# Patient Record
Sex: MALE | Race: WHITE | NOT HISPANIC OR LATINO | Employment: OTHER | URBAN - METROPOLITAN AREA
[De-identification: names, ages, dates, MRNs, and addresses within clinical notes are randomized per-mention and may not be internally consistent; named-entity substitution may affect disease eponyms.]

---

## 2017-08-24 ENCOUNTER — OFFICE VISIT (OUTPATIENT)
Dept: CARDIOLOGY | Facility: CLINIC | Age: 67
End: 2017-08-24
Payer: MEDICARE

## 2017-08-24 VITALS
SYSTOLIC BLOOD PRESSURE: 168 MMHG | DIASTOLIC BLOOD PRESSURE: 90 MMHG | HEIGHT: 66 IN | HEART RATE: 68 BPM | WEIGHT: 233 LBS | BODY MASS INDEX: 37.45 KG/M2

## 2017-08-24 DIAGNOSIS — G47.33 OBSTRUCTIVE SLEEP APNEA SYNDROME: ICD-10-CM

## 2017-08-24 DIAGNOSIS — I25.10 MILD CAD: ICD-10-CM

## 2017-08-24 DIAGNOSIS — I11.9 HYPERTENSIVE HEART DISEASE WITHOUT HEART FAILURE: Primary | ICD-10-CM

## 2017-08-24 DIAGNOSIS — I34.0 NON-RHEUMATIC MITRAL REGURGITATION: ICD-10-CM

## 2017-08-24 DIAGNOSIS — E78.5 DYSLIPIDEMIA: ICD-10-CM

## 2017-08-24 PROCEDURE — 99999 PR PBB SHADOW E&M-NEW PATIENT-LVL III: CPT | Mod: PBBFAC,,, | Performed by: INTERNAL MEDICINE

## 2017-08-24 PROCEDURE — 99204 OFFICE O/P NEW MOD 45 MIN: CPT | Mod: S$PBB,,, | Performed by: INTERNAL MEDICINE

## 2017-08-24 PROCEDURE — 1159F MED LIST DOCD IN RCRD: CPT | Mod: ,,, | Performed by: INTERNAL MEDICINE

## 2017-08-24 PROCEDURE — 1126F AMNT PAIN NOTED NONE PRSNT: CPT | Mod: ,,, | Performed by: INTERNAL MEDICINE

## 2017-08-24 PROCEDURE — 99203 OFFICE O/P NEW LOW 30 MIN: CPT | Mod: PBBFAC,PO | Performed by: INTERNAL MEDICINE

## 2017-08-24 RX ORDER — OMEPRAZOLE 40 MG/1
40 CAPSULE, DELAYED RELEASE ORAL DAILY
COMMUNITY
End: 2020-04-22

## 2017-08-24 RX ORDER — TERAZOSIN 5 MG/1
5 CAPSULE ORAL NIGHTLY
COMMUNITY
End: 2017-12-18 | Stop reason: DRUGHIGH

## 2017-08-24 RX ORDER — HYDRALAZINE HYDROCHLORIDE 50 MG/1
50 TABLET, FILM COATED ORAL 3 TIMES DAILY
Qty: 90 TABLET | Refills: 3 | Status: SHIPPED | OUTPATIENT
Start: 2017-08-24 | End: 2017-10-12 | Stop reason: SDUPTHER

## 2017-08-24 RX ORDER — TEMAZEPAM 22.5 MG/1
15 CAPSULE ORAL NIGHTLY PRN
COMMUNITY
End: 2018-12-28

## 2017-08-24 RX ORDER — AMITRIPTYLINE HYDROCHLORIDE 50 MG/1
50 TABLET, FILM COATED ORAL NIGHTLY
COMMUNITY
End: 2021-04-13 | Stop reason: SDUPTHER

## 2017-08-24 RX ORDER — FENOFIBRIC ACID 105 MG/1
1 TABLET ORAL DAILY
COMMUNITY
End: 2018-06-18 | Stop reason: ALTCHOICE

## 2017-08-24 RX ORDER — IRBESARTAN 300 MG/1
300 TABLET ORAL NIGHTLY
COMMUNITY
End: 2020-09-30

## 2017-08-24 RX ORDER — CARVEDILOL 12.5 MG/1
12.5 TABLET ORAL 2 TIMES DAILY WITH MEALS
Qty: 60 TABLET | Refills: 3 | Status: SHIPPED | OUTPATIENT
Start: 2017-08-24 | End: 2018-06-18 | Stop reason: SDUPTHER

## 2017-08-24 RX ORDER — POTASSIUM CHLORIDE 1.5 G/1.58G
20 POWDER, FOR SOLUTION ORAL 2 TIMES DAILY
COMMUNITY
End: 2021-04-13 | Stop reason: SDUPTHER

## 2017-08-24 RX ORDER — METFORMIN HYDROCHLORIDE 500 MG/1
500 TABLET ORAL
COMMUNITY
End: 2018-12-28

## 2017-08-24 RX ORDER — ASPIRIN 81 MG/1
81 TABLET ORAL DAILY
COMMUNITY
End: 2021-11-19

## 2017-08-24 RX ORDER — CARVEDILOL 25 MG/1
25 TABLET ORAL DAILY
COMMUNITY
Start: 2017-08-15 | End: 2017-08-24 | Stop reason: DRUGHIGH

## 2017-08-24 RX ORDER — AMLODIPINE BESYLATE 10 MG/1
5 TABLET ORAL DAILY
COMMUNITY
End: 2019-01-18 | Stop reason: SDUPTHER

## 2017-08-24 RX ORDER — HYDRALAZINE HYDROCHLORIDE 25 MG/1
25 TABLET, FILM COATED ORAL EVERY 12 HOURS
COMMUNITY
End: 2017-08-24 | Stop reason: DRUGHIGH

## 2017-08-24 RX ORDER — AA/PROT/LYSINE/METHIO/VIT C/B6 50-12.5 MG
10 TABLET ORAL DAILY
COMMUNITY
End: 2017-12-18

## 2017-08-24 RX ORDER — CLONIDINE HYDROCHLORIDE 0.1 MG/1
0.1 TABLET ORAL 2 TIMES DAILY
COMMUNITY
End: 2017-12-18

## 2017-08-24 RX ORDER — HYDROCHLOROTHIAZIDE 25 MG/1
25 TABLET ORAL DAILY
COMMUNITY
End: 2017-12-26 | Stop reason: SDUPTHER

## 2017-08-24 NOTE — PROGRESS NOTES
Subjective:    Patient ID:  Chris Hill is a 67 y.o. male who presents for Hypertension; Carotid Artery Disease; and Valvular Heart Disease      HPI  PT WAS FOLLOWED AT [facility], NEW TO THIS CLINIC, RECORDS REVIEWED, DR ABEL PCP WAS ADJUSTING MEDS, THEN SAW CARDIOLOGIST DR CIFUENTES AT [facility], CHANGED TOPROL TO COREG, CATH 7/2016 MINIMAL CAD, CHRONIC HA, BP UP, LOG NOTED,SEE ROS  Past Medical History:   Diagnosis Date    Coronary artery disease     mild    Diabetes mellitus     Heart murmur     Hyperlipidemia     Hypertension     Sleep apnea     NOT USING [facility]    Valvular regurgitation      Past Surgical History:   Procedure Laterality Date    ANGIOPLASTY      CARDIAC CATHETERIZATION       Family History   Problem Relation Age of Onset    Heart disease Mother     Heart disease Father      Social History     Social History    Marital status:      Spouse name: N/A    Number of children: N/A    Years of education: N/A     Social History Main Topics    Smoking status: Never Smoker    Smokeless tobacco: Never Used    Alcohol use No    Drug use: No    Sexual activity: Not Asked     Other Topics Concern    None     Social History Narrative    None       Review of patient's allergies indicates:  No Known Allergies    Current Outpatient Prescriptions:     amitriptyline (ELAVIL) 50 MG tablet, Take 50 mg by mouth every evening., Disp: , Rfl:     amlodipine (NORVASC) 10 MG tablet, Take 5 mg by mouth once daily., Disp: , Rfl:     aspirin (ECOTRIN) 81 MG EC tablet, Take 81 mg by mouth once daily., Disp: , Rfl:     carvedilol (COREG) 25 MG tablet, Take 25 mg by mouth once daily. , Disp: , Rfl:     cloNIDine (CATAPRES) 0.1 MG tablet, Take 0.1 mg by mouth 2 (two) times daily., Disp: , Rfl:     coenzyme Q10 10 mg capsule, Take 10 mg by mouth once daily., Disp: , Rfl:     fenofibric acid 105 mg Tab, Take 1 tablet by mouth once daily., Disp: , Rfl:     hydrochlorothiazide (HYDRODIURIL) 25 MG tablet,  Take 25 mg by mouth once daily., Disp: , Rfl:     irbesartan (AVAPRO) 300 MG tablet, Take 300 mg by mouth every evening., Disp: , Rfl:     metformin (GLUCOPHAGE) 500 MG tablet, Take 500 mg by mouth daily with breakfast., Disp: , Rfl:     omeprazole (PRILOSEC) 40 MG capsule, Take 40 mg by mouth once daily., Disp: , Rfl:     potassium chloride (KLOR-CON) 20 mEq Pack, Take 20 mEq by mouth once daily., Disp: , Rfl:     temazepam (RESTORIL) 22.5 MG capsule, Take 15 mg by mouth nightly as needed for Insomnia., Disp: , Rfl:     terazosin (HYTRIN) 5 MG capsule, Take 5 mg by mouth every evening., Disp: , Rfl:     hydrALAZINE (APRESOLINE) 50 MG tablet, Take 1 tablet (50 mg total) by mouth 3 (three) times daily., Disp: 90 tablet, Rfl: 3    Review of Systems   Constitution: Negative for chills, diaphoresis, weakness, malaise/fatigue and night sweats.   HENT: Positive for headaches. Negative for congestion, hearing loss and nosebleeds.    Eyes: Negative for blurred vision, discharge, double vision and visual disturbance.   Cardiovascular: Negative for chest pain, claudication, cyanosis, dyspnea on exertion (MILD), irregular heartbeat, leg swelling, near-syncope, orthopnea, palpitations, paroxysmal nocturnal dyspnea and syncope.   Respiratory: Positive for snoring. Negative for cough, hemoptysis, sleep disturbances due to breathing, sputum production and wheezing.    Endocrine: Negative for cold intolerance, heat intolerance and polyuria.   Hematologic/Lymphatic: Negative for adenopathy. Does not bruise/bleed easily.   Skin: Negative for color change, itching and nail changes.   Musculoskeletal: Positive for joint pain. Negative for back pain (CHRONIC), falls and stiffness.   Gastrointestinal: Negative for change in bowel habit (METAMUCIL), dysphagia, heartburn, hematemesis, jaundice, melena and vomiting. Abdominal pain: SOME.   Genitourinary: Negative for dysuria, flank pain and frequency.   Neurological: Negative for  "brief paralysis, difficulty with concentration, disturbances in coordination, dizziness, focal weakness, light-headedness, loss of balance, numbness, paresthesias, seizures, sensory change and tremors.   Psychiatric/Behavioral: Negative for altered mental status, depression, memory loss and substance abuse. The patient is not nervous/anxious.    Allergic/Immunologic: Negative for persistent infections.        Objective:      Vitals:    08/24/17 0831   BP: (!) 203/93   Pulse: 68   Weight: 105.7 kg (233 lb 0.4 oz)   Height: 5' 6" (1.676 m)   PainSc: 0-No pain     Body mass index is 37.61 kg/m².    Physical Exam   Constitutional: He is oriented to person, place, and time. He appears well-developed and well-nourished. He is active.   OBESE   HENT:   Head: Normocephalic and atraumatic.   Mouth/Throat: Oropharynx is clear and moist and mucous membranes are normal.   Eyes: Conjunctivae and EOM are normal. Pupils are equal, round, and reactive to light.   Neck: Normal range of motion. Neck supple. Normal carotid pulses, no hepatojugular reflux and no JVD present. Carotid bruit is not present. No tracheal deviation, no edema and no erythema present. No thyromegaly present.   Cardiovascular: Normal rate and regular rhythm.   No extrasystoles are present. PMI is not displaced.  Exam reveals no gallop, no distant heart sounds, no friction rub and no midsystolic click.    Murmur heard.  High-pitched blowing holosystolic murmur is present  at the apex  Pulses:       Carotid pulses are 2+ on the right side, and 2+ on the left side.       Radial pulses are 2+ on the right side, and 2+ on the left side.        Femoral pulses are 2+ on the right side, and 2+ on the left side.       Dorsalis pedis pulses are 2+ on the right side, and 2+ on the left side.        Posterior tibial pulses are 2+ on the right side, and 2+ on the left side.   Pulmonary/Chest: Effort normal and breath sounds normal. No accessory muscle usage. No tachypnea " and no bradypnea. No respiratory distress.   Abdominal: Soft. Bowel sounds are normal. He exhibits no distension and no mass. There is no hepatosplenomegaly. There is no tenderness. There is no CVA tenderness.   Musculoskeletal: Normal range of motion. He exhibits no edema or deformity.   Lymphadenopathy:     He has no cervical adenopathy.   Neurological: He is alert and oriented to person, place, and time. He has normal strength. He displays no tremor. No cranial nerve deficit. He exhibits normal muscle tone. Coordination normal.   Skin: Skin is warm and dry. No cyanosis or erythema. No pallor.   Psychiatric: He has a normal mood and affect. His speech is normal and behavior is normal. Judgment and thought content normal.               ..    Chemistry        Component Value Date/Time     06/09/2010 1215    K 4.2 06/09/2010 1215     06/09/2010 1215    CO2 30 (H) 06/09/2010 1215    BUN 19 06/09/2010 1215    CREATININE 1.1 06/09/2010 1215     06/09/2010 1215        Component Value Date/Time    CALCIUM 9.6 06/09/2010 1215    ALKPHOS 73 06/09/2010 1215    AST 30 06/09/2010 1215    ALT 38 06/09/2010 1215    BILITOT 0.3 06/09/2010 1215    ESTGFRAFRICA >60 06/09/2010 1215    EGFRNONAA >60 06/09/2010 1215            ..No results found for: CHOL  No results found for: HDL  No results found for: LDLCALC  No results found for: TRIG  No results found for: CHOLHDL  ..No results found for: WBC, HGB, HCT, MCV, PLT    Test(s) Reviewed  I have reviewed the following in detail:  [] Stress test   [] Angiography   [] Echocardiogram   [] Labs   [x] Other:       Assessment:         ICD-10-CM ICD-9-CM   1. Hypertensive heart disease without heart failure I11.9 402.90   2. Obstructive sleep apnea syndrome G47.33 327.23   3. Mild CAD I25.10 414.00   4. Non-rheumatic mitral regurgitation I34.0 424.0   5. Obesity, Class II, BMI 35.0-39.9, with comorbidity (see actual BMI) E66.9 278.00   6. Dyslipidemia E78.5 272.4      Problem List Items Addressed This Visit        Pulmonary    Obstructive sleep apnea syndrome       Cardiac/Vascular    Hypertensive heart disease without heart failure - Primary    Non-rheumatic mitral regurgitation    Mild CAD    Dyslipidemia       Endocrine    Obesity, Class II, BMI 35.0-39.9, with comorbidity (see actual BMI)      Other Visit Diagnoses    None.          Plan:         GET LABS FROM PCP /[facility], INCREASE HYDRALAZINE, NEEDS TO USE CPAP (NEEDED 14 CM ), REFER BACK TO DR HARDIN, EXPLAINED RELATION WITH BP, RISKS, ALL OTHER CV CLINICALLY STABLE, NO ANGINA, NO HF, NO TIA, NO CLINICAL ARRHYTHMIA,, EDUCATION, DIET, EXERCISE, WEIGHT LOSS,COREG SHOULD BE TWICE/DAYS, , EXPLAINED PLAN TO PT/ WIFE,  RTC IN 4 MO WITH CMP  Hypertensive heart disease without heart failure    Obstructive sleep apnea syndrome    Mild CAD    Non-rheumatic mitral regurgitation    Obesity, Class II, BMI 35.0-39.9, with comorbidity (see actual BMI)    Dyslipidemia    Other orders  -     hydrALAZINE (APRESOLINE) 50 MG tablet; Take 1 tablet (50 mg total) by mouth 3 (three) times daily.  Dispense: 90 tablet; Refill: 3    RTC Low level/low impact aerobic exercise 5x's/wk. Heart healthy diet and risk factor modification.    See labs and med orders.    Aerobic exercise 5x's/wk. Heart healthy diet and risk factor modification.    See labs and med orders.

## 2017-08-24 NOTE — PATIENT INSTRUCTIONS
Taking Aspirin for Atherosclerosis       Aspirin is a medicine often prescribed to treat atherosclerosis. This condition affects your arteries. These are the blood vessels that carry blood away from your heart. Having atherosclerosis means youre at greater risk of a heart attack or stroke. Aspirin can help prevent these from happening.  How atherosclerosis affects your arteries   A fatty material (plaque) can build up in your arteries. This makes it harder for blood to flow through them. A blood clot can then form on the plaque. This may block the artery, cutting off blood flow. This can cause conditions such as coronary artery disease (CAD) and peripheral arterial disease (PAD):  · CAD occurs when plaque builds up in the coronary artery. This artery supplies the heart with oxygen-rich blood.  · PAD occurs when plaque forms in leg arteries.  The same things that cause CAD and PAD can also cause plaque to form in other arteries in your body, such as those in the brain. When plaque occurs in any of these arteries, it raises your risk of heart attack or stroke.  What aspirin does  Aspirin is a blood-thinner (antiplatelet medicine). It helps keep blood clots from forming. This reduces the risk of blockage. Aspirin can be taken daily if you are at high risk of or have already had a heart attack or stroke. It is also used after a procedure called a stent placement. This is when a tiny wire mesh tube, or stent, is placed in an artery to keep it open. Aspirin helps prevent blood clots from forming on the stent.  Taking aspirin safely  Tell your healthcare provider about any medicines you are taking. This includes:  · All prescription medicines  · Over-the-counter medicines  · Herbs, vitamins, and other supplements  Also mention if you have a history of ulcers or bleeding problems. Ask whether you will need to stop taking aspirin before having surgery or dental work. Always take medicines as directed.  Tips for taking  "aspirin  · Have a routine. For example, take aspirin with the same meal each day.  · Dont take more than prescribed. A low dose gives the same benefit as a higher one, with a lower risk of side effects.  · Dont skip doses. Aspirin needs to be taken each day to work well.  · Keep track of what you take. A pillbox with days of the week can help, especially if you take several medicines. Or use a list or chart to keep track.  When to call your healthcare provider  Side effects of aspirin are not usually serious. If you do have problems, changing your dose may help. Call your healthcare provider if you have any of the following:  · Excessive bruising (some bruising is normal)  · Nosebleeds, bleeding gums, or other excessive bleeding  · An upset stomach or stomach pain   Date Last Reviewed: 6/1/2016 © 2000-2016 [employer]. [address] [place] [address]. All rights reserved. This information is not intended as a substitute for professional medical care. Always follow your healthcare professional's instructions.        Controlling Your Cholesterol  Cholesterol is a waxy substance. It travels in your blood through the blood vessels. When you have high cholesterol, it builds up in the walls of the blood vessels. This makes the vessels narrower. Blood flow decreases. You are then at greater risk for having a heart attack or a stroke.  Good and bad cholesterol  Lipids are fats. Blood is mostly water. Fat and water don't mix. So our bodies need lipoproteins (lipids inside a protein shell) to carry the lipids. The protein shell carries its lipids through the bloodstream. There are two main kinds of lipoproteins:  · LDL (low-density lipoprotein) is known as "bad cholesterol." It mainly carries cholesterol. It delivers this cholesterol to body cells. Excess LDL cholesterol will build up in artery walls. This increases your risk for heart disease and stroke.  · HDL (high-density lipoprotein) is known " "as "good cholesterol." This protein shell collects excess cholesterol that LDLs have left behind on blood vessel walls. That's why high levels of HDL cholesterol can decrease your risk of heart disease and stroke.  Controlling cholesterol levels  Total cholesterol includes LDL and HDL cholesterol, as well as other fats in the bloodstream. If your total cholesterol is high, follow the steps below to help lower your total cholesterol level:  · Eat less unhealthy fat:  ¨ Cut back on saturated fats and trans (also called hydrogenated) fats by selecting lean cuts of meat, low-fat dairy, and using oils instead of solid fats. Limit baked goods, processed meats, and fried foods. A diet thats high in these fats increases your bad cholesterol. It's not enough to just cut back on foods containing cholesterol.  ¨ Eat about 2 servings of fish per week. Most fish contain omega-3 fatty acids. These help lower blood cholesterol.  ¨ Eat more whole grains and soluble fiber (such as oat bran). These lower overall cholesterol.  · Be active:  ¨ Choose an activity you enjoy. Walking, swimming, and riding a bike are some good ways to be active.  ¨ Start at a level where you feel comfortable. Increase your time and pace a little each week.  ¨ Work up to 40 minutes of moderate to high intensity physical activity at least 3 to 4 days per week.  ¨ Remember, some activity is better than none.  ¨ If you haven't been exercising regularly, start slowly. Check with your doctor to make sure the exercise plan is right for you.  · Quit smoking. Quitting smoking can improve your lipid levels. It also lowers your risk for heart disease and stroke.  · Weight management. If you are overweight or obese, your health care provider will work with you to lose weight and lower your BMI (body mass index) to a normal or near-normal level. Making diet changes and increasing physical activity can help.  · Take medication as directed. Many people need medication " to get their LDL levels to a safe level. Medication to lower cholesterol levels is effective and safe. (But taking medication is not a substitute for exercise or watching your diet!) Your doctor can tell you whether you might benefit from a cholesterol-lowering medication.  Date Last Reviewed: 5/11/2015  © 2954-4423 [employer]. [address] [place] [address], [place] [address]. All rights reserved. This information is not intended as a substitute for professional medical care. Always follow your healthcare professional's instructions.        Continuous Positive Air Pressure (CPAP)  Continuous positive air pressure (CPAP) uses gentle air pressure to hold the airway open. CPAP is often the most effective treatment for sleep apnea and severe snoring. It works very well for many people. But keep in mind that it can take several adjustments before the setup is right for you.    How CPAP works  The CPAPmachine  is a small portable pump beside the bed. The pump sends air through a hose, which is held over your nose and mouth by a mask. Mild air pressure is gently pushed through your airway. The air pressure nudges sagging tissues aside. This widens the airway so you can breathe better. CPAP may be combined with other kinds of therapy for sleep apnea.     A mask over the nose gently directs air into the throat to keep the airway open.   Types of air pressure treatments  There are different types of CPAP. Your doctor or CPAP technician will help you decide which type is best for you:  · Basic CPAP keeps the pressure constant all night long.  · A bilevel device (BiPAP) provides more pressure when you breathe in and less when you breathe out. A BiPAP machine also may be set to provide automatic breaths to maintain breathing if you stop breathing while sleeping.  · An autoCPAP device automatically adjusts pressure throughout the night and in response to changes such as body position, sleep stage, and snoring.  Date Last  Reviewed: 8/10/2015  © 9518-5581 [employer]. [address] [place] [address], [place] [address]. All rights reserved. This information is not intended as a substitute for professional medical care. Always follow your healthcare professional's instructions.        Heart Disease Education    The heart beats 60 to 100 times per minute, 24 hours a day. This equals almost 1000,000 times a day. It pumps blood with oxygen and nutrients to the tissues and organs of the body. But the heart is a muscle and needs its own supply of blood. Blood flow to the heart is supplied by the coronary arteries. Coronary artery disease (atherosclerosis) is a result of cholesterol, saturated fat, and calcium deposits (plaques) that build up inside the walls. This causes inflammation within the coronary arteries. These plaques narrow the artery and reduce blood flow to the heart muscle. The reduction in blood flow to the heart muscle decreases oxygen supply to the heart. If the narrowing is significant enough, the oxygen supply to one or more regions of the heart can be temporarily or permanently shut down. This can cause chest pain, and possibly death of heart tissue (heart attack).  Types of chest pain  Angina is the name for pain in the heart muscle. Angina is a warning sign of serious heart disease. When untreated it can lead to a heart attack, also known as acute myocardial infarction, or AMI. Angina occurs when there is not enough blood and oxygen flowing to the heart for the amount of work it is doing. This most often happens during physical exertion, when the heart is working hardest. It is usually relieved by rest or nitroglycerin. Angina may also occur after a large meal when extra blood is sent to the digestive organs and less goes to the heart. In the case of advanced or unstable heart disease, angina can occur at rest or awaken you from sleep. Angina usually lasts from a few minutes up to 20 minutes or more. When treated  early, the effects of angina can be reversed without permanent damage to the heart. Angina is a serious condition and needs to be evaluated by a medical professional immediately.  There are two types of angina -- stable and unstable:  · Stable angina usually occurs with a predictable level of activity. Being stable, its character, severity, and occurrence do not change much over time. It usually starts with activity, and resolves with rest or taking your medicine as instructed by your doctor. The symptoms usually do not last long.  · Unstable angina changes or gets worse over time. It is different from whatever you are used to. It may feel different or worse, begin without cause, occur with exercise or exertion, wake you up from sleep, and last longer. It may not respond in the same way as it does when you take your usual medicines for an attack. This type of angina can be a warning sign of an impending heart attack.     A heart attack is usually the result of a blood clot that suddenly forms in a coronary artery that has been narrowed with plaque. When this occurs, blood flow may be cut off to a part of the heart muscle, causing the cells to die. This weakens the pumping action of the heart, which affects the delivery of blood to all the other organs in the body including the brain. This damage is not reversible. However, early treatment can limit the amount of damage.  The pain you feel with angina and a heart attack may have a similar quality. However, it is usually different in intensity and duration. Here are some typical descriptions of a heart attack:  · It is most often experienced as a squeezing, crushing, pressure-like sensation in the center of the chest.  · It is sometimes described as something heavy sitting on my chest.  · It may feel more like a bad case of indigestion.  · The pain may spread from the chest to the arm, shoulder, throat or jaw.  · Sometimes the pain is not felt in the chest at all,  "but only in the arm, shoulder, throat or jaw.  · There may also be nausea, vomiting, dizziness or light-headedness, sweating and trouble breathing.  · Palpitations, or your heart beating rapidly  · A new, irregular heart beat  · Unexplained weakness  You may not be able to tell the difference between "bad" angina and a heart attack at home. Seek help if your symptoms are different than usual. Do not be in denial or just try to "tough it out."  Call 911  This is the fastest and safest way to get to the emergency department. The paramedics can also start treatment on the way to the hospital, saving valuable time for your heart.  · If the angina gets worse, if it continues, or if it stops and returns, call 911 immediately. Do not delay. You may be having a heart attack.  · After you call 911, take a second tablet or spray unless instructed otherwise. When repeating doses, sit down if possible, because it can make you feel lightheaded or dizzy. Wait another 5 minutes. If the angina still does not go away, take a third tablet or spray. Do not take more than 3 tablets or sprays within 15 minutes. Stay on the phone with 911 for further instruction.  · Your healthcare provider may give you slightly different instructions than those above. If so, follow them carefully.  Do not wait until symptoms become severe to call 911.  Other reasons to call 911 include:  · Trouble breathing  · Feeling lightheaded, faint, or dizzy  · Rapid heart beat  · Slower than usual heart rate compared to your normal  · Angina with weakness, dizziness, fainting, heavy sweating, nausea, or vomiting  · Extreme drowsiness, confusion  · Weakness of an arm or leg or one side of the face  · Difficulty with speech or vision  When to seek medical care  Remember, the signs and symptoms of a heart attack are not always like they are on TV. Sometimes they are not so obvious. You may only feel weak, or just not right. If it is not clear or if you have any " "doubt, call for advice.  · Seek help if there is a change in the type of pain, if it feels different, or if your symptoms are mild.  · Do not drive yourself. Have someone else drive you. If no one can drive, call 911.  · Do not delay. Fast diagnosis and treatment can prevent or limit the amount of heart damage during a heart attack.  · Do not go to your doctor's office or a clinic as they may not be able to provide all the testing and treatment required for this condition.  · If your doctor has given you medicine to take when symptoms occur, take them but don't delay getting help trying to locate medicines.  What happens in the emergency department  The emergency department is connected to your local emergency medical system (EMS) through 911. That's why during a cardiac emergency, calling 911 is the fastest way to get help. The goal of the emergency department is to rapidly screen, evaluate, and treat people.  Once you are there, an electrocardiogram (ECG or heart tracing) will be done. Blood samples may be taken to look for the presence of heart enzymes that leak from damaged heart cells and show if a heart attack is occurring. You will often be evaluated by a heart specialist (cardiologist) who decides the best course of action. In the case of severe angina or early heart attack, and depending on the circumstances, powerful "clot busting" medicines can be used to dissolve blood clots in the coronary artery. In other cases, you may be taken to a cardiac catheterization lab. Here, a tiny balloon-tipped catheter is advanced through blood vessels to the heart. There the balloon is inflated pushing open the blood vessel restoring blood flow.  Risk factors for heart disease  Risk factors for heart disease are a combination of genetic and lifestyle. Many risk factors work by either directly or indirectly damaging the blood vessels of the heart, or by increasing the risk of forming blood or cholesterol clots, which then " clog up and block the arteries.     Examples of physical lifestyle risk factors:  · Cigarette smoking  · High blood pressure  · High blood cholesterol  · Use of stimulant drugs such as cocaine, crack, and amphetamines  · Eating a high-fat, high-cholesterol meal  · Diabetes   · Obesity which increases risk for diabetes and high blood pressure  · Lack of regular physical activity     Examples of emotional lifestyle factors:  · Chronic high stress levels release stress hormones. These raise blood pressure and cholesterol level and makes blood clot more easily.  · Held-in anger, hostile or cynical attitude  · Social and emotional isolation, lack of intimacy  · Loss of relationship  · Depression  Other factors that increase the risk of heart attack that you cannot control :  · Age. The older you get beyond 40, the greater is your risk of significant coronary artery disease.  · Gender. More men than women get heart disease; but once past menopause, women who are not taking estrogen replacement have the same risk as men for a heart attack.  · Family history. If your mother, father, brother or sister has coronary artery disease, your risk of having it is higher than a person your age without this family history.  What can you do to decrease your risk  To reduce your risk of heart disease:  · Get regular checkups with your doctor.  · Take your medicines for blood pressure, cholesterol or diabetes as directed.  · Watch your diet. Eat a heart healthy diet choosing fresh foods, less salt, cholesterol, and fat  · Stop smoking. Get help if needed.  · Get regular exercise.  · Manage stress.  · Carry a list of medicines and doses in your wallet.  Date Last Reviewed: 12/30/2015  © 6430-7122 [employer]. [address] [place] [address], [place] [address]. All rights reserved. This information is not intended as a substitute for professional medical care. Always follow your healthcare professional's  instructions.        Understanding Mitral Valve Regurgitation    Mitral valve regurgitation is when the mitral valve in the heart is leaky. It lets some blood flow backwards when the ventricle squeezes.  How mitral valve regurgitation happens  The mitral valve is one of the hearts 4 valves. Normally, these valves help the blood flow through the hearts 4 chambers and out to the body without leaking backwards. The mitral valve lies between the left atrium and the left ventricle. Normally, the mitral valve stops blood from flowing back into the left atrium from the left ventricle when the ventricle squeezes. This maximizes forward blood flow through the heart.  With mitral valve regurgitation, some blood leaks back through the valve. This makes the heart have to work harder to get blood out to your body. When this blood is regurgitated backwards in the heart, it increases the pressure within the heart which can lead to muscle damage as well as high blood pressure in the lungs.  Mitral valve regurgitation can increase risk for other heart rhythm problems, such as atrial fibrillation (AFib). This abnormal heart rhythm results in fast and irregular heartbeats which can also lower the heart's pumping ability and increases the risk for stroke.  Mitral valve regurgitation can be acute or chronic. If its acute, the valve suddenly becomes leaky. In this case, the heart doesnt have time to adapt to the leak in the valve. Symptoms are often severe. If its chronic, the valve leaks more and more over time. The heart has time to adapt to the leak.  What causes mitral valve regurgitation?  Mitral valve regurgitation can be caused by various things, such as:  · Heart attack or coronary artery disease  · Natural aging that can damage the mitral valve  · Tearing of the tissue or muscle that supports the mitral valve such as due to an injury  · A redundant or floppy mitral valve, called mitral valve prolapse  · Rheumatic heart  disease caused by untreated Streptococcus infection. Strep are the bacteria that cause strep throat.  · Certain autoimmune diseases, such as rheumatoid arthritis  · Infection of the heart valves (endocarditis)  · Problems with the mitral valve that are present at birth  · Certain medicines  You can reduce some risk factors for mitral valve regurgitation. For example:  · Use antibiotics as directed to treat a strep infection and prevent rheumatic heart disease.  · Reduce the risk for heart valve infection by not injecting illegal drugs.  · Manage risk factors that can lead to a heart attack or chronic heart artery disease.  There are other risk factors that you cant change. For example, some conditions that can lead to mitral valve regurgitation are partly genetic.  Symptoms of mitral valve regurgitation  Chronic mitral valve regurgitation often doesnt cause symptoms for a long time. Mild or moderate mitral regurgitation often doesnt cause any symptoms. If the condition becomes more severe, you may have symptoms. They may get worse and happen more often over time. Symptoms may include:  · Shortness of breath with physical activity  · Shortness of breath when lying flat  · Feeling tired  · Less ability to exercise  · Awareness of your heartbeat  · Swelling in your legs, abdomen, and the veins in your neck  · Chest pain (less common)  Acute, severe mitral valve regurgitation is a medical emergency that can cause serious symptoms such as:  · Symptoms of shock (pale skin, loss of consciousness, rapid breathing)  · Severe shortness of breath  · Arrhythmias that make the heart unable to pump blood well  Diagnosing mitral valve regurgitation  Your healthcare provider will ask about your health history. He or she will give you a physical exam. Using a stethoscope, your healthcare provider will listen to your heart. This is to check for sounds called heart murmurs and other signs that the heart isnt pumping normally. You  may also have tests such as:  · Echocardiogram, to look at the structure of the heart using sound waves  · Stress echocardiogram, to see how well the heart does during exercise  · Electrocardiogram (EKG), to check the hearts rhythm  · Cardiac MRI, transesophageal echocardiogram, or cardiac catheterization, if more information is needed  Date Last Reviewed: 6/1/2016  © 5228-2732 [employer]. [address] [place] [address]. All rights reserved. This information is not intended as a substitute for professional medical care. Always follow your healthcare professional's instructions.        Obstructive Sleep Apnea  Obstructive sleep apnea is a condition that causes your air passages to become narrowed or blocked during sleep. As a result, breathing stops for short periods. Your body wakes up enough for breathing to begin again, though you don't remember it. The cycle of stopped breathing and brief awakenings can repeat dozens of times a night. This prevents the body from getting to the deeper stages of sleep that are needed for good rest.  Signs of sleep apnea include loud snoring, noisy breathing, and gasping sounds during sleep. Daytime symptoms include waking up tired after a full night's sleep, waking up with headaches, feeling very sleepy or falling asleep during the day, and having problems with memory or concentration.  Risk factors for sleep apnea include:  · Being overweight  · Being a man, or a woman in menopause  · Smoking  · Using alcohol or sedating medications or herbs  · Having enlarged structures in the nose or throat  Home care  Lifestyle changes that can help treat snoring and sleep apnea include the following:  · If you are overweight, lose weight. Talk to your healthcare provider about a weight-loss plan for you.  · Avoid alcohol for 3 to 4 hours before bedtime. Avoid sedating medications. Ask your healthcare provider about the medications you take.  · If you smoke, talk to  your healthcare provider about ways to quit.  · Sleep on your side. This can help prevent gravity from pulling relaxed throat tissues into your breathing passages.  · If you have allergies or sinus problems that block your nose, ask your healthcare provider for help.  Follow up  Follow up with your healthcare provider as advised. A diagnosis of sleep apnea is made with a sleep study. Your healthcare provider can tell you more about this test.  When to seek medical care  Sleep apnea can make you more likely to have certain health problems. These include high blood pressure, heart attack, stroke, and sexual dysfunction. If you have sleep apnea, talk to your healthcare provider about the best treatments for you.  Date Last Reviewed: 5/3/2015  © 9405-0616 [employer]. [address] [place] [address], [place] [address]. All rights reserved. This information is not intended as a substitute for professional medical care. Always follow your healthcare professional's instructions.        Weight Management: Getting Started  Healthy bodies come in all shapes and sizes. Not all bodies are made to be thin. For some people, a healthy weight is higher than the average weight listed on weight charts. Your healthcare provider can help you decide on a healthy weight for you.    Reasons to lose weight  Losing weight can help with some health problems, such as high blood pressure, heart disease, diabetes, sleep apnea, and arthritis. You may also feel more energy.  Set your long-term goal  Your goal doesn't even have to be a specific weight. You may decide on a fitness goal (such as being able to walk 10 miles a week), or a health goal (such as lowering your blood pressure). Choose a goal that is measurable and reasonable, so you know when you've reached it. A goal of reaching a BMI of less than 25 is not always reasonable (or possible).   Make an action plan  Habits dont change overnight. Setting your goals too high can leave you  feeling discouraged if you cant reach them. Be realistic. Choose one or two small changes you can make now. Set an action plan for how you are going to make these changes. When you can stick to this plan, keep making a few more small changes. Taking small steps will help you stay on the path to success.  Track your progress  Write down your goals. Then, keep a daily record of your progress. Write down what you eat and how active you are. This record lets you look back on how much youve done. It may also help when youre feeling frustrated. Reward yourself for success. Even if you dont reach every goal, give yourself credit for what you do get done.  Get support  Encouragement from others can help make losing weight easier. Ask your family members and friends for support. They may even want to join you. Also look to your healthcare provider, registered dietitian, and  for help. Your local hospital can give you more information about nutrition, exercise, and weight loss.  Date Last Reviewed: 1/31/2016  © 6543-6444 [employer]. [address] [place] [address], [place] [address]. All rights reserved. This information is not intended as a substitute for professional medical care. Always follow your healthcare professional's instructions.

## 2017-09-05 ENCOUNTER — TELEPHONE (OUTPATIENT)
Dept: CARDIOLOGY | Facility: CLINIC | Age: 67
End: 2017-09-05

## 2017-09-05 NOTE — TELEPHONE ENCOUNTER
MD Maria Alejandra Null, LPN   Caller: Unspecified (Today,  1:49 PM)             Increase hydralazine to 100 BID, DIET, DECREASE SALT      Gave pt Dr Jama's instructions, pt verbalized understanding

## 2017-09-05 NOTE — TELEPHONE ENCOUNTER
/93, 1:45 in pm, random, no exertion, has some gastric distress today, been in 170/90's, last night 185/99, HR 63,please advise

## 2017-09-27 ENCOUNTER — TELEPHONE (OUTPATIENT)
Dept: CARDIOLOGY | Facility: CLINIC | Age: 67
End: 2017-09-27

## 2017-09-27 ENCOUNTER — NURSE TRIAGE (OUTPATIENT)
Dept: ADMINISTRATIVE | Facility: CLINIC | Age: 67
End: 2017-09-27

## 2017-09-27 NOTE — TELEPHONE ENCOUNTER
----- Message from Negar Barger sent at 9/27/2017  4:26 PM CDT -----  Contact: self  Patient called regarding multiple side effects while prescribed, COREG 12.5 MG tablet. Stating tightness in chest and dizzy. Transferred patient to [facility]. Please contact [phone] (home)

## 2017-09-27 NOTE — TELEPHONE ENCOUNTER
"  Reason for Disposition   Difficulty breathing     Please see triage note.  Directed Chris to go to the ED now for dizziness, lightheadedness, lump in throat, shortness of breath, heart pounding.  His wife will drive him to [facility] in [place]. Message to Dr Garza.  Please contact caller directly with any additional care advice.   Extra heart beats OR irregular heart beating  (i.e., "palpitations")    Answer Assessment - Initial Assessment Questions  1. DESCRIPTION: "Describe your dizziness."      I feel a lump in my throat, tightness when I swallow, and my chest is tight. Hard to swallow.    2. LIGHTHEADED: "Do you feel lightheaded?" (e.g., somewhat faint, woozy, weak upon standing)      I am feeling lightheaded, weak with standing.    3. VERTIGO: "Do you feel like either you or the room is spinning or tilting?" (i.e. vertigo)      No.    4. SEVERITY: "How bad is it?"  "Do you feel like you are going to faint?" "Can you stand and walk?"    - MILD - walking normally    - MODERATE - interferes with normal activities (e.g., work, school)     - SEVERE - unable to stand, requires support to walk, feels like passing out now.       Moderate.    5. ONSET:  "When did the dizziness begin?"      The dizziness began a couple weeks ago.    6. AGGRAVATING FACTORS: "Does anything make it worse?" (e.g., standing, change in head position)      Standing up.    7. HEART RATE: "Can you tell me your heart rate?" "How many beats in 15 seconds?"  (Note: not all patients can do this)        I feel like my heart is pounding.    8. CAUSE: "What do you think is causing the dizziness?"      I think it might be my medication.    9. RECURRENT SYMPTOM: "Have you had dizziness before?" If so, ask: "When was the last time?" "What happened that time?"      Yes. About 3 years ago, when I was on lisinopril.    10. OTHER SYMPTOMS: "Do you have any other symptoms?" (e.g., fever, chest pain, vomiting, diarrhea, bleeding)        Diarrhea for a " "month.  Shortness of breath.  Headache.    11. PREGNANCY: "Is there any chance you are pregnant?" "When was your last menstrual period?"        Na    Protocols used: ST DIZZINESS - [id number]    "

## 2017-10-12 RX ORDER — HYDRALAZINE HYDROCHLORIDE 100 MG/1
100 TABLET, FILM COATED ORAL 2 TIMES DAILY
Qty: 180 TABLET | Refills: 1 | Status: SHIPPED | OUTPATIENT
Start: 2017-10-12 | End: 2018-04-15 | Stop reason: SDUPTHER

## 2017-10-12 NOTE — TELEPHONE ENCOUNTER
----- Message from Palak Orta sent at 10/12/2017  8:31 AM CDT -----  Contact: Chris  Patient is requesting refill Rx hydrALAZINE (APRESOLINE) but needs to be written for 100 mg tablets twice daily. Please send Rx to     [facility] [address] - [address]  [address] [place] [address]  Phone: [phone] Fax: [phone]    Will be out of medication tomorrow. Any questions please call [phone]. Thanks!

## 2017-12-15 LAB
ALBUMIN SERPL-MCNC: 4.1 G/DL (ref 3.6–4.8)
ALBUMIN/GLOB SERPL: 1.7 {RATIO} (ref 1.2–2.2)
ALP SERPL-CCNC: 42 IU/L (ref 39–117)
ALT SERPL-CCNC: 20 IU/L (ref 0–44)
AMBIG ABBREV CMP 14 DEFAULT: NORMAL
AST SERPL-CCNC: 21 IU/L (ref 0–40)
BILIRUB SERPL-MCNC: 0.2 MG/DL (ref 0–1.2)
BUN SERPL-MCNC: 21 MG/DL (ref 8–27)
BUN/CREAT SERPL: 20 (ref 10–24)
CALCIUM SERPL-MCNC: 9.2 MG/DL (ref 8.6–10.2)
CHLORIDE SERPL-SCNC: 98 MMOL/L (ref 96–106)
CO2 SERPL-SCNC: 24 MMOL/L (ref 18–29)
CREAT SERPL-MCNC: 1.07 MG/DL (ref 0.76–1.27)
GFR SERPLBLD CREATININE-BSD FMLA CKD-EPI: 72 ML/MIN/1.73
GFR SERPLBLD CREATININE-BSD FMLA CKD-EPI: 83 ML/MIN/1.73
GLOBULIN SER CALC-MCNC: 2.4 G/DL (ref 1.5–4.5)
GLUCOSE SERPL-MCNC: 107 MG/DL (ref 65–99)
POTASSIUM SERPL-SCNC: 3.6 MMOL/L (ref 3.5–5.2)
PROT SERPL-MCNC: 6.5 G/DL (ref 6–8.5)
SODIUM SERPL-SCNC: 140 MMOL/L (ref 134–144)

## 2017-12-18 ENCOUNTER — OFFICE VISIT (OUTPATIENT)
Dept: CARDIOLOGY | Facility: CLINIC | Age: 67
End: 2017-12-18
Payer: MEDICARE

## 2017-12-18 VITALS
BODY MASS INDEX: 36.99 KG/M2 | SYSTOLIC BLOOD PRESSURE: 144 MMHG | DIASTOLIC BLOOD PRESSURE: 74 MMHG | HEIGHT: 66 IN | HEART RATE: 67 BPM | WEIGHT: 230.19 LBS

## 2017-12-18 DIAGNOSIS — E78.5 DYSLIPIDEMIA: ICD-10-CM

## 2017-12-18 DIAGNOSIS — I25.10 MILD CAD: ICD-10-CM

## 2017-12-18 DIAGNOSIS — I34.0 NON-RHEUMATIC MITRAL REGURGITATION: ICD-10-CM

## 2017-12-18 DIAGNOSIS — I11.9 HYPERTENSIVE HEART DISEASE WITHOUT HEART FAILURE: Primary | ICD-10-CM

## 2017-12-18 PROCEDURE — 99213 OFFICE O/P EST LOW 20 MIN: CPT | Mod: S$PBB,,, | Performed by: INTERNAL MEDICINE

## 2017-12-18 PROCEDURE — 99213 OFFICE O/P EST LOW 20 MIN: CPT | Mod: PO | Performed by: INTERNAL MEDICINE

## 2017-12-18 RX ORDER — TERAZOSIN 10 MG/1
10 CAPSULE ORAL NIGHTLY
Qty: 30 CAPSULE | Refills: 5 | Status: SHIPPED | OUTPATIENT
Start: 2017-12-18 | End: 2018-06-18 | Stop reason: SDUPTHER

## 2017-12-18 NOTE — PROGRESS NOTES
Subjective:    Patient ID:  Chris Hill is a 67 y.o. male who presents for Hypertension        HPI  BP BETTER BUT NOT IDEAL, STARTED USING CPAP FOR 2 MO, OVERALL FEELS BETTER,HAD LABS AT [facility], NOT RECEIVED,  SEE ROS    Past Medical History:   Diagnosis Date    Coronary artery disease     mild    Diabetes mellitus     Heart murmur     Hyperlipidemia     Hypertension     Sleep apnea     NOT USING CPCP    Valvular regurgitation      Past Surgical History:   Procedure Laterality Date    ANGIOPLASTY      CARDIAC CATHETERIZATION       Family History   Problem Relation Age of Onset    Heart disease Mother     Heart disease Father      Social History     Social History    Marital status:      Spouse name: N/A    Number of children: N/A    Years of education: N/A     Social History Main Topics    Smoking status: Never Smoker    Smokeless tobacco: Never Used    Alcohol use No    Drug use: No    Sexual activity: Not on file     Other Topics Concern    Not on file     Social History Narrative    No narrative on file       Review of patient's allergies indicates:  No Known Allergies    Current Outpatient Prescriptions:     amitriptyline (ELAVIL) 50 MG tablet, Take 50 mg by mouth every evening., Disp: , Rfl:     amlodipine (NORVASC) 10 MG tablet, Take 5 mg by mouth once daily., Disp: , Rfl:     aspirin (ECOTRIN) 81 MG EC tablet, Take 81 mg by mouth once daily., Disp: , Rfl:     carvedilol (COREG) 12.5 MG tablet, Take 1 tablet (12.5 mg total) by mouth 2 (two) times daily with meals., Disp: 60 tablet, Rfl: 3    fenofibric acid 105 mg Tab, Take 1 tablet by mouth once daily., Disp: , Rfl:     hydrALAZINE (APRESOLINE) 100 MG tablet, Take 1 tablet (100 mg total) by mouth 2 (two) times daily., Disp: 180 tablet, Rfl: 1    hydrochlorothiazide (HYDRODIURIL) 25 MG tablet, Take 25 mg by mouth once daily., Disp: , Rfl:     irbesartan (AVAPRO) 300 MG tablet, Take 300 mg by mouth every evening., Disp: ,  Rfl:     metformin (GLUCOPHAGE) 500 MG tablet, Take 500 mg by mouth daily with breakfast., Disp: , Rfl:     omeprazole (PRILOSEC) 40 MG capsule, Take 40 mg by mouth once daily., Disp: , Rfl:     potassium chloride (KLOR-CON) 20 mEq Pack, Take 20 mEq by mouth once daily., Disp: , Rfl:     temazepam (RESTORIL) 22.5 MG capsule, Take 15 mg by mouth nightly as needed for Insomnia., Disp: , Rfl:     terazosin (HYTRIN) 10 MG capsule, Take 1 capsule (10 mg total) by mouth every evening., Disp: 30 capsule, Rfl: 5    Review of Systems   Constitution: Negative for chills, diaphoresis, fever, weakness, malaise/fatigue and night sweats. Weight loss: SOME.   HENT: Negative for congestion and nosebleeds.    Eyes: Negative for blurred vision, discharge, double vision and visual disturbance.   Cardiovascular: Negative for chest pain, claudication, cyanosis, dyspnea on exertion (BETTER), irregular heartbeat, leg swelling, near-syncope, orthopnea, palpitations, paroxysmal nocturnal dyspnea and syncope.   Respiratory: Negative for cough, hemoptysis and wheezing.    Endocrine: Negative for cold intolerance, heat intolerance and polyuria.   Hematologic/Lymphatic: Negative for adenopathy and bleeding problem.   Skin: Negative for color change, itching and nail changes.   Musculoskeletal: Negative for back pain (CHRONIC) and falls.   Gastrointestinal: Negative for abdominal pain, change in bowel habit, dysphagia, heartburn, hematemesis, jaundice, melena and vomiting.   Genitourinary: Negative for dysuria, flank pain and frequency.   Neurological: Negative for brief paralysis, difficulty with concentration, disturbances in coordination, dizziness, focal weakness, headaches, light-headedness, loss of balance, numbness, paresthesias, seizures, sensory change and tremors.   Psychiatric/Behavioral: Negative for altered mental status, depression, memory loss and substance abuse. The patient is not nervous/anxious.    Allergic/Immunologic:  "Negative for persistent infections.        Objective:      Vitals:    12/18/17 0844   BP: (!) 144/74   Pulse: 67   Weight: 104.4 kg (230 lb 2.6 oz)   Height: 5' 6" (1.676 m)   PainSc: 0-No pain     Body mass index is 37.15 kg/m².    Physical Exam   Constitutional: He is oriented to person, place, and time. He appears well-developed and well-nourished. He is active.   OBESE   HENT:   Head: Normocephalic and atraumatic.   Mouth/Throat: Oropharynx is clear and moist and mucous membranes are normal.   Eyes: Conjunctivae and EOM are normal. Pupils are equal, round, and reactive to light.   Neck: Normal range of motion. Neck supple. Normal carotid pulses, no hepatojugular reflux and no JVD present. Carotid bruit is not present. No tracheal deviation, no edema and no erythema present. No thyromegaly present.   Cardiovascular: Normal rate and regular rhythm.   No extrasystoles are present. PMI is not displaced.  Exam reveals no gallop, no distant heart sounds, no friction rub and no midsystolic click.    Murmur heard.  High-pitched holosystolic murmur is present  at the apex  Pulses:       Carotid pulses are 2+ on the right side, and 2+ on the left side.       Radial pulses are 2+ on the right side, and 2+ on the left side.        Femoral pulses are 2+ on the right side, and 2+ on the left side.       Dorsalis pedis pulses are 2+ on the right side, and 2+ on the left side.        Posterior tibial pulses are 2+ on the right side, and 2+ on the left side.   Pulmonary/Chest: Effort normal and breath sounds normal. No accessory muscle usage. No tachypnea and no bradypnea. No respiratory distress.   Abdominal: Soft. Bowel sounds are normal. He exhibits no distension and no mass. There is no hepatosplenomegaly. There is no tenderness. There is no CVA tenderness.   Musculoskeletal: Normal range of motion. He exhibits no edema or deformity.   Lymphadenopathy:     He has no cervical adenopathy.   Neurological: He is alert and " oriented to person, place, and time. He has normal strength. He displays no tremor. No cranial nerve deficit. He exhibits normal muscle tone. Coordination normal.   Skin: Skin is warm and dry. No cyanosis or erythema. No pallor.   Psychiatric: He has a normal mood and affect. His speech is normal and behavior is normal. Judgment and thought content normal.               ..    Chemistry        Component Value Date/Time     06/09/2010 1215    K 4.2 06/09/2010 1215     06/09/2010 1215    CO2 30 (H) 06/09/2010 1215    BUN 19 06/09/2010 1215    CREATININE 1.1 06/09/2010 1215     06/09/2010 1215        Component Value Date/Time    CALCIUM 9.6 06/09/2010 1215    ALKPHOS 73 06/09/2010 1215    AST 30 06/09/2010 1215    ALT 38 06/09/2010 1215    BILITOT 0.3 06/09/2010 1215    ESTGFRAFRICA >60 06/09/2010 1215    EGFRNONAA >60 06/09/2010 1215            ..No results found for: CHOL  No results found for: HDL  No results found for: LDLCALC  No results found for: TRIG  No results found for: CHOLHDL  ..No results found for: WBC, HGB, HCT, MCV, PLT    Test(s) Reviewed  I have reviewed the following in detail:  [] Stress test   [] Angiography   [] Echocardiogram   [x] Labs   [] Other:       Assessment:         ICD-10-CM ICD-9-CM   1. Hypertensive heart disease without heart failure I11.9 402.90   2. Mild CAD I25.10 414.00   3. Non-rheumatic mitral regurgitation I34.0 424.0   4. Obesity, Class II, BMI 35.0-39.9, with comorbidity (see actual BMI) E66.9 278.00   5. Dyslipidemia E78.5 272.4     Problem List Items Addressed This Visit        Cardiac/Vascular    Hypertensive heart disease without heart failure - Primary    Non-rheumatic mitral regurgitation    Mild CAD    Dyslipidemia       Endocrine    Obesity, Class II, BMI 35.0-39.9, with comorbidity (see actual BMI)           Plan:     INCREASE HYTRIN TO 10 MG, ALL OTHER CV CLINICALLY STABLE, NO ANGINA, NO HF, NO TIA, NO CLINICAL ARRHYTHMIA,CONTINUE CURRENT MEDS,  EDUCATION, DIET, EXERCISE, WEIGHT LOSS, RTC IN 6 MO WITH BP LOG, GET LABS FROM [facility]      Hypertensive heart disease without heart failure    Mild CAD    Non-rheumatic mitral regurgitation    Obesity, Class II, BMI 35.0-39.9, with comorbidity (see actual BMI)    Dyslipidemia    Other orders  -     terazosin (HYTRIN) 10 MG capsule; Take 1 capsule (10 mg total) by mouth every evening.  Dispense: 30 capsule; Refill: 5    RTC Low level/low impact aerobic exercise 5x's/wk. Heart healthy diet and risk factor modification.    See labs and med orders.    Aerobic exercise 5x's/wk. Heart healthy diet and risk factor modification.    See labs and med orders.

## 2017-12-18 NOTE — PATIENT INSTRUCTIONS
Weight Management: Getting Started  Healthy bodies come in all shapes and sizes. Not all bodies are made to be thin. For some people, a healthy weight is higher than the average weight listed on weight charts. Your healthcare provider can help you decide on a healthy weight for you.    Reasons to lose weight  Losing weight can help with some health problems, such as high blood pressure, heart disease, diabetes, sleep apnea, and arthritis. You may also feel more energy.  Set your long-term goal  Your goal doesn't even have to be a specific weight. You may decide on a fitness goal (such as being able to walk 10 miles a week), or a health goal (such as lowering your blood pressure). Choose a goal that is measurable and reasonable, so you know when you've reached it. A goal of reaching a BMI of less than 25 is not always reasonable (or possible).   Make an action plan  Habits dont change overnight. Setting your goals too high can leave you feeling discouraged if you cant reach them. Be realistic. Choose one or two small changes you can make now. Set an action plan for how you are going to make these changes. When you can stick to this plan, keep making a few more small changes. Taking small steps will help you stay on the path to success.  Track your progress  Write down your goals. Then, keep a daily record of your progress. Write down what you eat and how active you are. This record lets you look back on how much youve done. It may also help when youre feeling frustrated. Reward yourself for success. Even if you dont reach every goal, give yourself credit for what you do get done.  Get support  Encouragement from others can help make losing weight easier. Ask your family members and friends for support. They may even want to join you. Also look to your healthcare provider, registered dietitian, and  for help. Your local hospital can give you more information about nutrition, exercise, and  weight loss.  Date Last Reviewed: 1/31/2016  © 4347-0835 [employer]. [address] [place] [address], [place] [address]. All rights reserved. This information is not intended as a substitute for professional medical care. Always follow your healthcare professional's instructions.

## 2017-12-26 RX ORDER — HYDROCHLOROTHIAZIDE 25 MG/1
25 TABLET ORAL DAILY
Qty: 90 TABLET | Refills: 1 | Status: SHIPPED | OUTPATIENT
Start: 2017-12-26 | End: 2018-06-18 | Stop reason: SDUPTHER

## 2017-12-26 NOTE — TELEPHONE ENCOUNTER
----- Message from Julissa Avery sent at 12/26/2017 10:33 AM CST -----  Contact: self  [phone]  Patient called asking for a new rx    hydrochlorothiazide (HYDRODIURIL) 25 MG tablet with [facility] on [address].Thanks!

## 2018-04-16 RX ORDER — HYDRALAZINE HYDROCHLORIDE 100 MG/1
TABLET, FILM COATED ORAL
Qty: 180 TABLET | Refills: 1 | Status: SHIPPED | OUTPATIENT
Start: 2018-04-16 | End: 2018-10-10 | Stop reason: SDUPTHER

## 2018-06-13 ENCOUNTER — TELEPHONE (OUTPATIENT)
Dept: CARDIOLOGY | Facility: CLINIC | Age: 68
End: 2018-06-13

## 2018-06-13 NOTE — TELEPHONE ENCOUNTER
----- Message from Michelle Salvador sent at 6/13/2018  9:10 AM CDT -----  Type: Needs Medical Advice    Who Called:  Patient  Best Call Back Number: [phone]  Additional Information: Patient has an appointment on 6/18/18. He states that he usually has his labs before the visit done at [facility] in [address], Ph# [phone]. Please call to place orders and let patient know.

## 2018-06-15 ENCOUNTER — TELEPHONE (OUTPATIENT)
Dept: CARDIOLOGY | Facility: CLINIC | Age: 68
End: 2018-06-15

## 2018-06-15 NOTE — TELEPHONE ENCOUNTER
----- Message from Taylor Rendon sent at 6/15/2018  9:08 AM CDT -----  Contact: pt  Type:  Patient Returning Call    Who Called:  pt  Who Left Message for Patient:  Verena  Does the patient know what this is regarding?: Nurse called him on 6/13 in regards [facility]t on 6/18 with the Dr Swenson Call Back Number: [phone]  Additional Information:  n/a

## 2018-06-15 NOTE — TELEPHONE ENCOUNTER
----- Message from [address] sent at 6/15/2018  8:16 AM CDT -----  Contact: Chris  Please call Mr. Perez back to ask a couple of questions and is calling back. He states he is going to get his labs done at [facility] this morning and they can see labs electronically. [phone] (home)   Thanks!

## 2018-06-18 ENCOUNTER — OFFICE VISIT (OUTPATIENT)
Dept: CARDIOLOGY | Facility: CLINIC | Age: 68
End: 2018-06-18
Payer: MEDICARE

## 2018-06-18 VITALS
SYSTOLIC BLOOD PRESSURE: 139 MMHG | HEIGHT: 66 IN | DIASTOLIC BLOOD PRESSURE: 84 MMHG | WEIGHT: 229.94 LBS | HEART RATE: 55 BPM | BODY MASS INDEX: 36.95 KG/M2

## 2018-06-18 DIAGNOSIS — E78.5 DYSLIPIDEMIA: ICD-10-CM

## 2018-06-18 DIAGNOSIS — I11.9 HYPERTENSIVE HEART DISEASE WITHOUT HEART FAILURE: Primary | ICD-10-CM

## 2018-06-18 DIAGNOSIS — I34.0 NON-RHEUMATIC MITRAL REGURGITATION: ICD-10-CM

## 2018-06-18 DIAGNOSIS — I25.10 MILD CAD: ICD-10-CM

## 2018-06-18 PROCEDURE — 99999 PR PBB SHADOW E&M-EST. PATIENT-LVL III: CPT | Mod: PBBFAC,,, | Performed by: INTERNAL MEDICINE

## 2018-06-18 PROCEDURE — 99214 OFFICE O/P EST MOD 30 MIN: CPT | Mod: S$PBB,,, | Performed by: INTERNAL MEDICINE

## 2018-06-18 PROCEDURE — 99213 OFFICE O/P EST LOW 20 MIN: CPT | Mod: PBBFAC,PO | Performed by: INTERNAL MEDICINE

## 2018-06-18 RX ORDER — TERAZOSIN 10 MG/1
10 CAPSULE ORAL NIGHTLY
Qty: 30 CAPSULE | Refills: 6 | Status: SHIPPED | OUTPATIENT
Start: 2018-06-18 | End: 2019-01-26 | Stop reason: SDUPTHER

## 2018-06-18 RX ORDER — ROSUVASTATIN CALCIUM 10 MG/1
10 TABLET, COATED ORAL DAILY
Qty: 90 TABLET | Refills: 1 | Status: SHIPPED | OUTPATIENT
Start: 2018-06-18 | End: 2018-12-26 | Stop reason: SDUPTHER

## 2018-06-18 RX ORDER — HYDROCHLOROTHIAZIDE 25 MG/1
25 TABLET ORAL DAILY
Qty: 90 TABLET | Refills: 1 | Status: SHIPPED | OUTPATIENT
Start: 2018-06-18 | End: 2018-12-30 | Stop reason: SDUPTHER

## 2018-06-18 RX ORDER — CARVEDILOL 12.5 MG/1
12.5 TABLET ORAL 2 TIMES DAILY WITH MEALS
Qty: 60 TABLET | Refills: 6 | Status: SHIPPED | OUTPATIENT
Start: 2018-06-18 | End: 2019-07-01 | Stop reason: SDUPTHER

## 2018-06-18 NOTE — PROGRESS NOTES
Subjective:    Patient ID:  Chris Hill is a 67 y.o. male who presents for Hypertension and Hyperlipidemia        HPI  HAD LABS AT [facility], HAD EYE TROUBLES, BP IN UPPERR 130'S , SEE ROS    Past Medical History:   Diagnosis Date    Coronary artery disease     mild    Diabetes mellitus     Heart murmur     Hyperlipidemia     Hypertension     Sleep apnea     NOT USING [facility]    Valvular regurgitation      Past Surgical History:   Procedure Laterality Date    ANGIOPLASTY      CARDIAC CATHETERIZATION       Family History   Problem Relation Age of Onset    Heart disease Mother     Heart disease Father      Social History     Social History    Marital status:      Spouse name: N/A    Number of children: N/A    Years of education: N/A     Social History Main Topics    Smoking status: Never Smoker    Smokeless tobacco: Never Used    Alcohol use No    Drug use: No    Sexual activity: Not on file     Other Topics Concern    Not on file     Social History Narrative    No narrative on file       Review of patient's allergies indicates:  No Known Allergies    Current Outpatient Prescriptions:     amitriptyline (ELAVIL) 50 MG tablet, Take 50 mg by mouth every evening., Disp: , Rfl:     amlodipine (NORVASC) 10 MG tablet, Take 5 mg by mouth once daily., Disp: , Rfl:     aspirin (ECOTRIN) 81 MG EC tablet, Take 81 mg by mouth once daily., Disp: , Rfl:     carvedilol (COREG) 12.5 MG tablet, Take 1 tablet (12.5 mg total) by mouth 2 (two) times daily with meals., Disp: 60 tablet, Rfl: 6    fenofibric acid 105 mg Tab, Take 1 tablet by mouth once daily., Disp: , Rfl:     hydrALAZINE (APRESOLINE) 100 MG tablet, TAKE ONE TABLET BY MOUTH TWICE DAILY, Disp: 180 tablet, Rfl: 1    hydroCHLOROthiazide (HYDRODIURIL) 25 MG tablet, Take 1 tablet (25 mg total) by mouth once daily., Disp: 90 tablet, Rfl: 1    irbesartan (AVAPRO) 300 MG tablet, Take 300 mg by mouth every evening., Disp: , Rfl:      "metformin (GLUCOPHAGE) 500 MG tablet, Take 500 mg by mouth daily with breakfast., Disp: , Rfl:     omeprazole (PRILOSEC) 40 MG capsule, Take 40 mg by mouth once daily., Disp: , Rfl:     potassium chloride (KLOR-CON) 20 mEq Pack, Take 20 mEq by mouth once daily., Disp: , Rfl:     temazepam (RESTORIL) 22.5 MG capsule, Take 15 mg by mouth nightly as needed for Insomnia., Disp: , Rfl:     terazosin (HYTRIN) 10 MG capsule, Take 1 capsule (10 mg total) by mouth every evening., Disp: 30 capsule, Rfl: 6    Review of Systems   Constitution: Negative for chills, diaphoresis, fever, weakness, malaise/fatigue and night sweats.   HENT: Negative for congestion and nosebleeds.    Eyes: Positive for visual disturbance. Negative for double vision.   Cardiovascular: Negative for chest pain, claudication, cyanosis, dyspnea on exertion (BETTER), irregular heartbeat, leg swelling, near-syncope, orthopnea, palpitations, paroxysmal nocturnal dyspnea and syncope.   Respiratory: Negative for cough, hemoptysis and wheezing.    Endocrine: Negative for cold intolerance, heat intolerance and polyuria.   Hematologic/Lymphatic: Negative for adenopathy and bleeding problem.   Skin: Negative for color change, itching and nail changes.   Musculoskeletal: Negative for back pain (CHRONIC) and falls.   Gastrointestinal: Negative for abdominal pain, change in bowel habit, heartburn, hematemesis, jaundice, melena and vomiting.   Genitourinary: Negative for dysuria, flank pain and frequency.   Neurological: Negative for brief paralysis, dizziness (OCC), light-headedness, loss of balance, numbness, paresthesias, seizures, sensory change and tremors.   Psychiatric/Behavioral: Negative for altered mental status, depression, memory loss and substance abuse. The patient is not nervous/anxious.         Objective:      Vitals:    06/18/18 0836   BP: 139/84   Pulse: (!) 55   Weight: 104.3 kg (229 lb 15 oz)   Height: 5' 6" (1.676 m)   PainSc:   7   PainLoc: " Head     Body mass index is 37.11 kg/m².    Physical Exam   Constitutional: He is oriented to person, place, and time. He appears well-developed and well-nourished. He is active.   OBESE   HENT:   Head: Normocephalic and atraumatic.   Mouth/Throat: Oropharynx is clear and moist and mucous membranes are normal.   Eyes: Conjunctivae and EOM are normal. Pupils are equal, round, and reactive to light.   Neck: Normal range of motion. Neck supple. Normal carotid pulses, no hepatojugular reflux and no JVD present. Carotid bruit is not present. No tracheal deviation, no edema and no erythema present. No thyromegaly present.   Cardiovascular: Normal rate and regular rhythm.   No extrasystoles are present. PMI is not displaced.  Exam reveals no gallop, no distant heart sounds, no friction rub and no midsystolic click.    Murmur heard.  High-pitched holosystolic murmur is present  at the apex  Pulses:       Carotid pulses are 2+ on the right side, and 2+ on the left side.       Radial pulses are 2+ on the right side, and 2+ on the left side.        Femoral pulses are 2+ on the right side, and 2+ on the left side.       Dorsalis pedis pulses are 2+ on the right side, and 2+ on the left side.        Posterior tibial pulses are 2+ on the right side, and 2+ on the left side.   Pulmonary/Chest: Effort normal and breath sounds normal. No accessory muscle usage. No tachypnea and no bradypnea. No respiratory distress.   Abdominal: Soft. Bowel sounds are normal. He exhibits no distension. There is no hepatosplenomegaly. There is no tenderness. There is no CVA tenderness.   Musculoskeletal: Normal range of motion. He exhibits no edema or deformity.   Lymphadenopathy:     He has no cervical adenopathy.   Neurological: He is alert and oriented to person, place, and time. He has normal strength. He displays no tremor. No cranial nerve deficit.   Skin: Skin is warm and dry. No cyanosis or erythema. No pallor.   Psychiatric: He has a normal  mood and affect. His speech is normal and behavior is normal. Thought content normal.               ..    Chemistry        Component Value Date/Time     12/14/2017 0854    K 3.6 12/14/2017 0854    CL 98 12/14/2017 0854    CO2 24 12/14/2017 0854    BUN 21 12/14/2017 0854    CREATININE 1.07 12/14/2017 0854     (H) 12/14/2017 0854        Component Value Date/Time    CALCIUM 9.2 12/14/2017 0854    ALKPHOS 42 12/14/2017 0854    AST 21 12/14/2017 0854    ALT 20 12/14/2017 0854    BILITOT 0.2 12/14/2017 0854    ESTGFRAFRICA >60 06/09/2010 1215    EGFRNONAA 72 12/14/2017 0854            ..No results found for: CHOL  No results found for: HDL  No results found for: LDLCALC  No results found for: TRIG  No results found for: CHOLHDL  ..No results found for: WBC, HGB, HCT, MCV, PLT    Test(s) Reviewed  I have reviewed the following in detail:  [] Stress test   [] Angiography   [] Echocardiogram   [] Labs   [] Other:       Assessment:         ICD-10-CM ICD-9-CM   1. Hypertensive heart disease without heart failure I11.9 402.90   2. Mild CAD I25.10 414.00   3. Non-rheumatic mitral regurgitation I34.0 424.0   4. Obesity, Class II, BMI 35.0-39.9, with comorbidity (see actual BMI) E66.9 278.00   5. Dyslipidemia E78.5 272.4     Problem List Items Addressed This Visit        Cardiac/Vascular    Hypertensive heart disease without heart failure - Primary    Relevant Orders    Comprehensive metabolic panel    Non-rheumatic mitral regurgitation    Relevant Orders    Comprehensive metabolic panel    Mild CAD    Relevant Orders    Comprehensive metabolic panel    Dyslipidemia    Relevant Orders    Comprehensive metabolic panel    Lipid panel       Endocrine    Obesity, Class II, BMI 35.0-39.9, with comorbidity (see actual BMI)    Relevant Orders    Comprehensive metabolic panel           Plan:         COMPLIANCE WITH DAILY CPAP, DIET, CHANGE FENOFIRIC TO CRESTOR, ALL CV CLINICALLY STABLE, NO ANGINA, NO HF, NO TIA, NO CLINICAL  ARRHYTHMIA,CONTINUE CURRENT MEDS, EDUCATION, DIET, EXERCISE, WEIGHT LOSS, RTC IN 7-8 MO WITH LABS  Hypertensive heart disease without heart failure  -     Comprehensive metabolic panel; Future; Expected date: 06/18/2018    Mild CAD  -     Comprehensive metabolic panel; Future; Expected date: 06/18/2018    Non-rheumatic mitral regurgitation  -     Comprehensive metabolic panel; Future; Expected date: 06/18/2018    Obesity, Class II, BMI 35.0-39.9, with comorbidity (see actual BMI)  -     Comprehensive metabolic panel; Future; Expected date: 06/18/2018    Dyslipidemia  -     Comprehensive metabolic panel; Future; Expected date: 06/18/2018  -     Lipid panel; Future; Expected date: 06/18/2018    Other orders  -     carvedilol (COREG) 12.5 MG tablet; Take 1 tablet (12.5 mg total) by mouth 2 (two) times daily with meals.  Dispense: 60 tablet; Refill: 6  -     hydroCHLOROthiazide (HYDRODIURIL) 25 MG tablet; Take 1 tablet (25 mg total) by mouth once daily.  Dispense: 90 tablet; Refill: 1  -     terazosin (HYTRIN) 10 MG capsule; Take 1 capsule (10 mg total) by mouth every evening.  Dispense: 30 capsule; Refill: 6    RTC Low level/low impact aerobic exercise 5x's/wk. Heart healthy diet and risk factor modification.    See labs and med orders.    Aerobic exercise 5x's/wk. Heart healthy diet and risk factor modification.    See labs and med orders.

## 2018-08-07 ENCOUNTER — TELEPHONE (OUTPATIENT)
Dept: CARDIOLOGY | Facility: CLINIC | Age: 68
End: 2018-08-07

## 2018-08-07 NOTE — TELEPHONE ENCOUNTER
----- Message from Christel Shipley sent at 8/7/2018 11:34 AM CDT -----  Contact: Patient  Chris, patient [phone] calling because he was prescribed rosuvastatin (CRESTOR) 10 MG tablet and ever since he started taking it he has cramps in his legs and hands and would like to know what to do. Please advise. Thanks.

## 2018-08-14 ENCOUNTER — TELEPHONE (OUTPATIENT)
Dept: CARDIOLOGY | Facility: CLINIC | Age: 68
End: 2018-08-14

## 2018-08-14 NOTE — TELEPHONE ENCOUNTER
----- Message from Lynn Bridges sent at 8/14/2018  8:52 AM CDT -----  Contact: [phone]  Patient is requesting a call back, was recently dx w/ cancer and need to discuss treatment and continue use of medication.    Please call the patient upon request at phone number [phone].

## 2018-08-14 NOTE — TELEPHONE ENCOUNTER
Please advise: pt has recent DX of prostate cancer & wanted to know if there are any concerns with him starting radiation, or interactions with his medication.

## 2018-08-20 ENCOUNTER — OFFICE VISIT (OUTPATIENT)
Dept: RADIATION ONCOLOGY | Facility: CLINIC | Age: 68
End: 2018-08-20
Payer: MEDICARE

## 2018-08-20 VITALS
HEIGHT: 66 IN | BODY MASS INDEX: 33.91 KG/M2 | SYSTOLIC BLOOD PRESSURE: 151 MMHG | DIASTOLIC BLOOD PRESSURE: 72 MMHG | RESPIRATION RATE: 16 BRPM | TEMPERATURE: 99 F | WEIGHT: 211 LBS | HEART RATE: 61 BPM

## 2018-08-20 DIAGNOSIS — C61 PROSTATE CANCER: Primary | ICD-10-CM

## 2018-08-20 PROCEDURE — 99205 OFFICE O/P NEW HI 60 MIN: CPT | Mod: ,,, | Performed by: RADIOLOGY

## 2018-08-20 RX ORDER — AMOXICILLIN 500 MG
2 CAPSULE ORAL NIGHTLY
COMMUNITY

## 2018-08-20 RX ORDER — UBIDECARENONE 30 MG
30 CAPSULE ORAL 3 TIMES DAILY
COMMUNITY

## 2018-08-20 NOTE — PROGRESS NOTES
Chris Hill  [id number]  1950  8/20/2018  Viktor Stephens Md  [address]  [address]  [place] [address]    REASON FOR CONSULTATION: High risk prostate adenocarcinoma, [id number] GS 4+5 iPSA 13.2  TREATMENT GOAL: primary    HISTORY OF PRESENT ILLNESS:   68M p/w elevated PSA.    PSA  6/18 12.5  7/18 13.2    *8/18 Dr. Stephens TRUS bx   - 3+3 adenoca R apex 5%; 4+3 adenoca L apex 55%; 4+5 adenoca L mid 80% and L base 95%   - no EPE; +PNI   - 4/6 cores+   - ADT placed    - RadOn consult  *8/18 CT AP/Bone scan: jenniffer    At todays visit patient denies fever, chills, chest pain, shortness of breath, cough or hemoptysis. He denies dysuria, hematuria, bright blood per rectum, diarrhea, nausea vomiting or abdominal pain. He has no bone pain. He has no appetite, energy changes. He is not experiencing any joint pain, hot flashes or weight gain.    AUA 10  QOL 2  IIEF 24    Review of Systems   Constitutional: Negative for appetite change, chills, fever and unexpected weight change.   HENT:   Negative for lump/mass, mouth sores, sore throat, trouble swallowing and voice change.    Eyes: Negative for eye problems and icterus.   Respiratory: Negative for chest tightness, cough, hemoptysis and shortness of breath.    Cardiovascular: Negative for chest pain and leg swelling.   Gastrointestinal: Negative for abdominal distention, abdominal pain, blood in stool, constipation, diarrhea, nausea and vomiting.   Genitourinary: Positive for frequency and nocturia. Negative for bladder incontinence, difficulty urinating, dysuria and hematuria.    Musculoskeletal: Negative for back pain, gait problem, neck pain and neck stiffness.   Neurological: Negative for extremity weakness, gait problem, headaches, numbness and seizures.   Hematological: Negative for adenopathy.     Past Medical History:   Diagnosis Date    Coronary artery disease     mild    Diabetes mellitus     Heart murmur     Hyperlipidemia     Hypertension      Sleep apnea     NOT USING [facility]    Valvular regurgitation      Past Surgical History:   Procedure Laterality Date    ANGIOPLASTY      CARDIAC CATHETERIZATION       Social History     Socioeconomic History    Marital status:      Spouse name: None    Number of children: None    Years of education: None    Highest education level: None   Social Needs    Financial resource strain: None    Food insecurity - worry: None    Food insecurity - inability: None    Transportation needs - medical: None    Transportation needs - non-medical: None   Occupational History    None   Tobacco Use    Smoking status: Never Smoker    Smokeless tobacco: Never Used   Substance and Sexual Activity    Alcohol use: No    Drug use: No    Sexual activity: None   Other Topics Concern    None   Social History Narrative    None     Family History   Problem Relation Age of Onset    Heart disease Mother     Heart disease Father        PRIOR HISTORY OF CHEMOTHERAPY OR RADIOTHERAPY: Please see HPI for patients prior oncologic history.    Medication List with Changes/Refills   Current Medications    AMITRIPTYLINE (ELAVIL) 50 MG TABLET    Take 50 mg by mouth every evening.    AMLODIPINE (NORVASC) 10 MG TABLET    Take 5 mg by mouth once daily.    ASPIRIN (ECOTRIN) 81 MG EC TABLET    Take 81 mg by mouth once daily.    CARVEDILOL (COREG) 12.5 MG TABLET    Take 1 tablet (12.5 mg total) by mouth 2 (two) times daily with meals.    CO-ENZYME Q-10 30 MG CAPSULE    Take 30 mg by mouth 3 (three) times daily.    FISH OIL-OMEGA-3 FATTY ACIDS 300-1,000 MG CAPSULE    Take 1 capsule by mouth 2 (two) times daily.    HYDRALAZINE (APRESOLINE) 100 MG TABLET    TAKE ONE TABLET BY MOUTH TWICE DAILY    HYDROCHLOROTHIAZIDE (HYDRODIURIL) 25 MG TABLET    Take 1 tablet (25 mg total) by mouth once daily.    IRBESARTAN (AVAPRO) 300 MG TABLET    Take 300 mg by mouth every evening.    METFORMIN (GLUCOPHAGE) 500 MG TABLET    Take 500 mg by mouth daily  "with breakfast.    OMEPRAZOLE (PRILOSEC) 40 MG CAPSULE    Take 40 mg by mouth once daily.    POTASSIUM CHLORIDE (KLOR-CON) 20 MEQ PACK    Take 20 mEq by mouth once daily.    ROSUVASTATIN (CRESTOR) 10 MG TABLET    Take 1 tablet (10 mg total) by mouth once daily.    TEMAZEPAM (RESTORIL) 22.5 MG CAPSULE    Take 15 mg by mouth nightly as needed for Insomnia.    TERAZOSIN (HYTRIN) 10 MG CAPSULE    Take 1 capsule (10 mg total) by mouth every evening.     Review of patient's allergies indicates:  No Known Allergies    QUALITY OF LIFE: 100%- Normal, No Complaints, No Evidence of Disease    Vitals:    08/20/18 1358   BP: (!) 151/72   Pulse: 61   Resp: 16   Temp: 98.8 °F (37.1 °C)   TempSrc: Oral   Weight: 95.7 kg (211 lb)   Height: 5' 6" (1.676 m)       PHYSICAL EXAM:   GENERAL: alert; in no apparent distress.   HEAD: normocephalic, atraumatic.  EYES: pupils are equal, round, reactive to light and accommodation. Sclera anicteric. Conjunctiva not injected.   NOSE/THROAT: no nasal erythema or rhinorrhea. Oropharynx pink, without erythema, ulcerations or thrush.   NECK: no cervical motion rigidity; supple with no masses.  CHEST: Patient is speaking comfortably on room air with normal work of breathing without using accessory muscles of respiration.  ABDOMEN: soft, nontender, nondistended. Bowel sounds present.   MUSCULOSKELETAL: no tenderness to palpation along the spine or scapulae. Normal range of motion. B knee incisions c/d/i  NEUROLOGIC: cranial nerves II-XII intact bilaterally. Strength 5/5 in bilateral upper and lower extremities. No sensory deficits appreciated. Normal gait.  EXTREMITIES: no clubbing, cyanosis, edema.  SKIN: no erythema, rashes, ulcerations noted.   RECTAL: deferred    REVIEW OF IMAGING/PATHOLOGY/LABS: Please see HPI. All images reviewed personally by dictating physician.     ASSESSMENT: 68M with high risk prostate adenocarcinoma, [id number] GS 4+5 iPSA 13.2.  PLAN:   Mr. Hill is been diagnosed with " high risk prostate adenocarcinoma discovered after an elevated PSA level. His biopsy results revealed high-volume Bee 4+5 disease in the left gland with contralateral Nayely 3+3 disease. To further clarify his circumstance I [facility] the [facility] with results as below.  15yr PCSS 97%  10yr bPFS 17%  OC  17%  EPE  80%  LN+  29%  SVI  34%    These numbers bear out his high-risk status. I carefully discussed by comparing and contrasting surgery versus definitive radiotherapy which in high-risk patients is traditionally given with 2-3 years of androgen deprivation. He has had ADT depot placed and is not yet appreciating any ill effects. Using the data above I pointed out the likelihood that he would require adjuvant and/or salvage radiotherapy following surgery and that no randomized data has shown a benefit to combined modality therapy. I also explained the radiotherapy can easily follow surgery, but that vice versa is rarely the case.    In discussing radiotherapy I explained the process of CT simulation and daily treatment as well as the importance of bowel and bladder preparation to minimize toxicity to those organs. I explained that we would provide an IMRT technique, prescription dose of 7740 cGy with treatment encompass the pelvic lymph nodes, seminal vesicles and prostate gland. I briefly discussed the immature data regarding hypo-fractionated radiotherapy and long-term toxicity. I also discussed brachytherapy which we do not provide at this institution but has been studied as a boost in high risk patients with good PSA results.    For peace of mind the patient is going to seek out second opinions for both radiation and surgery and I advised him to contact our office should he have any further questions that we can help with. I did my best to address he and his wifes questions today and he will contact us should he proceed with radiotherapy.    The patient has our contact  information and understands that they are free to contact us at any time with questions or concerns regarding radiation therapy.    DISPOSITION: RTC PRN    I have personally seen and evaluated this patient. Greater than 50% of this time was spent discussing coordination of care and/or counseling.    PHYSICIAN: Simone Santillan Jr, MD

## 2018-08-20 NOTE — PATIENT INSTRUCTIONS
Radiation to the male pelvis and skin care instruction sheet given along with [facility] Booklet.  Bowel and bladder prep instruction sheet given. Patient to contact our office once decision made for treatment.

## 2018-08-20 NOTE — Clinical Note
Pt is seeking 2nd opinions on both XRT and surgery, fyi.I recommended ADT+definitive RT to pelvis, SV and gland given high risk status.EMM

## 2018-08-24 ENCOUNTER — TELEPHONE (OUTPATIENT)
Dept: RADIATION ONCOLOGY | Facility: CLINIC | Age: 68
End: 2018-08-24

## 2018-08-24 NOTE — TELEPHONE ENCOUNTER
Patient called to let our office know that he has decided to go through with radiation. He will call us as soon as he gets his marker placement date from the urologist.

## 2018-08-28 ENCOUNTER — TELEPHONE (OUTPATIENT)
Dept: UROLOGY | Facility: CLINIC | Age: 68
End: 2018-08-28

## 2018-08-28 NOTE — TELEPHONE ENCOUNTER
----- Message from Patricia Breyel, RN sent at 8/28/2018 10:01 AM CDT -----  Mir Leonardo,    Wanted to ask if this patient's appointment could possibly  be moved up.  He has an appointment on 9/18 already.  Thank you.

## 2018-08-28 NOTE — TELEPHONE ENCOUNTER
Patient had injection already.  Will see Dr. Carranza after markers are placed.  Would like to get this all done sooner.  Please advise.

## 2018-08-29 NOTE — TELEPHONE ENCOUNTER
Patient does not want to see Dr. RODRIGUEZ.  Would like Dr. Gordon to take care of him.   His shot was 2 weeks ago.  Assured patient that 9/18 appt will not delay treatment, but will be right on schedule.

## 2018-08-30 ENCOUNTER — LAB VISIT (OUTPATIENT)
Dept: LAB | Facility: HOSPITAL | Age: 68
End: 2018-08-30
Attending: INTERNAL MEDICINE
Payer: MEDICARE

## 2018-08-30 DIAGNOSIS — E78.5 DYSLIPIDEMIA: ICD-10-CM

## 2018-08-30 LAB
ALBUMIN SERPL BCP-MCNC: 3.5 G/DL
ALP SERPL-CCNC: 78 U/L
ALT SERPL W/O P-5'-P-CCNC: 31 U/L
AST SERPL-CCNC: 27 U/L
BILIRUB DIRECT SERPL-MCNC: 0.2 MG/DL
BILIRUB SERPL-MCNC: 0.6 MG/DL
PROT SERPL-MCNC: 6.6 G/DL

## 2018-08-30 PROCEDURE — 36415 COLL VENOUS BLD VENIPUNCTURE: CPT | Mod: PO

## 2018-08-30 PROCEDURE — 80076 HEPATIC FUNCTION PANEL: CPT

## 2018-09-18 ENCOUNTER — OFFICE VISIT (OUTPATIENT)
Dept: UROLOGY | Facility: CLINIC | Age: 68
End: 2018-09-18
Payer: MEDICARE

## 2018-09-18 VITALS
BODY MASS INDEX: 33.91 KG/M2 | HEART RATE: 63 BPM | HEIGHT: 66 IN | TEMPERATURE: 98 F | WEIGHT: 211 LBS | SYSTOLIC BLOOD PRESSURE: 167 MMHG | RESPIRATION RATE: 18 BRPM | DIASTOLIC BLOOD PRESSURE: 90 MMHG

## 2018-09-18 DIAGNOSIS — C61 PROSTATE CANCER: Primary | ICD-10-CM

## 2018-09-18 LAB
BILIRUB SERPL-MCNC: NORMAL MG/DL
BLOOD URINE, POC: NORMAL
COLOR, POC UA: NORMAL
GLUCOSE UR QL STRIP: NORMAL
KETONES UR QL STRIP: NORMAL
LEUKOCYTE ESTERASE URINE, POC: NORMAL
NITRITE, POC UA: NORMAL
PH, POC UA: 5
PROTEIN, POC: NORMAL
SPECIFIC GRAVITY, POC UA: 1.02
UROBILINOGEN, POC UA: NORMAL

## 2018-09-18 PROCEDURE — 99999 PR PBB SHADOW E&M-EST. PATIENT-LVL IV: CPT | Mod: PBBFAC,,, | Performed by: UROLOGY

## 2018-09-18 PROCEDURE — 81002 URINALYSIS NONAUTO W/O SCOPE: CPT | Mod: PBBFAC,PN | Performed by: UROLOGY

## 2018-09-18 PROCEDURE — 99205 OFFICE O/P NEW HI 60 MIN: CPT | Mod: S$PBB,,, | Performed by: UROLOGY

## 2018-09-18 PROCEDURE — 99214 OFFICE O/P EST MOD 30 MIN: CPT | Mod: PBBFAC,PN | Performed by: UROLOGY

## 2018-09-18 RX ORDER — CIPROFLOXACIN 500 MG/1
500 TABLET ORAL 2 TIMES DAILY
Qty: 6 TABLET | Refills: 0 | Status: SHIPPED | OUTPATIENT
Start: 2018-09-18 | End: 2018-12-28

## 2018-09-18 RX ORDER — HYDROCODONE BITARTRATE AND ACETAMINOPHEN 7.5; 325 MG/1; MG/1
TABLET ORAL
COMMUNITY
Start: 2018-09-13 | End: 2018-12-28

## 2018-09-18 RX ORDER — LIDOCAINE HYDROCHLORIDE 10 MG/ML
10 INJECTION, SOLUTION EPIDURAL; INFILTRATION; INTRACAUDAL; PERINEURAL ONCE
Status: CANCELLED | OUTPATIENT
Start: 2018-09-18 | End: 2018-09-18

## 2018-09-18 RX ORDER — GENTAMICIN SULFATE 40 MG/ML
80 INJECTION, SOLUTION INTRAMUSCULAR; INTRAVENOUS
Status: CANCELLED | OUTPATIENT
Start: 2018-09-25

## 2018-09-18 RX ORDER — CEPHALEXIN 500 MG/1
CAPSULE ORAL
COMMUNITY
Start: 2018-09-13 | End: 2018-10-02

## 2018-09-18 NOTE — Clinical Note
Will place markers 9/25 and send him right over to get BMP and Xray.Please arrange CT sim and radiotherapyThanks.

## 2018-09-18 NOTE — LETTER
September 19, 2018        Rob Bui MD  [address]  [place] [address]             [place] [facility]  [address] [place] [address] [address]  [place] [address]  Phone: [phone]  Fax: [phone]   Patient: Chris Hill   MR Number: [id number]   YOB: 1950   Date of Visit: 9/18/2018       Dear Dr. Bui:    I had the pleasure of asuming urologic care of Mr. Chris Hill today. Attached you will find relevant portions of my assessment and plan of care.    If you have questions, please do not hesitate to call me. I look forward to following Chris Hill along with you.  As always, please do not hesitate to contact me directly for the urologic needs your patient's    Sincerely,        Manpreet Gordon MD            CC  No Recipients    Enclosure

## 2018-09-18 NOTE — H&P (VIEW-ONLY)
[facility] Urology New Patient/H&P:    Chris Hill is a 68 y.o. male who presents for prostate cancer evaluation    He had a Enedelia presented to Dr. Velazquez in June 2018 noting a family history of prostate cancer and has some low back pain dysuria with a PSA of 12.5 and clinical concern for prostatitis.  He was given a 15 day course of Cipro and a recheck of his PSA did increase to 13.2.  He does have a family history of prostate cancer.    On 7/30/18 he underwent transrectal ultrasound-guided prostate biopsy and was found to have a 22 g gland therefore significantly increased PSA density to 0.61.  On ultrasound the peripheral zone had no nodules though the right side did appear mottled and the seminal vesicles were noted to be within normal limits and prostate had a few calcifications.  Six core biopsy performed, of which 4 of 6 cores were positive for prostate cancer including 2 cores Nayely 9 prostate cancer  3+3 adenoca R apex 5%; 4+3 adenoca L apex 55%; 4+5 adenoca L mid 80% and L base 95%  CT and bone scan 8/10/18 were negative.    Reviewed pathology with Dr. Velazquez on 8/13/10 and discussed options of surgery and radiation.    Eligard 22.5 mg administered and he was referred to Radiation Oncology for consultation.    He did see Dr Santillan on 8/20/18 to discuss radiotherapy.  [facility] [place] 15yr PCSS  97%,  10yr bPFS   17%, OC  17%, EPE   80%, LN+   29%, SVI   34%  AUA SS 10/2, IIEF 24    He has since elected to proceed with radiotherapy though presents today to transition urologic care, discussed options again, and if proceeding with radiotherapy, plan fiducial marker placement.  He has had some flashes and fatigue since his ADT was initiated.  No Casodex used prior to Eligard injection.  AUA symptom score today is 12/2, mild LUTS, not bothered by them.  Denies hematuria or dysuria.  Denies back pain or bone pain.  Here today with his wife and have many questions regarding both surgery and  radiotherapy.    Past Medical History:   Diagnosis Date    Coronary artery disease     mild    Diabetes mellitus     Heart murmur     Hyperlipidemia     Hypertension     Prostate cancer 8/20/2018    Sleep apnea     NOT USING [facility]    Valvular regurgitation        Past Surgical History:   Procedure Laterality Date    ANGIOPLASTY      CARDIAC CATHETERIZATION         Family History   Problem Relation Age of Onset    Heart disease Mother     Heart disease Father        Social History     Socioeconomic History    Marital status:      Spouse name: Not on file    Number of children: Not on file    Years of education: Not on file    Highest education level: Not on file   Social Needs    Financial resource strain: Not on file    Food insecurity - worry: Not on file    Food insecurity - inability: Not on file    Transportation needs - medical: Not on file    Transportation needs - non-medical: Not on file   Occupational History    Not on file   Tobacco Use    Smoking status: Never Smoker    Smokeless tobacco: Never Used   Substance and Sexual Activity    Alcohol use: No    Drug use: No    Sexual activity: Not on file   Other Topics Concern    Not on file   Social History Narrative    Not on file       Review of patient's allergies indicates:  No Known Allergies    Medications Reviewed: see MAR    ROS:    Constitutional: denies fevers, chills, night sweats, fatigue, malaise  Respiratory: negative for cough, shortness of breath, wheezing, dyspnea.  Cardiovascular: + for high blood pressure, negative for chest pain, varicose veins, ankle swelling, palpitations, syncope.  GI: negative for abdominal pain, heartburn, indigestion, nausea, vomiting, constipation, diarrhea, blood in stool.   Urology: as noted above in HPI  Endocrinology: negative for cold intolerance, excessive thirst, not feeling tired/sluggish, no heat intolerance.   Hematology/Lymph: negative for easy bleeding, easy bruising,  "swollen glands.  Musculoskeletal: negative for back pain, joint pain, joint swelling, neck pain.  Allergy-Immunology: negative for seasonal allergies, negative for unusual infections.   Skin: negative for boils, breast lumps, hives, itching, rash.   Neurology: negative for, dizziness, headache, tingling/numbness, tremors.   Psych: satisfied with life; negative for, anxiety, depression, suicidal thoughts.     PHYSICAL EXAM:    Vitals:    09/18/18 0953   BP: (!) 167/90   Pulse: 63   Resp: 18   Temp: 97.6 °F (36.4 °C)     Body mass index is 34.05 kg/m². Weight: 95.7 kg (210 lb 15.7 oz) Height: 5' 6" (167.6 cm)       General: Alert, cooperative, no distress, appears stated age  Head: Normocephalic, without obvious abnormality, atraumatic  Neck: no masses, no thyromegaly, no lymphadenopathy  Eyes: PERRL, conjunctiva/corneas clear  Lungs: Respirations unlabored, normal effort, no accessory muscle use  CV: Warm and well perfused extremities  Abdomen: Soft, non-tender, no CVA tenderness, no hepatosplenomegaly, no hernia  Penis: phallus normal, well cared for, no plaques or lesions.   Scrotum: no cysts, no lesions, no rash, no hydrocele.   Epididymes: normal, nontender, symmetrical, no masses or cysts.   Testes: normal, both descended, no masses, mildly atrophic   Urethra: palpably normal with orthotopic meatus of normal size    LUI: normal sphincter tone, no masses, no hemmorrhoids   PROSTATE: 25-30g, no nodules, non-tender, symmetrical, subtle firmess  Extremities: Extremities normal, atraumatic, no cyanosis or edema  Skin: Normal color, texture, and turgor, no rashes or lesions  Psych: Appropriate, well oriented, normal affect, normal mood  Neuro: Non-focal    LABS:    Recent Results (from the past 336 hour(s))   POCT URINE DIPSTICK WITHOUT MICROSCOPE    Collection Time: 09/18/18 11:21 AM   Result Value Ref Range    Color, UA yellow,clear     Spec Grav UA 1.020     pH, UA 5.0     WBC, UA neg     Nitrite, UA neg     " Protein neg     Glucose, UA neg     Ketones, UA neg     Urobilinogen, UA neg     Bilirubin neg     Blood, UA neg          Assessment/Diagnosis:    1. Prostate cancer  Case Request Operating Room: ULTRASOUND, TRANSRECTAL - GUIDED PLACEMENT OF GOLD SEED FIDUCIAL MARKERS    Place in Outpatient    Prostate Specific Antigen, Diagnostic    Testosterone    POCT URINE DIPSTICK WITHOUT MICROSCOPE       Plans:  I sat with the patient and his wife and reviewed in detail his diagnosis of prostate cancer, including explanation of guru grading system, and went over all the treatment options for management of his prostate cancer. The treatment options include detailed discussions of radiation treatment and surgical extirpative therapy namely robotic prostatectomy. We did discuss that given his high grade guru 9  pathology that he is not a candidate for active surveillance.      We discussed radical prostatectomy. The procedure in general including hospital stay and postoperative process were discussed in detail. Risks of surgery were discussed including but not limited to up-front urinary incontinence and erectile dysfunction which we will work to overcome with kegel excercises and any number of ED therapies, versus side effects of radiation which are often minimal and irritative upfront with more troubling complications such as stricture and radiation cystitis occurring late. We also briefly discussed psa monitoring post-treatment and had brief discussion about psa recurrence, noting radiation could be used as salvage therapy after surgery but not often vice versa. We discussed concurrent pelvic lymphadenectomy with surgery, and that often lymph nodes are included in radiation fields dependent on risk. Also discussed concurrent use of ADT with radiation and its side effects such as fatigue, hot flashes, ED.       We did discuss that given presence of high grade guru 9 component, with high risk profile, and negative  staging imaging, he should proceed with local therapy, which he plans to do. He has made plans to start XRT though again inquires if he should proceed with surgery so we engaged in an extensive risk benefit discussion, including the likelihood given disease grade and volume (which may actually be under represented as he only had a 6 core biopsy, and the diagnostic accuracy is increased with 12 core biopsy), that he would need further adjuvant therapy or salvage radiotherapy following radical prostatectomy. As well volume of high grade disease burden relative to prostate small size increases risk of BERKLEY.     We briefly discussed the 2 schools of thought in high grade high volume disease, as there has been a move to remove the primary and perform prostatectomy with the expectation of adjuvant treatment, yet there is also the idea that he can be treated with ADT+XRT so why experience morbidities of surgery. I was upfront that if he was to proceed with surgery, would perform wide excision, and that there are slight increased risks to surrounding structures with surgical management of high grade disease especially if extracapsular extension is present, and if proceeding with surgery could get 3T MRI to evaluate for location of BERKLEY as well as plane between prostate and rectum.     A we did discuss robotic prostatectomy in detail including hospital stay, recovery, side effects, morbidities, expectations.  We did also discuss some of the long-term side effects of radiation in further detail as well.  After extensive discussion, the patient elected to proceed as planned with combination therapy of androgen deprivation therapy and external beam radiotherapy.  He did have a 3 month depot injection of Eligard on 8/13/18, and therefore is approximately 5 weeks status post initiation of hormone therapy.  We did discuss that fiducial marker placement is often done at the earliest 6 weeks after starting ADT.  One week after  fiducial markers are placed, he will present back to Radiation Oncology for his CT simulation and begin radiation therapy the following week.    We did review fiducial marker placement in detail with transrectal ultrasound guidance as well as reviewed the prep instructions are similar to a prostate biopsy.  These were all reviewed in detail given to him in writing.  Transrectal ultrasound-guided placement of fiducial markers have been scheduled on 9/25/18 at the [facility] after which time he will get a BMP as well as an x-ray to show placement, so radiation can perform his CT simulation the following week.  At this time, he is not bothered by his lower urinary tract symptoms, and we did discuss that my role during radiotherapy would be to help manage any urologic side effects or complications.  We did also discuss the doctor many knows very adept at assessing urinary symptoms, and he may require oral medications such as Flomax during the course of his radiotherapy, and/or possibly an OAB med.  Continued ADT will be arranged, and a nurse visit for injections will be arranged approximately November 13th with 6 month Depo injection of Trelstar 22.5 mg as that is what we have available in this clinic.  We did discuss continuing ADT for 2-3 years in combination with his radiotherapy.  After fiducial marker placement, he will be referred back to Dr. Santillan for XRT, and I will plan to see him approximately 1 month after the completion of his radiotherapy with a serum PSA and testosterone prior.  He is welcome to follow up p.r.n. during the course of his radiotherapy should he need to.     65 minutes spent in direct face to face encounter with patient and wife, over half in counseling, and including review of previous medical record.  All questions I had were answered, and are agreeable to treatment plan.

## 2018-09-18 NOTE — PROGRESS NOTES
[facility] Urology New Patient/H&P:    Chris Hill is a 68 y.o. male who presents for prostate cancer evaluation    He had a Enedelia presented to Dr. Velazquez in June 2018 noting a family history of prostate cancer and has some low back pain dysuria with a PSA of 12.5 and clinical concern for prostatitis.  He was given a 15 day course of Cipro and a recheck of his PSA did increase to 13.2.  He does have a family history of prostate cancer.    On 7/30/18 he underwent transrectal ultrasound-guided prostate biopsy and was found to have a 22 g gland therefore significantly increased PSA density to 0.61.  On ultrasound the peripheral zone had no nodules though the right side did appear mottled and the seminal vesicles were noted to be within normal limits and prostate had a few calcifications.  Six core biopsy performed, of which 4 of 6 cores were positive for prostate cancer including 2 cores Nayely 9 prostate cancer  3+3 adenoca R apex 5%; 4+3 adenoca L apex 55%; 4+5 adenoca L mid 80% and L base 95%  CT and bone scan 8/10/18 were negative.    Reviewed pathology with Dr. Velazquez on 8/13/10 and discussed options of surgery and radiation.    Eligard 22.5 mg administered and he was referred to Radiation Oncology for consultation.    He did see Dr Santillan on 8/20/18 to discuss radiotherapy.  [facility] [place]: 15yr PCSS  97%,  10yr bPFS   17%, OC  17%, EPE   80%, LN+   29%, SVI   34%  AUA SS 10/2, IIEF 24    He has since elected to proceed with radiotherapy though presents today to transition urologic care, discussed options again, and if proceeding with radiotherapy, plan fiducial marker placement.  He has had some flashes and fatigue since his ADT was initiated.  No Casodex used prior to Eligard injection.  AUA symptom score today is 12/2, mild LUTS, not bothered by them.  Denies hematuria or dysuria.  Denies back pain or bone pain.  Here today with his wife and have many questions regarding both surgery and  radiotherapy.    Past Medical History:   Diagnosis Date    Coronary artery disease     mild    Diabetes mellitus     Heart murmur     Hyperlipidemia     Hypertension     Prostate cancer 8/20/2018    Sleep apnea     NOT USING [facility]    Valvular regurgitation        Past Surgical History:   Procedure Laterality Date    ANGIOPLASTY      CARDIAC CATHETERIZATION         Family History   Problem Relation Age of Onset    Heart disease Mother     Heart disease Father        Social History     Socioeconomic History    Marital status:      Spouse name: Not on file    Number of children: Not on file    Years of education: Not on file    Highest education level: Not on file   Social Needs    Financial resource strain: Not on file    Food insecurity - worry: Not on file    Food insecurity - inability: Not on file    Transportation needs - medical: Not on file    Transportation needs - non-medical: Not on file   Occupational History    Not on file   Tobacco Use    Smoking status: Never Smoker    Smokeless tobacco: Never Used   Substance and Sexual Activity    Alcohol use: No    Drug use: No    Sexual activity: Not on file   Other Topics Concern    Not on file   Social History Narrative    Not on file       Review of patient's allergies indicates:  No Known Allergies    Medications Reviewed: see MAR    ROS:    Constitutional: denies fevers, chills, night sweats, fatigue, malaise  Respiratory: negative for cough, shortness of breath, wheezing, dyspnea.  Cardiovascular: + for high blood pressure, negative for chest pain, varicose veins, ankle swelling, palpitations, syncope.  GI: negative for abdominal pain, heartburn, indigestion, nausea, vomiting, constipation, diarrhea, blood in stool.   Urology: as noted above in HPI  Endocrinology: negative for cold intolerance, excessive thirst, not feeling tired/sluggish, no heat intolerance.   Hematology/Lymph: negative for easy bleeding, easy bruising,  "swollen glands.  Musculoskeletal: negative for back pain, joint pain, joint swelling, neck pain.  Allergy-Immunology: negative for seasonal allergies, negative for unusual infections.   Skin: negative for boils, breast lumps, hives, itching, rash.   Neurology: negative for, dizziness, headache, tingling/numbness, tremors.   Psych: satisfied with life; negative for, anxiety, depression, suicidal thoughts.     PHYSICAL EXAM:    Vitals:    09/18/18 0953   BP: (!) 167/90   Pulse: 63   Resp: 18   Temp: 97.6 °F (36.4 °C)     Body mass index is 34.05 kg/m². Weight: 95.7 kg (210 lb 15.7 oz) Height: 5' 6" (167.6 cm)       General: Alert, cooperative, no distress, appears stated age  Head: Normocephalic, without obvious abnormality, atraumatic  Neck: no masses, no thyromegaly, no lymphadenopathy  Eyes: PERRL, conjunctiva/corneas clear  Lungs: Respirations unlabored, normal effort, no accessory muscle use  CV: Warm and well perfused extremities  Abdomen: Soft, non-tender, no CVA tenderness, no hepatosplenomegaly, no hernia  Penis: phallus normal, well cared for, no plaques or lesions.   Scrotum: no cysts, no lesions, no rash, no hydrocele.   Epididymes: normal, nontender, symmetrical, no masses or cysts.   Testes: normal, both descended, no masses, mildly atrophic   Urethra: palpably normal with orthotopic meatus of normal size    LUI: normal sphincter tone, no masses, no hemmorrhoids   PROSTATE: 25-30g, no nodules, non-tender, symmetrical, subtle firmess  Extremities: Extremities normal, atraumatic, no cyanosis or edema  Skin: Normal color, texture, and turgor, no rashes or lesions  Psych: Appropriate, well oriented, normal affect, normal mood  Neuro: Non-focal    LABS:    Recent Results (from the past 336 hour(s))   POCT URINE DIPSTICK WITHOUT MICROSCOPE    Collection Time: 09/18/18 11:21 AM   Result Value Ref Range    Color, UA yellow,clear     Spec Grav UA 1.020     pH, UA 5.0     WBC, UA neg     Nitrite, UA neg     " Protein neg     Glucose, UA neg     Ketones, UA neg     Urobilinogen, UA neg     Bilirubin neg     Blood, UA neg          Assessment/Diagnosis:    1. Prostate cancer  Case Request Operating Room: ULTRASOUND, TRANSRECTAL - GUIDED PLACEMENT OF GOLD SEED FIDUCIAL MARKERS    Place in Outpatient    Prostate Specific Antigen, Diagnostic    Testosterone    POCT URINE DIPSTICK WITHOUT MICROSCOPE       Plans:  I sat with the patient and his wife and reviewed in detail his diagnosis of prostate cancer, including explanation of guru grading system, and went over all the treatment options for management of his prostate cancer. The treatment options include detailed discussions of radiation treatment and surgical extirpative therapy namely robotic prostatectomy. We did discuss that given his high grade guru 9  pathology that he is not a candidate for active surveillance.      We discussed radical prostatectomy. The procedure in general including hospital stay and postoperative process were discussed in detail. Risks of surgery were discussed including but not limited to up-front urinary incontinence and erectile dysfunction which we will work to overcome with kegel excercises and any number of ED therapies, versus side effects of radiation which are often minimal and irritative upfront with more troubling complications such as stricture and radiation cystitis occurring late. We also briefly discussed psa monitoring post-treatment and had brief discussion about psa recurrence, noting radiation could be used as salvage therapy after surgery but not often vice versa. We discussed concurrent pelvic lymphadenectomy with surgery, and that often lymph nodes are included in radiation fields dependent on risk. Also discussed concurrent use of ADT with radiation and its side effects such as fatigue, hot flashes, ED.       We did discuss that given presence of high grade guru 9 component, with high risk profile, and negative  staging imaging, he should proceed with local therapy, which he plans to do. He has made plans to start XRT though again inquires if he should proceed with surgery so we engaged in an extensive risk benefit discussion, including the likelihood given disease grade and volume (which may actually be under represented as he only had a 6 core biopsy, and the diagnostic accuracy is increased with 12 core biopsy), that he would need further adjuvant therapy or salvage radiotherapy following radical prostatectomy. As well volume of high grade disease burden relative to prostate small size increases risk of BERKLEY.     We briefly discussed the 2 schools of thought in high grade high volume disease, as there has been a move to remove the primary and perform prostatectomy with the expectation of adjuvant treatment, yet there is also the idea that he can be treated with ADT+XRT so why experience morbidities of surgery. I was upfront that if he was to proceed with surgery, would perform wide excision, and that there are slight increased risks to surrounding structures with surgical management of high grade disease especially if extracapsular extension is present, and if proceeding with surgery could get 3T MRI to evaluate for location of BERKLEY as well as plane between prostate and rectum.     A we did discuss robotic prostatectomy in detail including hospital stay, recovery, side effects, morbidities, expectations.  We did also discuss some of the long-term side effects of radiation in further detail as well.  After extensive discussion, the patient elected to proceed as planned with combination therapy of androgen deprivation therapy and external beam radiotherapy.  He did have a 3 month depot injection of Eligard on 8/13/18, and therefore is approximately 5 weeks status post initiation of hormone therapy.  We did discuss that fiducial marker placement is often done at the earliest 6 weeks after starting ADT.  One week after  fiducial markers are placed, he will present back to Radiation Oncology for his CT simulation and begin radiation therapy the following week.    We did review fiducial marker placement in detail with transrectal ultrasound guidance as well as reviewed the prep instructions are similar to a prostate biopsy.  These were all reviewed in detail given to him in writing.  Transrectal ultrasound-guided placement of fiducial markers have been scheduled on 9/25/18 at the [facility] after which time he will get a BMP as well as an x-ray to show placement, so radiation can perform his CT simulation the following week.  At this time, he is not bothered by his lower urinary tract symptoms, and we did discuss that my role during radiotherapy would be to help manage any urologic side effects or complications.  We did also discuss the doctor many knows very adept at assessing urinary symptoms, and he may require oral medications such as Flomax during the course of his radiotherapy, and/or possibly an OAB med.  Continued ADT will be arranged, and a nurse visit for injections will be arranged approximately November 13th with 6 month Depo injection of Trelstar 22.5 mg as that is what we have available in this clinic.  We did discuss continuing ADT for 2-3 years in combination with his radiotherapy.  After fiducial marker placement, he will be referred back to Dr. Santillan for XRT, and I will plan to see him approximately 1 month after the completion of his radiotherapy with a serum PSA and testosterone prior.  He is welcome to follow up p.r.n. during the course of his radiotherapy should he need to.     65 minutes spent in direct face to face encounter with patient and wife, over half in counseling, and including review of previous medical record.  All questions I had were answered, and are agreeable to treatment plan.

## 2018-09-21 ENCOUNTER — TELEPHONE (OUTPATIENT)
Dept: UROLOGY | Facility: CLINIC | Age: 68
End: 2018-09-21

## 2018-09-21 NOTE — TELEPHONE ENCOUNTER
Spoke to patient regarding upcoming fiducial marker placement.  As we now have capability for space-oar, discussed placement at time of markers.  Markers and space-oar gel to protect the rectum during radiation would be placed at same time, transperineally rather than transrectally.  Advised this procedure will need to be one in clinic rather than ASC and therefore will R/S to Friday afternoon 9/28 at 2pm.  Offered oral sedation with valium - pt declined.  Advised to follow same prep instructions.

## 2018-09-24 ENCOUNTER — TELEPHONE (OUTPATIENT)
Dept: UROLOGY | Facility: CLINIC | Age: 68
End: 2018-09-24

## 2018-09-24 DIAGNOSIS — C61 PROSTATE CANCER: Primary | ICD-10-CM

## 2018-09-24 RX ORDER — LIDOCAINE HYDROCHLORIDE 10 MG/ML
10 INJECTION, SOLUTION EPIDURAL; INFILTRATION; INTRACAUDAL; PERINEURAL ONCE
Status: CANCELLED | OUTPATIENT
Start: 2018-09-24 | End: 2018-09-24

## 2018-09-24 NOTE — TELEPHONE ENCOUNTER
Discussed with patient, final plan - fiducial markers placed transperineally in conjunction with transperineal placement of space oar will be done in OR under [facility] on 10/4/18. Patient agreeable to plan.    No in person PAT needed. Can phone preop to review meds/instructions. Advsied to be NPO after midnight. Will get BMP on arrival and EKG as per anesthesia discretion on arrival

## 2018-09-24 NOTE — TELEPHONE ENCOUNTER
Schedule defaulted to 105.  This has been fixed and patient advise.      ** please confirm final when and where for patient's surgery.  l

## 2018-09-24 NOTE — TELEPHONE ENCOUNTER
----- Message from Patricia Breyel, RN sent at 9/24/2018 10:28 AM CDT -----  Please clarify with patient the date of fiducial marker placement.  He is under the impression it is Friday, 9/28.  [facility]  Thank you

## 2018-10-01 ENCOUNTER — CLINICAL SUPPORT (OUTPATIENT)
Dept: UROLOGY | Facility: CLINIC | Age: 68
End: 2018-10-01
Payer: MEDICARE

## 2018-10-01 ENCOUNTER — TELEPHONE (OUTPATIENT)
Dept: UROLOGY | Facility: CLINIC | Age: 68
End: 2018-10-01

## 2018-10-01 DIAGNOSIS — R30.0 DYSURIA: ICD-10-CM

## 2018-10-01 DIAGNOSIS — R10.30 INGUINAL PAIN, UNSPECIFIED LATERALITY: Primary | ICD-10-CM

## 2018-10-01 LAB
BILIRUB SERPL-MCNC: NEGATIVE MG/DL
BLOOD URINE, POC: NEGATIVE
COLOR, POC UA: NORMAL
GLUCOSE UR QL STRIP: NEGATIVE
KETONES UR QL STRIP: NEGATIVE
LEUKOCYTE ESTERASE URINE, POC: NEGATIVE
NITRITE, POC UA: NEGATIVE
PH, POC UA: 5
POC RESIDUAL URINE VOLUME: 0 ML (ref 0–100)
PROTEIN, POC: NEGATIVE
SPECIFIC GRAVITY, POC UA: 1.01
UROBILINOGEN, POC UA: NEGATIVE

## 2018-10-01 PROCEDURE — 87086 URINE CULTURE/COLONY COUNT: CPT

## 2018-10-01 PROCEDURE — 51798 US URINE CAPACITY MEASURE: CPT | Mod: PBBFAC,PN

## 2018-10-01 PROCEDURE — 81002 URINALYSIS NONAUTO W/O SCOPE: CPT | Mod: PBBFAC,PN

## 2018-10-01 NOTE — TELEPHONE ENCOUNTER
----- Message from Aure Ly sent at 10/1/2018  9:09 AM CDT -----  Call [phone]  / asking to speak to nurse / has a procedure on Thursday / has some issues that he needs to discuss

## 2018-10-01 NOTE — TELEPHONE ENCOUNTER
Groin pain, mostly in right, and radiates down  The inside of his leg.  Dysuria.  No frequency.  Urine looks clear.  Flow is good.    MD mentioned that Dr. RODRIGUEZ did 6 point biopsy, but Dr. BRADY would have done 12-14.  Should he have more bopsies?  Please advise.

## 2018-10-02 ENCOUNTER — DOCUMENTATION ONLY (OUTPATIENT)
Dept: RADIATION ONCOLOGY | Facility: CLINIC | Age: 68
End: 2018-10-02

## 2018-10-02 NOTE — OR NURSING
PAT phone interview complete. Spoke with pt and discussed meds, npo status, need for  and need for antibacterial shower x2. Voiced understanding.

## 2018-10-03 ENCOUNTER — ANESTHESIA EVENT (OUTPATIENT)
Dept: SURGERY | Facility: HOSPITAL | Age: 68
End: 2018-10-03
Payer: MEDICARE

## 2018-10-03 LAB — BACTERIA UR CULT: NO GROWTH

## 2018-10-04 ENCOUNTER — ANESTHESIA (OUTPATIENT)
Dept: SURGERY | Facility: HOSPITAL | Age: 68
End: 2018-10-04
Payer: MEDICARE

## 2018-10-04 ENCOUNTER — HOSPITAL ENCOUNTER (OUTPATIENT)
Facility: HOSPITAL | Age: 68
Discharge: HOME | End: 2018-10-04
Attending: UROLOGY | Admitting: UROLOGY
Payer: MEDICARE

## 2018-10-04 DIAGNOSIS — C61 PROSTATE CANCER: ICD-10-CM

## 2018-10-04 LAB
ANION GAP SERPL CALC-SCNC: 11 MMOL/L
BUN SERPL-MCNC: 21 MG/DL
CALCIUM SERPL-MCNC: 9.5 MG/DL
CHLORIDE SERPL-SCNC: 105 MMOL/L
CO2 SERPL-SCNC: 30 MMOL/L
CREAT SERPL-MCNC: 1.1 MG/DL
EST. GFR  (AFRICAN AMERICAN): >60 ML/MIN/1.73 M^2
EST. GFR  (NON AFRICAN AMERICAN): >60 ML/MIN/1.73 M^2
GLUCOSE SERPL-MCNC: 116 MG/DL
POTASSIUM SERPL-SCNC: 3.1 MMOL/L
SODIUM SERPL-SCNC: 146 MMOL/L

## 2018-10-04 PROCEDURE — 76942 ECHO GUIDE FOR BIOPSY: CPT | Mod: 26,59,, | Performed by: UROLOGY

## 2018-10-04 PROCEDURE — 37000009 HC ANESTHESIA EA ADD 15 MINS: Performed by: UROLOGY

## 2018-10-04 PROCEDURE — 27800903 OPTIME MED/SURG SUP & DEVICES OTHER IMPLANTS: Performed by: UROLOGY

## 2018-10-04 PROCEDURE — 63600175 PHARM REV CODE 636 W HCPCS: Performed by: NURSE ANESTHETIST, CERTIFIED REGISTERED

## 2018-10-04 PROCEDURE — 25000003 PHARM REV CODE 250: Performed by: ANESTHESIOLOGY

## 2018-10-04 PROCEDURE — 93005 ELECTROCARDIOGRAM TRACING: CPT

## 2018-10-04 PROCEDURE — 55874 TPRNL PLMT BIODEGRDABL MATRL: CPT | Mod: ,,, | Performed by: UROLOGY

## 2018-10-04 PROCEDURE — 71000015 HC POSTOP RECOV 1ST HR: Performed by: UROLOGY

## 2018-10-04 PROCEDURE — 25000003 PHARM REV CODE 250: Performed by: UROLOGY

## 2018-10-04 PROCEDURE — 27201423 OPTIME MED/SURG SUP & DEVICES STERILE SUPPLY: Performed by: UROLOGY

## 2018-10-04 PROCEDURE — 63600175 PHARM REV CODE 636 W HCPCS: Performed by: ANESTHESIOLOGY

## 2018-10-04 PROCEDURE — 36000705 HC OR TIME LEV I EA ADD 15 MIN: Performed by: UROLOGY

## 2018-10-04 PROCEDURE — 36000704 HC OR TIME LEV I 1ST 15 MIN: Performed by: UROLOGY

## 2018-10-04 PROCEDURE — 71000033 HC RECOVERY, INTIAL HOUR: Performed by: UROLOGY

## 2018-10-04 PROCEDURE — D9220A PRA ANESTHESIA: Mod: ANES,,, | Performed by: ANESTHESIOLOGY

## 2018-10-04 PROCEDURE — 80048 BASIC METABOLIC PNL TOTAL CA: CPT

## 2018-10-04 PROCEDURE — 37000008 HC ANESTHESIA 1ST 15 MINUTES: Performed by: UROLOGY

## 2018-10-04 PROCEDURE — D9220A PRA ANESTHESIA: Mod: CRNA,,, | Performed by: NURSE ANESTHETIST, CERTIFIED REGISTERED

## 2018-10-04 PROCEDURE — 36415 COLL VENOUS BLD VENIPUNCTURE: CPT

## 2018-10-04 PROCEDURE — 25000003 PHARM REV CODE 250

## 2018-10-04 PROCEDURE — 55876 PLACE RT DEVICE/MARKER PROS: CPT | Mod: ,,, | Performed by: UROLOGY

## 2018-10-04 PROCEDURE — 63600175 PHARM REV CODE 636 W HCPCS: Performed by: UROLOGY

## 2018-10-04 PROCEDURE — 99900104 DSU ONLY-NO CHARGE-EA ADD'L HR (STAT): Performed by: UROLOGY

## 2018-10-04 PROCEDURE — 93010 ELECTROCARDIOGRAM REPORT: CPT | Mod: ,,, | Performed by: INTERNAL MEDICINE

## 2018-10-04 PROCEDURE — 99900103 DSU ONLY-NO CHARGE-INITIAL HR (STAT): Performed by: UROLOGY

## 2018-10-04 PROCEDURE — 76872 US TRANSRECTAL: CPT | Mod: 26,,, | Performed by: UROLOGY

## 2018-10-04 PROCEDURE — 25000003 PHARM REV CODE 250: Performed by: NURSE ANESTHETIST, CERTIFIED REGISTERED

## 2018-10-04 PROCEDURE — 71000039 HC RECOVERY, EACH ADD'L HOUR: Performed by: UROLOGY

## 2018-10-04 RX ORDER — DEXAMETHASONE SODIUM PHOSPHATE 4 MG/ML
INJECTION, SOLUTION INTRA-ARTICULAR; INTRALESIONAL; INTRAMUSCULAR; INTRAVENOUS; SOFT TISSUE
Status: DISCONTINUED | OUTPATIENT
Start: 2018-10-04 | End: 2018-10-04

## 2018-10-04 RX ORDER — GENTAMICIN SULFATE 80 MG/100ML
80 INJECTION, SOLUTION INTRAVENOUS
Status: COMPLETED | OUTPATIENT
Start: 2018-10-04 | End: 2018-10-04

## 2018-10-04 RX ORDER — PROPOFOL 10 MG/ML
VIAL (ML) INTRAVENOUS
Status: DISCONTINUED | OUTPATIENT
Start: 2018-10-04 | End: 2018-10-04

## 2018-10-04 RX ORDER — SODIUM CHLORIDE 0.9 % (FLUSH) 0.9 %
3 SYRINGE (ML) INJECTION
Status: DISCONTINUED | OUTPATIENT
Start: 2018-10-04 | End: 2018-10-04 | Stop reason: HOSPADM

## 2018-10-04 RX ORDER — SODIUM CHLORIDE, SODIUM LACTATE, POTASSIUM CHLORIDE, CALCIUM CHLORIDE 600; 310; 30; 20 MG/100ML; MG/100ML; MG/100ML; MG/100ML
INJECTION, SOLUTION INTRAVENOUS CONTINUOUS
Status: DISCONTINUED | OUTPATIENT
Start: 2018-10-04 | End: 2019-01-18

## 2018-10-04 RX ORDER — FENTANYL CITRATE 50 UG/ML
INJECTION, SOLUTION INTRAMUSCULAR; INTRAVENOUS
Status: DISCONTINUED | OUTPATIENT
Start: 2018-10-04 | End: 2018-10-04

## 2018-10-04 RX ORDER — LIDOCAINE HYDROCHLORIDE 10 MG/ML
INJECTION, SOLUTION EPIDURAL; INFILTRATION; INTRACAUDAL; PERINEURAL
Status: DISCONTINUED | OUTPATIENT
Start: 2018-10-04 | End: 2018-10-04 | Stop reason: HOSPADM

## 2018-10-04 RX ORDER — LIDOCAINE HYDROCHLORIDE 10 MG/ML
5 INJECTION, SOLUTION EPIDURAL; INFILTRATION; INTRACAUDAL; PERINEURAL ONCE
Status: DISCONTINUED | OUTPATIENT
Start: 2018-10-04 | End: 2019-01-18

## 2018-10-04 RX ORDER — FENTANYL CITRATE 50 UG/ML
25 INJECTION, SOLUTION INTRAMUSCULAR; INTRAVENOUS EVERY 5 MIN PRN
Status: DISCONTINUED | OUTPATIENT
Start: 2018-10-04 | End: 2018-10-04 | Stop reason: HOSPADM

## 2018-10-04 RX ORDER — ONDANSETRON HYDROCHLORIDE 2 MG/ML
INJECTION, SOLUTION INTRAMUSCULAR; INTRAVENOUS
Status: DISCONTINUED | OUTPATIENT
Start: 2018-10-04 | End: 2018-10-04

## 2018-10-04 RX ORDER — ROCURONIUM BROMIDE 10 MG/ML
INJECTION, SOLUTION INTRAVENOUS
Status: DISCONTINUED | OUTPATIENT
Start: 2018-10-04 | End: 2018-10-04

## 2018-10-04 RX ORDER — SUCCINYLCHOLINE CHLORIDE 20 MG/ML
INJECTION INTRAMUSCULAR; INTRAVENOUS
Status: DISCONTINUED | OUTPATIENT
Start: 2018-10-04 | End: 2018-10-04

## 2018-10-04 RX ORDER — SODIUM CITRATE AND CITRIC ACID MONOHYDRATE 334; 500 MG/5ML; MG/5ML
30 SOLUTION ORAL ONCE
Status: COMPLETED | OUTPATIENT
Start: 2018-10-04 | End: 2018-10-04

## 2018-10-04 RX ORDER — CEFOXITIN SODIUM 2 G/50ML
2 INJECTION, SOLUTION INTRAVENOUS
Status: COMPLETED | OUTPATIENT
Start: 2018-10-04 | End: 2018-10-04

## 2018-10-04 RX ORDER — LIDOCAINE HCL/PF 100 MG/5ML
SYRINGE (ML) INTRAVENOUS
Status: DISCONTINUED | OUTPATIENT
Start: 2018-10-04 | End: 2018-10-04

## 2018-10-04 RX ORDER — OXYCODONE HYDROCHLORIDE 5 MG/1
5 TABLET ORAL
Status: DISCONTINUED | OUTPATIENT
Start: 2018-10-04 | End: 2018-10-04 | Stop reason: HOSPADM

## 2018-10-04 RX ORDER — ONDANSETRON 2 MG/ML
4 INJECTION INTRAMUSCULAR; INTRAVENOUS ONCE
Status: COMPLETED | OUTPATIENT
Start: 2018-10-04 | End: 2018-10-04

## 2018-10-04 RX ORDER — LIDOCAINE HYDROCHLORIDE 10 MG/ML
10 INJECTION, SOLUTION EPIDURAL; INFILTRATION; INTRACAUDAL; PERINEURAL ONCE
Status: DISCONTINUED | OUTPATIENT
Start: 2018-10-04 | End: 2018-10-04 | Stop reason: HOSPADM

## 2018-10-04 RX ORDER — MIDAZOLAM HYDROCHLORIDE 1 MG/ML
INJECTION INTRAMUSCULAR; INTRAVENOUS
Status: DISCONTINUED | OUTPATIENT
Start: 2018-10-04 | End: 2018-10-04

## 2018-10-04 RX ORDER — CIPROFLOXACIN 500 MG/1
500 TABLET ORAL
Status: COMPLETED | OUTPATIENT
Start: 2018-10-04 | End: 2018-10-04

## 2018-10-04 RX ADMIN — PROMETHAZINE HYDROCHLORIDE 6.25 MG: 25 INJECTION INTRAMUSCULAR; INTRAVENOUS at 10:10

## 2018-10-04 RX ADMIN — CEFOXITIN SODIUM 2 G: 2 INJECTION, SOLUTION INTRAVENOUS at 07:10

## 2018-10-04 RX ADMIN — ONDANSETRON 4 MG: 2 INJECTION, SOLUTION INTRAMUSCULAR; INTRAVENOUS at 07:10

## 2018-10-04 RX ADMIN — FENTANYL CITRATE 25 MCG: 50 INJECTION INTRAMUSCULAR; INTRAVENOUS at 10:10

## 2018-10-04 RX ADMIN — GENTAMICIN SULFATE 80 MG: 80 INJECTION, SOLUTION INTRAVENOUS at 06:10

## 2018-10-04 RX ADMIN — SODIUM CITRATE AND CITRIC ACID MONOHYDRATE 30 ML: 500; 334 SOLUTION ORAL at 10:10

## 2018-10-04 RX ADMIN — MIDAZOLAM HYDROCHLORIDE 1 MG: 1 INJECTION, SOLUTION INTRAMUSCULAR; INTRAVENOUS at 07:10

## 2018-10-04 RX ADMIN — OXYCODONE HYDROCHLORIDE 5 MG: 5 TABLET ORAL at 10:10

## 2018-10-04 RX ADMIN — ROCURONIUM BROMIDE 10 MG: 10 INJECTION, SOLUTION INTRAVENOUS at 07:10

## 2018-10-04 RX ADMIN — DEXAMETHASONE SODIUM PHOSPHATE 4 MG: 4 INJECTION, SOLUTION INTRAMUSCULAR; INTRAVENOUS at 07:10

## 2018-10-04 RX ADMIN — SUCCINYLCHOLINE CHLORIDE 140 MG: 20 INJECTION, SOLUTION INTRAMUSCULAR; INTRAVENOUS at 07:10

## 2018-10-04 RX ADMIN — PROPOFOL 150 MG: 10 INJECTION, EMULSION INTRAVENOUS at 07:10

## 2018-10-04 RX ADMIN — ONDANSETRON 4 MG: 2 INJECTION INTRAMUSCULAR; INTRAVENOUS at 10:10

## 2018-10-04 RX ADMIN — FENTANYL CITRATE 100 MCG: 50 INJECTION, SOLUTION INTRAMUSCULAR; INTRAVENOUS at 07:10

## 2018-10-04 RX ADMIN — LIDOCAINE HYDROCHLORIDE 100 MG: 20 INJECTION, SOLUTION INTRAVENOUS at 07:10

## 2018-10-04 RX ADMIN — SODIUM CHLORIDE, SODIUM LACTATE, POTASSIUM CHLORIDE, AND CALCIUM CHLORIDE: .6; .31; .03; .02 INJECTION, SOLUTION INTRAVENOUS at 06:10

## 2018-10-04 RX ADMIN — CIPROFLOXACIN HYDROCHLORIDE 500 MG: 500 TABLET, FILM COATED ORAL at 07:10

## 2018-10-04 NOTE — DISCHARGE INSTRUCTIONS
General Information:    1.  Do not drink alcoholic beverages including beer for 24 hours or as long as you are on pain medication..  2.  Do not drive a motor vehicle, operate machinery or power tools, or signs legal papers for 24 hours or as long as you are on pain medication.   3.  You may experience light-headedness, dizziness, and sleepiness following surgery. Please do not stay alone. A responsible adult should be with you for this 24 hour period.  4.  Go home and rest.    5. Progress slowly to a normal diet unless instructed.  Otherwise, begin with liquids such as soft drinks, then soup and crackers working up to solid foods. Drink plenty of nonalcoholic fluids.  6.  Certain anesthetics and pain medications produce nausea and vomiting in certain       individuals. If nausea becomes a problem at home, call you doctor.    7. A nurse will be calling you sometime after surgery. Do not be alarmed. This is our way of finding out how you are doing.    8. Several times every hour while you are awake, take 2-3 deep breaths and cough. If you had stomach surgery hold a pillow or rolled towel firmly against your stomach before you cough. This will help with any pain the cough might cause.  9. Several times every hour while you are awake, pump and flex your feet 5-6 times and do foot circles. This will help prevent blood clots.    10.Call your doctor for severe pain, bleeding, fever, or signs or symptoms of infection (pain, swelling, redness, foul odor, drainage).    Post op instructions for prevention of DVT  What is deep vein thrombosis?  Deep vein thrombosis (DVT) is the medical term for blood clots in the deep veins of the leg.  These blood clots can be dangerous.  A DVT can block a blood vessel and keep blood from getting where it needs to go.  Another problem is that the clot can travel to other parts of the body such as the lungs.  A clot that travels to the lungs is called a pulmonary embolus (PE) and can cause  serious problems with breathing which can lead to death.  Am I at risk for DVT/PE?  If you are not very active, you are at risk of DVT.  Anyone confined to bed, sitting for long periods of time, recovering from surgery, etc. increases the risk of DVT.  Other risk factors are cancer diagnosis, certain medications, estrogen replacement in any form,older age, obesity, pregnancy, smoking, history of clotting disorders, and dehydration.  How will I know if I have a DVT?   Swelling in the lower leg   Pain   Warmth, redness, hardness or bulging of the vein  If you have any of these symptoms, call your doctors office right away.  Some people will not have any symptoms until the clot moves to the lungs.  What are the symptoms of a PE?   Panting, shortness of breath, or trouble breathing   Sharp, knife-like chest pain when you breathe   Coughing or coughing up blood   Rapid heartbeat  If you have any of these symptoms or get worse quickly, call 911 for emergency treatment.  How can I prevent a DVT?   Avoid long periods of inactivity and dont cross your legs--get up and walk around every hour or so.   Stay active--walking after surgery is highly encouraged.  This means you should get out of the house and walk in the neighborhood.  Going up and down stairs will not impair healing (unless advised against such activity by your doctor).     Drink plenty of noncaffeinated, nonalcoholic fluids each day to prevent dehydration.   Wear special support stockings, if they have been advised by your doctor.   If you travel, stop at least once an hour and walk around.   Avoid smoking (assistance with stopping is available through your healthcare provider)  Always notify your doctor if you are not able to follow the post operative instructions that are given to you at the time of discharge.  It may be necessary to prescribe one of the medications available to prevent DVT.    Discharge Instructions: After Your  "Surgery/Procedure  Youve just had surgery. During surgery you were given medicine called anesthesia to keep you relaxed and free of pain. After surgery you may have some pain or nausea. This is common. Here are some tips for feeling better and getting well after surgery.     Stay on schedule with your medication.   Going home  Your doctor or nurse will show you how to take care of yourself when you go home. He or she will also answer your questions. Have an adult family member or friend drive you home.      For your safety we recommend these precaution for the first 24 hours after your procedure:  · Do not drive or use heavy equipment.  · Do not make important decisions or sign legal papers.  · Do not drink alcohol.  · Have someone stay with you, if needed. He or she can watch for problems and help keep you safe.  · Your concentration, balance, coordination, and judgement may be impaired for many hours after anesthesia.  Use caution when ambulating or standing up.     · You may feel weak and "washed out" after anesthesia and surgery.      Subtle residual effects of general anesthesia or sedation with regional / local anesthesia can last more than 24 hours.  Rest for the remainder of the day or longer if your Doctor/Surgeon has advised you to do so.  Although you may feel normal within the first 24 hours, your reflexes and mental ability may be impaired without you realizing it.  You may feel dizzy, lightheaded or sleepy for 24 hours or longer.      Be sure to go to all follow-up visits with your doctor. And rest after your surgery for as long as your doctor tells you to.  Coping with pain  If you have pain after surgery, pain medicine will help you feel better. Take it as told, before pain becomes severe. Also, ask your doctor or pharmacist about other ways to control pain. This might be with heat, ice, or relaxation. And follow any other instructions your surgeon or nurse gives you.  Tips for taking pain " medicine  To get the best relief possible, remember these points:  · Pain medicines can upset your stomach. Taking them with a little food may help.  · Most pain relievers taken by mouth need at least 20 to 30 minutes to start to work.  · Taking medicine on a schedule can help you remember to take it. Try to time your medicine so that you can take it before starting an activity. This might be before you get dressed, go for a walk, or sit down for dinner.  · Constipation is a common side effect of pain medicines. Call your doctor before taking any medicines such as laxatives or stool softeners to help ease constipation. Also ask if you should skip any foods. Drinking lots of fluids and eating foods such as fruits and vegetables that are high in fiber can also help. Remember, do not take laxatives unless your surgeon has prescribed them.  · Drinking alcohol and taking pain medicine can cause dizziness and slow your breathing. It can even be deadly. Do not drink alcohol while taking pain medicine.  · Pain medicine can make you react more slowly to things. Do not drive or run machinery while taking pain medicine.  Your health care provider may tell you to take acetaminophen to help ease your pain. Ask him or her how much you are supposed to take each day. Acetaminophen or other pain relievers may interact with your prescription medicines or other over-the-counter (OTC) drugs. Some prescription medicines have acetaminophen and other ingredients. Using both prescription and OTC acetaminophen for pain can cause you to overdose. Read the labels on your OTC medicines with care. This will help you to clearly know the list of ingredients, how much to take, and any warnings. It may also help you not take too much acetaminophen. If you have questions or do not understand the information, ask your pharmacist or health care provider to explain it to you before you take the OTC medicine.  Managing nausea  Some people have an upset  stomach after surgery. This is often because of anesthesia, pain, or pain medicine, or the stress of surgery. These tips will help you handle nausea and eat healthy foods as you get better. If you were on a special food plan before surgery, ask your doctor if you should follow it while you get better. These tips may help:  · Do not push yourself to eat. Your body will tell you when to eat and how much.  · Start off with clear liquids and soup. They are easier to digest.  · Next try semi-solid foods, such as mashed potatoes, applesauce, and gelatin, as you feel ready.  · Slowly move to solid foods. Dont eat fatty, rich, or spicy foods at first.  · Do not force yourself to have 3 large meals a day. Instead eat smaller amounts more often.  · Take pain medicines with a small amount of solid food, such as crackers or toast, to avoid nausea.     Call your surgeon if  · You still have pain an hour after taking medicine. The medicine may not be strong enough.  · You feel too sleepy, dizzy, or groggy. The medicine may be too strong.  · You have side effects like nausea, vomiting, or skin changes, such as rash, itching, or hives.       If you have obstructive sleep apnea  You were given anesthesia medicine during surgery to keep you comfortable and free of pain. After surgery, you may have more apnea spells because of this medicine and other medicines you were given. The spells may last longer than usual.   At home:  · Keep using the continuous positive airway pressure (CPAP) device when you sleep. Unless your health care provider tells you not to, use it when you sleep, day or night. CPAP is a common device used to treat obstructive sleep apnea.  · Talk with your provider before taking any pain medicine, muscle relaxants, or sedatives. Your provider will tell you about the possible dangers of taking these medicines.  © 9586-6914 [employer]. [address] [place] [address], [place] [address]. All rights reserved. This  information is not intended as a substitute for professional medical care. Always follow your healthcare professional's instructions.

## 2018-10-04 NOTE — INTERVAL H&P NOTE
The patient has been examined and the H&P has been reviewed:    Plan updated to include SpaceOar gel placement to be done perineally concurrent with marker placement.  All risks and benefits discussed in detail with patient and appropriate informed consent obtained    Anesthesia/Surgery risks, benefits and alternative options discussed and understood by patient/family.          Active Hospital Problems    Diagnosis  POA    Prostate cancer [C61]  Yes      Resolved Hospital Problems   No resolved problems to display.

## 2018-10-04 NOTE — ANESTHESIA PREPROCEDURE EVALUATION
10/04/2018  Chris Hill is a 68 y.o., male.    Anesthesia Evaluation    I have reviewed the Patient Summary Reports.    I have reviewed the Nursing Notes.      Review of Systems  Anesthesia Hx:  No problems with previous Anesthesia    Cardiovascular:   Hypertension CAD      Pulmonary:   Sleep Apnea    Endocrine:   Diabetes        Physical Exam  General:  Well nourished, Obesity    Airway/Jaw/Neck:  Airway Findings: Mouth Opening: Normal Tongue: Normal  General Airway Assessment: Adult  Mallampati: III  Improves to II with phonation.  TM Distance: Normal, at least 6 cm  Jaw/Neck Findings:  Neck ROM: Normal ROM     Eyes/Ears/Nose:  Eyes/Ears/Nose Findings:    Dental:  Dental Findings:   Chest/Lungs:  Chest/Lungs Findings: Normal Respiratory Rate     Heart/Vascular:  Heart Findings: Rate: Normal  Rhythm: Regular Rhythm        Mental Status:  Mental Status Findings:  Cooperative, Alert and Oriented         Anesthesia Plan  Type of Anesthesia, risks & benefits discussed:  Anesthesia Type:  general  Patient's Preference: General  Intra-op Monitoring Plan: standard ASA monitors  Intra-op Monitoring Plan Comments:   Post Op Pain Control Plan:   Post Op Pain Control Plan Comments:   Induction:   IV  Beta Blocker:  Patient is not currently on a Beta-Blocker (No further documentation required).       Informed Consent: Patient understands risks and agrees with Anesthesia plan.  Questions answered. Anesthesia consent signed with patient.  ASA Score: 3     Day of Surgery Review of History & Physical:    H&P update referred to the surgeon.         Ready For Surgery From Anesthesia Perspective.

## 2018-10-04 NOTE — PLAN OF CARE
Medicated with fentanyl iv and took one pain pill since nausea better and is drinking cranberry juice for annita jung

## 2018-10-04 NOTE — PLAN OF CARE
Cont to feel nausea in spite of zofran 4 mg iv eariler dr Morrison made aware orders for pheng iv given

## 2018-10-04 NOTE — PLAN OF CARE
Brought pt to [facility] for phase 2  pt  States pain a 6 this is new has rectal pressure also md states he will have pressure now he wants meds

## 2018-10-04 NOTE — TRANSFER OF CARE
"Anesthesia Transfer of Care Note    Patient: Chris Hill    Procedure(s) Performed: Procedure(s) (LRB):  ULTRASOUND, PROSTATE, TRANSPERINEAL APPROACH, WITH GOLD FIDUCIAL MARKER INSERTION (with SPACEOAR transperineal biodegradable gel placement) (N/A)    Patient location: PACU    Anesthesia Type: general    Transport from OR: Transported from OR on 2-3 L/min O2 by NC with adequate spontaneous ventilation    Post pain: adequate analgesia    Post assessment: no apparent anesthetic complications and tolerated procedure well    Post vital signs: stable    Level of consciousness: sedated    Nausea/Vomiting: no nausea/vomiting    Complications: none    Transfer of care protocol was followed      Last vitals:   Visit Vitals  /66 (BP Location: Right arm, Patient Position: Lying)   Pulse 77   Temp 36.5 °C (97.7 °F) (Skin)   Resp 20   Ht 5' 6" (1.676 m)   Wt 99.8 kg (220 lb)   SpO2 (!) 94%   BMI 35.51 kg/m²     "

## 2018-10-04 NOTE — BRIEF OP NOTE
"[facility]  Brief Operative/Discharge Note    Date: 10/04/2018    Staff Surgeon: Manpreet Gordon MD    Pre-Op Diagnosis: prostate cancer    Post-Op Diagnosis: same    Procedure(s) Performed:   Transperineal placement of periprostatic biodegradable spacing gel (SpaceOAR) with ultrasound guided placement  Transperineal placement of gold seed fiducial markers into prostate  Transrectal ultrasound of prostate including volumetric measurement    Specimen(s): none    Anesthesia: General endotracheal anesthesia, and local with 1% xylocaine perineally and as prostate block    Findings: normal prostate anatomy, well defined planes  Prostate volume 33.4 cm3 (W: 43.3mm, H: 28.6mm, L 51.5mm)  Appropriate displacement of anterior rectum from prostate, with 13.4mm space created by SpaceOAR    Estimated Blood Loss: minimal    Drains: none    Complications: none    Disposition: pacu    Discharge home today status post uncomplicated procedure as above  Diet - resume home diet  Follow up: 1 week with Dr Santillan for CT simulation for radiation as planned  - urology follow up to be set approx 1 month after completing radiation  Instructions:   Return to ER if temp >101, fever, chills  No sex/ejaculation/strenuous activity x3-5 days, no riding mowers/tractors/bikes x2-4 weeks  Hold asa/bloodthinners/fish oil x 3-5 days  Drink plenty of water may see blood in urine  Follow instructions of "biopsy" sheet  Complete all cipro starting with evening       Meds:     Medication List      CONTINUE taking these medications    amitriptyline 50 MG tablet  Commonly known as:  ELAVIL     amLODIPine 10 MG tablet  Commonly known as:  NORVASC     aspirin 81 MG EC tablet  Commonly known as:  ECOTRIN     carvedilol 12.5 MG tablet  Commonly known as:  COREG  Take 1 tablet (12.5 mg total) by mouth 2 (two) times daily with meals.     ciprofloxacin HCl 500 MG tablet  Commonly known as:  CIPRO  Take 1 tablet (500 mg total) by mouth 2 (two) times daily.   "   co-enzyme Q-10 30 mg capsule     fish oil-omega-3 fatty acids 300-1,000 mg capsule     FLEET BISACODYL 10 mg/30 mL Enem  Generic drug:  bisacodyl     hydrALAZINE 100 MG tablet  Commonly known as:  APRESOLINE  TAKE ONE TABLET BY MOUTH TWICE DAILY     hydroCHLOROthiazide 25 MG tablet  Commonly known as:  HYDRODIURIL  Take 1 tablet (25 mg total) by mouth once daily.     HYDROcodone-acetaminophen 7.5-325 mg per tablet  Commonly known as:  NORCO     irbesartan 300 MG tablet  Commonly known as:  AVAPRO     metFORMIN 500 MG tablet  Commonly known as:  GLUCOPHAGE     omeprazole 40 MG capsule  Commonly known as:  PRILOSEC     potassium chloride 20 mEq Pack  Commonly known as:  KLOR-CON     rosuvastatin 10 MG tablet  Commonly known as:  CRESTOR  Take 1 tablet (10 mg total) by mouth once daily.     temazepam 22.5 MG capsule  Commonly known as:  RESTORIL     terazosin 10 MG capsule  Commonly known as:  HYTRIN  Take 1 capsule (10 mg total) by mouth every evening.

## 2018-10-04 NOTE — OR NURSING
Pt tolerated procedure well. Pt states no complaints. Pt and spouse given discharge instructions with verbalized understanding.Pt escorted via w/c to car.

## 2018-10-04 NOTE — ANESTHESIA POSTPROCEDURE EVALUATION
"Anesthesia Post Evaluation    Patient: Chris Hill    Procedure(s) Performed: Procedure(s) (LRB):  ULTRASOUND, PROSTATE, TRANSPERINEAL APPROACH, WITH GOLD FIDUCIAL MARKER INSERTION (with SPACEOAR transperineal biodegradable gel placement) (N/A)    Final Anesthesia Type: general  Patient location during evaluation: PACU  Patient participation: Yes- Able to Participate  Level of consciousness: awake and alert, oriented and awake  Post-procedure vital signs: reviewed and stable  Pain management: adequate  Airway patency: patent  PONV status at discharge: No PONV  Anesthetic complications: no      Cardiovascular status: blood pressure returned to baseline and hemodynamically stable  Respiratory status: unassisted, spontaneous ventilation and room air  Hydration status: euvolemic  Follow-up not needed.        Visit Vitals  /76   Pulse 68   Temp 36.4 °C (97.5 °F) (Temporal)   Resp 18   Ht 5' 6" (1.676 m)   Wt 99.8 kg (220 lb)   SpO2 98%   BMI 35.51 kg/m²       Pain/Jossie Score: Pain Assessment Performed: Yes (10/4/2018 10:48 AM)  Presence of Pain: complains of pain/discomfort (10/4/2018 10:48 AM)  Pain Rating Prior to Med Admin: 6 (10/4/2018 10:57 AM)  Pain Rating Post Med Admin: 2 (10/4/2018 11:08 AM)  Jossie Score: 10 (10/4/2018 10:48 AM)        "

## 2018-10-05 ENCOUNTER — TELEPHONE (OUTPATIENT)
Dept: UROLOGY | Facility: CLINIC | Age: 68
End: 2018-10-05

## 2018-10-05 VITALS
WEIGHT: 220 LBS | OXYGEN SATURATION: 97 % | HEIGHT: 66 IN | DIASTOLIC BLOOD PRESSURE: 75 MMHG | TEMPERATURE: 98 F | SYSTOLIC BLOOD PRESSURE: 134 MMHG | BODY MASS INDEX: 35.36 KG/M2 | RESPIRATION RATE: 18 BRPM | HEART RATE: 67 BPM

## 2018-10-05 NOTE — TELEPHONE ENCOUNTER
----- Message from RT Karla sent at 10/5/2018 10:53 AM CDT -----  Contact: pt    pt , requesting a call back soon he is seeking medical advise concerning his procedure, yesterday, thanks.

## 2018-10-05 NOTE — TELEPHONE ENCOUNTER
Told to not take shower yesterday.   Wants to know when he can shower.    Also still having some diarrhea.

## 2018-10-06 NOTE — OP NOTE
[facility] Operative Report     Date: 10/04/2018     Staff Surgeon: Manpreet Gordon MD     Pre-Op Diagnosis: prostate cancer     Post-Op Diagnosis: same     Procedure(s) Performed:   Transperineal placement of periprostatic biodegradable spacing gel (SpaceOAR) with ultrasound needle guidance (34840)  Transperineal placement of gold seed fiducial markers into prostate (38450)  Transrectal ultrasound of prostate including volumetric measurement (73897)     Specimen(s): none     Anesthesia: General endotracheal anesthesia, and local with 1% xylocaine perineally and as prostate block     Findings: normal prostate anatomy, well defined planes  Prostate volume 33.4 cm3 (W: 43.3mm, H: 28.6mm, L 51.5mm)  Appropriate displacement of anterior rectum from prostate, with 13.4mm space created by SpaceOAR    Estimated Blood Loss: minimal     Drains: none     Complications: none     Indications for procedure:  68-year-old man with [place] 4 + 5 equals 9 prostate cancer who has started androgen deprivation therapy and is pending external beam radiation.  He presents today for fiducial marker placement as well as SpaceOAR placement of absorbable polyethylene glycol Hydrogel to displace rectum away from prostate to decrease complications of and increased quality of life during and after radiotherapy, after extensive discussion of risks and benefits.  After extensive discussion of risks and benefits, and answering all questions, appropriate informed consent was obtained for the aforementioned procedures.    Procedure in detail:  After appropriate informed consent was obtained the patient was taken to the operating room and placed in dorsal lithotomy position.  Preoperative antibiotics were administered, and a WHO approved time-out was performed.    His scrotum was taped out of the way, and perineum prepped with ChloraPrep.  The side-fire transrectal ultrasound probe was secured to mounting device and passed into the rectum.   Three-dimensional measurements of the prostate were taken with combination of axial and sagittal plane views as noted above, for total volume of 33cc.  No gross abnormalities of the prostate were noted, and seminal vesicles appeared normal bilaterally.  He did have clear well defined anatomy with a low rectal hump and distinctly visible hyperechoic denonvillers fascia.  1% plain xylocaine was injected on the perineal skin and then advanced under ultrasound guidance into the mid perineum region to inject these areas for local anesthesia, after which the needle was advanced under ultrasound guidance in the sagittal plane up to the apex of the prostate bilaterally to perform bilateral pudendal nerve block.    Under transrectal ultrasound guidance, 3 prostate gold seed fiducial markers were placed transperineally, continue sleeve visualizing needle tip and inserting these markers at the left lateral mid gland, as well as right medial base and right medial apex.  Axial view was then obtained to see the shadowing of these markers and confirm their placement.    At this time, the SpaceOAR Hydrogel was prepared and reconstituted as per  instructions and loaded into the injection syringe Y connector. In the sagittal plane, under transrectal ultrasound guidance, the 15 cm 18 gauge needle was passed transperineally and seen to pass through the rectal urethralis muscle slowly advancing the needle tip into the perirectal fat inferior to the prostate, following Denonviller's fascia inferior to the prostate, while passing over the rectal hump and staying anterior to the rectal wall.  The needle tip position was confirmed mid gland in the appropriate space in both the sagittal and axial field.  Saline was used to hydro dissect this space between the fascia and anterior rectal wall, with test 1st in the axial plane, followed by creating the space with hydrodissection in the sagittal plane.    Again, with the needle tip  at mid gland, the axial field was used to confirm the needle was not in the rectal wall or tenting the rectal wall with movement of the needle tip without corresponding movement of the rectal wall to confirm perirectal placement.  While maintaining needle position, aspiration was performed to confirm position was not within a vascular space.  The needle was then held in this stable position, confirmed by ultrasound, and the saline syringe was exchanged for the previously assembled SpaceOAR delivery system.    Under ultrasound guidance in the sagittal plane, is smooth continuous injection technique was used to dispense Hydrogel into the space between the prostate and rectum.  All 10 cc were injected as a continuous injection without pause over 15 sec maintaining optimal visualization of the needle during Hydrogel administration at all times.  Desired result was noted, with good spread of the Hydrogel into this phyllis prostatic space.  Ultrasound measurement of the height of this space, and thus the displacement of the prostate from the anterior rectal wall, was measured to be 13.4 mm.     The patient tolerated the procedure well.  There were no complications.  There was no suspected penetration or compromise of the rectal wall.  Perineal pressure was held for 5 minutes postprocedure without any bleeding noted afterwards.     Disposition:  He will be discharged home today after uncomplicated procedure as above.  He will complete his course of prophylactic ciprofloxacin, and follow up next week with Radiation Oncology for CT simulation for his radiotherapy as planned.

## 2018-10-11 RX ORDER — HYDRALAZINE HYDROCHLORIDE 100 MG/1
TABLET, FILM COATED ORAL
Qty: 180 TABLET | Refills: 1 | Status: SHIPPED | OUTPATIENT
Start: 2018-10-11 | End: 2019-04-16 | Stop reason: SDUPTHER

## 2018-11-05 DIAGNOSIS — C61 MALIGNANT NEOPLASM OF PROSTATE: Primary | ICD-10-CM

## 2018-11-06 DIAGNOSIS — R11.0 NAUSEA: Primary | ICD-10-CM

## 2018-11-06 RX ORDER — ONDANSETRON HYDROCHLORIDE 8 MG/1
8 TABLET, FILM COATED ORAL EVERY 8 HOURS PRN
Qty: 30 TABLET | Refills: 2 | Status: SHIPPED | OUTPATIENT
Start: 2018-11-06 | End: 2019-08-01

## 2018-11-13 ENCOUNTER — CLINICAL SUPPORT (OUTPATIENT)
Dept: UROLOGY | Facility: CLINIC | Age: 68
End: 2018-11-13
Payer: MEDICARE

## 2018-11-13 DIAGNOSIS — C61 PROSTATE CANCER: Primary | ICD-10-CM

## 2018-11-13 PROCEDURE — 96402 CHEMO HORMON ANTINEOPL SQ/IM: CPT | Mod: S$PBB,,, | Performed by: UROLOGY

## 2018-11-13 PROCEDURE — 99211 OFF/OP EST MAY X REQ PHY/QHP: CPT | Mod: PBBFAC,PN

## 2018-11-13 PROCEDURE — 99213 OFFICE O/P EST LOW 20 MIN: CPT | Mod: S$PBB,25,, | Performed by: UROLOGY

## 2018-11-13 PROCEDURE — 99999 PR PBB SHADOW E&M-EST. PATIENT-LVL I: CPT | Mod: PBBFAC,,,

## 2018-11-13 RX ADMIN — TRIPTORELIN PAMOATE 22.5 MG: KIT at 04:11

## 2018-11-14 ENCOUNTER — LAB VISIT (OUTPATIENT)
Dept: LAB | Facility: HOSPITAL | Age: 68
End: 2018-11-14
Attending: INTERNAL MEDICINE
Payer: MEDICARE

## 2018-11-14 ENCOUNTER — TELEPHONE (OUTPATIENT)
Dept: UROLOGY | Facility: CLINIC | Age: 68
End: 2018-11-14

## 2018-11-14 DIAGNOSIS — R19.7 DIARRHEA OF PRESUMED INFECTIOUS ORIGIN: ICD-10-CM

## 2018-11-14 DIAGNOSIS — R50.9 HYPERTHERMIA-INDUCED DEFECT: Primary | ICD-10-CM

## 2018-11-14 LAB
ALBUMIN SERPL BCP-MCNC: 3.2 G/DL
ALP SERPL-CCNC: 157 U/L
ALT SERPL W/O P-5'-P-CCNC: 37 U/L
AMYLASE SERPL-CCNC: 44 U/L
ANION GAP SERPL CALC-SCNC: 14 MMOL/L
AST SERPL-CCNC: 18 U/L
BASOPHILS # BLD AUTO: 0 K/UL
BASOPHILS NFR BLD: 0.4 %
BILIRUB SERPL-MCNC: 0.5 MG/DL
BUN SERPL-MCNC: 14 MG/DL
CALCIUM SERPL-MCNC: 9.3 MG/DL
CHLORIDE SERPL-SCNC: 97 MMOL/L
CO2 SERPL-SCNC: 28 MMOL/L
CREAT SERPL-MCNC: 0.9 MG/DL
DIFFERENTIAL METHOD: ABNORMAL
EOSINOPHIL # BLD AUTO: 0.3 K/UL
EOSINOPHIL NFR BLD: 6.4 %
ERYTHROCYTE [DISTWIDTH] IN BLOOD BY AUTOMATED COUNT: 14.9 %
EST. GFR  (AFRICAN AMERICAN): >60 ML/MIN/1.73 M^2
EST. GFR  (NON AFRICAN AMERICAN): >60 ML/MIN/1.73 M^2
GLUCOSE SERPL-MCNC: 174 MG/DL
HCT VFR BLD AUTO: 31.7 %
HGB BLD-MCNC: 10.7 G/DL
LIPASE SERPL-CCNC: 24 U/L
LYMPHOCYTES # BLD AUTO: 0.6 K/UL
LYMPHOCYTES NFR BLD: 10.9 %
MCH RBC QN AUTO: 27.4 PG
MCHC RBC AUTO-ENTMCNC: 33.7 G/DL
MCV RBC AUTO: 81 FL
MONOCYTES # BLD AUTO: 0.5 K/UL
MONOCYTES NFR BLD: 8.7 %
NEUTROPHILS # BLD AUTO: 3.8 K/UL
NEUTROPHILS NFR BLD: 73.6 %
PLATELET # BLD AUTO: 197 K/UL
PMV BLD AUTO: 8.3 FL
POTASSIUM SERPL-SCNC: 3.2 MMOL/L
PROT SERPL-MCNC: 6.6 G/DL
RBC # BLD AUTO: 3.91 M/UL
SODIUM SERPL-SCNC: 139 MMOL/L
WBC # BLD AUTO: 5.2 K/UL

## 2018-11-14 PROCEDURE — 80053 COMPREHEN METABOLIC PANEL: CPT

## 2018-11-14 PROCEDURE — 36415 COLL VENOUS BLD VENIPUNCTURE: CPT

## 2018-11-14 PROCEDURE — 85025 COMPLETE CBC W/AUTO DIFF WBC: CPT

## 2018-11-14 PROCEDURE — 87040 BLOOD CULTURE FOR BACTERIA: CPT

## 2018-11-14 PROCEDURE — 82150 ASSAY OF AMYLASE: CPT

## 2018-11-14 PROCEDURE — 83690 ASSAY OF LIPASE: CPT

## 2018-11-14 RX ORDER — OXYBUTYNIN CHLORIDE 5 MG/1
5 TABLET ORAL 2 TIMES DAILY
Qty: 60 TABLET | Refills: 11 | Status: SHIPPED | OUTPATIENT
Start: 2018-11-14 | End: 2019-08-01

## 2018-11-14 NOTE — PROGRESS NOTES
[facility] Urology Progress Note    Chris Hill Jr is a 68 y.o. male who presents for renewal of his ADT    [place] 4 + 5 equals 9 prostate cancer who has started androgen deprivation therapy external beam radiation.    He presented 10/4/18 for fiducial marker placement as well as SpaceOAR placement of absorbable polyethylene glycol Hydrogel to displace rectum away from prostate to decrease complications of and increased quality of life during and after radiotherapy.  At time of SpaceOar he did note daily diarrhea for weeks prior.  Last ADT 8/13/10 (eligard 22.5 with Dr Velazquez, 3 mos depot injection)    He did interrupt his XRT 2 weeks ago for lumbar back surgery as his chronic back pain was limiting his ability to lay flat on radiation table.   He is 2 weeks postop and just had staples removed. Notes he only missed 2d of radiation therapy.  Diarrhea has persisted. Prn immodium, though often still diarrhea regardless  Only mild increase urinary urgency/frequency  Is having severe bothersome hotflashes  He does however note that since his back surgery he has been having nightly fevers  Initially as high as 102 and thought was from surgery, but has persisted nightly and up to 101 the other night  Feels ok during the day  No upper respiratory symptoms  Reports urine was checked and negative. No dysuria  Set to complete XRT around 12/19/18    ROS: A comprehensive 10 system review was performed and is negative except as noted above in HPI    PHYSICAL EXAM:    General: Alert, cooperative, no distress, appears stated age   Head: Normocephalic, without obvious abnormality, atraumatic    Lungs: Respirations unlabored   Heart: Warm and well perfused   Abdomen: soft NT ND  Back: healing low midline incision without surrounding erythema/warmth   Extremities: Extremities normal, atraumatic, no cyanosis or edema   Skin: Skin color, texture, turgor normal, no rashes or lesions   Psych: Appropriate   Neurologic: Non-focal      ASSESSMENT   1. Prostate cancer  triptorelin pamoate Syrg 22.5 mg    Prostate Specific Antigen, Diagnostic       Plan    Trelstar 22.5mg (6 mos depot) injection administered IM L gluteal today by me.  Will RTC ~1 month after XRT complete with psa prior  Will discuss with onc best regimen for ADT related hot flashes and contact patient with treatment recs/rx  Advised asap follow up with PCP for further workup of his nightly fevers - may need to workup diarrhea, stool studies (?cdiff), as well as investigate further into recent back surgery given onset of fever since that time, etc.  Will CC Dr Bui

## 2018-11-14 NOTE — TELEPHONE ENCOUNTER
Please advise patient I have Rxed oxybutynin 5mg po bid for his hot flashes at suggestion of  oncologist. This should also help with his urgency/frequency. Side effects include dry eyes, dry mouth, constipation, the latter of which my be of benefit given his chronic diarrhea for which I have advised he see pcp asap.

## 2018-11-15 ENCOUNTER — LAB VISIT (OUTPATIENT)
Dept: LAB | Facility: HOSPITAL | Age: 68
End: 2018-11-15
Attending: INTERNAL MEDICINE
Payer: MEDICARE

## 2018-11-15 DIAGNOSIS — R19.7 DIARRHEA OF PRESUMED INFECTIOUS ORIGIN: ICD-10-CM

## 2018-11-15 DIAGNOSIS — R50.9 HYPERTHERMIA-INDUCED DEFECT: ICD-10-CM

## 2018-11-15 LAB
OB PNL STL: NEGATIVE
WBC #/AREA STL HPF: NORMAL /[HPF]

## 2018-11-15 PROCEDURE — 87046 STOOL CULTR AEROBIC BACT EA: CPT

## 2018-11-15 PROCEDURE — 89055 LEUKOCYTE ASSESSMENT FECAL: CPT

## 2018-11-15 PROCEDURE — 87427 SHIGA-LIKE TOXIN AG IA: CPT

## 2018-11-15 PROCEDURE — 82272 OCCULT BLD FECES 1-3 TESTS: CPT

## 2018-11-15 PROCEDURE — 87045 FECES CULTURE AEROBIC BACT: CPT

## 2018-11-16 LAB
E COLI SXT1 STL QL IA: NEGATIVE
E COLI SXT2 STL QL IA: NEGATIVE

## 2018-11-18 LAB — BACTERIA STL CULT: NORMAL

## 2018-11-19 LAB — BACTERIA BLD CULT: NORMAL

## 2018-11-21 ENCOUNTER — LAB VISIT (OUTPATIENT)
Dept: LAB | Facility: HOSPITAL | Age: 68
End: 2018-11-21
Attending: INTERNAL MEDICINE
Payer: MEDICARE

## 2018-11-21 DIAGNOSIS — R19.7 DIARRHEA OF PRESUMED INFECTIOUS ORIGIN: ICD-10-CM

## 2018-11-21 DIAGNOSIS — R50.9 HYPERTHERMIA-INDUCED DEFECT: ICD-10-CM

## 2018-11-21 PROCEDURE — 36415 COLL VENOUS BLD VENIPUNCTURE: CPT

## 2018-11-21 PROCEDURE — 87324 CLOSTRIDIUM AG IA: CPT

## 2018-11-22 LAB
C DIFF GDH STL QL: NEGATIVE
C DIFF TOX A+B STL QL IA: NEGATIVE

## 2018-11-29 ENCOUNTER — LAB VISIT (OUTPATIENT)
Dept: LAB | Facility: HOSPITAL | Age: 68
End: 2018-11-29
Attending: INTERNAL MEDICINE
Payer: MEDICARE

## 2018-11-29 DIAGNOSIS — D64.9 ANEMIA, UNSPECIFIED: Primary | ICD-10-CM

## 2018-11-29 LAB
BASOPHILS # BLD AUTO: 0.02 K/UL
BASOPHILS NFR BLD: 0.4 %
DIFFERENTIAL METHOD: ABNORMAL
EOSINOPHIL # BLD AUTO: 0.6 K/UL
EOSINOPHIL NFR BLD: 11.6 %
ERYTHROCYTE [DISTWIDTH] IN BLOOD BY AUTOMATED COUNT: 16.5 %
FERRITIN SERPL-MCNC: 199 NG/ML
HCT VFR BLD AUTO: 32.7 %
HGB BLD-MCNC: 10.5 G/DL
IMM GRANULOCYTES # BLD AUTO: 0.03 K/UL
IMM GRANULOCYTES NFR BLD AUTO: 0.6 %
IRON SERPL-MCNC: 77 UG/DL
LYMPHOCYTES # BLD AUTO: 0.7 K/UL
LYMPHOCYTES NFR BLD: 12.3 %
MCH RBC QN AUTO: 27.2 PG
MCHC RBC AUTO-ENTMCNC: 32.1 G/DL
MCV RBC AUTO: 85 FL
MONOCYTES # BLD AUTO: 0.6 K/UL
MONOCYTES NFR BLD: 11.8 %
NEUTROPHILS # BLD AUTO: 3.4 K/UL
NEUTROPHILS NFR BLD: 63.3 %
NRBC BLD-RTO: 0 /100 WBC
PLATELET # BLD AUTO: 93 K/UL
PMV BLD AUTO: 12.3 FL
RBC # BLD AUTO: 3.86 M/UL
SATURATED IRON: 24 %
TOTAL IRON BINDING CAPACITY: 315 UG/DL
TRANSFERRIN SERPL-MCNC: 213 MG/DL
WBC # BLD AUTO: 5.36 K/UL

## 2018-11-29 PROCEDURE — 83540 ASSAY OF IRON: CPT

## 2018-11-29 PROCEDURE — 82728 ASSAY OF FERRITIN: CPT

## 2018-11-29 PROCEDURE — 36415 COLL VENOUS BLD VENIPUNCTURE: CPT | Mod: PO

## 2018-11-29 PROCEDURE — 85025 COMPLETE CBC W/AUTO DIFF WBC: CPT

## 2018-12-11 DIAGNOSIS — C61 PROSTATE CANCER: Primary | ICD-10-CM

## 2018-12-12 DIAGNOSIS — C61 MALIGNANT NEOPLASM OF PROSTATE: Primary | ICD-10-CM

## 2018-12-13 LAB — PSA, DIAGNOSTIC: 0.1 NG/ML (ref 0–3)

## 2018-12-28 ENCOUNTER — OFFICE VISIT (OUTPATIENT)
Dept: HEMATOLOGY/ONCOLOGY | Facility: CLINIC | Age: 68
End: 2018-12-28
Payer: MEDICARE

## 2018-12-28 VITALS
HEART RATE: 66 BPM | TEMPERATURE: 98 F | DIASTOLIC BLOOD PRESSURE: 69 MMHG | SYSTOLIC BLOOD PRESSURE: 121 MMHG | RESPIRATION RATE: 20 BRPM | WEIGHT: 226.13 LBS | HEIGHT: 64 IN | BODY MASS INDEX: 38.61 KG/M2

## 2018-12-28 DIAGNOSIS — C61 PROSTATE CANCER: Primary | ICD-10-CM

## 2018-12-28 DIAGNOSIS — D64.9 NORMOCYTIC NORMOCHROMIC ANEMIA: ICD-10-CM

## 2018-12-28 DIAGNOSIS — R53.82 CHRONIC FATIGUE: ICD-10-CM

## 2018-12-28 DIAGNOSIS — D63.8 ANEMIA, CHRONIC DISEASE: ICD-10-CM

## 2018-12-28 DIAGNOSIS — D69.6 THROMBOCYTOPENIA: ICD-10-CM

## 2018-12-28 PROCEDURE — 99203 OFFICE O/P NEW LOW 30 MIN: CPT | Mod: ,,, | Performed by: INTERNAL MEDICINE

## 2018-12-28 RX ORDER — METHOCARBAMOL 750 MG/1
TABLET, FILM COATED ORAL
Refills: 1 | COMMUNITY
Start: 2018-10-31 | End: 2019-01-18

## 2018-12-28 RX ORDER — OXYCODONE AND ACETAMINOPHEN 10; 325 MG/1; MG/1
TABLET ORAL
Refills: 0 | COMMUNITY
Start: 2018-10-31 | End: 2019-01-18

## 2018-12-28 RX ORDER — METFORMIN HYDROCHLORIDE 500 MG/1
500 TABLET, EXTENDED RELEASE ORAL 2 TIMES DAILY WITH MEALS
COMMUNITY
Start: 2018-10-08 | End: 2021-03-15

## 2018-12-28 RX ORDER — TEMAZEPAM 15 MG/1
CAPSULE ORAL
COMMUNITY
Start: 2018-12-13 | End: 2019-08-01

## 2018-12-28 NOTE — PROGRESS NOTES
[facility] Hematolgy/Oncology  History & Physical    Subjective:      Patient ID:   NAME: Chris Hill Jr : 1950     68 y.o. male    Referring Doc: Simone Santillan Jr., MD  Other Physicians: Manpreet Gordon; Wiley; Cheyanne Alfaro        Chief Complaint: prostate cancer; anem/tcp      HPI:  68 y.o. male with diagnosis of prostate cancer who has been referred by Simone Santillan Jr., MD for evaluation by medical oncology. Patient with a high risk adenocarcinoma of the prostate with [id number] with GS of 4+5. He has been referred for evaluation for anemia and thrombocytoepnia. He had recent EGD scope with Dr Alfaro on 2018. He was having some diarrhea issues with the radiation and recently finished XRt on 2018. The diarrhea has improved and he is planning to have a colonoscope in future. He is here with his wife. He denies any CP, SOB, HA's or N/V. He has some fatigue.               ROS:   GEN: normal without any fever, night sweats or weight loss  HEENT: normal with no HA's, sore throat, stiff neck, changes in vision  CV: normal with no CP, SOB, PND, JANSEN or orthopnea  PULM: normal with no SOB, cough, hemoptysis, sputum or pleuritic pain  GI: normal with no abdominal pain, nausea, vomiting, constipation, diarrhea, melanotic stools, BRBPR, or hematemesis  : normal with no hematuria, dysuria  BREAST: normal with no mass, discharge, pain  SKIN: normal with no rash, erythema, bruising, or swelling       Past Medical/Surgical History:  Past Medical History:   Diagnosis Date    Anemia, chronic disease 2018    Coronary artery disease     mild    Diabetes mellitus     Heart murmur     Hyperlipidemia     Hypertension     Normocytic normochromic anemia 2018    Prostate cancer 2018    Sleep apnea     NOT USING [facility]    Thrombocytopenia 2018    Valvular regurgitation      Past Surgical History:   Procedure Laterality Date    CARDIAC CATHETERIZATION      EYE SURGERY      cataracts  bilateral    ULTRASOUND, PROSTATE, TRANSPERINEAL APPROACH, WITH GOLD FIDUCIAL MARKER INSERTION (with SPACEOAR transperineal biodegradable gel placement) N/A 10/4/2018    Performed by Manpreet Gordon MD at [facility] OR         Allergies:  Review of patient's allergies indicates:  No Known Allergies    Social/Family History:  Social History     Socioeconomic History    Marital status:      Spouse name: Not on file    Number of children: Not on file    Years of education: Not on file    Highest education level: Not on file   Social Needs    Financial resource strain: Not on file    Food insecurity - worry: Not on file    Food insecurity - inability: Not on file    Transportation needs - medical: Not on file    Transportation needs - non-medical: Not on file   Occupational History    Not on file   Tobacco Use    Smoking status: Never Smoker    Smokeless tobacco: Never Used   Substance and Sexual Activity    Alcohol use: No    Drug use: No    Sexual activity: Not on file   Other Topics Concern    Not on file   Social History Narrative    Not on file     Family History   Problem Relation Age of Onset    Heart disease Mother     Heart disease Father          Medications:    Current Outpatient Medications:     amitriptyline (ELAVIL) 50 MG tablet, Take 50 mg by mouth every evening., Disp: , Rfl:     amlodipine (NORVASC) 10 MG tablet, Take 5 mg by mouth once daily., Disp: , Rfl:     aspirin (ECOTRIN) 81 MG EC tablet, Take 81 mg by mouth once daily., Disp: , Rfl:     carvedilol (COREG) 12.5 MG tablet, Take 1 tablet (12.5 mg total) by mouth 2 (two) times daily with meals., Disp: 60 tablet, Rfl: 6    co-enzyme Q-10 30 mg capsule, Take 30 mg by mouth 3 (three) times daily., Disp: , Rfl:     fish oil-omega-3 fatty acids 300-1,000 mg capsule, Take 1 capsule by mouth 2 (two) times daily., Disp: , Rfl:     hydrALAZINE (APRESOLINE) 100 MG tablet, TAKE 1 TABLET BY MOUTH TWICE DAILY, Disp: 180 tablet,  "Rfl: 1    hydroCHLOROthiazide (HYDRODIURIL) 25 MG tablet, Take 1 tablet (25 mg total) by mouth once daily., Disp: 90 tablet, Rfl: 1    irbesartan (AVAPRO) 300 MG tablet, Take 300 mg by mouth every evening., Disp: , Rfl:     metFORMIN (GLUCOPHAGE-XR) 500 MG 24 hr tablet, , Disp: , Rfl:     methocarbamol (ROBAXIN) 750 MG Tab, TK 1 T PO QID, Disp: , Rfl: 1    omeprazole (PRILOSEC) 40 MG capsule, Take 40 mg by mouth once daily., Disp: , Rfl:     ondansetron (ZOFRAN) 8 MG tablet, Take 1 tablet (8 mg total) by mouth every 8 (eight) hours as needed for Nausea., Disp: 30 tablet, Rfl: 2    oxybutynin (DITROPAN) 5 MG Tab, Take 1 tablet (5 mg total) by mouth 2 (two) times daily., Disp: 60 tablet, Rfl: 11    oxyCODONE-acetaminophen (PERCOCET)  mg per tablet, TK 1 T PO Q 4-6 H PRN FOR PAIN, Disp: , Rfl: 0    potassium chloride (KLOR-CON) 20 mEq Pack, Take 20 mEq by mouth once daily., Disp: , Rfl:     rosuvastatin (CRESTOR) 10 MG tablet, Take 1 tablet (10 mg total) by mouth once daily., Disp: 90 tablet, Rfl: 1    temazepam (RESTORIL) 15 mg Cap, , Disp: , Rfl:     terazosin (HYTRIN) 10 MG capsule, Take 1 capsule (10 mg total) by mouth every evening., Disp: 30 capsule, Rfl: 6  No current facility-administered medications for this visit.     Facility-Administered Medications Ordered in Other Visits:     lactated ringers infusion, , Intravenous, Continuous, Gertrude Akhtar MD, Stopped at 10/04/18 1135    lidocaine (PF) 10 mg/ml (1%) injection 5 mg, 5 mg, Intradermal, Once, Gertrude Akhtar MD      Pathology:  Cancer Staging  No matching staging information was found for the patient.      Objective:   Vitals:  Blood pressure 121/69, pulse 66, temperature 98.4 °F (36.9 °C), resp. rate 20, height 5' 4.25" (1.632 m), weight 102.6 kg (226 lb 1.6 oz).    Physical Examination:   GEN: no apparent distress, comfortable; AAOx3  HEAD: atraumatic and normocephalic  EYES: no pallor, no icterus, PERRLA  ENT: OMM, no pharyngeal " erythema, external ears WNL; no nasal discharge; no thrush  NECK: no masses, thyroid normal, trachea midline, no LAD/LN's, supple  CV: RRR with no murmur; normal pulse; normal S1 and S2; no pedal edema  CHEST: Normal respiratory effort; CTAB; normal breath sounds; no wheeze or crackles  ABDOM: nontender and nondistended; soft; normal bowel sounds; no rebound/guarding  MUSC/Skeletal: ROM normal; no crepitus; joints normal; no deformities or arthropathy  EXTREM: no clubbing, cyanosis, inflammation or swelling  SKIN: no rashes, lesions, ulcers, petechiae or subcutaneous nodules  : no ellison  NEURO: grossly intact; motor/sensory WNL; AAOx3; no tremors  PSYCH: normal mood, affect and behavior  LYMPH: normal cervical, supraclavicular, axillary and groin LN's      Labs:     11/29/2018  Lab Results   Component Value Date    WBC 5.36 11/29/2018    HGB 10.5 (L) 11/29/2018    HCT 32.7 (L) 11/29/2018    MCV 85 11/29/2018    PLT 93 (L) 11/29/2018    CMP  Sodium   Date Value Ref Range Status   11/14/2018 139 136 - 145 mmol/L Final     Potassium   Date Value Ref Range Status   11/14/2018 3.2 (L) 3.5 - 5.1 mmol/L Final     Chloride   Date Value Ref Range Status   11/14/2018 97 95 - 110 mmol/L Final     CO2   Date Value Ref Range Status   11/14/2018 28 23 - 29 mmol/L Final     Glucose   Date Value Ref Range Status   11/14/2018 174 (H) 70 - 110 mg/dL Final     BUN, Bld   Date Value Ref Range Status   11/14/2018 14 8 - 23 mg/dL Final     Creatinine   Date Value Ref Range Status   11/14/2018 0.9 0.5 - 1.4 mg/dL Final     Calcium   Date Value Ref Range Status   11/14/2018 9.3 8.7 - 10.5 mg/dL Final     Total Protein   Date Value Ref Range Status   11/14/2018 6.6 6.0 - 8.4 g/dL Final     Albumin   Date Value Ref Range Status   11/14/2018 3.2 (L) 3.5 - 5.2 g/dL Final     Total Bilirubin   Date Value Ref Range Status   11/14/2018 0.5 0.1 - 1.0 mg/dL Final     Comment:     For infants and newborns, interpretation of results should be  based  on gestational age, weight and in agreement with clinical  observations.  Premature Infant recommended reference ranges:  Up to 24 hours.............<8.0 mg/dL  Up to 48 hours............<12.0 mg/dL  3-5 days..................<15.0 mg/dL  6-29 days.................<15.0 mg/dL       Alkaline Phosphatase   Date Value Ref Range Status   11/14/2018 157 (H) 55 - 135 U/L Final     AST   Date Value Ref Range Status   11/14/2018 18 10 - 40 U/L Final     ALT   Date Value Ref Range Status   11/14/2018 37 10 - 44 U/L Final     Anion Gap   Date Value Ref Range Status   11/14/2018 14 8 - 16 mmol/L Final     eGFR if    Date Value Ref Range Status   11/14/2018 >60 >60 mL/min/1.73 m^2 Final     eGFR if non    Date Value Ref Range Status   11/14/2018 >60 >60 mL/min/1.73 m^2 Final     Comment:     Calculation used to obtain the estimated glomerular filtration  rate (eGFR) is the CKD-EPI equation.        Lab Results   Component Value Date    IRON 77 11/29/2018    TIBC 315 11/29/2018    FERRITIN 199 11/29/2018           Radiology/Diagnostic Studies:          All lab results and imaging results have been reviewed and discussed with the patient    Assessment:   (1) 68 y.o. male with diagnosis of prostate cancer who has been referred by Simone Santillan Jr., MD for evaluation by medical oncology. Patient with a high risk adenocarcinoma of the prostate with [id number] with GS of 4+5.   - he started androgen depravation therapy and monotherapy XRT (IMRT)  - followed by Dr Gordon with  and currently on Trelstar  - followed by Dr Santillan with Rad/onc and completed XRt on 12/18/2018  - latest PSA at 0.1 on 12/13    (2) CAD and non-rheumatic MR; mild CVA in 1993; Hypertensive Heart disease - followed by Dr Jama with cardiology    (3) HTN and hypercholesterolemia    (4) LORA    (5) DM    (6) Chronic back pain issues - required recent lumbar surgery with Dr Chris Fofana [facility]    (7) Hx/of nightly  fevers    (8) Chronic diarhhea - followed by Dr Alfaro with GI and s/p recent endoscopies    (9) Mild thrombocytopenia with last platelet count down at 93,000  - no history of hepatitis or liver problems; no alcoholism    (10) Mild anemia with latest hgb at 10.5 and MCV at 85 - NCNC parameters  - iron and ferritin are good  - OBS was negative on 11/15  - hx/of colon polyps in past  - recent EGD with Dr Alfaro on 12/5        VISIT DIAGNOSES:              Prostate cancer    Normocytic normochromic anemia    Anemia, chronic disease    Thrombocytopenia            Plan:     PLAN:  1. Check antiplatelet antibodies and US of liver and spleen  2. Check retic, hgb elect, SPEP, B12 and folate  3. Check hepatitis viral panel and SANGEETHA  4. F/u with PCP, GI, Rad/onc and   RTC in  3  weeks   Fax note to Simone Santillan Jr., MD; Dominic Bui Pinsky        I have explained and the patient understands all of  the current recommendation(s). I have answered all of their questions to the best of my ability and to their complete satisfaction.             Thank you for allowing me to participate in this patient's care. Please call with any questions or concerns.    Electronically signed Cyrus Gibson MD

## 2018-12-28 NOTE — LETTER
December 28, 2018      Simone Santillan Jr., MD  [address] [facility]  [address]  [facility]  [place] [address]           [facility] - Hematology Oncology  [address]  [address]  [place] [address]  Phone: [phone]  Fax: [phone]          Patient: Chris Hill Jr   MR Number: [id number]   YOB: 1950   Date of Visit: 12/28/2018       Dear Dr. Simone Santillan Jr.:    Thank you for referring Chris Hill Jr to me for evaluation. Attached you will find relevant portions of my assessment and plan of care.    If you have questions, please do not hesitate to call me. I look forward to following Chris Hill Jr along with you.    Sincerely,    Cyrus Gibson MD    Enclosure  CC:  No Recipients    If you would like to receive this communication electronically, please contact [email or web address] or [phone] to request more information on [employer] access.    For providers and/or their staff who would like to refer a patient to [facility], please contact us through our one-stop-shop provider referral line, [facility], at [phone].    If you feel you have received this communication in error or would no longer like to receive these types of communications, please e-mail [email or web address]

## 2018-12-31 RX ORDER — HYDROCHLOROTHIAZIDE 25 MG/1
TABLET ORAL
Qty: 90 TABLET | Refills: 1 | Status: SHIPPED | OUTPATIENT
Start: 2018-12-31 | End: 2019-07-15 | Stop reason: SDUPTHER

## 2018-12-31 RX ORDER — ROSUVASTATIN CALCIUM 10 MG/1
TABLET, COATED ORAL
Qty: 90 TABLET | Refills: 1 | Status: SHIPPED | OUTPATIENT
Start: 2018-12-31 | End: 2019-08-15 | Stop reason: SDUPTHER

## 2019-01-08 LAB — FLOW CYTOMETRY ANTIBODIES ANALYZED: NORMAL

## 2019-01-09 ENCOUNTER — TELEPHONE (OUTPATIENT)
Dept: HEMATOLOGY/ONCOLOGY | Facility: CLINIC | Age: 69
End: 2019-01-09

## 2019-01-09 ENCOUNTER — OFFICE VISIT (OUTPATIENT)
Dept: RADIATION ONCOLOGY | Facility: CLINIC | Age: 69
End: 2019-01-09
Payer: MEDICARE

## 2019-01-09 ENCOUNTER — TELEPHONE (OUTPATIENT)
Dept: UROLOGY | Facility: CLINIC | Age: 69
End: 2019-01-09

## 2019-01-09 DIAGNOSIS — C61 PROSTATE CANCER: Primary | ICD-10-CM

## 2019-01-09 PROCEDURE — 99024 PR POST-OP FOLLOW-UP VISIT: ICD-10-PCS | Mod: ,,, | Performed by: RADIOLOGY

## 2019-01-09 PROCEDURE — 99024 POSTOP FOLLOW-UP VISIT: CPT | Mod: ,,, | Performed by: RADIOLOGY

## 2019-01-09 NOTE — TELEPHONE ENCOUNTER
Tried calling pt x3 no answer and could not leave a message. Pt appt will be reschedule and mailed to home will make at note on appt slip to call office if appt is not a good fit for his schedule.

## 2019-01-09 NOTE — TELEPHONE ENCOUNTER
Printed and in folder for dinora payne see       ----- Message from Cyrus Gibson MD sent at 1/8/2019  3:10 PM CST -----  Need report

## 2019-01-09 NOTE — PROGRESS NOTES
Chris Hill   [id number]  1950  1/9/2019  Manpreet Gordon Md  [address] [place] [address] Dr  [address]  [place] [address]    DIAGNOSIS: High risk prostate adenocarcinoma, [id number] GS 4+5 iPSA 13.2  REASON FOR VISIT: Routine scheduled follow-up.    HISTORY OF PRESENT ILLNESS:   68M p/w elevated PSA.    PSA  6/18 12.5  7/18 13.2    *8/18 Dr. Stephens TRUS bx   - 3+3 adenoca R apex 5%; 4+3 adenoca L apex 55%; 4+5 adenoca L mid 80% and L base 95%   - no EPE; +PNI   - 4/6 cores+   - ADT placed    - RadOnc consult  *8/18 CT AP/Bone scan: jenniffer    Patient was placed on long-term androgen deprivation therapy by Dr. Gordon and completed definitive radiotherapy to 7920 cGy in December 2018. Treatment was complicated by an unexpected lower back surgery and detection of pancytopenia by his GI doctor for which she is undergoing workup with medical oncology. Patient developed frequency and urgency of both urine and stool throughout treatment, managed medically.    INTERVAL HISTORY:  Patient reports fatigue with energy level at 30%. Of note he is undergoing workup for low blood counts with Dr. Gibson.  He reports frequency, urgency, nocturia of urine are resolving however he continues with urgency of stool. He reports frequency has improved with Imodium. He is not doing Kegel exercises. He continues on ADT per Dr. Gordon. He also continues to follow with his surgeon regarding recent lower back procedure.  AUA 17 (10)  QOL 5 (2)  IIEF Na (24)    Review of Systems   Constitutional: Positive for fatigue. Negative for appetite change, chills, fever and unexpected weight change.   HENT:   Negative for lump/mass, mouth sores, sore throat, trouble swallowing and voice change.    Eyes: Negative for eye problems and icterus.   Respiratory: Negative for chest tightness, cough, hemoptysis and shortness of breath.    Cardiovascular: Negative for chest pain and leg swelling.   Gastrointestinal: Positive for diarrhea. Negative for abdominal  distention, abdominal pain, blood in stool, constipation, nausea and vomiting.   Genitourinary: Positive for frequency and nocturia. Negative for bladder incontinence, difficulty urinating, dysuria and hematuria.    Musculoskeletal: Positive for back pain. Negative for gait problem, neck pain and neck stiffness.   Neurological: Negative for extremity weakness, gait problem, headaches, numbness and seizures.   Hematological: Negative for adenopathy.     Past Medical History:   Diagnosis Date    Anemia, chronic disease 12/28/2018    Coronary artery disease     mild    Diabetes mellitus     Heart murmur     Hyperlipidemia     Hypertension     Normocytic normochromic anemia 12/28/2018    Prostate cancer 8/20/2018    Sleep apnea     NOT USING [facility]    Thrombocytopenia 12/28/2018    Valvular regurgitation      Past Surgical History:   Procedure Laterality Date    CARDIAC CATHETERIZATION      EYE SURGERY      cataracts bilateral    ULTRASOUND, PROSTATE, TRANSPERINEAL APPROACH, WITH GOLD FIDUCIAL MARKER INSERTION (with SPACEOAR transperineal biodegradable gel placement) N/A 10/4/2018    Performed by Manpreet Gordon MD at [facility] OR     Social History     Socioeconomic History    Marital status:      Spouse name: Not on file    Number of children: Not on file    Years of education: Not on file    Highest education level: Not on file   Social Needs    Financial resource strain: Not on file    Food insecurity - worry: Not on file    Food insecurity - inability: Not on file    Transportation needs - medical: Not on file    Transportation needs - non-medical: Not on file   Occupational History    Not on file   Tobacco Use    Smoking status: Never Smoker    Smokeless tobacco: Never Used   Substance and Sexual Activity    Alcohol use: No    Drug use: No    Sexual activity: Not on file   Other Topics Concern    Not on file   Social History Narrative    Not on file     Family History   Problem  Relation Age of Onset    Heart disease Mother     Heart disease Father         Medication List           Accurate as of 1/9/19 10:01 AM. If you have any questions, ask your nurse or doctor.               CONTINUE taking these medications    amitriptyline 50 MG tablet  Commonly known as:  ELAVIL     amLODIPine 10 MG tablet  Commonly known as:  NORVASC     aspirin 81 MG EC tablet  Commonly known as:  ECOTRIN     carvedilol 12.5 MG tablet  Commonly known as:  COREG  Take 1 tablet (12.5 mg total) by mouth 2 (two) times daily with meals.     co-enzyme Q-10 30 mg capsule     fish oil-omega-3 fatty acids 300-1,000 mg capsule     hydrALAZINE 100 MG tablet  Commonly known as:  APRESOLINE  TAKE 1 TABLET BY MOUTH TWICE DAILY     hydroCHLOROthiazide 25 MG tablet  Commonly known as:  HYDRODIURIL  TAKE 1 TABLET BY MOUTH ONCE DAILY     irbesartan 300 MG tablet  Commonly known as:  AVAPRO     metFORMIN 500 MG 24 hr tablet  Commonly known as:  GLUCOPHAGE-XR     methocarbamol 750 MG Tab  Commonly known as:  ROBAXIN     omeprazole 40 MG capsule  Commonly known as:  PRILOSEC     ondansetron 8 MG tablet  Commonly known as:  ZOFRAN  Take 1 tablet (8 mg total) by mouth every 8 (eight) hours as needed for Nausea.     oxybutynin 5 MG Tab  Commonly known as:  DITROPAN  Take 1 tablet (5 mg total) by mouth 2 (two) times daily.     oxyCODONE-acetaminophen  mg per tablet  Commonly known as:  PERCOCET     potassium chloride 20 mEq Pack  Commonly known as:  KLOR-CON     rosuvastatin 10 MG tablet  Commonly known as:  CRESTOR  TAKE 1 TABLET BY MOUTH ONCE DAILY     temazepam 15 mg Cap  Commonly known as:  RESTORIL     terazosin 10 MG capsule  Commonly known as:  HYTRIN  Take 1 capsule (10 mg total) by mouth every evening.          Review of patient's allergies indicates:  No Known Allergies    QUALITY OF LIFE: 80%- Normal Activity with Effort: Some Symptoms of Disease    There were no vitals filed for this visit.  There is no height or weight  on file to calculate BMI.    PHYSICAL EXAM:   GENERAL: alert; in no apparent distress.   HEAD: normocephalic, atraumatic.  EYES: pupils are equal, round, reactive to light and accommodation. Sclera anicteric. Conjunctiva not injected.   NOSE/THROAT: no nasal erythema or rhinorrhea. Oropharynx pink, without erythema, ulcerations or thrush.   NECK: no cervical motion rigidity; supple with no masses.  CHEST: Patient is speaking comfortably on room air with normal work of breathing without using accessory muscles of respiration.  ABDOMEN: soft, nontender, nondistended. Bowel sounds present.   MUSCULOSKELETAL: no tenderness to palpation along the spine or scapulae. Normal range of motion.  NEUROLOGIC: cranial nerves II-XII intact bilaterally. Strength 5/5 in bilateral upper and lower extremities. No sensory deficits appreciated. Normal gait.  EXTREMITIES: no clubbing, cyanosis, edema.  SKIN: no erythema, rashes, ulcerations noted.     ANCILLARY DATA:   Us Abdomen Complete   IMPRESSION: Fatty infiltration of the liver otherwise negative abdominal ultrasound       ASSESSMENT: 68 y.o. male with High risk prostate adenocarcinoma, [id number] GS 4+5 iPSA 13.2 started on long-term ADT, status post definitive radiotherapy to 7920 cGy ending December 2018.  PLAN:   Chris Hill Jr had some difficulty with treatment with symptoms of both cystitis and colitis/proctitis that were managed medically. He also underwent a lower back procedure and continues with pain in the right testicle and of note reports significant urgency of stool the frequency has significantly declined. Additionally he was appreciated by his endoscopist to have low blood counts and is undergoing workup with Dr. Gibson of medical oncology. He will be due for colonoscopy in March and this is an adequate interval after radiotherapy. I recommended and described Kegel exercises today as well as keeping a diet journal to assist with stool control. I expect fatigue  and acute radiation cystitis symptoms to resolve within the coming month and assured him of this. He will continue with every 3-6 month PSAs per Dr. Gordon and remain on hormone deprivation the balance of 2 years. Return to clinic in 6 months.    All questions answered and contact information provided. Patient understands free to call us anytime with any questions or concerns regarding radiation therapy.    I have personally seen and evaluated this patient. Greater than 50% of this time was spent discussing coordination of care and/or counseling.    PHYSICIAN: Simone Santillan Jr, MD

## 2019-01-11 ENCOUNTER — LAB VISIT (OUTPATIENT)
Dept: LAB | Facility: HOSPITAL | Age: 69
End: 2019-01-11
Attending: INTERNAL MEDICINE
Payer: MEDICARE

## 2019-01-11 DIAGNOSIS — I11.9 HYPERTENSIVE HEART DISEASE WITHOUT HEART FAILURE: ICD-10-CM

## 2019-01-11 DIAGNOSIS — E78.5 DYSLIPIDEMIA: ICD-10-CM

## 2019-01-11 DIAGNOSIS — I34.0 NON-RHEUMATIC MITRAL REGURGITATION: ICD-10-CM

## 2019-01-11 DIAGNOSIS — I25.10 MILD CAD: ICD-10-CM

## 2019-01-11 LAB
ALBUMIN SERPL BCP-MCNC: 3.2 G/DL
ALBUMIN SERPL BCP-MCNC: 3.2 G/DL
ALP SERPL-CCNC: 77 U/L
ALP SERPL-CCNC: 77 U/L
ALT SERPL W/O P-5'-P-CCNC: 20 U/L
ALT SERPL W/O P-5'-P-CCNC: 20 U/L
ANION GAP SERPL CALC-SCNC: 10 MMOL/L
ANION GAP SERPL CALC-SCNC: 10 MMOL/L
AST SERPL-CCNC: 15 U/L
AST SERPL-CCNC: 15 U/L
BILIRUB SERPL-MCNC: 0.4 MG/DL
BILIRUB SERPL-MCNC: 0.4 MG/DL
BUN SERPL-MCNC: 17 MG/DL
BUN SERPL-MCNC: 17 MG/DL
CALCIUM SERPL-MCNC: 9.2 MG/DL
CALCIUM SERPL-MCNC: 9.2 MG/DL
CHLORIDE SERPL-SCNC: 103 MMOL/L
CHLORIDE SERPL-SCNC: 103 MMOL/L
CHOLEST SERPL-MCNC: 137 MG/DL
CHOLEST/HDLC SERPL: 2.9 {RATIO}
CO2 SERPL-SCNC: 29 MMOL/L
CO2 SERPL-SCNC: 29 MMOL/L
CREAT SERPL-MCNC: 0.8 MG/DL
CREAT SERPL-MCNC: 0.8 MG/DL
EST. GFR  (AFRICAN AMERICAN): >60 ML/MIN/1.73 M^2
EST. GFR  (AFRICAN AMERICAN): >60 ML/MIN/1.73 M^2
EST. GFR  (NON AFRICAN AMERICAN): >60 ML/MIN/1.73 M^2
EST. GFR  (NON AFRICAN AMERICAN): >60 ML/MIN/1.73 M^2
GLUCOSE SERPL-MCNC: 129 MG/DL
GLUCOSE SERPL-MCNC: 129 MG/DL
HDLC SERPL-MCNC: 48 MG/DL
HDLC SERPL: 35 %
LDLC SERPL CALC-MCNC: 49.2 MG/DL
NONHDLC SERPL-MCNC: 89 MG/DL
POTASSIUM SERPL-SCNC: 3.7 MMOL/L
POTASSIUM SERPL-SCNC: 3.7 MMOL/L
PROT SERPL-MCNC: 6.5 G/DL
PROT SERPL-MCNC: 6.5 G/DL
SODIUM SERPL-SCNC: 142 MMOL/L
SODIUM SERPL-SCNC: 142 MMOL/L
TRIGL SERPL-MCNC: 199 MG/DL

## 2019-01-11 PROCEDURE — 80061 LIPID PANEL: CPT

## 2019-01-11 PROCEDURE — 80053 COMPREHEN METABOLIC PANEL: CPT

## 2019-01-11 PROCEDURE — 36415 COLL VENOUS BLD VENIPUNCTURE: CPT | Mod: PO

## 2019-01-14 ENCOUNTER — OFFICE VISIT (OUTPATIENT)
Dept: HEMATOLOGY/ONCOLOGY | Facility: CLINIC | Age: 69
End: 2019-01-14
Payer: MEDICARE

## 2019-01-14 VITALS
WEIGHT: 227.31 LBS | HEART RATE: 73 BPM | SYSTOLIC BLOOD PRESSURE: 151 MMHG | DIASTOLIC BLOOD PRESSURE: 68 MMHG | RESPIRATION RATE: 20 BRPM | BODY MASS INDEX: 38.71 KG/M2 | TEMPERATURE: 98 F

## 2019-01-14 DIAGNOSIS — D63.8 ANEMIA, CHRONIC DISEASE: ICD-10-CM

## 2019-01-14 DIAGNOSIS — C61 PROSTATE CANCER: ICD-10-CM

## 2019-01-14 DIAGNOSIS — D69.6 THROMBOCYTOPENIA: Primary | ICD-10-CM

## 2019-01-14 DIAGNOSIS — D69.3 CHRONIC ITP (IDIOPATHIC THROMBOCYTOPENIA): ICD-10-CM

## 2019-01-14 DIAGNOSIS — D64.9 NORMOCYTIC NORMOCHROMIC ANEMIA: ICD-10-CM

## 2019-01-14 LAB
BASOPHILS NFR BLD: 0 K/UL (ref 0–0.2)
BASOPHILS NFR BLD: 0.4 %
EOSINOPHIL NFR BLD: 0.4 K/UL (ref 0–0.7)
EOSINOPHIL NFR BLD: 7.8 %
ERYTHROCYTE [DISTWIDTH] IN BLOOD BY AUTOMATED COUNT: 14.6 % (ref 12.5–14.5)
GRAN #: 3.7 K/UL (ref 1.4–6.5)
GRAN%: 68.7 %
HCT VFR BLD AUTO: 34.8 % (ref 39–55)
HGB BLD-MCNC: 11.2 G/DL (ref 14–16)
IMMATURE GRANS (ABS): 0.1 K/UL (ref 0–1)
IMMATURE GRANULOCYTES: 0.9 %
LYMPH #: 0.7 K/UL (ref 1.2–3.4)
LYMPH%: 12.2 %
MCH RBC QN AUTO: 28.1 PG (ref 25–35)
MCHC RBC AUTO-ENTMCNC: 32.2 G/DL (ref 31–36)
MCV RBC AUTO: 87.2 FL (ref 80–100)
MONO #: 0.5 K/UL (ref 0.1–0.6)
MONO%: 10 %
NUCLEATED RBCS: 0 %
NUCLEATED RED BLOOD CELLS: 0 /100 WBC
PERFORMED BY:: ABNORMAL
PLATELET # BLD AUTO: 111 K/UL (ref 140–440)
PMV BLD AUTO: 10.7 FL (ref 8.8–12.7)
RBC # BLD AUTO: 3.99 M/UL (ref 4.3–5.9)
WBC # BLD: 5.4 K/UL (ref 5–10)

## 2019-01-14 PROCEDURE — 99215 PR OFFICE/OUTPT VISIT, EST, LEVL V, 40-54 MIN: ICD-10-PCS | Mod: ,,, | Performed by: INTERNAL MEDICINE

## 2019-01-14 PROCEDURE — 99215 OFFICE O/P EST HI 40 MIN: CPT | Mod: ,,, | Performed by: INTERNAL MEDICINE

## 2019-01-14 NOTE — PROGRESS NOTES
[facility] Hematology/Oncology  PROGRESS NOTE - 2nd Follow-up Visit      Subjective:       Patient ID:   NAME: Chris Hill Jr : 1950     68 y.o. male    Referring Doc: Simone Santillan Jr., MD  Other Physicians: Manpreet Gordon; Wiley; Cheyanne Alfaro        Chief Complaint:  prostate cancer; anem/tcp f/u         History of Present Illness:     Patient returns today for a 2nd regularly scheduled follow-up visit.  The patient is here today to go over the results of the recently ordered labs, tests and studies. He saw Dr Santillan on . He is here with his wife. He is doing ok. He has finished XRT. He has been having some dark stools. He denies any CP, SOB, HA's or N/V.             ROS:   GEN: normal without any fever, night sweats or weight loss  HEENT: normal with no HA's, sore throat, stiff neck, changes in vision  CV: normal with no CP, SOB, PND, JANSEN or orthopnea  PULM: normal with no SOB, cough, hemoptysis, sputum or pleuritic pain  GI: normal with no abdominal pain, nausea, vomiting, constipation, diarrhea, melanotic stools, BRBPR, or hematemesis  : normal with no hematuria, dysuria  BREAST: normal with no mass, discharge, pain  SKIN: normal with no rash, erythema, bruising, or swelling    Allergies:  Review of patient's allergies indicates:  No Known Allergies    Medications:    Current Outpatient Medications:     amitriptyline (ELAVIL) 50 MG tablet, Take 50 mg by mouth every evening., Disp: , Rfl:     amlodipine (NORVASC) 10 MG tablet, Take 5 mg by mouth once daily., Disp: , Rfl:     aspirin (ECOTRIN) 81 MG EC tablet, Take 81 mg by mouth once daily., Disp: , Rfl:     carvedilol (COREG) 12.5 MG tablet, Take 1 tablet (12.5 mg total) by mouth 2 (two) times daily with meals., Disp: 60 tablet, Rfl: 6    co-enzyme Q-10 30 mg capsule, Take 30 mg by mouth 3 (three) times daily., Disp: , Rfl:     fish oil-omega-3 fatty acids 300-1,000 mg capsule, Take 1 capsule by mouth 2 (two) times daily., Disp: ,  Rfl:     hydrALAZINE (APRESOLINE) 100 MG tablet, TAKE 1 TABLET BY MOUTH TWICE DAILY, Disp: 180 tablet, Rfl: 1    hydroCHLOROthiazide (HYDRODIURIL) 25 MG tablet, TAKE 1 TABLET BY MOUTH ONCE DAILY, Disp: 90 tablet, Rfl: 1    irbesartan (AVAPRO) 300 MG tablet, Take 300 mg by mouth every evening., Disp: , Rfl:     metFORMIN (GLUCOPHAGE-XR) 500 MG 24 hr tablet, , Disp: , Rfl:     methocarbamol (ROBAXIN) 750 MG Tab, TK 1 T PO QID, Disp: , Rfl: 1    omeprazole (PRILOSEC) 40 MG capsule, Take 40 mg by mouth once daily., Disp: , Rfl:     ondansetron (ZOFRAN) 8 MG tablet, Take 1 tablet (8 mg total) by mouth every 8 (eight) hours as needed for Nausea., Disp: 30 tablet, Rfl: 2    oxybutynin (DITROPAN) 5 MG Tab, Take 1 tablet (5 mg total) by mouth 2 (two) times daily., Disp: 60 tablet, Rfl: 11    oxyCODONE-acetaminophen (PERCOCET)  mg per tablet, TK 1 T PO Q 4-6 H PRN FOR PAIN, Disp: , Rfl: 0    potassium chloride (KLOR-CON) 20 mEq Pack, Take 20 mEq by mouth once daily., Disp: , Rfl:     rosuvastatin (CRESTOR) 10 MG tablet, TAKE 1 TABLET BY MOUTH ONCE DAILY, Disp: 90 tablet, Rfl: 1    temazepam (RESTORIL) 15 mg Cap, , Disp: , Rfl:     terazosin (HYTRIN) 10 MG capsule, Take 1 capsule (10 mg total) by mouth every evening., Disp: 30 capsule, Rfl: 6  No current facility-administered medications for this visit.     Facility-Administered Medications Ordered in Other Visits:     lactated ringers infusion, , Intravenous, Continuous, Gertrude Akhtar MD, Stopped at 10/04/18 1135    lidocaine (PF) 10 mg/ml (1%) injection 5 mg, 5 mg, Intradermal, Once, Gertrude Akhtar MD    PMHx/PSHx Updates:  See patient's last visit with me on 12/28/2018.  See H&P on 12/28/2018        Pathology:  Cancer Staging  No matching staging information was found for the patient.          Objective:     Vitals:  Blood pressure (!) 151/68, pulse 73, temperature 98.2 °F (36.8 °C), resp. rate 20, weight 103.1 kg (227 lb 4.8 oz).    Physical  Examination:   GEN: no apparent distress, comfortable; AAOx3  HEAD: atraumatic and normocephalic  EYES: no pallor, no icterus, PERRLA  ENT: OMM, no pharyngeal erythema, external ears WNL; no nasal discharge; no thrush  NECK: no masses, thyroid normal, trachea midline, no LAD/LN's, supple  CV: RRR with no murmur; normal pulse; normal S1 and S2; no pedal edema  CHEST: Normal respiratory effort; CTAB; normal breath sounds; no wheeze or crackles  ABDOM: nontender and nondistended; soft; normal bowel sounds; no rebound/guarding  MUSC/Skeletal: ROM normal; no crepitus; joints normal; no deformities or arthropathy  EXTREM: no clubbing, cyanosis, inflammation or swelling  SKIN: no rashes, lesions, ulcers, petechiae or subcutaneous nodules  : no ellison  NEURO: grossly intact; motor/sensory WNL; AAOx3; no tremors  PSYCH: normal mood, affect and behavior  LYMPH: normal cervical, supraclavicular, axillary and groin LN's            Labs:     1/2/2019 on chart  Platelet Autoantibody, Cell Bound   Order: [id number]   Status:  Final result   Visible to patient:  Yes (Patient Portal) Next appt:  01/18/2019 at 09:30 AM in Cardiology (Carol Jama MD)      Narrative   Performed by: [facility]                                VALUE             REFERENCE RANGE  UNITS                                         ----------------------------------------------------------------------------------  Plt Associated Anti-IIb/IIIa Positive AB Negative   Plt Associated Anti-Ib/IX Positive AB Negative   Plt Associated Anti-Ia/IIa Positive AB Negative            Platelet Autoantibody, Cell Bound   Order: [id number]   Status:  Final result   Visible to patient:  Yes (Patient Portal) Next appt:  01/18/2019 at 09:30 AM in Cardiology (Carol Jama MD)      Narrative   Performed by: [facility]                                VALUE             REFERENCE RANGE  UNITS                                          ----------------------------------------------------------------------------------  Plt Associated Anti-IIb/IIIa Positive AB Negative   Plt Associated Anti-Ib/IX Positive AB Negative   Plt Associated Anti-Ia/IIa Positive AB Negative            Hemoglobinopathy Evaluation   Order: [id number]   Status:  Final result   Visible to patient:  Yes (Patient Portal) Next appt:  01/18/2019 at 09:30 AM in Cardiology (Carol Jama MD) Dx:  Prostate cancer; Normocytic normochro...      Narrative   Performed by: [facility]                                VALUE             REFERENCE RANGE  UNITS                                         ----------------------------------------------------------------------------------  Hgb Solubility           Negative                     Negative              Hgb A                        98.0                    96.4-98.8  %           Hgb S                         0.0                          0.0  %           Hgb C                         0.0                          0.0  %           Hgb A2                        2.0                      1.8-3.2  %           Hgb F                         0.0                      0.0-2.0  %           Interpretation            Comment                                            Normal adult hemoglobin present.                  Radiology/Diagnostic Studies:    Us Abdomen Complete    Result Date: 1/2/2019  Abdominal ultrasound Clinical history is anemia, prostate cancer The pancreas, aorta and IVC are normal. The liver is hyperechoic compatible with fatty infiltration. There is hepatopedal flow within the portal vein. The common bile duct measures 6 mm. The patient has had a cholecystectomy. The kidneys are normal in size and echogenicity. The spleen is normal in size measuring 12 cm. IMPRESSION: Fatty infiltration of the liver otherwise negative abdominal ultrasound Read and electronically signed by: Marry Hernandez MD on 1/2/2019 9:49 AM CST MARRY HERNANDEZ   MD      I have reviewed all available lab results and radiology reports.    Assessment/Plan:   (1) 68 y.o. male with diagnosis of prostate cancer who has been referred by Smione Santillan Jr., MD for evaluation by medical oncology. Patient with a high risk adenocarcinoma of the prostate with [id number] with GS of 4+5.   - he started androgen depravation therapy and monotherapy XRT (IMRT)  - followed by Dr Gordon with  and currently on Trelstar  - followed by Dr Santillan with Rad/onc and completed XRt on 12/18/2018  - latest PSA at 0.1 on 12/13     (2) CAD and non-rheumatic MR; mild CVA in 1993; Hypertensive Heart disease - followed by Dr Jama with cardiology     (3) HTN and hypercholesterolemia     (4) LORA     (5) DM    (6) Chronic back pain issues - required recent lumbar surgery with Dr Chris Fofana at [facility]     (7) Hx/of nightly fevers     (8) Chronic diarhhea - followed by Dr Alfaro with GI and s/p recent endoscopies     (9) Mild thrombocytopenia with last platelet count down at 93,000  - no history of hepatitis or liver problems; no alcoholism  - he has positive antiplatelet antibody screens both direct and indirect consistent with an underlying chronic ITP condition  - his flow cytometry showed no evidence of leukemia but he did have a relative increase in eosinophils     (10) Mild anemia with latest hgb at 10.5 and MCV at 85 - NCNC parameters  - iron and ferritin are good  - OBS was negative on 11/15  - hx/of colon polyps in past  - recent EGD with Dr lAfaro on 12/5                VISIT DIAGNOSES:      Thrombocytopenia    Prostate cancer    Normocytic normochromic anemia    Anemia, chronic disease          PLAN:  1. Proceed with rad/onc and  directives  2. Check CBC/plat monthly for now  3. F/u with PCP, , Rad/onc etc  4. RTC in 3-4 months  Fax note to Simone Santillan Jr., MD, Rob Bui MD, and Wiley Gordon Albright    Discussion:       I spent over 25 mins of time with the patient. Reviewed  results of the recently ordered labs, tests and studies; made directives with regards to the results. Over half of this time was spent couseling and coordinating care.    I have explained all of the above in detail and the patient understands all of the current recommendation(s). I have answered all of their questions to the best of my ability and to their complete satisfaction.   The patient is to continue with the current management plan.            Electronically signed by Cyrus Gibson MD

## 2019-01-14 NOTE — LETTER
January 14, 2019      Simone Santillan Jr., MD  [address] [facility]  [address]  [facility]  [place] [address]           [facility] - Hematology Oncology  [address]  [address]  [place] [address]  Phone: [phone]  Fax: [phone]          Patient: Chris Hill Jr   MR Number: [id number]   YOB: 1950   Date of Visit: 1/14/2019       Dear Dr. Simone Santillan Jr.:    Thank you for referring Chris Hill Jr to me for evaluation. Attached you will find relevant portions of my assessment and plan of care.    If you have questions, please do not hesitate to call me. I look forward to following Chris Hill Jr along with you.    Sincerely,    Cyrus Gibson MD    Enclosure  CC:  No Recipients    If you would like to receive this communication electronically, please contact [email or web address] or [phone] to request more information on [employer] Link access.    For providers and/or their staff who would like to refer a patient to [facility], please contact us through our one-stop-shop provider referral line, [facility], at [phone].    If you feel you have received this communication in error or would no longer like to receive these types of communications, please e-mail [email or web address]

## 2019-01-16 ENCOUNTER — TELEPHONE (OUTPATIENT)
Dept: HEMATOLOGY/ONCOLOGY | Facility: CLINIC | Age: 69
End: 2019-01-16

## 2019-01-16 NOTE — TELEPHONE ENCOUNTER
Called patient went over labs to his satisfaction       ----- Message from Dorothea Arias sent at 1/15/2019  3:18 PM CST -----  The patient called and would like someone to call him back with his lab results from the labs he had done yesterday in the infusion suite next door. Please call him at [phone]

## 2019-01-18 ENCOUNTER — OFFICE VISIT (OUTPATIENT)
Dept: CARDIOLOGY | Facility: CLINIC | Age: 69
End: 2019-01-18
Payer: MEDICARE

## 2019-01-18 VITALS
DIASTOLIC BLOOD PRESSURE: 69 MMHG | HEART RATE: 69 BPM | SYSTOLIC BLOOD PRESSURE: 125 MMHG | WEIGHT: 225.75 LBS | BODY MASS INDEX: 36.28 KG/M2 | HEIGHT: 66 IN

## 2019-01-18 DIAGNOSIS — I34.0 NON-RHEUMATIC MITRAL REGURGITATION: ICD-10-CM

## 2019-01-18 DIAGNOSIS — E78.5 DYSLIPIDEMIA: ICD-10-CM

## 2019-01-18 DIAGNOSIS — E66.01 SEVERE OBESITY (BMI 35.0-35.9 WITH COMORBIDITY): ICD-10-CM

## 2019-01-18 DIAGNOSIS — I11.9 HYPERTENSIVE HEART DISEASE WITHOUT HEART FAILURE: Primary | ICD-10-CM

## 2019-01-18 DIAGNOSIS — I25.10 MILD CAD: ICD-10-CM

## 2019-01-18 PROCEDURE — 99999 PR PBB SHADOW E&M-EST. PATIENT-LVL III: ICD-10-PCS | Mod: PBBFAC,,, | Performed by: INTERNAL MEDICINE

## 2019-01-18 PROCEDURE — 99213 OFFICE O/P EST LOW 20 MIN: CPT | Mod: PBBFAC,PO | Performed by: INTERNAL MEDICINE

## 2019-01-18 PROCEDURE — 99214 PR OFFICE/OUTPT VISIT, EST, LEVL IV, 30-39 MIN: ICD-10-PCS | Mod: S$PBB,,, | Performed by: INTERNAL MEDICINE

## 2019-01-18 PROCEDURE — 99214 OFFICE O/P EST MOD 30 MIN: CPT | Mod: S$PBB,,, | Performed by: INTERNAL MEDICINE

## 2019-01-18 PROCEDURE — 99999 PR PBB SHADOW E&M-EST. PATIENT-LVL III: CPT | Mod: PBBFAC,,, | Performed by: INTERNAL MEDICINE

## 2019-01-18 RX ORDER — AMLODIPINE BESYLATE 5 MG/1
5 TABLET ORAL DAILY
Qty: 90 TABLET | Refills: 1 | Status: SHIPPED | OUTPATIENT
Start: 2019-01-18 | End: 2019-10-05 | Stop reason: SDUPTHER

## 2019-01-18 NOTE — PROGRESS NOTES
Subjective:    Patient ID:  Chris Hill Jr is a 68 y.o. male who presents for Hypertension; Coronary Artery Disease; and Valvular Heart Disease        HPI  DISCUSSED LABS AND GOALS, CMP OK, , HB 11.2, , JUST BACK ON METFORMIN , WAS OFF FOR DIARRHEA WITH RADIATION TX FOR PROSTATE CANCER, NOW ON HORMONAL TX, TRESLAR, LDL 49, DOING OK, BP LOG OK, HAD ANOTHER BACK SURGERY FOR RUPTURED DISC L2,USES C-PAP REGULARLY AND BENEFITS FROM IT, SEE ROS    Past Medical History:   Diagnosis Date    Anemia, chronic disease 12/28/2018    Coronary artery disease     mild    Diabetes mellitus     Heart murmur     Hyperlipidemia     Hypertension     Normocytic normochromic anemia 12/28/2018    Prostate cancer 8/20/2018    Sleep apnea     NOT USING [facility]    Thrombocytopenia 12/28/2018    Valvular regurgitation      Past Surgical History:   Procedure Laterality Date    CARDIAC CATHETERIZATION      EYE SURGERY      cataracts bilateral    ULTRASOUND, PROSTATE, TRANSPERINEAL APPROACH, WITH GOLD FIDUCIAL MARKER INSERTION (with SPACEOAR transperineal biodegradable gel placement) N/A 10/4/2018    Performed by Manpreet Gordon MD at [facility] OR     Family History   Problem Relation Age of Onset    Heart disease Mother     Heart disease Father      Social History     Socioeconomic History    Marital status:      Spouse name: Not on file    Number of children: Not on file    Years of education: Not on file    Highest education level: Not on file   Social Needs    Financial resource strain: Not on file    Food insecurity - worry: Not on file    Food insecurity - inability: Not on file    Transportation needs - medical: Not on file    Transportation needs - non-medical: Not on file   Occupational History    Not on file   Tobacco Use    Smoking status: Never Smoker    Smokeless tobacco: Never Used   Substance and Sexual Activity    Alcohol use: No    Drug use: No    Sexual activity: Not on file    Other Topics Concern    Not on file   Social History Narrative    Not on file       Review of patient's allergies indicates:  No Known Allergies    Current Outpatient Medications:     amitriptyline (ELAVIL) 50 MG tablet, Take 50 mg by mouth every evening., Disp: , Rfl:     amLODIPine (NORVASC) 5 MG tablet, Take 1 tablet (5 mg total) by mouth once daily., Disp: 90 tablet, Rfl: 1    aspirin (ECOTRIN) 81 MG EC tablet, Take 81 mg by mouth once daily., Disp: , Rfl:     carvedilol (COREG) 12.5 MG tablet, Take 1 tablet (12.5 mg total) by mouth 2 (two) times daily with meals., Disp: 60 tablet, Rfl: 6    co-enzyme Q-10 30 mg capsule, Take 30 mg by mouth 3 (three) times daily., Disp: , Rfl:     fish oil-omega-3 fatty acids 300-1,000 mg capsule, Take 1 capsule by mouth 2 (two) times daily., Disp: , Rfl:     hydrALAZINE (APRESOLINE) 100 MG tablet, TAKE 1 TABLET BY MOUTH TWICE DAILY, Disp: 180 tablet, Rfl: 1    hydroCHLOROthiazide (HYDRODIURIL) 25 MG tablet, TAKE 1 TABLET BY MOUTH ONCE DAILY, Disp: 90 tablet, Rfl: 1    irbesartan (AVAPRO) 300 MG tablet, Take 300 mg by mouth every evening., Disp: , Rfl:     metFORMIN (GLUCOPHAGE-XR) 500 MG 24 hr tablet, , Disp: , Rfl:     omeprazole (PRILOSEC) 40 MG capsule, Take 40 mg by mouth once daily., Disp: , Rfl:     ondansetron (ZOFRAN) 8 MG tablet, Take 1 tablet (8 mg total) by mouth every 8 (eight) hours as needed for Nausea., Disp: 30 tablet, Rfl: 2    oxybutynin (DITROPAN) 5 MG Tab, Take 1 tablet (5 mg total) by mouth 2 (two) times daily., Disp: 60 tablet, Rfl: 11    potassium chloride (KLOR-CON) 20 mEq Pack, Take 20 mEq by mouth once daily., Disp: , Rfl:     rosuvastatin (CRESTOR) 10 MG tablet, TAKE 1 TABLET BY MOUTH ONCE DAILY, Disp: 90 tablet, Rfl: 1    temazepam (RESTORIL) 15 mg Cap, , Disp: , Rfl:     terazosin (HYTRIN) 10 MG capsule, Take 1 capsule (10 mg total) by mouth every evening., Disp: 30 capsule, Rfl: 6    Review of Systems   Constitution: Negative  "for chills, diaphoresis, fever, weakness, malaise/fatigue (MILD) and night sweats.   HENT: Negative for congestion and nosebleeds.    Eyes: Negative for double vision and visual disturbance.   Cardiovascular: Negative for chest pain, claudication, cyanosis, dyspnea on exertion (MILD), irregular heartbeat, leg swelling, near-syncope, orthopnea, palpitations, paroxysmal nocturnal dyspnea and syncope.   Respiratory: Negative for cough, hemoptysis, shortness of breath and wheezing.    Endocrine: Negative for cold intolerance, heat intolerance and polyuria.   Hematologic/Lymphatic: Negative for adenopathy and bleeding problem.   Skin: Negative for color change, itching and nail changes.   Musculoskeletal: Negative for back pain (CHRONIC) and falls.   Gastrointestinal: Negative for abdominal pain, change in bowel habit, heartburn, hematemesis, jaundice, melena and vomiting.   Genitourinary: Negative for dysuria, flank pain and frequency.   Neurological: Negative for brief paralysis, dizziness (OCC), light-headedness, loss of balance, numbness, paresthesias, seizures, sensory change and tremors.   Psychiatric/Behavioral: Negative for altered mental status, depression, memory loss and substance abuse. The patient is not nervous/anxious.         Objective:      Vitals:    01/18/19 0927   BP: 125/69   Pulse: 69   Weight: 102.4 kg (225 lb 12 oz)   Height: 5' 6" (1.676 m)   PainSc:   6     Body mass index is 36.44 kg/m².    Physical Exam   Constitutional: He is oriented to person, place, and time. He appears well-developed and well-nourished. He is active.   OBESE   HENT:   Head: Normocephalic and atraumatic.   Mouth/Throat: Oropharynx is clear and moist and mucous membranes are normal.   Eyes: Conjunctivae and EOM are normal. Pupils are equal, round, and reactive to light.   Neck: Normal range of motion. Neck supple. Normal carotid pulses, no hepatojugular reflux and no JVD present. Carotid bruit is not present. No tracheal " deviation, no edema and no erythema present. No thyromegaly present.   Cardiovascular: Normal rate and regular rhythm.  No extrasystoles are present. PMI is not displaced. Exam reveals no gallop, no distant heart sounds, no friction rub and no midsystolic click.   Murmur heard.  High-pitched holosystolic murmur is present at the apex.  Pulses:       Carotid pulses are 2+ on the right side, and 2+ on the left side.       Radial pulses are 2+ on the right side, and 2+ on the left side.        Femoral pulses are 2+ on the right side, and 2+ on the left side.       Dorsalis pedis pulses are 2+ on the right side, and 2+ on the left side.        Posterior tibial pulses are 2+ on the right side, and 2+ on the left side.   Pulmonary/Chest: Effort normal and breath sounds normal. No accessory muscle usage. No tachypnea and no bradypnea. No respiratory distress.   Abdominal: Soft. Bowel sounds are normal. He exhibits no distension. There is no hepatosplenomegaly. There is no tenderness. There is no CVA tenderness.   Musculoskeletal: Normal range of motion. He exhibits no edema or deformity.   Lymphadenopathy:     He has no cervical adenopathy.   Neurological: He is alert and oriented to person, place, and time. He has normal strength. He displays no tremor. No cranial nerve deficit.   Skin: Skin is warm and dry. No cyanosis or erythema. No pallor.   Psychiatric: He has a normal mood and affect. His speech is normal and behavior is normal.               ..    Chemistry        Component Value Date/Time     01/11/2019 0818     01/11/2019 0818    K 3.7 01/11/2019 0818    K 3.7 01/11/2019 0818     01/11/2019 0818     01/11/2019 0818    CO2 29 01/11/2019 0818    CO2 29 01/11/2019 0818    BUN 17 01/11/2019 0818    BUN 17 01/11/2019 0818    CREATININE 0.8 01/11/2019 0818    CREATININE 0.8 01/11/2019 0818     (H) 01/11/2019 0818     (H) 01/11/2019 0818        Component Value Date/Time    CALCIUM  9.2 01/11/2019 0818    CALCIUM 9.2 01/11/2019 0818    ALKPHOS 77 01/11/2019 0818    ALKPHOS 77 01/11/2019 0818    AST 15 01/11/2019 0818    AST 15 01/11/2019 0818    ALT 20 01/11/2019 0818    ALT 20 01/11/2019 0818    BILITOT 0.4 01/11/2019 0818    BILITOT 0.4 01/11/2019 0818    ESTGFRAFRICA >60.0 01/11/2019 0818    ESTGFRAFRICA >60.0 01/11/2019 0818    EGFRNONAA >60.0 01/11/2019 0818    EGFRNONAA >60.0 01/11/2019 0818            ..  Lab Results   Component Value Date    CHOL 137 01/11/2019     Lab Results   Component Value Date    HDL 48 01/11/2019     Lab Results   Component Value Date    LDLCALC 49.2 (L) 01/11/2019     Lab Results   Component Value Date    TRIG 199 (H) 01/11/2019     Lab Results   Component Value Date    CHOLHDL 35.0 01/11/2019     ..  Lab Results   Component Value Date    WBC 5.4 01/14/2019    HGB 11.2 (L) 01/14/2019    HCT 34.8 (L) 01/14/2019    MCV 85 11/29/2018     (L) 01/14/2019       Test(s) Reviewed  I have reviewed the following in detail:  [] Stress test   [] Angiography   [] Echocardiogram   [x] Labs   [] Other:       Assessment:         ICD-10-CM ICD-9-CM   1. Hypertensive heart disease without heart failure I11.9 402.90   2. Non-rheumatic mitral regurgitation I34.0 424.0   3. Mild CAD I25.10 414.00   4. Dyslipidemia E78.5 272.4   5. Severe obesity (BMI 35.0-35.9 with comorbidity) E66.01 278.01    Z68.35 V85.35     Problem List Items Addressed This Visit        Cardiac/Vascular    Hypertensive heart disease without heart failure - Primary    Non-rheumatic mitral regurgitation    Mild CAD    Dyslipidemia       Endocrine    Severe obesity (BMI 35.0-35.9 with comorbidity)           Plan:         ALL CV CLINICALLY STABLE, NO ANGINA, NO HF, NO TIA, NO CLINICAL ARRHYTHMIA,CONTINUE CURRENT MEDS, EDUCATION, DIET, EXERCISE, WEIGHT LOSS, RTC IN 6-7 MO, EXPLAINED PLAN TO PT AND WIFE  Hypertensive heart disease without heart failure    Non-rheumatic mitral regurgitation    Mild  CAD    Dyslipidemia    Severe obesity (BMI 35.0-35.9 with comorbidity)    Other orders  -     amLODIPine (NORVASC) 5 MG tablet; Take 1 tablet (5 mg total) by mouth once daily.  Dispense: 90 tablet; Refill: 1    RTC Low level/low impact aerobic exercise 5x's/wk. Heart healthy diet and risk factor modification.    See labs and med orders.    Aerobic exercise 5x's/wk. Heart healthy diet and risk factor modification.    See labs and med orders.

## 2019-01-26 RX ORDER — TERAZOSIN 10 MG/1
CAPSULE ORAL
Qty: 30 CAPSULE | Refills: 6 | Status: SHIPPED | OUTPATIENT
Start: 2019-01-26 | End: 2019-08-29 | Stop reason: SDUPTHER

## 2019-02-06 ENCOUNTER — LAB VISIT (OUTPATIENT)
Dept: LAB | Facility: HOSPITAL | Age: 69
End: 2019-02-06
Attending: UROLOGY
Payer: MEDICARE

## 2019-02-06 DIAGNOSIS — C61 PROSTATE CANCER: ICD-10-CM

## 2019-02-06 LAB
ANION GAP SERPL CALC-SCNC: 10 MMOL/L
BUN SERPL-MCNC: 13 MG/DL
CALCIUM SERPL-MCNC: 9 MG/DL
CHLORIDE SERPL-SCNC: 105 MMOL/L
CO2 SERPL-SCNC: 27 MMOL/L
COMPLEXED PSA SERPL-MCNC: 0.02 NG/ML
CREAT SERPL-MCNC: 0.8 MG/DL
EST. GFR  (AFRICAN AMERICAN): >60 ML/MIN/1.73 M^2
EST. GFR  (NON AFRICAN AMERICAN): >60 ML/MIN/1.73 M^2
GLUCOSE SERPL-MCNC: 106 MG/DL
POTASSIUM SERPL-SCNC: 3.2 MMOL/L
SODIUM SERPL-SCNC: 142 MMOL/L
TESTOST SERPL-MCNC: <4 NG/DL

## 2019-02-06 PROCEDURE — 84403 ASSAY OF TOTAL TESTOSTERONE: CPT

## 2019-02-06 PROCEDURE — 36415 COLL VENOUS BLD VENIPUNCTURE: CPT

## 2019-02-06 PROCEDURE — 84153 ASSAY OF PSA TOTAL: CPT

## 2019-02-06 PROCEDURE — 80048 BASIC METABOLIC PNL TOTAL CA: CPT

## 2019-02-09 NOTE — PROGRESS NOTES
[facility] Urology Progress Note    Chris Hill Jr is a 68 y.o. male who presents for prostate cancer follow up    Paisley 4 + 5 equals 9 prostate cancer (cT1c, iPSA 13.2) treated with androgen deprivation therapy external beam radiation.    He presented 10/4/18 for fiducial marker placement as well as SpaceOAR placement. Vol 33.4g at time of markers. At time of SpaceOar he did note daily diarrhea for weeks prior.  8/13/18 eligard 22.5 3 mos depot  11/13/18 trelstar 22.5mg 6 mos depot.  - 2 weeks prior did interrupt his XRT for lumbar back surgery as his chronic back pain was limiting his ability to lay flat on radiation table. Only missed 2d of therapy.  He notd at that time his diarrhea had persisted. Using prn immodium    Completed XRT 12/18/18. PSA 0.1 on 12/13/18  Has followed up with Dr Gibson for chronic ITP and anemia, stable.  Has been followed by GI. Dr Tavares, with recent EGD 12/5/18 and colonoscopy 2/11/19 with a few cecal polyps removed and diverticulosis (repeat 3 yrs)  He last saw Dr Santillan on 1/9/19 noting:  Patient reports fatigue with energy level at 30%. Of note he is undergoing workup for low blood counts with Dr. Gibson.  He reports frequency, urgency, nocturia of urine are resolving however he continues with urgency of stool. He reports frequency has improved with Imodium. AUA SS 17/5 from 10/2    He returns today noting  2/6/19 PSA 0.02, T <4, Cr 0.8  AUA symptom score:  23/5 unhappy (5:  Intermittency, urgency; 4:  Emptying, frequency; 3:  Weak stream; 2:  Sleeping)  He did start oxybutynin 5 mg b.i.d. which has significantly helped decrease the hot flashes.  He still gets some though they are not as intense as they were before  Since starting it, and further way from radiation, his urgency is somewhat better.  He does have daytime frequency of 8-10 times at nighttime frequency of 1-2 times.  Not only see experiencing urinary urgency but he is also experiencing fecal urgency.  His  "diarrhea is somewhat better.  GI evaluation has been overall unrevealing of a etiology of chronic diarrhea.  He does report urinary intermittency but no urinary hesitancy.  He did have back surgery after 22 radiation treatments, though he does note that his fecal and urinary urgency predated his back surgery.  His urinalysis dipstick is negative except for 30 of protein.  Energy and fatigue are improving as time goes on.      ROS: A comprehensive 10 system review was performed and is negative except as noted above in HPI    PHYSICAL EXAM:    Vitals:    02/12/19 1055   BP: (!) 154/78   Pulse: 78   Resp: 18     Body mass index is 35.44 kg/m². Weight: 99.6 kg (219 lb 9.3 oz) Height: 5' 6" (167.6 cm)       General: Alert, cooperative, no distress, appears stated age   Head: Normocephalic, without obvious abnormality, atraumatic   Eyes: PERRL, conjunctiva/corneas clear   Lungs: Respirations unlabored   Heart: Warm and well perfused   Abdomen: soft NT ND  Extremities: Extremities normal, atraumatic, no cyanosis or edema   Skin: Skin color, texture, turgor normal, no rashes or lesions   Psych: Appropriate   Neurologic: Non-focal       Recent Results (from the past 336 hour(s))   Prostate Specific Antigen, Diagnostic    Collection Time: 02/06/19  9:09 AM   Result Value Ref Range    PSA DIAGNOSTIC 0.02 0.00 - 4.00 ng/mL   Testosterone    Collection Time: 02/06/19  9:09 AM   Result Value Ref Range    Testosterone, Total <4 (L) 304 - 1227 ng/dL   Basic metabolic panel    Collection Time: 02/06/19  9:09 AM   Result Value Ref Range    Sodium 142 136 - 145 mmol/L    Potassium 3.2 (L) 3.5 - 5.1 mmol/L    Chloride 105 95 - 110 mmol/L    CO2 27 23 - 29 mmol/L    Glucose 106 70 - 110 mg/dL    BUN, Bld 13 8 - 23 mg/dL    Creatinine 0.8 0.5 - 1.4 mg/dL    Calcium 9.0 8.7 - 10.5 mg/dL    Anion Gap 10 8 - 16 mmol/L    eGFR if African American >60 >60 mL/min/1.73 m^2    eGFR if non African American >60 >60 mL/min/1.73 m^2   POCT URINE " DIPSTICK WITHOUT MICROSCOPE    Collection Time: 02/12/19 10:59 AM   Result Value Ref Range    Color, UA yellow     Spec Grav UA 1.020     pH, UA 7     WBC, UA neg     Nitrite, UA neg     Protein neg     Glucose, UA neg     Ketones, UA neg     Urobilinogen, UA neg     Bilirubin neg     Blood, UA neg        ASSESSMENT   1. Prostate cancer  POCT URINE DIPSTICK WITHOUT MICROSCOPE    Testosterone    Prostate Specific Antigen, Diagnostic       Plan    Overall doing well status post radiotherapy for high-grade prostate cancer with good PSA response, now at 0.02.  He is still having significant urinary symptoms, as well as bowel symptoms, however those predated the radiotherapy.  He has had extensive GI evaluation.  We did discuss the possible neurogenic component given his chronic back problems and back surgery on both his fecal and urinary urgency.  He does however have significant obstructive lower urinary tract symptoms in addition to his urgency.  We did discuss the potential contributions of prostatic and lower tract obstruction to urinary urgency.    He is on Hytrin 10 mg every morning, and this is likely part of his blood pressure regimen per his understanding, however he is on multiple other antihypertensives.  We did discuss this is an alpha-blocker and sometimes Hytrin is used for urinary symptomatic control however Flomax is more specific to the urinary tract.  As well, sometimes alpha-blocker doses can be increased or doubled such as Flomax 0.8 mg rather than 0.4 mg, in the case of severe obstructive LUTS.  With this principal in mind, and given his baseline Hytrin in the mornings, will provide Flomax 0.4 mg in the evenings to see if he achieves better urinary symptomatic control from an obstructive symptom standpoint.  I will cc his primary care physician as well in regards to Hytrin in part of his blood pressure regimen to see its necessity, and can always discontinue and increase Flomax dose if there is  clinical benefit.  We did also discuss the potential for dizziness and retrograde ejaculation on Flomax, and the potential for increased dizziness or symptomatic hypotension on a double dose of alpha blocker which he should be cautious of an if he experiences, should discontinue Flomax and let us know.    Discussed conservative measures to control urgency and frequency including avoiding bladder irritants, timed voiding, not postponing voiding, and bowel regimen (as distended bowel has extrinsic compressive effect on bladder. Discussed bladder irritants include coffe (even decaf), tea, alcohol, soda, spicy foods, acidic juices (orange, tomato), vinegar, and artificial sweeteners.  As well, he has been on low-dose b.i.d. Ditropan largely for resolution of his hot flashes which has helped.  We did discuss that this is an anticholinergic which could help urinary urgency and frequency as well, and he is welcome to try t.i.d. dosing.  As well, will investigate if an extended XL once daily dosing would have the same benefits on his hot flashes, which I imagine it would is visit potentiate a dose of the same baseline medication, and could always try replacing his low-dose b.i.d. Ditropan with a larger dose XL dose more focused on his urinary urgency and frequency if needed.  We also discussed limiting fluid intake 2 hr before bed.  We did discuss that the natural course of irritative symptoms such as urgency and frequency are to continue to improve the further away he is from radiation, though also discuss the inter play between obstruction and urgency frequency.    He is Due for Trelstar ~5/13/18. Return at that time with PSA/T just prior, and can re-evaluate his lower urinary tract symptoms at that time with further temporal interval from radiation as well as time went increased alpha-blocker dose and adherence to conservative recommendations as above.  Should he still have moderate to severe lower urinary tract symptoms  it may be worth a cystoscopic evaluation for lower tract obstruction and potential further intervention, and we did discuss the utility of minimally invasive BPH interventions after radiotherapy for prostate cancer in the face of persistent obstructive symptoms.  As well given the combination of fecal and urinary urgency in the setting chronic back problems and back surgery, a urodynamic evaluation may be necessary in the future, especially in the absence of obstruction, or with relief of obstruction and persistent irritative symptoms.    All questions the patient and his wife had were answered in detail and he is agreeable to treatment plan.

## 2019-02-12 ENCOUNTER — OFFICE VISIT (OUTPATIENT)
Dept: UROLOGY | Facility: CLINIC | Age: 69
End: 2019-02-12
Payer: MEDICARE

## 2019-02-12 VITALS
DIASTOLIC BLOOD PRESSURE: 78 MMHG | BODY MASS INDEX: 35.29 KG/M2 | RESPIRATION RATE: 18 BRPM | HEIGHT: 66 IN | HEART RATE: 78 BPM | WEIGHT: 219.56 LBS | SYSTOLIC BLOOD PRESSURE: 154 MMHG

## 2019-02-12 DIAGNOSIS — C61 PROSTATE CANCER: Primary | ICD-10-CM

## 2019-02-12 LAB
BILIRUB SERPL-MCNC: NORMAL MG/DL
BLOOD URINE, POC: NORMAL
COLOR, POC UA: YELLOW
GLUCOSE UR QL STRIP: NORMAL
KETONES UR QL STRIP: NORMAL
LEUKOCYTE ESTERASE URINE, POC: NORMAL
NITRITE, POC UA: NORMAL
PH, POC UA: 7
PROTEIN, POC: NORMAL
SPECIFIC GRAVITY, POC UA: 1.02
UROBILINOGEN, POC UA: NORMAL

## 2019-02-12 PROCEDURE — 99214 OFFICE O/P EST MOD 30 MIN: CPT | Mod: S$PBB,,, | Performed by: UROLOGY

## 2019-02-12 PROCEDURE — 81002 URINALYSIS NONAUTO W/O SCOPE: CPT | Mod: PBBFAC,PN | Performed by: UROLOGY

## 2019-02-12 PROCEDURE — 99213 OFFICE O/P EST LOW 20 MIN: CPT | Mod: PBBFAC,PN | Performed by: UROLOGY

## 2019-02-12 PROCEDURE — 99999 PR PBB SHADOW E&M-EST. PATIENT-LVL III: CPT | Mod: PBBFAC,,, | Performed by: UROLOGY

## 2019-02-12 PROCEDURE — 99214 PR OFFICE/OUTPT VISIT, EST, LEVL IV, 30-39 MIN: ICD-10-PCS | Mod: S$PBB,,, | Performed by: UROLOGY

## 2019-02-12 PROCEDURE — 99999 PR PBB SHADOW E&M-EST. PATIENT-LVL III: ICD-10-PCS | Mod: PBBFAC,,, | Performed by: UROLOGY

## 2019-02-12 RX ORDER — TAMSULOSIN HYDROCHLORIDE 0.4 MG/1
0.4 CAPSULE ORAL NIGHTLY
Qty: 30 CAPSULE | Refills: 11 | Status: SHIPPED | OUTPATIENT
Start: 2019-02-12 | End: 2020-03-02

## 2019-02-12 NOTE — LETTER
February 17, 2019        Rob Bui MD  [address]  [place] [address]             [place] [facility]  [address] [place] [address] [address]  [place] [address]  Phone: [phone]  Fax: [phone]   Patient: Chris Hill Jr   MR Number: [id number]   YOB: 1950   Date of Visit: 2/12/2019       Dear Dr. Bui:    Thank you for referring Chris Hill Jr to me for evaluation. Attached you will find relevant portions of my assessment and plan of care.    If you have questions, please do not hesitate to call me. I look forward to following Chris Hill Jr along with you.    Sincerely,      Manpreet Gordon MD            CC  No Recipients    Enclosure

## 2019-02-12 NOTE — PATIENT INSTRUCTIONS
Discussed conservative measures to control urgency and frequency including   1. avoiding bladder irritants (see below) and INCREASE water    2. timed voiding - empty on a schedule (approx ~2-3 hours) in spite of need    3. dont postponing voiding - dont hold it on purpose     4. bowel regimen as distended bowel has extrinsic compressive effect on bladder.   - any or all of the following in any combination, titrate to soft daily bowel movement without pushing or straining  - colace/stool softener capsule - once to twice daily  - miralax - 1 capful daily to start, can increase to 2x daily (or decrease to 1/2 cap daily)  - increase dietary fibery  - fiber supplements, such as metamucil    Discussed bladder irritants include coffe (even decaf), tea, alcohol, soda, spicy foods, acidic juices (orange, tomato), vinegar, and artificial sweeteners/sugary beverages.    Stop fluid intake 2 hours before bed    Start flomax 0.4mg every evening    Continue ditropan 2x/day (can go up to 3x/day)

## 2019-04-12 ENCOUNTER — TELEPHONE (OUTPATIENT)
Dept: HEMATOLOGY/ONCOLOGY | Facility: CLINIC | Age: 69
End: 2019-04-12

## 2019-04-15 ENCOUNTER — OFFICE VISIT (OUTPATIENT)
Dept: HEMATOLOGY/ONCOLOGY | Facility: CLINIC | Age: 69
End: 2019-04-15
Payer: MEDICARE

## 2019-04-15 VITALS
SYSTOLIC BLOOD PRESSURE: 137 MMHG | HEART RATE: 59 BPM | BODY MASS INDEX: 36.77 KG/M2 | DIASTOLIC BLOOD PRESSURE: 77 MMHG | RESPIRATION RATE: 20 BRPM | WEIGHT: 227.81 LBS | TEMPERATURE: 98 F

## 2019-04-15 DIAGNOSIS — D69.6 THROMBOCYTOPENIA: Primary | ICD-10-CM

## 2019-04-15 DIAGNOSIS — D64.9 NORMOCYTIC NORMOCHROMIC ANEMIA: ICD-10-CM

## 2019-04-15 DIAGNOSIS — D63.8 ANEMIA, CHRONIC DISEASE: ICD-10-CM

## 2019-04-15 DIAGNOSIS — C61 PROSTATE CANCER: Primary | ICD-10-CM

## 2019-04-15 DIAGNOSIS — C61 PROSTATE CANCER: ICD-10-CM

## 2019-04-15 PROCEDURE — 99213 PR OFFICE/OUTPT VISIT, EST, LEVL III, 20-29 MIN: ICD-10-PCS | Mod: ,,, | Performed by: INTERNAL MEDICINE

## 2019-04-15 PROCEDURE — 99213 OFFICE O/P EST LOW 20 MIN: CPT | Mod: ,,, | Performed by: INTERNAL MEDICINE

## 2019-04-15 NOTE — LETTER
April 15, 2019      Rob Bui MD  [address]  [place] [address]           [facility] Hematology Oncology  [address]  [address]  [place] [address]  Phone: [phone]  Fax: [phone]          Patient: Chris Hill Jr   MR Number: [id number]   YOB: 1950   Date of Visit: 4/15/2019       Dear Dr. Rob Bui:    Thank you for referring Chris Hill Jr to me for evaluation. Attached you will find relevant portions of my assessment and plan of care.    If you have questions, please do not hesitate to call me. I look forward to following Chris Hill Jr along with you.    Sincerely,    Cyrus Gibson MD    Enclosure  CC:  No Recipients    If you would like to receive this communication electronically, please contact [email or web address] or [phone] to request more information on [employer] access.    For providers and/or their staff who would like to refer a patient to [facility], please contact us through our one-stop-shop provider referral line, [facility], at [phone].    If you feel you have received this communication in error or would no longer like to receive these types of communications, please e-mail [email or web address]

## 2019-04-15 NOTE — PROGRESS NOTES
[facility] Hematology/Oncology  PROGRESS NOTE -  Follow-up Visit      Subjective:       Patient ID:   NAME: Chris Hill Jr : 1950     68 y.o. male    Referring Doc: Tyrell  Other Physicians: Manpreet Gordon; Wiley; Cheyanne Alfaro        Chief Complaint:  prostate cancer; anem/tcp f/u         History of Present Illness:     Patient returns today for a regularly scheduled follow-up visit.  The patient is here today to go over the results of the recently ordered labs, tests and studies. He saw Dr Santillan on  and Dr Gordon on 2019. He is here by himself. He is doing ok. He previously had finished XRT. He denies any CP, SOB, HA's or N/V. He had recent ear infection and was on antibiotics.             ROS:   GEN: normal without any fever, night sweats or weight loss  HEENT: normal with no HA's, sore throat, stiff neck, changes in vision  CV: normal with no CP, SOB, PND, JANSEN or orthopnea  PULM: normal with no SOB, cough, hemoptysis, sputum or pleuritic pain  GI: normal with no abdominal pain, nausea, vomiting, constipation, diarrhea, melanotic stools, BRBPR, or hematemesis  : normal with no hematuria, dysuria  BREAST: normal with no mass, discharge, pain  SKIN: normal with no rash, erythema, bruising, or swelling    Allergies:  Review of patient's allergies indicates:  No Known Allergies    Medications:    Current Outpatient Medications:     amitriptyline (ELAVIL) 50 MG tablet, Take 50 mg by mouth every evening., Disp: , Rfl:     amLODIPine (NORVASC) 5 MG tablet, Take 1 tablet (5 mg total) by mouth once daily., Disp: 90 tablet, Rfl: 1    aspirin (ECOTRIN) 81 MG EC tablet, Take 81 mg by mouth once daily., Disp: , Rfl:     carvedilol (COREG) 12.5 MG tablet, Take 1 tablet (12.5 mg total) by mouth 2 (two) times daily with meals., Disp: 60 tablet, Rfl: 6    co-enzyme Q-10 30 mg capsule, Take 30 mg by mouth 3 (three) times daily., Disp: , Rfl:     fish oil-omega-3 fatty acids 300-1,000 mg capsule, Take  1 capsule by mouth 2 (two) times daily., Disp: , Rfl:     hydrALAZINE (APRESOLINE) 100 MG tablet, TAKE 1 TABLET BY MOUTH TWICE DAILY, Disp: 180 tablet, Rfl: 1    hydroCHLOROthiazide (HYDRODIURIL) 25 MG tablet, TAKE 1 TABLET BY MOUTH ONCE DAILY, Disp: 90 tablet, Rfl: 1    irbesartan (AVAPRO) 300 MG tablet, Take 300 mg by mouth every evening., Disp: , Rfl:     metFORMIN (GLUCOPHAGE-XR) 500 MG 24 hr tablet, , Disp: , Rfl:     omeprazole (PRILOSEC) 40 MG capsule, Take 40 mg by mouth once daily., Disp: , Rfl:     ondansetron (ZOFRAN) 8 MG tablet, Take 1 tablet (8 mg total) by mouth every 8 (eight) hours as needed for Nausea., Disp: 30 tablet, Rfl: 2    oxybutynin (DITROPAN) 5 MG Tab, Take 1 tablet (5 mg total) by mouth 2 (two) times daily., Disp: 60 tablet, Rfl: 11    potassium chloride (KLOR-CON) 20 mEq Pack, Take 20 mEq by mouth once daily., Disp: , Rfl:     rosuvastatin (CRESTOR) 10 MG tablet, TAKE 1 TABLET BY MOUTH ONCE DAILY, Disp: 90 tablet, Rfl: 1    tamsulosin (FLOMAX) 0.4 mg Cap, Take 1 capsule (0.4 mg total) by mouth every evening., Disp: 30 capsule, Rfl: 11    temazepam (RESTORIL) 15 mg Cap, , Disp: , Rfl:     terazosin (HYTRIN) 10 MG capsule, TAKE 1 CAPSULE BY MOUTH IN THE EVENING, Disp: 30 capsule, Rfl: 6    PMHx/PSHx Updates:  See patient's last visit with me on 1/14/2019.  See H&P on 12/28/2018        Pathology:  Cancer Staging  No matching staging information was found for the patient.          Objective:     Vitals:  Blood pressure 137/77, pulse (!) 59, temperature 98.4 °F (36.9 °C), resp. rate 20, weight 103.3 kg (227 lb 12.8 oz).    Physical Examination:   GEN: no apparent distress, comfortable; AAOx3  HEAD: atraumatic and normocephalic  EYES: no pallor, no icterus, PERRLA  ENT: OMM, no pharyngeal erythema, external ears WNL; no nasal discharge; no thrush  NECK: no masses, thyroid normal, trachea midline, no LAD/LN's, supple  CV: RRR with no murmur; normal pulse; normal S1 and S2; no pedal  edema  CHEST: Normal respiratory effort; CTAB; normal breath sounds; no wheeze or crackles  ABDOM: nontender and nondistended; soft; normal bowel sounds; no rebound/guarding  MUSC/Skeletal: ROM normal; no crepitus; joints normal; no deformities or arthropathy  EXTREM: no clubbing, cyanosis, inflammation or swelling  SKIN: no rashes, lesions, ulcers, petechiae or subcutaneous nodules  : no ellison  NEURO: grossly intact; motor/sensory WNL; AAOx3; no tremors  PSYCH: normal mood, affect and behavior  LYMPH: normal cervical, supraclavicular, axillary and groin LN's            Labs:       4/12/2019  Lab Results   Component Value Date    WBC 5.9 04/12/2019    HGB 11.1 (L) 04/12/2019    HCT 34.7 (L) 04/12/2019    MCV 85 11/29/2018     (L) 04/12/2019     CMP  Sodium   Date Value Ref Range Status   04/12/2019 138 134 - 144 mmol/L      Potassium   Date Value Ref Range Status   04/12/2019 3.5 3.5 - 5.0 mmol/L      Chloride   Date Value Ref Range Status   04/12/2019 99 98 - 110 mmol/L      CO2   Date Value Ref Range Status   04/12/2019 30.8 22.8 - 31.6 mmol/L      Glucose   Date Value Ref Range Status   04/12/2019 117 (H) 70 - 99 mg/dL      BUN, Bld   Date Value Ref Range Status   04/12/2019 14 8 - 20 mg/dL      Creatinine   Date Value Ref Range Status   04/12/2019 0.72 0.60 - 1.40 mg/dL      Calcium   Date Value Ref Range Status   04/12/2019 9.1 7.7 - 10.4 mg/dL      Total Protein   Date Value Ref Range Status   04/12/2019 6.7 6.0 - 8.2 g/dL      Albumin   Date Value Ref Range Status   04/12/2019 3.3 3.1 - 4.7 g/dL      Total Bilirubin   Date Value Ref Range Status   04/12/2019 0.7 0.3 - 1.0 mg/dL      Alkaline Phosphatase   Date Value Ref Range Status   04/12/2019 75 40 - 104 IU/L      AST   Date Value Ref Range Status   04/12/2019 27 10 - 40 IU/L      ALT   Date Value Ref Range Status   01/11/2019 20 10 - 44 U/L Final   01/11/2019 20 10 - 44 U/L Final     Anion Gap   Date Value Ref Range Status   02/06/2019 10 8 - 16  mmol/L Final     eGFR if    Date Value Ref Range Status   02/06/2019 >60 >60 mL/min/1.73 m^2 Final     eGFR if non    Date Value Ref Range Status   02/06/2019 >60 >60 mL/min/1.73 m^2 Final     Comment:     Calculation used to obtain the estimated glomerular filtration  rate (eGFR) is the CKD-EPI equation.                Radiology/Diagnostic Studies:    Us Abdomen Complete    Result Date: 1/2/2019  Abdominal ultrasound Clinical history is anemia, prostate cancer The pancreas, aorta and IVC are normal. The liver is hyperechoic compatible with fatty infiltration. There is hepatopedal flow within the portal vein. The common bile duct measures 6 mm. The patient has had a cholecystectomy. The kidneys are normal in size and echogenicity. The spleen is normal in size measuring 12 cm. IMPRESSION: Fatty infiltration of the liver otherwise negative abdominal ultrasound Read and electronically signed by: Leydi Goldberg MD on 1/2/2019 9:49 AM CST LEYDI GOLDBERG MD      I have reviewed all available lab results and radiology reports.    Assessment/Plan:   (1) 68 y.o. male with diagnosis of prostate cancer who has been referred by Simone Santillan Jr., MD for evaluation by medical oncology. Patient with a high risk adenocarcinoma of the prostate with [id number] with GS of 4+5.   - he started androgen depravation therapy and monotherapy XRT (IMRT)  - followed by Dr Gordon with  and currently on Trelstar  - followed by Dr Santillan with Rad/onc and completed XRt on 12/18/2018  - latest PSA at 0.1 on 12/13     (2) CAD and non-rheumatic MR; mild CVA in 1993; Hypertensive Heart disease - followed by Dr Jama with cardiology     (3) HTN and hypercholesterolemia     (4) LORA     (5) DM    (6) Chronic back pain issues - required recent lumbar surgery with Dr Chris Fofana at [facility]     (7) Hx/of nightly fevers     (8) Chronic diarhhea - followed by Dr Alfaro with GI and s/p recent endoscopies     (9)  Mild thrombocytopenia with last platelet count down at 129,000  - no history of hepatitis or liver problems; no alcoholism  - he has positive antiplatelet antibody screens both direct and indirect consistent with an underlying chronic ITP condition  - his flow cytometry showed no evidence of leukemia but he did have a relative increase in eosinophils     (10) Mild anemia with latest hgb at 11.1    - NCNC parameters  - iron and ferritin are good   - OBS was negative on 11/15  - hx/of colon polyps in past  - recent EGD with Dr Alfaro on 12/5/2018                VISIT DIAGNOSES:      Thrombocytopenia    Prostate cancer    Normocytic normochromic anemia    Anemia, chronic disease          PLAN:  1. Proceed with rad/onc and  directives  2. Check CBC/plat every 3 months for now  3. F/u with PCP, , Rad/onc etc  4. RTC in 6 months  Fax note to Rob Bui MD, Rob Bui MD, and Wiley Gordon Albright    Discussion:       I spent over 25 mins of time with the patient. Reviewed results of the recently ordered labs, tests and studies; made directives with regards to the results. Over half of this time was spent couseling and coordinating care.    I have explained all of the above in detail and the patient understands all of the current recommendation(s). I have answered all of their questions to the best of my ability and to their complete satisfaction.   The patient is to continue with the current management plan.            Electronically signed by Cyrus Gibson MD

## 2019-04-16 RX ORDER — HYDRALAZINE HYDROCHLORIDE 100 MG/1
TABLET, FILM COATED ORAL
Qty: 180 TABLET | Refills: 1 | Status: SHIPPED | OUTPATIENT
Start: 2019-04-16 | End: 2019-10-22 | Stop reason: SDUPTHER

## 2019-05-08 ENCOUNTER — LAB VISIT (OUTPATIENT)
Dept: LAB | Facility: HOSPITAL | Age: 69
End: 2019-05-08
Attending: UROLOGY
Payer: MEDICARE

## 2019-05-08 DIAGNOSIS — C61 PROSTATE CANCER: ICD-10-CM

## 2019-05-08 LAB
COMPLEXED PSA SERPL-MCNC: 0.02 NG/ML (ref 0–4)
TESTOST SERPL-MCNC: 10 NG/DL (ref 304–1227)

## 2019-05-08 PROCEDURE — 36415 COLL VENOUS BLD VENIPUNCTURE: CPT

## 2019-05-08 PROCEDURE — 84403 ASSAY OF TOTAL TESTOSTERONE: CPT

## 2019-05-08 PROCEDURE — 84153 ASSAY OF PSA TOTAL: CPT

## 2019-05-13 ENCOUNTER — OFFICE VISIT (OUTPATIENT)
Dept: UROLOGY | Facility: CLINIC | Age: 69
End: 2019-05-13
Payer: MEDICARE

## 2019-05-13 VITALS
SYSTOLIC BLOOD PRESSURE: 167 MMHG | WEIGHT: 224.88 LBS | BODY MASS INDEX: 36.14 KG/M2 | DIASTOLIC BLOOD PRESSURE: 80 MMHG | RESPIRATION RATE: 18 BRPM | HEIGHT: 66 IN | HEART RATE: 67 BPM

## 2019-05-13 DIAGNOSIS — C61 PROSTATE CANCER: Primary | ICD-10-CM

## 2019-05-13 LAB
BILIRUB SERPL-MCNC: NORMAL MG/DL
BLOOD URINE, POC: NORMAL
COLOR, POC UA: YELLOW
GLUCOSE UR QL STRIP: NORMAL
KETONES UR QL STRIP: NORMAL
LEUKOCYTE ESTERASE URINE, POC: NORMAL
NITRITE, POC UA: NORMAL
PH, POC UA: 7
PROTEIN, POC: NORMAL
SPECIFIC GRAVITY, POC UA: 1.01
UROBILINOGEN, POC UA: 0.2

## 2019-05-13 PROCEDURE — 99999 PR PBB SHADOW E&M-EST. PATIENT-LVL III: CPT | Mod: PBBFAC,,, | Performed by: UROLOGY

## 2019-05-13 PROCEDURE — 99213 OFFICE O/P EST LOW 20 MIN: CPT | Mod: PBBFAC,PN | Performed by: UROLOGY

## 2019-05-13 PROCEDURE — 99213 PR OFFICE/OUTPT VISIT, EST, LEVL III, 20-29 MIN: ICD-10-PCS | Mod: S$PBB,25,, | Performed by: UROLOGY

## 2019-05-13 PROCEDURE — 81002 URINALYSIS NONAUTO W/O SCOPE: CPT | Mod: PBBFAC,PN | Performed by: UROLOGY

## 2019-05-13 PROCEDURE — 99999 PR PBB SHADOW E&M-EST. PATIENT-LVL III: ICD-10-PCS | Mod: PBBFAC,,, | Performed by: UROLOGY

## 2019-05-13 PROCEDURE — 99213 OFFICE O/P EST LOW 20 MIN: CPT | Mod: S$PBB,25,, | Performed by: UROLOGY

## 2019-05-13 RX ADMIN — LEUPROLIDE ACETATE 45 MG: KIT at 12:05

## 2019-05-15 ENCOUNTER — TELEPHONE (OUTPATIENT)
Dept: UROLOGY | Facility: CLINIC | Age: 69
End: 2019-05-15

## 2019-05-15 NOTE — TELEPHONE ENCOUNTER
Follow up call.    Patient reports that he is alternating ice and heat.  Still has knot.  Still hurting him.  Patient reports that his right leg gets stabbing pain and nearly gives out when he gets up.    Please advise.

## 2019-05-15 NOTE — TELEPHONE ENCOUNTER
----- Message from Jeni Bañuelos sent at 5/15/2019  8:29 AM CDT -----  Contact: Patient  Type: Needs Medical Advice    Who Called:  Patient  Symptoms (please be specific):  Knot where he received the shot and that area is hurting  Best Call Back Number:   Additional Information: Calling to speak with the Nurse. Call to pod. No answer

## 2019-05-15 NOTE — TELEPHONE ENCOUNTER
Patient reports that he has knot about the size of a small orange at the injection site.  He says it is painful.  Also says that he has pain that radiates down his leg and his leg is a little weak when he stands.      Recommend he apply ice alternating with heat to the area about 10 minutes each several times a day.  May take Ibuprofen as needed for pain/inflamation.    Will call him again later for progress report.

## 2019-05-15 NOTE — TELEPHONE ENCOUNTER
Would be unusual for an IM injection to cause pain that makes his leg give out  Does he have any other back pain sciatica?  Can have him come by for you to assess injection site otherwise may need urgent care or asap pcp jose

## 2019-05-25 NOTE — PROGRESS NOTES
[facility] Urology Progress Note    Chris Hill Jr is a 68 y.o. male who presents for prostate cancer follow up and ADT renewal    Hx of [place] 4 + 5 equals 9 prostate cancer (cT1c, iPSA 13.2) treated with androgen deprivation therapy external beam radiation.    He presented 10/4/18 for fiducial marker placement as well as SpaceOAR placement. Vol 33.4g at time of markers. At time of SpaceOar he did note daily diarrhea for weeks prior.  8/13/18 eligard 22.5 3 mos depot  11/13/18 trelstar 22.5mg 6 mos depot.  - 2 weeks prior did interrupt his XRT for lumbar back surgery as his chronic back pain was limiting his ability to lay flat on radiation table. Only missed 2d of therapy.  He notd at that time his diarrhea had persisted. Using prn immodium  Completed XRT 12/18/18. PSA 0.1 on 12/13/18  Has followed up with Dr Gibson for chronic ITP and anemia, stable.  Has been followed by GI. Dr Tavares, with recent EGD 12/5/18 and colonoscopy 2/11/19 with a few cecal polyps removed and diverticulosis (repeat 3 yrs)    Dr Santillan on 1/9/19: Patient reports fatigue with energy level at 30%. Of note he is undergoing workup for low blood counts with Dr. Gibson.  He reports frequency, urgency, nocturia of urine are resolving however he continues with urgency of stool. He reports frequency has improved with Imodium. AUA SS 17/5 from 10/2     2/12/19: 2/6/19 PSA 0.02, T <4, Cr 0.8, AUA symptom score:  23/5 unhappy (5:  Intermittency, urgency; 4:  Emptying, frequency; 3:  Weak stream; 2:  Sleeping)  He did start oxybutynin 5 mg b.i.d. which has significantly helped decrease the hot flashes.  He still gets some though they are not as intense as they were before  Since starting it, and further away from radiation, his urgency is somewhat better.  He does have daytime frequency of 8-10 times at nighttime frequency of 1-2 times.  Not only see experiencing urinary urgency but he is also experiencing fecal urgency.  His diarrhea is  "somewhat better.  GI evaluation has been overall unrevealing of a etiology of chronic diarrhea.  He does report urinary intermittency but no urinary hesitancy. He did have back surgery after 22 radiation treatments, though he does note that his fecal and urinary urgency predated his back surgery.  On Hytrin 10 mg in morning, so started Flomax 0.4 mg in the evening to have increased dose of alpha blocker.    He returns today noting improved voiding taking Hytrin in the morning and Flomax in the evening.  His diarrhea has improved.  Hot flashes went away a bit and are well controlled with Ditropan b.i.d.  Urinalysis dipstick is negative  AUA symptom score:  9/1, please (3:  Emptying, urgency; 1:  Intermittency, weak stream, sleeping)  Labs 5/8/19 reveal PSA of 0.02 and testosterone of 10.    ROS: A comprehensive 10 system review was performed and is negative except as noted above in HPI    PHYSICAL EXAM:    Vitals:    05/13/19 1146   BP: (!) 167/80   Pulse: 67   Resp: 18     Body mass index is 36.29 kg/m². Weight: 102 kg (224 lb 13.9 oz) Height: 5' 6" (167.6 cm)       General: Alert, cooperative, no distress, appears stated age   Head: Normocephalic, without obvious abnormality, atraumatic   Eyes: PERRL, conjunctiva/corneas clear   Lungs: Respirations unlabored   Heart: Warm and well perfused   Abdomen: soft NT ND  Extremities: Extremities normal, atraumatic, no cyanosis or edema   Skin: Skin color, texture, turgor normal, no rashes or lesions   Psych: Appropriate   Neurologic: Non-focal       Recent Results (from the past 336 hour(s))   POCT URINE DIPSTICK WITHOUT MICROSCOPE    Collection Time: 05/13/19 11:43 AM   Result Value Ref Range    Color, UA yellow     Spec Grav UA 1.010     pH, UA 7     WBC, UA neg     Nitrite, UA neg     Protein neg     Glucose, UA neg     Ketones, UA neg     Urobilinogen, UA 0.2     Bilirubin neg     Blood, UA neg        ASSESSMENT   1. Prostate cancer  POCT URINE DIPSTICK WITHOUT " MICROSCOPE    leuprolide (6 month) SyKt 45 mg    Prostate Specific Antigen, Diagnostic    Testosterone       Plan    Doing well status post external beam radiation for prostate cancer.  With increased alpha blocker dose his urinary symptoms are well controlled at this time.  If he does have symptomatic progression in the future, again would consider cystoscopic evaluation for further intervention, though currently is pleased with his urinary symptoms.  His other bothersome symptoms such as hot flashes and fecal urgency and diarrhea are slowly resolving and he is doing well at this time.  His PSA is stable and 0.02 will likely be his kobe point.  ADT renewed today:  I prepared, mixed, and injected Lupron 45 mg, given as an intramuscular injection to the right gluteus muscle.  He tolerated the injection well.  RTC 6 months for further follow-up with PSA and testosterone just prior, and will renew ADT at that time, should continue to total of 2 years minimum.

## 2019-07-01 RX ORDER — CARVEDILOL 12.5 MG/1
TABLET ORAL
Qty: 60 TABLET | Refills: 6 | Status: SHIPPED | OUTPATIENT
Start: 2019-07-01 | End: 2020-02-07 | Stop reason: SDUPTHER

## 2019-07-08 ENCOUNTER — TELEPHONE (OUTPATIENT)
Dept: CARDIOLOGY | Facility: CLINIC | Age: 69
End: 2019-07-08

## 2019-07-08 NOTE — TELEPHONE ENCOUNTER
----- Message from Dionne Munoz sent at 7/8/2019 10:35 AM CDT -----  Contact: self  Type: Needs Medical Advice    Who Called:  patient  Best Call Back Number: [phone]  Additional Information: patient has an appt on 7/15 and wants to know if he is due for labs.  Please call back to advise

## 2019-07-10 ENCOUNTER — OFFICE VISIT (OUTPATIENT)
Dept: RADIATION ONCOLOGY | Facility: CLINIC | Age: 69
End: 2019-07-10
Payer: MEDICARE

## 2019-07-10 VITALS
RESPIRATION RATE: 18 BRPM | HEART RATE: 66 BPM | SYSTOLIC BLOOD PRESSURE: 140 MMHG | DIASTOLIC BLOOD PRESSURE: 68 MMHG | BODY MASS INDEX: 35.67 KG/M2 | WEIGHT: 221 LBS

## 2019-07-10 DIAGNOSIS — C61 PROSTATE CANCER: Primary | ICD-10-CM

## 2019-07-10 PROCEDURE — 99215 OFFICE O/P EST HI 40 MIN: CPT | Mod: ,,, | Performed by: RADIOLOGY

## 2019-07-10 PROCEDURE — 99215 PR OFFICE/OUTPT VISIT, EST, LEVL V, 40-54 MIN: ICD-10-PCS | Mod: ,,, | Performed by: RADIOLOGY

## 2019-07-10 RX ORDER — GABAPENTIN 300 MG/1
300 CAPSULE ORAL 3 TIMES DAILY
Qty: 30 CAPSULE | Refills: 0 | Status: SHIPPED | OUTPATIENT
Start: 2019-07-10 | End: 2019-08-05 | Stop reason: SDUPTHER

## 2019-07-10 NOTE — PROGRESS NOTES
Chris Hill   [id number]  1950  7/10/2019  Manpreet Gordon Md  [address] [place] [address] Dr  [address]  [place] [address]    DIAGNOSIS: High risk prostate adenocarcinoma, [id number] GS 4+5 iPSA 13.2  REASON FOR VISIT: Routine scheduled follow-up.    HISTORY OF PRESENT ILLNESS:   68M p/w elevated PSA.    PSA  6/18 12.5  7/18 13.2    *8/18 Dr. Stephens TRUS bx   - 3+3 adenoca R apex 5%; 4+3 adenoca L apex 55%; 4+5 adenoca L mid 80% and L base 95%   - no EPE; +PNI   - 4/6 cores+   - ADT placed    - RadOnc consult  *8/18 CT AP/Bone scan: jenniffer    Patient was placed on long-term androgen deprivation therapy by Dr. Gordon and completed definitive radiotherapy to 7920 cGy in December 2018. Treatment was complicated by an unexpected lower back surgery and detection of pancytopenia by his GI doctor for which she is undergoing workup with medical oncology. Patient developed frequency and urgency of both urine and stool throughout treatment, managed medically.    INTERVAL HISTORY:  Patient returns with a host of complaints. He reports urinary quality of life is improved on Hytrin and Flomax. He denies dysuria, hematuria, incontinence. He continues with back pain status post surgery and epidural injection ×2. He reports his pain radiates down the right lower extremity. He is not taking pain medication. He has not tried neuroleptics such as gabapentin or Lyrica. He continues to have diarrhea. Colonoscopy workup was unremarkable. Of note he chews sugar-free gum every day around-the-clock. He's also complaining of chronic dry mouth and dry skin. He reports his has altered his taste and his appetite is poor. Lastly he is continuing to have hot flashes and arthralgias. He reports very low energy level. He continues on ADT, planned for 2 years.    AUA 14 (10)  QOL 2 (2)  IIEF Na (24)    Review of Systems   Constitutional: Positive for diaphoresis and fatigue. Negative for appetite change, chills, fever and unexpected weight change.    HENT:   Negative for lump/mass, mouth sores, sore throat, trouble swallowing and voice change.    Eyes: Negative for eye problems and icterus.   Respiratory: Negative for chest tightness, cough, hemoptysis and shortness of breath.    Cardiovascular: Negative for chest pain and leg swelling.   Gastrointestinal: Positive for diarrhea. Negative for abdominal distention, abdominal pain, blood in stool, constipation, nausea and vomiting.   Genitourinary: Positive for nocturia. Negative for bladder incontinence, difficulty urinating, dysuria and hematuria.    Musculoskeletal: Positive for back pain. Negative for gait problem, neck pain and neck stiffness.   Neurological: Negative for extremity weakness, gait problem, headaches, numbness and seizures.   Hematological: Negative for adenopathy.   Psychiatric/Behavioral: Positive for sleep disturbance.     Past Medical History:   Diagnosis Date    Anemia, chronic disease 12/28/2018    Coronary artery disease     mild    Diabetes mellitus     Heart murmur     Hyperlipidemia     Hypertension     Normocytic normochromic anemia 12/28/2018    Prostate cancer 8/20/2018    Sleep apnea     NOT USING [facility]    Thrombocytopenia 12/28/2018    Valvular regurgitation      Past Surgical History:   Procedure Laterality Date    CARDIAC CATHETERIZATION      EYE SURGERY      cataracts bilateral    ULTRASOUND, PROSTATE, TRANSPERINEAL APPROACH, WITH GOLD FIDUCIAL MARKER INSERTION (with SPACEOAR transperineal biodegradable gel placement) N/A 10/4/2018    Performed by Manpreet Gordon MD at [facility] OR     Social History     Socioeconomic History    Marital status:      Spouse name: Not on file    Number of children: Not on file    Years of education: Not on file    Highest education level: Not on file   Occupational History    Not on file   Social Needs    Financial resource strain: Not on file    Food insecurity:     Worry: Not on file     Inability: Not on file     Transportation needs:     Medical: Not on file     Non-medical: Not on file   Tobacco Use    Smoking status: Never Smoker    Smokeless tobacco: Never Used   Substance and Sexual Activity    Alcohol use: No    Drug use: No    Sexual activity: Not on file   Lifestyle    Physical activity:     Days per week: Not on file     Minutes per session: Not on file    Stress: Not on file   Relationships    Social connections:     Talks on phone: Not on file     Gets together: Not on file     Attends [employer] service: Not on file     Active member of club or organization: Not on file     Attends meetings of clubs or organizations: Not on file     Relationship status: Not on file   Other Topics Concern    Not on file   Social History Narrative    Not on file     Family History   Problem Relation Age of Onset    Heart disease Mother     Heart disease Father      Medication List with Changes/Refills   Current Medications    AMITRIPTYLINE (ELAVIL) 50 MG TABLET    Take 50 mg by mouth every evening.    AMLODIPINE (NORVASC) 5 MG TABLET    Take 1 tablet (5 mg total) by mouth once daily.    ASPIRIN (ECOTRIN) 81 MG EC TABLET    Take 81 mg by mouth once daily.    CARVEDILOL (COREG) 12.5 MG TABLET    TAKE 1 TABLET BY MOUTH TWICE DAILY WITH MEALS    CO-ENZYME Q-10 30 MG CAPSULE    Take 30 mg by mouth 3 (three) times daily.    FISH OIL-OMEGA-3 FATTY ACIDS 300-1,000 MG CAPSULE    Take 1 capsule by mouth 2 (two) times daily.    HYDRALAZINE (APRESOLINE) 100 MG TABLET    TAKE 1 TABLET BY MOUTH TWICE DAILY    HYDROCHLOROTHIAZIDE (HYDRODIURIL) 25 MG TABLET    TAKE 1 TABLET BY MOUTH ONCE DAILY    IRBESARTAN (AVAPRO) 300 MG TABLET    Take 300 mg by mouth every evening.    METFORMIN (GLUCOPHAGE-XR) 500 MG 24 HR TABLET        OMEPRAZOLE (PRILOSEC) 40 MG CAPSULE    Take 40 mg by mouth once daily.    ONDANSETRON (ZOFRAN) 8 MG TABLET    Take 1 tablet (8 mg total) by mouth every 8 (eight) hours as needed for Nausea.    OXYBUTYNIN (DITROPAN) 5  MG TAB    Take 1 tablet (5 mg total) by mouth 2 (two) times daily.    POTASSIUM CHLORIDE (KLOR-CON) 20 MEQ PACK    Take 20 mEq by mouth once daily.    ROSUVASTATIN (CRESTOR) 10 MG TABLET    TAKE 1 TABLET BY MOUTH ONCE DAILY    TAMSULOSIN (FLOMAX) 0.4 MG CAP    Take 1 capsule (0.4 mg total) by mouth every evening.    TEMAZEPAM (RESTORIL) 15 MG CAP        TERAZOSIN (HYTRIN) 10 MG CAPSULE    TAKE 1 CAPSULE BY MOUTH IN THE EVENING     Review of patient's allergies indicates:  No Known Allergies    QUALITY OF LIFE: 80%- Normal Activity with Effort: Some Symptoms of Disease    Vitals:    07/10/19 0939   BP: (!) 140/68   Pulse: 66   Resp: 18   Weight: 100.2 kg (221 lb)   PainSc: 0-No pain     Body mass index is 35.67 kg/m².    PHYSICAL EXAM:   GENERAL: alert; in no apparent distress.   HEAD: normocephalic, atraumatic.  EYES: pupils are equal, round, reactive to light and accommodation. Sclera anicteric. Conjunctiva not injected.   NOSE/THROAT: no nasal erythema or rhinorrhea. Oropharynx pink, without erythema, ulcerations or thrush.   NECK: no cervical motion rigidity; supple with no masses.  CHEST: Patient is speaking comfortably on room air with normal work of breathing without using accessory muscles of respiration.  ABDOMEN: soft, nontender, nondistended. Bowel sounds present.   MUSCULOSKELETAL: no tenderness to palpation along the spine or scapulae. Normal range of motion.  NEUROLOGIC: cranial nerves II-XII intact bilaterally. Strength 5/5 in bilateral upper and lower extremities. No sensory deficits appreciated. Normal gait.  EXTREMITIES: no clubbing, cyanosis, edema.  SKIN: no erythema, rashes, ulcerations noted.     ANCILLARY DATA:   PSA  5/19 0.02      ASSESSMENT: 68 y.o. male with High risk prostate adenocarcinoma, [id number] GS 4+5 iPSA 13.2 started on long-term ADT, status post definitive radiotherapy to 7920 cGy ending December 2018.  PLAN:   Chris Hill Jr presents approximately 8 months out from definitive  radiotherapy. Symptoms of cystitis and lower urinary tract symptoms have largely corrected and are being well managed with Hytrin and Flomax per Dr. Gordon. Patient continues to have diarrhea. This would be unusual timeframe following radiotherapy and I'm concerned there may be an element related to nerve compression at his low back and/or his chronic sugar-free gum chewing. I recommended discontinuing this. Also recommended increasing fluid intake with electrolyte rich, sugar-free fluids to assist with symptoms of dehydration such as dry mouth and skin which may be due to chronic diarrhea and/or Rx AE; he is on diuretic. Also recommended daily immodium to assist with control. I recommended frequent mouth rinses while eating to assist with taste. I've also recommended a trial of Neurontin for right lower extremity pain and if beneficial directed him to discuss this with his back surgeon. Regarding prostate cancer I'm pleased with PSA level and I recommended continued surveillance and return to clinic in 6 months. Recommend ADT ×2 years and he understands this may be contributing to fatigue and is likely responsible for hot flashes.    All questions answered and contact information provided. Patient understands free to call us anytime with any questions or concerns regarding radiation therapy.    I have personally seen and evaluated this patient. Greater than 50% of this time was spent discussing coordination of care and/or counseling.    PHYSICIAN: Simone Santillan Jr, MD

## 2019-07-15 ENCOUNTER — OFFICE VISIT (OUTPATIENT)
Dept: CARDIOLOGY | Facility: CLINIC | Age: 69
End: 2019-07-15
Payer: MEDICARE

## 2019-07-15 VITALS
HEART RATE: 69 BPM | WEIGHT: 220.25 LBS | SYSTOLIC BLOOD PRESSURE: 127 MMHG | DIASTOLIC BLOOD PRESSURE: 75 MMHG | BODY MASS INDEX: 35.4 KG/M2 | HEIGHT: 66 IN

## 2019-07-15 DIAGNOSIS — E66.01 SEVERE OBESITY (BMI 35.0-35.9 WITH COMORBIDITY): ICD-10-CM

## 2019-07-15 DIAGNOSIS — E78.5 DYSLIPIDEMIA: ICD-10-CM

## 2019-07-15 DIAGNOSIS — I34.0 NON-RHEUMATIC MITRAL REGURGITATION: ICD-10-CM

## 2019-07-15 DIAGNOSIS — I11.9 HYPERTENSIVE HEART DISEASE WITHOUT HEART FAILURE: Primary | ICD-10-CM

## 2019-07-15 DIAGNOSIS — I25.10 MILD CAD: ICD-10-CM

## 2019-07-15 PROCEDURE — 99999 PR PBB SHADOW E&M-EST. PATIENT-LVL IV: CPT | Mod: PBBFAC,,, | Performed by: INTERNAL MEDICINE

## 2019-07-15 PROCEDURE — 99999 PR PBB SHADOW E&M-EST. PATIENT-LVL IV: ICD-10-PCS | Mod: PBBFAC,,, | Performed by: INTERNAL MEDICINE

## 2019-07-15 PROCEDURE — 99214 OFFICE O/P EST MOD 30 MIN: CPT | Mod: S$PBB,,, | Performed by: INTERNAL MEDICINE

## 2019-07-15 PROCEDURE — 99214 PR OFFICE/OUTPT VISIT, EST, LEVL IV, 30-39 MIN: ICD-10-PCS | Mod: S$PBB,,, | Performed by: INTERNAL MEDICINE

## 2019-07-15 PROCEDURE — 99214 OFFICE O/P EST MOD 30 MIN: CPT | Mod: PBBFAC,PO | Performed by: INTERNAL MEDICINE

## 2019-07-15 RX ORDER — HYDROCHLOROTHIAZIDE 25 MG/1
25 TABLET ORAL DAILY
Qty: 90 TABLET | Refills: 1 | Status: SHIPPED | OUTPATIENT
Start: 2019-07-15 | End: 2020-02-14

## 2019-07-15 NOTE — PROGRESS NOTES
Subjective:    Patient ID:  Chris Hill Jr is a 68 y.o. male who presents for Hypertension; Hyperlipidemia; and Valvular Heart Disease        HPI  DISCUSSED LABS, RECENT CMP OK, IN April, RECURRENT BACK PAIN, GETS EPIDURAL INJECTION, BP OK, NO CHEST PAIN, NO SIGNIFICANT SHORTNESS OF BREATH, NO TIA TYPE SYMPTOMS, NO SIGNIFICANT PALPITATION, SEE ROS    Past Medical History:   Diagnosis Date    Anemia, chronic disease 12/28/2018    Coronary artery disease     mild    Diabetes mellitus     Heart murmur     Hyperlipidemia     Hypertension     Normocytic normochromic anemia 12/28/2018    Prostate cancer 8/20/2018    Sleep apnea     NOT USING [facility]    Thrombocytopenia 12/28/2018    Valvular regurgitation      Past Surgical History:   Procedure Laterality Date    CARDIAC CATHETERIZATION      EYE SURGERY      cataracts bilateral    ULTRASOUND, PROSTATE, TRANSPERINEAL APPROACH, WITH GOLD FIDUCIAL MARKER INSERTION (with SPACEOAR transperineal biodegradable gel placement) N/A 10/4/2018    Performed by Manpreet Gordon MD at [facility] OR     Family History   Problem Relation Age of Onset    Heart disease Mother     Heart disease Father      Social History     Socioeconomic History    Marital status:      Spouse name: Not on file    Number of children: Not on file    Years of education: Not on file    Highest education level: Not on file   Occupational History    Not on file   Social Needs    Financial resource strain: Not on file    Food insecurity:     Worry: Not on file     Inability: Not on file    Transportation needs:     Medical: Not on file     Non-medical: Not on file   Tobacco Use    Smoking status: Never Smoker    Smokeless tobacco: Never Used   Substance and Sexual Activity    Alcohol use: No    Drug use: No    Sexual activity: Not on file   Lifestyle    Physical activity:     Days per week: Not on file     Minutes per session: Not on file    Stress: Not on file   Relationships     Social connections:     Talks on phone: Not on file     Gets together: Not on file     Attends Protestant service: Not on file     Active member of club or organization: Not on file     Attends meetings of clubs or organizations: Not on file     Relationship status: Not on file   Other Topics Concern    Not on file   Social History Narrative    Not on file       Review of patient's allergies indicates:  No Known Allergies    Current Outpatient Medications:     amitriptyline (ELAVIL) 50 MG tablet, Take 50 mg by mouth every evening., Disp: , Rfl:     amLODIPine (NORVASC) 5 MG tablet, Take 1 tablet (5 mg total) by mouth once daily., Disp: 90 tablet, Rfl: 1    aspirin (ECOTRIN) 81 MG EC tablet, Take 81 mg by mouth once daily., Disp: , Rfl:     carvedilol (COREG) 12.5 MG tablet, TAKE 1 TABLET BY MOUTH TWICE DAILY WITH MEALS, Disp: 60 tablet, Rfl: 6    fish oil-omega-3 fatty acids 300-1,000 mg capsule, Take 1 capsule by mouth 2 (two) times daily., Disp: , Rfl:     gabapentin (NEURONTIN) 300 MG capsule, Take 1 capsule (300 mg total) by mouth 3 (three) times daily. Begin with nightly dose to assess benefit before starting three times daily., Disp: 30 capsule, Rfl: 0    hydrALAZINE (APRESOLINE) 100 MG tablet, TAKE 1 TABLET BY MOUTH TWICE DAILY, Disp: 180 tablet, Rfl: 1    hydroCHLOROthiazide (HYDRODIURIL) 25 MG tablet, Take 1 tablet (25 mg total) by mouth once daily., Disp: 90 tablet, Rfl: 1    irbesartan (AVAPRO) 300 MG tablet, Take 300 mg by mouth every evening., Disp: , Rfl:     metFORMIN (GLUCOPHAGE-XR) 500 MG 24 hr tablet, Take 500 mg by mouth 2 (two) times daily with meals. , Disp: , Rfl:     omeprazole (PRILOSEC) 40 MG capsule, Take 40 mg by mouth once daily., Disp: , Rfl:     oxybutynin (DITROPAN) 5 MG Tab, Take 1 tablet (5 mg total) by mouth 2 (two) times daily., Disp: 60 tablet, Rfl: 11    potassium chloride (KLOR-CON) 20 mEq Pack, Take 20 mEq by mouth 2 (two) times daily. , Disp: , Rfl:      rosuvastatin (CRESTOR) 10 MG tablet, TAKE 1 TABLET BY MOUTH ONCE DAILY, Disp: 90 tablet, Rfl: 1    tamsulosin (FLOMAX) 0.4 mg Cap, Take 1 capsule (0.4 mg total) by mouth every evening., Disp: 30 capsule, Rfl: 11    temazepam (RESTORIL) 15 mg Cap, , Disp: , Rfl:     terazosin (HYTRIN) 10 MG capsule, TAKE 1 CAPSULE BY MOUTH IN THE EVENING, Disp: 30 capsule, Rfl: 6    co-enzyme Q-10 30 mg capsule, Take 30 mg by mouth 3 (three) times daily., Disp: , Rfl:     ondansetron (ZOFRAN) 8 MG tablet, Take 1 tablet (8 mg total) by mouth every 8 (eight) hours as needed for Nausea., Disp: 30 tablet, Rfl: 2    Review of Systems   Constitution: Negative for chills, diaphoresis, fever, malaise/fatigue and night sweats. Weight loss: SOME.   HENT: Negative for congestion and nosebleeds.    Eyes: Negative for blurred vision and visual disturbance (DECREASED FLOATERS).   Cardiovascular: Negative for chest pain, claudication, cyanosis, dyspnea on exertion (MILD), irregular heartbeat, leg swelling, near-syncope, orthopnea, palpitations, paroxysmal nocturnal dyspnea and syncope.   Respiratory: Negative for cough, hemoptysis, shortness of breath and wheezing.    Endocrine: Negative for cold intolerance, heat intolerance and polyuria.   Hematologic/Lymphatic: Negative for adenopathy and bleeding problem.   Skin: Negative for color change, itching and nail changes.   Musculoskeletal: Negative for back pain (CHRONIC) and falls.   Gastrointestinal: Negative for abdominal pain, change in bowel habit, heartburn, hematemesis, jaundice, melena and vomiting.   Genitourinary: Negative for dysuria, flank pain and frequency.   Neurological: Negative for brief paralysis, dizziness, light-headedness, loss of balance, numbness (LEGS FROM BACK), paresthesias, sensory change, tremors and weakness.   Psychiatric/Behavioral: Negative for altered mental status, depression and memory loss. The patient is not nervous/anxious.    Allergic/Immunologic: Negative  "for hives.        Objective:      Vitals:    07/15/19 0941   BP: 127/75   Pulse: 69   Weight: 99.9 kg (220 lb 3.8 oz)   Height: 5' 6" (1.676 m)   PainSc:   3   PainLoc: Back     Body mass index is 35.55 kg/m².    Physical Exam   Constitutional: He is oriented to person, place, and time. He appears well-developed and well-nourished. He is active.   OBESE   HENT:   Head: Normocephalic and atraumatic.   Mouth/Throat: Oropharynx is clear and moist and mucous membranes are normal.   Eyes: Pupils are equal, round, and reactive to light. Conjunctivae and EOM are normal.   Neck: Normal range of motion. Neck supple. Normal carotid pulses, no hepatojugular reflux and no JVD present. Carotid bruit is not present. No edema and no erythema present. No thyromegaly present.   Cardiovascular: Normal rate and regular rhythm.  No extrasystoles are present. PMI is not displaced. Exam reveals no gallop, no distant heart sounds, no friction rub and no midsystolic click.   Murmur heard.  High-pitched holosystolic murmur is present at the apex.  Pulses:       Carotid pulses are 2+ on the right side, and 2+ on the left side.       Radial pulses are 2+ on the right side, and 2+ on the left side.        Femoral pulses are 2+ on the right side, and 2+ on the left side.       Dorsalis pedis pulses are 2+ on the right side, and 2+ on the left side.        Posterior tibial pulses are 2+ on the right side, and 2+ on the left side.   Pulmonary/Chest: Effort normal and breath sounds normal. No accessory muscle usage. No tachypnea and no bradypnea. No respiratory distress.   Abdominal: Soft. Bowel sounds are normal. He exhibits no distension and no mass. There is no hepatosplenomegaly. There is no CVA tenderness.   Musculoskeletal: Normal range of motion. He exhibits no edema or deformity.   Lymphadenopathy:     He has no cervical adenopathy.   Neurological: He is alert and oriented to person, place, and time. He has normal strength. He displays no " tremor. No cranial nerve deficit.   Skin: Skin is warm and dry. No cyanosis or erythema. No pallor.   Psychiatric: He has a normal mood and affect. His speech is normal and behavior is normal.               ..    Chemistry        Component Value Date/Time     04/12/2019 1022    K 3.5 04/12/2019 1022    CL 99 04/12/2019 1022    CO2 30.8 04/12/2019 1022    BUN 14 04/12/2019 1022    CREATININE 0.72 04/12/2019 1022     (H) 04/12/2019 1022        Component Value Date/Time    CALCIUM 9.1 04/12/2019 1022    ALKPHOS 75 04/12/2019 1022    AST 27 04/12/2019 1022    ALT 20 01/11/2019 0818    ALT 20 01/11/2019 0818    BILITOT 0.7 04/12/2019 1022    ESTGFRAFRICA >60 02/06/2019 0909    EGFRNONAA >60 02/06/2019 0909            ..  Lab Results   Component Value Date    CHOL 137 01/11/2019     Lab Results   Component Value Date    HDL 48 01/11/2019     Lab Results   Component Value Date    LDLCALC 49.2 (L) 01/11/2019     Lab Results   Component Value Date    TRIG 199 (H) 01/11/2019     Lab Results   Component Value Date    CHOLHDL 35.0 01/11/2019     ..  Lab Results   Component Value Date    WBC 5.9 04/12/2019    HGB 11.1 (L) 04/12/2019    HCT 34.7 (L) 04/12/2019    MCV 85 11/29/2018     (L) 04/12/2019       Test(s) Reviewed  I have reviewed the following in detail:  [] Stress test   [] Angiography   [] Echocardiogram   [x] Labs   [] Other:       Assessment:         ICD-10-CM ICD-9-CM   1. Hypertensive heart disease without heart failure I11.9 402.90   2. Mild CAD I25.10 414.00   3. Non-rheumatic mitral regurgitation I34.0 424.0   4. Dyslipidemia E78.5 272.4   5. Severe obesity (BMI 35.0-35.9 with comorbidity) E66.01 278.01    Z68.35 V85.35     Problem List Items Addressed This Visit        Cardiac/Vascular    Hypertensive heart disease without heart failure - Primary    Non-rheumatic mitral regurgitation    Mild CAD    Relevant Orders    Lipid panel    Dyslipidemia    Relevant Orders    Lipid panel        Endocrine    Severe obesity (BMI 35.0-35.9 with comorbidity)           Plan:     ALL CV CLINICALLY STABLE, NO ANGINA, NO HF, NO TIA, NO CLINICAL ARRHYTHMIA,CONTINUE CURRENT MEDS, EDUCATION, DIET, EXERCISE, WEIGHT LOSS, INCREASED CV RISK WITH DIABETES, RTC  IN 7-8 MO WITH LABS      Hypertensive heart disease without heart failure    Mild CAD  -     Lipid panel; Future; Expected date: 07/15/2019    Non-rheumatic mitral regurgitation    Dyslipidemia  -     Lipid panel; Future; Expected date: 07/15/2019    Severe obesity (BMI 35.0-35.9 with comorbidity)    Other orders  -     hydroCHLOROthiazide (HYDRODIURIL) 25 MG tablet; Take 1 tablet (25 mg total) by mouth once daily.  Dispense: 90 tablet; Refill: 1    RTC Low level/low impact aerobic exercise 5x's/wk. Heart healthy diet and risk factor modification.    See labs and med orders.    Aerobic exercise 5x's/wk. Heart healthy diet and risk factor modification.    See labs and med orders.

## 2019-08-01 ENCOUNTER — OFFICE VISIT (OUTPATIENT)
Dept: PODIATRY | Facility: CLINIC | Age: 69
End: 2019-08-01
Payer: MEDICARE

## 2019-08-01 VITALS — HEIGHT: 66 IN | WEIGHT: 220 LBS | BODY MASS INDEX: 35.36 KG/M2

## 2019-08-01 DIAGNOSIS — L03.032 CELLULITIS OF TOE OF LEFT FOOT: ICD-10-CM

## 2019-08-01 DIAGNOSIS — M79.675 PAIN OF TOE OF LEFT FOOT: ICD-10-CM

## 2019-08-01 DIAGNOSIS — L60.0 INGROWN TOENAIL OF LEFT FOOT: Primary | ICD-10-CM

## 2019-08-01 PROCEDURE — 99999 PR PBB SHADOW E&M-EST. PATIENT-LVL III: CPT | Mod: PBBFAC,,, | Performed by: PODIATRIST

## 2019-08-01 PROCEDURE — 11750 EXCISION NAIL&NAIL MATRIX: CPT | Mod: PBBFAC | Performed by: PODIATRIST

## 2019-08-01 PROCEDURE — 99999 PR PBB SHADOW E&M-EST. PATIENT-LVL III: ICD-10-PCS | Mod: PBBFAC,,, | Performed by: PODIATRIST

## 2019-08-01 PROCEDURE — 11750 NAIL REMOVAL: ICD-10-PCS | Mod: S$PBB,T1,, | Performed by: PODIATRIST

## 2019-08-01 PROCEDURE — 99213 OFFICE O/P EST LOW 20 MIN: CPT | Mod: PBBFAC,25 | Performed by: PODIATRIST

## 2019-08-01 PROCEDURE — 99213 PR OFFICE/OUTPT VISIT, EST, LEVL III, 20-29 MIN: ICD-10-PCS | Mod: 25,S$PBB,, | Performed by: PODIATRIST

## 2019-08-01 PROCEDURE — 99213 OFFICE O/P EST LOW 20 MIN: CPT | Mod: 25,S$PBB,, | Performed by: PODIATRIST

## 2019-08-01 RX ORDER — MUPIROCIN 20 MG/G
OINTMENT TOPICAL
Refills: 4 | COMMUNITY
Start: 2019-06-12 | End: 2021-03-08

## 2019-08-01 RX ORDER — CEPHALEXIN 500 MG/1
500 CAPSULE ORAL EVERY 12 HOURS
Qty: 20 CAPSULE | Refills: 0 | Status: SHIPPED | OUTPATIENT
Start: 2019-08-01 | End: 2019-08-11

## 2019-08-01 RX ORDER — HYDROCODONE BITARTRATE AND ACETAMINOPHEN 7.5; 325 MG/1; MG/1
1 TABLET ORAL EVERY 6 HOURS PRN
Qty: 10 TABLET | Refills: 0 | Status: SHIPPED | OUTPATIENT
Start: 2019-08-01 | End: 2019-08-08

## 2019-08-01 NOTE — PROGRESS NOTES
[address] [facility] [address]  [place] [address]  Phone: [phone]   [phone]    Patient's PCP:Rob Bui MD  Referring Provider: No ref. provider found    Subjective:      Chief Complaint:: Ingrown Toenail (left second toenail medial)    ANA Hill Jr is a 68 y.o. male who presents with a complaint of  Ingrown toenail left second medial border lasting for two months.Also noticed discoloration. Onset of the symptoms was none.  Current symptoms include pain and redness.  Aggravating factors are touch. Symptoms have gotten worse.Treatment to date have included nail trimming,saoking with epson salt and dressing with neosporin Patients rates pain 8/10 on pain scale.    Systemic Doctor: Rob Bui MD  Date Last Seen: 2-19  Blood Sugar: 119  Hemoglobin A1c:     There were no vitals filed for this visit.  Shoe Size:     Past Surgical History:   Procedure Laterality Date    CARDIAC CATHETERIZATION      EYE SURGERY      cataracts bilateral    ULTRASOUND, PROSTATE, TRANSPERINEAL APPROACH, WITH GOLD FIDUCIAL MARKER INSERTION (with SPACEOAR transperineal biodegradable gel placement) N/A 10/4/2018    Performed by Manpreet Gordon MD at [facility] OR     Past Medical History:   Diagnosis Date    Anemia, chronic disease 12/28/2018    Coronary artery disease     mild    Diabetes mellitus     Heart murmur     Hyperlipidemia     Hypertension     Normocytic normochromic anemia 12/28/2018    Prostate cancer 8/20/2018    Sleep apnea     NOT USING [facility]    Thrombocytopenia 12/28/2018    Valvular regurgitation      Family History   Problem Relation Age of Onset    Heart disease Mother     Heart disease Father         Social History:   Marital Status:   Alcohol History:  reports that he does not drink alcohol.  Tobacco History:  reports that he has never smoked. He has never used smokeless tobacco.  Drug History:  reports that he does not use drugs.    Review of patient's allergies  indicates:  No Known Allergies    Current Outpatient Medications   Medication Sig Dispense Refill    amitriptyline (ELAVIL) 50 MG tablet Take 50 mg by mouth every evening.      amLODIPine (NORVASC) 5 MG tablet Take 1 tablet (5 mg total) by mouth once daily. 90 tablet 1    aspirin (ECOTRIN) 81 MG EC tablet Take 81 mg by mouth once daily.      carvedilol (COREG) 12.5 MG tablet TAKE 1 TABLET BY MOUTH TWICE DAILY WITH MEALS 60 tablet 6    cephALEXin (KEFLEX) 500 MG capsule Take 1 capsule (500 mg total) by mouth every 12 (twelve) hours. for 10 days 20 capsule 0    co-enzyme Q-10 30 mg capsule Take 30 mg by mouth 3 (three) times daily.      fish oil-omega-3 fatty acids 300-1,000 mg capsule Take 1 capsule by mouth 2 (two) times daily.      gabapentin (NEURONTIN) 300 MG capsule Take 1 capsule (300 mg total) by mouth 3 (three) times daily. Begin with nightly dose to assess benefit before starting three times daily. 30 capsule 0    hydrALAZINE (APRESOLINE) 100 MG tablet TAKE 1 TABLET BY MOUTH TWICE DAILY 180 tablet 1    hydroCHLOROthiazide (HYDRODIURIL) 25 MG tablet Take 1 tablet (25 mg total) by mouth once daily. 90 tablet 1    HYDROcodone-acetaminophen (NORCO) 7.5-325 mg per tablet Take 1 tablet by mouth every 6 (six) hours as needed for Pain. 10 tablet 0    irbesartan (AVAPRO) 300 MG tablet Take 300 mg by mouth every evening.      metFORMIN (GLUCOPHAGE-XR) 500 MG 24 hr tablet Take 500 mg by mouth 2 (two) times daily with meals.       mupirocin (BACTROBAN) 2 % ointment APPLY A SMALL AMOUNT TOPICALLY THREE TIMES DAILY  4    omeprazole (PRILOSEC) 40 MG capsule Take 40 mg by mouth once daily.      potassium chloride (KLOR-CON) 20 mEq Pack Take 20 mEq by mouth 2 (two) times daily.       rosuvastatin (CRESTOR) 10 MG tablet TAKE 1 TABLET BY MOUTH ONCE DAILY 90 tablet 1    tamsulosin (FLOMAX) 0.4 mg Cap Take 1 capsule (0.4 mg total) by mouth every evening. 30 capsule 11    terazosin (HYTRIN) 10 MG capsule TAKE 1  CAPSULE BY MOUTH IN THE EVENING 30 capsule 6     No current facility-administered medications for this visit.        Review of Systems      Objective:        Physical Exam:   Foot Exam    General  General Appearance: appears stated age and healthy   Orientation: alert and oriented to person, place, and time   Affect: appropriate   Gait: unimpaired       Left Foot/Ankle      Neurovascular  Dorsalis pedis: 2+  Posterior tibial: 2+  Saphenous nerve sensation: normal  Tibial nerve sensation: normal  Superficial peroneal nerve sensation: normal  Deep peroneal nerve sensation: normal  Sural nerve sensation: normal          Physical Exam   Cardiovascular:   Pulses:       Dorsalis pedis pulses are 2+ on the left side.        Posterior tibial pulses are 2+ on the left side.   Musculoskeletal:        Feet:        Imaging:            Assessment:       1. Ingrown toenail of left foot    2. Pain of toe of left foot    3. Cellulitis of toe of left foot      Plan:   Ingrown toenail of left foot  -     Nail Removal    Pain of toe of left foot  -     Nail Removal    Cellulitis of toe of left foot  -     Nail Removal    Other orders  -     cephALEXin (KEFLEX) 500 MG capsule; Take 1 capsule (500 mg total) by mouth every 12 (twelve) hours. for 10 days  Dispense: 20 capsule; Refill: 0  -     HYDROcodone-acetaminophen (NORCO) 7.5-325 mg per tablet; Take 1 tablet by mouth every 6 (six) hours as needed for Pain.  Dispense: 10 tablet; Refill: 0      Follow up if symptoms worsen or fail to improve.    Nail Removal  Date/Time: 8/1/2019 2:28 PM  Performed by: Skinny Somers DPM  Authorized by: Skinny Somers DPM     Consent Done?:  Yes (Written)    Location:  Left foot  Location detail:  Left second toe  Anesthesia:  Digital block  Local anesthetic: lidocaine 2% without epinephrine  Anesthetic total (ml):  3  Preparation:  Skin prepped with alcohol    Amount removed:  1/5  Nail removed location: Medial border.  Wedge excision of skin  of nail fold: No    Nail bed sutured?: No    Nail matrix removed:  Partial  Removed nail replaced and anchored: No    Dressing applied:  4x4 and gauze roll  Patient tolerance:  Patient tolerated the procedure well with no immediate complications     Daily dressings until dry with Neosporin and tube gauze.  Keflex and Norco prescribed.     - None    Counseling:     I provided patient education verbally regarding:   Patient diagnosis, treatment options, as well as alternatives, risks, and benefits.     This note was created using Dragon voice recognition software that occasionally misinterpreted phrases or words.

## 2019-08-05 DIAGNOSIS — C61 PROSTATE CANCER: ICD-10-CM

## 2019-08-06 RX ORDER — GABAPENTIN 300 MG/1
CAPSULE ORAL
Qty: 30 CAPSULE | Refills: 0 | Status: SHIPPED | OUTPATIENT
Start: 2019-08-06 | End: 2019-09-05 | Stop reason: SDUPTHER

## 2019-08-15 RX ORDER — ROSUVASTATIN CALCIUM 10 MG/1
TABLET, COATED ORAL
Qty: 90 TABLET | Refills: 1 | Status: SHIPPED | OUTPATIENT
Start: 2019-08-15 | End: 2020-02-26

## 2019-08-29 RX ORDER — TERAZOSIN 10 MG/1
CAPSULE ORAL
Qty: 30 CAPSULE | Refills: 6 | Status: SHIPPED | OUTPATIENT
Start: 2019-08-29 | End: 2020-03-30

## 2019-09-05 DIAGNOSIS — C61 PROSTATE CANCER: ICD-10-CM

## 2019-09-06 RX ORDER — GABAPENTIN 300 MG/1
CAPSULE ORAL
Qty: 30 CAPSULE | Refills: 0 | Status: SHIPPED | OUTPATIENT
Start: 2019-09-06 | End: 2019-10-12 | Stop reason: SDUPTHER

## 2019-09-06 RX ORDER — AMOXICILLIN AND CLAVULANATE POTASSIUM 500; 125 MG/1; MG/1
1 TABLET, FILM COATED ORAL 2 TIMES DAILY
Refills: 0 | COMMUNITY
Start: 2019-09-04 | End: 2019-12-17

## 2019-09-06 RX ORDER — PANTOPRAZOLE SODIUM 40 MG/1
40 TABLET, DELAYED RELEASE ORAL DAILY
Refills: 1 | COMMUNITY
Start: 2019-08-09 | End: 2020-05-22

## 2019-09-06 RX ORDER — OXYBUTYNIN CHLORIDE 5 MG/1
5 TABLET ORAL 2 TIMES DAILY
Refills: 11 | COMMUNITY
Start: 2019-09-02 | End: 2020-09-30 | Stop reason: SDUPTHER

## 2019-09-06 RX ORDER — BLOOD-GLUCOSE METER
EACH MISCELLANEOUS
Refills: 1 | COMMUNITY
Start: 2019-08-14 | End: 2022-09-20

## 2019-09-09 ENCOUNTER — OFFICE VISIT (OUTPATIENT)
Dept: PODIATRY | Facility: CLINIC | Age: 69
End: 2019-09-09
Payer: MEDICARE

## 2019-09-09 VITALS
SYSTOLIC BLOOD PRESSURE: 138 MMHG | BODY MASS INDEX: 35.36 KG/M2 | DIASTOLIC BLOOD PRESSURE: 55 MMHG | HEIGHT: 66 IN | WEIGHT: 220 LBS | HEART RATE: 59 BPM

## 2019-09-09 DIAGNOSIS — M79.675 PAIN OF TOE OF LEFT FOOT: Primary | ICD-10-CM

## 2019-09-09 DIAGNOSIS — L03.032 CELLULITIS OF TOE OF LEFT FOOT: ICD-10-CM

## 2019-09-09 PROCEDURE — 99999 PR PBB SHADOW E&M-EST. PATIENT-LVL III: CPT | Mod: PBBFAC,,, | Performed by: PODIATRIST

## 2019-09-09 PROCEDURE — 99213 PR OFFICE/OUTPT VISIT, EST, LEVL III, 20-29 MIN: ICD-10-PCS | Mod: S$PBB,,, | Performed by: PODIATRIST

## 2019-09-09 PROCEDURE — 99999 PR PBB SHADOW E&M-EST. PATIENT-LVL III: ICD-10-PCS | Mod: PBBFAC,,, | Performed by: PODIATRIST

## 2019-09-09 PROCEDURE — 99213 OFFICE O/P EST LOW 20 MIN: CPT | Mod: PBBFAC | Performed by: PODIATRIST

## 2019-09-09 PROCEDURE — 99213 OFFICE O/P EST LOW 20 MIN: CPT | Mod: S$PBB,,, | Performed by: PODIATRIST

## 2019-09-09 NOTE — PROGRESS NOTES
[address] [facility] [address]  [place] [address]  Phone: [phone]   [phone]    Patient's PCP:Rob Bui MD  Referring Provider: No ref. provider found    Subjective:      Chief Complaint:: Follow-up (left second toenail)    HPI  Chris Hill Jr is a 69 y.o. male who presents with a complaint of left second toenail pain .Pain scale 6/10. I had a PA done on 8-1-19 [facility] border.Never really feel like it healed.I had a blood blister on the toe last Thursday.I am still dressing it with neosporin I finished my antibiotics.    Systemic Doctor: Rob Bui MD  Date Last Seen: 2-19  Blood Sugar: 120  Hemoglobin A1c:     Vitals:    09/09/19 1620   BP: (!) 138/55   Pulse: (!) 59     Shoe Size:     Past Surgical History:   Procedure Laterality Date    CARDIAC CATHETERIZATION      EYE SURGERY      cataracts bilateral    ULTRASOUND, PROSTATE, TRANSPERINEAL APPROACH, WITH GOLD FIDUCIAL MARKER INSERTION (with SPACEOAR transperineal biodegradable gel placement) N/A 10/4/2018    Performed by Manpreet Gordon MD at [facility] OR     Past Medical History:   Diagnosis Date    Anemia, chronic disease 12/28/2018    Coronary artery disease     mild    Diabetes mellitus     Heart murmur     Hyperlipidemia     Hypertension     Normocytic normochromic anemia 12/28/2018    Prostate cancer 8/20/2018    Sleep apnea     NOT USING [facility]    Thrombocytopenia 12/28/2018    Valvular regurgitation      Family History   Problem Relation Age of Onset    Heart disease Mother     Heart disease Father         Social History:   Marital Status:   Alcohol History:  reports that he does not drink alcohol.  Tobacco History:  reports that he has never smoked. He has never used smokeless tobacco.  Drug History:  reports that he does not use drugs.    Review of patient's allergies indicates:  No Known Allergies    Current Outpatient Medications   Medication Sig Dispense Refill    amitriptyline (ELAVIL) 50 MG tablet Take  50 mg by mouth every evening.      amLODIPine (NORVASC) 5 MG tablet Take 1 tablet (5 mg total) by mouth once daily. 90 tablet 1    amoxicillin-clavulanate 500-125mg (AUGMENTIN) 500-125 mg Tab Take 1 tablet by mouth 2 (two) times daily.  0    aspirin (ECOTRIN) 81 MG EC tablet Take 81 mg by mouth once daily.      carvedilol (COREG) 12.5 MG tablet TAKE 1 TABLET BY MOUTH TWICE DAILY WITH MEALS 60 tablet 6    co-enzyme Q-10 30 mg capsule Take 30 mg by mouth 3 (three) times daily.      fish oil-omega-3 fatty acids 300-1,000 mg capsule Take 1 capsule by mouth 2 (two) times daily.      gabapentin (NEURONTIN) 300 MG capsule TAKE 1 CAPSULE BY MOUTH THREE TIMES DAILY BEGIN  WITH  NIGHTLY  DOSE  TO  ASSESS  BENEFITS  BEFORE  STARTING  THREE  TIMES  DAILY 30 capsule 0    hydrALAZINE (APRESOLINE) 100 MG tablet TAKE 1 TABLET BY MOUTH TWICE DAILY 180 tablet 1    hydroCHLOROthiazide (HYDRODIURIL) 25 MG tablet Take 1 tablet (25 mg total) by mouth once daily. 90 tablet 1    irbesartan (AVAPRO) 300 MG tablet Take 300 mg by mouth every evening.      metFORMIN (GLUCOPHAGE-XR) 500 MG 24 hr tablet Take 500 mg by mouth 2 (two) times daily with meals.       mupirocin (BACTROBAN) 2 % ointment APPLY A SMALL AMOUNT TOPICALLY THREE TIMES DAILY  4    omeprazole (PRILOSEC) 40 MG capsule Take 40 mg by mouth once daily.      ONEUCH VER Strp USE 1 STRIP TO CHECK GLUCOSE TWICE DAILY  1    oxybutynin (DITROPAN) 5 MG Tab Take 5 mg by mouth 2 (two) times daily.  11    pantoprazole (PROTONIX) 40 MG tablet Take 40 mg by mouth once daily.  1    potassium chloride (KLOR-CON) 20 mEq Pack Take 20 mEq by mouth 2 (two) times daily.       rosuvastatin (CRESTOR) 10 MG tablet TAKE 1 TABLET BY MOUTH ONCE DAILY 90 tablet 1    tamsulosin (FLOMAX) 0.4 mg Cap Take 1 capsule (0.4 mg total) by mouth every evening. 30 capsule 11    terazosin (HYTRIN) 10 MG capsule TAKE 1 CAPSULE BY MOUTH ONCE DAILY IN THE EVENING 30 capsule 6     No current  facility-administered medications for this visit.        Review of Systems      Objective:            Physical Exam:   Foot Exam    General  General Appearance: appears stated age and healthy   Orientation: alert and oriented to person, place, and time   Affect: appropriate   Gait: unimpaired       Left Foot/Ankle      Inspection and Palpation  Tenderness: (Proximal medial nail 2nd toe)    Neurovascular  Dorsalis pedis: 2+  Posterior tibial: 2+  Saphenous nerve sensation: normal  Tibial nerve sensation: normal  Superficial peroneal nerve sensation: normal  Deep peroneal nerve sensation: normal  Sural nerve sensation: normal          Physical Exam   Cardiovascular:   Pulses:       Dorsalis pedis pulses are 2+ on the left side.        Posterior tibial pulses are 2+ on the left side.   Musculoskeletal:        Feet:        Imaging:            Assessment:       1. Pain of toe of left foot    2. Cellulitis of toe of left foot      Plan:   Pain of toe of left foot    Cellulitis of toe of left foot     The patient is on Augmentin twice a day already which she will continue.  He is going to soak the left foot abscess all water apply Betadine and then Bactroban and re-dress.  If the area of open wound is healing over the next 2-3 weeks he does not need to see me the continues to be open bothering him I will have to anesthetize the toe and debride the area.  No follow-ups on file.    Procedures - None    Counseling:     I provided patient education verbally regarding:   Patient diagnosis, treatment options, as well as alternatives, risks, and benefits.     This note was created using Dragon voice recognition software that occasionally misinterpreted phrases or words.

## 2019-10-05 RX ORDER — AMLODIPINE BESYLATE 5 MG/1
TABLET ORAL
Qty: 90 TABLET | Refills: 1 | Status: SHIPPED | OUTPATIENT
Start: 2019-10-05 | End: 2020-02-14 | Stop reason: SDUPTHER

## 2019-10-10 ENCOUNTER — TELEPHONE (OUTPATIENT)
Dept: HEMATOLOGY/ONCOLOGY | Facility: CLINIC | Age: 69
End: 2019-10-10

## 2019-10-10 DIAGNOSIS — D69.3 CHRONIC ITP (IDIOPATHIC THROMBOCYTOPENIA): Primary | ICD-10-CM

## 2019-10-11 ENCOUNTER — LAB VISIT (OUTPATIENT)
Dept: LAB | Facility: HOSPITAL | Age: 69
End: 2019-10-11
Attending: INTERNAL MEDICINE
Payer: MEDICARE

## 2019-10-11 DIAGNOSIS — D69.3 CHRONIC ITP (IDIOPATHIC THROMBOCYTOPENIA): ICD-10-CM

## 2019-10-11 LAB
ALBUMIN SERPL BCP-MCNC: 3.7 G/DL (ref 3.5–5.2)
ALP SERPL-CCNC: 75 U/L (ref 55–135)
ALT SERPL W/O P-5'-P-CCNC: 32 U/L (ref 10–44)
ANION GAP SERPL CALC-SCNC: 12 MMOL/L (ref 8–16)
AST SERPL-CCNC: 29 U/L (ref 10–40)
BASOPHILS # BLD AUTO: 0.03 K/UL (ref 0–0.2)
BASOPHILS NFR BLD: 0.6 % (ref 0–1.9)
BILIRUB SERPL-MCNC: 0.7 MG/DL (ref 0.1–1)
BUN SERPL-MCNC: 18 MG/DL (ref 8–23)
CALCIUM SERPL-MCNC: 8.9 MG/DL (ref 8.7–10.5)
CHLORIDE SERPL-SCNC: 101 MMOL/L (ref 95–110)
CO2 SERPL-SCNC: 28 MMOL/L (ref 23–29)
CREAT SERPL-MCNC: 0.9 MG/DL (ref 0.5–1.4)
DIFFERENTIAL METHOD: ABNORMAL
EOSINOPHIL # BLD AUTO: 0.4 K/UL (ref 0–0.5)
EOSINOPHIL NFR BLD: 7.6 % (ref 0–8)
ERYTHROCYTE [DISTWIDTH] IN BLOOD BY AUTOMATED COUNT: 14.3 % (ref 11.5–14.5)
EST. GFR  (AFRICAN AMERICAN): >60 ML/MIN/1.73 M^2
EST. GFR  (NON AFRICAN AMERICAN): >60 ML/MIN/1.73 M^2
GLUCOSE SERPL-MCNC: 106 MG/DL (ref 70–110)
HCT VFR BLD AUTO: 34.7 % (ref 40–54)
HGB BLD-MCNC: 11.3 G/DL (ref 14–18)
IMM GRANULOCYTES # BLD AUTO: 0.06 K/UL (ref 0–0.04)
IMM GRANULOCYTES NFR BLD AUTO: 1.1 % (ref 0–0.5)
LYMPHOCYTES # BLD AUTO: 1.1 K/UL (ref 1–4.8)
LYMPHOCYTES NFR BLD: 19.7 % (ref 18–48)
MCH RBC QN AUTO: 27 PG (ref 27–31)
MCHC RBC AUTO-ENTMCNC: 32.6 G/DL (ref 32–36)
MCV RBC AUTO: 83 FL (ref 82–98)
MONOCYTES # BLD AUTO: 0.5 K/UL (ref 0.3–1)
MONOCYTES NFR BLD: 8.6 % (ref 4–15)
NEUTROPHILS # BLD AUTO: 3.4 K/UL (ref 1.8–7.7)
NEUTROPHILS NFR BLD: 62.4 % (ref 38–73)
NRBC BLD-RTO: 0 /100 WBC
PLATELET # BLD AUTO: 150 K/UL (ref 150–350)
PMV BLD AUTO: 11.9 FL (ref 9.2–12.9)
POTASSIUM SERPL-SCNC: 3.3 MMOL/L (ref 3.5–5.1)
PROT SERPL-MCNC: 6.7 G/DL (ref 6–8.4)
RBC # BLD AUTO: 4.18 M/UL (ref 4.6–6.2)
SODIUM SERPL-SCNC: 141 MMOL/L (ref 136–145)
WBC # BLD AUTO: 5.38 K/UL (ref 3.9–12.7)

## 2019-10-11 PROCEDURE — 85025 COMPLETE CBC W/AUTO DIFF WBC: CPT

## 2019-10-11 PROCEDURE — 80053 COMPREHEN METABOLIC PANEL: CPT

## 2019-10-12 DIAGNOSIS — C61 PROSTATE CANCER: ICD-10-CM

## 2019-10-13 NOTE — PROGRESS NOTES
[facility] Hematology/Oncology  PROGRESS NOTE -  Follow-up Visit      Subjective:       Patient ID:   NAME: Chris Hill Jr : 1950     69 y.o. male    Referring Doc: Tyrell  Other Physicians: Manpreet Gordon; Wiley; Cheyanne Alfaro        Chief Complaint:  prostate cancer; anem/tcp f/u         History of Present Illness:     Patient returns today for a regularly scheduled follow-up visit.  The patient is here today to go over the results of the recently ordered labs, tests and studies. He last saw Dr Santillan on 7/10/2019 and Dr Gordon on 2019. He is here by himself. He is doing ok. He previously had finished XRT. He denies any CP, SOB, HA's or N/V.  He is here with his wife. He has some fatigue.             ROS:   GEN: normal without any fever, night sweats or weight loss  HEENT: normal with no HA's, sore throat, stiff neck, changes in vision  CV: normal with no CP, SOB, PND, JANSEN or orthopnea  PULM: normal with no SOB, cough, hemoptysis, sputum or pleuritic pain  GI: normal with no abdominal pain, nausea, vomiting, constipation, diarrhea, melanotic stools, BRBPR, or hematemesis  : normal with no hematuria, dysuria  BREAST: normal with no mass, discharge, pain  SKIN: normal with no rash, erythema, bruising, or swelling    Allergies:  Review of patient's allergies indicates:  No Known Allergies    Medications:    Current Outpatient Medications:     amitriptyline (ELAVIL) 50 MG tablet, Take 50 mg by mouth every evening., Disp: , Rfl:     amLODIPine (NORVASC) 5 MG tablet, TAKE 1 TABLET BY MOUTH ONCE DAILY, Disp: 90 tablet, Rfl: 1    amoxicillin-clavulanate 500-125mg (AUGMENTIN) 500-125 mg Tab, Take 1 tablet by mouth 2 (two) times daily., Disp: , Rfl: 0    aspirin (ECOTRIN) 81 MG EC tablet, Take 81 mg by mouth once daily., Disp: , Rfl:     carvedilol (COREG) 12.5 MG tablet, TAKE 1 TABLET BY MOUTH TWICE DAILY WITH MEALS, Disp: 60 tablet, Rfl: 6    co-enzyme Q-10 30 mg capsule, Take 30 mg by mouth 3  (three) times daily., Disp: , Rfl:     fish oil-omega-3 fatty acids 300-1,000 mg capsule, Take 1 capsule by mouth 2 (two) times daily., Disp: , Rfl:     gabapentin (NEURONTIN) 300 MG capsule, TAKE 1 CAPSULE BY MOUTH THREE TIMES DAILY BEGIN  WITH  NIGHTLY  DOSE  TO  ASSESS  BENEFITS  BEFORE  STARTING  THREE  TIMES  A  DAY, Disp: 30 capsule, Rfl: 0    hydrALAZINE (APRESOLINE) 100 MG tablet, TAKE 1 TABLET BY MOUTH TWICE DAILY, Disp: 180 tablet, Rfl: 1    hydroCHLOROthiazide (HYDRODIURIL) 25 MG tablet, Take 1 tablet (25 mg total) by mouth once daily., Disp: 90 tablet, Rfl: 1    irbesartan (AVAPRO) 300 MG tablet, Take 300 mg by mouth every evening., Disp: , Rfl:     metFORMIN (GLUCOPHAGE-XR) 500 MG 24 hr tablet, Take 500 mg by mouth 2 (two) times daily with meals. , Disp: , Rfl:     mupirocin (BACTROBAN) 2 % ointment, APPLY A SMALL AMOUNT TOPICALLY THREE TIMES DAILY, Disp: , Rfl: 4    omeprazole (PRILOSEC) 40 MG capsule, Take 40 mg by mouth once daily., Disp: , Rfl:     ONETOUCH VERIO Strp, USE 1 STRIP TO CHECK GLUCOSE TWICE DAILY, Disp: , Rfl: 1    oxybutynin (DITROPAN) 5 MG Tab, Take 5 mg by mouth 2 (two) times daily., Disp: , Rfl: 11    pantoprazole (PROTONIX) 40 MG tablet, Take 40 mg by mouth once daily., Disp: , Rfl: 1    potassium chloride (KLOR-CON) 20 mEq Pack, Take 20 mEq by mouth 2 (two) times daily. , Disp: , Rfl:     rosuvastatin (CRESTOR) 10 MG tablet, TAKE 1 TABLET BY MOUTH ONCE DAILY, Disp: 90 tablet, Rfl: 1    tamsulosin (FLOMAX) 0.4 mg Cap, Take 1 capsule (0.4 mg total) by mouth every evening., Disp: 30 capsule, Rfl: 11    terazosin (HYTRIN) 10 MG capsule, TAKE 1 CAPSULE BY MOUTH ONCE DAILY IN THE EVENING, Disp: 30 capsule, Rfl: 6    PMHx/PSHx Updates:  See patient's last visit with me on 4/15/2019.  See H&P on 12/28/2018        Pathology:  Cancer Staging  No matching staging information was found for the patient.          Objective:     Vitals:  Blood pressure (!) 144/76, pulse (!) 59,  temperature 97.7 °F (36.5 °C), temperature source Oral, resp. rate 20, weight 103.4 kg (227 lb 14.4 oz).    Physical Examination:   GEN: no apparent distress, comfortable; AAOx3  HEAD: atraumatic and normocephalic  EYES: no pallor, no icterus, PERRLA  ENT: OMM, no pharyngeal erythema, external ears WNL; no nasal discharge; no thrush  NECK: no masses, thyroid normal, trachea midline, no LAD/LN's, supple  CV: RRR with no murmur; normal pulse; normal S1 and S2; no pedal edema  CHEST: Normal respiratory effort; CTAB; normal breath sounds; no wheeze or crackles  ABDOM: nontender and nondistended; soft; normal bowel sounds; no rebound/guarding  MUSC/Skeletal: ROM normal; no crepitus; joints normal; no deformities or arthropathy  EXTREM: no clubbing, cyanosis, inflammation or swelling  SKIN: no rashes, lesions, ulcers, petechiae or subcutaneous nodules  : no ellison  NEURO: grossly intact; motor/sensory WNL; AAOx3; no tremors  PSYCH: normal mood, affect and behavior  LYMPH: normal cervical, supraclavicular, axillary and groin LN's            Labs:       10/11/2019  Lab Results   Component Value Date    WBC 5.38 10/11/2019    HGB 11.3 (L) 10/11/2019    HCT 34.7 (L) 10/11/2019    MCV 83 10/11/2019     10/11/2019     CMP  Sodium   Date Value Ref Range Status   10/11/2019 141 136 - 145 mmol/L Final   04/12/2019 138 134 - 144 mmol/L      Potassium   Date Value Ref Range Status   10/11/2019 3.3 (L) 3.5 - 5.1 mmol/L Final     Chloride   Date Value Ref Range Status   10/11/2019 101 95 - 110 mmol/L Final   04/12/2019 99 98 - 110 mmol/L      CO2   Date Value Ref Range Status   10/11/2019 28 23 - 29 mmol/L Final     Glucose   Date Value Ref Range Status   10/11/2019 106 70 - 110 mg/dL Final   04/12/2019 117 (H) 70 - 99 mg/dL      BUN, Bld   Date Value Ref Range Status   10/11/2019 18 8 - 23 mg/dL Final     Creatinine   Date Value Ref Range Status   10/11/2019 0.9 0.5 - 1.4 mg/dL Final   04/12/2019 0.72 0.60 - 1.40 mg/dL       Calcium   Date Value Ref Range Status   10/11/2019 8.9 8.7 - 10.5 mg/dL Final     Total Protein   Date Value Ref Range Status   10/11/2019 6.7 6.0 - 8.4 g/dL Final     Albumin   Date Value Ref Range Status   10/11/2019 3.7 3.5 - 5.2 g/dL Final   04/12/2019 3.3 3.1 - 4.7 g/dL      Total Bilirubin   Date Value Ref Range Status   10/11/2019 0.7 0.1 - 1.0 mg/dL Final     Comment:     For infants and newborns, interpretation of results should be based  on gestational age, weight and in agreement with clinical  observations.  Premature Infant recommended reference ranges:  Up to 24 hours.............<8.0 mg/dL  Up to 48 hours............<12.0 mg/dL  3-5 days..................<15.0 mg/dL  6-29 days.................<15.0 mg/dL       Alkaline Phosphatase   Date Value Ref Range Status   10/11/2019 75 55 - 135 U/L Final     AST   Date Value Ref Range Status   10/11/2019 29 10 - 40 U/L Final     ALT   Date Value Ref Range Status   10/11/2019 32 10 - 44 U/L Final     Anion Gap   Date Value Ref Range Status   10/11/2019 12 8 - 16 mmol/L Final     eGFR if    Date Value Ref Range Status   10/11/2019 >60.0 >60 mL/min/1.73 m^2 Final     eGFR if non    Date Value Ref Range Status   10/11/2019 >60.0 >60 mL/min/1.73 m^2 Final     Comment:     Calculation used to obtain the estimated glomerular filtration  rate (eGFR) is the CKD-EPI equation.        PSA 0.02 0n 5/8/2019        Radiology/Diagnostic Studies:    Us Abdomen Complete    Result Date: 1/2/2019  Abdominal ultrasound Clinical history is anemia, prostate cancer The pancreas, aorta and IVC are normal. The liver is hyperechoic compatible with fatty infiltration. There is hepatopedal flow within the portal vein. The common bile duct measures 6 mm. The patient has had a cholecystectomy. The kidneys are normal in size and echogenicity. The spleen is normal in size measuring 12 cm. IMPRESSION: Fatty infiltration of the liver otherwise negative  abdominal ultrasound Read and electronically signed by: Leydi Goldberg MD on 1/2/2019 9:49 AM CST LEYDI GOLDBERG MD      I have reviewed all available lab results and radiology reports.    Assessment/Plan:   (1) 69 y.o. male with diagnosis of prostate cancer who has been referred by Simone Santillan Jr., MD for evaluation by medical oncology. Patient with a high risk adenocarcinoma of the prostate with [id number] with GS of 4+5.   - he started androgen depravation therapy and monotherapy XRT (IMRT)  - followed by Dr Gordon with  and currently on Trelstar  - followed by Dr Santillan with Rad/onc and completed XRt on 12/18/2018  - latest PSA at 0.02 on 5/8/2019     (2) CAD and non-rheumatic MR; mild CVA in 1993; Hypertensive Heart disease - followed by Dr Jama with cardiology     (3) HTN and hypercholesterolemia     (4) LORA     (5) DM    (6) Chronic back pain issues - required recent lumbar surgery with Dr Chris Fofana at [facility]     (7) Hx/of nightly fevers     (8) Chronic diarhhea - followed by Dr Alfaro with GI and s/p recent endoscopies     (9) Mild thrombocytopenia resolved with last platelet count down at 150,000  - no history of hepatitis or liver problems; no alcoholism  - he has positive antiplatelet antibody screens both direct and indirect consistent with an underlying chronic ITP condition  - his flow cytometry showed no evidence of leukemia but he did have a relative increase in eosinophils     (10) Mild anemia with latest hgb at 11.3 and stable if not better   - NCNC parameters  - iron and ferritin are good   - OBS was negative on 11/15  - hx/of colon polyps in past  - recent EGD with Dr Alfaro on 12/5/2018                VISIT DIAGNOSES:      Prostate cancer    Normocytic normochromic anemia    Anemia, chronic disease    Thrombocytopenia          PLAN:  1. Proceed with rad/onc and  directives  2. Check CBC/plat every 6 months for now  3. F/u with PCP, , Rad/onc etc  4. RTC in 6 months  Fax  note to Rob Santillan MD, and Wiley Gordon Albright    Discussion:       I spent over 25 mins of time with the patient. Reviewed results of the recently ordered labs, tests and studies; made directives with regards to the results. Over half of this time was spent couseling and coordinating care.    I have explained all of the above in detail and the patient understands all of the current recommendation(s). I have answered all of their questions to the best of my ability and to their complete satisfaction.   The patient is to continue with the current management plan.            Electronically signed by Cyrus Gibson MD

## 2019-10-14 ENCOUNTER — OFFICE VISIT (OUTPATIENT)
Dept: HEMATOLOGY/ONCOLOGY | Facility: CLINIC | Age: 69
End: 2019-10-14
Payer: MEDICARE

## 2019-10-14 ENCOUNTER — TELEPHONE (OUTPATIENT)
Dept: HEMATOLOGY/ONCOLOGY | Facility: CLINIC | Age: 69
End: 2019-10-14

## 2019-10-14 VITALS
RESPIRATION RATE: 20 BRPM | SYSTOLIC BLOOD PRESSURE: 144 MMHG | HEART RATE: 59 BPM | WEIGHT: 227.88 LBS | TEMPERATURE: 98 F | BODY MASS INDEX: 36.78 KG/M2 | DIASTOLIC BLOOD PRESSURE: 76 MMHG

## 2019-10-14 DIAGNOSIS — D63.8 ANEMIA, CHRONIC DISEASE: ICD-10-CM

## 2019-10-14 DIAGNOSIS — D64.9 NORMOCYTIC NORMOCHROMIC ANEMIA: ICD-10-CM

## 2019-10-14 DIAGNOSIS — D69.6 THROMBOCYTOPENIA: ICD-10-CM

## 2019-10-14 DIAGNOSIS — C61 PROSTATE CANCER: Primary | ICD-10-CM

## 2019-10-14 PROCEDURE — 99213 PR OFFICE/OUTPT VISIT, EST, LEVL III, 20-29 MIN: ICD-10-PCS | Mod: S$GLB,,, | Performed by: INTERNAL MEDICINE

## 2019-10-14 PROCEDURE — 99213 OFFICE O/P EST LOW 20 MIN: CPT | Mod: S$GLB,,, | Performed by: INTERNAL MEDICINE

## 2019-10-14 RX ORDER — GABAPENTIN 300 MG/1
CAPSULE ORAL
Qty: 30 CAPSULE | Refills: 0 | Status: SHIPPED | OUTPATIENT
Start: 2019-10-14 | End: 2020-04-22

## 2019-10-14 NOTE — TELEPHONE ENCOUNTER
----- Message from Cyrus Gibson MD sent at 10/11/2019  3:49 PM CDT -----  Potassium is a little low - please forward this to his PCP's attention

## 2019-10-16 ENCOUNTER — LAB VISIT (OUTPATIENT)
Dept: LAB | Facility: HOSPITAL | Age: 69
End: 2019-10-16
Attending: INTERNAL MEDICINE
Payer: MEDICARE

## 2019-10-16 DIAGNOSIS — E78.5 HYPERLIPEMIA: Primary | ICD-10-CM

## 2019-10-16 DIAGNOSIS — I10 ESSENTIAL HYPERTENSION, MALIGNANT: ICD-10-CM

## 2019-10-16 DIAGNOSIS — E11.65 TYPE 2 DIABETES MELLITUS WITH HYPERGLYCEMIA: ICD-10-CM

## 2019-10-16 LAB
ALBUMIN/CREAT UR: NORMAL UG/MG (ref 0–30)
BILIRUB UR QL STRIP: NEGATIVE
CHOLEST SERPL-MCNC: 94 MG/DL (ref 120–199)
CHOLEST/HDLC SERPL: 2.8 {RATIO} (ref 2–5)
CLARITY UR: CLEAR
COLOR UR: YELLOW
CREAT UR-MCNC: 63 MG/DL (ref 23–375)
ESTIMATED AVG GLUCOSE: 126 MG/DL (ref 68–131)
GLUCOSE UR QL STRIP: NEGATIVE
HBA1C MFR BLD HPLC: 6 % (ref 4.5–6.2)
HDLC SERPL-MCNC: 34 MG/DL (ref 40–75)
HDLC SERPL: 36.2 % (ref 20–50)
HGB UR QL STRIP: NEGATIVE
KETONES UR QL STRIP: NEGATIVE
LDLC SERPL CALC-MCNC: 12 MG/DL (ref 63–159)
LEUKOCYTE ESTERASE UR QL STRIP: NEGATIVE
MICROALBUMIN UR DL<=1MG/L-MCNC: <2 UG/ML
NITRITE UR QL STRIP: NEGATIVE
NONHDLC SERPL-MCNC: 60 MG/DL
PH UR STRIP: 7 [PH] (ref 5–8)
PROT UR QL STRIP: NEGATIVE
SP GR UR STRIP: 1 (ref 1–1.03)
TRIGL SERPL-MCNC: 240 MG/DL (ref 30–150)
URN SPEC COLLECT METH UR: NORMAL
UROBILINOGEN UR STRIP-ACNC: NEGATIVE EU/DL

## 2019-10-16 PROCEDURE — 81003 URINALYSIS AUTO W/O SCOPE: CPT

## 2019-10-16 PROCEDURE — 83036 HEMOGLOBIN GLYCOSYLATED A1C: CPT

## 2019-10-16 PROCEDURE — 82043 UR ALBUMIN QUANTITATIVE: CPT

## 2019-10-16 PROCEDURE — 80061 LIPID PANEL: CPT

## 2019-10-18 ENCOUNTER — HOSPITAL ENCOUNTER (OUTPATIENT)
Dept: RADIOLOGY | Facility: HOSPITAL | Age: 69
Discharge: HOME | End: 2019-10-18
Attending: INTERNAL MEDICINE
Payer: MEDICARE

## 2019-10-18 DIAGNOSIS — J01.90 ACUTE SINUSITIS, UNSPECIFIED: ICD-10-CM

## 2019-10-18 DIAGNOSIS — J40 BRONCHITIS, NOT SPECIFIED AS ACUTE OR CHRONIC: ICD-10-CM

## 2019-10-18 DIAGNOSIS — J40 BRONCHITIS, NOT SPECIFIED AS ACUTE OR CHRONIC: Primary | ICD-10-CM

## 2019-10-22 RX ORDER — HYDRALAZINE HYDROCHLORIDE 100 MG/1
TABLET, FILM COATED ORAL
Qty: 180 TABLET | Refills: 1 | Status: SHIPPED | OUTPATIENT
Start: 2019-10-22 | End: 2020-04-24 | Stop reason: SDUPTHER

## 2019-10-23 ENCOUNTER — HOSPITAL ENCOUNTER (OUTPATIENT)
Dept: RADIOLOGY | Facility: HOSPITAL | Age: 69
Discharge: HOME | End: 2019-10-23
Attending: INTERNAL MEDICINE
Payer: MEDICARE

## 2019-10-23 PROCEDURE — 70486 CT MAXILLOFACIAL W/O DYE: CPT | Mod: TC,PO

## 2019-10-23 PROCEDURE — 71046 X-RAY EXAM CHEST 2 VIEWS: CPT | Mod: TC,PO

## 2019-10-24 ENCOUNTER — HOSPITAL ENCOUNTER (EMERGENCY)
Facility: HOSPITAL | Age: 69
Discharge: HOME | End: 2019-10-24
Attending: EMERGENCY MEDICINE
Payer: MEDICARE

## 2019-10-24 VITALS
TEMPERATURE: 98 F | HEART RATE: 67 BPM | BODY MASS INDEX: 35.36 KG/M2 | RESPIRATION RATE: 20 BRPM | SYSTOLIC BLOOD PRESSURE: 170 MMHG | HEIGHT: 66 IN | DIASTOLIC BLOOD PRESSURE: 77 MMHG | OXYGEN SATURATION: 95 % | WEIGHT: 220 LBS

## 2019-10-24 DIAGNOSIS — T14.8XXA ABRASION: ICD-10-CM

## 2019-10-24 DIAGNOSIS — M25.551 HIP PAIN, ACUTE, RIGHT: ICD-10-CM

## 2019-10-24 DIAGNOSIS — T14.90XA TRAUMA: ICD-10-CM

## 2019-10-24 DIAGNOSIS — S16.1XXA STRAIN OF NECK MUSCLE, INITIAL ENCOUNTER: ICD-10-CM

## 2019-10-24 DIAGNOSIS — R52 PAIN: ICD-10-CM

## 2019-10-24 DIAGNOSIS — S09.90XA INJURY OF HEAD, INITIAL ENCOUNTER: ICD-10-CM

## 2019-10-24 DIAGNOSIS — S99.912A INJURY OF LEFT ANKLE, INITIAL ENCOUNTER: Primary | ICD-10-CM

## 2019-10-24 DIAGNOSIS — M25.511 ACUTE PAIN OF RIGHT SHOULDER: ICD-10-CM

## 2019-10-24 PROCEDURE — 25000003 PHARM REV CODE 250: Performed by: NURSE PRACTITIONER

## 2019-10-24 PROCEDURE — 99284 EMERGENCY DEPT VISIT MOD MDM: CPT | Mod: 25

## 2019-10-24 RX ORDER — HYDROCODONE BITARTRATE AND ACETAMINOPHEN 7.5; 325 MG/1; MG/1
1 TABLET ORAL EVERY 4 HOURS PRN
Qty: 18 TABLET | Refills: 0 | Status: SHIPPED | OUTPATIENT
Start: 2019-10-24 | End: 2020-04-22

## 2019-10-24 RX ORDER — METHOCARBAMOL 500 MG/1
500 TABLET, FILM COATED ORAL
Status: COMPLETED | OUTPATIENT
Start: 2019-10-24 | End: 2019-10-24

## 2019-10-24 RX ORDER — HYDROCODONE BITARTRATE AND ACETAMINOPHEN 7.5; 325 MG/1; MG/1
1 TABLET ORAL
Status: COMPLETED | OUTPATIENT
Start: 2019-10-24 | End: 2019-10-24

## 2019-10-24 RX ADMIN — METHOCARBAMOL 500 MG: 500 TABLET, FILM COATED ORAL at 06:10

## 2019-10-24 RX ADMIN — HYDROCODONE BITARTRATE AND ACETAMINOPHEN 1 TABLET: 7.5; 325 TABLET ORAL at 06:10

## 2019-10-24 NOTE — ED NOTES
Pt to Ed for c/o left ankle pain following a fall at [place]. Pt twisted left ankle and hit back of head and right shoulder. Also c/o right hip pain.

## 2019-10-24 NOTE — ED NOTES
Bed: [place] 01  Expected date:   Expected time:   Means of arrival:   Comments:  EMS- Fall Injury

## 2019-10-24 NOTE — ED PROVIDER NOTES
Encounter Date: 10/24/2019       History     Chief Complaint   Patient presents with    Ankle Pain     Presents with right shoulder and right hip pain  Left ankle pain head injury with neck pain after falling off one step of a bleacher         Review of patient's allergies indicates:  No Known Allergies  Past Medical History:   Diagnosis Date    Anemia, chronic disease 12/28/2018    Coronary artery disease     mild    Diabetes mellitus     Heart murmur     Hyperlipidemia     Hypertension     Normocytic normochromic anemia 12/28/2018    Prostate cancer 8/20/2018    Sleep apnea     NOT USING [facility]    Thrombocytopenia 12/28/2018    Valvular regurgitation      Past Surgical History:   Procedure Laterality Date    CARDIAC CATHETERIZATION      EYE SURGERY      cataracts bilateral    TRANSRECTAL ULTRASOUND OF PROSTATE WITH INSERTION OF GOLD FIDUCIAL MARKER N/A 10/4/2018    Procedure: ULTRASOUND, PROSTATE, TRANSPERINEAL APPROACH, WITH GOLD FIDUCIAL MARKER INSERTION (with SPACEOAR transperineal biodegradable gel placement);  Surgeon: Manpreet Gordon MD;  Location: [facility];  Service: Urology;  Laterality: N/A;     Family History   Problem Relation Age of Onset    Heart disease Mother     Heart disease Father      Social History     Tobacco Use    Smoking status: Never Smoker    Smokeless tobacco: Never Used   Substance Use Topics    Alcohol use: No    Drug use: No     Review of Systems   Constitutional: Negative for fever.   Respiratory: Negative for cough, shortness of breath and wheezing.    Cardiovascular: Negative for chest pain, palpitations and leg swelling.   Gastrointestinal: Negative for abdominal pain, diarrhea, nausea and vomiting.   Musculoskeletal: Positive for neck pain. Negative for back pain.        Left ankle pain right hip pain and right shoulder pain   Skin: Negative for rash.   Neurological: Negative for weakness.       Physical Exam     Initial Vitals [10/24/19 1644]   BP Pulse Resp  Temp SpO2   (!) 169/80 64 20 97.8 °F (36.6 °C) (!) 94 %      MAP       --         Physical Exam    Constitutional: He appears well-developed and well-nourished.   HENT:   Head: Normocephalic and atraumatic.   Mouth/Throat: Oropharynx is clear and moist.   Eyes: Conjunctivae are normal. Pupils are equal, round, and reactive to light.   Neck: Normal range of motion. Neck supple.   Mild pain with any movement.  Neg for bony tenderness    Cardiovascular: Normal rate.   Pulmonary/Chest: Breath sounds normal.   Abdominal: Soft. Bowel sounds are normal.   Musculoskeletal: Normal range of motion.   Left ankle swelling.  FROM all extremities.     Neurological: He is alert and oriented to person, place, and time. No sensory deficit. GCS score is 15. GCS eye subscore is 4. GCS verbal subscore is 5. GCS motor subscore is 6.   Skin: Skin is warm. Capillary refill takes less than 2 seconds.   Abrasion right scapular region   Psychiatric: He has a normal mood and affect.         ED Course   Splint Application  Date/Time: 10/24/2019 7:22 PM  Performed by: Aretha Watt NP  Authorized by: Aretha Watt NP   Location details: left ankle  Supplies used: elastic bandage  Post-procedure: The splinted body part was neurovascularly unchanged following the procedure.  Patient tolerance: Patient tolerated the procedure well with no immediate complications  Comments: Right shoulder immobilizer applied        Labs Reviewed - No data to display       Imaging Results    None                               Clinical Impression:       ICD-10-CM ICD-9-CM   1. Injury of left ankle, initial encounter S99.912A 959.7   2. Pain R52 780.96   3. Trauma T14.90XA 959.9   4. Hip pain, acute, right M25.551 719.45   5. Acute pain of right shoulder M25.511 719.41   6. Injury of head, initial encounter S09.90XA 959.01   7. Strain of neck muscle, initial encounter S16.1XXA 847.0   8. Abrasion T14.8XXA 919.0                                Aretha RUBY  HECTOR Watt  10/24/19 1927

## 2019-10-28 RX ORDER — LABETALOL 300 MG/1
TABLET, FILM COATED ORAL
COMMUNITY
End: 2020-02-07 | Stop reason: ALTCHOICE

## 2019-10-28 RX ORDER — HYDROXYZINE HYDROCHLORIDE 25 MG/1
TABLET, FILM COATED ORAL
Status: ON HOLD | COMMUNITY
End: 2021-06-22 | Stop reason: HOSPADM

## 2019-10-28 RX ORDER — TRAMADOL HYDROCHLORIDE 50 MG/1
TABLET ORAL
COMMUNITY
End: 2020-04-22

## 2019-10-28 RX ORDER — VALSARTAN AND HYDROCHLOROTHIAZIDE 320; 25 MG/1; MG/1
TABLET, FILM COATED ORAL
COMMUNITY
End: 2020-09-30

## 2019-10-28 RX ORDER — OLMESARTAN MEDOXOMIL AND HYDROCHLOROTHIAZIDE 20/12.5 20; 12.5 MG/1; MG/1
TABLET ORAL
COMMUNITY
End: 2020-04-22

## 2019-10-28 RX ORDER — FLUTICASONE PROPIONATE 50 MCG
SPRAY, SUSPENSION (ML) NASAL
COMMUNITY
End: 2020-09-30

## 2019-10-28 RX ORDER — DICLOFENAC SODIUM 10 MG/G
GEL TOPICAL
COMMUNITY
End: 2020-09-30

## 2019-10-28 RX ORDER — OXYCODONE AND ACETAMINOPHEN 10; 325 MG/1; MG/1
1 TABLET ORAL EVERY 6 HOURS PRN
Refills: 0 | COMMUNITY
Start: 2019-09-14 | End: 2020-04-22

## 2019-10-28 RX ORDER — TEMAZEPAM 15 MG/1
CAPSULE ORAL
Refills: 3 | COMMUNITY
Start: 2019-09-17 | End: 2020-04-22

## 2019-10-28 RX ORDER — MELOXICAM 7.5 MG/1
TABLET ORAL
COMMUNITY
End: 2020-04-22

## 2019-10-28 RX ORDER — CLONAZEPAM 0.5 MG/1
TABLET ORAL
COMMUNITY
End: 2020-09-30

## 2019-10-28 RX ORDER — LEVOFLOXACIN 500 MG/1
500 TABLET, FILM COATED ORAL DAILY
Refills: 0 | COMMUNITY
Start: 2019-10-18 | End: 2020-04-22

## 2019-10-28 RX ORDER — FENOFIBRATE 150 MG/1
CAPSULE ORAL
COMMUNITY
End: 2020-10-15 | Stop reason: ALTCHOICE

## 2019-10-29 ENCOUNTER — OFFICE VISIT (OUTPATIENT)
Dept: PODIATRY | Facility: CLINIC | Age: 69
End: 2019-10-29
Payer: MEDICARE

## 2019-10-29 VITALS
WEIGHT: 229.5 LBS | BODY MASS INDEX: 36.88 KG/M2 | HEIGHT: 66 IN | SYSTOLIC BLOOD PRESSURE: 126 MMHG | DIASTOLIC BLOOD PRESSURE: 69 MMHG | HEART RATE: 71 BPM

## 2019-10-29 DIAGNOSIS — M79.672 FOOT PAIN, LEFT: ICD-10-CM

## 2019-10-29 DIAGNOSIS — M25.572 LEFT ANKLE PAIN, UNSPECIFIED CHRONICITY: ICD-10-CM

## 2019-10-29 PROCEDURE — 99213 OFFICE O/P EST LOW 20 MIN: CPT | Performed by: PODIATRIST

## 2019-10-29 PROCEDURE — 99213 PR OFFICE/OUTPT VISIT, EST, LEVL III, 20-29 MIN: ICD-10-PCS | Mod: S$PBB,,, | Performed by: PODIATRIST

## 2019-10-29 PROCEDURE — 99213 OFFICE O/P EST LOW 20 MIN: CPT | Mod: S$PBB,,, | Performed by: PODIATRIST

## 2019-10-29 NOTE — PROGRESS NOTES
[address] [facility] [address]  [place] [address]  Phone: [phone]   [phone]    Patient's PCP:Rob Bui MD  Referring Provider: No ref. provider found    Subjective:      Chief Complaint:: Ankle Injury (left) and Numbness (left toes)    HPI  Chris Hill Jr is a 69 y.o. male who presents with a complaint of left ankle pain/injury lasting since Thursday. Onset of the symptoms was pt was walking down bleachers and missed the last step and rolled left ankle.  Current symptoms include swelling.  Aggravating factors are prolonged walking/standing. Treatment to date have included ice, went to ER and they took x-rays and Rx pain pills. Patients rates pain 9/10 on pain scale at its worse.    Also c/o numbness in left foot top by toes and the injury to left ankle made matters worse.    Systemic Doctor: Rob Bui MD  Date Last Seen: 10/16/19  Blood Sugar: 120  Hemoglobin A1c: 6.0 (10/16/19)    Vitals:    10/29/19 0901   BP: 126/69   Pulse: 71     Shoe Size:     Past Surgical History:   Procedure Laterality Date    CARDIAC CATHETERIZATION      EYE SURGERY      cataracts bilateral    TRANSRECTAL ULTRASOUND OF PROSTATE WITH INSERTION OF GOLD FIDUCIAL MARKER N/A 10/4/2018    Procedure: ULTRASOUND, PROSTATE, TRANSPERINEAL APPROACH, WITH GOLD FIDUCIAL MARKER INSERTION (with SPACEOAR transperineal biodegradable gel placement);  Surgeon: Manpreet Gordon MD;  Location: [facility];  Service: Urology;  Laterality: N/A;     Past Medical History:   Diagnosis Date    Anemia, chronic disease 12/28/2018    Coronary artery disease     mild    Diabetes mellitus     Heart murmur     Hyperlipidemia     Hypertension     Normocytic normochromic anemia 12/28/2018    Prostate cancer 8/20/2018    Sleep apnea     NOT USING [facility]    Thrombocytopenia 12/28/2018    Valvular regurgitation      Family History   Problem Relation Age of Onset    Heart disease Mother     Heart disease Father         Social History:    Marital Status:   Alcohol History:  reports that he does not drink alcohol.  Tobacco History:  reports that he has never smoked. He has never used smokeless tobacco.  Drug History:  reports that he does not use drugs.    Review of patient's allergies indicates:  No Known Allergies    Current Outpatient Medications   Medication Sig Dispense Refill    amitriptyline (ELAVIL) 50 MG tablet Take 50 mg by mouth every evening.      amLODIPine (NORVASC) 5 MG tablet TAKE 1 TABLET BY MOUTH ONCE DAILY 90 tablet 1    amoxicillin-clavulanate 500-125mg (AUGMENTIN) 500-125 mg Tab Take 1 tablet by mouth 2 (two) times daily.  0    aspirin (ECOTRIN) 81 MG EC tablet Take 81 mg by mouth once daily.      carvedilol (COREG) 12.5 MG tablet TAKE 1 TABLET BY MOUTH TWICE DAILY WITH MEALS 60 tablet 6    clonazePAM (KLONOPIN) 0.5 MG tablet clonazepam 0.5 mg tablet      co-enzyme Q-10 30 mg capsule Take 30 mg by mouth 3 (three) times daily.      diclofenac sodium (VOLTAREN) 1 % Gel Voltaren 1 % topical gel      fenofibrate 150 mg Cap fenofibrate 150 mg capsule      fish oil-omega-3 fatty acids 300-1,000 mg capsule Take 1 capsule by mouth 2 (two) times daily.      fluticasone propionate (FLONASE) 50 mcg/actuation nasal spray fluticasone propionate 50 mcg/actuation nasal spray,suspension      gabapentin (NEURONTIN) 300 MG capsule TAKE 1 CAPSULE BY MOUTH THREE TIMES DAILY BEGIN  WITH  NIGHTLY  DOSE  TO  ASSESS  BENEFITS  BEFORE  STARTING  THREE  TIMES  A  DAY 30 capsule 0    hydrALAZINE (APRESOLINE) 100 MG tablet TAKE 1 TABLET BY MOUTH TWICE DAILY 180 tablet 1    hydroCHLOROthiazide (HYDRODIURIL) 25 MG tablet Take 1 tablet (25 mg total) by mouth once daily. 90 tablet 1    HYDROcodone-acetaminophen (NORCO) 7.5-325 mg per tablet Take 1 tablet by mouth every 4 (four) hours as needed for Pain. 18 tablet 0    hydrOXYzine HCl (ATARAX) 25 MG tablet hydroxyzine HCl 25 mg tablet      irbesartan (AVAPRO) 300 MG tablet Take 300 mg by  mouth every evening.      labetalol (NORMODYNE) 300 MG tablet labetalol 300 mg tablet      levoFLOXacin (LEVAQUIN) 500 MG tablet Take 500 mg by mouth once daily.  0    meloxicam (MOBIC) 7.5 MG tablet meloxicam 7.5 mg tablet      metFORMIN (GLUCOPHAGE-XR) 500 MG 24 hr tablet Take 500 mg by mouth 2 (two) times daily with meals.       mupirocin (BACTROBAN) 2 % ointment APPLY A SMALL AMOUNT TOPICALLY THREE TIMES DAILY  4    olmesartan-hydrochlorothiazide (BENICAR HCT) 20-12.5 mg per tablet Benicar HCT 20 mg-12.5 mg tablet      omeprazole (PRILOSEC) 40 MG capsule Take 40 mg by mouth once daily.      ONEUCH VERIO Strp USE 1 STRIP TO CHECK GLUCOSE TWICE DAILY  1    oxybutynin (DITROPAN) 5 MG Tab Take 5 mg by mouth 2 (two) times daily.  11    oxyCODONE-acetaminophen (PERCOCET)  mg per tablet Take 1 tablet by mouth every 6 (six) hours as needed.  0    pantoprazole (PROTONIX) 40 MG tablet Take 40 mg by mouth once daily.  1    potassium chloride (KLOR-CON) 20 mEq Pack Take 20 mEq by mouth 2 (two) times daily.       rosuvastatin (CRESTOR) 10 MG tablet TAKE 1 TABLET BY MOUTH ONCE DAILY 90 tablet 1    tamsulosin (FLOMAX) 0.4 mg Cap Take 1 capsule (0.4 mg total) by mouth every evening. 30 capsule 11    temazepam (RESTORIL) 15 mg Cap TAKE 1 CAPSULE BY MOUTH ONCE DAILY AT BEDTIME  3    terazosin (HYTRIN) 10 MG capsule TAKE 1 CAPSULE BY MOUTH ONCE DAILY IN THE EVENING 30 capsule 6    traMADol (ULTRAM) 50 mg tablet tramadol 50 mg tablet      valsartan-hydrochlorothiazide (DIOVAN-HCT) 320-25 mg per tablet valsartan 320 mg-hydrochlorothiazide 25 mg tablet       No current facility-administered medications for this visit.        Review of Systems      Objective:        Physical Exam:   Foot Exam    General  General Appearance: appears stated age and healthy   Orientation: alert and oriented to person, place, and time   Affect: appropriate   Gait: antalgic       Left Foot/Ankle      Inspection and Palpation  Left  foot ecchymosis: Medial lateral and anterior ankle left.  Medial lateral subtalar joint.  Tenderness: (Anterior anterior lateral and lateral ankle and posterior by peroneal tendons.  Pain by subtalar joint lateral and medial)  Swelling: (Swelling throughout the ankle and lateral subtalar joint and along the course of the peroneal tendons.)  Hammertoes: second toe, third toe, fourth toe and fifth toe  Skin Exam: skin intact;     Neurovascular  Dorsalis pedis: 2+  Posterior tibial: 2+  Saphenous nerve sensation: normal  Tibial nerve sensation: normal  Superficial peroneal nerve sensation: normal  Deep peroneal nerve sensation: normal  Sural nerve sensation: normal          Physical Exam   Cardiovascular:   Pulses:       Dorsalis pedis pulses are 2+ on the left side.        Posterior tibial pulses are 2+ on the left side.   Musculoskeletal:        Feet:        Imaging:   AP, lateral, lateral oblique x-rays of left ankle:  No acute fractures, no bone tumors, no soft tissue masses. No dislocation or malalignment of the joint. Questionable widening of the inferior tibial fibular syndesmosis.  Degenerative joint disease of subtalar joint.         Assessment:       No diagnosis found.  Plan:   There are no diagnoses linked to this encounter.  No follow-ups on file.  I am placing the patient in a Cam Walker boot cast with an Ace wrap and also a Spenco cross  with felt accommodation early pressure on 2nd 3rd and 4th metatarsal heads.  He has ice the area daily.  No nonsteroidal anti-inflammatories because of previous heart disease.  He is return to see me in 4 weeks for further evaluation.  There is a possibility with previous history of damaged ankle and he has continued pain swelling and instability that will need order an MRI of the ankle.  Possible cortisone injection the 3rd intermetatarsal space of no relief to see if he has a neuroma that is causing his pain.  Procedures - None    Counseling:   I discussed ankle  sprain with pt and the different grades/severity of sprain and different ligaments that can be torn.  I also discussed that most ankle sprains are treated conservatively and the pt does well.  Occasionally some sprains do not heal normally and there is insatbility of the joint leading to pain and possible arthritis.  these are usually evaluated by MRI, stress test.  I counseled patient on causes and treatments for neuromas. Wearing tight or high-heeled shoes can cause a neuroma. Shoes that are too narrow or too pointed squeeze the bones in the ball of the foot. Shoes with high heels put extra pressure on the ends of the bones. When the bones are squeezed together, they pinch the nerve that runs between them. Taking off your shoes and rubbing the ball of your foot may decrease or relieve the pain. Recommend shoes with more room in the toe box. Sometimes steroid injection is necessary to alleviate symptoms. Discussed alcohol sclerosing injections as another option for conservative treatment.   I discussed conservative treatment of minor tendon tear with cast for 6 to 8 weeks, MRI, ultrasound if needed for evaluation of the rupture and possible need for surgical repair.  I provided patient education verbally regarding:   Patient diagnosis, treatment options, as well as alternatives, risks, and benefits.     This note was created using Dragon voice recognition software that occasionally misinterpreted phrases or words.

## 2019-11-04 ENCOUNTER — OFFICE VISIT (OUTPATIENT)
Dept: ORTHOPEDICS | Facility: CLINIC | Age: 69
End: 2019-11-04
Payer: MEDICARE

## 2019-11-04 VITALS
HEIGHT: 66 IN | SYSTOLIC BLOOD PRESSURE: 138 MMHG | DIASTOLIC BLOOD PRESSURE: 71 MMHG | BODY MASS INDEX: 35.36 KG/M2 | WEIGHT: 220 LBS | OXYGEN SATURATION: 95 % | HEART RATE: 66 BPM

## 2019-11-04 DIAGNOSIS — M75.41 IMPINGEMENT SYNDROME OF RIGHT SHOULDER: Primary | ICD-10-CM

## 2019-11-04 DIAGNOSIS — Z98.890 HISTORY OF REPAIR OF RIGHT ROTATOR CUFF: ICD-10-CM

## 2019-11-04 DIAGNOSIS — M65.332 TRIGGER MIDDLE FINGER OF LEFT HAND: ICD-10-CM

## 2019-11-04 PROCEDURE — 99213 OFFICE O/P EST LOW 20 MIN: CPT | Mod: 25,S$GLB,, | Performed by: ORTHOPAEDIC SURGERY

## 2019-11-04 PROCEDURE — 20550 NJX 1 TENDON SHEATH/LIGAMENT: CPT | Mod: LT,S$GLB,, | Performed by: ORTHOPAEDIC SURGERY

## 2019-11-04 PROCEDURE — 99213 PR OFFICE/OUTPT VISIT, EST, LEVL III, 20-29 MIN: ICD-10-PCS | Mod: 25,S$GLB,, | Performed by: ORTHOPAEDIC SURGERY

## 2019-11-04 PROCEDURE — 20550 TENDON SHEATH: L LONG MCP: ICD-10-PCS | Mod: LT,S$GLB,, | Performed by: ORTHOPAEDIC SURGERY

## 2019-11-04 RX ORDER — METHYLPREDNISOLONE ACETATE 40 MG/ML
40 INJECTION, SUSPENSION INTRA-ARTICULAR; INTRALESIONAL; INTRAMUSCULAR; SOFT TISSUE
Status: DISCONTINUED | OUTPATIENT
Start: 2019-11-04 | End: 2019-11-04 | Stop reason: HOSPADM

## 2019-11-04 RX ADMIN — METHYLPREDNISOLONE ACETATE 40 MG: 40 INJECTION, SUSPENSION INTRA-ARTICULAR; INTRALESIONAL; INTRAMUSCULAR; SOFT TISSUE at 02:11

## 2019-11-04 NOTE — PROCEDURES
Tendon Sheath: L long MCP  Date/Time: 11/4/2019 2:45 PM  Performed by: Mitchell Sanders Jr., MD  Authorized by: Mitchell Sanders Jr., MD     Consent Done?:  Yes (Verbal)  Timeout: prior to procedure the correct patient, procedure, and site was verified    Indications:  Pain  Site marked: the procedure site was marked    Timeout: prior to procedure the correct patient, procedure, and site was verified    Location:  Long finger  Site:  L long MCP  Prep: patient was prepped and draped in usual sterile fashion    Ultrasonic guidance for needle placement?: No    Needle size:  25 G  Medications:  40 mg methylPREDNISolone acetate 40 mg/mL  Patient tolerance:  Patient tolerated the procedure well with no immediate complications

## 2019-11-04 NOTE — PROGRESS NOTES
[facility]    Subjective:     Chief Complaint:   Chief Complaint   Patient presents with    Right Shoulder - Injury     Pt fell in gym on 10/24/19 while walking down bleachers, fell on floor and hit right shoulde, pain is a 5-6/10 on a pain scale, tx: [facility] told pt suture and screw came loose.         Past Medical History:   Diagnosis Date    Anemia, chronic disease 12/28/2018    Coronary artery disease     mild    Diabetes mellitus     Heart murmur     Hyperlipidemia     Hypertension     Normocytic normochromic anemia 12/28/2018    Prostate cancer 8/20/2018    Sleep apnea     NOT USING [facility]    Thrombocytopenia 12/28/2018    Valvular regurgitation        Past Surgical History:   Procedure Laterality Date    CARDIAC CATHETERIZATION      EYE SURGERY      cataracts bilateral    TRANSRECTAL ULTRASOUND OF PROSTATE WITH INSERTION OF GOLD FIDUCIAL MARKER N/A 10/4/2018    Procedure: ULTRASOUND, PROSTATE, TRANSPERINEAL APPROACH, WITH GOLD FIDUCIAL MARKER INSERTION (with SPACEOAR transperineal biodegradable gel placement);  Surgeon: Manpreet Gordon MD;  [facility]  Service: Urology;  Laterality: N/A;       Current Outpatient Medications   Medication Sig    amitriptyline (ELAVIL) 50 MG tablet Take 50 mg by mouth every evening.    amLODIPine (NORVASC) 5 MG tablet TAKE 1 TABLET BY MOUTH ONCE DAILY    amoxicillin-clavulanate 500-125mg (AUGMENTIN) 500-125 mg Tab Take 1 tablet by mouth 2 (two) times daily.    aspirin (ECOTRIN) 81 MG EC tablet Take 81 mg by mouth once daily.    carvedilol (COREG) 12.5 MG tablet TAKE 1 TABLET BY MOUTH TWICE DAILY WITH MEALS    clonazePAM (KLONOPIN) 0.5 MG tablet clonazepam 0.5 mg tablet    co-enzyme Q-10 30 mg capsule Take 30 mg by mouth 3 (three) times daily.    diclofenac sodium (VOLTAREN) 1 % Gel Voltaren 1 % topical gel    fenofibrate 150 mg Cap fenofibrate 150 mg capsule    fish oil-omega-3 fatty acids 300-1,000 mg capsule Take 1 capsule by mouth 2  (two) times daily.    fluticasone propionate (FLONASE) 50 mcg/actuation nasal spray fluticasone propionate 50 mcg/actuation nasal spray,suspension    gabapentin (NEURONTIN) 300 MG capsule TAKE 1 CAPSULE BY MOUTH THREE TIMES DAILY BEGIN  WITH  NIGHTLY  DOSE  TO  ASSESS  BENEFITS  BEFORE  STARTING  THREE  TIMES  A  DAY    hydrALAZINE (APRESOLINE) 100 MG tablet TAKE 1 TABLET BY MOUTH TWICE DAILY    hydroCHLOROthiazide (HYDRODIURIL) 25 MG tablet Take 1 tablet (25 mg total) by mouth once daily.    HYDROcodone-acetaminophen (NORCO) 7.5-325 mg per tablet Take 1 tablet by mouth every 4 (four) hours as needed for Pain.    hydrOXYzine HCl (ATARAX) 25 MG tablet hydroxyzine HCl 25 mg tablet    irbesartan (AVAPRO) 300 MG tablet Take 300 mg by mouth every evening.    labetalol (NORMODYNE) 300 MG tablet labetalol 300 mg tablet    levoFLOXacin (LEVAQUIN) 500 MG tablet Take 500 mg by mouth once daily.    meloxicam (MOBIC) 7.5 MG tablet meloxicam 7.5 mg tablet    metFORMIN (GLUCOPHAGE-XR) 500 MG 24 hr tablet Take 500 mg by mouth 2 (two) times daily with meals.     mupirocin (BACTROBAN) 2 % ointment APPLY A SMALL AMOUNT TOPICALLY THREE TIMES DAILY    olmesartan-hydrochlorothiazide (BENICAR HCT) 20-12.5 mg per tablet Benicar HCT 20 mg-12.5 mg tablet    omeprazole (PRILOSEC) 40 MG capsule Take 40 mg by mouth once daily.    [facility] USE 1 STRIP TO CHECK GLUCOSE TWICE DAILY    oxybutynin (DITROPAN) 5 MG Tab Take 5 mg by mouth 2 (two) times daily.    oxyCODONE-acetaminophen (PERCOCET)  mg per tablet Take 1 tablet by mouth every 6 (six) hours as needed.    pantoprazole (PROTONIX) 40 MG tablet Take 40 mg by mouth once daily.    potassium chloride (KLOR-CON) 20 mEq Pack Take 20 mEq by mouth 2 (two) times daily.     rosuvastatin (CRESTOR) 10 MG tablet TAKE 1 TABLET BY MOUTH ONCE DAILY    tamsulosin (FLOMAX) 0.4 mg Cap Take 1 capsule (0.4 mg total) by mouth every evening.    temazepam (RESTORIL) 15 mg Cap  TAKE 1 CAPSULE BY MOUTH ONCE DAILY AT BEDTIME    terazosin (HYTRIN) 10 MG capsule TAKE 1 CAPSULE BY MOUTH ONCE DAILY IN THE EVENING    traMADol (ULTRAM) 50 mg tablet tramadol 50 mg tablet    valsartan-hydrochlorothiazide (DIOVAN-HCT) 320-25 mg per tablet valsartan 320 mg-hydrochlorothiazide 25 mg tablet     No current facility-administered medications for this visit.        Review of patient's allergies indicates:  No Known Allergies    Family History   Problem Relation Age of Onset    Heart disease Mother     Heart disease Father        Social History     Socioeconomic History    Marital status:      Spouse name: Not on file    Number of children: Not on file    Years of education: Not on file    Highest education level: Not on file   Occupational History    Not on file   Social Needs    Financial resource strain: Not on file    Food insecurity:     Worry: Not on file     Inability: Not on file    Transportation needs:     Medical: Not on file     Non-medical: Not on file   Tobacco Use    Smoking status: Never Smoker    Smokeless tobacco: Never Used   Substance and Sexual Activity    Alcohol use: No    Drug use: No    Sexual activity: Not on file   Lifestyle    Physical activity:     Days per week: Not on file     Minutes per session: Not on file    Stress: Not on file   Relationships    Social connections:     Talks on phone: Not on file     Gets together: Not on file     Attends Congregational service: Not on file     Active member of club or organization: Not on file     Attends meetings of clubs or organizations: Not on file     Relationship status: Not on file   Other Topics Concern    Not on file   Social History Narrative    Not on file       History of present illness he fell head injury injured his left foot which is being followed by Dr. Somers and his right shoulder.  Went the emergency room. He had the prior rotator cuff surgery right shoulder x2.  The emergency room was  concerned about him tearing of his right shoulder.    Review of Systems:    Constitution: Negative for chills, fever, and sweats.  Negative for unexplained weight loss.    HENT:  Negative for headaches and blurry vision.    Cardiovascular:Negative for chest pain or irregular heart beat. Negative for hypertension.    Respiratory:  Negative for cough and shortness of breath.    Gastrointestinal: Negative for abdominal pain, heartburn, melena, nausea, and vomitting.    Genitourinary:  Negative bladder incontinence and dysuria.    Musculoskeletal:  See HPI for details.     Neurological: Negative for numbness.    Psychiatric/Behavioral: Negative for depression.  The patient is not nervous/anxious.      Endocrine: Negative for polyuria    Hematologic/Lymphatic: Negative for bleeding problem.  Does not bruise/bleed easily.    Skin: Negative for poor would healing and rash    Objective:      Physical Examination:    Vital Signs:    Vitals:    11/04/19 1443   BP: 138/71   Pulse: 66       Body mass index is 35.51 kg/m².    This a well-developed, well nourished patient in no acute distress.  They are alert and oriented and cooperative to examination.        Right shoulder has mild click with a rotator cuff maneuvers.  Has good forward flexion has good abduction strength mild pain with of rotator cuff arc.  Pertinent New Results:    XRAY Report / Interpretation:       Assessment/Plan:      Referring emergency room which shows multiple metal anchors in the humeral head. On the lateral 1 of these anchors appears to be anterior but adjacent to the humeral head.  There is minimal superior migration of the humeral head. When compared to 1 of our prior x-rays does metal anchors are exactly the same.  There has been no change in the metal anchors and indeed 1 of the anchors was slightly anterior at the head on a prior x-ray.  So I do not think that he has any change in the hardware in his right shoulder he may however have some  rotator cuff tearing of the right shoulder which is not massive.  The biggest issue the current has is foot so he is going to in a boot for the left foot he needs to get that treated it is okay to use the shoulder he does not do any overhead work we will see if his right shoulder heals up adequately or not if not an MRI to look at the rotator cuff.  He also happens to have at trigger finger left long.  He has had multiple other trigger fingers operated on so reinjecting left long finger today 1 cc Depo-Medrol and 2 cc of lidocaine for trigger finger left long.  So he will call us back when he is finished with the foot see if he needs further follow-up on either trigger finger or the right shoulder so I think he has quite possibly some rotator cuff damage to his right shoulder which may be new or old from this fall      This note was created using Dragon voice recognition software that occasionally misinterpreted phrases or words.

## 2019-11-11 ENCOUNTER — LAB VISIT (OUTPATIENT)
Dept: LAB | Facility: HOSPITAL | Age: 69
End: 2019-11-11
Attending: UROLOGY
Payer: MEDICARE

## 2019-11-11 DIAGNOSIS — C61 PROSTATE CANCER: ICD-10-CM

## 2019-11-11 LAB
COMPLEXED PSA SERPL-MCNC: <0.01 NG/ML (ref 0–4)
TESTOST SERPL-MCNC: 7 NG/DL (ref 304–1227)

## 2019-11-11 PROCEDURE — 84403 ASSAY OF TOTAL TESTOSTERONE: CPT

## 2019-11-11 PROCEDURE — 84153 ASSAY OF PSA TOTAL: CPT

## 2019-11-15 NOTE — PROGRESS NOTES
[facility] Urology Progress Note     Chris Hill Jr is a 69 y.o. male who presents for prostate cancer follow up and ADT renewal     Hx of Nayely 4 + 5 equals 9 prostate cancer (cT1c, iPSA 13.2) treated with androgen deprivation therapy external beam radiation.    He presented 10/4/18 for fiducial marker placement as well as SpaceOAR placement. Vol 33.4g at time of markers. At time of SpaceOar he did note daily diarrhea for weeks prior.  8/13/18 eligard 22.5 3 mos depot; 11/13/18 trelstar 22.5mg 6 mos depot.  - 2 weeks prior did interrupt his XRT for lumbar back surgery as his chronic back pain was limiting his ability to lay flat on radiation table. Only missed 2d of therapy.  He noted at that time his diarrhea had persisted. Using prn immodium    Completed XRT 12/18/18. PSA 0.1 on 12/13/18  Has followed up with Dr Gibson for chronic ITP and anemia, stable.  Has been followed by GI. Dr Tavares, with EGD 12/5/18 and colonoscopy 2/11/19 with a few cecal polyps removed and diverticulosis (repeat 3 yrs)     Dr Santillan on 1/9/19: Patient reports fatigue with energy level at 30%. Of note he is undergoing workup for low blood counts with Dr. Gibson.  He reports frequency, urgency, nocturia of urine are resolving however he continues with urgency of stool. He reports frequency has improved with Imodium. AUA SS 17/5 from 10/2     2/12/19: 2/6/19 PSA 0.02, T <4, Cr 0.8, AUA symptom score:  23/5 unhappy (5:  Intermittency, urgency; 4:  Emptying, frequency; 3:  Weak stream; 2:  Sleeping)  He did start oxybutynin 5 mg b.i.d. which has significantly helped decrease the hot flashes.  He still gets some though they are not as intense as they were before  Since starting it, and further away from radiation, his urgency is somewhat better.  He does have daytime frequency of 8-10 times at nighttime frequency of 1-2 times.  Having both urinary urgency and fecal urgency.  His diarrhea is somewhat better.  GI evaluation has been  "overall unrevealing of a etiology of chronic diarrhea.  He does report urinary intermittency but no urinary hesitancy. He did have back surgery after 22 radiation treatments, though he does note that his fecal and urinary urgency predated his back surgery. On Hytrin 10 mg in morning, so started Flomax 0.4 mg in the evening to have increased dose of alpha blocker.     19: improved voiding taking Hytrin in the morning and Flomax in the evening. His diarrhea has improved. Hot flashes went away a bit and well controlled with Ditropan b.i.d.  AUA symptom score:  9/, please (3:  Emptying, urgency; 1:  Intermittency, weak stream, sleeping). 19: PSA  0.02 and T 10.  ADT renewed with Lupron 45mg    He returns today notin19: psa undetectable <0.01, T 7  Last saw Dr Gibson 10/14/19 noting mild stable anemia and stable low platelet count with evidence of chronic ITP  Urinary symptoms were stable but urgency has progresses  AUA symptom score:  15/3, mixed (5:  Urgency; 3:  Emptying, weak stream; 2:  Intermittency; 1:  Frequency, sleeping).  Medications helped 5/10  Currently taking Hytrin a.m., Flomax p.m., Ditropan 5 mg b.i.d.   Still has mild PV dribble and has minimal UUI  Alternates constipation/diarrhea - may have worse urinary symptoms when constipated  Lots of water during day - rare soda.  DTF 4-5x. 2/5 are urgent.  Had another back surgery 2 months ago  Urgency may be worse since then? hasnt considered  PVR by bladder scan 0cc.    ROS: A comprehensive 10 system review was performed and is negative except as noted above in HPI    PHYSICAL EXAM:    Vitals:    19 0823   Resp: 18     Body mass index is 34.87 kg/m². Weight: 98 kg (216 lb 0.8 oz) Height: 5' 6" (167.6 cm)       General: Alert, cooperative, no distress, appears stated age   Head: Normocephalic, without obvious abnormality, atraumatic   Eyes: PERRL, conjunctiva/corneas clear   Lungs: Respirations unlabored   Heart: Warm and well " perfused   Abdomen: soft NT ND  Extremities: Extremities normal, atraumatic, no cyanosis or edema   Skin: Skin color, texture, turgor normal, no rashes or lesions   Psych: Appropriate   Neurologic: Non-focal       Recent Results (from the past 336 hour(s))   Prostate Specific Antigen, Diagnostic    Collection Time: 11/11/19  7:27 AM   Result Value Ref Range    PSA DIAGNOSTIC <0.01 0.00 - 4.00 ng/mL   Testosterone    Collection Time: 11/11/19  7:27 AM   Result Value Ref Range    Testosterone, Total 7 (L) 304 - 1227 ng/dL   POTASSIUM    Collection Time: 11/11/19  7:28 AM   Result Value Ref Range    Potassium 3.0 (L) 3.5 - 5.1 mmol/L   POCT URINE DIPSTICK WITHOUT MICROSCOPE    Collection Time: 11/19/19  8:35 AM   Result Value Ref Range    Color, UA yellow     Spec Grav UA 1.025     pH, UA 6     WBC, UA neg     Nitrite, UA neg     Protein trace     Glucose, UA neg     Ketones, UA trace     Urobilinogen, UA 0.2     Bilirubin small     Blood, UA neg        ASSESSMENT   1. Prostate cancer  POCT URINE DIPSTICK WITHOUT MICROSCOPE    POCT Bladder Scan    Prostate Specific Antigen, Diagnostic    Testosterone    leuprolide (6 month) injection 45 mg       Plan    He has had some symptomatic progression of his lower urinary tract symptoms including progression of his urgency.  We did discuss the implications of radiation and radiation changes in the urinary tract which may potentiate urgency but also the implications of longstanding obstruction on worsening urgency.  Reviewed conservative recommendations for urgency and frequency.  We did discuss cystoscopic evaluation for significant obstruction which may need further management or significant changes of radiation to look for anatomic lower urinary tract considerations on contributions to his symptoms.  At this time he would like to continue his current medical regimen and will consider future cystoscopy.  He does also have chronic back and spine problems with multiple back  surgeries, including a recent 1 in the interim since last visit which may have potentiate and worsening urgency.  May need to consider increasing anticholinergic dose.   He is otherwise doing well at this time.  His PSA is now undetectable. Will continue to follow Q6 months.    ADT renewed today:  I prepared, mixed, and injected Lupron 45 mg, given as an intramuscular injection to the left gluteus muscle.  He tolerated the injection well.  RTC 6 months for further follow-up with PSA and testosterone just prior, and will renew ADT at that time, which will be his last injection

## 2019-11-18 ENCOUNTER — OFFICE VISIT (OUTPATIENT)
Dept: PODIATRY | Facility: CLINIC | Age: 69
End: 2019-11-18
Payer: MEDICARE

## 2019-11-18 VITALS
HEART RATE: 82 BPM | HEIGHT: 66 IN | SYSTOLIC BLOOD PRESSURE: 138 MMHG | BODY MASS INDEX: 35.36 KG/M2 | DIASTOLIC BLOOD PRESSURE: 76 MMHG | WEIGHT: 220 LBS

## 2019-11-18 DIAGNOSIS — M25.572 ACUTE LEFT ANKLE PAIN: ICD-10-CM

## 2019-11-18 PROCEDURE — 99213 OFFICE O/P EST LOW 20 MIN: CPT | Performed by: PODIATRIST

## 2019-11-18 PROCEDURE — 99213 OFFICE O/P EST LOW 20 MIN: CPT | Mod: S$PBB,,, | Performed by: PODIATRIST

## 2019-11-18 PROCEDURE — 99213 PR OFFICE/OUTPT VISIT, EST, LEVL III, 20-29 MIN: ICD-10-PCS | Mod: S$PBB,,, | Performed by: PODIATRIST

## 2019-11-18 RX ORDER — VALACYCLOVIR HYDROCHLORIDE 1 G/1
TABLET, FILM COATED ORAL
Refills: 5 | COMMUNITY
Start: 2019-11-12 | End: 2020-09-30

## 2019-11-18 RX ORDER — DOXYCYCLINE HYCLATE 100 MG
TABLET ORAL
Refills: 0 | COMMUNITY
Start: 2019-11-12 | End: 2019-12-17

## 2019-11-18 NOTE — PROGRESS NOTES
[address] [facility] [address]  [place] [address]  Phone: [phone]   [phone]    Patient's PCP:Rob Bui MD  Referring Provider: No ref. provider found    Subjective:      Chief Complaint:: Follow-up (left ankle sprain)    HPI  Chris Hill Jr is a 69 y.o. male who presents with a follow up ankle sprain left ankle. Still in boot cast. Pain scale 1/10. At it's worst would be a 3. Still some numbness in my toes.    Systemic Doctor: Rob Bui MD  Blood sugar; 126  Date last sen : 10-16  Hemoglobin A 1 :6  There were no vitals filed for this visit.  Shoe Size:     Past Surgical History:   Procedure Laterality Date    CARDIAC CATHETERIZATION      EYE SURGERY      cataracts bilateral    TRANSRECTAL ULTRASOUND OF PROSTATE WITH INSERTION OF GOLD FIDUCIAL MARKER N/A 10/4/2018    Procedure: ULTRASOUND, PROSTATE, TRANSPERINEAL APPROACH, WITH GOLD FIDUCIAL MARKER INSERTION (with SPACEOAR transperineal biodegradable gel placement);  Surgeon: Manpreet Gordon MD;  [facility];  Service: Urology;  Laterality: N/A;     Past Medical History:   Diagnosis Date    Anemia, chronic disease 12/28/2018    Coronary artery disease     mild    Diabetes mellitus     Heart murmur     Hyperlipidemia     Hypertension     Normocytic normochromic anemia 12/28/2018    Prostate cancer 8/20/2018    Sleep apnea     NOT USING [facility]    Thrombocytopenia 12/28/2018    Valvular regurgitation      Family History   Problem Relation Age of Onset    Heart disease Mother     Heart disease Father         Social History:   Marital Status:   Alcohol History:  reports that he does not drink alcohol.  Tobacco History:  reports that he has never smoked. He has never used smokeless tobacco.  Drug History:  reports that he does not use drugs.    Review of patient's allergies indicates:  No Known Allergies    Current Outpatient Medications   Medication Sig Dispense Refill    amitriptyline (ELAVIL) 50 MG tablet  Take 50 mg by mouth every evening.      amLODIPine (NORVASC) 5 MG tablet TAKE 1 TABLET BY MOUTH ONCE DAILY 90 tablet 1    amoxicillin-clavulanate 500-125mg (AUGMENTIN) 500-125 mg Tab Take 1 tablet by mouth 2 (two) times daily.  0    aspirin (ECOTRIN) 81 MG EC tablet Take 81 mg by mouth once daily.      carvedilol (COREG) 12.5 MG tablet TAKE 1 TABLET BY MOUTH TWICE DAILY WITH MEALS 60 tablet 6    clonazePAM (KLONOPIN) 0.5 MG tablet clonazepam 0.5 mg tablet      co-enzyme Q-10 30 mg capsule Take 30 mg by mouth 3 (three) times daily.      diclofenac sodium (VOLTAREN) 1 % Gel Voltaren 1 % topical gel      doxycycline (VIBRA-TABS) 100 MG tablet TAKE 1 TABLET BY MOUTH TWICE DAILY TODAY THEN TAKE ONE TABLET BY MOUTH ONCE DAILY  0    fenofibrate 150 mg Cap fenofibrate 150 mg capsule      fish oil-omega-3 fatty acids 300-1,000 mg capsule Take 1 capsule by mouth 2 (two) times daily.      fluticasone propionate (FLONASE) 50 mcg/actuation nasal spray fluticasone propionate 50 mcg/actuation nasal spray,suspension      gabapentin (NEURONTIN) 300 MG capsule TAKE 1 CAPSULE BY MOUTH THREE TIMES DAILY BEGIN  WITH  NIGHTLY  DOSE  TO  ASSESS  BENEFITS  BEFORE  STARTING  THREE  TIMES  A  DAY 30 capsule 0    hydrALAZINE (APRESOLINE) 100 MG tablet TAKE 1 TABLET BY MOUTH TWICE DAILY 180 tablet 1    hydroCHLOROthiazide (HYDRODIURIL) 25 MG tablet Take 1 tablet (25 mg total) by mouth once daily. 90 tablet 1    HYDROcodone-acetaminophen (NORCO) 7.5-325 mg per tablet Take 1 tablet by mouth every 4 (four) hours as needed for Pain. 18 tablet 0    hydrOXYzine HCl (ATARAX) 25 MG tablet hydroxyzine HCl 25 mg tablet      irbesartan (AVAPRO) 300 MG tablet Take 300 mg by mouth every evening.      labetalol (NORMODYNE) 300 MG tablet labetalol 300 mg tablet      levoFLOXacin (LEVAQUIN) 500 MG tablet Take 500 mg by mouth once daily.  0    meloxicam (MOBIC) 7.5 MG tablet meloxicam 7.5 mg tablet      metFORMIN (GLUCOPHAGE-XR) 500 MG 24  hr tablet Take 500 mg by mouth 2 (two) times daily with meals.       mupirocin (BACTROBAN) 2 % ointment APPLY A SMALL AMOUNT TOPICALLY THREE TIMES DAILY  4    olmesartan-hydrochlorothiazide (BENICAR HCT) 20-12.5 mg per tablet Benicar HCT 20 mg-12.5 mg tablet      omeprazole (PRILOSEC) 40 MG capsule Take 40 mg by mouth once daily.      ONETOUCH VERIO Strp USE 1 STRIP TO CHECK GLUCOSE TWICE DAILY  1    oxybutynin (DITROPAN) 5 MG Tab Take 5 mg by mouth 2 (two) times daily.  11    oxyCODONE-acetaminophen (PERCOCET)  mg per tablet Take 1 tablet by mouth every 6 (six) hours as needed.  0    pantoprazole (PROTONIX) 40 MG tablet Take 40 mg by mouth once daily.  1    potassium chloride (KLOR-CON) 20 mEq Pack Take 20 mEq by mouth 2 (two) times daily.       rosuvastatin (CRESTOR) 10 MG tablet TAKE 1 TABLET BY MOUTH ONCE DAILY 90 tablet 1    tamsulosin (FLOMAX) 0.4 mg Cap Take 1 capsule (0.4 mg total) by mouth every evening. 30 capsule 11    temazepam (RESTORIL) 15 mg Cap TAKE 1 CAPSULE BY MOUTH ONCE DAILY AT BEDTIME  3    terazosin (HYTRIN) 10 MG capsule TAKE 1 CAPSULE BY MOUTH ONCE DAILY IN THE EVENING 30 capsule 6    traMADol (ULTRAM) 50 mg tablet tramadol 50 mg tablet      valACYclovir (VALTREX) 1000 MG tablet TAKE 2 TABLETS BY MOUTH NOW AND 2 TABLETS BY MOUTH EVERY 12 HOURS  5    valsartan-hydrochlorothiazide (DIOVAN-HCT) 320-25 mg per tablet valsartan 320 mg-hydrochlorothiazide 25 mg tablet       No current facility-administered medications for this visit.        Review of Systems      Objective:        Physical Exam:   Foot Exam  Physical Exam   Musculoskeletal:        Feet:        Imaging:            Assessment:       1. Grade 3 ankle sprain, left, initial encounter - Left Foot    2. Acute left ankle pain      Plan:   Grade 3 ankle sprain, left, initial encounter - Left Foot  -     X-Ray Ankle Complete Left    Acute left ankle pain     Patient will transition into running shoe with ankle brace.   Continue work on range of motion and strengthening.  Gradually increase activity levels.  If still having any problems in 4-6 weeks will return to see me for MRI.  No follow-ups on file.    Procedures - None    Counseling:     I provided patient education verbally regarding:   Patient diagnosis, treatment options, as well as alternatives, risks, and benefits.     This note was created using Dragon voice recognition software that occasionally misinterpreted phrases or words.

## 2019-11-19 ENCOUNTER — OFFICE VISIT (OUTPATIENT)
Dept: UROLOGY | Facility: CLINIC | Age: 69
End: 2019-11-19
Payer: MEDICARE

## 2019-11-19 VITALS — RESPIRATION RATE: 18 BRPM | WEIGHT: 216.06 LBS | HEIGHT: 66 IN | BODY MASS INDEX: 34.72 KG/M2

## 2019-11-19 DIAGNOSIS — C61 PROSTATE CANCER: Primary | ICD-10-CM

## 2019-11-19 LAB
BILIRUB SERPL-MCNC: ABNORMAL MG/DL
BLOOD URINE, POC: ABNORMAL
COLOR, POC UA: YELLOW
GLUCOSE UR QL STRIP: ABNORMAL
KETONES UR QL STRIP: ABNORMAL
LEUKOCYTE ESTERASE URINE, POC: ABNORMAL
NITRITE, POC UA: ABNORMAL
PH, POC UA: 6
PROTEIN, POC: ABNORMAL
SPECIFIC GRAVITY, POC UA: 1.02
UROBILINOGEN, POC UA: 0.2

## 2019-11-19 PROCEDURE — 99999 PR PBB SHADOW E&M-EST. PATIENT-LVL III: CPT | Mod: PBBFAC,,, | Performed by: UROLOGY

## 2019-11-19 PROCEDURE — 99213 OFFICE O/P EST LOW 20 MIN: CPT | Mod: S$PBB,25,, | Performed by: UROLOGY

## 2019-11-19 PROCEDURE — 99999 PR PBB SHADOW E&M-EST. PATIENT-LVL III: ICD-10-PCS | Mod: PBBFAC,,, | Performed by: UROLOGY

## 2019-11-19 PROCEDURE — 99213 PR OFFICE/OUTPT VISIT, EST, LEVL III, 20-29 MIN: ICD-10-PCS | Mod: S$PBB,25,, | Performed by: UROLOGY

## 2019-11-19 PROCEDURE — 99213 OFFICE O/P EST LOW 20 MIN: CPT | Mod: PBBFAC,PN | Performed by: UROLOGY

## 2019-11-19 PROCEDURE — 81002 URINALYSIS NONAUTO W/O SCOPE: CPT | Mod: PBBFAC,PN | Performed by: UROLOGY

## 2019-11-19 RX ADMIN — LEUPROLIDE ACETATE 45 MG: KIT at 08:11

## 2019-12-06 RX ORDER — OXYBUTYNIN CHLORIDE 5 MG/1
TABLET ORAL
Qty: 60 TABLET | Refills: 11 | Status: SHIPPED | OUTPATIENT
Start: 2019-12-06 | End: 2020-11-11 | Stop reason: SDUPTHER

## 2019-12-17 ENCOUNTER — OFFICE VISIT (OUTPATIENT)
Dept: PODIATRY | Facility: CLINIC | Age: 69
End: 2019-12-17
Payer: MEDICARE

## 2019-12-17 VITALS
WEIGHT: 228 LBS | SYSTOLIC BLOOD PRESSURE: 138 MMHG | BODY MASS INDEX: 36.64 KG/M2 | HEIGHT: 66 IN | HEART RATE: 82 BPM | DIASTOLIC BLOOD PRESSURE: 76 MMHG

## 2019-12-17 DIAGNOSIS — S93.492D HIGH ANKLE SPRAIN OF LEFT LOWER EXTREMITY, SUBSEQUENT ENCOUNTER: ICD-10-CM

## 2019-12-17 DIAGNOSIS — M25.572 ACUTE LEFT ANKLE PAIN: ICD-10-CM

## 2019-12-17 PROBLEM — S93.492A HIGH ANKLE SPRAIN OF LEFT LOWER EXTREMITY: Status: ACTIVE | Noted: 2019-12-17

## 2019-12-17 PROCEDURE — 99213 PR OFFICE/OUTPT VISIT, EST, LEVL III, 20-29 MIN: ICD-10-PCS | Mod: S$PBB,,, | Performed by: PODIATRIST

## 2019-12-17 PROCEDURE — 1159F PR MEDICATION LIST DOCUMENTED IN MEDICAL RECORD: ICD-10-PCS | Mod: ,,, | Performed by: PODIATRIST

## 2019-12-17 PROCEDURE — 1125F AMNT PAIN NOTED PAIN PRSNT: CPT | Mod: ,,, | Performed by: PODIATRIST

## 2019-12-17 PROCEDURE — 99213 OFFICE O/P EST LOW 20 MIN: CPT | Mod: S$PBB,,, | Performed by: PODIATRIST

## 2019-12-17 PROCEDURE — 99213 OFFICE O/P EST LOW 20 MIN: CPT | Performed by: PODIATRIST

## 2019-12-17 PROCEDURE — 1125F PR PAIN SEVERITY QUANTIFIED, PAIN PRESENT: ICD-10-PCS | Mod: ,,, | Performed by: PODIATRIST

## 2019-12-17 PROCEDURE — 1159F MED LIST DOCD IN RCRD: CPT | Mod: ,,, | Performed by: PODIATRIST

## 2019-12-17 NOTE — PROGRESS NOTES
[address] [facility] [address]  [place] [address]  Phone: [phone]   [phone]    Patient's PCP:Rob Bui MD  Referring Provider: No ref. provider found    Subjective:      Chief Complaint:: Follow-up (left ankle sprain)    HPI  Chris Hill Jr is a 69 y.o. male who presents with a follow up ankle sprain left ankle. Symptoms have gotten worse. At it's worst would be a 3. Still swells up, can't be on it too long without it swelling up.  Pain is a 8/10 on a pain scale at its worse.  Patient has not been wearing ankle brace within the past few days.       Systemic Doctor: Rob Bui MD  Blood sugar: 123  Date last sen: 10-16  Hemoglobin A 1: 6    Vitals:    12/17/19 1409   BP: 138/76   Pulse: 82     Shoe Size:     Past Surgical History:   Procedure Laterality Date    BACK SURGERY  09/2019    CARDIAC CATHETERIZATION      EYE SURGERY      cataracts bilateral    TRANSRECTAL ULTRASOUND OF PROSTATE WITH INSERTION OF GOLD FIDUCIAL MARKER N/A 10/4/2018    Procedure: ULTRASOUND, PROSTATE, TRANSPERINEAL APPROACH, WITH GOLD FIDUCIAL MARKER INSERTION (with SPACEOAR transperineal biodegradable gel placement);  Surgeon: Manpreet Gordon MD;  Location: [facility];  Service: Urology;  Laterality: N/A;     Past Medical History:   Diagnosis Date    Anemia, chronic disease 12/28/2018    Coronary artery disease     mild    Diabetes mellitus     Heart murmur     Hyperlipidemia     Hypertension     Normocytic normochromic anemia 12/28/2018    Prostate cancer 8/20/2018    Sleep apnea     NOT USING [facility]    Thrombocytopenia 12/28/2018    Valvular regurgitation      Family History   Problem Relation Age of Onset    Heart disease Mother     Heart disease Father         Social History:   Marital Status:   Alcohol History:  reports that he does not drink alcohol.  Tobacco History:  reports that he has never smoked. He has never used smokeless tobacco.  Drug History:  reports that he does not use  drugs.    Review of patient's allergies indicates:  No Known Allergies    Current Outpatient Medications   Medication Sig Dispense Refill    amitriptyline (ELAVIL) 50 MG tablet Take 50 mg by mouth every evening.      amLODIPine (NORVASC) 5 MG tablet TAKE 1 TABLET BY MOUTH ONCE DAILY 90 tablet 1    aspirin (ECOTRIN) 81 MG EC tablet Take 81 mg by mouth once daily.      carvedilol (COREG) 12.5 MG tablet TAKE 1 TABLET BY MOUTH TWICE DAILY WITH MEALS 60 tablet 6    clonazePAM (KLONOPIN) 0.5 MG tablet clonazepam 0.5 mg tablet      co-enzyme Q-10 30 mg capsule Take 30 mg by mouth 3 (three) times daily.      diclofenac sodium (VOLTAREN) 1 % Gel Voltaren 1 % topical gel      fenofibrate 150 mg Cap fenofibrate 150 mg capsule      fish oil-omega-3 fatty acids 300-1,000 mg capsule Take 1 capsule by mouth 2 (two) times daily.      fluticasone propionate (FLONASE) 50 mcg/actuation nasal spray fluticasone propionate 50 mcg/actuation nasal spray,suspension      gabapentin (NEURONTIN) 300 MG capsule TAKE 1 CAPSULE BY MOUTH THREE TIMES DAILY BEGIN  WITH  NIGHTLY  DOSE  TO  ASSESS  BENEFITS  BEFORE  STARTING  THREE  TIMES  A  DAY 30 capsule 0    hydrALAZINE (APRESOLINE) 100 MG tablet TAKE 1 TABLET BY MOUTH TWICE DAILY 180 tablet 1    hydroCHLOROthiazide (HYDRODIURIL) 25 MG tablet Take 1 tablet (25 mg total) by mouth once daily. 90 tablet 1    HYDROcodone-acetaminophen (NORCO) 7.5-325 mg per tablet Take 1 tablet by mouth every 4 (four) hours as needed for Pain. 18 tablet 0    hydrOXYzine HCl (ATARAX) 25 MG tablet hydroxyzine HCl 25 mg tablet      irbesartan (AVAPRO) 300 MG tablet Take 300 mg by mouth every evening.      labetalol (NORMODYNE) 300 MG tablet labetalol 300 mg tablet      levoFLOXacin (LEVAQUIN) 500 MG tablet Take 500 mg by mouth once daily.  0    meloxicam (MOBIC) 7.5 MG tablet meloxicam 7.5 mg tablet      metFORMIN (GLUCOPHAGE-XR) 500 MG 24 hr tablet Take 500 mg by mouth 2 (two) times daily with meals.        mupirocin (BACTROBAN) 2 % ointment APPLY A SMALL AMOUNT TOPICALLY THREE TIMES DAILY  4    olmesartan-hydrochlorothiazide (BENICAR HCT) 20-12.5 mg per tablet Benicar HCT 20 mg-12.5 mg tablet      omeprazole (PRILOSEC) 40 MG capsule Take 40 mg by mouth once daily.      ONETOUCH VERIO Strp USE 1 STRIP TO CHECK GLUCOSE TWICE DAILY  1    oxybutynin (DITROPAN) 5 MG Tab Take 5 mg by mouth 2 (two) times daily.  11    oxybutynin (DITROPAN) 5 MG Tab TAKE 1 TABLET BY MOUTH TWICE DAILY 60 tablet 11    oxyCODONE-acetaminophen (PERCOCET)  mg per tablet Take 1 tablet by mouth every 6 (six) hours as needed.  0    pantoprazole (PROTONIX) 40 MG tablet Take 40 mg by mouth once daily.  1    potassium chloride (KLOR-CON) 20 mEq Pack Take 20 mEq by mouth 2 (two) times daily.       rosuvastatin (CRESTOR) 10 MG tablet TAKE 1 TABLET BY MOUTH ONCE DAILY 90 tablet 1    tamsulosin (FLOMAX) 0.4 mg Cap Take 1 capsule (0.4 mg total) by mouth every evening. 30 capsule 11    temazepam (RESTORIL) 15 mg Cap TAKE 1 CAPSULE BY MOUTH ONCE DAILY AT BEDTIME  3    terazosin (HYTRIN) 10 MG capsule TAKE 1 CAPSULE BY MOUTH ONCE DAILY IN THE EVENING 30 capsule 6    traMADol (ULTRAM) 50 mg tablet tramadol 50 mg tablet      valACYclovir (VALTREX) 1000 MG tablet TAKE 2 TABLETS BY MOUTH NOW AND 2 TABLETS BY MOUTH EVERY 12 HOURS  5    valsartan-hydrochlorothiazide (DIOVAN-HCT) 320-25 mg per tablet valsartan 320 mg-hydrochlorothiazide 25 mg tablet       No current facility-administered medications for this visit.        Review of Systems      Objective:        Physical Exam:   Foot Exam    General  General Appearance: appears stated age and healthy   Orientation: alert and oriented to person, place, and time   Affect: appropriate   Gait: antalgic       Left Foot/Ankle      Inspection and Palpation  Ecchymosis: none  Tenderness: (Along the course of the peroneal tendons and anterior talofibular ligament and inferior anterior tibial fibular  syndesmosis)  Swelling: (Throughout ankle and lower leg)  Skin Exam: skin intact;     Neurovascular  Dorsalis pedis: 2+  Posterior tibial: 2+  Saphenous nerve sensation: normal  Tibial nerve sensation: normal  Superficial peroneal nerve sensation: normal  Deep peroneal nerve sensation: normal  Sural nerve sensation: normal    Range of Motion    Passive  Ankle dorsiflexion: pain  Plantar flexion: pain  Ankle eversion: pain  Ankle inversion: pain    Active  Ankle dorsiflexion: pain  Plantar flexion: pain  Ankle eversion: pain  Ankle inversion: pain    Tests  Anterior drawer: positive   Varus Tilt: positive   Syndesmosis squeeze test: positive   Talar tilt: positive           Physical Exam   Cardiovascular:   Pulses:       Dorsalis pedis pulses are 2+ on the left side.        Posterior tibial pulses are 2+ on the left side.       Imaging:            Assessment:       1. Grade 3 ankle sprain, left, initial encounter - Left Foot    2. Acute left ankle pain    3. High ankle sprain of left lower extremity, subsequent encounter      Plan:   Grade 3 ankle sprain, left, initial encounter - Left Foot  -     MRI Ankle Without Contrast Left    Acute left ankle pain  -     MRI Ankle Without Contrast Left    High ankle sprain of left lower extremity, subsequent encounter     Patient will continue Cam Walker boot cast and Ace wrap and of apply compression socks below the knee.  I am order an MRI to evaluate for torn ligaments tendons and anterior inferior tibial syndesmosis damage.  He return to see me once a get the MRI results.  Follow up for MRI Test Results.    Procedures - None    Counseling:     I provided patient education verbally regarding:   Patient diagnosis, treatment options, as well as alternatives, risks, and benefits.     This note was created using Dragon voice recognition software that occasionally misinterpreted phrases or words.

## 2019-12-19 ENCOUNTER — OFFICE VISIT (OUTPATIENT)
Dept: PODIATRY | Facility: CLINIC | Age: 69
End: 2019-12-19
Payer: MEDICARE

## 2019-12-19 DIAGNOSIS — S93.492D HIGH ANKLE SPRAIN OF LEFT LOWER EXTREMITY, SUBSEQUENT ENCOUNTER: ICD-10-CM

## 2019-12-19 DIAGNOSIS — M19.072 ARTHRITIS OF LEFT ANKLE: ICD-10-CM

## 2019-12-19 PROCEDURE — 96372 THER/PROPH/DIAG INJ SC/IM: CPT | Mod: PBBFAC | Performed by: PODIATRIST

## 2019-12-19 PROCEDURE — 20605 DRAIN/INJ JOINT/BURSA W/O US: CPT | Mod: PBBFAC | Performed by: PODIATRIST

## 2019-12-19 PROCEDURE — 20605 INTERMEDIATE JOINT ASPIRATION/INJECTION: L ANKLE: ICD-10-PCS | Mod: S$PBB,LT,, | Performed by: PODIATRIST

## 2019-12-19 PROCEDURE — 99213 PR OFFICE/OUTPT VISIT, EST, LEVL III, 20-29 MIN: ICD-10-PCS | Mod: 25,S$PBB,, | Performed by: PODIATRIST

## 2019-12-19 PROCEDURE — 99213 OFFICE O/P EST LOW 20 MIN: CPT | Mod: 25,S$PBB,, | Performed by: PODIATRIST

## 2019-12-19 PROCEDURE — 1159F MED LIST DOCD IN RCRD: CPT | Mod: ,,, | Performed by: PODIATRIST

## 2019-12-19 PROCEDURE — 1159F PR MEDICATION LIST DOCUMENTED IN MEDICAL RECORD: ICD-10-PCS | Mod: ,,, | Performed by: PODIATRIST

## 2019-12-19 PROCEDURE — 99213 OFFICE O/P EST LOW 20 MIN: CPT | Mod: 25 | Performed by: PODIATRIST

## 2019-12-19 RX ORDER — BUPIVACAINE HYDROCHLORIDE 5 MG/ML
1.5 INJECTION, SOLUTION PERINEURAL
Status: COMPLETED | OUTPATIENT
Start: 2019-12-19 | End: 2019-12-19

## 2019-12-19 RX ORDER — DEXAMETHASONE SODIUM PHOSPHATE 4 MG/ML
4 INJECTION, SOLUTION INTRA-ARTICULAR; INTRALESIONAL; INTRAMUSCULAR; INTRAVENOUS; SOFT TISSUE
Status: COMPLETED | OUTPATIENT
Start: 2019-12-19 | End: 2019-12-19

## 2019-12-19 RX ORDER — METHYLPREDNISOLONE ACETATE 40 MG/ML
40 INJECTION, SUSPENSION INTRA-ARTICULAR; INTRALESIONAL; INTRAMUSCULAR; SOFT TISSUE
Status: DISCONTINUED | OUTPATIENT
Start: 2019-12-19 | End: 2019-12-19 | Stop reason: HOSPADM

## 2019-12-19 RX ORDER — METHYLPREDNISOLONE ACETATE 40 MG/ML
20 INJECTION, SUSPENSION INTRA-ARTICULAR; INTRALESIONAL; INTRAMUSCULAR; SOFT TISSUE
Status: COMPLETED | OUTPATIENT
Start: 2019-12-19 | End: 2019-12-19

## 2019-12-19 RX ORDER — DEXAMETHASONE SODIUM PHOSPHATE 4 MG/ML
4 INJECTION, SOLUTION INTRA-ARTICULAR; INTRALESIONAL; INTRAMUSCULAR; INTRAVENOUS; SOFT TISSUE
Status: DISCONTINUED | OUTPATIENT
Start: 2019-12-19 | End: 2019-12-19 | Stop reason: HOSPADM

## 2019-12-19 RX ADMIN — DEXAMETHASONE SODIUM PHOSPHATE 4 MG: 4 INJECTION INTRA-ARTICULAR; INTRALESIONAL; INTRAMUSCULAR; INTRAVENOUS; SOFT TISSUE at 10:12

## 2019-12-19 RX ADMIN — DEXAMETHASONE SODIUM PHOSPHATE 4 MG: 4 INJECTION, SOLUTION INTRAMUSCULAR; INTRAVENOUS at 10:12

## 2019-12-19 RX ADMIN — METHYLPREDNISOLONE ACETATE 40 MG: 40 INJECTION, SUSPENSION INTRALESIONAL; INTRAMUSCULAR; INTRASYNOVIAL; SOFT TISSUE at 10:12

## 2019-12-19 RX ADMIN — BUPIVACAINE HYDROCHLORIDE 7.5 MG: 5 INJECTION, SOLUTION PERINEURAL at 10:12

## 2019-12-19 RX ADMIN — METHYLPREDNISOLONE ACETATE 20 MG: 40 INJECTION, SUSPENSION INTRA-ARTICULAR; INTRALESIONAL; INTRAMUSCULAR; SOFT TISSUE at 10:12

## 2019-12-19 NOTE — PROGRESS NOTES
[address] [facility] [address]  [place] [address]  Phone: [phone]   [phone]    Patient's PCP:Rob Bui MD  Referring Provider: No ref. provider found    Subjective:      Chief Complaint:: Ankle Pain (left ankle pain (MRI results))    ANA Hill Jr is a 69 y.o. male who presents for follow up and MRI results.  Patient still has pain 6/10. Patient has been trying to stay off his foot.  Patient continues to have swelling and pain when walking and standing     Systemic Doctor: Rob Bui MD  Blood sugar: 123  Date last sen: 10-16  Hemoglobin A 1: 6       There were no vitals filed for this visit.  Shoe Size: 8.5    Past Surgical History:   Procedure Laterality Date    BACK SURGERY  09/2019    CARDIAC CATHETERIZATION      EYE SURGERY      cataracts bilateral    TRANSRECTAL ULTRASOUND OF PROSTATE WITH INSERTION OF GOLD FIDUCIAL MARKER N/A 10/4/2018    Procedure: ULTRASOUND, PROSTATE, TRANSPERINEAL APPROACH, WITH GOLD FIDUCIAL MARKER INSERTION (with SPACEOAR transperineal biodegradable gel placement);  Surgeon: Manpreet Gorodn MD;  Location: [facility];  Service: Urology;  Laterality: N/A;     Past Medical History:   Diagnosis Date    Anemia, chronic disease 12/28/2018    Coronary artery disease     mild    Diabetes mellitus     Heart murmur     Hyperlipidemia     Hypertension     Normocytic normochromic anemia 12/28/2018    Prostate cancer 8/20/2018    Sleep apnea     NOT USING [facility]    Thrombocytopenia 12/28/2018    Valvular regurgitation      Family History   Problem Relation Age of Onset    Heart disease Mother     Heart disease Father         Social History:   Marital Status:   Alcohol History:  reports that he does not drink alcohol.  Tobacco History:  reports that he has never smoked. He has never used smokeless tobacco.  Drug History:  reports that he does not use drugs.    Review of patient's allergies indicates:  No Known Allergies    Current Outpatient  Medications   Medication Sig Dispense Refill    amitriptyline (ELAVIL) 50 MG tablet Take 50 mg by mouth every evening.      amLODIPine (NORVASC) 5 MG tablet TAKE 1 TABLET BY MOUTH ONCE DAILY 90 tablet 1    aspirin (ECOTRIN) 81 MG EC tablet Take 81 mg by mouth once daily.      carvedilol (COREG) 12.5 MG tablet TAKE 1 TABLET BY MOUTH TWICE DAILY WITH MEALS 60 tablet 6    clonazePAM (KLONOPIN) 0.5 MG tablet clonazepam 0.5 mg tablet      co-enzyme Q-10 30 mg capsule Take 30 mg by mouth 3 (three) times daily.      diclofenac sodium (VOLTAREN) 1 % Gel Voltaren 1 % topical gel      fenofibrate 150 mg Cap fenofibrate 150 mg capsule      fish oil-omega-3 fatty acids 300-1,000 mg capsule Take 1 capsule by mouth 2 (two) times daily.      fluticasone propionate (FLONASE) 50 mcg/actuation nasal spray fluticasone propionate 50 mcg/actuation nasal spray,suspension      gabapentin (NEURONTIN) 300 MG capsule TAKE 1 CAPSULE BY MOUTH THREE TIMES DAILY BEGIN  WITH  NIGHTLY  DOSE  TO  ASSESS  BENEFITS  BEFORE  STARTING  THREE  TIMES  A  DAY 30 capsule 0    hydrALAZINE (APRESOLINE) 100 MG tablet TAKE 1 TABLET BY MOUTH TWICE DAILY 180 tablet 1    hydroCHLOROthiazide (HYDRODIURIL) 25 MG tablet Take 1 tablet (25 mg total) by mouth once daily. 90 tablet 1    HYDROcodone-acetaminophen (NORCO) 7.5-325 mg per tablet Take 1 tablet by mouth every 4 (four) hours as needed for Pain. 18 tablet 0    hydrOXYzine HCl (ATARAX) 25 MG tablet hydroxyzine HCl 25 mg tablet      irbesartan (AVAPRO) 300 MG tablet Take 300 mg by mouth every evening.      labetalol (NORMODYNE) 300 MG tablet labetalol 300 mg tablet      levoFLOXacin (LEVAQUIN) 500 MG tablet Take 500 mg by mouth once daily.  0    meloxicam (MOBIC) 7.5 MG tablet meloxicam 7.5 mg tablet      metFORMIN (GLUCOPHAGE-XR) 500 MG 24 hr tablet Take 500 mg by mouth 2 (two) times daily with meals.       mupirocin (BACTROBAN) 2 % ointment APPLY A SMALL AMOUNT TOPICALLY THREE TIMES DAILY   4    olmesartan-hydrochlorothiazide (BENICAR HCT) 20-12.5 mg per tablet Benicar HCT 20 mg-12.5 mg tablet      omeprazole (PRILOSEC) 40 MG capsule Take 40 mg by mouth once daily.      ONETOUCH VERLONA Strp USE 1 STRIP TO CHECK GLUCOSE TWICE DAILY  1    oxybutynin (DITROPAN) 5 MG Tab Take 5 mg by mouth 2 (two) times daily.  11    oxybutynin (DITROPAN) 5 MG Tab TAKE 1 TABLET BY MOUTH TWICE DAILY 60 tablet 11    oxyCODONE-acetaminophen (PERCOCET)  mg per tablet Take 1 tablet by mouth every 6 (six) hours as needed.  0    pantoprazole (PROTONIX) 40 MG tablet Take 40 mg by mouth once daily.  1    potassium chloride (KLOR-CON) 20 mEq Pack Take 20 mEq by mouth 2 (two) times daily.       rosuvastatin (CRESTOR) 10 MG tablet TAKE 1 TABLET BY MOUTH ONCE DAILY 90 tablet 1    tamsulosin (FLOMAX) 0.4 mg Cap Take 1 capsule (0.4 mg total) by mouth every evening. 30 capsule 11    temazepam (RESTORIL) 15 mg Cap TAKE 1 CAPSULE BY MOUTH ONCE DAILY AT BEDTIME  3    terazosin (HYTRIN) 10 MG capsule TAKE 1 CAPSULE BY MOUTH ONCE DAILY IN THE EVENING 30 capsule 6    traMADol (ULTRAM) 50 mg tablet tramadol 50 mg tablet      valACYclovir (VALTREX) 1000 MG tablet TAKE 2 TABLETS BY MOUTH NOW AND 2 TABLETS BY MOUTH EVERY 12 HOURS  5    valsartan-hydrochlorothiazide (DIOVAN-HCT) 320-25 mg per tablet valsartan 320 mg-hydrochlorothiazide 25 mg tablet       No current facility-administered medications for this visit.        Review of Systems      Objective:        Physical Exam:   Foot Exam    General  General Appearance: appears stated age and healthy   Orientation: alert and oriented to person, place, and time   Affect: appropriate   Gait: antalgic       Left Foot/Ankle      Inspection and Palpation  Ecchymosis: none  Tenderness: (Anterior and lateral ankle anterior talofibular ligament mild pain by peroneal tendons.)  Swelling: (Anterior lateral ankle)  Skin Exam: skin intact;     Neurovascular  Dorsalis pedis: 2+  Posterior  tibial: 2+  Saphenous nerve sensation: normal  Tibial nerve sensation: normal  Superficial peroneal nerve sensation: normal  Deep peroneal nerve sensation: normal  Sural nerve sensation: normal          Physical Exam   Cardiovascular:   Pulses:       Dorsalis pedis pulses are 2+ on the left side.        Posterior tibial pulses are 2+ on the left side.   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  The inferior tibial fibular syndesmosis appears to be intact.         Assessment:       1. Grade 3 ankle sprain, left, initial encounter - Left Foot    2. High ankle sprain of left lower extremity, subsequent encounter      Plan:   Grade 3 ankle sprain, left, initial encounter - Left Foot    High ankle sprain of left lower extremity, subsequent encounter      No follow-ups on file.    Intermediate Joint Aspiration/Injection: L ankle  Date/Time: 12/19/2019 10:10 AM  Performed by: Skinny Somers DPM  Authorized by: Skinny Somers DPM     Consent Done?: Yes (Verbal)  Indications:  Pain and joint swelling  Site marked: The procedure site was marked   Timeout: Prior to procedure the correct patient, procedure, and site was verified      Location:  Ankle  Site:  L ankle  Prep: Patient was prepped and draped in usual sterile fashion    Ultrasonic Guidance for needle placement: No  Needle size:  25 G  Approach:  Anterolateral  Medications:  40 mg methylPREDNISolone acetate 40 mg/mL; 4 mg dexamethasone 4 mg/mL; 40 mg methylPREDNISolone acetate 40 mg/mL  Aspirate amount (ml):  0  Patient tolerance:  Patient tolerated the procedure well with no immediate complications     Additional Comments: 1 1/2 cc of 0.5% Marcaine w/out epi, 1 cc Dexamethasone, 1/2 cc Methylprednisolone     if the patient does better with the injection will just treat the  area with conservative symptomatic treatment.  If the ankle feels better with the anterior talofibular ligament still hurts and he still has ankle instability will discuss repair of the ankle ligament.    Counseling:     I provided patient education verbally regarding:   Patient diagnosis, treatment options, as well as alternatives, risks, and benefits.     This note was created using Dragon voice recognition software that occasionally misinterpreted phrases or words.

## 2019-12-20 ENCOUNTER — TELEPHONE (OUTPATIENT)
Dept: PODIATRY | Facility: CLINIC | Age: 69
End: 2019-12-20

## 2019-12-20 DIAGNOSIS — S93.492D HIGH ANKLE SPRAIN OF LEFT LOWER EXTREMITY, SUBSEQUENT ENCOUNTER: ICD-10-CM

## 2019-12-20 DIAGNOSIS — M19.072 ARTHRITIS OF LEFT ANKLE: ICD-10-CM

## 2019-12-20 DIAGNOSIS — M25.572 ACUTE LEFT ANKLE PAIN: ICD-10-CM

## 2019-12-30 ENCOUNTER — TELEPHONE (OUTPATIENT)
Dept: PODIATRY | Facility: CLINIC | Age: 69
End: 2019-12-30

## 2019-12-30 DIAGNOSIS — M19.072 ARTHRITIS OF LEFT ANKLE: ICD-10-CM

## 2019-12-30 DIAGNOSIS — S93.492D HIGH ANKLE SPRAIN OF LEFT LOWER EXTREMITY, SUBSEQUENT ENCOUNTER: ICD-10-CM

## 2019-12-30 DIAGNOSIS — M25.572 ACUTE LEFT ANKLE PAIN: ICD-10-CM

## 2019-12-30 NOTE — TELEPHONE ENCOUNTER
----- Message from Yanna Aviles sent at 12/30/2019 10:48 AM CST -----  Contact: patient  Mr. Perez called and stated that when he left from his appt last week he told whomever that he wanted to go to [facility] Supposedly we sent an order somewhere else and he's wondering if we can get a new one sent to the correct place please. If you need to call him his 3 is [phone]    Thanks  Lisa

## 2020-01-08 ENCOUNTER — OFFICE VISIT (OUTPATIENT)
Dept: RADIATION ONCOLOGY | Facility: CLINIC | Age: 70
End: 2020-01-08
Payer: MEDICARE

## 2020-01-08 VITALS
BODY MASS INDEX: 36.95 KG/M2 | DIASTOLIC BLOOD PRESSURE: 77 MMHG | HEART RATE: 73 BPM | SYSTOLIC BLOOD PRESSURE: 145 MMHG | OXYGEN SATURATION: 96 % | WEIGHT: 228.88 LBS

## 2020-01-08 DIAGNOSIS — C61 PROSTATE CANCER: Primary | ICD-10-CM

## 2020-01-08 PROCEDURE — 1125F AMNT PAIN NOTED PAIN PRSNT: CPT | Mod: S$GLB,,, | Performed by: RADIOLOGY

## 2020-01-08 PROCEDURE — 99214 OFFICE O/P EST MOD 30 MIN: CPT | Mod: S$GLB,,, | Performed by: RADIOLOGY

## 2020-01-08 PROCEDURE — 99214 PR OFFICE/OUTPT VISIT, EST, LEVL IV, 30-39 MIN: ICD-10-PCS | Mod: S$GLB,,, | Performed by: RADIOLOGY

## 2020-01-08 PROCEDURE — 1125F PR PAIN SEVERITY QUANTIFIED, PAIN PRESENT: ICD-10-PCS | Mod: S$GLB,,, | Performed by: RADIOLOGY

## 2020-01-08 PROCEDURE — 1159F PR MEDICATION LIST DOCUMENTED IN MEDICAL RECORD: ICD-10-PCS | Mod: S$GLB,,, | Performed by: RADIOLOGY

## 2020-01-08 PROCEDURE — 1159F MED LIST DOCD IN RCRD: CPT | Mod: S$GLB,,, | Performed by: RADIOLOGY

## 2020-01-08 NOTE — PROGRESS NOTES
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    HISTORY OF PRESENT ILLNESS:   68M p/w elevated PSA.    PSA  6/18 12.5  7/18 13.2    *8/18 Dr. Stephens TRUS bx   - 3+3 adenoca R apex 5%; 4+3 adenoca L apex 55%; 4+5 adenoca L mid 80% and L base 95%   - no EPE; +PNI   - 4/6 cores+   - ADT placed    - RadOnc consult  *8/18 CT AP/Bone scan: jenniffer    Patient was placed on long-term androgen deprivation therapy by Dr. Gordon and completed definitive radiotherapy to 7920 cGy in December 2018. Treatment was complicated by an unexpected lower back surgery and detection of pancytopenia by his GI doctor for which she is undergoing workup with medical oncology. Patient developed frequency and urgency of both urine and stool throughout treatment, managed medically.    INTERVAL HISTORY:  Patient returns reporting stabilization of his urinary function.  Recently met with Dr. Gordon and continued on current medical management.  He has also had colonoscopy with polypectomy and reports bowel movements are stable.  He recently had yet another lower back procedure which alleviated radicular pain of the right lower extremity.  There is concerned that his lumbar spinal disease could be affecting his urinary and bowel function.  He recently had a fall with a torn ligament in the left ankle.  Today denies dysuria, hematuria and continues to have alternating diarrhea/constipation.  He also continues with hot flashes and is on ADT.    AUA 15 (10)  QOL 4 (2)  IIEF Na (24)    Review of Systems   Constitutional: Positive for fatigue. Negative for appetite change, chills, fever and unexpected weight change.   HENT:   Negative for lump/mass, mouth sores, sore throat, trouble swallowing and voice change.    Eyes: Negative for eye problems and icterus.    Respiratory: Negative for chest tightness, cough, hemoptysis and shortness of breath.    Cardiovascular: Negative for chest pain and leg swelling.   Gastrointestinal: Negative for abdominal distention, abdominal pain, blood in stool, constipation, nausea and vomiting.   Endocrine: Positive for hot flashes.   Genitourinary: Positive for nocturia. Negative for bladder incontinence, difficulty urinating, dysuria and hematuria.    Musculoskeletal: Positive for back pain. Negative for gait problem, neck pain and neck stiffness.   Neurological: Negative for extremity weakness, gait problem, headaches, numbness and seizures.   Hematological: Negative for adenopathy.     Past Medical History:   Diagnosis Date    Anemia, chronic disease 12/28/2018    Coronary artery disease     mild    Diabetes mellitus     Heart murmur     Hyperlipidemia     Hypertension     Normocytic normochromic anemia 12/28/2018    Prostate cancer 8/20/2018    Sleep apnea     NOT USING [facility]    Thrombocytopenia 12/28/2018    Valvular regurgitation      Past Surgical History:   Procedure Laterality Date    BACK SURGERY  09/2019    CARDIAC CATHETERIZATION      EYE SURGERY      cataracts bilateral    TRANSRECTAL ULTRASOUND OF PROSTATE WITH INSERTION OF GOLD FIDUCIAL MARKER N/A 10/4/2018    Procedure: ULTRASOUND, PROSTATE, TRANSPERINEAL APPROACH, WITH GOLD FIDUCIAL MARKER INSERTION (with SPACEOAR transperineal biodegradable gel placement);  Surgeon: Manpreet Gordon MD;  Location: [facility];  Service: Urology;  Laterality: N/A;     Social History     Socioeconomic History    Marital status:      Spouse name: Not on file    Number of children: Not on file    Years of education: Not on file    Highest education level: Not on file   Occupational History    Not on file   Social Needs    Financial resource strain: Not on file    Food insecurity:     Worry: Not on file     Inability: Not on file    Transportation needs:     Medical:  Not on file     Non-medical: Not on file   Tobacco Use    Smoking status: Never Smoker    Smokeless tobacco: Never Used   Substance and Sexual Activity    Alcohol use: No    Drug use: No    Sexual activity: Not on file   Lifestyle    Physical activity:     Days per week: Not on file     Minutes per session: Not on file    Stress: Not on file   Relationships    Social connections:     Talks on phone: Not on file     Gets together: Not on file     Attends [employer] service: Not on file     Active member of club or organization: Not on file     Attends meetings of clubs or organizations: Not on file     Relationship status: Not on file   Other Topics Concern    Not on file   Social History Narrative    Not on file     Family History   Problem Relation Age of Onset    Heart disease Mother     Heart disease Father      Medication List with Changes/Refills   Current Medications    AMITRIPTYLINE (ELAVIL) 50 MG TABLET    Take 50 mg by mouth every evening.    AMLODIPINE (NORVASC) 5 MG TABLET    TAKE 1 TABLET BY MOUTH ONCE DAILY    ASPIRIN (ECOTRIN) 81 MG EC TABLET    Take 81 mg by mouth once daily.    CARVEDILOL (COREG) 12.5 MG TABLET    TAKE 1 TABLET BY MOUTH TWICE DAILY WITH MEALS    CLONAZEPAM (KLONOPIN) 0.5 MG TABLET    clonazepam 0.5 mg tablet    CO-ENZYME Q-10 30 MG CAPSULE    Take 30 mg by mouth 3 (three) times daily.    DICLOFENAC SODIUM (VOLTAREN) 1 % GEL    Voltaren 1 % topical gel    FENOFIBRATE 150 MG CAP    fenofibrate 150 mg capsule    FISH OIL-OMEGA-3 FATTY ACIDS 300-1,000 MG CAPSULE    Take 1 capsule by mouth 2 (two) times daily.    FLUTICASONE PROPIONATE (FLONASE) 50 MCG/ACTUATION NASAL SPRAY    fluticasone propionate 50 mcg/actuation nasal spray,suspension    GABAPENTIN (NEURONTIN) 300 MG CAPSULE    TAKE 1 CAPSULE BY MOUTH THREE TIMES DAILY BEGIN  WITH  NIGHTLY  DOSE  TO  ASSESS  BENEFITS  BEFORE  STARTING  THREE  TIMES  A  DAY    HYDRALAZINE (APRESOLINE) 100 MG TABLET    TAKE 1 TABLET BY MOUTH  TWICE DAILY    HYDROCHLOROTHIAZIDE (HYDRODIURIL) 25 MG TABLET    Take 1 tablet (25 mg total) by mouth once daily.    HYDROCODONE-ACETAMINOPHEN (NORCO) 7.5-325 MG PER TABLET    Take 1 tablet by mouth every 4 (four) hours as needed for Pain.    HYDROXYZINE HCL (ATARAX) 25 MG TABLET    hydroxyzine HCl 25 mg tablet    IRBESARTAN (AVAPRO) 300 MG TABLET    Take 300 mg by mouth every evening.    LABETALOL (NORMODYNE) 300 MG TABLET    labetalol 300 mg tablet    LEVOFLOXACIN (LEVAQUIN) 500 MG TABLET    Take 500 mg by mouth once daily.    MELOXICAM (MOBIC) 7.5 MG TABLET    meloxicam 7.5 mg tablet    METFORMIN (GLUCOPHAGE-XR) 500 MG 24 HR TABLET    Take 500 mg by mouth 2 (two) times daily with meals.     MUPIROCIN (BACTROBAN) 2 % OINTMENT    APPLY A SMALL AMOUNT TOPICALLY THREE TIMES DAILY    OLMESARTAN-HYDROCHLOROTHIAZIDE (BENICAR HCT) 20-12.5 MG PER TABLET    Benicar HCT 20 mg-12.5 mg tablet    OMEPRAZOLE (PRILOSEC) 40 MG CAPSULE    Take 40 mg by mouth once daily.    ONEUCH VERIO STR    USE 1 STRIP TO CHECK GLUCOSE TWICE DAILY    OXYBUTYNIN (DITROPAN) 5 MG TAB    Take 5 mg by mouth 2 (two) times daily.    OXYBUTYNIN (DITROPAN) 5 MG TAB    TAKE 1 TABLET BY MOUTH TWICE DAILY    OXYCODONE-ACETAMINOPHEN (PERCOCET)  MG PER TABLET    Take 1 tablet by mouth every 6 (six) hours as needed.    PANTOPRAZOLE (PROTONIX) 40 MG TABLET    Take 40 mg by mouth once daily.    POTASSIUM CHLORIDE (KLOR-CON) 20 MEQ PACK    Take 20 mEq by mouth 2 (two) times daily.     ROSUVASTATIN (CRESTOR) 10 MG TABLET    TAKE 1 TABLET BY MOUTH ONCE DAILY    TAMSULOSIN (FLOMAX) 0.4 MG CAP    Take 1 capsule (0.4 mg total) by mouth every evening.    TEMAZEPAM (RESTORIL) 15 MG CAP    TAKE 1 CAPSULE BY MOUTH ONCE DAILY AT BEDTIME    TERAZOSIN (HYTRIN) 10 MG CAPSULE    TAKE 1 CAPSULE BY MOUTH ONCE DAILY IN THE EVENING    TRAMADOL (ULTRAM) 50 MG TABLET    tramadol 50 mg tablet    VALACYCLOVIR (VALTREX) 1000 MG TABLET    TAKE 2 TABLETS BY MOUTH NOW AND 2  TABLETS BY MOUTH EVERY 12 HOURS    VALSARTAN-HYDROCHLOROTHIAZIDE (DIOVAN-HCT) 320-25 MG PER TABLET    valsartan 320 mg-hydrochlorothiazide 25 mg tablet     Review of patient's allergies indicates:  No Known Allergies    QUALITY OF LIFE: 80%- Normal Activity with Effort: Some Symptoms of Disease    Vitals:    01/08/20 0927   BP: (!) 145/77   Pulse: 73   SpO2: 96%   Weight: 103.8 kg (228 lb 14.4 oz)   PainSc:   5   PainLoc: Ankle     Body mass index is 36.95 kg/m².    PHYSICAL EXAM:   GENERAL: alert; in no apparent distress.   HEAD: normocephalic, atraumatic.  EYES: pupils are equal, round, reactive to light and accommodation. Sclera anicteric. Conjunctiva not injected.   NOSE/THROAT: no nasal erythema or rhinorrhea. Oropharynx pink, without erythema, ulcerations or thrush.   NECK: no cervical motion rigidity; supple with no masses.  CHEST: Patient is speaking comfortably on room air with normal work of breathing without using accessory muscles of respiration.  ABDOMEN: soft, nontender, nondistended. Bowel sounds present.   MUSCULOSKELETAL: no tenderness to palpation along the spine or scapulae. Normal range of motion.  NEUROLOGIC: cranial nerves II-XII intact bilaterally. Strength 5/5 in bilateral upper and lower extremities. No sensory deficits appreciated. Normal gait.  EXTREMITIES: no clubbing, cyanosis, edema.  SKIN: no erythema, rashes, ulcerations noted.     ANCILLARY DATA:   PSA  11/19 <0.01  5/19 0.02      ASSESSMENT: 69 y.o. male with High risk prostate adenocarcinoma, [id number] GS 4+5 iPSA 13.2 started on long-term ADT, status post definitive radiotherapy to 7920 cGy ending December 2018.  PLAN:   Chris Hill Jr is now approximately 1 year out from definitive radiotherapy for high risk prostate adenocarcinoma.  He continues on hormone deprivation and is planned for 2 years.  He generally reports good tolerance with occasional hot flashes.  His urinary function is stable and he continues to follow with   Heidi with medical management.  His biggest complaint is low back pain for which he recently underwent a procedure and now a fall with torn ligament in the left ankle undergoing rehab.  By review of systems, physical exam and updated PSA he does not have progressive disease.  I recommend continue with balance of 2 years of hormone deprivation and further PSA surveillance of Q 3-6 months.  Return to clinic in 6 months    All questions answered and contact information provided. Patient understands free to call us anytime with any questions or concerns regarding radiation therapy.    I have personally seen and evaluated this patient. Greater than 50% of this time was spent discussing coordination of care and/or counseling.    PHYSICIAN: Simone Santillan Jr, MD

## 2020-01-28 ENCOUNTER — OFFICE VISIT (OUTPATIENT)
Dept: PODIATRY | Facility: CLINIC | Age: 70
End: 2020-01-28
Payer: MEDICARE

## 2020-01-28 VITALS
WEIGHT: 224 LBS | DIASTOLIC BLOOD PRESSURE: 73 MMHG | SYSTOLIC BLOOD PRESSURE: 140 MMHG | HEIGHT: 66 IN | BODY MASS INDEX: 36 KG/M2 | HEART RATE: 80 BPM

## 2020-01-28 DIAGNOSIS — S93.492D HIGH ANKLE SPRAIN OF LEFT LOWER EXTREMITY, SUBSEQUENT ENCOUNTER: Primary | ICD-10-CM

## 2020-01-28 DIAGNOSIS — M19.072 ARTHRITIS OF LEFT ANKLE: ICD-10-CM

## 2020-01-28 PROCEDURE — 99213 OFFICE O/P EST LOW 20 MIN: CPT | Mod: S$PBB,,, | Performed by: PODIATRIST

## 2020-01-28 PROCEDURE — 1125F AMNT PAIN NOTED PAIN PRSNT: CPT | Mod: ,,, | Performed by: PODIATRIST

## 2020-01-28 PROCEDURE — 1159F PR MEDICATION LIST DOCUMENTED IN MEDICAL RECORD: ICD-10-PCS | Mod: ,,, | Performed by: PODIATRIST

## 2020-01-28 PROCEDURE — 99213 OFFICE O/P EST LOW 20 MIN: CPT | Performed by: PODIATRIST

## 2020-01-28 PROCEDURE — 1125F PR PAIN SEVERITY QUANTIFIED, PAIN PRESENT: ICD-10-PCS | Mod: ,,, | Performed by: PODIATRIST

## 2020-01-28 PROCEDURE — 99213 PR OFFICE/OUTPT VISIT, EST, LEVL III, 20-29 MIN: ICD-10-PCS | Mod: S$PBB,,, | Performed by: PODIATRIST

## 2020-01-28 PROCEDURE — 1159F MED LIST DOCD IN RCRD: CPT | Mod: ,,, | Performed by: PODIATRIST

## 2020-01-28 NOTE — PROGRESS NOTES
[address] [facility] [address]  [place] [address]  Phone: [phone]   [phone]    Patient's PCP:Rob Bui MD  Referring Provider: No ref. provider found    Subjective:      Chief Complaint:: Follow-up (Grade 3 ankle sprain left)    ANA Hill Jr is a 69 y.o. male who returns to clinic today for follow up on a complaint of Grade 3 left ankle sprain .Patient still has pain 4/10. Patient has been been attending physical therapy for one month. He continues to have swelling and pain when walking and standing.     Systemic Doctor: Rob Bui MD  Blood sugar: 120  Date last sen: 10-16-19  Hemoglobin A 1: 6    Vitals:    01/28/20 1051   BP: (!) 140/73   Pulse: 80     Shoe Size: 8.5    Past Surgical History:   Procedure Laterality Date    BACK SURGERY  09/2019    CARDIAC CATHETERIZATION      EYE SURGERY      cataracts bilateral    TRANSRECTAL ULTRASOUND OF PROSTATE WITH INSERTION OF GOLD FIDUCIAL MARKER N/A 10/4/2018    Procedure: ULTRASOUND, PROSTATE, TRANSPERINEAL APPROACH, WITH GOLD FIDUCIAL MARKER INSERTION (with SPACEOAR transperineal biodegradable gel placement);  Surgeon: Manpreet Gordon MD;  Location: [facility];  Service: Urology;  Laterality: N/A;     Past Medical History:   Diagnosis Date    Anemia, chronic disease 12/28/2018    Coronary artery disease     mild    Diabetes mellitus     Heart murmur     Hyperlipidemia     Hypertension     Normocytic normochromic anemia 12/28/2018    Prostate cancer 8/20/2018    Sleep apnea     NOT USING [facility]    Thrombocytopenia 12/28/2018    Valvular regurgitation      Family History   Problem Relation Age of Onset    Heart disease Mother     Heart disease Father         Social History:   Marital Status:   Alcohol History:  reports that he does not drink alcohol.  Tobacco History:  reports that he has never smoked. He has never used smokeless tobacco.  Drug History:  reports that he does not use drugs.    Review of  patient's allergies indicates:  No Known Allergies    Current Outpatient Medications   Medication Sig Dispense Refill    amitriptyline (ELAVIL) 50 MG tablet Take 50 mg by mouth every evening.      amLODIPine (NORVASC) 5 MG tablet TAKE 1 TABLET BY MOUTH ONCE DAILY 90 tablet 1    aspirin (ECOTRIN) 81 MG EC tablet Take 81 mg by mouth once daily.      carvedilol (COREG) 12.5 MG tablet TAKE 1 TABLET BY MOUTH TWICE DAILY WITH MEALS 60 tablet 6    clonazePAM (KLONOPIN) 0.5 MG tablet clonazepam 0.5 mg tablet      co-enzyme Q-10 30 mg capsule Take 30 mg by mouth 3 (three) times daily.      diclofenac sodium (VOLTAREN) 1 % Gel Voltaren 1 % topical gel      fenofibrate 150 mg Cap fenofibrate 150 mg capsule      fish oil-omega-3 fatty acids 300-1,000 mg capsule Take 1 capsule by mouth 2 (two) times daily.      fluticasone propionate (FLONASE) 50 mcg/actuation nasal spray fluticasone propionate 50 mcg/actuation nasal spray,suspension      gabapentin (NEURONTIN) 300 MG capsule TAKE 1 CAPSULE BY MOUTH THREE TIMES DAILY BEGIN  WITH  NIGHTLY  DOSE  TO  ASSESS  BENEFITS  BEFORE  STARTING  THREE  TIMES  A  DAY 30 capsule 0    hydrALAZINE (APRESOLINE) 100 MG tablet TAKE 1 TABLET BY MOUTH TWICE DAILY 180 tablet 1    hydroCHLOROthiazide (HYDRODIURIL) 25 MG tablet Take 1 tablet (25 mg total) by mouth once daily. 90 tablet 1    HYDROcodone-acetaminophen (NORCO) 7.5-325 mg per tablet Take 1 tablet by mouth every 4 (four) hours as needed for Pain. 18 tablet 0    hydrOXYzine HCl (ATARAX) 25 MG tablet hydroxyzine HCl 25 mg tablet      irbesartan (AVAPRO) 300 MG tablet Take 300 mg by mouth every evening.      labetalol (NORMODYNE) 300 MG tablet labetalol 300 mg tablet      levoFLOXacin (LEVAQUIN) 500 MG tablet Take 500 mg by mouth once daily.  0    meloxicam (MOBIC) 7.5 MG tablet meloxicam 7.5 mg tablet      metFORMIN (GLUCOPHAGE-XR) 500 MG 24 hr tablet Take 500 mg by mouth 2 (two) times daily with meals.       mupirocin  (BACTROBAN) 2 % ointment APPLY A SMALL AMOUNT TOPICALLY THREE TIMES DAILY  4    olmesartan-hydrochlorothiazide (BENICAR HCT) 20-12.5 mg per tablet Benicar HCT 20 mg-12.5 mg tablet      omeprazole (PRILOSEC) 40 MG capsule Take 40 mg by mouth once daily.      ONETOUCH VERIO Strp USE 1 STRIP TO CHECK GLUCOSE TWICE DAILY  1    oxybutynin (DITROPAN) 5 MG Tab Take 5 mg by mouth 2 (two) times daily.  11    oxybutynin (DITROPAN) 5 MG Tab TAKE 1 TABLET BY MOUTH TWICE DAILY 60 tablet 11    oxyCODONE-acetaminophen (PERCOCET)  mg per tablet Take 1 tablet by mouth every 6 (six) hours as needed.  0    pantoprazole (PROTONIX) 40 MG tablet Take 40 mg by mouth once daily.  1    potassium chloride (KLOR-CON) 20 mEq Pack Take 20 mEq by mouth 2 (two) times daily.       rosuvastatin (CRESTOR) 10 MG tablet TAKE 1 TABLET BY MOUTH ONCE DAILY 90 tablet 1    tamsulosin (FLOMAX) 0.4 mg Cap Take 1 capsule (0.4 mg total) by mouth every evening. 30 capsule 11    temazepam (RESTORIL) 15 mg Cap TAKE 1 CAPSULE BY MOUTH ONCE DAILY AT BEDTIME  3    terazosin (HYTRIN) 10 MG capsule TAKE 1 CAPSULE BY MOUTH ONCE DAILY IN THE EVENING 30 capsule 6    traMADol (ULTRAM) 50 mg tablet tramadol 50 mg tablet      valACYclovir (VALTREX) 1000 MG tablet TAKE 2 TABLETS BY MOUTH NOW AND 2 TABLETS BY MOUTH EVERY 12 HOURS  5    valsartan-hydrochlorothiazide (DIOVAN-HCT) 320-25 mg per tablet valsartan 320 mg-hydrochlorothiazide 25 mg tablet       No current facility-administered medications for this visit.        Review of Systems      Objective:        Physical Exam:   Foot Exam    General  General Appearance: appears stated age and healthy   Orientation: alert and oriented to person, place, and time   Affect: appropriate   Gait: antalgic       Left Foot/Ankle      Inspection and Palpation  Tenderness: (Anterior lateral ankle by anterior talofibular ligament mild pain by peroneal tendon)  Swelling: (Mild swelling anterior lateral ankle by anterior  talofibular ligament)  Arch: normal    Neurovascular  Dorsalis pedis: 2+  Posterior tibial: 2+          Physical Exam   Cardiovascular:   Pulses:       Dorsalis pedis pulses are 2+ on the left side.        Posterior tibial pulses are 2+ on the left side.   Musculoskeletal:        Feet:        Imaging:            Assessment:       1. High ankle sprain of left lower extremity, subsequent encounter    2. Grade 3 ankle sprain, left, initial encounter - Left Foot    3. Arthritis of left ankle      Plan:   High ankle sprain of left lower extremity, subsequent encounter    Grade 3 ankle sprain, left, initial encounter - Left Foot    Arthritis of left ankle    I discussed ankle sprain with pt and the different grades/severity of sprain and different ligaments that can be torn.  I also discussed that most ankle sprains are treated conservatively and the pt does well.  Occasionally some sprains do not heal normally and there is insatbility of the joint leading to pain and possible arthritis.  these are usually evaluated by MRI, stress test.    Patient was educated about the entire pre, phyllis and post-operative time period.  They  were educated about the pro's and con's of surgery.  All conservative measures have been exhausted. Patient understands the particular risks involved which may occur in connection with the procedure proposed including pain, swelling, infection, stiffness, decreased ROM, recurrence, rejection, numbness, delayed healing, scar formation.    Patient was educated that their diagnosis may be surgically treated.  The patient's problem will probably advance and usually will not get better without surgery.  All of the pre-operative treatment plans have been exhausted or will no longer be successful at this point in time.  Patient was told of the possible outcomes and expectations of the surgical procedure.  They  will need to be followed-up post-operatively.  Today, pictures were drawn, questions answered.       We discussed the following surgical procedures:  Repair of anterior talofibular ligament with Arthrex internal brace.  Patient will consider this option if he fails to respond to conservative care and physical therapy.  He will contact my office if he decides to do the surgery and then he will get a medical clearance by his MD.  If we do not do surgery than heel just return as needed.  I told there is no rush on my part it is completely up to him and amount of disabilities having with the ankle.    No follow-ups on file.    Procedures - None    Counseling:     I provided patient education verbally regarding:   Patient diagnosis, treatment options, as well as alternatives, risks, and benefits.     This note was created using Dragon voice recognition software that occasionally misinterpreted phrases or words.

## 2020-02-03 ENCOUNTER — TELEPHONE (OUTPATIENT)
Dept: PODIATRY | Facility: CLINIC | Age: 70
End: 2020-02-03

## 2020-02-03 DIAGNOSIS — S93.492D HIGH ANKLE SPRAIN OF LEFT LOWER EXTREMITY, SUBSEQUENT ENCOUNTER: ICD-10-CM

## 2020-02-03 NOTE — TELEPHONE ENCOUNTER
Patient came by to the office and expressed that the last time Dr. Somers saw him in the office he recommended an ankle brace. Patient received ankle brace.

## 2020-02-07 RX ORDER — CARVEDILOL 12.5 MG/1
12.5 TABLET ORAL 2 TIMES DAILY WITH MEALS
Qty: 60 TABLET | Refills: 6 | Status: SHIPPED | OUTPATIENT
Start: 2020-02-07 | End: 2020-11-09

## 2020-02-07 RX ORDER — CARVEDILOL 12.5 MG/1
12.5 TABLET ORAL 2 TIMES DAILY WITH MEALS
Qty: 60 TABLET | Refills: 5 | OUTPATIENT
Start: 2020-02-07

## 2020-02-07 NOTE — TELEPHONE ENCOUNTER
----- Message from Carol Jama MD sent at 2/7/2020  3:21 PM CST -----  A got request for refill on carvedilol however when I tried it is saying that he already on labetalol old normal dyne so we need to check which when he is on correlation not be on 2 beta-blocker

## 2020-02-10 ENCOUNTER — TELEPHONE (OUTPATIENT)
Dept: CARDIOLOGY | Facility: CLINIC | Age: 70
End: 2020-02-10

## 2020-02-10 DIAGNOSIS — I25.10 MILD CAD: ICD-10-CM

## 2020-02-10 DIAGNOSIS — I11.9 HYPERTENSIVE HEART DISEASE WITHOUT HEART FAILURE: Primary | ICD-10-CM

## 2020-02-10 DIAGNOSIS — I34.0 NON-RHEUMATIC MITRAL REGURGITATION: ICD-10-CM

## 2020-02-10 NOTE — TELEPHONE ENCOUNTER
----- Message from Gena Pappas sent at 2/10/2020  1:14 PM CST -----  Contact: Pt  Type: Needs Medical Advice    Who Called:  Pt  Best Call Back Number: [phone]  Additional Information: Pt is requesting a call from Nurse Barbour regarding questions he have about orders for blood work. Please call pt [phone] and advise.

## 2020-02-11 ENCOUNTER — LAB VISIT (OUTPATIENT)
Dept: LAB | Facility: HOSPITAL | Age: 70
End: 2020-02-11
Attending: INTERNAL MEDICINE
Payer: MEDICARE

## 2020-02-11 DIAGNOSIS — I25.10 MILD CAD: ICD-10-CM

## 2020-02-11 DIAGNOSIS — I34.0 NON-RHEUMATIC MITRAL REGURGITATION: ICD-10-CM

## 2020-02-11 DIAGNOSIS — E78.5 DYSLIPIDEMIA: ICD-10-CM

## 2020-02-11 DIAGNOSIS — I11.9 HYPERTENSIVE HEART DISEASE WITHOUT HEART FAILURE: ICD-10-CM

## 2020-02-11 LAB
ALBUMIN SERPL BCP-MCNC: 3.1 G/DL (ref 3.5–5.2)
ALP SERPL-CCNC: 76 U/L (ref 55–135)
ALT SERPL W/O P-5'-P-CCNC: 25 U/L (ref 10–44)
ANION GAP SERPL CALC-SCNC: 7 MMOL/L (ref 8–16)
AST SERPL-CCNC: 36 U/L (ref 10–40)
BILIRUB SERPL-MCNC: 0.6 MG/DL (ref 0.1–1)
BUN SERPL-MCNC: 11 MG/DL (ref 8–23)
CALCIUM SERPL-MCNC: 8.6 MG/DL (ref 8.7–10.5)
CHLORIDE SERPL-SCNC: 107 MMOL/L (ref 95–110)
CHOLEST SERPL-MCNC: 100 MG/DL (ref 120–199)
CHOLEST/HDLC SERPL: 3.2 {RATIO} (ref 2–5)
CO2 SERPL-SCNC: 28 MMOL/L (ref 23–29)
CREAT SERPL-MCNC: 0.8 MG/DL (ref 0.5–1.4)
EST. GFR  (AFRICAN AMERICAN): >60 ML/MIN/1.73 M^2
EST. GFR  (NON AFRICAN AMERICAN): >60 ML/MIN/1.73 M^2
GLUCOSE SERPL-MCNC: 119 MG/DL (ref 70–110)
HDLC SERPL-MCNC: 31 MG/DL (ref 40–75)
HDLC SERPL: 31 % (ref 20–50)
LDLC SERPL CALC-MCNC: 29.8 MG/DL (ref 63–159)
NONHDLC SERPL-MCNC: 69 MG/DL
POTASSIUM SERPL-SCNC: 3.4 MMOL/L (ref 3.5–5.1)
PROT SERPL-MCNC: 6.2 G/DL (ref 6–8.4)
SODIUM SERPL-SCNC: 142 MMOL/L (ref 136–145)
TRIGL SERPL-MCNC: 196 MG/DL (ref 30–150)

## 2020-02-11 PROCEDURE — 80053 COMPREHEN METABOLIC PANEL: CPT

## 2020-02-11 PROCEDURE — 36415 COLL VENOUS BLD VENIPUNCTURE: CPT

## 2020-02-11 PROCEDURE — 80061 LIPID PANEL: CPT

## 2020-02-14 ENCOUNTER — LAB VISIT (OUTPATIENT)
Dept: LAB | Facility: HOSPITAL | Age: 70
End: 2020-02-14
Attending: INTERNAL MEDICINE
Payer: MEDICARE

## 2020-02-14 ENCOUNTER — OFFICE VISIT (OUTPATIENT)
Dept: CARDIOLOGY | Facility: CLINIC | Age: 70
End: 2020-02-14
Payer: MEDICARE

## 2020-02-14 VITALS
HEART RATE: 64 BPM | SYSTOLIC BLOOD PRESSURE: 136 MMHG | DIASTOLIC BLOOD PRESSURE: 71 MMHG | WEIGHT: 227.31 LBS | HEIGHT: 66 IN | BODY MASS INDEX: 36.53 KG/M2

## 2020-02-14 DIAGNOSIS — I34.0 NON-RHEUMATIC MITRAL REGURGITATION: ICD-10-CM

## 2020-02-14 DIAGNOSIS — I11.9 HYPERTENSIVE HEART DISEASE WITHOUT HEART FAILURE: Primary | ICD-10-CM

## 2020-02-14 DIAGNOSIS — E66.01 SEVERE OBESITY (BMI 35.0-39.9) WITH COMORBIDITY: ICD-10-CM

## 2020-02-14 DIAGNOSIS — E78.5 DYSLIPIDEMIA: ICD-10-CM

## 2020-02-14 DIAGNOSIS — E87.6 HYPOKALEMIA: ICD-10-CM

## 2020-02-14 DIAGNOSIS — I25.10 MILD CAD: ICD-10-CM

## 2020-02-14 PROCEDURE — 99214 PR OFFICE/OUTPT VISIT, EST, LEVL IV, 30-39 MIN: ICD-10-PCS | Mod: S$PBB,,, | Performed by: INTERNAL MEDICINE

## 2020-02-14 PROCEDURE — 99999 PR PBB SHADOW E&M-EST. PATIENT-LVL V: ICD-10-PCS | Mod: PBBFAC,,, | Performed by: INTERNAL MEDICINE

## 2020-02-14 PROCEDURE — 99215 OFFICE O/P EST HI 40 MIN: CPT | Mod: PBBFAC,PO | Performed by: INTERNAL MEDICINE

## 2020-02-14 PROCEDURE — 82088 ASSAY OF ALDOSTERONE: CPT

## 2020-02-14 PROCEDURE — 99214 OFFICE O/P EST MOD 30 MIN: CPT | Mod: S$PBB,,, | Performed by: INTERNAL MEDICINE

## 2020-02-14 PROCEDURE — 36415 COLL VENOUS BLD VENIPUNCTURE: CPT | Mod: PO

## 2020-02-14 PROCEDURE — 99999 PR PBB SHADOW E&M-EST. PATIENT-LVL V: CPT | Mod: PBBFAC,,, | Performed by: INTERNAL MEDICINE

## 2020-02-14 RX ORDER — AMLODIPINE BESYLATE 5 MG/1
5 TABLET ORAL DAILY
Qty: 90 TABLET | Refills: 1 | Status: SHIPPED | OUTPATIENT
Start: 2020-02-14 | End: 2020-08-14 | Stop reason: DRUGHIGH

## 2020-02-14 RX ORDER — PANTOPRAZOLE SODIUM 40 MG/1
40 TABLET, DELAYED RELEASE ORAL DAILY
COMMUNITY
End: 2020-11-21 | Stop reason: SDUPTHER

## 2020-02-14 NOTE — PROGRESS NOTES
Subjective:    Patient ID:  Chris Hill Jr is a 69 y.o. male who presents for Hypertension (labs); Hyperlipidemia; and Valvular Heart Disease        HPI    DISCUSSED LABS AND GOALS LDL 29, OFF HCTZ B/O LOW K, STILL 3.4, DOING OK, ON HORMONAL TX, PSA OK, OVERALL DOING WELL NO CHEST PAIN NO SHORTNESS OF BREATH NO TIA TYPE SYMPTOMS NO NEAR-SYNCOPE, SEE ROS  Past Medical History:   Diagnosis Date    Anemia, chronic disease 12/28/2018    Coronary artery disease     mild    Diabetes mellitus     Heart murmur     Hyperlipidemia     Hypertension     Normocytic normochromic anemia 12/28/2018    Prostate cancer 8/20/2018    Sleep apnea     NOT USING [facility]    Thrombocytopenia 12/28/2018    Valvular regurgitation      Past Surgical History:   Procedure Laterality Date    BACK SURGERY  09/2019    CARDIAC CATHETERIZATION      EYE SURGERY      cataracts bilateral    TRANSRECTAL ULTRASOUND OF PROSTATE WITH INSERTION OF GOLD FIDUCIAL MARKER N/A 10/4/2018    Procedure: ULTRASOUND, PROSTATE, TRANSPERINEAL APPROACH, WITH GOLD FIDUCIAL MARKER INSERTION (with SPACEOAR transperineal biodegradable gel placement);  Surgeon: Manpreet Gordon MD;  Location: [facility];  Service: Urology;  Laterality: N/A;     Family History   Problem Relation Age of Onset    Heart disease Mother     Heart disease Father      Social History     Socioeconomic History    Marital status:      Spouse name: Not on file    Number of children: Not on file    Years of education: Not on file    Highest education level: Not on file   Occupational History    Not on file   Social Needs    Financial resource strain: Not on file    Food insecurity:     Worry: Not on file     Inability: Not on file    Transportation needs:     Medical: Not on file     Non-medical: Not on file   Tobacco Use    Smoking status: Never Smoker    Smokeless tobacco: Never Used   Substance and Sexual Activity    Alcohol use: No    Drug use: No    Sexual  activity: Not on file   Lifestyle    Physical activity:     Days per week: Not on file     Minutes per session: Not on file    Stress: Not on file   Relationships    Social connections:     Talks on phone: Not on file     Gets together: Not on file     Attends Protestant service: Not on file     Active member of club or organization: Not on file     Attends meetings of clubs or organizations: Not on file     Relationship status: Not on file   Other Topics Concern    Not on file   Social History Narrative    Not on file       Review of patient's allergies indicates:  No Known Allergies    Current Outpatient Medications:     amitriptyline (ELAVIL) 50 MG tablet, Take 50 mg by mouth every evening., Disp: , Rfl:     amLODIPine (NORVASC) 5 MG tablet, Take 1 tablet (5 mg total) by mouth once daily., Disp: 90 tablet, Rfl: 1    aspirin (ECOTRIN) 81 MG EC tablet, Take 81 mg by mouth once daily., Disp: , Rfl:     carvediloL (COREG) 12.5 MG tablet, Take 1 tablet (12.5 mg total) by mouth 2 (two) times daily with meals., Disp: 60 tablet, Rfl: 6    co-enzyme Q-10 30 mg capsule, Take 30 mg by mouth 3 (three) times daily., Disp: , Rfl:     fish oil-omega-3 fatty acids 300-1,000 mg capsule, Take 1 capsule by mouth 2 (two) times daily., Disp: , Rfl:     hydrALAZINE (APRESOLINE) 100 MG tablet, TAKE 1 TABLET BY MOUTH TWICE DAILY, Disp: 180 tablet, Rfl: 1    irbesartan (AVAPRO) 300 MG tablet, Take 300 mg by mouth every evening., Disp: , Rfl:     metFORMIN (GLUCOPHAGE-XR) 500 MG 24 hr tablet, Take 500 mg by mouth 2 (two) times daily with meals. , Disp: , Rfl:     mupirocin (BACTROBAN) 2 % ointment, APPLY A SMALL AMOUNT TOPICALLY THREE TIMES DAILY, Disp: , Rfl: 4    ONETOUCH VERIO Strp, USE 1 STRIP TO CHECK GLUCOSE TWICE DAILY, Disp: , Rfl: 1    oxybutynin (DITROPAN) 5 MG Tab, Take 5 mg by mouth 2 (two) times daily., Disp: , Rfl: 11    oxybutynin (DITROPAN) 5 MG Tab, TAKE 1 TABLET BY MOUTH TWICE DAILY, Disp: 60 tablet,  Rfl: 11    pantoprazole (PROTONIX) 40 MG tablet, Take 40 mg by mouth once daily., Disp: , Rfl: 1    pantoprazole (PROTONIX) 40 MG tablet, Take 40 mg by mouth once daily., Disp: , Rfl:     potassium chloride (KLOR-CON) 20 mEq Pack, Take 20 mEq by mouth 2 (two) times daily. , Disp: , Rfl:     rosuvastatin (CRESTOR) 10 MG tablet, TAKE 1 TABLET BY MOUTH ONCE DAILY, Disp: 90 tablet, Rfl: 1    tamsulosin (FLOMAX) 0.4 mg Cap, Take 1 capsule (0.4 mg total) by mouth every evening., Disp: 30 capsule, Rfl: 11    terazosin (HYTRIN) 10 MG capsule, TAKE 1 CAPSULE BY MOUTH ONCE DAILY IN THE EVENING, Disp: 30 capsule, Rfl: 6    clonazePAM (KLONOPIN) 0.5 MG tablet, clonazepam 0.5 mg tablet, Disp: , Rfl:     diclofenac sodium (VOLTAREN) 1 % Gel, Voltaren 1 % topical gel, Disp: , Rfl:     fenofibrate 150 mg Cap, fenofibrate 150 mg capsule, Disp: , Rfl:     fluticasone propionate (FLONASE) 50 mcg/actuation nasal spray, fluticasone propionate 50 mcg/actuation nasal spray,suspension, Disp: , Rfl:     gabapentin (NEURONTIN) 300 MG capsule, TAKE 1 CAPSULE BY MOUTH THREE TIMES DAILY BEGIN  WITH  NIGHTLY  DOSE  TO  ASSESS  BENEFITS  BEFORE  STARTING  THREE  TIMES  A  DAY (Patient not taking: Reported on 2/14/2020), Disp: 30 capsule, Rfl: 0    HYDROcodone-acetaminophen (NORCO) 7.5-325 mg per tablet, Take 1 tablet by mouth every 4 (four) hours as needed for Pain. (Patient not taking: Reported on 2/14/2020), Disp: 18 tablet, Rfl: 0    hydrOXYzine HCl (ATARAX) 25 MG tablet, hydroxyzine HCl 25 mg tablet, Disp: , Rfl:     levoFLOXacin (LEVAQUIN) 500 MG tablet, Take 500 mg by mouth once daily., Disp: , Rfl: 0    meloxicam (MOBIC) 7.5 MG tablet, meloxicam 7.5 mg tablet, Disp: , Rfl:     olmesartan-hydrochlorothiazide (BENICAR HCT) 20-12.5 mg per tablet, Benicar HCT 20 mg-12.5 mg tablet, Disp: , Rfl:     omeprazole (PRILOSEC) 40 MG capsule, Take 40 mg by mouth once daily., Disp: , Rfl:     oxyCODONE-acetaminophen (PERCOCET)   mg per tablet, Take 1 tablet by mouth every 6 (six) hours as needed., Disp: , Rfl: 0    temazepam (RESTORIL) 15 mg Cap, TAKE 1 CAPSULE BY MOUTH ONCE DAILY AT BEDTIME, Disp: , Rfl: 3    traMADol (ULTRAM) 50 mg tablet, tramadol 50 mg tablet, Disp: , Rfl:     valACYclovir (VALTREX) 1000 MG tablet, TAKE 2 TABLETS BY MOUTH NOW AND 2 TABLETS BY MOUTH EVERY 12 HOURS, Disp: , Rfl: 5    valsartan-hydrochlorothiazide (DIOVAN-HCT) 320-25 mg per tablet, valsartan 320 mg-hydrochlorothiazide 25 mg tablet, Disp: , Rfl:     Review of Systems   Constitution: Negative for chills, diaphoresis, fever, malaise/fatigue and night sweats. Weight loss: SOME.   HENT: Negative for congestion and nosebleeds.    Eyes: Negative for blurred vision and visual disturbance (FLOATERS).   Cardiovascular: Negative for chest pain, claudication, cyanosis, dyspnea on exertion (MILD), irregular heartbeat, leg swelling, near-syncope, orthopnea, palpitations, paroxysmal nocturnal dyspnea and syncope.   Respiratory: Negative for cough, hemoptysis, shortness of breath and wheezing.    Endocrine: Negative for cold intolerance, heat intolerance and polyuria.   Hematologic/Lymphatic: Negative for adenopathy and bleeding problem.   Skin: Negative for color change, itching and nail changes.   Musculoskeletal: Negative for back pain (CHRONIC) and falls.   Gastrointestinal: Negative for abdominal pain, change in bowel habit, heartburn, hematemesis, jaundice, melena and vomiting.   Genitourinary: Negative for dysuria, flank pain and frequency.   Neurological: Negative for brief paralysis, dizziness, light-headedness, loss of balance, numbness (LEGS FROM BACK), paresthesias, sensory change, tremors and weakness.   Psychiatric/Behavioral: Negative for altered mental status, depression and memory loss. The patient is not nervous/anxious.    Allergic/Immunologic: Negative for hives.        Objective:      Vitals:    02/14/20 1033   BP: 136/71   Pulse: 64   Weight:  "103.1 kg (227 lb 4.7 oz)   Height: 5' 6" (1.676 m)   PainSc:   4     Body mass index is 36.69 kg/m².    Physical Exam   Constitutional: He is oriented to person, place, and time. He appears well-developed and well-nourished. He is active.   OBESE   HENT:   Head: Normocephalic and atraumatic.   Mouth/Throat: Oropharynx is clear and moist and mucous membranes are normal.   Eyes: Pupils are equal, round, and reactive to light. Conjunctivae and EOM are normal.   Neck: Normal range of motion. Neck supple. Normal carotid pulses, no hepatojugular reflux and no JVD present. Carotid bruit is not present. No edema and no erythema present. No thyromegaly present.   Cardiovascular: Normal rate and regular rhythm.  No extrasystoles are present. PMI is not displaced. Exam reveals no gallop, no distant heart sounds, no friction rub and no midsystolic click.   Murmur heard.  High-pitched holosystolic murmur is present at the apex.  Pulses:       Carotid pulses are 2+ on the right side, and 2+ on the left side.       Radial pulses are 2+ on the right side, and 2+ on the left side.        Femoral pulses are 2+ on the right side, and 2+ on the left side.       Dorsalis pedis pulses are 2+ on the right side, and 2+ on the left side.        Posterior tibial pulses are 2+ on the right side, and 2+ on the left side.   Pulmonary/Chest: Effort normal and breath sounds normal. No accessory muscle usage. No tachypnea and no bradypnea. No respiratory distress.   Abdominal: Soft. Bowel sounds are normal. He exhibits no distension and no mass. There is no hepatosplenomegaly. There is no CVA tenderness.   Musculoskeletal: Normal range of motion. He exhibits no edema or deformity.   Lymphadenopathy:     He has no cervical adenopathy.   Neurological: He is alert and oriented to person, place, and time. He has normal strength. He displays no tremor. No cranial nerve deficit.   Skin: Skin is warm and dry. No cyanosis or erythema. No pallor. "   Psychiatric: He has a normal mood and affect. His speech is normal and behavior is normal.               ..    Chemistry        Component Value Date/Time     02/11/2020 0902     04/12/2019 1022    K 3.4 (L) 02/11/2020 0902     02/11/2020 0902    CL 99 04/12/2019 1022    CO2 28 02/11/2020 0902    BUN 11 02/11/2020 0902    CREATININE 0.8 02/11/2020 0902    CREATININE 0.72 04/12/2019 1022     (H) 02/11/2020 0902     (H) 04/12/2019 1022        Component Value Date/Time    CALCIUM 8.6 (L) 02/11/2020 0902    ALKPHOS 76 02/11/2020 0902    AST 36 02/11/2020 0902    ALT 25 02/11/2020 0902    BILITOT 0.6 02/11/2020 0902    ESTGFRAFRICA >60 02/11/2020 0902    EGFRNONAA >60 02/11/2020 0902            ..  Lab Results   Component Value Date    CHOL 100 (L) 02/11/2020    CHOL 94 (L) 10/16/2019    CHOL 137 01/11/2019     Lab Results   Component Value Date    HDL 31 (L) 02/11/2020    HDL 34 (L) 10/16/2019    HDL 48 01/11/2019     Lab Results   Component Value Date    LDLCALC 29.8 (L) 02/11/2020    LDLCALC 12.0 (L) 10/16/2019    LDLCALC 49.2 (L) 01/11/2019     Lab Results   Component Value Date    TRIG 196 (H) 02/11/2020    TRIG 240 (H) 10/16/2019    TRIG 199 (H) 01/11/2019     Lab Results   Component Value Date    CHOLHDL 31.0 02/11/2020    CHOLHDL 36.2 10/16/2019    CHOLHDL 35.0 01/11/2019     ..  Lab Results   Component Value Date    WBC 5.38 10/11/2019    HGB 11.3 (L) 10/11/2019    HCT 34.7 (L) 10/11/2019    MCV 83 10/11/2019     10/11/2019       Test(s) Reviewed  I have reviewed the following in detail:  [] Stress test   [] Angiography   [] Echocardiogram   [x] Labs   [] Other:       Assessment:         ICD-10-CM ICD-9-CM   1. Hypertensive heart disease without heart failure I11.9 402.90   2. Non-rheumatic mitral regurgitation I34.0 424.0   3. Hypokalemia E87.6 276.8   4. Mild CAD I25.10 414.00   5. Dyslipidemia E78.5 272.4   6. Severe obesity (BMI 35.0-39.9) with comorbidity E66.01  278.01     Problem List Items Addressed This Visit        Cardiac/Vascular    Hypertensive heart disease without heart failure - Primary    Non-rheumatic mitral regurgitation    Mild CAD    Dyslipidemia       Renal/    Hypokalemia    Relevant Orders    ALDOSTERONE       Endocrine    Severe obesity (BMI 35.0-39.9) with comorbidity           Plan:         CHECK ALDOSTERONE LEVEL IN VIEW OF UNPROVOKED HYPOKALEMIA DESPITE SUPPLEMENT.ALL OTHER CV CLINICALLY STABLE, NO ANGINA, NO HF, NO TIA, NO CLINICAL ARRHYTHMIA,CONTINUE CURRENT MEDS, EDUCATION, DIET, EXERCISE, WEIGHT LOSS, RETURN TO CLINIC IN 8 TO 9 MO, DISCUSSED PLAN WITH THE PATIENT AND HIS WIFE  Hypertensive heart disease without heart failure    Non-rheumatic mitral regurgitation    Hypokalemia  -     ALDOSTERONE; Future; Expected date: 02/14/2020    Mild CAD    Dyslipidemia    Severe obesity (BMI 35.0-39.9) with comorbidity    Other orders  -     amLODIPine (NORVASC) 5 MG tablet; Take 1 tablet (5 mg total) by mouth once daily.  Dispense: 90 tablet; Refill: 1    RTC Low level/low impact aerobic exercise 5x's/wk. Heart healthy diet and risk factor modification.    See labs and med orders.    Aerobic exercise 5x's/wk. Heart healthy diet and risk factor modification.    See labs and med orders.

## 2020-02-19 LAB — ALDOST SERPL-MCNC: 18.8 NG/DL

## 2020-02-26 RX ORDER — ROSUVASTATIN CALCIUM 10 MG/1
TABLET, COATED ORAL
Qty: 90 TABLET | Refills: 1 | Status: SHIPPED | OUTPATIENT
Start: 2020-02-26 | End: 2020-04-22

## 2020-03-02 LAB
LEFT EYE DM RETINOPATHY: POSITIVE
RIGHT EYE DM RETINOPATHY: POSITIVE

## 2020-03-02 RX ORDER — TAMSULOSIN HYDROCHLORIDE 0.4 MG/1
CAPSULE ORAL
Qty: 30 CAPSULE | Refills: 11 | Status: SHIPPED | OUTPATIENT
Start: 2020-03-02 | End: 2020-04-22

## 2020-03-18 DIAGNOSIS — R13.10 DYSPHAGIA, UNSPECIFIED: Primary | ICD-10-CM

## 2020-03-30 RX ORDER — TERAZOSIN 10 MG/1
CAPSULE ORAL
Qty: 90 CAPSULE | Refills: 1 | Status: SHIPPED | OUTPATIENT
Start: 2020-03-30 | End: 2020-04-22

## 2020-04-08 ENCOUNTER — PATIENT MESSAGE (OUTPATIENT)
Dept: UROLOGY | Facility: CLINIC | Age: 70
End: 2020-04-08

## 2020-04-09 ENCOUNTER — LAB VISIT (OUTPATIENT)
Dept: LAB | Facility: HOSPITAL | Age: 70
End: 2020-04-09
Attending: INTERNAL MEDICINE
Payer: MEDICARE

## 2020-04-09 ENCOUNTER — TELEPHONE (OUTPATIENT)
Dept: HEMATOLOGY/ONCOLOGY | Facility: CLINIC | Age: 70
End: 2020-04-09

## 2020-04-09 DIAGNOSIS — D69.6 THROMBOCYTOPENIA: ICD-10-CM

## 2020-04-09 DIAGNOSIS — D64.9 NORMOCYTIC NORMOCHROMIC ANEMIA: ICD-10-CM

## 2020-04-09 DIAGNOSIS — C61 PROSTATE CANCER: ICD-10-CM

## 2020-04-09 DIAGNOSIS — D63.8 ANEMIA, CHRONIC DISEASE: ICD-10-CM

## 2020-04-09 LAB
ALBUMIN SERPL BCP-MCNC: 3.9 G/DL (ref 3.5–5.2)
ALP SERPL-CCNC: 71 U/L (ref 55–135)
ALT SERPL W/O P-5'-P-CCNC: 20 U/L (ref 10–44)
ANION GAP SERPL CALC-SCNC: 13 MMOL/L (ref 8–16)
AST SERPL-CCNC: 23 U/L (ref 10–40)
BASOPHILS # BLD AUTO: 0.03 K/UL (ref 0–0.2)
BASOPHILS NFR BLD: 0.5 % (ref 0–1.9)
BILIRUB SERPL-MCNC: 0.6 MG/DL (ref 0.1–1)
BUN SERPL-MCNC: 19 MG/DL (ref 8–23)
CALCIUM SERPL-MCNC: 9.4 MG/DL (ref 8.7–10.5)
CHLORIDE SERPL-SCNC: 103 MMOL/L (ref 95–110)
CO2 SERPL-SCNC: 22 MMOL/L (ref 23–29)
CREAT SERPL-MCNC: 0.8 MG/DL (ref 0.5–1.4)
DIFFERENTIAL METHOD: ABNORMAL
EOSINOPHIL # BLD AUTO: 0.6 K/UL (ref 0–0.5)
EOSINOPHIL NFR BLD: 9.1 % (ref 0–8)
ERYTHROCYTE [DISTWIDTH] IN BLOOD BY AUTOMATED COUNT: 15.3 % (ref 11.5–14.5)
EST. GFR  (AFRICAN AMERICAN): >60 ML/MIN/1.73 M^2
EST. GFR  (NON AFRICAN AMERICAN): >60 ML/MIN/1.73 M^2
FERRITIN SERPL-MCNC: 50 NG/ML (ref 20–300)
GLUCOSE SERPL-MCNC: 121 MG/DL (ref 70–110)
HCT VFR BLD AUTO: 34.9 % (ref 40–54)
HGB BLD-MCNC: 11.1 G/DL (ref 14–18)
IMM GRANULOCYTES # BLD AUTO: 0.03 K/UL (ref 0–0.04)
IMM GRANULOCYTES NFR BLD AUTO: 0.5 % (ref 0–0.5)
IRON SERPL-MCNC: 41 UG/DL (ref 45–160)
LYMPHOCYTES # BLD AUTO: 0.9 K/UL (ref 1–4.8)
LYMPHOCYTES NFR BLD: 15.2 % (ref 18–48)
MCH RBC QN AUTO: 26.3 PG (ref 27–31)
MCHC RBC AUTO-ENTMCNC: 31.8 G/DL (ref 32–36)
MCV RBC AUTO: 83 FL (ref 82–98)
MONOCYTES # BLD AUTO: 0.5 K/UL (ref 0.3–1)
MONOCYTES NFR BLD: 7.6 % (ref 4–15)
NEUTROPHILS # BLD AUTO: 4.2 K/UL (ref 1.8–7.7)
NEUTROPHILS NFR BLD: 67.1 % (ref 38–73)
NRBC BLD-RTO: 0 /100 WBC
PLATELET # BLD AUTO: 141 K/UL (ref 150–350)
PMV BLD AUTO: 12.7 FL (ref 9.2–12.9)
POTASSIUM SERPL-SCNC: 3.8 MMOL/L (ref 3.5–5.1)
PROT SERPL-MCNC: 7.2 G/DL (ref 6–8.4)
RBC # BLD AUTO: 4.22 M/UL (ref 4.6–6.2)
SATURATED IRON: 13 % (ref 20–50)
SODIUM SERPL-SCNC: 138 MMOL/L (ref 136–145)
TOTAL IRON BINDING CAPACITY: 328 UG/DL (ref 250–450)
TRANSFERRIN SERPL-MCNC: 234 MG/DL (ref 200–375)
WBC # BLD AUTO: 6.18 K/UL (ref 3.9–12.7)

## 2020-04-09 PROCEDURE — 82728 ASSAY OF FERRITIN: CPT

## 2020-04-09 PROCEDURE — 36415 COLL VENOUS BLD VENIPUNCTURE: CPT

## 2020-04-09 PROCEDURE — 80053 COMPREHEN METABOLIC PANEL: CPT

## 2020-04-09 PROCEDURE — 85025 COMPLETE CBC W/AUTO DIFF WBC: CPT

## 2020-04-09 PROCEDURE — 83540 ASSAY OF IRON: CPT

## 2020-04-09 NOTE — TELEPHONE ENCOUNTER
Spoke to Chris about changing his 04/13 appointment @ 12:45 to a virtual visit. He currently uses the [employer] on his cell phone so I have changed his appointment to a virtual visit. He will log in to make sure he can access the video visit and do the precheck questions. He said he would call if he had any questions or problems.

## 2020-04-12 NOTE — PROGRESS NOTES
[facility] Hematology/Oncology  PROGRESS NOTE -  Telemedicine Visit      Subjective:       Patient ID:   NAME: Chris Hill Jr. : 1950     69 y.o. male    Referring Doc: Tyrell  Other Physicians: Manpreet Gordon; Wiley; Cheyanne Alfaro        Chief Complaint:  prostate cancer; anem/tcp f/u         History of Present Illness:     Patient is being seen today via a telemedicine follow-up visit in lieu of a normal in-person visit due to the recent COVID19 outbreak. The patient is currently located at home. This visit type is a virtual visit with synchronous audio with video.      The patient is on today to go over the results of the recently ordered labs, tests and studies. He last saw Dr Santillan on 2020 and Dr Gordon on 2019. He is here by himself. He is doing ok. He previously had finished XRT. He denies any CP, SOB, HA's or N/V.  He is here by himself. He has some fatigue.     He had some swallowing issues and has been seeing Dr Alfaro with planned EGD in near future.     He sees Dr Gordon again on May 22nd and plans to get shot.       I discussed COVID19 precautions          ROS:   GEN: normal without any fever, night sweats or weight loss  HEENT: normal with no HA's, sore throat, stiff neck, changes in vision  CV: normal with no CP, SOB, PND, JANSEN or orthopnea  PULM: normal with no SOB, cough, hemoptysis, sputum or pleuritic pain  GI: normal with no abdominal pain, nausea, vomiting, constipation, diarrhea, melanotic stools, BRBPR, or hematemesis  : normal with no hematuria, dysuria  BREAST: normal with no mass, discharge, pain  SKIN: normal with no rash, erythema, bruising, or swelling    Allergies:  Review of patient's allergies indicates:  No Known Allergies    Medications:    Current Outpatient Medications:     amitriptyline (ELAVIL) 50 MG tablet, Take 50 mg by mouth every evening., Disp: , Rfl:     amLODIPine (NORVASC) 5 MG tablet, Take 1 tablet (5 mg total) by mouth once daily., Disp: 90  tablet, Rfl: 1    aspirin (ECOTRIN) 81 MG EC tablet, Take 81 mg by mouth once daily., Disp: , Rfl:     carvediloL (COREG) 12.5 MG tablet, Take 1 tablet (12.5 mg total) by mouth 2 (two) times daily with meals., Disp: 60 tablet, Rfl: 6    clonazePAM (KLONOPIN) 0.5 MG tablet, clonazepam 0.5 mg tablet, Disp: , Rfl:     co-enzyme Q-10 30 mg capsule, Take 30 mg by mouth 3 (three) times daily., Disp: , Rfl:     diclofenac sodium (VOLTAREN) 1 % Gel, Voltaren 1 % topical gel, Disp: , Rfl:     fenofibrate 150 mg Cap, fenofibrate 150 mg capsule, Disp: , Rfl:     fish oil-omega-3 fatty acids 300-1,000 mg capsule, Take 1 capsule by mouth 2 (two) times daily., Disp: , Rfl:     fluticasone propionate (FLONASE) 50 mcg/actuation nasal spray, fluticasone propionate 50 mcg/actuation nasal spray,suspension, Disp: , Rfl:     gabapentin (NEURONTIN) 300 MG capsule, TAKE 1 CAPSULE BY MOUTH THREE TIMES DAILY BEGIN  WITH  NIGHTLY  DOSE  TO  ASSESS  BENEFITS  BEFORE  STARTING  THREE  TIMES  A  DAY (Patient not taking: Reported on 2/14/2020), Disp: 30 capsule, Rfl: 0    hydrALAZINE (APRESOLINE) 100 MG tablet, TAKE 1 TABLET BY MOUTH TWICE DAILY, Disp: 180 tablet, Rfl: 1    HYDROcodone-acetaminophen (NORCO) 7.5-325 mg per tablet, Take 1 tablet by mouth every 4 (four) hours as needed for Pain. (Patient not taking: Reported on 2/14/2020), Disp: 18 tablet, Rfl: 0    hydrOXYzine HCl (ATARAX) 25 MG tablet, hydroxyzine HCl 25 mg tablet, Disp: , Rfl:     irbesartan (AVAPRO) 300 MG tablet, Take 300 mg by mouth every evening., Disp: , Rfl:     levoFLOXacin (LEVAQUIN) 500 MG tablet, Take 500 mg by mouth once daily., Disp: , Rfl: 0    meloxicam (MOBIC) 7.5 MG tablet, meloxicam 7.5 mg tablet, Disp: , Rfl:     metFORMIN (GLUCOPHAGE-XR) 500 MG 24 hr tablet, Take 500 mg by mouth 2 (two) times daily with meals. , Disp: , Rfl:     mupirocin (BACTROBAN) 2 % ointment, APPLY A SMALL AMOUNT TOPICALLY THREE TIMES DAILY, Disp: , Rfl: 4     olmesartan-hydrochlorothiazide (BENICAR HCT) 20-12.5 mg per tablet, Benicar HCT 20 mg-12.5 mg tablet, Disp: , Rfl:     omeprazole (PRILOSEC) 40 MG capsule, Take 40 mg by mouth once daily., Disp: , Rfl:     ONETOUCH VERIO Strp, USE 1 STRIP TO CHECK GLUCOSE TWICE DAILY, Disp: , Rfl: 1    oxybutynin (DITROPAN) 5 MG Tab, Take 5 mg by mouth 2 (two) times daily., Disp: , Rfl: 11    oxybutynin (DITROPAN) 5 MG Tab, TAKE 1 TABLET BY MOUTH TWICE DAILY, Disp: 60 tablet, Rfl: 11    oxyCODONE-acetaminophen (PERCOCET)  mg per tablet, Take 1 tablet by mouth every 6 (six) hours as needed., Disp: , Rfl: 0    pantoprazole (PROTONIX) 40 MG tablet, Take 40 mg by mouth once daily., Disp: , Rfl: 1    pantoprazole (PROTONIX) 40 MG tablet, Take 40 mg by mouth once daily., Disp: , Rfl:     potassium chloride (KLOR-CON) 20 mEq Pack, Take 20 mEq by mouth 2 (two) times daily. , Disp: , Rfl:     rosuvastatin (CRESTOR) 10 MG tablet, Take 1 tablet by mouth once daily, Disp: 90 tablet, Rfl: 1    tamsulosin (FLOMAX) 0.4 mg Cap, TAKE 1 CAPSULE BY MOUTH ONCE DAILY IN THE EVENING, Disp: 30 capsule, Rfl: 11    temazepam (RESTORIL) 15 mg Cap, TAKE 1 CAPSULE BY MOUTH ONCE DAILY AT BEDTIME, Disp: , Rfl: 3    terazosin (HYTRIN) 10 MG capsule, TAKE 1 CAPSULE BY MOUTH ONCE DAILY IN THE EVENING, Disp: 90 capsule, Rfl: 1    traMADol (ULTRAM) 50 mg tablet, tramadol 50 mg tablet, Disp: , Rfl:     valACYclovir (VALTREX) 1000 MG tablet, TAKE 2 TABLETS BY MOUTH NOW AND 2 TABLETS BY MOUTH EVERY 12 HOURS, Disp: , Rfl: 5    valsartan-hydrochlorothiazide (DIOVAN-HCT) 320-25 mg per tablet, valsartan 320 mg-hydrochlorothiazide 25 mg tablet, Disp: , Rfl:     PMHx/PSHx Updates:  See patient's last visit with me on 10/14/2019.  See H&P on 12/28/2018        Pathology:  Cancer Staging  No matching staging information was found for the patient.          Objective:     Vitals:  Afebrile   217#    Physical Examination:   GEN: no apparent distress,  comfortable; AAOx3  HEAD: atraumatic and normocephalic  EYES: no conjunctival pallor or muddiness, no icterus; normal pupil reaction to ambient light  ENT: OMM, no pharyngeal erythema, external bilateral ears WNL; no visible thrush or ulcers  NECK: no masses or swelling, trachea midline, no visible LAD/LN's   CV: no palpitations; no pedal edema; no noticeable JVD or neck vein distension  CHEST: Normal respiratory effort; chest wall breath movements symmetrical; no audible wheezing  ABDOM: non-distended; no bloating  MUSC/Skeletal: ROM normal; joints visibly normal; no deformities or arthropathy  EXTREM: no clubbing, cyanosis, inflammation or swelling  SKIN: no rashes, lesions, ulcers, petechiae or subcutaneous nodules  : no ellison  NEURO: moving all 4 extremities; AAOx3; no tremors  PSYCH: normal mood, affect and behavior  LYMPH: no visible LN's or LAD              Labs:       4/9/2020  Lab Results   Component Value Date    WBC 6.18 04/09/2020    HGB 11.1 (L) 04/09/2020    HCT 34.9 (L) 04/09/2020    MCV 83 04/09/2020     (L) 04/09/2020     CMP  Sodium   Date Value Ref Range Status   04/09/2020 138 136 - 145 mmol/L Final   04/12/2019 138 134 - 144 mmol/L      Potassium   Date Value Ref Range Status   04/09/2020 3.8 3.5 - 5.1 mmol/L Final     Chloride   Date Value Ref Range Status   04/09/2020 103 95 - 110 mmol/L Final   04/12/2019 99 98 - 110 mmol/L      CO2   Date Value Ref Range Status   04/09/2020 22 (L) 23 - 29 mmol/L Final     Glucose   Date Value Ref Range Status   04/09/2020 121 (H) 70 - 110 mg/dL Final   04/12/2019 117 (H) 70 - 99 mg/dL      BUN, Bld   Date Value Ref Range Status   04/09/2020 19 8 - 23 mg/dL Final     Creatinine   Date Value Ref Range Status   04/09/2020 0.8 0.5 - 1.4 mg/dL Final   04/12/2019 0.72 0.60 - 1.40 mg/dL      Calcium   Date Value Ref Range Status   04/09/2020 9.4 8.7 - 10.5 mg/dL Final     Total Protein   Date Value Ref Range Status   04/09/2020 7.2 6.0 - 8.4 g/dL Final      Albumin   Date Value Ref Range Status   04/09/2020 3.9 3.5 - 5.2 g/dL Final   04/12/2019 3.3 3.1 - 4.7 g/dL      Total Bilirubin   Date Value Ref Range Status   04/09/2020 0.6 0.1 - 1.0 mg/dL Final     Comment:     For infants and newborns, interpretation of results should be based  on gestational age, weight and in agreement with clinical  observations.  Premature Infant recommended reference ranges:  Up to 24 hours.............<8.0 mg/dL  Up to 48 hours............<12.0 mg/dL  3-5 days..................<15.0 mg/dL  6-29 days.................<15.0 mg/dL       Alkaline Phosphatase   Date Value Ref Range Status   04/09/2020 71 55 - 135 U/L Final     AST   Date Value Ref Range Status   04/09/2020 23 10 - 40 U/L Final     ALT   Date Value Ref Range Status   04/09/2020 20 10 - 44 U/L Final     Anion Gap   Date Value Ref Range Status   04/09/2020 13 8 - 16 mmol/L Final     eGFR if    Date Value Ref Range Status   04/09/2020 >60.0 >60 mL/min/1.73 m^2 Final     eGFR if non    Date Value Ref Range Status   04/09/2020 >60.0 >60 mL/min/1.73 m^2 Final     Comment:     Calculation used to obtain the estimated glomerular filtration  rate (eGFR) is the CKD-EPI equation.        Lab Results   Component Value Date    IRON 41 (L) 04/09/2020    TIBC 328 04/09/2020    FERRITIN 50 04/09/2020 11/11/2019  PSA DIAGNOSTIC 0.00 - 4.00 ng/mL <0.01            Radiology/Diagnostic Studies:    Us Abdomen Complete    Result Date: 1/2/2019  Abdominal ultrasound Clinical history is anemia, prostate cancer The pancreas, aorta and IVC are normal. The liver is hyperechoic compatible with fatty infiltration. There is hepatopedal flow within the portal vein. The common bile duct measures 6 mm. The patient has had a cholecystectomy. The kidneys are normal in size and echogenicity. The spleen is normal in size measuring 12 cm. IMPRESSION: Fatty infiltration of the liver otherwise negative abdominal ultrasound Read  and electronically signed by: Leydi Goldberg MD on 1/2/2019 9:49 AM CST LEYDI GOLDBERG MD      I have reviewed all available lab results and radiology reports.    Assessment/Plan:   (1) 69 y.o. male with diagnosis of prostate cancer who has been referred by Simone Santillan Jr., MD for evaluation by medical oncology. Patient with a high risk adenocarcinoma of the prostate with [id number] with GS of 4+5.   - he started androgen depravation therapy and monotherapy XRT (IMRT)  - followed by Dr Gordon with  and currently on Trelstar  - followed by Dr Santillan with Rad/onc and completed XRt on 12/18/2018  - latest PSA at 0.02 on 5/8/2019     (2) CAD and non-rheumatic MR; mild CVA in 1993; Hypertensive Heart disease - followed by Dr Jama with cardiology     (3) HTN and hypercholesterolemia     (4) LORA     (5) DM    (6) Chronic back pain issues - required recent lumbar surgery with Dr Chris Fofana [facility]     (7) Hx/of nightly fevers     (8) Chronic diarhhea - followed by Dr Alfaro with GI and s/p recent endoscopies     (9) Mild thrombocytopenia resolved with last platelet count down at 141,000  - no history of hepatitis or liver problems; no alcoholism  - he has positive antiplatelet antibody screens both direct and indirect consistent with an underlying chronic ITP condition  - his flow cytometry showed no evidence of leukemia but he did have a relative increase in eosinophils     (10) Mild anemia with latest hgb at 11.1 and stable   - NCNC parameters  - iron was a little low this time around;   ferritin was still good   - OBS was negative on 11/15  - hx/of colon polyps in past  - followed by Dr Alfaro with GI with planned repat endoscopies in near future                VISIT DIAGNOSES:      Prostate cancer    Normocytic normochromic anemia    Anemia, chronic disease    Thrombocytopenia          PLAN:  1. Proceed with rad/onc and  directives  2. Check CBC/plat/iron panel every 3 months for now  3. F/u with  PCP, , Rad/onc etc  4. Add ICAR-C daily po  5. F/u with GI for the planned endoscopies  4. RTC in 3 months  Fax note to Rob Santillan MD, and Wiley Gordon Albright    Discussion:       Total Time spent on patient:    I spent over 15 mins of time with the patient. Reviewed results of the recently ordered labs, tests, reports and studies; made directives with regards to the results. Over half of this time was spent couseling and coordinating care, making treatment and analytical decisions; ordering necessary labs, tests and studies; and discussing treatment options and setting up treatment plan(s) if indicated.        COVID-19 Discussion:    I had long discussion with patient and any applicable family about the COVID-19 coronavirus epidemic and the recommended precautions with regard to cancer and/or hematology patients. I have re-iterated the CDC recommendations for adequate hand washing, use of hand -like products, and coughing into elbow, etc. In addition, especially for our patients who are on chemotherapy and/or our otherwise immunocompromised patients, I have recommended avoidance of crowds, including movie theaters, restaurants, churches, etc. I have recommended avoidance of any sick or symptomatic family members and/or friends. I have also recommended avoidance of any raw and unwashed food products, and general avoidance of food items that have not been prepared by themselves. The patient has been asked to call us immediately with any symptom developments, issues, questions or other general concerns.       Telemedicine Statement:    The patient acknowledged and agreed to the audio/video encounter and the patient who is being provided medical services by telemedicine is:  (1) informed of the relationship between the physician and patient and the respective role of any other health care provider with respect to management of the patient; and (2) notified that he or she may decline to  receive medical services by telemedicine and may withdraw from such care at any time.      I have explained all of the above in detail and the patient understands all of the current recommendation(s). I have answered all of their questions to the best of my ability and to their complete satisfaction.   The patient is to continue with the current management plan.            Electronically signed by Cyrus Gibson MD        Answers for HPI/ROS submitted by the patient on 4/13/2020   appetite change : Yes  unexpected weight change: No  visual disturbance: Yes  cough: Yes  shortness of breath: Yes  chest pain: Yes  diarrhea: No  frequency: No  back pain: Yes  rash: No  headaches: No  adenopathy: No  nervous/ anxious: No

## 2020-04-13 ENCOUNTER — OFFICE VISIT (OUTPATIENT)
Dept: HEMATOLOGY/ONCOLOGY | Facility: CLINIC | Age: 70
End: 2020-04-13
Payer: MEDICARE

## 2020-04-13 DIAGNOSIS — D64.9 NORMOCYTIC NORMOCHROMIC ANEMIA: ICD-10-CM

## 2020-04-13 DIAGNOSIS — D69.6 THROMBOCYTOPENIA: ICD-10-CM

## 2020-04-13 DIAGNOSIS — C61 PROSTATE CANCER: Primary | ICD-10-CM

## 2020-04-13 DIAGNOSIS — D50.9 IRON DEFICIENCY ANEMIA, UNSPECIFIED IRON DEFICIENCY ANEMIA TYPE: ICD-10-CM

## 2020-04-13 DIAGNOSIS — D63.8 ANEMIA, CHRONIC DISEASE: ICD-10-CM

## 2020-04-13 PROCEDURE — 99213 OFFICE O/P EST LOW 20 MIN: CPT | Mod: 95,,, | Performed by: INTERNAL MEDICINE

## 2020-04-13 PROCEDURE — 99213 PR OFFICE/OUTPT VISIT, EST, LEVL III, 20-29 MIN: ICD-10-PCS | Mod: 95,,, | Performed by: INTERNAL MEDICINE

## 2020-04-13 RX ORDER — IRON,CARBONYL/ASCORBIC ACID 100-250 MG
1 TABLET ORAL DAILY
Qty: 30 EACH | Refills: 6 | Status: SHIPPED | OUTPATIENT
Start: 2020-04-13 | End: 2020-04-22

## 2020-04-22 ENCOUNTER — OFFICE VISIT (OUTPATIENT)
Dept: ORTHOPEDICS | Facility: CLINIC | Age: 70
End: 2020-04-22
Payer: MEDICARE

## 2020-04-22 VITALS
SYSTOLIC BLOOD PRESSURE: 120 MMHG | HEART RATE: 70 BPM | HEIGHT: 66 IN | WEIGHT: 227 LBS | DIASTOLIC BLOOD PRESSURE: 64 MMHG | BODY MASS INDEX: 36.48 KG/M2

## 2020-04-22 DIAGNOSIS — M25.541 ARTHRALGIA OF METACARPOPHALANGEAL JOINT, RIGHT: Primary | ICD-10-CM

## 2020-04-22 DIAGNOSIS — M65.331 TRIGGER MIDDLE FINGER OF RIGHT HAND: ICD-10-CM

## 2020-04-22 DIAGNOSIS — M79.645 FINGER PAIN, LEFT: ICD-10-CM

## 2020-04-22 DIAGNOSIS — M65.332 TRIGGER MIDDLE FINGER OF LEFT HAND: ICD-10-CM

## 2020-04-22 PROCEDURE — 20550 NJX 1 TENDON SHEATH/LIGAMENT: CPT | Mod: 50,S$GLB,, | Performed by: ORTHOPAEDIC SURGERY

## 2020-04-22 PROCEDURE — 20550 TENDON SHEATH: R LONG MCP: ICD-10-PCS | Mod: 50,S$GLB,, | Performed by: ORTHOPAEDIC SURGERY

## 2020-04-22 PROCEDURE — 99213 OFFICE O/P EST LOW 20 MIN: CPT | Mod: 25,S$GLB,, | Performed by: ORTHOPAEDIC SURGERY

## 2020-04-22 PROCEDURE — 99213 PR OFFICE/OUTPT VISIT, EST, LEVL III, 20-29 MIN: ICD-10-PCS | Mod: 25,S$GLB,, | Performed by: ORTHOPAEDIC SURGERY

## 2020-04-22 PROCEDURE — 20550 NJX 1 TENDON SHEATH/LIGAMENT: CPT | Mod: 51,RT,S$GLB, | Performed by: ORTHOPAEDIC SURGERY

## 2020-04-22 RX ORDER — METHYLPREDNISOLONE ACETATE 40 MG/ML
40 INJECTION, SUSPENSION INTRA-ARTICULAR; INTRALESIONAL; INTRAMUSCULAR; SOFT TISSUE
Status: DISCONTINUED | OUTPATIENT
Start: 2020-04-22 | End: 2020-04-22 | Stop reason: HOSPADM

## 2020-04-22 RX ADMIN — METHYLPREDNISOLONE ACETATE 40 MG: 40 INJECTION, SUSPENSION INTRA-ARTICULAR; INTRALESIONAL; INTRAMUSCULAR; SOFT TISSUE at 09:04

## 2020-04-22 NOTE — PROCEDURES
Small Joint Aspiration/Injection: R long PIP  Date/Time: 4/22/2020 9:45 AM  Performed by: Mitchell Sanders Jr., MD  Authorized by: Mitchell Sanders Jr., MD     Consent Done?:  Yes (Verbal)  Indications:  Pain  Site marked: the procedure site was marked    Timeout: prior to procedure the correct patient, procedure, and site was verified    Prep: patient was prepped and draped in usual sterile fashion      Local anesthesia used?: Yes    Location:  Long finger  Site:  R long PIP  Ultrasonic guidance for needle placement?: No    Needle size:  25 G  Medications:  40 mg methylPREDNISolone acetate 40 mg/mL  Patient tolerance:  Patient tolerated the procedure well with no immediate complications

## 2020-04-22 NOTE — PROGRESS NOTES
[facility]    Subjective:     Chief Complaint:   Chief Complaint   Patient presents with    Left Hand - Pain     Trigger finger- 2nd, 3rd, and 4rd digits. Onset 2 weeks ago. Symptoms getting worse- locking and pain    Right Hand - Pain       Past Medical History:   Diagnosis Date    Anemia, chronic disease 12/28/2018    Coronary artery disease     mild    Diabetes mellitus     Heart murmur     Hyperlipidemia     Hypertension     Normocytic normochromic anemia 12/28/2018    Prostate cancer 8/20/2018    Sleep apnea     NOT USING [facility]    Thrombocytopenia 12/28/2018    Valvular regurgitation        Past Surgical History:   Procedure Laterality Date    BACK SURGERY  09/2019    CARDIAC CATHETERIZATION      EYE SURGERY      cataracts bilateral    TRANSRECTAL ULTRASOUND OF PROSTATE WITH INSERTION OF GOLD FIDUCIAL MARKER N/A 10/4/2018    Procedure: ULTRASOUND, PROSTATE, TRANSPERINEAL APPROACH, WITH GOLD FIDUCIAL MARKER INSERTION (with SPACEOAR transperineal biodegradable gel placement);  Surgeon: Manpreet Gordon MD;  Location: [facility];  Service: Urology;  Laterality: N/A;       Current Outpatient Medications   Medication Sig    amitriptyline (ELAVIL) 50 MG tablet Take 50 mg by mouth every evening.    amLODIPine (NORVASC) 5 MG tablet Take 1 tablet (5 mg total) by mouth once daily.    aspirin (ECOTRIN) 81 MG EC tablet Take 81 mg by mouth once daily.    carvediloL (COREG) 12.5 MG tablet Take 1 tablet (12.5 mg total) by mouth 2 (two) times daily with meals.    clonazePAM (KLONOPIN) 0.5 MG tablet clonazepam 0.5 mg tablet    co-enzyme Q-10 30 mg capsule Take 30 mg by mouth 3 (three) times daily.    diclofenac sodium (VOLTAREN) 1 % Gel Voltaren 1 % topical gel    fenofibrate 150 mg Cap fenofibrate 150 mg capsule    fish oil-omega-3 fatty acids 300-1,000 mg capsule Take 1 capsule by mouth 2 (two) times daily.    fluticasone propionate (FLONASE) 50 mcg/actuation nasal spray fluticasone  propionate 50 mcg/actuation nasal spray,suspension    hydrALAZINE (APRESOLINE) 100 MG tablet TAKE 1 TABLET BY MOUTH TWICE DAILY    hydrOXYzine HCl (ATARAX) 25 MG tablet hydroxyzine HCl 25 mg tablet    irbesartan (AVAPRO) 300 MG tablet Take 300 mg by mouth every evening.    iron-vit c-vit b12-folic acid (IRON-C PLUS) tablet Iron 100 Plus 100 mg-250 mg-25 mcg-1 mg tablet   Take 1 tablet every day by oral route.    metFORMIN (GLUCOPHAGE-XR) 500 MG 24 hr tablet Take 500 mg by mouth 2 (two) times daily with meals.     mupirocin (BACTROBAN) 2 % ointment APPLY A SMALL AMOUNT TOPICALLY THREE TIMES DAILY    mv-mins/folic/lycopene/ginkgo (MENS 50+ DAILY FORMULA,GINGKO, ORAL) Mens 50+ Daily Formula(gingko)    ONETOUCH VERIO Strp USE 1 STRIP TO CHECK GLUCOSE TWICE DAILY    oxybutynin (DITROPAN) 5 MG Tab Take 5 mg by mouth 2 (two) times daily.    oxybutynin (DITROPAN) 5 MG Tab TAKE 1 TABLET BY MOUTH TWICE DAILY    pantoprazole (PROTONIX) 40 MG tablet Take 40 mg by mouth once daily.    pantoprazole (PROTONIX) 40 MG tablet Take 40 mg by mouth once daily.    potassium chloride (KLOR-CON) 20 mEq Pack Take 20 mEq by mouth 2 (two) times daily.     valsartan-hydrochlorothiazide (DIOVAN-HCT) 320-25 mg per tablet valsartan 320 mg-hydrochlorothiazide 25 mg tablet    valACYclovir (VALTREX) 1000 MG tablet TAKE 2 TABLETS BY MOUTH NOW AND 2 TABLETS BY MOUTH EVERY 12 HOURS     No current facility-administered medications for this visit.        Review of patient's allergies indicates:  No Known Allergies    Family History   Problem Relation Age of Onset    Heart disease Mother     Heart disease Father        Social History     Socioeconomic History    Marital status:      Spouse name: Not on file    Number of children: Not on file    Years of education: Not on file    Highest education level: Not on file   Occupational History    Not on file   Social Needs    Financial resource strain: Not on file    Food  insecurity:     Worry: Not on file     Inability: Not on file    Transportation needs:     Medical: Not on file     Non-medical: Not on file   Tobacco Use    Smoking status: Never Smoker    Smokeless tobacco: Never Used   Substance and Sexual Activity    Alcohol use: No    Drug use: No    Sexual activity: Not on file   Lifestyle    Physical activity:     Days per week: Not on file     Minutes per session: Not on file    Stress: Not on file   Relationships    Social connections:     Talks on phone: Not on file     Gets together: Not on file     Attends Orthodox service: Not on file     Active member of club or organization: Not on file     Attends meetings of clubs or organizations: Not on file     Relationship status: Not on file   Other Topics Concern    Not on file   Social History Narrative    Not on file       History of present illness:  So I this man has trigger finger left hand long finger.  We done some trigger finger surgery on her right hand before he has significant triggering left long finger this is a recurrence he has had injections before but none recently is right hand he also has some pain in the long finger and may be some extent trigger but there is also swelling of the 3rd MP joint.  Has had prior trigger finger surgery on the right hand      Review of Systems:    Constitution: Negative for chills, fever, and sweats.  Negative for unexplained weight loss.    HENT:  Negative for headaches and blurry vision.    Cardiovascular:Negative for chest pain or irregular heart beat. Negative for hypertension.    Respiratory:  Negative for cough and shortness of breath.    Gastrointestinal: Negative for abdominal pain, heartburn, melena, nausea, and vomitting.    Genitourinary:  Negative bladder incontinence and dysuria.    Musculoskeletal:  See HPI for details.     Neurological: Negative for numbness.    Psychiatric/Behavioral: Negative for depression.  The patient is not nervous/anxious.       Endocrine: Negative for polyuria    Hematologic/Lymphatic: Negative for bleeding problem.  Does not bruise/bleed easily.    Skin: Negative for poor would healing and rash    Objective:      Physical Examination:    Vital Signs:    Vitals:    04/22/20 0955   BP: 120/64   Pulse: 70       Body mass index is 36.64 kg/m².    This a well-developed, well nourished patient in no acute distress.  They are alert and oriented and cooperative to examination.        The left hand shows he clearly has a trigger at the base of the long finger on the left hand he can move it still.  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  He may need surgery on his left hand trigger at some point in future      This note was created using Dragon voice recognition software that occasionally misinterpreted phrases or words.

## 2020-04-22 NOTE — PROCEDURES
Tendon Sheath: R long PIP  Date/Time: 4/22/2020 9:45 AM  Performed by: Mitchell Sanders Jr., MD  Authorized by: Mitchell Sanders Jr., MD     Consent Done?:  Yes (Verbal)  Indications:  Pain  Site marked: the procedure site was marked    Timeout: prior to procedure the correct patient, procedure, and site was verified    Prep: patient was prepped and draped in usual sterile fashion      Location:  Long finger  Site:  R long PIP  Ultrasonic guidance for needle placement?: No    Needle size:  25 G  Medications:  40 mg methylPREDNISolone acetate 40 mg/mL  Patient tolerance:  Patient tolerated the procedure well with no immediate complications

## 2020-04-22 NOTE — PROCEDURES
Tendon Sheath: L long PIP  Date/Time: 4/22/2020 9:45 AM  Performed by: Mitchell Sanders Jr., MD  Authorized by: Mitchell Sanders Jr., MD     Consent Done?:  Yes (Verbal)  Indications:  Pain  Site marked: the procedure site was marked    Timeout: prior to procedure the correct patient, procedure, and site was verified    Prep: patient was prepped and draped in usual sterile fashion      Location:  Long finger  Site:  L long PIP  Ultrasonic guidance for needle placement?: No    Needle size:  25 G  Medications:  40 mg methylPREDNISolone acetate 40 mg/mL  Patient tolerance:  Patient tolerated the procedure well with no immediate complications

## 2020-04-22 NOTE — PROCEDURES
Small Joint Aspiration/Injection: L long PIP  Date/Time: 4/22/2020 9:45 AM  Performed by: Mitchell Sanders Jr., MD  Authorized by: Mitchell Sanders Jr., MD     Consent Done?:  Yes (Verbal)  Indications:  Pain  Site marked: the procedure site was marked    Timeout: prior to procedure the correct patient, procedure, and site was verified    Prep: patient was prepped and draped in usual sterile fashion      Local anesthesia used?: Yes    Location:  Long finger  Site:  L long PIP  Ultrasonic guidance for needle placement?: No    Needle size:  25 G  Medications:  40 mg methylPREDNISolone acetate 40 mg/mL  Patient tolerance:  Patient tolerated the procedure well with no immediate complications

## 2020-04-22 NOTE — PROCEDURES
Tendon Sheath: R long MCP  Date/Time: 4/22/2020 9:45 AM  Performed by: Mitchell Sanders Jr., MD  Authorized by: Mitchell Sanders Jr., MD     Consent Done?:  Yes (Verbal)  Indications:  Pain  Site marked: the procedure site was marked    Timeout: prior to procedure the correct patient, procedure, and site was verified    Prep: patient was prepped and draped in usual sterile fashion      Location:  Long finger  Site:  R long MCP  Ultrasonic guidance for needle placement?: No    Needle size:  25 G  Medications:  40 mg methylPREDNISolone acetate 40 mg/mL  Patient tolerance:  Patient tolerated the procedure well with no immediate complications

## 2020-04-22 NOTE — PROCEDURES
Small Joint Aspiration/Injection: R long MCP  Date/Time: 4/22/2020 9:45 AM  Performed by: Mitchell Sanders Jr., MD  Authorized by: Mitchell Sanders Jr., MD     Consent Done?:  Yes (Verbal)  Site marked: the procedure site was marked    Timeout: prior to procedure the correct patient, procedure, and site was verified    Prep: patient was prepped and draped in usual sterile fashion      Local anesthesia used?: Yes    Location:  Long finger  Site:  R long MCP  Ultrasonic guidance for needle placement?: No    Needle size:  25 G  Medications:  40 mg methylPREDNISolone acetate 40 mg/mL  Patient tolerance:  Patient tolerated the procedure well with no immediate complications

## 2020-04-23 ENCOUNTER — HOSPITAL ENCOUNTER (OUTPATIENT)
Dept: RADIOLOGY | Facility: HOSPITAL | Age: 70
Discharge: HOME | End: 2020-04-23
Attending: INTERNAL MEDICINE
Payer: MEDICARE

## 2020-04-23 DIAGNOSIS — R13.10 DYSPHAGIA, UNSPECIFIED: ICD-10-CM

## 2020-04-23 PROCEDURE — 74220 X-RAY XM ESOPHAGUS 1CNTRST: CPT | Mod: TC

## 2020-04-24 RX ORDER — HYDRALAZINE HYDROCHLORIDE 100 MG/1
TABLET, FILM COATED ORAL
Qty: 180 TABLET | Refills: 0 | Status: SHIPPED | OUTPATIENT
Start: 2020-04-24 | End: 2020-07-24

## 2020-05-14 DIAGNOSIS — R13.10 PROBLEMS WITH SWALLOWING AND MASTICATION: Primary | ICD-10-CM

## 2020-05-15 ENCOUNTER — LAB VISIT (OUTPATIENT)
Dept: LAB | Facility: HOSPITAL | Age: 70
End: 2020-05-15
Attending: UROLOGY
Payer: MEDICARE

## 2020-05-15 DIAGNOSIS — C61 PROSTATE CANCER: ICD-10-CM

## 2020-05-15 LAB
COMPLEXED PSA SERPL-MCNC: <0.01 NG/ML (ref 0–4)
TESTOST SERPL-MCNC: 7 NG/DL (ref 304–1227)

## 2020-05-15 PROCEDURE — 36415 COLL VENOUS BLD VENIPUNCTURE: CPT

## 2020-05-15 PROCEDURE — 84153 ASSAY OF PSA TOTAL: CPT

## 2020-05-15 PROCEDURE — 84403 ASSAY OF TOTAL TESTOSTERONE: CPT

## 2020-05-21 NOTE — PROGRESS NOTES
[facility] Note  Staff: ANGELO Crespo-C    PCP: Dr. Rob Bui    Chief Complaint: Follow-up: Prostate Cancer    Subjective:        HPI: Chris Hill Jr. is a 69 y.o. male presents today in clinic for prostate cancer follow-up and ADT renewal.    Hx of [place] 4 + 5 equals 9 prostate cancer (cT1c, iPSA 13.2) treated with androgen deprivation therapy external beam radiation.    He presented 10/4/18 for fiducial marker placement as well as SpaceOAR placement. Vol 33.4g at time of markers. At time of SpaceOar he did note daily diarrhea for weeks prior.  8/13/18 eligard 22.5 3 mos depot; 11/13/18 trelstar 22.5mg 6 mos depot.  - 2 weeks prior did interrupt his XRT for lumbar back surgery as his chronic back pain was limiting his ability to lay flat on radiation table. Only missed 2d of therapy.  He noted at that time his diarrhea had persisted. Using prn immodium     Completed XRT 12/18/18. PSA 0.1 on 12/13/18  Has followed up with Dr Gibson for chronic ITP and anemia, stable.  Has been followed by GI. Dr Tavares, with EGD 12/5/18 and colonoscopy 2/11/19 with a few cecal polyps removed and diverticulosis (repeat 3 yrs)     Dr Santillan on 1/9/19: Patient reports fatigue with energy level at 30%. Of note he is undergoing workup for low blood counts with Dr. Gibson.  He reports frequency, urgency, nocturia of urine are resolving however he continues with urgency of stool. He reports frequency has improved with Imodium. AUA SS 17/5 from 10/2     2/12/19: 2/6/19 PSA 0.02, T <4, Cr 0.8, AUA symptom score:  23/5 unhappy (5:  Intermittency, urgency; 4:  Emptying, frequency; 3:  Weak stream; 2:  Sleeping)  He did start oxybutynin 5 mg b.i.d. which has significantly helped decrease the hot flashes.  He still gets some though they are not as intense as they were before  Since starting it, and further away from radiation, his urgency is somewhat better.  He does have daytime frequency of 8-10  times at nighttime frequency of 1-2 times.  Having both urinary urgency and fecal urgency.  His diarrhea is somewhat better.  GI evaluation has been overall unrevealing of a etiology of chronic diarrhea.  He does report urinary intermittency but no urinary hesitancy. He did have back surgery after 22 radiation treatments, though he does note that his fecal and urinary urgency predated his back surgery. On Hytrin 10 mg in morning, so started Flomax 0.4 mg in the evening to have increased dose of alpha blocker.     5/25/19: improved voiding taking Hytrin in the morning and Flomax in the evening. His diarrhea has improved. Hot flashes went away a bit and well controlled with Ditropan b.i.d.  AUA symptom score:  9/1, please (3:  Emptying, urgency; 1:  Intermittency, weak stream, sleeping). 5/8/19: PSA  0.02 and T 10.  ADT renewed with Lupron 45mg     Last OV with MD (11/2019):  11/11/19: psa undetectable <0.01, T 7  Last saw Dr Gibson 10/14/19 noting mild stable anemia and stable low platelet count with evidence of chronic ITP  Urinary symptoms were stable but urgency has progresses  AUA symptom score:  15/3, mixed (5:  Urgency; 3:  Emptying, weak stream; 2:  Intermittency; 1:  Frequency, sleeping).  Medications helped 5/10  Currently taking Hytrin a.m., Flomax p.m., Ditropan 5 mg b.i.d.   Still has mild PV dribble and has minimal UUI  Alternates constipation/diarrhea - may have worse urinary symptoms when constipated  Lots of water during day - rare soda.  DTF 4-5x. 2/5 are urgent.  Had another back surgery 2 months ago  Urgency may be worse since then? hasnt considered  PVR by bladder scan 0cc.    TODAY:  Pt doing well since last visit/procedure with no complaints voiced.  Lupron 45 mg IM administered in clinic by me today-rt dorsogluteal.  He does have after injection intermittent hot flashes and decrease in appetite but overall doing well and tolerable.  No gross hematuria, No dysuria, No problems with  urination.    REVIEW OF SYSTEMS:  A comprehensive 10 system review was performed and is negative except as noted above in HPI    PMHx:  Past Medical History:   Diagnosis Date    Anemia, chronic disease 12/28/2018    Coronary artery disease     mild    Diabetes mellitus     Heart murmur     Hyperlipidemia     Hypertension     Normocytic normochromic anemia 12/28/2018    Prostate cancer 8/20/2018    Sleep apnea     NOT USING [facility]    Thrombocytopenia 12/28/2018    Valvular regurgitation      PSHx:  Past Surgical History:   Procedure Laterality Date    BACK SURGERY  09/2019    CARDIAC CATHETERIZATION      EYE SURGERY      cataracts bilateral    TRANSRECTAL ULTRASOUND OF PROSTATE WITH INSERTION OF GOLD FIDUCIAL MARKER N/A 10/4/2018    Procedure: ULTRASOUND, PROSTATE, TRANSPERINEAL APPROACH, WITH GOLD FIDUCIAL MARKER INSERTION (with SPACEOAR transperineal biodegradable gel placement);  Surgeon: Manpreet Gordon MD;  Location: [facility];  Service: Urology;  Laterality: N/A;     Allergies:  Patient has no known allergies.    Medications: reviewed   Objective:     Vitals:    05/22/20 0819   BP: (!) 172/82   Pulse: (!) 59   Resp: 18     General:WDWN in NAD  Eyes: PERRLA, normal conjunctiva  Respiratory: no increased work on breathing, clear to auscultation  Cardiovascular: regular rate and rhythm. No obvious extremity edema.  GI: palpation of masses. No tenderness. No hepatosplenomegaly to palpation.  Musculoskeletal: normal range of motion of bilateral upper extremities. Normal muscle strength and tone.  Skin: no obvious rashes or lesions. No tightening of skin noted.  Neurologic: CN grossly normal. Normal sensation.   Psychiatric: awake, alert and oriented x 3. Mood and affect normal. Cooperative.    Procedure Note:  Lupron 45 mg IM administered injection to pt's RIGHT dorsogluteal by me during ov today.  Pt tolerated procedure well.    Medication information-benefits, risks and side affects thoroughly  reviewed with pt during office visit today with all questions answered.    Assessment:       1. Prostate cancer          Plan:   Prostate CA:    PSA level and Testosterone to be done in 6 months.    F/u with Dr. Gordon in six months.    [facility]: Active, Mobile    Apolonia Tolentino, LINDAP-C

## 2020-05-22 ENCOUNTER — OFFICE VISIT (OUTPATIENT)
Dept: UROLOGY | Facility: CLINIC | Age: 70
End: 2020-05-22
Payer: MEDICARE

## 2020-05-22 VITALS
WEIGHT: 216.25 LBS | BODY MASS INDEX: 34.75 KG/M2 | HEART RATE: 59 BPM | RESPIRATION RATE: 18 BRPM | DIASTOLIC BLOOD PRESSURE: 82 MMHG | SYSTOLIC BLOOD PRESSURE: 172 MMHG | HEIGHT: 66 IN

## 2020-05-22 DIAGNOSIS — C61 PROSTATE CANCER: Primary | ICD-10-CM

## 2020-05-22 PROCEDURE — 99999 PR PBB SHADOW E&M-EST. PATIENT-LVL V: CPT | Mod: PBBFAC,,, | Performed by: NURSE PRACTITIONER

## 2020-05-22 PROCEDURE — 99214 OFFICE O/P EST MOD 30 MIN: CPT | Mod: S$PBB,25,, | Performed by: NURSE PRACTITIONER

## 2020-05-22 PROCEDURE — 99999 PR PBB SHADOW E&M-EST. PATIENT-LVL V: ICD-10-PCS | Mod: PBBFAC,,, | Performed by: NURSE PRACTITIONER

## 2020-05-22 PROCEDURE — 99214 PR OFFICE/OUTPT VISIT, EST, LEVL IV, 30-39 MIN: ICD-10-PCS | Mod: S$PBB,25,, | Performed by: NURSE PRACTITIONER

## 2020-05-22 PROCEDURE — 99215 OFFICE O/P EST HI 40 MIN: CPT | Mod: PBBFAC,PN | Performed by: NURSE PRACTITIONER

## 2020-05-22 RX ORDER — OLMESARTAN MEDOXOMIL 40 MG/1
40 TABLET ORAL DAILY
COMMUNITY
Start: 2020-05-11 | End: 2021-04-13 | Stop reason: SDUPTHER

## 2020-05-22 RX ADMIN — LEUPROLIDE ACETATE 45 MG: KIT at 08:05

## 2020-05-22 NOTE — LETTER
May 22, 2020      Manpreet Gordon MD  [address] [place] [address]   [address]  [facility] [address]           [facility]  [address] [place] [address] [address]  [facility] [address]  Phone: [phone]  Fax: [phone]          Patient: Chris Hill Jr.   MR Number: [id number]   YOB: 1950   Date of Visit: 5/22/2020       Dear Dr. Manpreet Gordon:    Thank you for referring Chris Hill to me for evaluation. Attached you will find relevant portions of my assessment and plan of care.    If you have questions, please do not hesitate to call me. I look forward to following Chris Hill along with you.    Sincerely,    Apolonia Tolentino, [facility]    Enclosure  CC:  No Recipients    If you would like to receive this communication electronically, please contact [email or web address] or [phone] to request more information on [facility] access.    For providers and/or their staff who would like to refer a patient to [facility], please contact us through our one-stop-shop provider referral line, [facility], at [phone].    If you feel you have received this communication in error or would no longer like to receive these types of communications, please e-mail [email or web address]

## 2020-07-08 ENCOUNTER — OFFICE VISIT (OUTPATIENT)
Dept: RADIATION ONCOLOGY | Facility: CLINIC | Age: 70
End: 2020-07-08
Payer: MEDICARE

## 2020-07-08 VITALS
HEART RATE: 58 BPM | OXYGEN SATURATION: 97 % | TEMPERATURE: 98 F | RESPIRATION RATE: 18 BRPM | SYSTOLIC BLOOD PRESSURE: 156 MMHG | WEIGHT: 208.88 LBS | DIASTOLIC BLOOD PRESSURE: 80 MMHG | BODY MASS INDEX: 33.72 KG/M2

## 2020-07-08 DIAGNOSIS — C61 PROSTATE CANCER: Primary | ICD-10-CM

## 2020-07-08 PROCEDURE — 99214 OFFICE O/P EST MOD 30 MIN: CPT | Mod: S$GLB,,, | Performed by: RADIOLOGY

## 2020-07-08 PROCEDURE — 99214 PR OFFICE/OUTPT VISIT, EST, LEVL IV, 30-39 MIN: ICD-10-PCS | Mod: S$GLB,,, | Performed by: RADIOLOGY

## 2020-07-08 RX ORDER — HYOSCYAMINE SULFATE 0.12 MG/1
0.12 TABLET SUBLINGUAL 3 TIMES DAILY
COMMUNITY
End: 2021-06-14

## 2020-07-08 NOTE — PROGRESS NOTES
Chris Hill Jr.  [id number]  1950  7/8/2020  No referring provider defined for this encounter.    DIAGNOSIS: High risk prostate adenocarcinoma, [id number] GS 4+5 iPSA 13.2  REASON FOR VISIT: Routine scheduled follow-up.    HISTORY OF PRESENT ILLNESS:   68M p/w elevated PSA.    PSA  6/18 12.5  7/18 13.2    *8/18 Dr. Stephens TRUS bx   - 3+3 adenoca R apex 5%; 4+3 adenoca L apex 55%; 4+5 adenoca L mid 80% and L base 95%   - no EPE; +PNI   - 4/6 cores+   - ADT placed    - RadOnc consult  *8/18 CT AP/Bone scan: jenniffer    Patient was placed on long-term androgen deprivation therapy by Dr. Gordon and completed definitive radiotherapy to 7920 cGy in December 2018. Treatment was complicated by an unexpected lower back surgery and detection of pancytopenia by his GI doctor. Patient developed frequency and urgency of both urine and stool throughout treatment, managed medically.    INTERVAL HISTORY:  Patient continues on hormone deprivation reporting fatigue and hot flashes; final depot placed.  He has nocturia x2.  He denies dysuria or hematuria.  Continues with mild urgency of urination.  He denies diarrhea or bright red blood per rectum.  In fact he has constipation now on iron supplementation.  He has recently developed significant reflux with EGD detecting a polyp, biopsy pending by Dr. Alfaro.    AUA 17 (10)  QOL 4 (2)  IIEF Na (24)    Review of Systems   Constitutional: Positive for fatigue. Negative for appetite change, chills, fever and unexpected weight change.   HENT:   Negative for lump/mass, mouth sores, sore throat, trouble swallowing and voice change.    Eyes: Negative for eye problems and icterus.   Respiratory: Negative for chest tightness, cough, hemoptysis and shortness of breath.    Cardiovascular: Negative for chest pain and leg swelling.   Gastrointestinal: Negative for abdominal distention, abdominal pain, blood in stool, constipation, nausea and vomiting.   Endocrine: Positive for hot flashes.    Genitourinary: Positive for nocturia. Negative for bladder incontinence, difficulty urinating, dysuria and hematuria.    Musculoskeletal: Positive for back pain. Negative for gait problem, neck pain and neck stiffness.   Neurological: Negative for extremity weakness, gait problem, headaches, numbness and seizures.   Hematological: Negative for adenopathy.     Past Medical History:   Diagnosis Date    Anemia, chronic disease 12/28/2018    Coronary artery disease     mild    Diabetes mellitus     GERD (gastroesophageal reflux disease)     Heart murmur     Hyperlipidemia     Hypertension     Normocytic normochromic anemia 12/28/2018    Prostate cancer 8/20/2018    Sleep apnea     NOT USING [facility]    Thrombocytopenia 12/28/2018    Valvular regurgitation      Past Surgical History:   Procedure Laterality Date    BACK SURGERY  09/2019    CARDIAC CATHETERIZATION      EYE SURGERY      cataracts bilateral    TRANSRECTAL ULTRASOUND OF PROSTATE WITH INSERTION OF GOLD FIDUCIAL MARKER N/A 10/4/2018    Procedure: ULTRASOUND, PROSTATE, TRANSPERINEAL APPROACH, WITH GOLD FIDUCIAL MARKER INSERTION (with SPACEOAR transperineal biodegradable gel placement);  Surgeon: Manpreet Gordon MD;  Location: [facility];  Service: Urology;  Laterality: N/A;     Social History     Socioeconomic History    Marital status:      Spouse name: Not on file    Number of children: Not on file    Years of education: Not on file    Highest education level: Not on file   Occupational History    Not on file   Social Needs    Financial resource strain: Not on file    Food insecurity     Worry: Not on file     Inability: Not on file    Transportation needs     Medical: Not on file     Non-medical: Not on file   Tobacco Use    Smoking status: Never Smoker    Smokeless tobacco: Never Used   Substance and Sexual Activity    Alcohol use: No    Drug use: No    Sexual activity: Not on file   Lifestyle    Physical activity     Days  per week: Not on file     Minutes per session: Not on file    Stress: Not on file   Relationships    Social connections     Talks on phone: Not on file     Gets together: Not on file     Attends Yazdanism service: Not on file     Active member of club or organization: Not on file     Attends meetings of clubs or organizations: Not on file     Relationship status: Not on file   Other Topics Concern    Not on file   Social History Narrative    Not on file     Family History   Problem Relation Age of Onset    Heart disease Mother     Heart disease Father      Medication List with Changes/Refills   Current Medications    AMITRIPTYLINE (ELAVIL) 50 MG TABLET    Take 50 mg by mouth every evening.    AMLODIPINE (NORVASC) 5 MG TABLET    Take 1 tablet (5 mg total) by mouth once daily.    ASPIRIN (ECOTRIN) 81 MG EC TABLET    Take 81 mg by mouth once daily.    CARVEDILOL (COREG) 12.5 MG TABLET    Take 1 tablet (12.5 mg total) by mouth 2 (two) times daily with meals.    CLONAZEPAM (KLONOPIN) 0.5 MG TABLET    clonazepam 0.5 mg tablet    CO-ENZYME Q-10 30 MG CAPSULE    Take 30 mg by mouth 3 (three) times daily.    DICLOFENAC SODIUM (VOLTAREN) 1 % GEL    Voltaren 1 % topical gel    FENOFIBRATE 150 MG CAP    fenofibrate 150 mg capsule    FISH OIL-OMEGA-3 FATTY ACIDS 300-1,000 MG CAPSULE    Take 1 capsule by mouth 2 (two) times daily.    FLUTICASONE PROPIONATE (FLONASE) 50 MCG/ACTUATION NASAL SPRAY    fluticasone propionate 50 mcg/actuation nasal spray,suspension    HYDRALAZINE (APRESOLINE) 100 MG TABLET    Take 1 tablet by mouth twice daily    HYDROXYZINE HCL (ATARAX) 25 MG TABLET    hydroxyzine HCl 25 mg tablet    HYOSCYAMINE (LEVSIN/SL) 0.125 MG SUBL    Place 0.125 mg under the tongue 3 (three) times daily.    IRBESARTAN (AVAPRO) 300 MG TABLET    Take 300 mg by mouth every evening.    IRON-VIT C-VIT B12-FOLIC ACID (IRON-C PLUS) TABLET    Iron 100 Plus 100 mg-250 mg-25 mcg-1 mg tablet   Take 1 tablet every day by oral route.     METFORMIN (GLUCOPHAGE-XR) 500 MG 24 HR TABLET    Take 500 mg by mouth 2 (two) times daily with meals.     MUPIROCIN (BACTROBAN) 2 % OINTMENT    APPLY A SMALL AMOUNT TOPICALLY THREE TIMES DAILY    MV-MINS/FOLIC/LYCOPENE/GINKGO (MENS 50+ DAILY FORMULA,GINGKO, ORAL)    Mens 50+ Daily Formula(gingko)    OLMESARTAN (BENICAR) 40 MG TABLET    Take 40 mg by mouth once daily.    ONETOUCH VERIO STRP    USE 1 STRIP TO CHECK GLUCOSE TWICE DAILY    OXYBUTYNIN (DITROPAN) 5 MG TAB    Take 5 mg by mouth 2 (two) times daily.    OXYBUTYNIN (DITROPAN) 5 MG TAB    TAKE 1 TABLET BY MOUTH TWICE DAILY    PANTOPRAZOLE (PROTONIX) 40 MG TABLET    Take 40 mg by mouth once daily.    POTASSIUM CHLORIDE (KLOR-CON) 20 MEQ PACK    Take 20 mEq by mouth 2 (two) times daily.     VALACYCLOVIR (VALTREX) 1000 MG TABLET    TAKE 2 TABLETS BY MOUTH NOW AND 2 TABLETS BY MOUTH EVERY 12 HOURS    VALSARTAN-HYDROCHLOROTHIAZIDE (DIOVAN-HCT) 320-25 MG PER TABLET    valsartan 320 mg-hydrochlorothiazide 25 mg tablet     Review of patient's allergies indicates:  No Known Allergies    QUALITY OF LIFE: 90%    Vitals:    07/08/20 0911   BP: (!) 156/80   Pulse: (!) 58   Resp: 18   Temp: 97.9 °F (36.6 °C)   TempSrc: Temporal   SpO2: 97%   Weight: 94.8 kg (208 lb 14.4 oz)   PainSc: 0-No pain     Body mass index is 33.72 kg/m².    PHYSICAL EXAM:   GENERAL: alert; in no apparent distress.   HEAD: normocephalic, atraumatic.  EYES: pupils are equal, round, reactive to light and accommodation. Sclera anicteric. Conjunctiva not injected.   NOSE/THROAT: no nasal erythema or rhinorrhea. Oropharynx pink, without erythema, ulcerations or thrush.   NECK: no cervical motion rigidity; supple with no masses.  CHEST: Patient is speaking comfortably on room air with normal work of breathing without using accessory muscles of respiration.  ABDOMEN: soft, nontender, nondistended. Bowel sounds present.   MUSCULOSKELETAL: no tenderness to palpation along the spine or scapulae. Normal  range of motion.  NEUROLOGIC: cranial nerves II-XII intact bilaterally. Strength 5/5 in bilateral upper and lower extremities. No sensory deficits appreciated. Normal gait.  EXTREMITIES: no clubbing, cyanosis, edema.  SKIN: no erythema, rashes, ulcerations noted.     ANCILLARY DATA:   PSA  5/20 <0.01  11/19 <0.01  5/19 0.02      ASSESSMENT: 69 y.o. male with High risk prostate adenocarcinoma, [id number] GS 4+5 iPSA 13.2 started on long-term ADT, status post definitive radiotherapy to 7920 cGy ending December 2018.  PLAN:   Chris Hill Jr. has received his final ADT depot and the end of this year will be 2 years out from long-term hormone deprivation and radiotherapy.  I recommend we continue to monitor PSA Q 3-6 months after completion.  I discussed q.h.s. fluid habits of restriction x4 hours, no caffeine or alcohol to assist with nocturia.  Also discussed process of testosterone reconstitution and expected symptom resolution interval.  We will await results of esophageal biopsy.  Return to clinic in 6 months.    All questions answered and contact information provided. Patient understands free to call us anytime with any questions or concerns regarding radiation therapy.    I have personally seen and evaluated this patient. Greater than 50% of this time was spent discussing coordination of care and/or counseling.    PHYSICIAN: Simone Santillan Jr, MD

## 2020-07-12 NOTE — PROGRESS NOTES
[facility] Hematology/Oncology  PROGRESS NOTE -  Follow-up Visit      Subjective:       Patient ID:   NAME: Chris Hill Jr. : 1950     69 y.o. male    Referring Doc: Tyrell  Other Physicians: Manpreet Gordon; Wiley; Cheyanne Alfaro        Chief Complaint:  prostate cancer; anem/tcp f/u         History of Present Illness:     Patient is being seen today in person. He had scope with EGD with Dr Alfaro last week and they found an esophageal polyp which was cauterized. He saw Dr Santillan just last week and he recommended that the patient have the polyp removed.     The patient is on today to go over the results of the recently ordered labs, tests and studies.  He is here by himself. He is doing ok. He previously had finished XRT. He denies any CP, SOB, HA's or N/V.      He had some swallowing issues and has been seeing Dr Alfaro. He had dilation and is swallowing better now.     He sees Dr Gordon again in 2020 and had his last shot in April/May 2020       I discussed COVID19 precautions          ROS:   GEN: normal without any fever, night sweats or weight loss  HEENT: normal with no HA's, sore throat, stiff neck, changes in vision  CV: normal with no CP, SOB, PND, JANSEN or orthopnea  PULM: normal with no SOB, cough, hemoptysis, sputum or pleuritic pain  GI: normal with no abdominal pain, nausea, vomiting, constipation, diarrhea, melanotic stools, BRBPR, or hematemesis  : normal with no hematuria, dysuria  BREAST: normal with no mass, discharge, pain  SKIN: normal with no rash, erythema, bruising, or swelling    Allergies:  Review of patient's allergies indicates:  No Known Allergies    Medications:    Current Outpatient Medications:     amitriptyline (ELAVIL) 50 MG tablet, Take 50 mg by mouth every evening., Disp: , Rfl:     amLODIPine (NORVASC) 5 MG tablet, Take 1 tablet (5 mg total) by mouth once daily., Disp: 90 tablet, Rfl: 1    aspirin (ECOTRIN) 81 MG EC tablet, Take 81 mg by mouth once daily.,  Disp: , Rfl:     carvediloL (COREG) 12.5 MG tablet, Take 1 tablet (12.5 mg total) by mouth 2 (two) times daily with meals., Disp: 60 tablet, Rfl: 6    clonazePAM (KLONOPIN) 0.5 MG tablet, clonazepam 0.5 mg tablet, Disp: , Rfl:     co-enzyme Q-10 30 mg capsule, Take 30 mg by mouth 3 (three) times daily., Disp: , Rfl:     diclofenac sodium (VOLTAREN) 1 % Gel, Voltaren 1 % topical gel, Disp: , Rfl:     fenofibrate 150 mg Cap, fenofibrate 150 mg capsule, Disp: , Rfl:     fish oil-omega-3 fatty acids 300-1,000 mg capsule, Take 1 capsule by mouth 2 (two) times daily., Disp: , Rfl:     fluticasone propionate (FLONASE) 50 mcg/actuation nasal spray, fluticasone propionate 50 mcg/actuation nasal spray,suspension, Disp: , Rfl:     hydrALAZINE (APRESOLINE) 100 MG tablet, Take 1 tablet by mouth twice daily, Disp: 180 tablet, Rfl: 0    hydrOXYzine HCl (ATARAX) 25 MG tablet, hydroxyzine HCl 25 mg tablet, Disp: , Rfl:     hyoscyamine (LEVSIN/SL) 0.125 mg Subl, Place 0.125 mg under the tongue 3 (three) times daily., Disp: , Rfl:     irbesartan (AVAPRO) 300 MG tablet, Take 300 mg by mouth every evening., Disp: , Rfl:     iron-vit c-vit b12-folic acid (IRON-C PLUS) tablet, Iron 100 Plus 100 mg-250 mg-25 mcg-1 mg tablet  Take 1 tablet every day by oral route., Disp: , Rfl:     metFORMIN (GLUCOPHAGE-XR) 500 MG 24 hr tablet, Take 500 mg by mouth 2 (two) times daily with meals. , Disp: , Rfl:     mupirocin (BACTROBAN) 2 % ointment, APPLY A SMALL AMOUNT TOPICALLY THREE TIMES DAILY, Disp: , Rfl: 4    mv-mins/folic/lycopene/ginkgo (MENS 50+ DAILY FORMULA,GINGKO, ORAL), Mens 50+ Daily Formula(gingko), Disp: , Rfl:     olmesartan (BENICAR) 40 MG tablet, Take 40 mg by mouth once daily., Disp: , Rfl:     ONETOUCH VERIO Strp, USE 1 STRIP TO CHECK GLUCOSE TWICE DAILY, Disp: , Rfl: 1    oxybutynin (DITROPAN) 5 MG Tab, Take 5 mg by mouth 2 (two) times daily., Disp: , Rfl: 11    oxybutynin (DITROPAN) 5 MG Tab, TAKE 1 TABLET BY  MOUTH TWICE DAILY, Disp: 60 tablet, Rfl: 11    pantoprazole (PROTONIX) 40 MG tablet, Take 40 mg by mouth once daily., Disp: , Rfl:     potassium chloride (KLOR-CON) 20 mEq Pack, Take 20 mEq by mouth 2 (two) times daily. , Disp: , Rfl:     valACYclovir (VALTREX) 1000 MG tablet, TAKE 2 TABLETS BY MOUTH NOW AND 2 TABLETS BY MOUTH EVERY 12 HOURS, Disp: , Rfl: 5    valsartan-hydrochlorothiazide (DIOVAN-HCT) 320-25 mg per tablet, valsartan 320 mg-hydrochlorothiazide 25 mg tablet, Disp: , Rfl:     PMHx/PSHx Updates:  See patient's last visit with me on 4/21/2020  See H&P on 12/28/2018        Pathology:  Cancer Staging  No matching staging information was found for the patient.          Objective:     Vitals:  Blood pressure 137/67, pulse 69, temperature 97.7 °F (36.5 °C), resp. rate 20, weight 94.3 kg (207 lb 14.4 oz).        Physical Examination:   GEN: no apparent distress, comfortable; AAOx3  HEAD: atraumatic and normocephalic  EYES: no conjunctival pallor or muddiness, no icterus; normal pupil reaction to ambient light  ENT: OMM, no pharyngeal erythema, external bilateral ears WNL; no visible thrush or ulcers  NECK: no masses or swelling, trachea midline, no visible LAD/LN's   CV: no palpitations; no pedal edema; no noticeable JVD or neck vein distension  CHEST: Normal respiratory effort; chest wall breath movements symmetrical; no audible wheezing  ABDOM: non-distended; no bloating  MUSC/Skeletal: ROM normal; joints visibly normal; no deformities or arthropathy  EXTREM: no clubbing, cyanosis, inflammation or swelling  SKIN: no rashes, lesions, ulcers, petechiae or subcutaneous nodules  : no ellison  NEURO: moving all 4 extremities; AAOx3; no tremors  PSYCH: normal mood, affect and behavior  LYMPH: no visible LN's or LAD              Labs:       4/9/2020  Lab Results   Component Value Date    WBC 6.18 04/09/2020    HGB 11.1 (L) 04/09/2020    HCT 34.9 (L) 04/09/2020    MCV 83 04/09/2020     (L) 04/09/2020      CMP  Sodium   Date Value Ref Range Status   04/09/2020 138 136 - 145 mmol/L Final   04/12/2019 138 134 - 144 mmol/L      Potassium   Date Value Ref Range Status   04/09/2020 3.8 3.5 - 5.1 mmol/L Final     Chloride   Date Value Ref Range Status   04/09/2020 103 95 - 110 mmol/L Final   04/12/2019 99 98 - 110 mmol/L      CO2   Date Value Ref Range Status   04/09/2020 22 (L) 23 - 29 mmol/L Final     Glucose   Date Value Ref Range Status   04/09/2020 121 (H) 70 - 110 mg/dL Final   04/12/2019 117 (H) 70 - 99 mg/dL      BUN, Bld   Date Value Ref Range Status   04/09/2020 19 8 - 23 mg/dL Final     Creatinine   Date Value Ref Range Status   04/09/2020 0.8 0.5 - 1.4 mg/dL Final   04/12/2019 0.72 0.60 - 1.40 mg/dL      Calcium   Date Value Ref Range Status   04/09/2020 9.4 8.7 - 10.5 mg/dL Final     Total Protein   Date Value Ref Range Status   04/09/2020 7.2 6.0 - 8.4 g/dL Final     Albumin   Date Value Ref Range Status   04/09/2020 3.9 3.5 - 5.2 g/dL Final   04/12/2019 3.3 3.1 - 4.7 g/dL      Total Bilirubin   Date Value Ref Range Status   04/09/2020 0.6 0.1 - 1.0 mg/dL Final     Comment:     For infants and newborns, interpretation of results should be based  on gestational age, weight and in agreement with clinical  observations.  Premature Infant recommended reference ranges:  Up to 24 hours.............<8.0 mg/dL  Up to 48 hours............<12.0 mg/dL  3-5 days..................<15.0 mg/dL  6-29 days.................<15.0 mg/dL       Alkaline Phosphatase   Date Value Ref Range Status   04/09/2020 71 55 - 135 U/L Final     AST   Date Value Ref Range Status   04/09/2020 23 10 - 40 U/L Final     ALT   Date Value Ref Range Status   04/09/2020 20 10 - 44 U/L Final     Anion Gap   Date Value Ref Range Status   04/09/2020 13 8 - 16 mmol/L Final     eGFR if    Date Value Ref Range Status   04/09/2020 >60.0 >60 mL/min/1.73 m^2 Final     eGFR if non    Date Value Ref Range Status   04/09/2020  >60.0 >60 mL/min/1.73 m^2 Final     Comment:     Calculation used to obtain the estimated glomerular filtration  rate (eGFR) is the CKD-EPI equation.        Lab Results   Component Value Date    IRON 41 (L) 04/09/2020    TIBC 328 04/09/2020    FERRITIN 50 04/09/2020       5/15/2020  PSA DIAGNOSTIC 0.00 - 4.00 ng/mL <0.01            Radiology/Diagnostic Studies:    Us Abdomen Complete    Result Date: 1/2/2019  Abdominal ultrasound Clinical history is anemia, prostate cancer The pancreas, aorta and IVC are normal. The liver is hyperechoic compatible with fatty infiltration. There is hepatopedal flow within the portal vein. The common bile duct measures 6 mm. The patient has had a cholecystectomy. The kidneys are normal in size and echogenicity. The spleen is normal in size measuring 12 cm. IMPRESSION: Fatty infiltration of the liver otherwise negative abdominal ultrasound Read and electronically signed by: Leydi Goldberg MD on 1/2/2019 9:49 AM CST LEYDI GOLDBERG MD      I have reviewed all available lab results and radiology reports.    Assessment/Plan:   (1) 69 y.o. male with diagnosis of prostate cancer who has been referred by Simone Santillan Jr., MD for evaluation by medical oncology. Patient with a high risk adenocarcinoma of the prostate with [id number] with GS of 4+5.   - he started androgen depravation therapy and monotherapy XRT (IMRT)  - followed by Dr Gordon with  and currently on Trelstar  - followed by Dr Santillan with Rad/onc and completed XRt on 12/18/2018  - latest PSA at 0.01 in May 2020 and he had his last lupron shot done in April/May 2020  - he sees Dr Gordon again in Nov 2020     (2) CAD and non-rheumatic MR; mild CVA in 1993; Hypertensive Heart disease - followed by Dr Jama with cardiology     (3) HTN and hypercholesterolemia     (4) LORA     (5) DM    (6) Chronic back pain issues - required recent lumbar surgery with Dr Chris Fofana at [facility]     (7) Hx/of nightly fevers     (8) Chronic  diarhhea - followed by Dr Alfaro with GI and s/p recent endoscopies     (9) Mild thrombocytopenia resolved with last platelet count down at 141,000  - no history of hepatitis or liver problems; no alcoholism  - he has positive antiplatelet antibody screens both direct and indirect consistent with an underlying chronic ITP condition  - his flow cytometry showed no evidence of leukemia but he did have a relative increase in eosinophils     (10) Mild anemia with latest hgb at 11.1 and stable   - NCNC parameters  - iron was a little low this time around;   ferritin was still good   - OBS was negative on 11/15  - hx/of colon polyps in past  - followed by Dr Alfaro with GI with planned repat endoscopies in near future    (11) Esophageal polyp   - He had scope with EGD with Dr Alfaro last week and they found an esophageal polyp which was cauterized. He saw Dr Santillan just last week and he recommended that the patient have the polyp removed.                 VISIT DIAGNOSES:      Normocytic normochromic anemia    Anemia, chronic disease    Thrombocytopenia    Prostate cancer          PLAN:  1. Proceed with rad/onc and  directives  2. Check CBC/plat/iron panel every 3 months for now  3. F/u with PCP, , Rad/onc etc  4. continue ICAR-C daily po  5. F/u with GI - I need the latest EGD procedure and biopsy reports  6. RTC in 3 months    Fax note to Rob Santillan MD, and Wiley Gordon Albright    Discussion:       Total Time spent on patient:    I spent over 15 mins of time with the patient. Reviewed results of the recently ordered labs, tests, reports and studies; made directives with regards to the results. Over half of this time was spent couseling and coordinating care, making treatment and analytical decisions; ordering necessary labs, tests and studies; and discussing treatment options and setting up treatment plan(s) if indicated.        COVID-19 Discussion:    I had long discussion with patient and  any applicable family about the COVID-19 coronavirus epidemic and the recommended precautions with regard to cancer and/or hematology patients. I have re-iterated the CDC recommendations for adequate hand washing, use of hand -like products, and coughing into elbow, etc. In addition, especially for our patients who are on chemotherapy and/or our otherwise immunocompromised patients, I have recommended avoidance of crowds, including movie theaters, restaurants, churches, etc. I have recommended avoidance of any sick or symptomatic family members and/or friends. I have also recommended avoidance of any raw and unwashed food products, and general avoidance of food items that have not been prepared by themselves. The patient has been asked to call us immediately with any symptom developments, issues, questions or other general concerns.       I have explained all of the above in detail and the patient understands all of the current recommendation(s). I have answered all of their questions to the best of my ability and to their complete satisfaction.   The patient is to continue with the current management plan.            Electronically signed by Cyrus Gibson MD

## 2020-07-13 ENCOUNTER — TELEPHONE (OUTPATIENT)
Dept: HEMATOLOGY/ONCOLOGY | Facility: CLINIC | Age: 70
End: 2020-07-13

## 2020-07-13 ENCOUNTER — LAB VISIT (OUTPATIENT)
Dept: LAB | Facility: HOSPITAL | Age: 70
End: 2020-07-13
Attending: INTERNAL MEDICINE
Payer: MEDICARE

## 2020-07-13 ENCOUNTER — OFFICE VISIT (OUTPATIENT)
Dept: HEMATOLOGY/ONCOLOGY | Facility: CLINIC | Age: 70
End: 2020-07-13
Payer: MEDICARE

## 2020-07-13 VITALS
HEART RATE: 69 BPM | DIASTOLIC BLOOD PRESSURE: 67 MMHG | BODY MASS INDEX: 33.56 KG/M2 | SYSTOLIC BLOOD PRESSURE: 137 MMHG | RESPIRATION RATE: 20 BRPM | TEMPERATURE: 98 F | WEIGHT: 207.88 LBS

## 2020-07-13 DIAGNOSIS — C61 PROSTATE CANCER: ICD-10-CM

## 2020-07-13 DIAGNOSIS — D63.8 ANEMIA, CHRONIC DISEASE: ICD-10-CM

## 2020-07-13 DIAGNOSIS — D69.6 THROMBOCYTOPENIA: ICD-10-CM

## 2020-07-13 DIAGNOSIS — D64.9 NORMOCYTIC NORMOCHROMIC ANEMIA: Primary | ICD-10-CM

## 2020-07-13 DIAGNOSIS — D64.9 NORMOCYTIC NORMOCHROMIC ANEMIA: ICD-10-CM

## 2020-07-13 LAB
ALBUMIN SERPL BCP-MCNC: 3.5 G/DL (ref 3.5–5.2)
ALP SERPL-CCNC: 60 U/L (ref 55–135)
ALT SERPL W/O P-5'-P-CCNC: 29 U/L (ref 10–44)
ANION GAP SERPL CALC-SCNC: 12 MMOL/L (ref 8–16)
AST SERPL-CCNC: 27 U/L (ref 10–40)
BASOPHILS # BLD AUTO: 0.02 K/UL (ref 0–0.2)
BASOPHILS NFR BLD: 0.3 % (ref 0–1.9)
BILIRUB SERPL-MCNC: 0.6 MG/DL (ref 0.1–1)
BUN SERPL-MCNC: 20 MG/DL (ref 8–23)
CALCIUM SERPL-MCNC: 9 MG/DL (ref 8.7–10.5)
CHLORIDE SERPL-SCNC: 102 MMOL/L (ref 95–110)
CO2 SERPL-SCNC: 29 MMOL/L (ref 23–29)
CREAT SERPL-MCNC: 1 MG/DL (ref 0.5–1.4)
DIFFERENTIAL METHOD: ABNORMAL
EOSINOPHIL # BLD AUTO: 0.6 K/UL (ref 0–0.5)
EOSINOPHIL NFR BLD: 8.1 % (ref 0–8)
ERYTHROCYTE [DISTWIDTH] IN BLOOD BY AUTOMATED COUNT: 14.6 % (ref 11.5–14.5)
EST. GFR  (AFRICAN AMERICAN): >60 ML/MIN/1.73 M^2
EST. GFR  (NON AFRICAN AMERICAN): >60 ML/MIN/1.73 M^2
FERRITIN SERPL-MCNC: 50 NG/ML (ref 20–300)
GLUCOSE SERPL-MCNC: 93 MG/DL (ref 70–110)
HCT VFR BLD AUTO: 34.9 % (ref 40–54)
HGB BLD-MCNC: 11 G/DL (ref 14–18)
IMM GRANULOCYTES # BLD AUTO: 0.03 K/UL (ref 0–0.04)
IMM GRANULOCYTES NFR BLD AUTO: 0.4 % (ref 0–0.5)
IRON SERPL-MCNC: 63 UG/DL (ref 45–160)
LYMPHOCYTES # BLD AUTO: 1.1 K/UL (ref 1–4.8)
LYMPHOCYTES NFR BLD: 14.6 % (ref 18–48)
MCH RBC QN AUTO: 25.8 PG (ref 27–31)
MCHC RBC AUTO-ENTMCNC: 31.5 G/DL (ref 32–36)
MCV RBC AUTO: 82 FL (ref 82–98)
MONOCYTES # BLD AUTO: 0.8 K/UL (ref 0.3–1)
MONOCYTES NFR BLD: 11 % (ref 4–15)
NEUTROPHILS # BLD AUTO: 4.8 K/UL (ref 1.8–7.7)
NEUTROPHILS NFR BLD: 65.6 % (ref 38–73)
NRBC BLD-RTO: 0 /100 WBC
PLATELET # BLD AUTO: 129 K/UL (ref 150–350)
PMV BLD AUTO: 13.2 FL (ref 9.2–12.9)
POTASSIUM SERPL-SCNC: 3.3 MMOL/L (ref 3.5–5.1)
PROT SERPL-MCNC: 6.5 G/DL (ref 6–8.4)
RBC # BLD AUTO: 4.26 M/UL (ref 4.6–6.2)
SATURATED IRON: 21 % (ref 20–50)
SODIUM SERPL-SCNC: 143 MMOL/L (ref 136–145)
TOTAL IRON BINDING CAPACITY: 300 UG/DL (ref 250–450)
TRANSFERRIN SERPL-MCNC: 214 MG/DL (ref 200–375)
WBC # BLD AUTO: 7.27 K/UL (ref 3.9–12.7)

## 2020-07-13 PROCEDURE — 83540 ASSAY OF IRON: CPT

## 2020-07-13 PROCEDURE — 99213 OFFICE O/P EST LOW 20 MIN: CPT | Mod: S$GLB,,, | Performed by: INTERNAL MEDICINE

## 2020-07-13 PROCEDURE — 36415 COLL VENOUS BLD VENIPUNCTURE: CPT

## 2020-07-13 PROCEDURE — 99213 PR OFFICE/OUTPT VISIT, EST, LEVL III, 20-29 MIN: ICD-10-PCS | Mod: S$GLB,,, | Performed by: INTERNAL MEDICINE

## 2020-07-13 PROCEDURE — 80053 COMPREHEN METABOLIC PANEL: CPT

## 2020-07-13 PROCEDURE — 82728 ASSAY OF FERRITIN: CPT

## 2020-07-13 PROCEDURE — 85025 COMPLETE CBC W/AUTO DIFF WBC: CPT

## 2020-07-13 NOTE — TELEPHONE ENCOUNTER
Called the patient and instructed him that Dr. Gibson agrees with Dr. Santillan and that he should set it up with Dr. Franco.  Patient stated that Dr. Santillan spoke with Dr. Alfaro and they are going to watch him for 3 months.

## 2020-08-03 ENCOUNTER — OFFICE VISIT (OUTPATIENT)
Dept: ORTHOPEDICS | Facility: CLINIC | Age: 70
End: 2020-08-03
Payer: MEDICARE

## 2020-08-03 VITALS
HEART RATE: 55 BPM | BODY MASS INDEX: 33.43 KG/M2 | HEIGHT: 66 IN | DIASTOLIC BLOOD PRESSURE: 83 MMHG | SYSTOLIC BLOOD PRESSURE: 174 MMHG | WEIGHT: 208 LBS

## 2020-08-03 DIAGNOSIS — M25.441 SWELLING OF HAND JOINT, RIGHT: Primary | ICD-10-CM

## 2020-08-03 DIAGNOSIS — M25.541 ARTHRALGIA OF METACARPOPHALANGEAL JOINT, RIGHT: ICD-10-CM

## 2020-08-03 PROCEDURE — 99213 OFFICE O/P EST LOW 20 MIN: CPT | Mod: S$GLB,,, | Performed by: ORTHOPAEDIC SURGERY

## 2020-08-03 PROCEDURE — 99213 PR OFFICE/OUTPT VISIT, EST, LEVL III, 20-29 MIN: ICD-10-PCS | Mod: S$GLB,,, | Performed by: ORTHOPAEDIC SURGERY

## 2020-08-03 RX ORDER — TERAZOSIN 5 MG/1
CAPSULE ORAL
COMMUNITY
End: 2020-10-07 | Stop reason: CLARIF

## 2020-08-03 RX ORDER — IRON,CARBONYL/ASCORBIC ACID 100-250 MG
1 TABLET ORAL DAILY
COMMUNITY
Start: 2020-07-06 | End: 2020-11-10

## 2020-08-03 NOTE — PROGRESS NOTES
[facility]    Subjective:     Chief Complaint:   Chief Complaint   Patient presents with    Right Hand - Pain     Right hand pain x a while. States that the injection he had on 4.22.2020 helped some, did not last, pain is worse and his fingers are starting to hurt now.        Past Medical History:   Diagnosis Date    Anemia, chronic disease 12/28/2018    Coronary artery disease     mild    Diabetes mellitus     GERD (gastroesophageal reflux disease)     Heart murmur     Hyperlipidemia     Hypertension     Normocytic normochromic anemia 12/28/2018    Prostate cancer 8/20/2018    Sleep apnea     NOT USING [facility]    Thrombocytopenia 12/28/2018    Valvular regurgitation        Past Surgical History:   Procedure Laterality Date    BACK SURGERY  09/2019    CARDIAC CATHETERIZATION      EYE SURGERY      cataracts bilateral    TRANSRECTAL ULTRASOUND OF PROSTATE WITH INSERTION OF GOLD FIDUCIAL MARKER N/A 10/4/2018    Procedure: ULTRASOUND, PROSTATE, TRANSPERINEAL APPROACH, WITH GOLD FIDUCIAL MARKER INSERTION (with SPACEOAR transperineal biodegradable gel placement);  Surgeon: Manpreet Gordon MD;  Location: [facility];  Service: Urology;  Laterality: N/A;       Current Outpatient Medications   Medication Sig    amitriptyline (ELAVIL) 50 MG tablet Take 50 mg by mouth every evening.    amLODIPine (NORVASC) 5 MG tablet Take 1 tablet (5 mg total) by mouth once daily.    aspirin (ECOTRIN) 81 MG EC tablet Take 81 mg by mouth once daily.    carvediloL (COREG) 12.5 MG tablet Take 1 tablet (12.5 mg total) by mouth 2 (two) times daily with meals.    clonazePAM (KLONOPIN) 0.5 MG tablet clonazepam 0.5 mg tablet    co-enzyme Q-10 30 mg capsule Take 30 mg by mouth 3 (three) times daily.    diclofenac sodium (VOLTAREN) 1 % Gel Voltaren 1 % topical gel    fenofibrate 150 mg Cap fenofibrate 150 mg capsule    fish oil-omega-3 fatty acids 300-1,000 mg capsule Take 1 capsule by mouth 2 (two) times daily.     fluticasone propionate (FLONASE) 50 mcg/actuation nasal spray fluticasone propionate 50 mcg/actuation nasal spray,suspension    hydrALAZINE (APRESOLINE) 100 MG tablet Take 1 tablet by mouth twice daily    hydrOXYzine HCl (ATARAX) 25 MG tablet hydroxyzine HCl 25 mg tablet    hyoscyamine (LEVSIN/SL) 0.125 mg Subl Place 0.125 mg under the tongue 3 (three) times daily.    irbesartan (AVAPRO) 300 MG tablet Take 300 mg by mouth every evening.    iron-vit c-vit b12-folic acid (IRON-C PLUS) tablet Iron 100 Plus 100 mg-250 mg-25 mcg-1 mg tablet   Take 1 tablet every day by oral route.    iron-vitamin C 100-250 mg, ICAR-C, 100-250 mg Tab Take 1 tablet by mouth once daily.    metFORMIN (GLUCOPHAGE-XR) 500 MG 24 hr tablet Take 500 mg by mouth 2 (two) times daily with meals.     mupirocin (BACTROBAN) 2 % ointment APPLY A SMALL AMOUNT TOPICALLY THREE TIMES DAILY    mv-mins/folic/lycopene/ginkgo (MENS 50+ DAILY FORMULA,GINGKO, ORAL) Mens 50+ Daily Formula(gingko)    olmesartan (BENICAR) 40 MG tablet Take 40 mg by mouth once daily.    [facility] USE 1 STRIP TO CHECK GLUCOSE TWICE DAILY    oxybutynin (DITROPAN) 5 MG Tab Take 5 mg by mouth 2 (two) times daily.    oxybutynin (DITROPAN) 5 MG Tab TAKE 1 TABLET BY MOUTH TWICE DAILY    pantoprazole (PROTONIX) 40 MG tablet Take 40 mg by mouth once daily.    potassium chloride (KLOR-CON) 20 mEq Pack Take 20 mEq by mouth 2 (two) times daily.     terazosin (HYTRIN) 5 MG capsule terazosin 5 mg capsule   Take 1 capsule every day by oral route.    valACYclovir (VALTREX) 1000 MG tablet TAKE 2 TABLETS BY MOUTH NOW AND 2 TABLETS BY MOUTH EVERY 12 HOURS    valsartan-hydrochlorothiazide (DIOVAN-HCT) 320-25 mg per tablet valsartan 320 mg-hydrochlorothiazide 25 mg tablet     No current facility-administered medications for this visit.        Review of patient's allergies indicates:  No Known Allergies    Family History   Problem Relation Age of Onset    Heart disease Mother      Heart disease Father        Social History     Socioeconomic History    Marital status:      Spouse name: Not on file    Number of children: Not on file    Years of education: Not on file    Highest education level: Not on file   Occupational History    Not on file   Social Needs    Financial resource strain: Not on file    Food insecurity     Worry: Not on file     Inability: Not on file    Transportation needs     Medical: Not on file     Non-medical: Not on file   Tobacco Use    Smoking status: Never Smoker    Smokeless tobacco: Never Used   Substance and Sexual Activity    Alcohol use: No    Drug use: No    Sexual activity: Not on file   Lifestyle    Physical activity     Days per week: Not on file     Minutes per session: Not on file    Stress: Not on file   Relationships    Social connections     Talks on phone: Not on file     Gets together: Not on file     Attends Rastafarian service: Not on file     Active member of club or organization: Not on file     Attends meetings of clubs or organizations: Not on file     Relationship status: Not on file   Other Topics Concern    Not on file   Social History Narrative    Not on file       History of present illness:  Right hand.  When he was here April 3 months ago he had an injection for presumed arthritis 3rd MP joint the injection helped for about a month with than the pain and swelling have returned is complaining of significant pain and swelling 3rd MP joint right hand.  No injury.  He has had a mild trigger in the left hand which is under control.    Review of Systems:    Constitution: Negative for chills, fever, and sweats.  Negative for unexplained weight loss.    HENT:  Negative for headaches and blurry vision.    Cardiovascular:Negative for chest pain or irregular heart beat. Negative for hypertension.    Respiratory:  Negative for cough and shortness of breath.    Gastrointestinal: Negative for abdominal pain, heartburn, melena,  nausea, and vomitting.    Genitourinary:  Negative bladder incontinence and dysuria.    Musculoskeletal:  See HPI for details.     Neurological: Negative for numbness.    Psychiatric/Behavioral: Negative for depression.  The patient is not nervous/anxious.      Endocrine: Negative for polyuria    Hematologic/Lymphatic: Negative for bleeding problem.  Does not bruise/bleed easily.    Skin: Negative for poor would healing and rash    Objective:      Physical Examination:    Vital Signs:    Vitals:    08/03/20 0850   BP: (!) 174/83   Pulse: (!) 55       Body mass index is 33.57 kg/m².    This a well-developed, well nourished patient in no acute distress.  They are alert and oriented and cooperative to examination.        So the right hand shows he clearly has swelling of the 3rd MP joint is not red it is not hot a he does not have normal flexion flexes to maybe 60° opposite closer to 90.  He has got the a delta phalanx little finger bilateral.  But is clearly a swelling and synovitis of the 3rd MP joint there were looking at.  Does not extend up or down the finger prior x-rays had shown only mild degenerative changes 3rd MP joint both hands symmetrical he has got an old fracture of the 5th metacarpal ways had some delta phalanx little fingers bilateral of those x-rays were April.  Pertinent New Results:    XRAY Report / Interpretation:       Assessment/Plan:      So he has clearly got some kind of 3rd MP joint synovitis issue does not look like the typical degenerative story in did not respond very well to injection so we need an M MRI of the hand to look at that 3rd MP joint for diagnosis and then treatment.      This note was created using Dragon voice recognition software that occasionally misinterpreted phrases or words.

## 2020-08-04 ENCOUNTER — HOSPITAL ENCOUNTER (OUTPATIENT)
Dept: RADIOLOGY | Facility: HOSPITAL | Age: 70
Discharge: HOME | End: 2020-08-04
Attending: ORTHOPAEDIC SURGERY
Payer: MEDICARE

## 2020-08-04 DIAGNOSIS — M25.541 ARTHRALGIA OF METACARPOPHALANGEAL JOINT, RIGHT: ICD-10-CM

## 2020-08-04 DIAGNOSIS — M25.441 SWELLING OF HAND JOINT, RIGHT: ICD-10-CM

## 2020-08-04 PROCEDURE — 73218 MRI UPPER EXTREMITY W/O DYE: CPT | Mod: TC,PO,RT

## 2020-08-05 DIAGNOSIS — C61 PROSTATE CANCER: Primary | ICD-10-CM

## 2020-08-06 ENCOUNTER — OFFICE VISIT (OUTPATIENT)
Dept: FAMILY MEDICINE | Facility: CLINIC | Age: 70
End: 2020-08-06
Payer: MEDICARE

## 2020-08-06 VITALS
BODY MASS INDEX: 33.59 KG/M2 | OXYGEN SATURATION: 97 % | WEIGHT: 209 LBS | TEMPERATURE: 99 F | HEART RATE: 67 BPM | SYSTOLIC BLOOD PRESSURE: 140 MMHG | HEIGHT: 66 IN | DIASTOLIC BLOOD PRESSURE: 78 MMHG

## 2020-08-06 DIAGNOSIS — G47.33 OBSTRUCTIVE SLEEP APNEA SYNDROME: ICD-10-CM

## 2020-08-06 DIAGNOSIS — D63.8 ANEMIA, CHRONIC DISEASE: ICD-10-CM

## 2020-08-06 DIAGNOSIS — Z76.89 ENCOUNTER TO ESTABLISH CARE: Primary | ICD-10-CM

## 2020-08-06 DIAGNOSIS — E78.5 DYSLIPIDEMIA: ICD-10-CM

## 2020-08-06 DIAGNOSIS — E11.9 TYPE 2 DIABETES MELLITUS WITHOUT COMPLICATION, WITHOUT LONG-TERM CURRENT USE OF INSULIN: ICD-10-CM

## 2020-08-06 DIAGNOSIS — C61 PROSTATE CANCER: ICD-10-CM

## 2020-08-06 DIAGNOSIS — I10 ESSENTIAL HYPERTENSION: ICD-10-CM

## 2020-08-06 DIAGNOSIS — R10.13 EPIGASTRIC PAIN: ICD-10-CM

## 2020-08-06 LAB — EKG 12-LEAD: NORMAL

## 2020-08-06 PROCEDURE — 93000 ELECTROCARDIOGRAM COMPLETE: CPT | Mod: S$GLB,,, | Performed by: NURSE PRACTITIONER

## 2020-08-06 PROCEDURE — 99204 OFFICE O/P NEW MOD 45 MIN: CPT | Mod: 25,S$GLB,, | Performed by: NURSE PRACTITIONER

## 2020-08-06 PROCEDURE — 99204 PR OFFICE/OUTPT VISIT, NEW, LEVL IV, 45-59 MIN: ICD-10-PCS | Mod: 25,S$GLB,, | Performed by: NURSE PRACTITIONER

## 2020-08-06 PROCEDURE — 93000 POCT EKG 12-LEAD: ICD-10-PCS | Mod: S$GLB,,, | Performed by: NURSE PRACTITIONER

## 2020-08-06 RX ORDER — SUCRALFATE 1 G/10ML
1 SUSPENSION ORAL 4 TIMES DAILY
Qty: 414 ML | Refills: 0 | Status: SHIPPED | OUTPATIENT
Start: 2020-08-06 | End: 2020-11-03

## 2020-08-06 NOTE — PATIENT INSTRUCTIONS
Epigastric Pain (Uncertain Cause)     Epigastric pain can be a sign of disease in the upper abdomen. Common causes include:  · Acid reflux (stomach acid flowing up into the esophagus)  · Gastritis (irritation of the stomach lining)  · Peptic Ulcer Disease  · Inflammation of the pancreas  · Gallstone  · Infection in the gallbladder  Pain may be dull or burning. It may spread upward to the chest or to the back. There may be other symptoms such as belching, bloating, cramps or hunger pains. There may be weight loss or poor appetite, nausea or vomiting.  Since the diagnosis of your pain is not certain yet, further tests may sometimes be needed. Sometimes the doctor will treat you for the most likely condition to see if there is improvement before doing further tests.  Home care  Medicines  · Antacids help neutralize the normal acids in your stomach. Examples are Maalox, Mylanta, Rolaids, and Tums. If you dont like the liquid, you can also try a chewable one. You may find one works better than another for you. Overuse can cause diarrhea or constipation.  · Acid blockers (H2 blockers) decrease acid production. Examples are cimetidine (Tagamet), famotidine (Pepcid) and ranitidine (Zantac).  · Acid inhibitors (PPIs) decrease acid production in a different way than the blockers. You may find they work better, but can take a little longer to take effect.  Examples are omeprazole (Prilosec), lansoprazole (Prevacid), pantoprazole (Protonix), rabeprazole (Aciphex), and esomeprazole (Nexium).  · Take an antacid 30-60 minutes after eating and at bedtime, but not at the same time as an acid blocker.  · Try not to take NSAIDs. Aspirin may also cause problems, but if taking it for your heart or other medical reasons, talk to your doctor before stopping it; you do not want to cause a worse problem, like a heart attack or stroke.  Diet  · If certain foods seem to cause your spasm, try to avoid them.   · Eat slowly and chew food well  before swallowing. Symptoms of gastritis can be worsened by certain foods. Limit or avoid fatty, fried, and spicy foods, as well as coffee, chocolate, mint, and foods with high acid content such as tomatoes and citrus fruit and juices (orange, grapefruit, lemon).  · Avoid alcohol, caffeine, and tobacco, which can delay healing and worsen your problem.  · Try eating smaller meals with snacks in between  Follow-up care  Follow up with your healthcare provider or as advised.  When to seek medical advice  Call your healthcare provider right away if any of the following occur:  · Stomach pain worsens or moves to the right lower part of the abdomen  · Chest pain appears, or if it worsens or spreads to the chest, back, neck, shoulder, or arm  · Frequent vomiting (cant keep down liquids)  · Blood in the stool or vomit (red or black color)  · Feeling weak or dizzy, fainting, or having trouble breathing  · Fever of 100.4ºF (38ºC) or higher, or as directed by your healthcare provider  · Abdominal swelling  Date Last Reviewed: 9/25/2015  © 0614-1596 [email or web address]. [address] [place] [id number]. All rights reserved. This information is not intended as a substitute for professional medical care. Always follow your healthcare professional's instructions.

## 2020-08-06 NOTE — PROGRESS NOTES
SUBJECTIVE:      Patient ID: Chris Hill Jr. is a 70 y.o. male.    Chief Complaint: Establish Care and Abdominal Pain (x 1 month, 2 worsening episodes today )    Mr. Hill presents to the clinic to establish care and for the evaluation of abdominal pain.  His abdominal pain has been progressively worsening over the past month.  He has a history of a cholecystectomy and appendectomy.  He reports he had an EGD done recently by Dr. Alfaro and had a polyp removed in his esophagus.  He is on Protonix.  He is on orals iron supplements for chronic anemia that causes him constipation.  His last bowel movement was yesterday.  He denies fever, chest pain, shortness of breath, nausea, vomiting, blood in stool, flank pain, dysuria, or hematuria.      Abdominal Pain  This is a new problem. The current episode started 1 to 4 weeks ago. The onset quality is gradual. The problem occurs constantly. The problem has been gradually worsening. The pain is located in the epigastric region. The pain is at a severity of 6/10. The pain is moderate. The quality of the pain is sharp (Describes as a constant pain with intermittent sharp episodes.). The abdominal pain radiates to the RUQ and chest. Associated symptoms include constipation. Pertinent negatives include no diarrhea, dysuria, fever, frequency, headaches, hematochezia, hematuria, melena, nausea or vomiting. The pain is aggravated by eating and palpation (Drinking). The pain is relieved by nothing. He has tried proton pump inhibitors (Aleve) for the symptoms. The treatment provided no relief. Prior diagnostic workup includes CT scan. His past medical history is significant for abdominal surgery and GERD.       Family History   Problem Relation Age of Onset    Heart disease Mother     Heart disease Father       Social History     Socioeconomic History    Marital status:      Spouse name: Not on file    Number of children: Not on file    Years of education: Not  on file    Highest education level: Not on file   Occupational History    Not on file   Social Needs    Financial resource strain: Not on file    Food insecurity     Worry: Not on file     Inability: Not on file    Transportation needs     Medical: Not on file     Non-medical: Not on file   Tobacco Use    Smoking status: Never Smoker    Smokeless tobacco: Never Used   Substance and Sexual Activity    Alcohol use: No    Drug use: No    Sexual activity: Not on file   Lifestyle    Physical activity     Days per week: Not on file     Minutes per session: Not on file    Stress: Not on file   Relationships    Social connections     Talks on phone: Not on file     Gets together: Not on file     Attends Jewish service: Not on file     Active member of club or organization: Not on file     Attends meetings of clubs or organizations: Not on file     Relationship status: Not on file   Other Topics Concern    Not on file   Social History Narrative    Not on file     Current Outpatient Medications   Medication Sig Dispense Refill    amitriptyline (ELAVIL) 50 MG tablet Take 50 mg by mouth every evening.      amLODIPine (NORVASC) 5 MG tablet Take 1 tablet (5 mg total) by mouth once daily. 90 tablet 1    aspirin (ECOTRIN) 81 MG EC tablet Take 81 mg by mouth once daily.      carvediloL (COREG) 12.5 MG tablet Take 1 tablet (12.5 mg total) by mouth 2 (two) times daily with meals. 60 tablet 6    clonazePAM (KLONOPIN) 0.5 MG tablet clonazepam 0.5 mg tablet      co-enzyme Q-10 30 mg capsule Take 30 mg by mouth 3 (three) times daily.      diclofenac sodium (VOLTAREN) 1 % Gel Voltaren 1 % topical gel      fenofibrate 150 mg Cap fenofibrate 150 mg capsule      fish oil-omega-3 fatty acids 300-1,000 mg capsule Take 1 capsule by mouth 2 (two) times daily.      fluticasone propionate (FLONASE) 50 mcg/actuation nasal spray fluticasone propionate 50 mcg/actuation nasal spray,suspension      hydrALAZINE (APRESOLINE)  100 MG tablet Take 1 tablet by mouth twice daily 180 tablet 0    hydrOXYzine HCl (ATARAX) 25 MG tablet hydroxyzine HCl 25 mg tablet      hyoscyamine (LEVSIN/SL) 0.125 mg Subl Place 0.125 mg under the tongue 3 (three) times daily.      irbesartan (AVAPRO) 300 MG tablet Take 300 mg by mouth every evening.      iron-vit c-vit b12-folic acid (IRON-C PLUS) tablet Iron 100 Plus 100 mg-250 mg-25 mcg-1 mg tablet   Take 1 tablet every day by oral route.      iron-vitamin C 100-250 mg, ICAR-C, 100-250 mg Tab Take 1 tablet by mouth once daily.      metFORMIN (GLUCOPHAGE-XR) 500 MG 24 hr tablet Take 500 mg by mouth 2 (two) times daily with meals.       mupirocin (BACTROBAN) 2 % ointment APPLY A SMALL AMOUNT TOPICALLY THREE TIMES DAILY  4    mv-mins/folic/lycopene/ginkgo (MENS 50+ DAILY FORMULA,GINGKO, ORAL) Mens 50+ Daily Formula(gingko)      olmesartan (BENICAR) 40 MG tablet Take 40 mg by mouth once daily.      [facility] VER Str USE 1 STRIP TO CHECK GLUCOSE TWICE DAILY  1    oxybutynin (DITROPAN) 5 MG Tab Take 5 mg by mouth 2 (two) times daily.  11    oxybutynin (DITROPAN) 5 MG Tab TAKE 1 TABLET BY MOUTH TWICE DAILY 60 tablet 11    pantoprazole (PROTONIX) 40 MG tablet Take 40 mg by mouth once daily.      potassium chloride (KLOR-CON) 20 mEq Pack Take 20 mEq by mouth 2 (two) times daily.       terazosin (HYTRIN) 5 MG capsule terazosin 5 mg capsule   Take 1 capsule every day by oral route.      valACYclovir (VALTREX) 1000 MG tablet TAKE 2 TABLETS BY MOUTH NOW AND 2 TABLETS BY MOUTH EVERY 12 HOURS  5    valsartan-hydrochlorothiazide (DIOVAN-HCT) 320-25 mg per tablet valsartan 320 mg-hydrochlorothiazide 25 mg tablet      sucralfate (CARAFATE) 100 mg/mL suspension Take 10 mLs (1 g total) by mouth 4 (four) times daily. 414 mL 0     Current Facility-Administered Medications   Medication Dose Route Frequency Provider Last Rate Last Dose    [START ON 11/23/2020] leuprolide (6 month) injection 45 mg  45 mg  Intramuscular 1 time in Clinic/HOD Manpreet Gordon MD         Review of patient's allergies indicates:  No Known Allergies   Past Medical History:   Diagnosis Date    Anemia, chronic disease 12/28/2018    Coronary artery disease     mild    Diabetes mellitus     GERD (gastroesophageal reflux disease)     Heart murmur     Hyperlipidemia     Hypertension     Normocytic normochromic anemia 12/28/2018    Prostate cancer 8/20/2018    Sleep apnea     NOT USING [facility]    Thrombocytopenia 12/28/2018    Valvular regurgitation      Past Surgical History:   Procedure Laterality Date    BACK SURGERY  09/2019    CARDIAC CATHETERIZATION      EYE SURGERY      cataracts bilateral    TRANSRECTAL ULTRASOUND OF PROSTATE WITH INSERTION OF GOLD FIDUCIAL MARKER N/A 10/4/2018    Procedure: ULTRASOUND, PROSTATE, TRANSPERINEAL APPROACH, WITH GOLD FIDUCIAL MARKER INSERTION (with SPACEOAR transperineal biodegradable gel placement);  Surgeon: Manpreet Gordon MD;  [facility];  Service: Urology;  Laterality: N/A;       Review of Systems   Constitutional: Negative for activity change, chills, fatigue, fever and unexpected weight change.   HENT: Negative for congestion, ear pain, postnasal drip, sinus pressure, sore throat, trouble swallowing and voice change.    Eyes: Negative for pain, discharge and visual disturbance.   Respiratory: Negative for cough, chest tightness, shortness of breath and wheezing.    Cardiovascular: Negative for chest pain and palpitations.   Gastrointestinal: Positive for abdominal pain and constipation. Negative for diarrhea, hematochezia, melena, nausea and vomiting.   Genitourinary: Negative for difficulty urinating, dysuria, flank pain, frequency, hematuria and urgency.   Musculoskeletal: Negative for back pain, joint swelling and neck pain.   Skin: Negative for color change and rash.   Neurological: Negative for dizziness, seizures, syncope, weakness, numbness and headaches.  "  Hematological: Negative for adenopathy.   Psychiatric/Behavioral: Negative for dysphoric mood and sleep disturbance. The patient is not nervous/anxious.       OBJECTIVE:      Vitals:    08/06/20 1503 08/06/20 1511   BP: (!) 160/82 (!) 140/78   BP Location: Right arm Left leg   Patient Position: Sitting Sitting   BP Method: X-Large (Manual) X-Large (Manual)   Pulse: 67    Temp: 98.8 °F (37.1 °C)    SpO2: 97%    Weight: 94.8 kg (209 lb)    Height: 5' 6" (1.676 m)      Physical Exam  Vitals signs and nursing note reviewed.   Constitutional:       General: He is awake. He is not in acute distress.     Appearance: Normal appearance. He is obese. He is not ill-appearing, toxic-appearing or diaphoretic.   HENT:      Head: Normocephalic and atraumatic.      Right Ear: Tympanic membrane, ear canal and external ear normal. There is no impacted cerumen.      Left Ear: Tympanic membrane, ear canal and external ear normal. There is no impacted cerumen.      Nose: Nose normal. No congestion or rhinorrhea.      Mouth/Throat:      Mouth: Mucous membranes are moist.      Pharynx: No oropharyngeal exudate or posterior oropharyngeal erythema.   Eyes:      General: Lids are normal. No scleral icterus.     Extraocular Movements: Extraocular movements intact.      Conjunctiva/sclera: Conjunctivae normal.      Pupils: Pupils are equal, round, and reactive to light.   Neck:      Musculoskeletal: Full passive range of motion without pain, normal range of motion and neck supple. No muscular tenderness.      Thyroid: No thyromegaly.      Vascular: No carotid bruit.   Cardiovascular:      Rate and Rhythm: Normal rate and regular rhythm.      Pulses: Normal pulses.      Heart sounds: Normal heart sounds. No murmur. No friction rub. No gallop.    Pulmonary:      Effort: Pulmonary effort is normal. No respiratory distress.      Breath sounds: Normal breath sounds. No stridor. No wheezing, rhonchi or rales.   Abdominal:      General: Bowel " sounds are normal. There is no distension.      Palpations: Abdomen is soft. There is no hepatomegaly or splenomegaly.      Tenderness: There is abdominal tenderness in the right upper quadrant and epigastric area. There is no guarding.      Comments: Epigastric and right upper quadrant where tender to palpation.   Musculoskeletal: Normal range of motion.         General: No tenderness.      Right lower leg: No edema.      Left lower leg: No edema.   Lymphadenopathy:      Cervical: No cervical adenopathy.   Skin:     General: Skin is warm and dry.      Capillary Refill: Capillary refill takes less than 2 seconds.      Findings: No erythema or rash.   Neurological:      Mental Status: He is alert and oriented to person, place, and time. Mental status is at baseline.   Psychiatric:         Mood and Affect: Mood normal.         Behavior: Behavior normal. Behavior is cooperative.         Thought Content: Thought content normal.         Judgment: Judgment normal.        Assessment:       1. Encounter to establish care    2. Epigastric pain    3. Essential hypertension    4. Anemia, chronic disease    5. Dyslipidemia    6. Prostate cancer    7. Type 2 diabetes mellitus without complication, without long-term current use of insulin    8. Obstructive sleep apnea syndrome        Plan:       Encounter to establish care   New patient with a complex medical history.  Will start getting his routine care and overdue health maintenance completed.  Advised patient about age and season appropriate immunizations/ cancer screenings. Influenza yearly and Tdap vaccine ever 10 years.      Epigastric pain   Patient had significant tenderness to his mid epigastric area and his right upper quadrant.  He appears uncomfortable.  EKG shows sinus bradycardia with prolonged QT interval.  No ST abnormalities.  If his CT scan and lab work does not show an identifiable cause for his abdominal pain, will have patient follow-up with Dr. Alfaro.  -      CT Abdomen Pelvis  Without Contrast; Future; Expected date: 08/06/2020  -     POCT EKG 12-LEAD (NOT FOR [facility] USE)  -     sucralfate (CARAFATE) 100 mg/mL suspension; Take 10 mLs (1 g total) by mouth 4 (four) times daily.  Dispense: 414 mL; Refill: 0  -     CBC auto differential; Future; Expected date: 08/06/2020  -     Comprehensive metabolic panel; Future; Expected date: 08/06/2020  -     Lipase; Future; Expected date: 08/06/2020    Essential hypertension   Blood pressure was elevated today.  Unsure if this is related to pain or if his blood pressure is uncontrolled.  He has a cardiology appointment with Dr. Garza on 08/14/2020.  Will have patient follow-up in 2 weeks to recheck blood pressure in office.    Anemia, chronic disease   Managed by Dr. Gibson.  Mild anemia.  Most recent hemoglobin is 11.0 and stable.    Dyslipidemia   Stable, continue current medication.    Prostate cancer   Followed by Dr. Gramajo and Dr. Gordon.  Patient has adenocarcinoma  of the prostate.  Last Lupron injection was 05/22/2020.  Last PSA level <0.01.    Type 2 diabetes mellitus without complication, without long-term current use of insulin   Last A1c was 6.0 nine months ago.  Will recheck A1c and office at follow-up appointment.  Continue current treatment.    Obstructive sleep apnea syndrome    This note was created using [employer] voice recognition software that occasionally misinterprets phrases or words.       Follow up in about 2 weeks (around 8/20/2020) for htn.      8/6/2020 ROSALINA Veras, FNP

## 2020-08-07 ENCOUNTER — TELEPHONE (OUTPATIENT)
Dept: CARDIOLOGY | Facility: CLINIC | Age: 70
End: 2020-08-07

## 2020-08-07 ENCOUNTER — HOSPITAL ENCOUNTER (OUTPATIENT)
Dept: RADIOLOGY | Facility: HOSPITAL | Age: 70
Discharge: HOME | End: 2020-08-07
Attending: NURSE PRACTITIONER
Payer: MEDICARE

## 2020-08-07 ENCOUNTER — TELEPHONE (OUTPATIENT)
Dept: HEMATOLOGY/ONCOLOGY | Facility: CLINIC | Age: 70
End: 2020-08-07

## 2020-08-07 DIAGNOSIS — R10.13 EPIGASTRIC PAIN: ICD-10-CM

## 2020-08-07 PROCEDURE — 74176 CT ABD & PELVIS W/O CONTRAST: CPT | Mod: TC,PO

## 2020-08-07 NOTE — PROGRESS NOTES
Results called to patient.  Patient informed to follow-up with his gastroenterologist Dr. Alfaro for further evaluation and management of his abdominal pain.

## 2020-08-07 NOTE — TELEPHONE ENCOUNTER
----- Message from Maribel Back, Patient Care Assistant sent at 8/7/2020  1:37 PM CDT -----  Patient called in stating he a CT done and would like to know if Dr. Gibson could look at the results and go over them with him. He can be reached at [phone]

## 2020-08-07 NOTE — TELEPHONE ENCOUNTER
----- Message from Taylor Rendon sent at 8/7/2020  8:41 AM CDT -----  Contact: pt  Pt states that he is getting some labs done from another Dr he is wanting to know if they are compatible with what the Dr is going to need for his upcoming appointment. Please call him to advise at...[phone]

## 2020-08-10 ENCOUNTER — TELEPHONE (OUTPATIENT)
Dept: FAMILY MEDICINE | Facility: CLINIC | Age: 70
End: 2020-08-10

## 2020-08-10 NOTE — TELEPHONE ENCOUNTER
----- Message from Arsh Barr NP sent at 8/10/2020  8:09 AM CDT -----  Please call patient.   I have reviewed his results.  Lipase was normal.  CBC and CMP are stable and comparable to his results 4 wks ago.  If he is still experiencing abdominal pain he should follow-up with his gastroenterologist.

## 2020-08-10 NOTE — TELEPHONE ENCOUNTER
Advised patient that his Lipase results were normal and his CBC & CMP results were stable. Patient is expecting to schedule an appointment with Gastroenterology, he states that he is awaiting a call back. I asked Mr. Hill how is he feeling today, he says that he is ok and is epigastric pain is 3/10. He thanked me for calling and acknowledged that he understood his results.

## 2020-08-12 ENCOUNTER — OFFICE VISIT (OUTPATIENT)
Dept: ORTHOPEDICS | Facility: CLINIC | Age: 70
End: 2020-08-12
Payer: MEDICARE

## 2020-08-12 VITALS
DIASTOLIC BLOOD PRESSURE: 88 MMHG | HEIGHT: 66 IN | SYSTOLIC BLOOD PRESSURE: 151 MMHG | BODY MASS INDEX: 32.95 KG/M2 | WEIGHT: 205 LBS | HEART RATE: 55 BPM

## 2020-08-12 DIAGNOSIS — M25.541 ARTHRALGIA OF METACARPOPHALANGEAL JOINT, RIGHT: Primary | ICD-10-CM

## 2020-08-12 DIAGNOSIS — M25.441 SWELLING OF HAND JOINT, RIGHT: ICD-10-CM

## 2020-08-12 DIAGNOSIS — M65.332 TRIGGER MIDDLE FINGER OF LEFT HAND: ICD-10-CM

## 2020-08-12 PROCEDURE — 20550 NJX 1 TENDON SHEATH/LIGAMENT: CPT | Mod: 59,LT,S$GLB, | Performed by: ORTHOPAEDIC SURGERY

## 2020-08-12 PROCEDURE — 99213 OFFICE O/P EST LOW 20 MIN: CPT | Mod: 25,S$GLB,, | Performed by: ORTHOPAEDIC SURGERY

## 2020-08-12 PROCEDURE — 20600 DRAIN/INJ JOINT/BURSA W/O US: CPT | Mod: RT,S$GLB,, | Performed by: ORTHOPAEDIC SURGERY

## 2020-08-12 PROCEDURE — 99213 PR OFFICE/OUTPT VISIT, EST, LEVL III, 20-29 MIN: ICD-10-PCS | Mod: 25,S$GLB,, | Performed by: ORTHOPAEDIC SURGERY

## 2020-08-12 PROCEDURE — 20550 TENDON SHEATH: L LONG MCP: ICD-10-PCS | Mod: 59,LT,S$GLB, | Performed by: ORTHOPAEDIC SURGERY

## 2020-08-12 PROCEDURE — 20600 SMALL JOINT ASPIRATION/INJECTION: R LONG PIP: ICD-10-PCS | Mod: RT,S$GLB,, | Performed by: ORTHOPAEDIC SURGERY

## 2020-08-12 RX ORDER — METHYLPREDNISOLONE ACETATE 40 MG/ML
40 INJECTION, SUSPENSION INTRA-ARTICULAR; INTRALESIONAL; INTRAMUSCULAR; SOFT TISSUE
Status: DISCONTINUED | OUTPATIENT
Start: 2020-08-12 | End: 2020-08-12 | Stop reason: HOSPADM

## 2020-08-12 RX ADMIN — METHYLPREDNISOLONE ACETATE 40 MG: 40 INJECTION, SUSPENSION INTRA-ARTICULAR; INTRALESIONAL; INTRAMUSCULAR; SOFT TISSUE at 08:08

## 2020-08-12 NOTE — PROGRESS NOTES
[facility]    Subjective:     Chief Complaint:   Chief Complaint   Patient presents with    Right Hand - Pain     Right hand pain/MRI results. States that his pain in his hand is a little worse, states that it is constant.        Past Medical History:   Diagnosis Date    Anemia, chronic disease 12/28/2018    Coronary artery disease     mild    Diabetes mellitus     GERD (gastroesophageal reflux disease)     Heart murmur     Hyperlipidemia     Hypertension     Normocytic normochromic anemia 12/28/2018    Prostate cancer 8/20/2018    Sleep apnea     NOT USING [facility]    Thrombocytopenia 12/28/2018    Valvular regurgitation        Past Surgical History:   Procedure Laterality Date    BACK SURGERY  09/2019    CARDIAC CATHETERIZATION      EYE SURGERY      cataracts bilateral    TRANSRECTAL ULTRASOUND OF PROSTATE WITH INSERTION OF GOLD FIDUCIAL MARKER N/A 10/4/2018    Procedure: ULTRASOUND, PROSTATE, TRANSPERINEAL APPROACH, WITH GOLD FIDUCIAL MARKER INSERTION (with SPACEOAR transperineal biodegradable gel placement);  Surgeon: Manpreet Gordon MD;  Location: [facility];  Service: Urology;  Laterality: N/A;       Current Outpatient Medications   Medication Sig    amitriptyline (ELAVIL) 50 MG tablet Take 50 mg by mouth every evening.    amLODIPine (NORVASC) 5 MG tablet Take 1 tablet (5 mg total) by mouth once daily.    aspirin (ECOTRIN) 81 MG EC tablet Take 81 mg by mouth once daily.    carvediloL (COREG) 12.5 MG tablet Take 1 tablet (12.5 mg total) by mouth 2 (two) times daily with meals.    clonazePAM (KLONOPIN) 0.5 MG tablet clonazepam 0.5 mg tablet    co-enzyme Q-10 30 mg capsule Take 30 mg by mouth 3 (three) times daily.    diclofenac sodium (VOLTAREN) 1 % Gel Voltaren 1 % topical gel    fenofibrate 150 mg Cap fenofibrate 150 mg capsule    fish oil-omega-3 fatty acids 300-1,000 mg capsule Take 1 capsule by mouth 2 (two) times daily.    fluticasone propionate (FLONASE) 50 mcg/actuation  nasal spray fluticasone propionate 50 mcg/actuation nasal spray,suspension    hydrALAZINE (APRESOLINE) 100 MG tablet Take 1 tablet by mouth twice daily    hydrOXYzine HCl (ATARAX) 25 MG tablet hydroxyzine HCl 25 mg tablet    hyoscyamine (LEVSIN/SL) 0.125 mg Subl Place 0.125 mg under the tongue 3 (three) times daily.    irbesartan (AVAPRO) 300 MG tablet Take 300 mg by mouth every evening.    iron-vit c-vit b12-folic acid (IRON-C PLUS) tablet Iron 100 Plus 100 mg-250 mg-25 mcg-1 mg tablet   Take 1 tablet every day by oral route.    iron-vitamin C 100-250 mg, ICAR-C, 100-250 mg Tab Take 1 tablet by mouth once daily.    metFORMIN (GLUCOPHAGE-XR) 500 MG 24 hr tablet Take 500 mg by mouth 2 (two) times daily with meals.     mupirocin (BACTROBAN) 2 % ointment APPLY A SMALL AMOUNT TOPICALLY THREE TIMES DAILY    mv-mins/folic/lycopene/ginkgo (MENS 50+ DAILY FORMULA,GINGKO, ORAL) Mens 50+ Daily Formula(gingko)    olmesartan (BENICAR) 40 MG tablet Take 40 mg by mouth once daily.    [facility] USE 1 STRIP TO CHECK GLUCOSE TWICE DAILY    oxybutynin (DITROPAN) 5 MG Tab Take 5 mg by mouth 2 (two) times daily.    oxybutynin (DITROPAN) 5 MG Tab TAKE 1 TABLET BY MOUTH TWICE DAILY    pantoprazole (PROTONIX) 40 MG tablet Take 40 mg by mouth once daily.    potassium chloride (KLOR-CON) 20 mEq Pack Take 20 mEq by mouth 2 (two) times daily.     sucralfate (CARAFATE) 100 mg/mL suspension Take 10 mLs (1 g total) by mouth 4 (four) times daily.    terazosin (HYTRIN) 5 MG capsule terazosin 5 mg capsule   Take 1 capsule every day by oral route.    valACYclovir (VALTREX) 1000 MG tablet TAKE 2 TABLETS BY MOUTH NOW AND 2 TABLETS BY MOUTH EVERY 12 HOURS    valsartan-hydrochlorothiazide (DIOVAN-HCT) 320-25 mg per tablet valsartan 320 mg-hydrochlorothiazide 25 mg tablet     Current Facility-Administered Medications   Medication    [START ON 11/23/2020] leuprolide (6 month) injection 45 mg       Review of patient's  allergies indicates:  No Known Allergies    Family History   Problem Relation Age of Onset    Heart disease Mother     Heart disease Father        Social History     Socioeconomic History    Marital status:      Spouse name: Not on file    Number of children: Not on file    Years of education: Not on file    Highest education level: Not on file   Occupational History    Not on file   Social Needs    Financial resource strain: Not on file    Food insecurity     Worry: Not on file     Inability: Not on file    Transportation needs     Medical: Not on file     Non-medical: Not on file   Tobacco Use    Smoking status: Never Smoker    Smokeless tobacco: Never Used   Substance and Sexual Activity    Alcohol use: No    Drug use: No    Sexual activity: Not on file   Lifestyle    Physical activity     Days per week: Not on file     Minutes per session: Not on file    Stress: Not on file   Relationships    Social connections     Talks on phone: Not on file     Gets together: Not on file     Attends Gnosticism service: Not on file     Active member of club or organization: Not on file     Attends meetings of clubs or organizations: Not on file     Relationship status: Not on file   Other Topics Concern    Not on file   Social History Narrative    Not on file       History of present illness:  He is here follow-up on right hand.  We have an MRI of his right hand which shows synovitis at the 3rd MP joint with some degenerative changes of the 3rd MP joint there is no other abscess there is no bony lesion other than some degenerative changes.  He still has pain in that joint is right handed fairly active is has some swelling all the time never been hot never drained anything.    Review of Systems:    Constitution: Negative for chills, fever, and sweats.  Negative for unexplained weight loss.    HENT:  Negative for headaches and blurry vision.    Cardiovascular:Negative for chest pain or irregular heart  beat. Negative for hypertension.    Respiratory:  Negative for cough and shortness of breath.    Gastrointestinal: Negative for abdominal pain, heartburn, melena, nausea, and vomitting.    Genitourinary:  Negative bladder incontinence and dysuria.    Musculoskeletal:  See HPI for details.     Neurological: Negative for numbness.    Psychiatric/Behavioral: Negative for depression.  The patient is not nervous/anxious.      Endocrine: Negative for polyuria    Hematologic/Lymphatic: Negative for bleeding problem.  Does not bruise/bleed easily.    Skin: Negative for poor would healing and rash    Objective:      Physical Examination:    Vital Signs:    Vitals:    08/12/20 0832   BP: (!) 151/88   Pulse: (!) 55       Body mass index is 33.09 kg/m².    This a well-developed, well nourished patient in no acute distress.  They are alert and oriented and cooperative to examination.        So physical exam shows he has swelling of the 3rd MP joint he has got some loss of motion.  There is no angular deformity.  It is not hot not red.  Similar to what we saw last time.  He also has an early trigger of the left long finger.  This been there for weeks.  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  And if he is not getting any relief from that then the consult with a hand doctor about would perhaps some surgical procedure.  I do not see anything else to operate on at this point.  Were treating the trigger finger left long and 3rd the MP joint arthritis      This note was created using Dragon voice recognition software that occasionally misinterpreted phrases  or words.

## 2020-08-12 NOTE — PROCEDURES
Tendon Sheath: L long MCP    Date/Time: 8/12/2020 8:30 AM  Performed by: Mitchell Sanders Jr., MD  Authorized by: Mitchell Sanders Jr., MD     Consent Done?:  Yes (Verbal)  Indications:  Pain  Site marked: the procedure site was marked    Timeout: prior to procedure the correct patient, procedure, and site was verified    Prep: patient was prepped and draped in usual sterile fashion      Local anesthesia used?: Yes    Local anesthetic:  Lidocaine 1% without epinephrine  Location:  Long finger  Site:  L long MCP  Ultrasonic guidance for needle placement?: No    Needle size:  25 G  Medications:  40 mg methylPREDNISolone acetate 40 mg/mL  Patient tolerance:  Patient tolerated the procedure well with no immediate complications    Small Joint Aspiration/Injection: R long PIP    Date/Time: 8/12/2020 8:30 AM  Performed by: Mitchell Sanders Jr., MD  Authorized by: Mitchell Sanders Jr., MD     Consent Done?:  Yes (Verbal)  Indications:  Arthritis  Site marked: the procedure site was marked    Timeout: prior to procedure the correct patient, procedure, and site was verified    Prep: patient was prepped and draped in usual sterile fashion      Local anesthesia used?: Yes    Local anesthetic:  Lidocaine 1% without epinephrine  Location:  Long finger  Site:  R long PIP  Ultrasonic guidance for needle placement?: No    Needle size:  25 G  Medications:  40 mg methylPREDNISolone acetate 40 mg/mL  Patient tolerance:  Patient tolerated the procedure well with no immediate complications

## 2020-08-14 ENCOUNTER — OFFICE VISIT (OUTPATIENT)
Dept: CARDIOLOGY | Facility: CLINIC | Age: 70
End: 2020-08-14
Payer: MEDICARE

## 2020-08-14 VITALS
HEIGHT: 66 IN | SYSTOLIC BLOOD PRESSURE: 169 MMHG | DIASTOLIC BLOOD PRESSURE: 79 MMHG | BODY MASS INDEX: 34.01 KG/M2 | HEART RATE: 56 BPM | WEIGHT: 211.63 LBS

## 2020-08-14 DIAGNOSIS — I11.9 HYPERTENSIVE HEART DISEASE WITHOUT HEART FAILURE: Primary | ICD-10-CM

## 2020-08-14 DIAGNOSIS — E87.6 HYPOKALEMIA: ICD-10-CM

## 2020-08-14 DIAGNOSIS — E78.5 DYSLIPIDEMIA: ICD-10-CM

## 2020-08-14 DIAGNOSIS — I25.10 MILD CAD: ICD-10-CM

## 2020-08-14 DIAGNOSIS — I34.0 NON-RHEUMATIC MITRAL REGURGITATION: ICD-10-CM

## 2020-08-14 DIAGNOSIS — E66.9 OBESITY, CLASS I, BMI 30-34.9: ICD-10-CM

## 2020-08-14 PROBLEM — E66.811 OBESITY, CLASS I, BMI 30-34.9: Status: ACTIVE | Noted: 2019-01-18

## 2020-08-14 PROCEDURE — 99214 OFFICE O/P EST MOD 30 MIN: CPT | Mod: PBBFAC,PO | Performed by: INTERNAL MEDICINE

## 2020-08-14 PROCEDURE — 99214 PR OFFICE/OUTPT VISIT, EST, LEVL IV, 30-39 MIN: ICD-10-PCS | Mod: S$PBB,,, | Performed by: INTERNAL MEDICINE

## 2020-08-14 PROCEDURE — 99999 PR PBB SHADOW E&M-EST. PATIENT-LVL IV: CPT | Mod: PBBFAC,,, | Performed by: INTERNAL MEDICINE

## 2020-08-14 PROCEDURE — 99999 PR PBB SHADOW E&M-EST. PATIENT-LVL IV: ICD-10-PCS | Mod: PBBFAC,,, | Performed by: INTERNAL MEDICINE

## 2020-08-14 PROCEDURE — 99214 OFFICE O/P EST MOD 30 MIN: CPT | Mod: S$PBB,,, | Performed by: INTERNAL MEDICINE

## 2020-08-14 RX ORDER — AMLODIPINE BESYLATE 10 MG/1
10 TABLET ORAL DAILY
Qty: 90 TABLET | Refills: 1 | Status: SHIPPED | OUTPATIENT
Start: 2020-08-14 | End: 2021-01-13 | Stop reason: SDUPTHER

## 2020-08-14 RX ORDER — ROSUVASTATIN CALCIUM 10 MG/1
10 TABLET, COATED ORAL DAILY
COMMUNITY
Start: 2020-05-25 | End: 2020-08-31

## 2020-08-14 NOTE — PROGRESS NOTES
Subjective:    Patient ID:  Chris Hill Jr. is a 70 y.o. male who presents for Hypertension, Hyperlipidemia, and Valvular Heart Disease        HPI  HAS BEEN HAVING GI ISSUES HAD EGD , GERD, MEDS CHANGED, HAD CT, CMP OK, K 3.3, BP UP, RECENTLY, NO CHEST PAIN NO SHORTNESS OF BREATH NO TIA TYPE SYMPTOMS NO NEAR-SYNCOPE, SEE ROS    Past Medical History:   Diagnosis Date    Anemia, chronic disease 12/28/2018    Coronary artery disease     mild    Diabetes mellitus     GERD (gastroesophageal reflux disease)     Heart murmur     Hyperlipidemia     Hypertension     Normocytic normochromic anemia 12/28/2018    Prostate cancer 8/20/2018    Sleep apnea     NOT USING [facility]    Thrombocytopenia 12/28/2018    Valvular regurgitation      Past Surgical History:   Procedure Laterality Date    BACK SURGERY  09/2019    CARDIAC CATHETERIZATION      EYE SURGERY      cataracts bilateral    TRANSRECTAL ULTRASOUND OF PROSTATE WITH INSERTION OF GOLD FIDUCIAL MARKER N/A 10/4/2018    Procedure: ULTRASOUND, PROSTATE, TRANSPERINEAL APPROACH, WITH GOLD FIDUCIAL MARKER INSERTION (with SPACEOAR transperineal biodegradable gel placement);  Surgeon: Manpreet Gordon MD;  Location: [facility];  Service: Urology;  Laterality: N/A;     Family History   Problem Relation Age of Onset    Heart disease Mother     Heart disease Father      Social History     Socioeconomic History    Marital status:      Spouse name: Not on file    Number of children: Not on file    Years of education: Not on file    Highest education level: Not on file   Occupational History    Not on file   Social Needs    Financial resource strain: Not on file    Food insecurity     Worry: Not on file     Inability: Not on file    Transportation needs     Medical: Not on file     Non-medical: Not on file   Tobacco Use    Smoking status: Never Smoker    Smokeless tobacco: Never Used   Substance and Sexual Activity    Alcohol use: No    Drug use: No     Sexual activity: Not on file   Lifestyle    Physical activity     Days per week: Not on file     Minutes per session: Not on file    Stress: Not on file   Relationships    Social connections     Talks on phone: Not on file     Gets together: Not on file     Attends Religion service: Not on file     Active member of club or organization: Not on file     Attends meetings of clubs or organizations: Not on file     Relationship status: Not on file   Other Topics Concern    Not on file   Social History Narrative    Not on file       Review of patient's allergies indicates:  No Known Allergies    Current Outpatient Medications:     amitriptyline (ELAVIL) 50 MG tablet, Take 50 mg by mouth every evening., Disp: , Rfl:     aspirin (ECOTRIN) 81 MG EC tablet, Take 81 mg by mouth once daily., Disp: , Rfl:     carvediloL (COREG) 12.5 MG tablet, Take 1 tablet (12.5 mg total) by mouth 2 (two) times daily with meals., Disp: 60 tablet, Rfl: 6    clonazePAM (KLONOPIN) 0.5 MG tablet, clonazepam 0.5 mg tablet, Disp: , Rfl:     co-enzyme Q-10 30 mg capsule, Take 30 mg by mouth 3 (three) times daily., Disp: , Rfl:     diclofenac sodium (VOLTAREN) 1 % Gel, Voltaren 1 % topical gel, Disp: , Rfl:     fenofibrate 150 mg Cap, fenofibrate 150 mg capsule, Disp: , Rfl:     fish oil-omega-3 fatty acids 300-1,000 mg capsule, Take 1 capsule by mouth 2 (two) times daily., Disp: , Rfl:     fluticasone propionate (FLONASE) 50 mcg/actuation nasal spray, fluticasone propionate 50 mcg/actuation nasal spray,suspension, Disp: , Rfl:     hydrALAZINE (APRESOLINE) 100 MG tablet, Take 1 tablet by mouth twice daily, Disp: 180 tablet, Rfl: 0    hydrOXYzine HCl (ATARAX) 25 MG tablet, hydroxyzine HCl 25 mg tablet, Disp: , Rfl:     hyoscyamine (LEVSIN/SL) 0.125 mg Subl, Place 0.125 mg under the tongue 3 (three) times daily., Disp: , Rfl:     irbesartan (AVAPRO) 300 MG tablet, Take 300 mg by mouth every evening., Disp: , Rfl:     iron-vit c-vit  b12-folic acid (IRON-C PLUS) tablet, Iron 100 Plus 100 mg-250 mg-25 mcg-1 mg tablet  Take 1 tablet every day by oral route., Disp: , Rfl:     iron-vitamin C 100-250 mg, ICAR-C, 100-250 mg Tab, Take 1 tablet by mouth once daily., Disp: , Rfl:     metFORMIN (GLUCOPHAGE-XR) 500 MG 24 hr tablet, Take 500 mg by mouth 2 (two) times daily with meals. , Disp: , Rfl:     mupirocin (BACTROBAN) 2 % ointment, APPLY A SMALL AMOUNT TOPICALLY THREE TIMES DAILY, Disp: , Rfl: 4    mv-mins/folic/lycopene/ginkgo (MENS 50+ DAILY FORMULA,GINGKO, ORAL), Mens 50+ Daily Formula(gingko), Disp: , Rfl:     olmesartan (BENICAR) 40 MG tablet, Take 40 mg by mouth once daily., Disp: , Rfl:     ONETOUCH VERIO Strp, USE 1 STRIP TO CHECK GLUCOSE TWICE DAILY, Disp: , Rfl: 1    oxybutynin (DITROPAN) 5 MG Tab, Take 5 mg by mouth 2 (two) times daily., Disp: , Rfl: 11    oxybutynin (DITROPAN) 5 MG Tab, TAKE 1 TABLET BY MOUTH TWICE DAILY, Disp: 60 tablet, Rfl: 11    pantoprazole (PROTONIX) 40 MG tablet, Take 40 mg by mouth once daily., Disp: , Rfl:     potassium chloride (KLOR-CON) 20 mEq Pack, Take 20 mEq by mouth 2 (two) times daily. , Disp: , Rfl:     rosuvastatin (CRESTOR) 10 MG tablet, Take 10 mg by mouth once daily., Disp: , Rfl:     sucralfate (CARAFATE) 100 mg/mL suspension, Take 10 mLs (1 g total) by mouth 4 (four) times daily., Disp: 414 mL, Rfl: 0    terazosin (HYTRIN) 5 MG capsule, terazosin 5 mg capsule  Take 1 capsule every day by oral route., Disp: , Rfl:     valACYclovir (VALTREX) 1000 MG tablet, TAKE 2 TABLETS BY MOUTH NOW AND 2 TABLETS BY MOUTH EVERY 12 HOURS, Disp: , Rfl: 5    valsartan-hydrochlorothiazide (DIOVAN-HCT) 320-25 mg per tablet, valsartan 320 mg-hydrochlorothiazide 25 mg tablet, Disp: , Rfl:     amLODIPine (NORVASC) 10 MG tablet, Take 1 tablet (10 mg total) by mouth once daily., Disp: 90 tablet, Rfl: 1    Current Facility-Administered Medications:     [START ON 11/23/2020] leuprolide (6 month) injection 45  "mg, 45 mg, Intramuscular, 1 time in Clinic/HOD, Manpreet Gordon MD    Review of Systems   Constitution: Negative for chills, diaphoresis, fever, malaise/fatigue and night sweats. Weight loss: SOME,DIET.   HENT: Negative for congestion, ear pain and nosebleeds.    Eyes: Negative for blurred vision and visual disturbance.   Cardiovascular: Negative for chest pain, claudication, cyanosis, dyspnea on exertion (MILD), irregular heartbeat, leg swelling, near-syncope, orthopnea, palpitations, paroxysmal nocturnal dyspnea and syncope.   Respiratory: Negative for cough, hemoptysis, shortness of breath and wheezing.    Endocrine: Negative for cold intolerance, heat intolerance and polyuria.   Hematologic/Lymphatic: Negative for adenopathy and bleeding problem. Does not bruise/bleed easily (SOME).   Skin: Negative for color change, itching and nail changes.   Musculoskeletal: Negative for back pain (CHRONIC) and falls.   Gastrointestinal: Negative for abdominal pain, change in bowel habit, heartburn (GERD), hematemesis, jaundice, melena and vomiting.   Genitourinary: Negative for dysuria, flank pain and frequency.   Neurological: Negative for brief paralysis, dizziness, light-headedness, loss of balance, numbness (LEGS FROM BACK), paresthesias, sensory change, tremors and weakness.   Psychiatric/Behavioral: Negative for altered mental status, depression and memory loss. The patient is not nervous/anxious.    Allergic/Immunologic: Negative for hives.        Objective:      Vitals:    08/14/20 1030   BP: (!) 169/79   Pulse: (!) 56   Weight: 96 kg (211 lb 10.3 oz)   Height: 5' 6" (1.676 m)     Body mass index is 34.16 kg/m².    Physical Exam   Constitutional: He is oriented to person, place, and time. He appears well-developed and well-nourished. He is active.   OBESE   HENT:   Head: Normocephalic and atraumatic.   Mouth/Throat: Oropharynx is clear and moist and mucous membranes are normal.   Eyes: Pupils are equal, round, and " reactive to light. Conjunctivae and EOM are normal.   Neck: Normal range of motion. Neck supple. Normal carotid pulses, no hepatojugular reflux and no JVD present. Carotid bruit is not present. No edema and no erythema present. No thyromegaly present.   Cardiovascular: Normal rate and regular rhythm.  No extrasystoles are present. PMI is not displaced. Exam reveals no gallop, no distant heart sounds, no friction rub and no midsystolic click.   Murmur heard.  High-pitched holosystolic murmur is present at the apex.  Pulses:       Carotid pulses are 2+ on the right side and 2+ on the left side.       Radial pulses are 2+ on the right side and 2+ on the left side.        Femoral pulses are 2+ on the right side and 2+ on the left side.       Dorsalis pedis pulses are 2+ on the right side and 2+ on the left side.        Posterior tibial pulses are 2+ on the right side and 2+ on the left side.   Pulmonary/Chest: Effort normal and breath sounds normal. No accessory muscle usage. No tachypnea and no bradypnea. No respiratory distress.   Abdominal: Soft. Bowel sounds are normal. He exhibits no distension and no mass. There is no hepatosplenomegaly. There is no CVA tenderness.   Musculoskeletal: Normal range of motion.         General: No deformity or edema.   Lymphadenopathy:     He has no cervical adenopathy.   Neurological: He is alert and oriented to person, place, and time. He has normal strength. He displays no tremor. No cranial nerve deficit.   Skin: Skin is warm and dry. No cyanosis or erythema. No pallor.   Psychiatric: He has a normal mood and affect. His speech is normal and behavior is normal.               ..    Chemistry        Component Value Date/Time     08/07/2020 1205     04/12/2019 1022    K 3.3 (L) 08/07/2020 1205     08/07/2020 1205    CL 99 04/12/2019 1022    CO2 26 08/07/2020 1205    BUN 21 08/07/2020 1205    CREATININE 0.8 08/07/2020 1205    CREATININE 0.72 04/12/2019 1022    GLU  110 08/07/2020 1205     (H) 04/12/2019 1022        Component Value Date/Time    CALCIUM 9.0 08/07/2020 1205    ALKPHOS 53 (L) 08/07/2020 1205    AST 25 08/07/2020 1205    ALT 23 08/07/2020 1205    BILITOT 0.8 08/07/2020 1205    ESTGFRAFRICA >60.0 08/07/2020 1205    EGFRNONAA >60.0 08/07/2020 1205            ..  Lab Results   Component Value Date    CHOL 100 (L) 02/11/2020    CHOL 94 (L) 10/16/2019    CHOL 137 01/11/2019     Lab Results   Component Value Date    HDL 31 (L) 02/11/2020    HDL 34 (L) 10/16/2019    HDL 48 01/11/2019     Lab Results   Component Value Date    LDLCALC 29.8 (L) 02/11/2020    LDLCALC 12.0 (L) 10/16/2019    LDLCALC 49.2 (L) 01/11/2019     Lab Results   Component Value Date    TRIG 196 (H) 02/11/2020    TRIG 240 (H) 10/16/2019    TRIG 199 (H) 01/11/2019     Lab Results   Component Value Date    CHOLHDL 31.0 02/11/2020    CHOLHDL 36.2 10/16/2019    CHOLHDL 35.0 01/11/2019     ..  Lab Results   Component Value Date    WBC 6.13 08/07/2020    HGB 10.7 (L) 08/07/2020    HCT 34.4 (L) 08/07/2020    MCV 85 08/07/2020     (L) 08/07/2020       Test(s) Reviewed  I have reviewed the following in detail:  [] Stress test   [] Angiography   [] Echocardiogram   [x] Labs   [x] Other:       Assessment:         ICD-10-CM ICD-9-CM   1. Hypertensive heart disease without heart failure  I11.9 402.90   2. Mild CAD  I25.10 414.00   3. Non-rheumatic mitral regurgitation  I34.0 424.0   4. Hypokalemia  E87.6 276.8   5. Dyslipidemia  E78.5 272.4   6. Obesity, Class I, BMI 30-34.9  E66.9 278.00     Problem List Items Addressed This Visit        Cardiac/Vascular    Hypertensive heart disease without heart failure - Primary    Non-rheumatic mitral regurgitation    Mild CAD    Dyslipidemia       Renal/    Hypokalemia       Endocrine    Obesity, Class I, BMI 30-34.9           Plan:     INCREASE AMLODIPINE TO 10 MG, MIGHT CAUSE EDEMA, EXPLAINED THE PATIENT OFF HCTZ B/O LOW PLT, PER DR. BARRERA, ALL OTHER CV  CLINICALLY STABLE, NO ANGINA, NO HF, NO TIA, NO CLINICAL ARRHYTHMIA,CONTINUE CURRENT MEDS, EDUCATION, DIET, EXERCISE WEIGHT LOSS, RETURN TO CLINIC IN 6 MO WITH LABS      Hypertensive heart disease without heart failure    Mild CAD    Non-rheumatic mitral regurgitation    Hypokalemia    Dyslipidemia    Obesity, Class I, BMI 30-34.9    Other orders  -     amLODIPine (NORVASC) 10 MG tablet; Take 1 tablet (10 mg total) by mouth once daily.  Dispense: 90 tablet; Refill: 1    RTC Low level/low impact aerobic exercise 5x's/wk. Heart healthy diet and risk factor modification.    See labs and med orders.    Aerobic exercise 5x's/wk. Heart healthy diet and risk factor modification.    See labs and med orders.

## 2020-08-24 ENCOUNTER — LAB VISIT (OUTPATIENT)
Dept: LAB | Facility: HOSPITAL | Age: 70
End: 2020-08-24
Attending: INTERNAL MEDICINE
Payer: MEDICARE

## 2020-08-24 DIAGNOSIS — D64.9 ANEMIA, UNSPECIFIED: Primary | ICD-10-CM

## 2020-08-24 LAB
HAPTOGLOB SERPL-MCNC: 214 MG/DL (ref 30–250)
RETICS/RBC NFR AUTO: 2.2 % (ref 0.4–2)
VIT B12 SERPL-MCNC: 270 PG/ML (ref 210–950)

## 2020-08-24 PROCEDURE — 82746 ASSAY OF FOLIC ACID SERUM: CPT

## 2020-08-24 PROCEDURE — 83010 ASSAY OF HAPTOGLOBIN QUANT: CPT

## 2020-08-24 PROCEDURE — 85045 AUTOMATED RETICULOCYTE COUNT: CPT

## 2020-08-24 PROCEDURE — 82607 VITAMIN B-12: CPT

## 2020-08-24 PROCEDURE — 36415 COLL VENOUS BLD VENIPUNCTURE: CPT

## 2020-08-24 PROCEDURE — 84207 ASSAY OF VITAMIN B-6: CPT

## 2020-08-26 ENCOUNTER — TELEPHONE (OUTPATIENT)
Dept: UROLOGY | Facility: CLINIC | Age: 70
End: 2020-08-26

## 2020-08-26 LAB — FOLATE SERPL-MCNC: 13 NG/ML (ref 4–24)

## 2020-08-26 NOTE — TELEPHONE ENCOUNTER
----- Message from Taylor Rendon sent at 8/26/2020 10:22 AM CDT -----  Contact: pt  Pt states that he missed a call from the nurse and returning the call...[phone]

## 2020-08-27 LAB — PYRIDOXAL SERPL-MCNC: 10 UG/L (ref 5–50)

## 2020-09-30 ENCOUNTER — OFFICE VISIT (OUTPATIENT)
Dept: ORTHOPEDICS | Facility: CLINIC | Age: 70
End: 2020-09-30
Payer: MEDICARE

## 2020-09-30 VITALS
DIASTOLIC BLOOD PRESSURE: 80 MMHG | HEIGHT: 66 IN | HEART RATE: 76 BPM | SYSTOLIC BLOOD PRESSURE: 150 MMHG | BODY MASS INDEX: 33.91 KG/M2 | WEIGHT: 211 LBS

## 2020-09-30 DIAGNOSIS — M65.4 DE QUERVAIN'S TENOSYNOVITIS, LEFT: Primary | ICD-10-CM

## 2020-09-30 DIAGNOSIS — M25.532 LEFT WRIST PAIN: ICD-10-CM

## 2020-09-30 PROCEDURE — 99213 OFFICE O/P EST LOW 20 MIN: CPT | Mod: 25,S$GLB,, | Performed by: ORTHOPAEDIC SURGERY

## 2020-09-30 PROCEDURE — 99213 PR OFFICE/OUTPT VISIT, EST, LEVL III, 20-29 MIN: ICD-10-PCS | Mod: 25,S$GLB,, | Performed by: ORTHOPAEDIC SURGERY

## 2020-09-30 PROCEDURE — 20550 TENDON SHEATH: ICD-10-PCS | Mod: LT,S$GLB,, | Performed by: ORTHOPAEDIC SURGERY

## 2020-09-30 PROCEDURE — 20550 NJX 1 TENDON SHEATH/LIGAMENT: CPT | Mod: LT,S$GLB,, | Performed by: ORTHOPAEDIC SURGERY

## 2020-09-30 RX ORDER — TAMSULOSIN HYDROCHLORIDE 0.4 MG/1
1 CAPSULE ORAL NIGHTLY
COMMUNITY
Start: 2020-08-29 | End: 2020-11-03

## 2020-09-30 RX ORDER — METHYLPREDNISOLONE ACETATE 40 MG/ML
40 INJECTION, SUSPENSION INTRA-ARTICULAR; INTRALESIONAL; INTRAMUSCULAR; SOFT TISSUE
Status: DISCONTINUED | OUTPATIENT
Start: 2020-09-30 | End: 2020-09-30 | Stop reason: HOSPADM

## 2020-09-30 RX ADMIN — METHYLPREDNISOLONE ACETATE 40 MG: 40 INJECTION, SUSPENSION INTRA-ARTICULAR; INTRALESIONAL; INTRAMUSCULAR; SOFT TISSUE at 11:09

## 2020-09-30 NOTE — PROGRESS NOTES
[facility]    Subjective:     Chief Complaint:   Chief Complaint   Patient presents with    Left Wrist - Pain     Left wrist pain x 3 weeks. States that his granddaughter went to jumped on him went to catch her and felt a pop in his wrist. Pain is constant and worse with activity        Past Medical History:   Diagnosis Date    Anemia, chronic disease 12/28/2018    Coronary artery disease     mild    Diabetes mellitus     Diabetes mellitus, type 2     GERD (gastroesophageal reflux disease)     Heart murmur     Hyperlipidemia     Hypertension     Normocytic normochromic anemia 12/28/2018    Prostate cancer 8/20/2018    Sleep apnea     NOT USING [facility]    Thrombocytopenia 12/28/2018    Valvular regurgitation        Past Surgical History:   Procedure Laterality Date    BACK SURGERY  09/2019    CARDIAC CATHETERIZATION      EYE SURGERY      cataracts bilateral    TRANSRECTAL ULTRASOUND OF PROSTATE WITH INSERTION OF GOLD FIDUCIAL MARKER N/A 10/4/2018    Procedure: ULTRASOUND, PROSTATE, TRANSPERINEAL APPROACH, WITH GOLD FIDUCIAL MARKER INSERTION (with SPACEOAR transperineal biodegradable gel placement);  Surgeon: Manpreet Gordon MD;  Location: [facility];  Service: Urology;  Laterality: N/A;       Current Outpatient Medications   Medication Sig    amitriptyline (ELAVIL) 50 MG tablet Take 50 mg by mouth every evening.    amLODIPine (NORVASC) 10 MG tablet Take 1 tablet (10 mg total) by mouth once daily.    aspirin (ECOTRIN) 81 MG EC tablet Take 81 mg by mouth once daily.    carvediloL (COREG) 12.5 MG tablet Take 1 tablet (12.5 mg total) by mouth 2 (two) times daily with meals.    co-enzyme Q-10 30 mg capsule Take 30 mg by mouth 3 (three) times daily.    fenofibrate 150 mg Cap fenofibrate 150 mg capsule    fish oil-omega-3 fatty acids 300-1,000 mg capsule Take 1 capsule by mouth 2 (two) times daily.    hydrALAZINE (APRESOLINE) 100 MG tablet Take 1 tablet by mouth twice daily    hydrOXYzine  HCl (ATARAX) 25 MG tablet hydroxyzine HCl 25 mg tablet    hyoscyamine (LEVSIN/SL) 0.125 mg Subl Place 0.125 mg under the tongue 3 (three) times daily.    iron-vitamin C 100-250 mg, ICAR-C, 100-250 mg Tab Take 1 tablet by mouth once daily.    metFORMIN (GLUCOPHAGE-XR) 500 MG 24 hr tablet Take 500 mg by mouth 2 (two) times daily with meals.     mv-mins/folic/lycopene/ginkgo (MENS 50+ DAILY FORMULA,GINGKO, ORAL) Mens 50+ Daily Formula(gingko)    olmesartan (BENICAR) 40 MG tablet Take 40 mg by mouth once daily.    ONETOUCH VERIO Strp USE 1 STRIP TO CHECK GLUCOSE TWICE DAILY    oxybutynin (DITROPAN) 5 MG Tab TAKE 1 TABLET BY MOUTH TWICE DAILY    pantoprazole (PROTONIX) 40 MG tablet Take 40 mg by mouth once daily.    potassium chloride (KLOR-CON) 20 mEq Pack Take 20 mEq by mouth 2 (two) times daily.     rosuvastatin (CRESTOR) 10 MG tablet Take 1 tablet by mouth once daily    sucralfate (CARAFATE) 100 mg/mL suspension Take 10 mLs (1 g total) by mouth 4 (four) times daily.    tamsulosin (FLOMAX) 0.4 mg Cap Take 1 capsule by mouth every evening.    terazosin (HYTRIN) 5 MG capsule terazosin 5 mg capsule   Take 1 capsule every day by oral route.    mupirocin (BACTROBAN) 2 % ointment APPLY A SMALL AMOUNT TOPICALLY THREE TIMES DAILY     Current Facility-Administered Medications   Medication    [START ON 11/23/2020] leuprolide (6 month) injection 45 mg       Review of patient's allergies indicates:  No Known Allergies    Family History   Problem Relation Age of Onset    Heart disease Mother     Heart disease Father        Social History     Socioeconomic History    Marital status:      Spouse name: Not on file    Number of children: Not on file    Years of education: Not on file    Highest education level: Not on file   Occupational History    Not on file   Social Needs    Financial resource strain: Not on file    Food insecurity     Worry: Not on file     Inability: Not on file    Transportation  needs     Medical: Not on file     Non-medical: Not on file   Tobacco Use    Smoking status: Never Smoker    Smokeless tobacco: Never Used   Substance and Sexual Activity    Alcohol use: No    Drug use: No    Sexual activity: Not on file   Lifestyle    Physical activity     Days per week: Not on file     Minutes per session: Not on file    Stress: Not on file   Relationships    Social connections     Talks on phone: Not on file     Gets together: Not on file     Attends [employer] service: Not on file     Active member of club or organization: Not on file     Attends meetings of clubs or organizations: Not on file     Relationship status: Not on file   Other Topics Concern    Not on file   Social History Narrative    Not on file       History of present illness:  She has got a recent pain is left wrist  something and pulled a little bit in the wrist but did not fall did not have a big injury no prior treatment left wrist    Review of Systems:    Constitution: Negative for chills, fever, and sweats.  Negative for unexplained weight loss.    HENT:  Negative for headaches and blurry vision.    Cardiovascular:Negative for chest pain or irregular heart beat. Negative for hypertension.    Respiratory:  Negative for cough and shortness of breath.    Gastrointestinal: Negative for abdominal pain, heartburn, melena, nausea, and vomitting.    Genitourinary:  Negative bladder incontinence and dysuria.    Musculoskeletal:  See HPI for details.     Neurological: Negative for numbness.    Psychiatric/Behavioral: Negative for depression.  The patient is not nervous/anxious.      Endocrine: Negative for polyuria    Hematologic/Lymphatic: Negative for bleeding problem.  Does not bruise/bleed easily.    Skin: Negative for poor would healing and rash    Objective:      Physical Examination:    Vital Signs:    Vitals:    09/30/20 1116   BP: (!) 150/80   Pulse: 76       Body mass index is 34.06 kg/m².    This a  well-developed, well nourished patient in no acute distress.  They are alert and oriented and cooperative to examination.        So physical exam shows he has tenderness in the 1st dorsal compartment he has minimal pain with grind base of the thumb there is no major deformity in the thumb there is no tenderness over the distal radius no tenderness in the snuffbox there is no swelling in the wrist.  Range of motion of the wrist is intact can make a good fist without a problem  Pertinent New Results:    XRAY Report / Interpretation:   AP lateral oblique of the of left wrist shows there are no acute deformities.  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  Electronically Signed By Mitchell Sanders JR, MD    Assessment/Plan:      My impression I think his primary issue today is a de Quervain tendonitis of the left wrist.  Our plan is steroid injection 1st dorsal compartment done today 1 cc Depo-Medrol and 2 cc of lidocaine he has a splint to wave wear for few days.  I believe this will of fix the problem.  If he has recurrences he will return.  I do not think he needs treatment for of base of the thumb arthritis today.  Incidentally his right hand he has got a degenerative 3rd MP joint that has been injected a couple of times the pain in the swelling returned is activity related he wants to go further investigating surgeries for that he would need to see a hand specialist.      This note was created using Dragon voice recognition software that occasionally misinterpreted phrases or words.

## 2020-09-30 NOTE — PROCEDURES
Tendon Sheath    Date/Time: 9/30/2020 11:15 AM  Performed by: Mitchell Sanders Jr., MD  Authorized by: Mitchell Sanders Jr., MD     Consent Done?:  Yes (Verbal)  Indications:  Pain  Site marked: the procedure site was marked    Timeout: prior to procedure the correct patient, procedure, and site was verified    Prep: patient was prepped and draped in usual sterile fashion      Local anesthesia used?: Yes    Local anesthetic:  Lidocaine 1% without epinephrine  Location:  Wrist  Site:  L first doral compartment  Ultrasonic guidance for needle placement?: No    Needle size:  25 G  Medications:  40 mg methylPREDNISolone acetate 40 mg/mL  Patient tolerance:  Patient tolerated the procedure well with no immediate complications

## 2020-10-09 ENCOUNTER — TELEPHONE (OUTPATIENT)
Dept: HEMATOLOGY/ONCOLOGY | Facility: CLINIC | Age: 70
End: 2020-10-09

## 2020-10-09 NOTE — TELEPHONE ENCOUNTER
CALLED TO SEE IF PATIENT COMPLETED LAB WORK. PATIENT STATES HE WILL GET IT DONE IN INFUSION Monday.

## 2020-10-12 ENCOUNTER — LAB VISIT (OUTPATIENT)
Dept: LAB | Facility: HOSPITAL | Age: 70
End: 2020-10-12
Attending: INTERNAL MEDICINE
Payer: MEDICARE

## 2020-10-12 DIAGNOSIS — C61 PROSTATE CANCER: ICD-10-CM

## 2020-10-12 DIAGNOSIS — D63.8 ANEMIA, CHRONIC DISEASE: ICD-10-CM

## 2020-10-12 DIAGNOSIS — D64.9 NORMOCYTIC NORMOCHROMIC ANEMIA: ICD-10-CM

## 2020-10-12 DIAGNOSIS — D69.6 THROMBOCYTOPENIA: ICD-10-CM

## 2020-10-12 LAB
ALBUMIN SERPL BCP-MCNC: 3.6 G/DL (ref 3.5–5.2)
ALP SERPL-CCNC: 61 U/L (ref 55–135)
ALT SERPL W/O P-5'-P-CCNC: 38 U/L (ref 10–44)
ANION GAP SERPL CALC-SCNC: 10 MMOL/L (ref 8–16)
AST SERPL-CCNC: 36 U/L (ref 10–40)
BASOPHILS # BLD AUTO: 0.03 K/UL (ref 0–0.2)
BASOPHILS NFR BLD: 0.4 % (ref 0–1.9)
BILIRUB SERPL-MCNC: 0.4 MG/DL (ref 0.1–1)
BUN SERPL-MCNC: 16 MG/DL (ref 8–23)
CALCIUM SERPL-MCNC: 9 MG/DL (ref 8.7–10.5)
CHLORIDE SERPL-SCNC: 103 MMOL/L (ref 95–110)
CO2 SERPL-SCNC: 26 MMOL/L (ref 23–29)
CREAT SERPL-MCNC: 0.9 MG/DL (ref 0.5–1.4)
DIFFERENTIAL METHOD: ABNORMAL
EOSINOPHIL # BLD AUTO: 0.6 K/UL (ref 0–0.5)
EOSINOPHIL NFR BLD: 8.2 % (ref 0–8)
ERYTHROCYTE [DISTWIDTH] IN BLOOD BY AUTOMATED COUNT: 14.6 % (ref 11.5–14.5)
EST. GFR  (AFRICAN AMERICAN): >60 ML/MIN/1.73 M^2
EST. GFR  (NON AFRICAN AMERICAN): >60 ML/MIN/1.73 M^2
FERRITIN SERPL-MCNC: 49 NG/ML (ref 20–300)
GLUCOSE SERPL-MCNC: 91 MG/DL (ref 70–110)
HCT VFR BLD AUTO: 35.9 % (ref 40–54)
HGB BLD-MCNC: 11.5 G/DL (ref 14–18)
IMM GRANULOCYTES # BLD AUTO: 0.06 K/UL (ref 0–0.04)
IMM GRANULOCYTES NFR BLD AUTO: 0.9 % (ref 0–0.5)
IRON SERPL-MCNC: 52 UG/DL (ref 45–160)
LYMPHOCYTES # BLD AUTO: 1.2 K/UL (ref 1–4.8)
LYMPHOCYTES NFR BLD: 18.1 % (ref 18–48)
MCH RBC QN AUTO: 27.1 PG (ref 27–31)
MCHC RBC AUTO-ENTMCNC: 32 G/DL (ref 32–36)
MCV RBC AUTO: 85 FL (ref 82–98)
MONOCYTES # BLD AUTO: 0.7 K/UL (ref 0.3–1)
MONOCYTES NFR BLD: 9.9 % (ref 4–15)
NEUTROPHILS # BLD AUTO: 4.2 K/UL (ref 1.8–7.7)
NEUTROPHILS NFR BLD: 62.5 % (ref 38–73)
NRBC BLD-RTO: 0 /100 WBC
PLATELET # BLD AUTO: 154 K/UL (ref 150–350)
PMV BLD AUTO: 12 FL (ref 9.2–12.9)
POTASSIUM SERPL-SCNC: 4.3 MMOL/L (ref 3.5–5.1)
PROT SERPL-MCNC: 6.3 G/DL (ref 6–8.4)
RBC # BLD AUTO: 4.25 M/UL (ref 4.6–6.2)
SATURATED IRON: 17 % (ref 20–50)
SODIUM SERPL-SCNC: 139 MMOL/L (ref 136–145)
TOTAL IRON BINDING CAPACITY: 300 UG/DL (ref 250–450)
TRANSFERRIN SERPL-MCNC: 214 MG/DL (ref 200–375)
WBC # BLD AUTO: 6.69 K/UL (ref 3.9–12.7)

## 2020-10-12 PROCEDURE — 85025 COMPLETE CBC W/AUTO DIFF WBC: CPT

## 2020-10-12 PROCEDURE — 83540 ASSAY OF IRON: CPT

## 2020-10-12 PROCEDURE — 36415 COLL VENOUS BLD VENIPUNCTURE: CPT

## 2020-10-12 PROCEDURE — 80053 COMPREHEN METABOLIC PANEL: CPT

## 2020-10-12 PROCEDURE — 82728 ASSAY OF FERRITIN: CPT

## 2020-10-12 NOTE — PROGRESS NOTES
[facility]emat[facility]/Oncology  PROGRESS NOTE -  Follow-up Visit      Subjective:       Patient ID:   NAME: Chris Hill Jr. : 1950     70 y.o. male    Referring Doc: Tyrell  Other Physicians: Manpreet Gordon; Wiley; Cheyanne Alfaro        Chief Complaint:  prostate cancer; anem/tcp f/u         History of Present Illness:     Patient is being seen today in person. He previously had scope with EGD with Dr Alfaro  and they found an esophageal polyp which was cauterized. He subsequently saw Dr Santillan and he recommended that the patient have the polyp removed.     He had recent EGD but it was incomplete due residual gastric contents and it has been rescheduled to next month.     The patient is on today to go over the results of the recently ordered labs, tests and studies.  He is here by himself. He is doing ok.      He denies any CP, SOB, HA's or N/V.  He has been having some leg cramps, possibly due to anticholesterol meds. He is seeing Dr Pettit tomorrow    He previously had dilation of the esophagus and is swallowing a little better now.     He sees Dr Gordon again in 2020     I discussed COVID19 precautions          ROS:   GEN: normal without any fever, night sweats or weight loss  HEENT: normal with no HA's, sore throat, stiff neck, changes in vision  CV: normal with no CP, SOB, PND, JANSEN or orthopnea  PULM: normal with no SOB, cough, hemoptysis, sputum or pleuritic pain  GI: normal with no abdominal pain, nausea, vomiting, constipation, diarrhea, melanotic stools, BRBPR, or hematemesis  : normal with no hematuria, dysuria  BREAST: normal with no mass, discharge, pain  SKIN: normal with no rash, erythema, bruising, or swelling    Allergies:  Review of patient's allergies indicates:  No Known Allergies    Medications:    Current Outpatient Medications:     amitriptyline (ELAVIL) 50 MG tablet, Take 50 mg by mouth every evening., Disp: , Rfl:     amLODIPine (NORVASC) 10 MG tablet, Take 1 tablet (10 mg  total) by mouth once daily., Disp: 90 tablet, Rfl: 1    aspirin (ECOTRIN) 81 MG EC tablet, Take 81 mg by mouth once daily., Disp: , Rfl:     carvediloL (COREG) 12.5 MG tablet, Take 1 tablet (12.5 mg total) by mouth 2 (two) times daily with meals., Disp: 60 tablet, Rfl: 6    co-enzyme Q-10 30 mg capsule, Take 30 mg by mouth 3 (three) times daily., Disp: , Rfl:     fenofibrate 150 mg Cap, fenofibrate 150 mg capsule, Disp: , Rfl:     fish oil-omega-3 fatty acids 300-1,000 mg capsule, Take 1 capsule by mouth 2 (two) times daily., Disp: , Rfl:     hydrALAZINE (APRESOLINE) 100 MG tablet, Take 1 tablet by mouth twice daily, Disp: 180 tablet, Rfl: 0    hydrOXYzine HCl (ATARAX) 25 MG tablet, hydroxyzine HCl 25 mg tablet, Disp: , Rfl:     hyoscyamine (LEVSIN/SL) 0.125 mg Subl, Place 0.125 mg under the tongue 3 (three) times daily., Disp: , Rfl:     iron-vitamin C 100-250 mg, ICAR-C, 100-250 mg Tab, Take 1 tablet by mouth once daily., Disp: , Rfl:     metFORMIN (GLUCOPHAGE-XR) 500 MG 24 hr tablet, Take 500 mg by mouth 2 (two) times daily with meals. , Disp: , Rfl:     mupirocin (BACTROBAN) 2 % ointment, APPLY A SMALL AMOUNT TOPICALLY THREE TIMES DAILY, Disp: , Rfl: 4    mv-mins/folic/lycopene/ginkgo (MENS 50+ DAILY FORMULA,GINGKO, ORAL), Mens 50+ Daily Formula(gingko), Disp: , Rfl:     olmesartan (BENICAR) 40 MG tablet, Take 40 mg by mouth once daily., Disp: , Rfl:     ONETOUCH VERIO Strp, USE 1 STRIP TO CHECK GLUCOSE TWICE DAILY, Disp: , Rfl: 1    oxybutynin (DITROPAN) 5 MG Tab, TAKE 1 TABLET BY MOUTH TWICE DAILY, Disp: 60 tablet, Rfl: 11    pantoprazole (PROTONIX) 40 MG tablet, Take 40 mg by mouth once daily., Disp: , Rfl:     potassium chloride (KLOR-CON) 20 mEq Pack, Take 20 mEq by mouth 2 (two) times daily. , Disp: , Rfl:     rosuvastatin (CRESTOR) 10 MG tablet, Take 1 tablet by mouth once daily, Disp: 90 tablet, Rfl: 1    sucralfate (CARAFATE) 100 mg/mL suspension, Take 10 mLs (1 g total) by mouth 4  (four) times daily., Disp: 414 mL, Rfl: 0    tamsulosin (FLOMAX) 0.4 mg Cap, Take 1 capsule by mouth every evening., Disp: , Rfl:     terazosin (HYTRIN) 10 MG capsule, TAKE 1 CAPSULE BY MOUTH ONCE DAILY IN THE EVENING, Disp: 90 capsule, Rfl: 1    Current Facility-Administered Medications:     [START ON 11/23/2020] leuprolide (6 month) injection 45 mg, 45 mg, Intramuscular, 1 time in Clinic/HOD, Manpreet Gordon MD    PMHx/PSHx Updates:  See patient's last visit with me on 7/13/2020  See H&P on 12/28/2018        Pathology:  Cancer Staging  No matching staging information was found for the patient.          Objective:     Vitals:  Blood pressure (!) 141/80, pulse 72, temperature 96.7 °F (35.9 °C), resp. rate 17, weight 94.5 kg (208 lb 4.8 oz).        Physical Examination:   GEN: no apparent distress, comfortable; AAOx3  HEAD: atraumatic and normocephalic  EYES: no conjunctival pallor or muddiness, no icterus; normal pupil reaction to ambient light  ENT: OMM, no pharyngeal erythema, external bilateral ears WNL; no visible thrush or ulcers  NECK: no masses or swelling, trachea midline, no visible LAD/LN's   CV: no palpitations; no pedal edema; no noticeable JVD or neck vein distension  CHEST: Normal respiratory effort; chest wall breath movements symmetrical; no audible wheezing  ABDOM: non-distended; no bloating  MUSC/Skeletal: ROM normal; joints visibly normal; no deformities or arthropathy  EXTREM: no clubbing, cyanosis, inflammation or swelling  SKIN: no rashes, lesions, ulcers, petechiae or subcutaneous nodules  : no ellison  NEURO: moving all 4 extremities; AAOx3; no tremors  PSYCH: normal mood, affect and behavior  LYMPH: no visible LN's or LAD              Labs:        Lab Results   Component Value Date    WBC 6.69 10/12/2020    HGB 11.5 (L) 10/12/2020    HCT 35.9 (L) 10/12/2020    MCV 85 10/12/2020     10/12/2020     CMP  Sodium   Date Value Ref Range Status   10/12/2020 139 136 - 145 mmol/L Final    04/12/2019 138 134 - 144 mmol/L      Potassium   Date Value Ref Range Status   10/12/2020 4.3 3.5 - 5.1 mmol/L Final     Chloride   Date Value Ref Range Status   10/12/2020 103 95 - 110 mmol/L Final   04/12/2019 99 98 - 110 mmol/L      CO2   Date Value Ref Range Status   10/12/2020 26 23 - 29 mmol/L Final     Glucose   Date Value Ref Range Status   10/12/2020 91 70 - 110 mg/dL Final   04/12/2019 117 (H) 70 - 99 mg/dL      BUN, Bld   Date Value Ref Range Status   10/12/2020 16 8 - 23 mg/dL Final     Creatinine   Date Value Ref Range Status   10/12/2020 0.9 0.5 - 1.4 mg/dL Final   04/12/2019 0.72 0.60 - 1.40 mg/dL      Calcium   Date Value Ref Range Status   10/12/2020 9.0 8.7 - 10.5 mg/dL Final     Total Protein   Date Value Ref Range Status   10/12/2020 6.3 6.0 - 8.4 g/dL Final     Albumin   Date Value Ref Range Status   10/12/2020 3.6 3.5 - 5.2 g/dL Final   04/12/2019 3.3 3.1 - 4.7 g/dL      Total Bilirubin   Date Value Ref Range Status   10/12/2020 0.4 0.1 - 1.0 mg/dL Final     Comment:     For infants and newborns, interpretation of results should be based  on gestational age, weight and in agreement with clinical  observations.  Premature Infant recommended reference ranges:  Up to 24 hours.............<8.0 mg/dL  Up to 48 hours............<12.0 mg/dL  3-5 days..................<15.0 mg/dL  6-29 days.................<15.0 mg/dL       Alkaline Phosphatase   Date Value Ref Range Status   10/12/2020 61 55 - 135 U/L Final     AST   Date Value Ref Range Status   10/12/2020 36 10 - 40 U/L Final     ALT   Date Value Ref Range Status   10/12/2020 38 10 - 44 U/L Final     Anion Gap   Date Value Ref Range Status   10/12/2020 10 8 - 16 mmol/L Final     eGFR if    Date Value Ref Range Status   10/12/2020 >60.0 >60 mL/min/1.73 m^2 Final     eGFR if non    Date Value Ref Range Status   10/12/2020 >60.0 >60 mL/min/1.73 m^2 Final     Comment:     Calculation used to obtain the estimated  glomerular filtration  rate (eGFR) is the CKD-EPI equation.        Lab Results   Component Value Date    IRON 52 10/12/2020    TIBC 300 10/12/2020    FERRITIN 49 10/12/2020            Radiology/Diagnostic Studies:    Us Abdomen Complete    Result Date: 1/2/2019  Abdominal ultrasound Clinical history is anemia, prostate cancer The pancreas, aorta and IVC are normal. The liver is hyperechoic compatible with fatty infiltration. There is hepatopedal flow within the portal vein. The common bile duct measures 6 mm. The patient has had a cholecystectomy. The kidneys are normal in size and echogenicity. The spleen is normal in size measuring 12 cm. IMPRESSION: Fatty infiltration of the liver otherwise negative abdominal ultrasound Read and electronically signed by: Leydi Goldberg MD on 1/2/2019 9:49 AM CST LEYDI GOLDBERG MD      I have reviewed all available lab results and radiology reports.    Assessment/Plan:   (1) 70 y.o. male with diagnosis of prostate cancer who has been referred by Simone Santillan Jr., MD for evaluation by medical oncology. Patient with a high risk adenocarcinoma of the prostate with [id number] with GS of 4+5.   - he started androgen depravation therapy and monotherapy XRT (IMRT)  - followed by Dr Gordon with  and currently on Trelstar  - followed by Dr Santillan with Rad/onc and completed XRt on 12/18/2018  - latest PSA at 0.01 in May 2020 and he had his last lupron shot done in April/May 2020  - he sees Dr Gordon again in Nov 2020     (2) CAD and non-rheumatic MR; mild CVA in 1993; Hypertensive Heart disease - followed by Dr Jama with cardiology     (3) HTN and hypercholesterolemia     (4) LORA     (5) DM    (6) Chronic back pain issues - required recent lumbar surgery with Dr Chris Fofana at [facility]     (7) Hx/of nightly fevers     (8) Chronic diarhhea - followed by Dr Alfaro with GI and s/p recent endoscopies     (9) Mild thrombocytopenia resolved with last platelet count down at 141,000  -  no history of hepatitis or liver problems; no alcoholism  - he has positive antiplatelet antibody screens both direct and indirect consistent with an underlying chronic ITP condition  - his flow cytometry showed no evidence of leukemia but he did have a relative increase in eosinophils     (10) Mild anemia with latest hgb at 11.5 and stable   - NCNC parameters  - iron panel was WNL with recent labs  - OBS was negative on 11/15  - hx/of colon polyps in past  - followed by Dr Alfaro with GI with planned repeat endoscopies in near future    (11) Esophageal polyp   - He had scope with EGD with Dr Alfaroand they found an esophageal polyp which was cauterized. He saw Dr Santillan  and he recommended that the patient have the polyp removed.   - he is having repeat EGD to re-evaluate the polyp next month                VISIT DIAGNOSES:      Prostate cancer    Normocytic normochromic anemia    Anemia, chronic disease    Thrombocytopenia          PLAN:  1. Proceed with rad/onc and  directives  2. Check CBC/plat/iron panel every 3 months for now  3. F/u with PCP, , Rad/onc etc  4. continue ICAR-C daily po  5. F/u with GI with planned repeat EGD next month  6. RTC in 3 months    Fax note to Arsh Santillan NP, and Wiley Gordon Albright    Discussion:       Total Time spent on patient:    I spent over 15 mins of time with the patient. Reviewed results of the recently ordered labs, tests, reports and studies; made directives with regards to the results. Over half of this time was spent couseling and coordinating care, making treatment and analytical decisions; ordering necessary labs, tests and studies; and discussing treatment options and setting up treatment plan(s) if indicated.        COVID-19 Discussion:    I had long discussion with patient and any applicable family about the COVID-19 coronavirus epidemic and the recommended precautions with regard to cancer and/or hematology patients. I have  re-iterated the CDC recommendations for adequate hand washing, use of hand -like products, and coughing into elbow, etc. In addition, especially for our patients who are on chemotherapy and/or our otherwise immunocompromised patients, I have recommended avoidance of crowds, including movie theaters, restaurants, churches, etc. I have recommended avoidance of any sick or symptomatic family members and/or friends. I have also recommended avoidance of any raw and unwashed food products, and general avoidance of food items that have not been prepared by themselves. The patient has been asked to call us immediately with any symptom developments, issues, questions or other general concerns.       I have explained all of the above in detail and the patient understands all of the current recommendation(s). I have answered all of their questions to the best of my ability and to their complete satisfaction.   The patient is to continue with the current management plan.            Electronically signed by Cyrus Gibson MD

## 2020-10-13 ENCOUNTER — OFFICE VISIT (OUTPATIENT)
Dept: HEMATOLOGY/ONCOLOGY | Facility: CLINIC | Age: 70
End: 2020-10-13
Payer: MEDICARE

## 2020-10-13 VITALS
RESPIRATION RATE: 17 BRPM | WEIGHT: 208.31 LBS | DIASTOLIC BLOOD PRESSURE: 80 MMHG | BODY MASS INDEX: 33.62 KG/M2 | TEMPERATURE: 97 F | HEART RATE: 72 BPM | SYSTOLIC BLOOD PRESSURE: 141 MMHG

## 2020-10-13 DIAGNOSIS — D69.6 THROMBOCYTOPENIA: ICD-10-CM

## 2020-10-13 DIAGNOSIS — D64.9 NORMOCYTIC NORMOCHROMIC ANEMIA: ICD-10-CM

## 2020-10-13 DIAGNOSIS — D63.8 ANEMIA, CHRONIC DISEASE: ICD-10-CM

## 2020-10-13 DIAGNOSIS — C61 PROSTATE CANCER: Primary | ICD-10-CM

## 2020-10-13 PROCEDURE — 99213 PR OFFICE/OUTPT VISIT, EST, LEVL III, 20-29 MIN: ICD-10-PCS | Mod: S$GLB,,, | Performed by: INTERNAL MEDICINE

## 2020-10-13 PROCEDURE — 99213 OFFICE O/P EST LOW 20 MIN: CPT | Mod: S$GLB,,, | Performed by: INTERNAL MEDICINE

## 2020-10-15 ENCOUNTER — OFFICE VISIT (OUTPATIENT)
Dept: FAMILY MEDICINE | Facility: CLINIC | Age: 70
End: 2020-10-15
Payer: MEDICARE

## 2020-10-15 VITALS
DIASTOLIC BLOOD PRESSURE: 66 MMHG | TEMPERATURE: 97 F | HEIGHT: 66 IN | HEART RATE: 61 BPM | WEIGHT: 201 LBS | OXYGEN SATURATION: 97 % | SYSTOLIC BLOOD PRESSURE: 132 MMHG | BODY MASS INDEX: 32.3 KG/M2

## 2020-10-15 DIAGNOSIS — N40.0 BENIGN PROSTATIC HYPERPLASIA, UNSPECIFIED WHETHER LOWER URINARY TRACT SYMPTOMS PRESENT: ICD-10-CM

## 2020-10-15 DIAGNOSIS — I34.0 NON-RHEUMATIC MITRAL REGURGITATION: Primary | ICD-10-CM

## 2020-10-15 DIAGNOSIS — E11.9 TYPE 2 DIABETES MELLITUS WITHOUT COMPLICATION, WITHOUT LONG-TERM CURRENT USE OF INSULIN: ICD-10-CM

## 2020-10-15 DIAGNOSIS — E53.8 VITAMIN B12 DEFICIENCY: ICD-10-CM

## 2020-10-15 DIAGNOSIS — I25.10 MILD CAD: ICD-10-CM

## 2020-10-15 DIAGNOSIS — K21.9 GASTROESOPHAGEAL REFLUX DISEASE, UNSPECIFIED WHETHER ESOPHAGITIS PRESENT: ICD-10-CM

## 2020-10-15 DIAGNOSIS — Z79.82 ASPIRIN LONG-TERM USE: ICD-10-CM

## 2020-10-15 DIAGNOSIS — C61 PROSTATE CANCER: ICD-10-CM

## 2020-10-15 DIAGNOSIS — G47.33 OBSTRUCTIVE SLEEP APNEA SYNDROME: ICD-10-CM

## 2020-10-15 DIAGNOSIS — I63.9 CEREBROVASCULAR ACCIDENT (CVA), UNSPECIFIED MECHANISM: ICD-10-CM

## 2020-10-15 DIAGNOSIS — E78.5 HYPERLIPIDEMIA, UNSPECIFIED HYPERLIPIDEMIA TYPE: ICD-10-CM

## 2020-10-15 DIAGNOSIS — I10 ESSENTIAL HYPERTENSION: ICD-10-CM

## 2020-10-15 PROCEDURE — G0009 PNEUMOCOCCAL CONJUGATE VACCINE 13-VALENT LESS THAN 5YO & GREATER THAN: ICD-10-PCS | Mod: S$GLB,,, | Performed by: FAMILY MEDICINE

## 2020-10-15 PROCEDURE — 99204 PR OFFICE/OUTPT VISIT, NEW, LEVL IV, 45-59 MIN: ICD-10-PCS | Mod: 25,S$GLB,, | Performed by: FAMILY MEDICINE

## 2020-10-15 PROCEDURE — G0008 FLU VACCINE - QUADRIVALENT - ADJUVANTED: ICD-10-PCS | Mod: S$GLB,,, | Performed by: FAMILY MEDICINE

## 2020-10-15 PROCEDURE — 90694 VACC AIIV4 NO PRSRV 0.5ML IM: CPT | Mod: S$GLB,,, | Performed by: FAMILY MEDICINE

## 2020-10-15 PROCEDURE — 90670 PNEUMOCOCCAL CONJUGATE VACCINE 13-VALENT LESS THAN 5YO & GREATER THAN: ICD-10-PCS | Mod: S$GLB,,, | Performed by: FAMILY MEDICINE

## 2020-10-15 PROCEDURE — 99204 OFFICE O/P NEW MOD 45 MIN: CPT | Mod: 25,S$GLB,, | Performed by: FAMILY MEDICINE

## 2020-10-15 PROCEDURE — 90670 PCV13 VACCINE IM: CPT | Mod: S$GLB,,, | Performed by: FAMILY MEDICINE

## 2020-10-15 PROCEDURE — G0009 ADMIN PNEUMOCOCCAL VACCINE: HCPCS | Mod: S$GLB,,, | Performed by: FAMILY MEDICINE

## 2020-10-15 PROCEDURE — 90694 FLU VACCINE - QUADRIVALENT - ADJUVANTED: ICD-10-PCS | Mod: S$GLB,,, | Performed by: FAMILY MEDICINE

## 2020-10-15 PROCEDURE — G0008 ADMIN INFLUENZA VIRUS VAC: HCPCS | Mod: S$GLB,,, | Performed by: FAMILY MEDICINE

## 2020-10-17 NOTE — PROGRESS NOTES
Was seeing Dr. Slade.  But has not seen him since COVID.  Carcinoma of the prostate diagnosed 2 years ago had radiation and has been on Lupron since then.  The tumor was beyond the prostate was not operable.  Is generally doing okay.  Hypertension since 1993.  He has some mitral regurg urge.  Has minimal coronary artery disease.  He is on aspirin.  No anginal chest pain.  Pulmonary never smoked.  No asthma problems.  GI colonoscopy was in 2019 Dr. isaías arriola.  Recommended repeat in 5 years.  EGD showed some reflux.  On medication did have a gastric polyp in repeat EGD is due.   he does start and stop urinating some and have some nocturia.  Hematologic does bruise.  His hemoglobin recently 11.5.  B12 level was low.  He is on metformin for his diabetes which he has had for 9 or 10 years.  Uses Dr. MALIKA brambila for his eye exam and that is current and was okay.  Hyperlipidemia on Crestor.  Check of this is due also.  He does have diabetic neuropathy.  And has leg cramps.  Sees Dr. Mana adams.  Right posterior lateral chest lesion that he would like me to look at.    Immunizations.  He has not had pneumococcal vaccine either Prevnar or Pneumovax.  He is in need of his flu shot.    Physical examination vital signs are noted.  Well-developed well-nourished male no acute distress alert oriented.  Neck is without bruit no adenopathy no thyromegaly.  Chest clear to auscultation no wheezes or crackles no dyspnea.  Heart regular rate rhythm without murmur gallop or rub.  Abdomen bowel sounds positive soft nontender no hepatosplenomegaly no guarding or rebound.  Extremities no clubbing cyanosis or edema.  Positive pedal pulses.  Right posterior lateral chest has a 1 x 3 cm oval nontender subcutaneous lump.  Most likely a lipoma.    Impression coronary artery disease.  Mitral regurg.  Hypertension controlled.  Hyperlipidemia.  Diabetes mellitus type 2.  Gastroesophageal reflux disease.  Obstructive sleep apnea.  Carcinoma of the  prostate.  Prior CVA in 1993.  B12 deficiency.    Plan Prevnar 13 recommended now.  Pneumovax in 1 year.  Flu shot high-dose in the right arm.  Prevnar in the left.  B12 deficiency start sublingual B12 5000 per day.  Microalbumin ordered.  Lipids and A1c with his next labs.  Follow-up in 3 months.  Okay refills of his medications November 1st when he is changing insurances.

## 2020-11-03 ENCOUNTER — OFFICE VISIT (OUTPATIENT)
Dept: FAMILY MEDICINE | Facility: CLINIC | Age: 70
End: 2020-11-03
Payer: MEDICARE

## 2020-11-03 ENCOUNTER — APPOINTMENT (OUTPATIENT)
Dept: URGENT CARE | Facility: CLINIC | Age: 70
End: 2020-11-03
Payer: MEDICARE

## 2020-11-03 VITALS
TEMPERATURE: 98 F | DIASTOLIC BLOOD PRESSURE: 80 MMHG | SYSTOLIC BLOOD PRESSURE: 142 MMHG | OXYGEN SATURATION: 97 % | HEART RATE: 57 BPM | BODY MASS INDEX: 33.75 KG/M2 | WEIGHT: 210 LBS | HEIGHT: 66 IN

## 2020-11-03 DIAGNOSIS — I10 HYPERTENSION, ESSENTIAL: Primary | ICD-10-CM

## 2020-11-03 DIAGNOSIS — C61 PROSTATE CANCER: ICD-10-CM

## 2020-11-03 DIAGNOSIS — J20.9 ACUTE BRONCHITIS, UNSPECIFIED ORGANISM: ICD-10-CM

## 2020-11-03 DIAGNOSIS — E11.9 TYPE 2 DIABETES MELLITUS WITHOUT COMPLICATION, WITHOUT LONG-TERM CURRENT USE OF INSULIN: ICD-10-CM

## 2020-11-03 DIAGNOSIS — D69.6 THROMBOCYTOPENIA: ICD-10-CM

## 2020-11-03 DIAGNOSIS — R05.9 COUGH: ICD-10-CM

## 2020-11-03 DIAGNOSIS — G62.9 NEUROPATHY: ICD-10-CM

## 2020-11-03 PROCEDURE — U0003 INFECTIOUS AGENT DETECTION BY NUCLEIC ACID (DNA OR RNA); SEVERE ACUTE RESPIRATORY SYNDROME CORONAVIRUS 2 (SARS-COV-2) (CORONAVIRUS DISEASE [COVID-19]), AMPLIFIED PROBE TECHNIQUE, MAKING USE OF HIGH THROUGHPUT TECHNOLOGIES AS DESCRIBED BY CMS-2020-01-R: HCPCS

## 2020-11-03 PROCEDURE — 99213 PR OFFICE/OUTPT VISIT, EST, LEVL III, 20-29 MIN: ICD-10-PCS | Mod: S$GLB,,, | Performed by: FAMILY MEDICINE

## 2020-11-03 PROCEDURE — 99213 OFFICE O/P EST LOW 20 MIN: CPT | Mod: S$GLB,,, | Performed by: FAMILY MEDICINE

## 2020-11-03 RX ORDER — DOXYCYCLINE 100 MG/1
100 CAPSULE ORAL EVERY 12 HOURS
Qty: 20 CAPSULE | Refills: 0 | Status: SHIPPED | OUTPATIENT
Start: 2020-11-03 | End: 2021-03-08

## 2020-11-03 RX ORDER — DOXYCYCLINE 100 MG/1
100 CAPSULE ORAL EVERY 12 HOURS
COMMUNITY
End: 2020-11-03 | Stop reason: SDUPTHER

## 2020-11-04 LAB — SARS-COV-2 RNA RESP QL NAA+PROBE: NOT DETECTED

## 2020-11-05 ENCOUNTER — TELEPHONE (OUTPATIENT)
Dept: FAMILY MEDICINE | Facility: CLINIC | Age: 70
End: 2020-11-05

## 2020-11-05 ENCOUNTER — TELEPHONE (OUTPATIENT)
Dept: URGENT CARE | Facility: CLINIC | Age: 70
End: 2020-11-05

## 2020-11-05 NOTE — TELEPHONE ENCOUNTER
Pt advised normal labs  ----- Message from Samy Pettit III, MD sent at 11/4/2020  7:42 PM CST -----  NORMAL followup as needed.

## 2020-11-05 NOTE — PROGRESS NOTES
Ears have been clogged up sore throat coughing sneezing and headache.  No fever.  Sick for 4 days.  Son and his wife and granddaughter all had COVID.  There were around them a couple of days before they became ill.  Granddaughter had at 1st.  Her whole as lytic team headache.  He has had no diarrhea.  Does get some constipation we uses iron.  Had upper endoscopy 8 days ago.  Uses Elavil for itching and his neuropathy.  Discontinued it recently.  Off aspirin due to the GI problems.    Physical examination vital signs are noted.  Overweight male no acute distress.  Tympanic membranes without erythema.  Nose congested pharynx without erythema exudate.  Neck is without adenopathy no bruit no thyromegaly.  Chest clear to auscultation no wheezes or crackles or dyspnea.  Heart regular rate and rhythm without murmur gallop or rub.  Extremities without edema.    Impression hypertension controlled.  Neuropathy.  Thrombocytopenia.  CA of the prostate.  Bronchitis.  Diabetes mellitus type 2.    Plan resume his aspirin 81 mg daily.  Get a COVID nasal swab.  Vibramycin 100 mg b.i.d. for the bronchitis.  Discussed COVID risk factors and implications.

## 2020-11-05 NOTE — TELEPHONE ENCOUNTER
Pt advised to call us back when he figures out the name of the mail order service he would like to use.  ----- Message from Ruslan Magana sent at 11/5/2020  3:39 PM CST -----  Type: Needs Medical Advice  Who Called:  Patient    Best Call Back Number: [phone] (home)     Additional Information: Patient would like prescriptions switched to mail order. Please call to advise.

## 2020-11-05 NOTE — TELEPHONE ENCOUNTER
----- Message from ANGELO Desouaz sent at 11/5/2020  9:40 AM CST -----  Negative covid test, please call to notify patient    Called pt to give results. Pt has already received results and expressed understanding.

## 2020-11-06 ENCOUNTER — TELEPHONE (OUTPATIENT)
Dept: FAMILY MEDICINE | Facility: CLINIC | Age: 70
End: 2020-11-06

## 2020-11-07 NOTE — TELEPHONE ENCOUNTER
----- Message from Pillo Echavarria sent at 11/6/2020  9:50 AM CST -----  Contact: pt at [phone]  Type:  Patient Returning Call  Who Called:  pt  Does the patient know what this is regarding?:  yes  Best Call Back Number:  [phone]  Additional Information:  pt is returning a call to the office. Please call back and advise      PT USE [facility] HAS TO CALL THEM TO SET UP ACCT AND CALL US BACK

## 2020-11-09 ENCOUNTER — TELEPHONE (OUTPATIENT)
Dept: FAMILY MEDICINE | Facility: CLINIC | Age: 70
End: 2020-11-09

## 2020-11-09 NOTE — TELEPHONE ENCOUNTER
----- Message from Rabia Fuentes sent at 11/9/2020 12:54 PM CST -----  Regarding: Refill  Contact: patient  Patient want to speak with a nurse regarding medication questions and new rx to be placed on file, patient only wanted to speak with nurse please call back at [phone] (home)     Case number [id number]      SPOKE WITH PT AGAIN ABOUT RX TO [facility] HAVE TO SET UP ACCT. IF HE NEED SOME NOW CAN CALL LOCAL PHARM. ELAVIL 25 MG QD KLOR CON 2O MEQ 2 BID METFORMIN 500 MG BID COREG 12.5 MG BID BENICAR 40 MG QD. CALLING BACK WHEN EVERYTHING IS SET UP

## 2020-11-11 ENCOUNTER — TELEPHONE (OUTPATIENT)
Dept: CARDIOLOGY | Facility: CLINIC | Age: 70
End: 2020-11-11

## 2020-11-11 RX ORDER — OXYBUTYNIN CHLORIDE 5 MG/1
5 TABLET ORAL 2 TIMES DAILY
Qty: 60 TABLET | Refills: 11 | Status: SHIPPED | OUTPATIENT
Start: 2020-11-11 | End: 2020-11-23

## 2020-11-11 NOTE — TELEPHONE ENCOUNTER
Spoke to pt over the phone . Pt need to get 90 days refill supply for next time we order medication

## 2020-11-11 NOTE — TELEPHONE ENCOUNTER
----- Message from Panda Laguna sent at 11/11/2020  1:54 PM CST -----  Regarding: med refill - 3 month supply  Pt will run out of his oxybutin prior to his appt on 23rd of this month.  Pt now has [employer] [place] ins and will cover a 3 month supply. Pt rqsts a 3 month supply.    Pharmacy = [facility] on [address]    Please call pt if there are any questions or concerns @   [phone]

## 2020-11-11 NOTE — TELEPHONE ENCOUNTER
----- Message from Navid Kramer sent at 11/11/2020  2:29 PM CST -----  Type: Needs Medical Advice    Who Called:  Patient  Best Call Back Number: [phone]  Additional Information: Patient would like to discuss receiving 90 day supplies for prescriptions. Please call to advise. Thanks!

## 2020-11-11 NOTE — TELEPHONE ENCOUNTER
Spoke w pt informed Dr Gordon not in the office and aware of asking for refill pt voiced ok and understanding.

## 2020-11-12 ENCOUNTER — TELEPHONE (OUTPATIENT)
Dept: FAMILY MEDICINE | Facility: CLINIC | Age: 70
End: 2020-11-12

## 2020-11-12 NOTE — TELEPHONE ENCOUNTER
Carvedilol 12.5 mg 1 po bid #180x2, olmesartan 40mg 1 po qd #92x2, pot cl micro er 10 meq 2 po bid#360x2 called to [facility]

## 2020-11-12 NOTE — TELEPHONE ENCOUNTER
----- Message from Irais Garrett MA sent at 11/12/2020 10:11 AM CST -----  Type:  Patient Returning Call    Who Called:  Chris  Who Left Message for Patient:  unknown  Does the patient know what this is regarding?:  medication  Best Call Back Number:  [phone]  Additional Information:        Refill elavil 50mg 1 po qhs #90x2 and metformin 500mg 1 po bid #180x2 called to [facility]

## 2020-11-13 ENCOUNTER — TELEPHONE (OUTPATIENT)
Dept: FAMILY MEDICINE | Facility: CLINIC | Age: 70
End: 2020-11-13

## 2020-11-13 ENCOUNTER — LAB VISIT (OUTPATIENT)
Dept: LAB | Facility: HOSPITAL | Age: 70
End: 2020-11-13
Attending: NURSE PRACTITIONER
Payer: MEDICARE

## 2020-11-13 DIAGNOSIS — E11.9 TYPE 2 DIABETES MELLITUS WITHOUT COMPLICATION, WITHOUT LONG-TERM CURRENT USE OF INSULIN: ICD-10-CM

## 2020-11-13 DIAGNOSIS — E78.5 HYPERLIPIDEMIA, UNSPECIFIED HYPERLIPIDEMIA TYPE: ICD-10-CM

## 2020-11-13 DIAGNOSIS — C61 PROSTATE CANCER: ICD-10-CM

## 2020-11-13 LAB
CHOLEST SERPL-MCNC: 109 MG/DL (ref 120–199)
CHOLEST/HDLC SERPL: 2.9 {RATIO} (ref 2–5)
COMPLEXED PSA SERPL-MCNC: <0.01 NG/ML (ref 0–4)
ESTIMATED AVG GLUCOSE: 111 MG/DL (ref 68–131)
HBA1C MFR BLD HPLC: 5.5 % (ref 4–5.6)
HDLC SERPL-MCNC: 37 MG/DL (ref 40–75)
HDLC SERPL: 33.9 % (ref 20–50)
LDLC SERPL CALC-MCNC: 31.8 MG/DL (ref 63–159)
NONHDLC SERPL-MCNC: 72 MG/DL
TESTOST SERPL-MCNC: 9 NG/DL (ref 304–1227)
TRIGL SERPL-MCNC: 201 MG/DL (ref 30–150)

## 2020-11-13 PROCEDURE — 36415 COLL VENOUS BLD VENIPUNCTURE: CPT

## 2020-11-13 PROCEDURE — 84153 ASSAY OF PSA TOTAL: CPT

## 2020-11-13 PROCEDURE — 84403 ASSAY OF TOTAL TESTOSTERONE: CPT

## 2020-11-13 PROCEDURE — 83036 HEMOGLOBIN GLYCOSYLATED A1C: CPT

## 2020-11-13 PROCEDURE — 80061 LIPID PANEL: CPT

## 2020-11-13 NOTE — TELEPHONE ENCOUNTER
----- Message from Irais Garrett MA sent at 11/13/2020  8:26 AM CST -----  Type: Needs Medical Advice  Who Called:  Chris  Messi Call Back Number: [phone]  Additional Information: patient would like to ensure that a copy of his cv19 results were sent Dr FLORI Carey.  Please call patient to advise    Patient appt at 9am  Fax [phone]      Faxed over covid  result

## 2020-11-17 ENCOUNTER — TELEPHONE (OUTPATIENT)
Dept: FAMILY MEDICINE | Facility: CLINIC | Age: 70
End: 2020-11-17

## 2020-11-17 NOTE — TELEPHONE ENCOUNTER
Pt advised ok results.  ----- Message from Samy Pettit III, MD sent at 11/13/2020 10:46 PM CST -----  FOLLOW-UP AS RECOMMENDED a1c lipids ok

## 2020-11-21 ENCOUNTER — HOSPITAL ENCOUNTER (EMERGENCY)
Facility: HOSPITAL | Age: 70
Discharge: HOME | End: 2020-11-21
Attending: EMERGENCY MEDICINE
Payer: MEDICARE

## 2020-11-21 VITALS
SYSTOLIC BLOOD PRESSURE: 164 MMHG | WEIGHT: 210 LBS | HEART RATE: 62 BPM | HEIGHT: 66 IN | DIASTOLIC BLOOD PRESSURE: 77 MMHG | RESPIRATION RATE: 19 BRPM | OXYGEN SATURATION: 98 % | BODY MASS INDEX: 33.75 KG/M2 | TEMPERATURE: 98 F

## 2020-11-21 DIAGNOSIS — R52 PAIN: ICD-10-CM

## 2020-11-21 DIAGNOSIS — R10.13 EPIGASTRIC ABDOMINAL PAIN: Primary | ICD-10-CM

## 2020-11-21 LAB
ALBUMIN SERPL BCP-MCNC: 3.7 G/DL (ref 3.5–5.2)
ALP SERPL-CCNC: 64 U/L (ref 55–135)
ALT SERPL W/O P-5'-P-CCNC: 83 U/L (ref 10–44)
ANION GAP SERPL CALC-SCNC: 10 MMOL/L (ref 8–16)
AST SERPL-CCNC: 174 U/L (ref 10–40)
BASOPHILS # BLD AUTO: 0.04 K/UL (ref 0–0.2)
BASOPHILS NFR BLD: 0.6 % (ref 0–1.9)
BILIRUB SERPL-MCNC: 0.6 MG/DL (ref 0.1–1)
BILIRUB UR QL STRIP: NEGATIVE
BUN SERPL-MCNC: 32 MG/DL (ref 8–23)
CALCIUM SERPL-MCNC: 9.2 MG/DL (ref 8.7–10.5)
CHLORIDE SERPL-SCNC: 105 MMOL/L (ref 95–110)
CLARITY UR: CLEAR
CO2 SERPL-SCNC: 23 MMOL/L (ref 23–29)
COLOR UR: YELLOW
CREAT SERPL-MCNC: 1 MG/DL (ref 0.5–1.4)
DIFFERENTIAL METHOD: ABNORMAL
EOSINOPHIL # BLD AUTO: 0.5 K/UL (ref 0–0.5)
EOSINOPHIL NFR BLD: 6.4 % (ref 0–8)
ERYTHROCYTE [DISTWIDTH] IN BLOOD BY AUTOMATED COUNT: 14.9 % (ref 11.5–14.5)
EST. GFR  (AFRICAN AMERICAN): >60 ML/MIN/1.73 M^2
EST. GFR  (NON AFRICAN AMERICAN): >60 ML/MIN/1.73 M^2
GLUCOSE SERPL-MCNC: 152 MG/DL (ref 70–110)
GLUCOSE UR QL STRIP: NEGATIVE
HCT VFR BLD AUTO: 34.3 % (ref 40–54)
HGB BLD-MCNC: 10.7 G/DL (ref 14–18)
HGB UR QL STRIP: NEGATIVE
IMM GRANULOCYTES # BLD AUTO: 0.12 K/UL (ref 0–0.04)
IMM GRANULOCYTES NFR BLD AUTO: 1.7 % (ref 0–0.5)
INR PPP: 1.1
KETONES UR QL STRIP: NEGATIVE
LEUKOCYTE ESTERASE UR QL STRIP: NEGATIVE
LIPASE SERPL-CCNC: 35 U/L (ref 4–60)
LYMPHOCYTES # BLD AUTO: 1.1 K/UL (ref 1–4.8)
LYMPHOCYTES NFR BLD: 15.3 % (ref 18–48)
MCH RBC QN AUTO: 26.4 PG (ref 27–31)
MCHC RBC AUTO-ENTMCNC: 31.2 G/DL (ref 32–36)
MCV RBC AUTO: 85 FL (ref 82–98)
MONOCYTES # BLD AUTO: 0.6 K/UL (ref 0.3–1)
MONOCYTES NFR BLD: 9 % (ref 4–15)
NEUTROPHILS # BLD AUTO: 4.7 K/UL (ref 1.8–7.7)
NEUTROPHILS NFR BLD: 67 % (ref 38–73)
NITRITE UR QL STRIP: NEGATIVE
NRBC BLD-RTO: 0 /100 WBC
PH UR STRIP: 5 [PH] (ref 5–8)
PLATELET # BLD AUTO: 146 K/UL (ref 150–350)
PMV BLD AUTO: 12.5 FL (ref 9.2–12.9)
POTASSIUM SERPL-SCNC: 3.7 MMOL/L (ref 3.5–5.1)
PROT SERPL-MCNC: 6 G/DL (ref 6–8.4)
PROT UR QL STRIP: NEGATIVE
PROTHROMBIN TIME: 13.8 SEC (ref 10.6–14.8)
RBC # BLD AUTO: 4.06 M/UL (ref 4.6–6.2)
SODIUM SERPL-SCNC: 138 MMOL/L (ref 136–145)
SP GR UR STRIP: 1.03 (ref 1–1.03)
TROPONIN I SERPL DL<=0.01 NG/ML-MCNC: <0.03 NG/ML
URN SPEC COLLECT METH UR: NORMAL
UROBILINOGEN UR STRIP-ACNC: NEGATIVE EU/DL
WBC # BLD AUTO: 7 K/UL (ref 3.9–12.7)

## 2020-11-21 PROCEDURE — 93010 EKG 12-LEAD: ICD-10-PCS | Mod: ,,, | Performed by: INTERNAL MEDICINE

## 2020-11-21 PROCEDURE — 81003 URINALYSIS AUTO W/O SCOPE: CPT

## 2020-11-21 PROCEDURE — 85610 PROTHROMBIN TIME: CPT

## 2020-11-21 PROCEDURE — 25500020 PHARM REV CODE 255: Performed by: EMERGENCY MEDICINE

## 2020-11-21 PROCEDURE — 93010 ELECTROCARDIOGRAM REPORT: CPT | Mod: ,,, | Performed by: INTERNAL MEDICINE

## 2020-11-21 PROCEDURE — 85025 COMPLETE CBC W/AUTO DIFF WBC: CPT

## 2020-11-21 PROCEDURE — 99285 EMERGENCY DEPT VISIT HI MDM: CPT | Mod: 25

## 2020-11-21 PROCEDURE — 93005 ELECTROCARDIOGRAM TRACING: CPT | Performed by: INTERNAL MEDICINE

## 2020-11-21 PROCEDURE — 83690 ASSAY OF LIPASE: CPT

## 2020-11-21 PROCEDURE — 84484 ASSAY OF TROPONIN QUANT: CPT

## 2020-11-21 PROCEDURE — 80053 COMPREHEN METABOLIC PANEL: CPT

## 2020-11-21 RX ORDER — SUCRALFATE 1 G/1
1 TABLET ORAL 2 TIMES DAILY
Qty: 14 TABLET | Refills: 0 | Status: ON HOLD | OUTPATIENT
Start: 2020-11-21 | End: 2021-06-22 | Stop reason: HOSPADM

## 2020-11-21 RX ADMIN — IOHEXOL 100 ML: 350 INJECTION, SOLUTION INTRAVENOUS at 09:11

## 2020-11-21 NOTE — PROGRESS NOTES
[facility] Urology Progress Note     Chris Hill Jr is a 70 y.o. male who presents for prostate cancer follow up and ADT renewal     Hx of Nayely 4 + 5 equals 9 prostate cancer (cT1c, iPSA 13.2) treated with androgen deprivation therapy external beam radiation.    He presented 10/4/18 for fiducial marker placement as well as SpaceOAR placement. Vol 33.4g at time of markers. At time of SpaceOar he did note daily diarrhea for weeks prior.  8/13/18 eligard 22.5 3 mos depot; 11/13/18 trelstar 22.5mg 6 mos depot.   - 2 weeks prior did interrupt his XRT for lumbar back surgery as his chronic back pain was limiting his ability to lay flat on radiation table. Only missed 2d of therapy.  He noted at that time his diarrhea had persisted. Using prn immodium     Completed XRT 12/18/18. PSA 0.1 on 12/13/18. Has followed up with Dr Gibson for chronic ITP and anemia, stable.  Has been followed by GI. Dr Tavares, with EGD 12/5/18 and colonoscopy 2/11/19 with a few cecal polyps removed and diverticulosis (repeat 3 yrs)     Dr Santillan on 1/9/19: Patient reports fatigue with energy level at 30%. Of note he is undergoing workup for low blood counts with Dr. Gibson.  He reports frequency, urgency, nocturia of urine are resolving however he continues with urgency of stool. He reports frequency has improved with Imodium. AUA SS 17/5 from 10/2     2/12/19: 2/6/19 PSA 0.02, T <4, Cr 0.8, AUA symptom score:  23/5 unhappy (5:  Intermittency, urgency; 4:  Emptying, frequency; 3:  Weak stream; 2:  Sleeping)  He did start oxybutynin 5 mg b.i.d. which has significantly helped decrease the hot flashes.  He still gets some though they are not as intense as they were before  Since starting it, and further away from radiation, his urgency is somewhat better.  He does have daytime frequency of 8-10 times at nighttime frequency of 1-2 times.  Having both urinary urgency and fecal urgency.  His diarrhea is somewhat better.  GI evaluation has been  overall unrevealing of a etiology of chronic diarrhea.  He does report urinary intermittency but no urinary hesitancy. He did have back surgery after 22 radiation treatments, though he does note that his fecal and urinary urgency predated his back surgery. On Hytrin 10 mg in morning, so started Flomax 0.4 mg in the evening to have increased dose of alpha blocker.     5/25/19: improved voiding taking Hytrin in the morning and Flomax in the evening. His diarrhea has improved. Hot flashes went away a bit and well controlled with Ditropan b.i.d.  AUA symptom score:  9/1, please (3:  Emptying, urgency; 1:  Intermittency, weak stream, sleeping). 5/8/19: PSA  0.02 and T 10.  ADT renewed with Lupron 45mg     11/19/19: psa undetectable <0.01, T 7. Chronic ITP. LUTS stable with progressive urgency. AUA SS:  15/3, mixed (5:  Urgency; 3:  Emptying, weak stream; 2:  Intermittency; 1:  Frequency, sleeping).  Medications helped 5/10. Hytrin a.m., Flomax p.m., Ditropan 5 mg b.i.d. Still has mild PV dribble and has minimal UUI  Alternates constipation/diarrhea - may have worse urinary symptoms when constipated. Lots of water during day - rare soda. DTF 4-5x. 2/5 are urgent. Another back surgery 2 mos ago. Urgency may be worse since then? hasnt considered. PVR by bladder scan 0cc.  Deferred cysto in favor of continued medical management, ADT renewed with Lupron 45mg    5/2020 NP OQuin - final Lupron/ADT injection.     Returns today noting:  AUA SS: 11/2 (3:  Emptying; 2:  Urgency, weak stream, sleeping; 1:  Frequency, intermittency)  PVR: 45 cc  Has transitioned primary care to Dr Pettit  Had Covid exposures but tested negative, about 1 month ago  11/13/20 psa <0.01 and T 9  Had ER visit over the weekend for epigastric pain with neg CT and 1 set cardiac enzymes and was given carafate.  Had recent upper endoscopy 1 month ago, and has been on dulcolax for constipation 2/2 iron use  Still having some hot flashes. Slight increase in  "energy after radiation complete      ROS: A comprehensive 10 system review was performed and is negative except as noted above in HPI    PHYSICAL EXAM:    Vitals:    11/23/20 0829   BP: (!) 151/75   Pulse: 67     Body mass index is 33.8 kg/m². Weight: 95 kg (209 lb 7 oz) Height: 5' 6" (167.6 cm)       General: Alert, cooperative, no distress, appears stated age   Head: Normocephalic, without obvious abnormality, atraumatic   Eyes: PERRL, conjunctiva/corneas clear   Lungs: Respirations unlabored   Heart: Warm and well perfused   Abdomen: Soft NT ND   Extremities: Extremities normal, atraumatic, no cyanosis or edema   Skin: Skin color, texture, turgor normal, no rashes or lesions   Psych: Appropriate   Neurologic: Non-focal       Recent Results (from the past 336 hour(s))   Prostate Specific Antigen, Diagnostic    Collection Time: 11/13/20  8:04 AM   Result Value Ref Range    PSA Diagnostic <0.01 0.00 - 4.00 ng/mL   Testosterone    Collection Time: 11/13/20  8:04 AM   Result Value Ref Range    Testosterone, Total 9 (L) 304 - 1227 ng/dL   Lipid Panel    Collection Time: 11/13/20  8:04 AM   Result Value Ref Range    Cholesterol 109 (L) 120 - 199 mg/dL    Triglycerides 201 (H) 30 - 150 mg/dL    HDL 37 (L) 40 - 75 mg/dL    LDL Cholesterol 31.8 (L) 63.0 - 159.0 mg/dL    HDL/Cholesterol Ratio 33.9 20.0 - 50.0 %    Total Cholesterol/HDL Ratio 2.9 2.0 - 5.0    Non-HDL Cholesterol 72 mg/dL   Hemoglobin A1C    Collection Time: 11/13/20  8:04 AM   Result Value Ref Range    Hemoglobin A1C 5.5 4.0 - 5.6 %    Estimated Avg Glucose 111 68 - 131 mg/dL   CBC auto differential    Collection Time: 11/21/20  8:46 PM   Result Value Ref Range    WBC 7.00 3.90 - 12.70 K/uL    RBC 4.06 (L) 4.60 - 6.20 M/uL    Hemoglobin 10.7 (L) 14.0 - 18.0 g/dL    Hematocrit 34.3 (L) 40.0 - 54.0 %    MCV 85 82 - 98 fL    MCH 26.4 (L) 27.0 - 31.0 pg    MCHC 31.2 (L) 32.0 - 36.0 g/dL    RDW 14.9 (H) 11.5 - 14.5 %    Platelets 146 (L) 150 - 350 K/uL    MPV " 12.5 9.2 - 12.9 fL    Immature Granulocytes 1.7 (H) 0.0 - 0.5 %    Gran # (ANC) 4.7 1.8 - 7.7 K/uL    Immature Grans (Abs) 0.12 (H) 0.00 - 0.04 K/uL    Lymph # 1.1 1.0 - 4.8 K/uL    Mono # 0.6 0.3 - 1.0 K/uL    Eos # 0.5 0.0 - 0.5 K/uL    Baso # 0.04 0.00 - 0.20 K/uL    nRBC 0 0 /100 WBC    Gran % 67.0 38.0 - 73.0 %    Lymph % 15.3 (L) 18.0 - 48.0 %    Mono % 9.0 4.0 - 15.0 %    Eosinophil % 6.4 0.0 - 8.0 %    Basophil % 0.6 0.0 - 1.9 %    Differential Method Automated    Comprehensive metabolic panel    Collection Time: 11/21/20  8:46 PM   Result Value Ref Range    Sodium 138 136 - 145 mmol/L    Potassium 3.7 3.5 - 5.1 mmol/L    Chloride 105 95 - 110 mmol/L    CO2 23 23 - 29 mmol/L    Glucose 152 (H) 70 - 110 mg/dL    BUN 32 (H) 8 - 23 mg/dL    Creatinine 1.0 0.5 - 1.4 mg/dL    Calcium 9.2 8.7 - 10.5 mg/dL    Total Protein 6.0 6.0 - 8.4 g/dL    Albumin 3.7 3.5 - 5.2 g/dL    Total Bilirubin 0.6 0.1 - 1.0 mg/dL    Alkaline Phosphatase 64 55 - 135 U/L     (H) 10 - 40 U/L    ALT 83 (H) 10 - 44 U/L    Anion Gap 10 8 - 16 mmol/L    eGFR if African American >60.0 >60 mL/min/1.73 m^2    eGFR if non African American >60.0 >60 mL/min/1.73 m^2   Protime-INR    Collection Time: 11/21/20  8:46 PM   Result Value Ref Range    PT 13.8 10.6 - 14.8 sec    INR 1.1    Lipase    Collection Time: 11/21/20  8:46 PM   Result Value Ref Range    Lipase 35 4 - 60 U/L   Troponin I    Collection Time: 11/21/20  8:46 PM   Result Value Ref Range    Troponin I <0.030 <=0.040 ng/mL   Urinalysis, Reflex to Urine Culture Urine, Clean Catch    Collection Time: 11/21/20  9:32 PM    Specimen: Urine, Clean Catch   Result Value Ref Range    Specimen UA Urine, Clean Catch     Color, UA Yellow Yellow, Straw, Beba    Appearance, UA Clear Clear    pH, UA 5.0 5.0 - 8.0    Specific Gravity, UA 1.030 1.005 - 1.030    Protein, UA Negative Negative    Glucose, UA Negative Negative    Ketones, UA Negative Negative    Bilirubin (UA) Negative Negative     Occult Blood UA Negative Negative    Nitrite, UA Negative Negative    Urobilinogen, UA Negative Negative EU/dL    Leukocytes, UA Negative Negative   POCT Bladder Scan    Collection Time: 11/23/20  8:31 AM   Result Value Ref Range    POC Residual Urine Volume 45 0 - 100 mL   POCT URINE DIPSTICK WITHOUT MICROSCOPE    Collection Time: 11/23/20  8:38 AM   Result Value Ref Range    Color, UA Yellow     pH, UA 7     WBC, UA neg     Nitrite, UA neg     Protein neg     Glucose, UA neg     Ketones, UA neg     Urobilinogen, UA 0.2     Bilirubin neg     Blood, UA neg     Clarity, UA Clear     Spec Grav UA 1.025        ASSESSMENT   1. Prostate cancer  POCT Bladder Scan    POCT URINE DIPSTICK WITHOUT MICROSCOPE   2. BPH with urinary obstruction  POCT Bladder Scan    POCT URINE DIPSTICK WITHOUT MICROSCOPE       Plan    Doing well status post completion of therapy with androgen deprivation and external beam radiation for prostate cancer.  PSA is undetectable at this time with no evidence of disease.  He has just completed androgen deprivation therapy as this is the end of a six-month period from his last shot after 2 years of ADT.  We did discuss that his PSA may rise slightly with a slight bounce after removing the androgen deprivation suppressive affect, but should settle out a kobe point.  Will continue to follow PSA every 3 months at this time until stable.  PSA lab visit in 3 months, PSA and testosterone in 6 months, return at that time to see nurse practitioner to re-evaluate urinary symptoms.  Will refill Flomax, and given his chronic constipation will change the oxybutynin to once daily XL dosing and keep but a low dose.  This was both for his urinary urgency and frequency as well as to help control some of his hot flashes which hopefully now will decrease in the absence of hormone suppression.  If he is not having any urgency or frequency that are bothersome at his 6 month follow-up, could consider discontinuing at that  time and just continuing Flomax and seeing how he is doing 6 months later.      20 mins spent in encounter, over half in counseling

## 2020-11-22 NOTE — ED PROVIDER NOTES
Encounter Date: 11/21/2020       History     Chief Complaint   Patient presents with    Abdominal Pain     Epigastric pain all day. States recent dx of gastritis.     70-year-old male presents complaining of epigastric pain for the past several days, patient reports lately pain is worse, patient rates pain as 8/10 and describes it as sharp patient denies weakness or numbness patient denies chest pain or shortness of breath patient denies cough patient has a history of diabetes hypertension hyperlipidemia and mitral valve regurgitation.        Review of patient's allergies indicates:  No Known Allergies  Past Medical History:   Diagnosis Date    Anemia, chronic disease 12/28/2018    Coronary artery disease     mild    Diabetes mellitus     Diabetes mellitus, type 2     GERD (gastroesophageal reflux disease)     Heart murmur     Hyperlipidemia     Hypertension     Normocytic normochromic anemia 12/28/2018    Prostate cancer 8/20/2018    Sleep apnea     NOT USING [facility]    Thrombocytopenia 12/28/2018    Valvular regurgitation      Past Surgical History:   Procedure Laterality Date    BACK SURGERY  09/2019    CARDIAC CATHETERIZATION      EYE SURGERY      cataracts bilateral    TRANSRECTAL ULTRASOUND OF PROSTATE WITH INSERTION OF GOLD FIDUCIAL MARKER N/A 10/4/2018    Procedure: ULTRASOUND, PROSTATE, TRANSPERINEAL APPROACH, WITH GOLD FIDUCIAL MARKER INSERTION (with SPACEOAR transperineal biodegradable gel placement);  Surgeon: Manpreet Gordon MD;  Location: [facility];  Service: Urology;  Laterality: N/A;     Family History   Problem Relation Age of Onset    Heart disease Mother     Heart disease Father      Social History     Tobacco Use    Smoking status: Never Smoker    Smokeless tobacco: Never Used   Substance Use Topics    Alcohol use: No    Drug use: No     Review of Systems   Constitutional: Negative for fever.   HENT: Negative for congestion, rhinorrhea, sore throat and trouble swallowing.     Eyes: Negative for visual disturbance.   Respiratory: Negative for cough, chest tightness, shortness of breath and wheezing.    Cardiovascular: Negative for chest pain, palpitations and leg swelling.   Gastrointestinal: Positive for abdominal pain. Negative for abdominal distention, constipation, diarrhea, nausea and vomiting.   Genitourinary: Negative for difficulty urinating, dysuria, flank pain and frequency.   Musculoskeletal: Negative for arthralgias, back pain, joint swelling and neck pain.   Skin: Negative for color change and rash.   Neurological: Negative for dizziness, syncope, speech difficulty, weakness, numbness and headaches.   All other systems reviewed and are negative.      Physical Exam     Initial Vitals   BP Pulse Resp Temp SpO2   11/21/20 2035 11/21/20 2034 11/21/20 2034 11/21/20 2034 11/21/20 2034   (!) 157/71 (!) 56 16 97.9 °F (36.6 °C) 97 %      MAP       --                Physical Exam    Nursing note and vitals reviewed.  Constitutional: He appears well-developed and well-nourished. He is not diaphoretic. No distress.   HENT:   Head: Normocephalic and atraumatic.   Right Ear: External ear normal.   Left Ear: External ear normal.   Nose: Nose normal.   Mouth/Throat: Oropharynx is clear and moist. No oropharyngeal exudate.   Eyes: Conjunctivae and EOM are normal. Pupils are equal, round, and reactive to light. Right eye exhibits no discharge. Left eye exhibits no discharge. No scleral icterus.   Neck: Normal range of motion. Neck supple. No thyromegaly present. No tracheal deviation present. No JVD present.   Cardiovascular: Normal rate, regular rhythm, normal heart sounds and intact distal pulses. Exam reveals no gallop and no friction rub.    No murmur heard.  Pulmonary/Chest: Breath sounds normal. No stridor. No respiratory distress. He has no wheezes. He has no rhonchi. He has no rales. He exhibits no tenderness.   Abdominal: Soft. Bowel sounds are normal. He exhibits no distension and  no mass. There is abdominal tenderness. There is no rebound and no guarding.   Tenderness to palpation in the epigastric region   Musculoskeletal: Normal range of motion. No tenderness or edema.   Lymphadenopathy:     He has no cervical adenopathy.   Neurological: He is alert and oriented to person, place, and time. He has normal strength and normal reflexes. He displays normal reflexes. No cranial nerve deficit or sensory deficit.   Skin: Skin is warm and dry. No rash noted. No erythema.         ED Course   Procedures  Labs Reviewed   CBC W/ AUTO DIFFERENTIAL - Abnormal; Notable for the following components:       Result Value    RBC 4.06 (*)     Hemoglobin 10.7 (*)     Hematocrit 34.3 (*)     MCH 26.4 (*)     MCHC 31.2 (*)     RDW 14.9 (*)     Platelets 146 (*)     Immature Granulocytes 1.7 (*)     Immature Grans (Abs) 0.12 (*)     Lymph % 15.3 (*)     All other components within normal limits   COMPREHENSIVE METABOLIC PANEL - Abnormal; Notable for the following components:    Glucose 152 (*)     BUN 32 (*)      (*)     ALT 83 (*)     All other components within normal limits   PROTIME-INR   LIPASE   URINALYSIS, REFLEX TO URINE CULTURE    Narrative:     Specimen Source->Urine   TROPONIN I          Imaging Results          CT Abdomen Pelvis With Contrast (Final result)  Result time 11/21/20 20:46:00    Final result by Danyell Meade MD (11/21/20 20:46:00)                 Narrative:    EXAM:  CT Abdomen and Pelvis With Intravenous Contrast    CLINICAL HISTORY:  The patient is 70 years old and is Male; Epigastric pain    TECHNIQUE:  Axial computed tomography images of the abdomen and pelvis with intravenous contrast.  Sagittal and coronal reformatted images were created and reviewed.  This CT exam was performed using one or more of the following dose reduction techniques:  automated exposure control, adjustment of the mA and/or kV according to patient size, and/or use of iterative reconstruction  technique.    COMPARISON:  CT of the abdomen and pelvis August 7, 2020    FINDINGS:  LUNG BASES:  Minimal dependent densities in the lung bases is noted.    ABDOMEN:  LIVER:  A cyst within the left hepatic lobe is present measuring approximately 2 cm. The liver is otherwise unremarkable.  GALLBLADDER AND BILE DUCTS:  Surgical clips are present in the right upper quadrant, consistent with previous cholecystectomy.  PANCREAS:  No ductal dilation.  No mass.  SPLEEN:  Unremarkable.  ADRENALS:  Unremarkable.  No mass.  KIDNEYS AND URETERS:  Unremarkable. The kidneys enhance symmetrically. No obstructing renal or ureteral calculus is seen. No hydronephrosis or hydroureter. No perinephric fluid or stranding.  STOMACH AND BOWEL:  The stomach is minimally distended with food contents and air. The small bowel is fluid-filled and normal in caliber. A moderate amount of stool is present throughout colon. A few scattered colonic diverticula are present without surrounding inflammation. There is no mucosal thickening or evidence of bowel obstruction.    PELVIS:  APPENDIX:  No findings to suggest acute appendicitis.  BLADDER:  The bladder is not well distended.  REPRODUCTIVE:  The prostate is surgically absent.    ABDOMEN and PELVIS:  INTRAPERITONEAL SPACE:  Unremarkable.  No free air.  No significant fluid collection.  BONES/JOINTS:  Multilevel degenerative change of the spine is present. Intervertebral disc space narrowing with facet arthropathy and osteophyte formation is noted. Exaggeration of lumbar lordosis is present.  SOFT TISSUES:  The soft tissues are normal.  VASCULATURE:  Atherosclerosis of the vasculature is present. The vessels are normal in caliber.  No abdominal aortic aneurysm.  LYMPH NODES:  Unremarkable. No enlarged lymph nodes.    IMPRESSION:  1.  No acute findings on this contrasted CT of the abdomen and pelvis to explain the patient's symptoms.  2.  Chronic findings as detailed above.    Electronically signed  by:  Danyell Meade MD  11/21/2020 10:26 PM [facility] Workstation: [id number]                             X-Ray Chest AP Portable (In process)                  Medical Decision Making:   History:   Old Medical Records: I decided to obtain old medical records.  Initial Assessment:   Emergent evaluation of a 70-year-old male presenting with epigastric pain differential diagnosis includes infection, electrolyte abnormality, endocrine dysfunction, obstruction, perforation              Attending Attestation:             Attending ED Notes:   Patient's CT shows no significant acute abnormalities patient's labs show a mild elevation in liver enzymes otherwise no acute abnormalities patient tolerating p.o., patient has no other acute abnormalities noted and vital signs are stable, patient is referred to GI for further evaluation and management he will be started on a course of Carafate and is cautioned to return immediately to the emergency department for any worsening or any further concerns, given his age and risk factors I did offer a 2nd set of cardiac enzymes to evaluate for possible cardiac equivalent syndrome however he declined stating that he does not wish to stay for a 2nd set of cardiac enzymes at this time.  Patient understands to return immediately to the emergency department for any worsening or any further concerns.  Patient is otherwise to follow-up with GI in the next 2-3 days for further evaluation and management                    Clinical Impression:       ICD-10-CM ICD-9-CM   1. Epigastric abdominal pain  R10.13 789.06   2. Pain  R52 780.96                          ED Disposition Condition    Discharge Stable        ED Prescriptions     Medication Sig Dispense Start Date End Date Auth. Provider    sucralfate (CARAFATE) 1 gram tablet Take 1 tablet (1 g total) by mouth 2 (two) times daily. 14 tablet 11/21/2020  Angel Luis Cunningham MD        Follow-up Information     Follow up With Specialties Details Why Contact Info  Additional Information    [facility] Emergency Medicine  If symptoms worsen [address]  [facility] [address]  [phone] 1st floor    Brennon Alfaro MD Gastroenterology Schedule an appointment as soon as possible for a visit in 2 days  [id number] [facility] [id number]  [phone]                                          Angel Luis Cunningham MD  11/21/20 0861

## 2020-11-23 ENCOUNTER — OFFICE VISIT (OUTPATIENT)
Dept: UROLOGY | Facility: CLINIC | Age: 70
End: 2020-11-23
Payer: MEDICARE

## 2020-11-23 VITALS
DIASTOLIC BLOOD PRESSURE: 75 MMHG | BODY MASS INDEX: 33.66 KG/M2 | SYSTOLIC BLOOD PRESSURE: 151 MMHG | HEART RATE: 67 BPM | WEIGHT: 209.44 LBS | HEIGHT: 66 IN

## 2020-11-23 DIAGNOSIS — N13.8 BPH WITH URINARY OBSTRUCTION: ICD-10-CM

## 2020-11-23 DIAGNOSIS — N40.1 BPH WITH URINARY OBSTRUCTION: ICD-10-CM

## 2020-11-23 DIAGNOSIS — C61 PROSTATE CANCER: Primary | ICD-10-CM

## 2020-11-23 LAB
BILIRUB SERPL-MCNC: NORMAL MG/DL
BLOOD URINE, POC: NORMAL
CLARITY, POC UA: CLEAR
COLOR, POC UA: YELLOW
GLUCOSE UR QL STRIP: NORMAL
KETONES UR QL STRIP: NORMAL
LEUKOCYTE ESTERASE URINE, POC: NORMAL
NITRITE, POC UA: NORMAL
PH, POC UA: 7
POC RESIDUAL URINE VOLUME: 45 ML (ref 0–100)
PROTEIN, POC: NORMAL
SPECIFIC GRAVITY, POC UA: 1.02
UROBILINOGEN, POC UA: 0.2

## 2020-11-23 PROCEDURE — 99999 PR PBB SHADOW E&M-EST. PATIENT-LVL IV: ICD-10-PCS | Mod: PBBFAC,,, | Performed by: UROLOGY

## 2020-11-23 PROCEDURE — 99214 PR OFFICE/OUTPT VISIT, EST, LEVL IV, 30-39 MIN: ICD-10-PCS | Mod: 25,S$GLB,, | Performed by: UROLOGY

## 2020-11-23 PROCEDURE — 99999 PR PBB SHADOW E&M-EST. PATIENT-LVL IV: CPT | Mod: PBBFAC,,, | Performed by: UROLOGY

## 2020-11-23 PROCEDURE — 3288F FALL RISK ASSESSMENT DOCD: CPT | Mod: S$GLB,,, | Performed by: UROLOGY

## 2020-11-23 PROCEDURE — 1101F PR PT FALLS ASSESS DOC 0-1 FALLS W/OUT INJ PAST YR: ICD-10-PCS | Mod: S$GLB,,, | Performed by: UROLOGY

## 2020-11-23 PROCEDURE — 51798 POCT BLADDER SCAN: ICD-10-PCS | Mod: S$GLB,,, | Performed by: UROLOGY

## 2020-11-23 PROCEDURE — 51798 US URINE CAPACITY MEASURE: CPT | Mod: S$GLB,,, | Performed by: UROLOGY

## 2020-11-23 PROCEDURE — 3078F DIAST BP <80 MM HG: CPT | Mod: S$GLB,,, | Performed by: UROLOGY

## 2020-11-23 PROCEDURE — 1159F MED LIST DOCD IN RCRD: CPT | Mod: S$GLB,,, | Performed by: UROLOGY

## 2020-11-23 PROCEDURE — 1126F AMNT PAIN NOTED NONE PRSNT: CPT | Mod: S$GLB,,, | Performed by: UROLOGY

## 2020-11-23 PROCEDURE — 3008F PR BODY MASS INDEX (BMI) DOCUMENTED: ICD-10-PCS | Mod: S$GLB,,, | Performed by: UROLOGY

## 2020-11-23 PROCEDURE — 3078F PR MOST RECENT DIASTOLIC BLOOD PRESSURE < 80 MM HG: ICD-10-PCS | Mod: S$GLB,,, | Performed by: UROLOGY

## 2020-11-23 PROCEDURE — 3077F SYST BP >= 140 MM HG: CPT | Mod: S$GLB,,, | Performed by: UROLOGY

## 2020-11-23 PROCEDURE — 1126F PR PAIN SEVERITY QUANTIFIED, NO PAIN PRESENT: ICD-10-PCS | Mod: S$GLB,,, | Performed by: UROLOGY

## 2020-11-23 PROCEDURE — 3077F PR MOST RECENT SYSTOLIC BLOOD PRESSURE >= 140 MM HG: ICD-10-PCS | Mod: S$GLB,,, | Performed by: UROLOGY

## 2020-11-23 PROCEDURE — 3288F PR FALLS RISK ASSESSMENT DOCUMENTED: ICD-10-PCS | Mod: S$GLB,,, | Performed by: UROLOGY

## 2020-11-23 PROCEDURE — 81002 POCT URINE DIPSTICK WITHOUT MICROSCOPE: ICD-10-PCS | Mod: S$GLB,,, | Performed by: UROLOGY

## 2020-11-23 PROCEDURE — 3008F BODY MASS INDEX DOCD: CPT | Mod: S$GLB,,, | Performed by: UROLOGY

## 2020-11-23 PROCEDURE — 81002 URINALYSIS NONAUTO W/O SCOPE: CPT | Mod: S$GLB,,, | Performed by: UROLOGY

## 2020-11-23 PROCEDURE — 99214 OFFICE O/P EST MOD 30 MIN: CPT | Mod: 25,S$GLB,, | Performed by: UROLOGY

## 2020-11-23 PROCEDURE — 1101F PT FALLS ASSESS-DOCD LE1/YR: CPT | Mod: S$GLB,,, | Performed by: UROLOGY

## 2020-11-23 PROCEDURE — 1159F PR MEDICATION LIST DOCUMENTED IN MEDICAL RECORD: ICD-10-PCS | Mod: S$GLB,,, | Performed by: UROLOGY

## 2020-11-23 RX ORDER — TAMSULOSIN HYDROCHLORIDE 0.4 MG/1
0.4 CAPSULE ORAL NIGHTLY
Qty: 90 CAPSULE | Refills: 3 | Status: SHIPPED | OUTPATIENT
Start: 2020-11-23 | End: 2021-10-18 | Stop reason: SDUPTHER

## 2020-11-23 RX ORDER — OXYBUTYNIN CHLORIDE 5 MG/1
5 TABLET, EXTENDED RELEASE ORAL DAILY
Qty: 90 TABLET | Refills: 3 | Status: SHIPPED | OUTPATIENT
Start: 2020-11-23 | End: 2021-06-14 | Stop reason: ALTCHOICE

## 2020-12-18 DIAGNOSIS — E11.9 TYPE 2 DIABETES MELLITUS WITHOUT COMPLICATION, UNSPECIFIED WHETHER LONG TERM INSULIN USE: ICD-10-CM

## 2021-01-01 NOTE — TELEPHONE ENCOUNTER
Chief Complaint   Patient presents with   • Well Infant Birth to 23 Months         • Well Infant Birth to 23 Months       Child accompanied by mother and father    Concerns: some breastfeeding questions.  Interested in supplementing formula and eventually pumping    Breast feeding: y   Minutes per feeding: 15-20    Frequency of feedings: 2-3hrs  Bottle feeding: n   Type of formula:     Amount per feeding:     Frequency of feedings:    Urinary output (per day) nl  Stools (per day) several  Color of stool: transitioning  Patient sleeps in bassinet    Social History:    Smoke exposure:  NO  Household members:  mom and dad    No current outpatient medications on file.     No current facility-administered medications for this visit.        ALLERGIES:  No Known Allergies    Birth History   • Birth     Length: 21.5\" (54.6 cm)     Weight: 3.42 kg     HC 34.5 cm (13.58\")   • Apgar     One: 8.0     Five: 9.0   • Delivery Method: Vaginal, Spontaneous   • Gestation Age: 41 wks   • Duration of Labor: 1st: 10h 27m / 2nd: 1h 46m       History reviewed. No pertinent past medical history.    There is no problem list on file for this patient.      History reviewed. No pertinent family history.      PHYSICAL EXAMINATION:    Visit Vitals  Pulse 150   Temp 98.8 °F (37.1 °C)   Resp 48   Ht 21.5\" (54.6 cm)   Wt 3.21 kg   HC 33.5 cm (13.19\")   BMI 10.76 kg/m²     33 %ile (Z= -0.43) based on WHO (Boys, 0-2 years) weight-for-age data using vitals from 2021.  99 %ile (Z= 2.32) based on WHO (Boys, 0-2 years) Length-for-age data based on Length recorded on 2021.  18 %ile (Z= -0.91) based on WHO (Boys, 0-2 years) head circumference-for-age based on Head Circumference recorded on 2021.  <1 %ile (Z= -3.98) based on WHO (Boys, 0-2 years) weight-for-recumbent length data based on body measurements available as of 2021.    GENERAL:  Well appearing 2 day old male, non-toxic, in no acute distress.  Alert and  Patient advised.  He will not come  today.  I will call again in am.     interactive.  SKIN: Warm, pink, normal turgor.  No cyanosis.  No rash.  HEAD:  Normocephalic, atraumatic.  Anterior fontanelle open, soft, and flat.  EYES:  Conjunctivae normal, non-injected, non-icteric.   NOSE:  Nares patent.  EARS:  Normal pinnae.  THROAT:  Oropharynx with moist mucous membranes and no lesions.   NECK:  Supple.  HEART:  Regular rate and rhythm.  Normal S1, S2.  No murmurs, rubs, or gallops. Pulses 2+ bilaterally.  LUNGS:  Clear to auscultation bilaterally.  No wheezes, rales, rhonchi.  Non-labored breathing.  ABDOMEN:  Soft, nontender, non-distended.   No organomegaly or masses.  MUSCULOSKELETAL: Full range of motion, no deformities, strength 5/5 bilaterally.  NEUROLOGIC:  Alert, active, normal lee, grasp and suck reflexes.    ASSESSMENT AND PLAN:  2 day old male  here for  followup.  Some weight loss - continue with breastfeeding and supplement formula after each feed/every other feed.  Weight check in two days.  Jaundice - tcbm 13.4.  Will check a serum bili and call with results.    Birth Weight: 7 lb 8.6 oz (3420 g)   Discharge weight: same  Today's weight:   Wt Readings from Last 1 Encounters:   21 3.21 kg (33 %, Z= -0.43)*     * Growth percentiles are based on WHO (Boys, 0-2 years) data.         -6% weight loss from birth.

## 2021-01-08 ENCOUNTER — LAB VISIT (OUTPATIENT)
Dept: LAB | Facility: HOSPITAL | Age: 71
End: 2021-01-08
Attending: INTERNAL MEDICINE
Payer: MEDICARE

## 2021-01-08 DIAGNOSIS — D69.3 CHRONIC ITP (IDIOPATHIC THROMBOCYTOPENIA): ICD-10-CM

## 2021-01-08 DIAGNOSIS — D64.9 NORMOCYTIC NORMOCHROMIC ANEMIA: ICD-10-CM

## 2021-01-08 DIAGNOSIS — D50.9 IRON DEFICIENCY ANEMIA, UNSPECIFIED IRON DEFICIENCY ANEMIA TYPE: ICD-10-CM

## 2021-01-08 DIAGNOSIS — C61 PROSTATE CANCER: Primary | ICD-10-CM

## 2021-01-08 DIAGNOSIS — C61 PROSTATE CANCER: ICD-10-CM

## 2021-01-08 LAB
ALBUMIN SERPL BCP-MCNC: 3.6 G/DL (ref 3.5–5.2)
ALP SERPL-CCNC: 49 U/L (ref 55–135)
ALT SERPL W/O P-5'-P-CCNC: 28 U/L (ref 10–44)
ANION GAP SERPL CALC-SCNC: 9 MMOL/L (ref 8–16)
AST SERPL-CCNC: 25 U/L (ref 10–40)
BASOPHILS # BLD AUTO: 0.02 K/UL (ref 0–0.2)
BASOPHILS NFR BLD: 0.3 % (ref 0–1.9)
BILIRUB SERPL-MCNC: 0.8 MG/DL (ref 0.1–1)
BUN SERPL-MCNC: 17 MG/DL (ref 8–23)
CALCIUM SERPL-MCNC: 8.9 MG/DL (ref 8.7–10.5)
CHLORIDE SERPL-SCNC: 103 MMOL/L (ref 95–110)
CO2 SERPL-SCNC: 26 MMOL/L (ref 23–29)
CREAT SERPL-MCNC: 0.8 MG/DL (ref 0.5–1.4)
DIFFERENTIAL METHOD: ABNORMAL
EOSINOPHIL # BLD AUTO: 0.6 K/UL (ref 0–0.5)
EOSINOPHIL NFR BLD: 9.8 % (ref 0–8)
ERYTHROCYTE [DISTWIDTH] IN BLOOD BY AUTOMATED COUNT: 14.6 % (ref 11.5–14.5)
EST. GFR  (AFRICAN AMERICAN): >60 ML/MIN/1.73 M^2
EST. GFR  (NON AFRICAN AMERICAN): >60 ML/MIN/1.73 M^2
FERRITIN SERPL-MCNC: 44 NG/ML (ref 20–300)
GLUCOSE SERPL-MCNC: 121 MG/DL (ref 70–110)
HCT VFR BLD AUTO: 35.8 % (ref 40–54)
HGB BLD-MCNC: 11.5 G/DL (ref 14–18)
IMM GRANULOCYTES # BLD AUTO: 0.02 K/UL (ref 0–0.04)
IMM GRANULOCYTES NFR BLD AUTO: 0.3 % (ref 0–0.5)
IRON SERPL-MCNC: 69 UG/DL (ref 45–160)
LYMPHOCYTES # BLD AUTO: 1.1 K/UL (ref 1–4.8)
LYMPHOCYTES NFR BLD: 17.4 % (ref 18–48)
MCH RBC QN AUTO: 27.4 PG (ref 27–31)
MCHC RBC AUTO-ENTMCNC: 32.1 G/DL (ref 32–36)
MCV RBC AUTO: 85 FL (ref 82–98)
MONOCYTES # BLD AUTO: 0.5 K/UL (ref 0.3–1)
MONOCYTES NFR BLD: 8.6 % (ref 4–15)
NEUTROPHILS # BLD AUTO: 3.8 K/UL (ref 1.8–7.7)
NEUTROPHILS NFR BLD: 63.6 % (ref 38–73)
NRBC BLD-RTO: 0 /100 WBC
PLATELET # BLD AUTO: 129 K/UL (ref 150–350)
PMV BLD AUTO: 10.9 FL (ref 9.2–12.9)
POTASSIUM SERPL-SCNC: 3.7 MMOL/L (ref 3.5–5.1)
PROT SERPL-MCNC: 6.6 G/DL (ref 6–8.4)
RBC # BLD AUTO: 4.2 M/UL (ref 4.6–6.2)
SATURATED IRON: 21 % (ref 20–50)
SODIUM SERPL-SCNC: 138 MMOL/L (ref 136–145)
TOTAL IRON BINDING CAPACITY: 328 UG/DL (ref 250–450)
TRANSFERRIN SERPL-MCNC: 234 MG/DL (ref 200–375)
WBC # BLD AUTO: 6.02 K/UL (ref 3.9–12.7)

## 2021-01-08 PROCEDURE — 82728 ASSAY OF FERRITIN: CPT

## 2021-01-08 PROCEDURE — 80053 COMPREHEN METABOLIC PANEL: CPT

## 2021-01-08 PROCEDURE — 85025 COMPLETE CBC W/AUTO DIFF WBC: CPT

## 2021-01-08 PROCEDURE — 36415 COLL VENOUS BLD VENIPUNCTURE: CPT

## 2021-01-08 PROCEDURE — 83540 ASSAY OF IRON: CPT

## 2021-01-11 ENCOUNTER — OFFICE VISIT (OUTPATIENT)
Dept: RADIATION ONCOLOGY | Facility: CLINIC | Age: 71
End: 2021-01-11
Payer: MEDICARE

## 2021-01-11 VITALS
BODY MASS INDEX: 34.86 KG/M2 | DIASTOLIC BLOOD PRESSURE: 69 MMHG | RESPIRATION RATE: 18 BRPM | OXYGEN SATURATION: 98 % | HEART RATE: 64 BPM | SYSTOLIC BLOOD PRESSURE: 147 MMHG | TEMPERATURE: 98 F | WEIGHT: 216 LBS

## 2021-01-11 DIAGNOSIS — C61 PROSTATE CANCER: Primary | ICD-10-CM

## 2021-01-11 PROCEDURE — 1126F PR PAIN SEVERITY QUANTIFIED, NO PAIN PRESENT: ICD-10-PCS | Mod: S$GLB,,, | Performed by: RADIOLOGY

## 2021-01-11 PROCEDURE — 3008F BODY MASS INDEX DOCD: CPT | Mod: S$GLB,,, | Performed by: RADIOLOGY

## 2021-01-11 PROCEDURE — 3077F PR MOST RECENT SYSTOLIC BLOOD PRESSURE >= 140 MM HG: ICD-10-PCS | Mod: S$GLB,,, | Performed by: RADIOLOGY

## 2021-01-11 PROCEDURE — 3078F PR MOST RECENT DIASTOLIC BLOOD PRESSURE < 80 MM HG: ICD-10-PCS | Mod: S$GLB,,, | Performed by: RADIOLOGY

## 2021-01-11 PROCEDURE — 1159F PR MEDICATION LIST DOCUMENTED IN MEDICAL RECORD: ICD-10-PCS | Mod: S$GLB,,, | Performed by: RADIOLOGY

## 2021-01-11 PROCEDURE — 1126F AMNT PAIN NOTED NONE PRSNT: CPT | Mod: S$GLB,,, | Performed by: RADIOLOGY

## 2021-01-11 PROCEDURE — 99214 OFFICE O/P EST MOD 30 MIN: CPT | Mod: S$GLB,,, | Performed by: RADIOLOGY

## 2021-01-11 PROCEDURE — 3008F PR BODY MASS INDEX (BMI) DOCUMENTED: ICD-10-PCS | Mod: S$GLB,,, | Performed by: RADIOLOGY

## 2021-01-11 PROCEDURE — 3078F DIAST BP <80 MM HG: CPT | Mod: S$GLB,,, | Performed by: RADIOLOGY

## 2021-01-11 PROCEDURE — 99214 PR OFFICE/OUTPT VISIT, EST, LEVL IV, 30-39 MIN: ICD-10-PCS | Mod: S$GLB,,, | Performed by: RADIOLOGY

## 2021-01-11 PROCEDURE — 3077F SYST BP >= 140 MM HG: CPT | Mod: S$GLB,,, | Performed by: RADIOLOGY

## 2021-01-11 PROCEDURE — 1159F MED LIST DOCD IN RCRD: CPT | Mod: S$GLB,,, | Performed by: RADIOLOGY

## 2021-01-13 ENCOUNTER — OFFICE VISIT (OUTPATIENT)
Dept: HEMATOLOGY/ONCOLOGY | Facility: CLINIC | Age: 71
End: 2021-01-13
Payer: MEDICARE

## 2021-01-13 ENCOUNTER — OFFICE VISIT (OUTPATIENT)
Dept: FAMILY MEDICINE | Facility: CLINIC | Age: 71
End: 2021-01-13
Payer: MEDICARE

## 2021-01-13 VITALS
SYSTOLIC BLOOD PRESSURE: 124 MMHG | HEIGHT: 66 IN | OXYGEN SATURATION: 98 % | TEMPERATURE: 98 F | BODY MASS INDEX: 35.2 KG/M2 | WEIGHT: 219 LBS | HEART RATE: 72 BPM | DIASTOLIC BLOOD PRESSURE: 78 MMHG

## 2021-01-13 VITALS
BODY MASS INDEX: 35.35 KG/M2 | DIASTOLIC BLOOD PRESSURE: 78 MMHG | WEIGHT: 219 LBS | RESPIRATION RATE: 18 BRPM | TEMPERATURE: 98 F | HEART RATE: 69 BPM | SYSTOLIC BLOOD PRESSURE: 143 MMHG

## 2021-01-13 DIAGNOSIS — D69.6 THROMBOCYTOPENIA: ICD-10-CM

## 2021-01-13 DIAGNOSIS — C61 PROSTATE CANCER: Primary | ICD-10-CM

## 2021-01-13 DIAGNOSIS — E66.01 CLASS 2 SEVERE OBESITY DUE TO EXCESS CALORIES WITH SERIOUS COMORBIDITY AND BODY MASS INDEX (BMI) OF 35.0 TO 35.9 IN ADULT: ICD-10-CM

## 2021-01-13 DIAGNOSIS — R60.0 PEDAL EDEMA: ICD-10-CM

## 2021-01-13 DIAGNOSIS — I10 HYPERTENSION, ESSENTIAL: Primary | ICD-10-CM

## 2021-01-13 DIAGNOSIS — D63.8 ANEMIA, CHRONIC DISEASE: ICD-10-CM

## 2021-01-13 DIAGNOSIS — D64.9 NORMOCYTIC NORMOCHROMIC ANEMIA: ICD-10-CM

## 2021-01-13 DIAGNOSIS — E11.9 TYPE 2 DIABETES MELLITUS WITHOUT COMPLICATION, WITHOUT LONG-TERM CURRENT USE OF INSULIN: ICD-10-CM

## 2021-01-13 DIAGNOSIS — C61 PROSTATE CANCER: ICD-10-CM

## 2021-01-13 DIAGNOSIS — G62.9 NEUROPATHY: ICD-10-CM

## 2021-01-13 PROBLEM — E66.812 CLASS 2 SEVERE OBESITY DUE TO EXCESS CALORIES WITH SERIOUS COMORBIDITY AND BODY MASS INDEX (BMI) OF 35.0 TO 35.9 IN ADULT: Status: ACTIVE | Noted: 2017-08-24

## 2021-01-13 PROCEDURE — 3044F PR MOST RECENT HEMOGLOBIN A1C LEVEL <7.0%: ICD-10-PCS | Mod: S$GLB,,, | Performed by: FAMILY MEDICINE

## 2021-01-13 PROCEDURE — 99214 OFFICE O/P EST MOD 30 MIN: CPT | Mod: S$GLB,,, | Performed by: FAMILY MEDICINE

## 2021-01-13 PROCEDURE — 1101F PR PT FALLS ASSESS DOC 0-1 FALLS W/OUT INJ PAST YR: ICD-10-PCS | Mod: S$GLB,,, | Performed by: INTERNAL MEDICINE

## 2021-01-13 PROCEDURE — 3044F HG A1C LEVEL LT 7.0%: CPT | Mod: S$GLB,,, | Performed by: FAMILY MEDICINE

## 2021-01-13 PROCEDURE — 1126F PR PAIN SEVERITY QUANTIFIED, NO PAIN PRESENT: ICD-10-PCS | Mod: S$GLB,,, | Performed by: INTERNAL MEDICINE

## 2021-01-13 PROCEDURE — 1159F MED LIST DOCD IN RCRD: CPT | Mod: S$GLB,,, | Performed by: INTERNAL MEDICINE

## 2021-01-13 PROCEDURE — 3077F PR MOST RECENT SYSTOLIC BLOOD PRESSURE >= 140 MM HG: ICD-10-PCS | Mod: S$GLB,,, | Performed by: INTERNAL MEDICINE

## 2021-01-13 PROCEDURE — 99213 PR OFFICE/OUTPT VISIT, EST, LEVL III, 20-29 MIN: ICD-10-PCS | Mod: S$GLB,,, | Performed by: INTERNAL MEDICINE

## 2021-01-13 PROCEDURE — 3288F PR FALLS RISK ASSESSMENT DOCUMENTED: ICD-10-PCS | Mod: S$GLB,,, | Performed by: INTERNAL MEDICINE

## 2021-01-13 PROCEDURE — 3078F DIAST BP <80 MM HG: CPT | Mod: S$GLB,,, | Performed by: FAMILY MEDICINE

## 2021-01-13 PROCEDURE — 3008F BODY MASS INDEX DOCD: CPT | Mod: S$GLB,,, | Performed by: INTERNAL MEDICINE

## 2021-01-13 PROCEDURE — 3078F DIAST BP <80 MM HG: CPT | Mod: S$GLB,,, | Performed by: INTERNAL MEDICINE

## 2021-01-13 PROCEDURE — 3077F SYST BP >= 140 MM HG: CPT | Mod: S$GLB,,, | Performed by: INTERNAL MEDICINE

## 2021-01-13 PROCEDURE — 99213 OFFICE O/P EST LOW 20 MIN: CPT | Mod: S$GLB,,, | Performed by: INTERNAL MEDICINE

## 2021-01-13 PROCEDURE — 3078F PR MOST RECENT DIASTOLIC BLOOD PRESSURE < 80 MM HG: ICD-10-PCS | Mod: S$GLB,,, | Performed by: FAMILY MEDICINE

## 2021-01-13 PROCEDURE — 1126F AMNT PAIN NOTED NONE PRSNT: CPT | Mod: S$GLB,,, | Performed by: INTERNAL MEDICINE

## 2021-01-13 PROCEDURE — 3074F SYST BP LT 130 MM HG: CPT | Mod: S$GLB,,, | Performed by: FAMILY MEDICINE

## 2021-01-13 PROCEDURE — 3078F PR MOST RECENT DIASTOLIC BLOOD PRESSURE < 80 MM HG: ICD-10-PCS | Mod: S$GLB,,, | Performed by: INTERNAL MEDICINE

## 2021-01-13 PROCEDURE — 1159F PR MEDICATION LIST DOCUMENTED IN MEDICAL RECORD: ICD-10-PCS | Mod: S$GLB,,, | Performed by: FAMILY MEDICINE

## 2021-01-13 PROCEDURE — 3008F PR BODY MASS INDEX (BMI) DOCUMENTED: ICD-10-PCS | Mod: S$GLB,,, | Performed by: INTERNAL MEDICINE

## 2021-01-13 PROCEDURE — 1159F MED LIST DOCD IN RCRD: CPT | Mod: S$GLB,,, | Performed by: FAMILY MEDICINE

## 2021-01-13 PROCEDURE — 99214 PR OFFICE/OUTPT VISIT, EST, LEVL IV, 30-39 MIN: ICD-10-PCS | Mod: S$GLB,,, | Performed by: FAMILY MEDICINE

## 2021-01-13 PROCEDURE — 1159F PR MEDICATION LIST DOCUMENTED IN MEDICAL RECORD: ICD-10-PCS | Mod: S$GLB,,, | Performed by: INTERNAL MEDICINE

## 2021-01-13 PROCEDURE — 1101F PT FALLS ASSESS-DOCD LE1/YR: CPT | Mod: S$GLB,,, | Performed by: INTERNAL MEDICINE

## 2021-01-13 PROCEDURE — 3288F FALL RISK ASSESSMENT DOCD: CPT | Mod: S$GLB,,, | Performed by: INTERNAL MEDICINE

## 2021-01-13 PROCEDURE — 3074F PR MOST RECENT SYSTOLIC BLOOD PRESSURE < 130 MM HG: ICD-10-PCS | Mod: S$GLB,,, | Performed by: FAMILY MEDICINE

## 2021-01-13 RX ORDER — AMLODIPINE BESYLATE 5 MG/1
5 TABLET ORAL DAILY
Qty: 90 TABLET | Refills: 1 | Status: ON HOLD | OUTPATIENT
Start: 2021-01-13 | End: 2021-06-22 | Stop reason: HOSPADM

## 2021-01-13 RX ORDER — PANTOPRAZOLE SODIUM 40 MG/1
40 TABLET, DELAYED RELEASE ORAL 2 TIMES DAILY PRN
COMMUNITY
Start: 2021-01-06 | End: 2023-07-19 | Stop reason: CLARIF

## 2021-01-13 RX ORDER — HYDRALAZINE HYDROCHLORIDE 100 MG/1
100 TABLET, FILM COATED ORAL 2 TIMES DAILY
Qty: 180 TABLET | Refills: 1 | Status: SHIPPED | OUTPATIENT
Start: 2021-01-13 | End: 2021-07-12 | Stop reason: SDUPTHER

## 2021-01-13 RX ORDER — ALLOPURINOL 100 MG/1
100 TABLET ORAL 2 TIMES DAILY
Status: ON HOLD | COMMUNITY
Start: 2020-12-22 | End: 2021-06-22 | Stop reason: HOSPADM

## 2021-01-13 RX ORDER — ROSUVASTATIN CALCIUM 10 MG/1
10 TABLET, COATED ORAL DAILY
Qty: 90 TABLET | Refills: 1 | Status: SHIPPED | OUTPATIENT
Start: 2021-01-13 | End: 2021-08-17 | Stop reason: DRUGHIGH

## 2021-01-13 RX ORDER — TERAZOSIN 10 MG/1
10 CAPSULE ORAL NIGHTLY
Qty: 90 CAPSULE | Refills: 1 | Status: SHIPPED | OUTPATIENT
Start: 2021-01-13 | End: 2021-10-04

## 2021-01-14 ENCOUNTER — PATIENT OUTREACH (OUTPATIENT)
Dept: ADMINISTRATIVE | Facility: HOSPITAL | Age: 71
End: 2021-01-14

## 2021-01-19 ENCOUNTER — LAB VISIT (OUTPATIENT)
Dept: LAB | Facility: HOSPITAL | Age: 71
End: 2021-01-19
Attending: FAMILY MEDICINE
Payer: MEDICARE

## 2021-01-19 DIAGNOSIS — E11.9 TYPE 2 DIABETES MELLITUS WITHOUT COMPLICATION, WITHOUT LONG-TERM CURRENT USE OF INSULIN: ICD-10-CM

## 2021-01-19 DIAGNOSIS — G62.9 NEUROPATHY: ICD-10-CM

## 2021-01-19 DIAGNOSIS — I10 HYPERTENSION, ESSENTIAL: ICD-10-CM

## 2021-01-19 LAB
CHOLEST SERPL-MCNC: 110 MG/DL (ref 120–199)
CHOLEST/HDLC SERPL: 2.9 {RATIO} (ref 2–5)
ESTIMATED AVG GLUCOSE: 123 MG/DL (ref 68–131)
HBA1C MFR BLD HPLC: 5.9 % (ref 4.5–6.2)
HDLC SERPL-MCNC: 38 MG/DL (ref 40–75)
HDLC SERPL: 34.5 % (ref 20–50)
LDLC SERPL CALC-MCNC: 34.2 MG/DL (ref 63–159)
NONHDLC SERPL-MCNC: 72 MG/DL
TRIGL SERPL-MCNC: 189 MG/DL (ref 30–150)

## 2021-01-19 PROCEDURE — 86803 HEPATITIS C AB TEST: CPT

## 2021-01-19 PROCEDURE — 83036 HEMOGLOBIN GLYCOSYLATED A1C: CPT

## 2021-01-19 PROCEDURE — 80061 LIPID PANEL: CPT

## 2021-01-20 LAB — HCV AB S/CO SERPL IA: <0.1 S/CO RATIO (ref 0–0.9)

## 2021-02-02 ENCOUNTER — TELEPHONE (OUTPATIENT)
Dept: HEMATOLOGY/ONCOLOGY | Facility: CLINIC | Age: 71
End: 2021-02-02

## 2021-02-23 ENCOUNTER — LAB VISIT (OUTPATIENT)
Dept: LAB | Facility: HOSPITAL | Age: 71
End: 2021-02-23
Attending: UROLOGY
Payer: MEDICARE

## 2021-02-23 DIAGNOSIS — C61 PROSTATE CANCER: ICD-10-CM

## 2021-02-23 LAB — COMPLEXED PSA SERPL-MCNC: 0.01 NG/ML (ref 0–4)

## 2021-02-23 PROCEDURE — 84153 ASSAY OF PSA TOTAL: CPT

## 2021-02-23 PROCEDURE — 36415 COLL VENOUS BLD VENIPUNCTURE: CPT

## 2021-03-08 ENCOUNTER — OFFICE VISIT (OUTPATIENT)
Dept: FAMILY MEDICINE | Facility: CLINIC | Age: 71
End: 2021-03-08
Payer: MEDICARE

## 2021-03-08 VITALS
BODY MASS INDEX: 35.03 KG/M2 | HEART RATE: 78 BPM | RESPIRATION RATE: 18 BRPM | TEMPERATURE: 98 F | OXYGEN SATURATION: 98 % | WEIGHT: 218 LBS | DIASTOLIC BLOOD PRESSURE: 80 MMHG | SYSTOLIC BLOOD PRESSURE: 132 MMHG | HEIGHT: 66 IN

## 2021-03-08 DIAGNOSIS — I10 HYPERTENSION, ESSENTIAL: Primary | ICD-10-CM

## 2021-03-08 DIAGNOSIS — S70.362A INSECT BITE OF LEFT THIGH, INITIAL ENCOUNTER: ICD-10-CM
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
## 2021-03-08 DIAGNOSIS — L03.116 CELLULITIS OF LEFT LOWER EXTREMITY: ICD-10-CM

## 2021-03-08 PROCEDURE — 3079F DIAST BP 80-89 MM HG: CPT | Mod: S$GLB,,, | Performed by: NURSE PRACTITIONER

## 2021-03-08 PROCEDURE — 3075F PR MOST RECENT SYSTOLIC BLOOD PRESS GE 130-139MM HG: ICD-10-PCS | Mod: S$GLB,,, | Performed by: NURSE PRACTITIONER

## 2021-03-08 PROCEDURE — 1159F PR MEDICATION LIST DOCUMENTED IN MEDICAL RECORD: ICD-10-PCS | Mod: S$GLB,,, | Performed by: NURSE PRACTITIONER

## 2021-03-08 PROCEDURE — 3288F FALL RISK ASSESSMENT DOCD: CPT | Mod: S$GLB,,, | Performed by: NURSE PRACTITIONER

## 2021-03-08 PROCEDURE — 3008F BODY MASS INDEX DOCD: CPT | Mod: S$GLB,,, | Performed by: NURSE PRACTITIONER

## 2021-03-08 PROCEDURE — 3008F PR BODY MASS INDEX (BMI) DOCUMENTED: ICD-10-PCS | Mod: S$GLB,,, | Performed by: NURSE PRACTITIONER

## 2021-03-08 PROCEDURE — 1101F PT FALLS ASSESS-DOCD LE1/YR: CPT | Mod: S$GLB,,, | Performed by: NURSE PRACTITIONER

## 2021-03-08 PROCEDURE — 3288F PR FALLS RISK ASSESSMENT DOCUMENTED: ICD-10-PCS | Mod: S$GLB,,, | Performed by: NURSE PRACTITIONER

## 2021-03-08 PROCEDURE — 3079F PR MOST RECENT DIASTOLIC BLOOD PRESSURE 80-89 MM HG: ICD-10-PCS | Mod: S$GLB,,, | Performed by: NURSE PRACTITIONER

## 2021-03-08 PROCEDURE — 3075F SYST BP GE 130 - 139MM HG: CPT | Mod: S$GLB,,, | Performed by: NURSE PRACTITIONER

## 2021-03-08 PROCEDURE — 1159F MED LIST DOCD IN RCRD: CPT | Mod: S$GLB,,, | Performed by: NURSE PRACTITIONER

## 2021-03-08 PROCEDURE — 99213 PR OFFICE/OUTPT VISIT, EST, LEVL III, 20-29 MIN: ICD-10-PCS | Mod: 25,S$GLB,, | Performed by: NURSE PRACTITIONER

## 2021-03-08 PROCEDURE — 1101F PR PT FALLS ASSESS DOC 0-1 FALLS W/OUT INJ PAST YR: ICD-10-PCS | Mod: S$GLB,,, | Performed by: NURSE PRACTITIONER

## 2021-03-08 PROCEDURE — 10060 PR DRAIN SKIN ABSCESS SIMPLE: ICD-10-PCS | Mod: S$GLB,,, | Performed by: NURSE PRACTITIONER

## 2021-03-08 PROCEDURE — 99213 OFFICE O/P EST LOW 20 MIN: CPT | Mod: 25,S$GLB,, | Performed by: NURSE PRACTITIONER

## 2021-03-08 PROCEDURE — 10060 I&D ABSCESS SIMPLE/SINGLE: CPT | Mod: S$GLB,,, | Performed by: NURSE PRACTITIONER

## 2021-03-08 RX ORDER — DOXYCYCLINE 100 MG/1
100 CAPSULE ORAL EVERY 12 HOURS
Qty: 20 CAPSULE | Refills: 0 | Status: SHIPPED | OUTPATIENT
Start: 2021-03-08 | End: 2021-03-15 | Stop reason: SDUPTHER

## 2021-03-08 RX ORDER — OMEPRAZOLE 20 MG/1
20 CAPSULE, DELAYED RELEASE ORAL DAILY
COMMUNITY
End: 2021-11-19

## 2021-03-08 RX ORDER — MUPIROCIN 20 MG/G
OINTMENT TOPICAL 3 TIMES DAILY
Qty: 2 G | Refills: 3 | Status: ON HOLD | OUTPATIENT
Start: 2021-03-08 | End: 2021-06-22 | Stop reason: HOSPADM

## 2021-03-11 LAB — BACTERIA SPEC AEROBE CULT: ABNORMAL

## 2021-03-15 ENCOUNTER — OFFICE VISIT (OUTPATIENT)
Dept: FAMILY MEDICINE | Facility: CLINIC | Age: 71
End: 2021-03-15
Payer: MEDICARE

## 2021-03-15 VITALS
SYSTOLIC BLOOD PRESSURE: 138 MMHG | DIASTOLIC BLOOD PRESSURE: 76 MMHG | TEMPERATURE: 98 F | OXYGEN SATURATION: 98 % | HEIGHT: 66 IN | WEIGHT: 214 LBS | HEART RATE: 62 BPM | BODY MASS INDEX: 34.39 KG/M2

## 2021-03-15 DIAGNOSIS — L03.116 CELLULITIS OF LEFT LOWER EXTREMITY: ICD-10-CM
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
## 2021-03-15 DIAGNOSIS — S70.362A INSECT BITE OF LEFT THIGH, INITIAL ENCOUNTER: ICD-10-CM

## 2021-03-15 PROCEDURE — 1101F PT FALLS ASSESS-DOCD LE1/YR: CPT | Mod: S$GLB,,, | Performed by: NURSE PRACTITIONER

## 2021-03-15 PROCEDURE — 3288F FALL RISK ASSESSMENT DOCD: CPT | Mod: S$GLB,,, | Performed by: NURSE PRACTITIONER

## 2021-03-15 PROCEDURE — 3008F PR BODY MASS INDEX (BMI) DOCUMENTED: ICD-10-PCS | Mod: S$GLB,,, | Performed by: NURSE PRACTITIONER

## 2021-03-15 PROCEDURE — 3008F BODY MASS INDEX DOCD: CPT | Mod: S$GLB,,, | Performed by: NURSE PRACTITIONER

## 2021-03-15 PROCEDURE — 99024 PR POST-OP FOLLOW-UP VISIT: ICD-10-PCS | Mod: S$GLB,,, | Performed by: NURSE PRACTITIONER

## 2021-03-15 PROCEDURE — 1126F PR PAIN SEVERITY QUANTIFIED, NO PAIN PRESENT: ICD-10-PCS | Mod: S$GLB,,, | Performed by: NURSE PRACTITIONER

## 2021-03-15 PROCEDURE — 1101F PR PT FALLS ASSESS DOC 0-1 FALLS W/OUT INJ PAST YR: ICD-10-PCS | Mod: S$GLB,,, | Performed by: NURSE PRACTITIONER

## 2021-03-15 PROCEDURE — 99024 POSTOP FOLLOW-UP VISIT: CPT | Mod: S$GLB,,, | Performed by: NURSE PRACTITIONER

## 2021-03-15 PROCEDURE — 3288F PR FALLS RISK ASSESSMENT DOCUMENTED: ICD-10-PCS | Mod: S$GLB,,, | Performed by: NURSE PRACTITIONER

## 2021-03-15 PROCEDURE — 3078F PR MOST RECENT DIASTOLIC BLOOD PRESSURE < 80 MM HG: ICD-10-PCS | Mod: S$GLB,,, | Performed by: NURSE PRACTITIONER

## 2021-03-15 PROCEDURE — 1126F AMNT PAIN NOTED NONE PRSNT: CPT | Mod: S$GLB,,, | Performed by: NURSE PRACTITIONER

## 2021-03-15 PROCEDURE — 1159F MED LIST DOCD IN RCRD: CPT | Mod: S$GLB,,, | Performed by: NURSE PRACTITIONER

## 2021-03-15 PROCEDURE — 3075F SYST BP GE 130 - 139MM HG: CPT | Mod: S$GLB,,, | Performed by: NURSE PRACTITIONER

## 2021-03-15 PROCEDURE — 1159F PR MEDICATION LIST DOCUMENTED IN MEDICAL RECORD: ICD-10-PCS | Mod: S$GLB,,, | Performed by: NURSE PRACTITIONER

## 2021-03-15 PROCEDURE — 3075F PR MOST RECENT SYSTOLIC BLOOD PRESS GE 130-139MM HG: ICD-10-PCS | Mod: S$GLB,,, | Performed by: NURSE PRACTITIONER

## 2021-03-15 PROCEDURE — 3078F DIAST BP <80 MM HG: CPT | Mod: S$GLB,,, | Performed by: NURSE PRACTITIONER

## 2021-03-15 RX ORDER — METFORMIN HYDROCHLORIDE 500 MG/1
500 TABLET ORAL 2 TIMES DAILY
COMMUNITY
Start: 2021-03-08 | End: 2021-04-13 | Stop reason: SDUPTHER

## 2021-03-15 RX ORDER — TRIAMCINOLONE ACETONIDE 1 MG/G
1 CREAM TOPICAL 2 TIMES DAILY
COMMUNITY
End: 2021-11-19

## 2021-03-15 RX ORDER — DOXYCYCLINE 100 MG/1
100 CAPSULE ORAL EVERY 12 HOURS
Qty: 20 CAPSULE | Refills: 0 | Status: ON HOLD | OUTPATIENT
Start: 2021-03-15 | End: 2021-06-22 | Stop reason: HOSPADM

## 2021-03-22 ENCOUNTER — OFFICE VISIT (OUTPATIENT)
Dept: FAMILY MEDICINE | Facility: CLINIC | Age: 71
End: 2021-03-22
Payer: MEDICARE

## 2021-03-22 VITALS
HEIGHT: 66 IN | SYSTOLIC BLOOD PRESSURE: 138 MMHG | BODY MASS INDEX: 34.72 KG/M2 | DIASTOLIC BLOOD PRESSURE: 86 MMHG | HEART RATE: 66 BPM | OXYGEN SATURATION: 98 % | WEIGHT: 216 LBS | TEMPERATURE: 98 F

## 2021-03-22 DIAGNOSIS — I10 HYPERTENSION, ESSENTIAL: ICD-10-CM

## 2021-03-22 DIAGNOSIS — L03.116 CELLULITIS OF LEFT LOWER EXTREMITY: Primary | ICD-10-CM

## 2021-03-22 PROCEDURE — 3079F DIAST BP 80-89 MM HG: CPT | Mod: S$GLB,,, | Performed by: NURSE PRACTITIONER

## 2021-03-22 PROCEDURE — 3075F SYST BP GE 130 - 139MM HG: CPT | Mod: S$GLB,,, | Performed by: NURSE PRACTITIONER

## 2021-03-22 PROCEDURE — 3008F BODY MASS INDEX DOCD: CPT | Mod: S$GLB,,, | Performed by: NURSE PRACTITIONER

## 2021-03-22 PROCEDURE — 1159F MED LIST DOCD IN RCRD: CPT | Mod: S$GLB,,, | Performed by: NURSE PRACTITIONER

## 2021-03-22 PROCEDURE — 1126F AMNT PAIN NOTED NONE PRSNT: CPT | Mod: S$GLB,,, | Performed by: NURSE PRACTITIONER

## 2021-03-22 PROCEDURE — 1101F PR PT FALLS ASSESS DOC 0-1 FALLS W/OUT INJ PAST YR: ICD-10-PCS | Mod: S$GLB,,, | Performed by: NURSE PRACTITIONER

## 2021-03-22 PROCEDURE — 3288F FALL RISK ASSESSMENT DOCD: CPT | Mod: S$GLB,,, | Performed by: NURSE PRACTITIONER

## 2021-03-22 PROCEDURE — 1101F PT FALLS ASSESS-DOCD LE1/YR: CPT | Mod: S$GLB,,, | Performed by: NURSE PRACTITIONER

## 2021-03-22 PROCEDURE — 3288F PR FALLS RISK ASSESSMENT DOCUMENTED: ICD-10-PCS | Mod: S$GLB,,, | Performed by: NURSE PRACTITIONER

## 2021-03-22 PROCEDURE — 3075F PR MOST RECENT SYSTOLIC BLOOD PRESS GE 130-139MM HG: ICD-10-PCS | Mod: S$GLB,,, | Performed by: NURSE PRACTITIONER

## 2021-03-22 PROCEDURE — 99212 OFFICE O/P EST SF 10 MIN: CPT | Mod: S$GLB,,, | Performed by: NURSE PRACTITIONER

## 2021-03-22 PROCEDURE — 1126F PR PAIN SEVERITY QUANTIFIED, NO PAIN PRESENT: ICD-10-PCS | Mod: S$GLB,,, | Performed by: NURSE PRACTITIONER

## 2021-03-22 PROCEDURE — 1159F PR MEDICATION LIST DOCUMENTED IN MEDICAL RECORD: ICD-10-PCS | Mod: S$GLB,,, | Performed by: NURSE PRACTITIONER

## 2021-03-22 PROCEDURE — 3008F PR BODY MASS INDEX (BMI) DOCUMENTED: ICD-10-PCS | Mod: S$GLB,,, | Performed by: NURSE PRACTITIONER

## 2021-03-22 PROCEDURE — 3079F PR MOST RECENT DIASTOLIC BLOOD PRESSURE 80-89 MM HG: ICD-10-PCS | Mod: S$GLB,,, | Performed by: NURSE PRACTITIONER

## 2021-03-22 PROCEDURE — 99212 PR OFFICE/OUTPT VISIT, EST, LEVL II, 10-19 MIN: ICD-10-PCS | Mod: S$GLB,,, | Performed by: NURSE PRACTITIONER

## 2021-03-22 RX ORDER — CLINDAMYCIN HYDROCHLORIDE 300 MG/1
300 CAPSULE ORAL 4 TIMES DAILY
Qty: 40 CAPSULE | Refills: 0 | Status: ON HOLD | OUTPATIENT
Start: 2021-03-22 | End: 2021-06-22 | Stop reason: HOSPADM

## 2021-04-05 ENCOUNTER — OFFICE VISIT (OUTPATIENT)
Dept: FAMILY MEDICINE | Facility: CLINIC | Age: 71
End: 2021-04-05
Payer: MEDICARE

## 2021-04-05 VITALS
DIASTOLIC BLOOD PRESSURE: 82 MMHG | TEMPERATURE: 98 F | OXYGEN SATURATION: 99 % | BODY MASS INDEX: 35.36 KG/M2 | HEART RATE: 64 BPM | HEIGHT: 66 IN | SYSTOLIC BLOOD PRESSURE: 138 MMHG | WEIGHT: 220 LBS

## 2021-04-05 DIAGNOSIS — T63.304D SPIDER BITE WOUND, UNDETERMINED INTENT, SUBSEQUENT ENCOUNTER: ICD-10-CM

## 2021-04-05 DIAGNOSIS — I10 HYPERTENSION, ESSENTIAL: Primary | ICD-10-CM

## 2021-04-05 PROBLEM — T63.301A SPIDER BITE: Status: ACTIVE | Noted: 2021-04-05

## 2021-04-05 PROCEDURE — 3288F PR FALLS RISK ASSESSMENT DOCUMENTED: ICD-10-PCS | Mod: S$GLB,,, | Performed by: NURSE PRACTITIONER

## 2021-04-05 PROCEDURE — 3075F PR MOST RECENT SYSTOLIC BLOOD PRESS GE 130-139MM HG: ICD-10-PCS | Mod: S$GLB,,, | Performed by: NURSE PRACTITIONER

## 2021-04-05 PROCEDURE — 99212 PR OFFICE/OUTPT VISIT, EST, LEVL II, 10-19 MIN: ICD-10-PCS | Mod: S$GLB,,, | Performed by: NURSE PRACTITIONER

## 2021-04-05 PROCEDURE — 1159F MED LIST DOCD IN RCRD: CPT | Mod: S$GLB,,, | Performed by: NURSE PRACTITIONER

## 2021-04-05 PROCEDURE — 3288F FALL RISK ASSESSMENT DOCD: CPT | Mod: S$GLB,,, | Performed by: NURSE PRACTITIONER

## 2021-04-05 PROCEDURE — 3075F SYST BP GE 130 - 139MM HG: CPT | Mod: S$GLB,,, | Performed by: NURSE PRACTITIONER

## 2021-04-05 PROCEDURE — 3079F DIAST BP 80-89 MM HG: CPT | Mod: S$GLB,,, | Performed by: NURSE PRACTITIONER

## 2021-04-05 PROCEDURE — 1126F AMNT PAIN NOTED NONE PRSNT: CPT | Mod: S$GLB,,, | Performed by: NURSE PRACTITIONER

## 2021-04-05 PROCEDURE — 3008F BODY MASS INDEX DOCD: CPT | Mod: S$GLB,,, | Performed by: NURSE PRACTITIONER

## 2021-04-05 PROCEDURE — 3079F PR MOST RECENT DIASTOLIC BLOOD PRESSURE 80-89 MM HG: ICD-10-PCS | Mod: S$GLB,,, | Performed by: NURSE PRACTITIONER

## 2021-04-05 PROCEDURE — 1101F PR PT FALLS ASSESS DOC 0-1 FALLS W/OUT INJ PAST YR: ICD-10-PCS | Mod: S$GLB,,, | Performed by: NURSE PRACTITIONER

## 2021-04-05 PROCEDURE — 99212 OFFICE O/P EST SF 10 MIN: CPT | Mod: S$GLB,,, | Performed by: NURSE PRACTITIONER

## 2021-04-05 PROCEDURE — 1126F PR PAIN SEVERITY QUANTIFIED, NO PAIN PRESENT: ICD-10-PCS | Mod: S$GLB,,, | Performed by: NURSE PRACTITIONER

## 2021-04-05 PROCEDURE — 3008F PR BODY MASS INDEX (BMI) DOCUMENTED: ICD-10-PCS | Mod: S$GLB,,, | Performed by: NURSE PRACTITIONER

## 2021-04-05 PROCEDURE — 1159F PR MEDICATION LIST DOCUMENTED IN MEDICAL RECORD: ICD-10-PCS | Mod: S$GLB,,, | Performed by: NURSE PRACTITIONER

## 2021-04-05 PROCEDURE — 1101F PT FALLS ASSESS-DOCD LE1/YR: CPT | Mod: S$GLB,,, | Performed by: NURSE PRACTITIONER

## 2021-04-13 ENCOUNTER — OFFICE VISIT (OUTPATIENT)
Dept: FAMILY MEDICINE | Facility: CLINIC | Age: 71
End: 2021-04-13
Payer: MEDICARE

## 2021-04-13 ENCOUNTER — OFFICE VISIT (OUTPATIENT)
Dept: HEMATOLOGY/ONCOLOGY | Facility: CLINIC | Age: 71
End: 2021-04-13
Payer: MEDICARE

## 2021-04-13 VITALS
HEART RATE: 57 BPM | WEIGHT: 219 LBS | HEIGHT: 66 IN | DIASTOLIC BLOOD PRESSURE: 74 MMHG | RESPIRATION RATE: 20 BRPM | BODY MASS INDEX: 35.2 KG/M2 | SYSTOLIC BLOOD PRESSURE: 167 MMHG

## 2021-04-13 VITALS
DIASTOLIC BLOOD PRESSURE: 74 MMHG | OXYGEN SATURATION: 98 % | WEIGHT: 220 LBS | BODY MASS INDEX: 35.36 KG/M2 | HEIGHT: 66 IN | TEMPERATURE: 98 F | SYSTOLIC BLOOD PRESSURE: 138 MMHG | HEART RATE: 62 BPM

## 2021-04-13 DIAGNOSIS — I25.10 MILD CAD: Primary | ICD-10-CM

## 2021-04-13 DIAGNOSIS — D69.6 THROMBOCYTOPENIA: ICD-10-CM

## 2021-04-13 DIAGNOSIS — C61 PROSTATE CANCER: ICD-10-CM

## 2021-04-13 DIAGNOSIS — G47.33 OSA ON CPAP: ICD-10-CM

## 2021-04-13 DIAGNOSIS — D53.9 NUTRITIONAL ANEMIA, UNSPECIFIED: ICD-10-CM

## 2021-04-13 DIAGNOSIS — D63.8 ANEMIA, CHRONIC DISEASE: ICD-10-CM

## 2021-04-13 DIAGNOSIS — E78.5 HYPERLIPIDEMIA, UNSPECIFIED HYPERLIPIDEMIA TYPE: ICD-10-CM

## 2021-04-13 DIAGNOSIS — E11.9 TYPE 2 DIABETES MELLITUS WITHOUT COMPLICATION, WITHOUT LONG-TERM CURRENT USE OF INSULIN: ICD-10-CM

## 2021-04-13 DIAGNOSIS — C61 PROSTATE CANCER: Primary | ICD-10-CM

## 2021-04-13 DIAGNOSIS — D64.9 NORMOCYTIC NORMOCHROMIC ANEMIA: ICD-10-CM

## 2021-04-13 DIAGNOSIS — K22.2 ESOPHAGEAL STRICTURE: ICD-10-CM

## 2021-04-13 DIAGNOSIS — R60.0 PEDAL EDEMA: ICD-10-CM

## 2021-04-13 DIAGNOSIS — I10 HYPERTENSION, ESSENTIAL: ICD-10-CM

## 2021-04-13 PROCEDURE — 1159F MED LIST DOCD IN RCRD: CPT | Mod: S$GLB,,, | Performed by: INTERNAL MEDICINE

## 2021-04-13 PROCEDURE — 3008F BODY MASS INDEX DOCD: CPT | Mod: S$GLB,,, | Performed by: INTERNAL MEDICINE

## 2021-04-13 PROCEDURE — 1101F PT FALLS ASSESS-DOCD LE1/YR: CPT | Mod: S$GLB,,, | Performed by: FAMILY MEDICINE

## 2021-04-13 PROCEDURE — 99214 OFFICE O/P EST MOD 30 MIN: CPT | Mod: S$GLB,,, | Performed by: FAMILY MEDICINE

## 2021-04-13 PROCEDURE — 3008F BODY MASS INDEX DOCD: CPT | Mod: S$GLB,,, | Performed by: FAMILY MEDICINE

## 2021-04-13 PROCEDURE — 1125F PR PAIN SEVERITY QUANTIFIED, PAIN PRESENT: ICD-10-PCS | Mod: S$GLB,,, | Performed by: INTERNAL MEDICINE

## 2021-04-13 PROCEDURE — 1159F MED LIST DOCD IN RCRD: CPT | Mod: S$GLB,,, | Performed by: FAMILY MEDICINE

## 2021-04-13 PROCEDURE — 99214 PR OFFICE/OUTPT VISIT, EST, LEVL IV, 30-39 MIN: ICD-10-PCS | Mod: S$GLB,,, | Performed by: FAMILY MEDICINE

## 2021-04-13 PROCEDURE — 3288F PR FALLS RISK ASSESSMENT DOCUMENTED: ICD-10-PCS | Mod: S$GLB,,, | Performed by: INTERNAL MEDICINE

## 2021-04-13 PROCEDURE — 3288F PR FALLS RISK ASSESSMENT DOCUMENTED: ICD-10-PCS | Mod: S$GLB,,, | Performed by: FAMILY MEDICINE

## 2021-04-13 PROCEDURE — 1159F PR MEDICATION LIST DOCUMENTED IN MEDICAL RECORD: ICD-10-PCS | Mod: S$GLB,,, | Performed by: FAMILY MEDICINE

## 2021-04-13 PROCEDURE — 3008F PR BODY MASS INDEX (BMI) DOCUMENTED: ICD-10-PCS | Mod: S$GLB,,, | Performed by: INTERNAL MEDICINE

## 2021-04-13 PROCEDURE — 3044F PR MOST RECENT HEMOGLOBIN A1C LEVEL <7.0%: ICD-10-PCS | Mod: S$GLB,,, | Performed by: INTERNAL MEDICINE

## 2021-04-13 PROCEDURE — 1101F PR PT FALLS ASSESS DOC 0-1 FALLS W/OUT INJ PAST YR: ICD-10-PCS | Mod: S$GLB,,, | Performed by: FAMILY MEDICINE

## 2021-04-13 PROCEDURE — 1159F PR MEDICATION LIST DOCUMENTED IN MEDICAL RECORD: ICD-10-PCS | Mod: S$GLB,,, | Performed by: INTERNAL MEDICINE

## 2021-04-13 PROCEDURE — 3008F PR BODY MASS INDEX (BMI) DOCUMENTED: ICD-10-PCS | Mod: S$GLB,,, | Performed by: FAMILY MEDICINE

## 2021-04-13 PROCEDURE — 3044F HG A1C LEVEL LT 7.0%: CPT | Mod: S$GLB,,, | Performed by: INTERNAL MEDICINE

## 2021-04-13 PROCEDURE — 3288F FALL RISK ASSESSMENT DOCD: CPT | Mod: S$GLB,,, | Performed by: INTERNAL MEDICINE

## 2021-04-13 PROCEDURE — 99213 OFFICE O/P EST LOW 20 MIN: CPT | Mod: S$GLB,,, | Performed by: INTERNAL MEDICINE

## 2021-04-13 PROCEDURE — 1125F AMNT PAIN NOTED PAIN PRSNT: CPT | Mod: S$GLB,,, | Performed by: INTERNAL MEDICINE

## 2021-04-13 PROCEDURE — 3288F FALL RISK ASSESSMENT DOCD: CPT | Mod: S$GLB,,, | Performed by: FAMILY MEDICINE

## 2021-04-13 PROCEDURE — 1101F PR PT FALLS ASSESS DOC 0-1 FALLS W/OUT INJ PAST YR: ICD-10-PCS | Mod: S$GLB,,, | Performed by: INTERNAL MEDICINE

## 2021-04-13 PROCEDURE — 99213 PR OFFICE/OUTPT VISIT, EST, LEVL III, 20-29 MIN: ICD-10-PCS | Mod: S$GLB,,, | Performed by: INTERNAL MEDICINE

## 2021-04-13 PROCEDURE — 1125F PR PAIN SEVERITY QUANTIFIED, PAIN PRESENT: ICD-10-PCS | Mod: S$GLB,,, | Performed by: FAMILY MEDICINE

## 2021-04-13 PROCEDURE — 1101F PT FALLS ASSESS-DOCD LE1/YR: CPT | Mod: S$GLB,,, | Performed by: INTERNAL MEDICINE

## 2021-04-13 PROCEDURE — 1125F AMNT PAIN NOTED PAIN PRSNT: CPT | Mod: S$GLB,,, | Performed by: FAMILY MEDICINE

## 2021-04-13 RX ORDER — AMITRIPTYLINE HYDROCHLORIDE 50 MG/1
50 TABLET, FILM COATED ORAL NIGHTLY
Qty: 90 TABLET | Refills: 0 | Status: SHIPPED | OUTPATIENT
Start: 2021-04-13 | End: 2021-06-03

## 2021-04-13 RX ORDER — OLMESARTAN MEDOXOMIL 40 MG/1
40 TABLET ORAL DAILY
Qty: 90 TABLET | Refills: 1 | Status: SHIPPED | OUTPATIENT
Start: 2021-04-13 | End: 2021-10-18 | Stop reason: SDUPTHER

## 2021-04-13 RX ORDER — POTASSIUM CHLORIDE 1.5 G/1.58G
20 POWDER, FOR SOLUTION ORAL 2 TIMES DAILY
Qty: 180 PACKET | Refills: 1 | Status: SHIPPED | OUTPATIENT
Start: 2021-04-13 | End: 2021-10-18 | Stop reason: SDUPTHER

## 2021-04-13 RX ORDER — CARVEDILOL 12.5 MG/1
12.5 TABLET ORAL 2 TIMES DAILY WITH MEALS
Qty: 180 TABLET | Refills: 1 | Status: SHIPPED | OUTPATIENT
Start: 2021-04-13 | End: 2021-07-12 | Stop reason: SDUPTHER

## 2021-04-13 RX ORDER — METFORMIN HYDROCHLORIDE 500 MG/1
500 TABLET ORAL 2 TIMES DAILY
Qty: 180 TABLET | Refills: 1 | Status: ON HOLD | OUTPATIENT
Start: 2021-04-13 | End: 2021-06-22 | Stop reason: SDUPTHER

## 2021-04-13 RX ORDER — PNV NO.95/FERROUS FUM/FOLIC AC 28MG-0.8MG
1000 TABLET ORAL DAILY
COMMUNITY

## 2021-04-14 ENCOUNTER — LAB VISIT (OUTPATIENT)
Dept: LAB | Facility: HOSPITAL | Age: 71
End: 2021-04-14
Attending: INTERNAL MEDICINE
Payer: MEDICARE

## 2021-04-14 DIAGNOSIS — D63.8 ANEMIA, CHRONIC DISEASE: ICD-10-CM

## 2021-04-14 DIAGNOSIS — D53.9 NUTRITIONAL ANEMIA, UNSPECIFIED: ICD-10-CM

## 2021-04-14 DIAGNOSIS — D69.6 THROMBOCYTOPENIA: ICD-10-CM

## 2021-04-14 DIAGNOSIS — D64.9 NORMOCYTIC NORMOCHROMIC ANEMIA: ICD-10-CM

## 2021-04-14 LAB
ALBUMIN SERPL BCP-MCNC: 3.7 G/DL (ref 3.5–5.2)
ALP SERPL-CCNC: 45 U/L (ref 55–135)
ALT SERPL W/O P-5'-P-CCNC: 20 U/L (ref 10–44)
ANION GAP SERPL CALC-SCNC: 11 MMOL/L (ref 8–16)
AST SERPL-CCNC: 25 U/L (ref 10–40)
BASOPHILS # BLD AUTO: 0.02 K/UL (ref 0–0.2)
BASOPHILS NFR BLD: 0.3 % (ref 0–1.9)
BILIRUB SERPL-MCNC: 0.6 MG/DL (ref 0.1–1)
BUN SERPL-MCNC: 13 MG/DL (ref 8–23)
CALCIUM SERPL-MCNC: 8.8 MG/DL (ref 8.7–10.5)
CHLORIDE SERPL-SCNC: 103 MMOL/L (ref 95–110)
CO2 SERPL-SCNC: 27 MMOL/L (ref 23–29)
CREAT SERPL-MCNC: 0.8 MG/DL (ref 0.5–1.4)
DIFFERENTIAL METHOD: ABNORMAL
EOSINOPHIL # BLD AUTO: 0.4 K/UL (ref 0–0.5)
EOSINOPHIL NFR BLD: 6.5 % (ref 0–8)
ERYTHROCYTE [DISTWIDTH] IN BLOOD BY AUTOMATED COUNT: 14.2 % (ref 11.5–14.5)
EST. GFR  (AFRICAN AMERICAN): >60 ML/MIN/1.73 M^2
EST. GFR  (NON AFRICAN AMERICAN): >60 ML/MIN/1.73 M^2
FERRITIN SERPL-MCNC: 54 NG/ML (ref 20–300)
FOLATE SERPL-MCNC: >24.8 NG/ML (ref 4–24)
GLUCOSE SERPL-MCNC: 105 MG/DL (ref 70–110)
HCT VFR BLD AUTO: 33.9 % (ref 40–54)
HGB BLD-MCNC: 11 G/DL (ref 14–18)
IMM GRANULOCYTES # BLD AUTO: 0.02 K/UL (ref 0–0.04)
IMM GRANULOCYTES NFR BLD AUTO: 0.3 % (ref 0–0.5)
IRON SERPL-MCNC: 38 UG/DL (ref 45–160)
LYMPHOCYTES # BLD AUTO: 1.1 K/UL (ref 1–4.8)
LYMPHOCYTES NFR BLD: 16.7 % (ref 18–48)
MCH RBC QN AUTO: 27.2 PG (ref 27–31)
MCHC RBC AUTO-ENTMCNC: 32.4 G/DL (ref 32–36)
MCV RBC AUTO: 84 FL (ref 82–98)
MONOCYTES # BLD AUTO: 0.6 K/UL (ref 0.3–1)
MONOCYTES NFR BLD: 9.3 % (ref 4–15)
NEUTROPHILS # BLD AUTO: 4.3 K/UL (ref 1.8–7.7)
NEUTROPHILS NFR BLD: 66.9 % (ref 38–73)
NRBC BLD-RTO: 0 /100 WBC
PLATELET # BLD AUTO: 133 K/UL (ref 150–450)
PLATELET BLD QL SMEAR: ABNORMAL
PMV BLD AUTO: 11.5 FL (ref 9.2–12.9)
POTASSIUM SERPL-SCNC: 3.6 MMOL/L (ref 3.5–5.1)
PROT SERPL-MCNC: 6.6 G/DL (ref 6–8.4)
RBC # BLD AUTO: 4.05 M/UL (ref 4.6–6.2)
SATURATED IRON: 12 % (ref 20–50)
SODIUM SERPL-SCNC: 141 MMOL/L (ref 136–145)
TOTAL IRON BINDING CAPACITY: 325 UG/DL (ref 250–450)
TRANSFERRIN SERPL-MCNC: 232 MG/DL (ref 200–375)
VIT B12 SERPL-MCNC: >1500 PG/ML (ref 210–950)
WBC # BLD AUTO: 6.46 K/UL (ref 3.9–12.7)

## 2021-04-14 PROCEDURE — 82728 ASSAY OF FERRITIN: CPT | Performed by: INTERNAL MEDICINE

## 2021-04-14 PROCEDURE — 82607 VITAMIN B-12: CPT | Performed by: INTERNAL MEDICINE

## 2021-04-14 PROCEDURE — 80053 COMPREHEN METABOLIC PANEL: CPT | Performed by: INTERNAL MEDICINE

## 2021-04-14 PROCEDURE — 83540 ASSAY OF IRON: CPT | Performed by: INTERNAL MEDICINE

## 2021-04-14 PROCEDURE — 82746 ASSAY OF FOLIC ACID SERUM: CPT | Performed by: INTERNAL MEDICINE

## 2021-04-14 PROCEDURE — 85025 COMPLETE CBC W/AUTO DIFF WBC: CPT | Performed by: INTERNAL MEDICINE

## 2021-04-15 ENCOUNTER — TELEPHONE (OUTPATIENT)
Dept: HEMATOLOGY/ONCOLOGY | Facility: CLINIC | Age: 71
End: 2021-04-15

## 2021-04-15 PROBLEM — K22.2 ESOPHAGEAL STRICTURE: Status: ACTIVE | Noted: 2021-04-15

## 2021-05-10 ENCOUNTER — TELEPHONE (OUTPATIENT)
Dept: FAMILY MEDICINE | Facility: CLINIC | Age: 71
End: 2021-05-10

## 2021-05-12 DIAGNOSIS — E11.9 TYPE 2 DIABETES MELLITUS WITHOUT COMPLICATION: ICD-10-CM

## 2021-05-24 ENCOUNTER — LAB VISIT (OUTPATIENT)
Dept: LAB | Facility: HOSPITAL | Age: 71
End: 2021-05-24
Attending: UROLOGY
Payer: MEDICARE

## 2021-05-24 DIAGNOSIS — C61 PROSTATE CANCER: ICD-10-CM

## 2021-05-24 LAB
COMPLEXED PSA SERPL-MCNC: <0.01 NG/ML (ref 0–4)
TESTOST SERPL-MCNC: 110 NG/DL (ref 304–1227)

## 2021-05-24 PROCEDURE — 36415 COLL VENOUS BLD VENIPUNCTURE: CPT | Performed by: UROLOGY

## 2021-05-24 PROCEDURE — 84403 ASSAY OF TOTAL TESTOSTERONE: CPT | Performed by: UROLOGY

## 2021-05-24 PROCEDURE — 84153 ASSAY OF PSA TOTAL: CPT | Performed by: UROLOGY

## 2021-05-31 ENCOUNTER — TELEPHONE (OUTPATIENT)
Dept: ORTHOPEDICS | Facility: CLINIC | Age: 71
End: 2021-05-31

## 2021-06-01 ENCOUNTER — TELEPHONE (OUTPATIENT)
Dept: UROLOGY | Facility: CLINIC | Age: 71
End: 2021-06-01

## 2021-06-13 ENCOUNTER — PATIENT OUTREACH (OUTPATIENT)
Dept: ADMINISTRATIVE | Facility: OTHER | Age: 71
End: 2021-06-13

## 2021-06-14 ENCOUNTER — OFFICE VISIT (OUTPATIENT)
Dept: UROLOGY | Facility: CLINIC | Age: 71
End: 2021-06-14
Payer: MEDICARE

## 2021-06-14 VITALS — DIASTOLIC BLOOD PRESSURE: 74 MMHG | SYSTOLIC BLOOD PRESSURE: 168 MMHG | HEART RATE: 58 BPM

## 2021-06-14 DIAGNOSIS — N40.1 BPH WITH URINARY OBSTRUCTION: ICD-10-CM

## 2021-06-14 DIAGNOSIS — C61 PROSTATE CANCER: Primary | ICD-10-CM

## 2021-06-14 DIAGNOSIS — N13.8 BPH WITH URINARY OBSTRUCTION: ICD-10-CM

## 2021-06-14 PROCEDURE — 99214 PR OFFICE/OUTPT VISIT, EST, LEVL IV, 30-39 MIN: ICD-10-PCS | Mod: S$GLB,,, | Performed by: NURSE PRACTITIONER

## 2021-06-14 PROCEDURE — 1159F MED LIST DOCD IN RCRD: CPT | Mod: S$GLB,,, | Performed by: NURSE PRACTITIONER

## 2021-06-14 PROCEDURE — 99999 PR PBB SHADOW E&M-EST. PATIENT-LVL III: CPT | Mod: PBBFAC,,, | Performed by: NURSE PRACTITIONER

## 2021-06-14 PROCEDURE — 99999 PR PBB SHADOW E&M-EST. PATIENT-LVL III: ICD-10-PCS | Mod: PBBFAC,,, | Performed by: NURSE PRACTITIONER

## 2021-06-14 PROCEDURE — 99214 OFFICE O/P EST MOD 30 MIN: CPT | Mod: S$GLB,,, | Performed by: NURSE PRACTITIONER

## 2021-06-14 PROCEDURE — 1159F PR MEDICATION LIST DOCUMENTED IN MEDICAL RECORD: ICD-10-PCS | Mod: S$GLB,,, | Performed by: NURSE PRACTITIONER

## 2021-06-14 RX ORDER — OXYBUTYNIN CHLORIDE 10 MG/1
10 TABLET, EXTENDED RELEASE ORAL DAILY
Qty: 30 TABLET | Refills: 12 | Status: SHIPPED | OUTPATIENT
Start: 2021-06-14 | End: 2021-07-14

## 2021-06-21 ENCOUNTER — HOSPITAL ENCOUNTER (OUTPATIENT)
Facility: HOSPITAL | Age: 71
Discharge: HOME | End: 2021-06-22
Attending: EMERGENCY MEDICINE | Admitting: INTERNAL MEDICINE
Payer: MEDICARE

## 2021-06-21 DIAGNOSIS — I16.0 HYPERTENSIVE URGENCY: ICD-10-CM

## 2021-06-21 DIAGNOSIS — R07.9 CHEST PAIN: ICD-10-CM

## 2021-06-21 DIAGNOSIS — R42 DIZZINESS: Primary | ICD-10-CM

## 2021-06-21 PROBLEM — E11.9 TYPE 2 DIABETES MELLITUS, WITHOUT LONG-TERM CURRENT USE OF INSULIN: Chronic | Status: ACTIVE | Noted: 2021-06-21

## 2021-06-21 PROBLEM — Z85.46 HISTORY OF PROSTATE CANCER: Chronic | Status: ACTIVE | Noted: 2021-06-21

## 2021-06-21 PROBLEM — E66.9 CLASS 2 OBESITY IN ADULT: Chronic | Status: ACTIVE | Noted: 2021-06-21

## 2021-06-21 PROBLEM — J32.2 ETHMOID SINUSITIS: Status: ACTIVE | Noted: 2021-06-21

## 2021-06-21 PROBLEM — G47.33 OSA (OBSTRUCTIVE SLEEP APNEA): Chronic | Status: ACTIVE | Noted: 2021-06-21

## 2021-06-21 PROBLEM — E66.812 CLASS 2 OBESITY IN ADULT: Chronic | Status: ACTIVE | Noted: 2021-06-21

## 2021-06-21 PROBLEM — E78.5 HYPERLIPEMIA: Chronic | Status: ACTIVE | Noted: 2021-06-21

## 2021-06-21 PROBLEM — K21.9 GERD (GASTROESOPHAGEAL REFLUX DISEASE): Chronic | Status: ACTIVE | Noted: 2021-06-21

## 2021-06-21 PROBLEM — H81.392 PERIPHERAL VERTIGO INVOLVING LEFT EAR: Status: ACTIVE | Noted: 2021-06-21

## 2021-06-21 PROBLEM — R94.31 QT PROLONGATION: Status: ACTIVE | Noted: 2021-06-21

## 2021-06-21 LAB
ALBUMIN SERPL BCP-MCNC: 3.9 G/DL (ref 3.5–5.2)
ALP SERPL-CCNC: 56 U/L (ref 55–135)
ALT SERPL W/O P-5'-P-CCNC: 22 U/L (ref 10–44)
ANION GAP SERPL CALC-SCNC: 9 MMOL/L (ref 8–16)
APTT PPP: 26.2 SEC (ref 25.6–35.8)
AST SERPL-CCNC: 18 U/L (ref 10–40)
BASOPHILS # BLD AUTO: 0.03 K/UL (ref 0–0.2)
BASOPHILS NFR BLD: 0.3 % (ref 0–1.9)
BILIRUB SERPL-MCNC: 0.8 MG/DL (ref 0.1–1)
BNP SERPL-MCNC: 50 PG/ML (ref 0–99)
BUN SERPL-MCNC: 22 MG/DL (ref 8–23)
CALCIUM SERPL-MCNC: 8.8 MG/DL (ref 8.7–10.5)
CHLORIDE SERPL-SCNC: 103 MMOL/L (ref 95–110)
CO2 SERPL-SCNC: 26 MMOL/L (ref 23–29)
CREAT SERPL-MCNC: 0.9 MG/DL (ref 0.5–1.4)
CREAT SERPL-MCNC: 0.9 MG/DL (ref 0.5–1.4)
DIFFERENTIAL METHOD: ABNORMAL
EOSINOPHIL # BLD AUTO: 0.2 K/UL (ref 0–0.5)
EOSINOPHIL NFR BLD: 2 % (ref 0–8)
ERYTHROCYTE [DISTWIDTH] IN BLOOD BY AUTOMATED COUNT: 15.1 % (ref 11.5–14.5)
EST. GFR  (AFRICAN AMERICAN): >60 ML/MIN/1.73 M^2
EST. GFR  (NON AFRICAN AMERICAN): >60 ML/MIN/1.73 M^2
GLUCOSE SERPL-MCNC: 125 MG/DL (ref 70–110)
GLUCOSE SERPL-MCNC: 127 MG/DL (ref 70–110)
GLUCOSE SERPL-MCNC: 131 MG/DL (ref 70–110)
HCT VFR BLD AUTO: 37 % (ref 40–54)
HGB BLD-MCNC: 11.6 G/DL (ref 14–18)
IMM GRANULOCYTES # BLD AUTO: 0.22 K/UL (ref 0–0.04)
IMM GRANULOCYTES NFR BLD AUTO: 2.3 % (ref 0–0.5)
INR PPP: 1
LYMPHOCYTES # BLD AUTO: 1.3 K/UL (ref 1–4.8)
LYMPHOCYTES NFR BLD: 13.5 % (ref 18–48)
MCH RBC QN AUTO: 26.5 PG (ref 27–31)
MCHC RBC AUTO-ENTMCNC: 31.4 G/DL (ref 32–36)
MCV RBC AUTO: 85 FL (ref 82–98)
MONOCYTES # BLD AUTO: 0.9 K/UL (ref 0.3–1)
MONOCYTES NFR BLD: 9.2 % (ref 4–15)
NEUTROPHILS # BLD AUTO: 6.9 K/UL (ref 1.8–7.7)
NEUTROPHILS NFR BLD: 72.7 % (ref 38–73)
NRBC BLD-RTO: 0 /100 WBC
PLATELET # BLD AUTO: 155 K/UL (ref 150–450)
PMV BLD AUTO: 12.1 FL (ref 9.2–12.9)
POTASSIUM SERPL-SCNC: 4.1 MMOL/L (ref 3.5–5.1)
PROT SERPL-MCNC: 6.6 G/DL (ref 6–8.4)
PROTHROMBIN TIME: 12.8 SEC (ref 11.8–14.3)
RBC # BLD AUTO: 4.37 M/UL (ref 4.6–6.2)
SAMPLE: NORMAL
SODIUM SERPL-SCNC: 138 MMOL/L (ref 136–145)
TROPONIN I SERPL DL<=0.01 NG/ML-MCNC: <0.03 NG/ML
TSH SERPL DL<=0.005 MIU/L-ACNC: 1.92 UIU/ML (ref 0.34–5.6)
WBC # BLD AUTO: 9.5 K/UL (ref 3.9–12.7)

## 2021-06-21 PROCEDURE — 85610 PROTHROMBIN TIME: CPT | Performed by: NURSE PRACTITIONER

## 2021-06-21 PROCEDURE — G0378 HOSPITAL OBSERVATION PER HR: HCPCS

## 2021-06-21 PROCEDURE — 25000003 PHARM REV CODE 250: Performed by: INTERNAL MEDICINE

## 2021-06-21 PROCEDURE — 85730 THROMBOPLASTIN TIME PARTIAL: CPT | Performed by: NURSE PRACTITIONER

## 2021-06-21 PROCEDURE — 83880 ASSAY OF NATRIURETIC PEPTIDE: CPT | Performed by: NURSE PRACTITIONER

## 2021-06-21 PROCEDURE — 85025 COMPLETE CBC W/AUTO DIFF WBC: CPT | Performed by: NURSE PRACTITIONER

## 2021-06-21 PROCEDURE — 80053 COMPREHEN METABOLIC PANEL: CPT | Mod: 59 | Performed by: NURSE PRACTITIONER

## 2021-06-21 PROCEDURE — 25500020 PHARM REV CODE 255: Performed by: EMERGENCY MEDICINE

## 2021-06-21 PROCEDURE — 84484 ASSAY OF TROPONIN QUANT: CPT | Performed by: NURSE PRACTITIONER

## 2021-06-21 PROCEDURE — 82962 GLUCOSE BLOOD TEST: CPT

## 2021-06-21 PROCEDURE — 93005 ELECTROCARDIOGRAM TRACING: CPT | Performed by: INTERNAL MEDICINE

## 2021-06-21 PROCEDURE — 25000003 PHARM REV CODE 250: Performed by: EMERGENCY MEDICINE

## 2021-06-21 PROCEDURE — 96376 TX/PRO/DX INJ SAME DRUG ADON: CPT | Mod: 59

## 2021-06-21 PROCEDURE — 99285 EMERGENCY DEPT VISIT HI MDM: CPT | Mod: 25

## 2021-06-21 PROCEDURE — 84443 ASSAY THYROID STIM HORMONE: CPT | Performed by: NURSE PRACTITIONER

## 2021-06-21 RX ORDER — AMLODIPINE BESYLATE 5 MG/1
5 TABLET ORAL DAILY
Status: DISCONTINUED | OUTPATIENT
Start: 2021-06-22 | End: 2021-06-21

## 2021-06-21 RX ORDER — MAGNESIUM SULFATE 1 G/100ML
1 INJECTION INTRAVENOUS
Status: DISCONTINUED | OUTPATIENT
Start: 2021-06-21 | End: 2021-06-22 | Stop reason: HOSPADM

## 2021-06-21 RX ORDER — ATROPINE SULFATE 0.1 MG/ML
INJECTION INTRAVENOUS
Status: DISCONTINUED
Start: 2021-06-21 | End: 2021-06-22 | Stop reason: WASHOUT

## 2021-06-21 RX ORDER — MECLIZINE HCL 12.5 MG 12.5 MG/1
12.5 TABLET ORAL 3 TIMES DAILY PRN
Status: DISCONTINUED | OUTPATIENT
Start: 2021-06-21 | End: 2021-06-22 | Stop reason: HOSPADM

## 2021-06-21 RX ORDER — POTASSIUM CHLORIDE 20 MEQ/1
20 TABLET, EXTENDED RELEASE ORAL
Status: DISCONTINUED | OUTPATIENT
Start: 2021-06-21 | End: 2021-06-22 | Stop reason: HOSPADM

## 2021-06-21 RX ORDER — LANOLIN ALCOHOL/MO/W.PET/CERES
800 CREAM (GRAM) TOPICAL
Status: DISCONTINUED | OUTPATIENT
Start: 2021-06-21 | End: 2021-06-22 | Stop reason: HOSPADM

## 2021-06-21 RX ORDER — POTASSIUM CHLORIDE 7.45 MG/ML
20 INJECTION INTRAVENOUS
Status: DISCONTINUED | OUTPATIENT
Start: 2021-06-21 | End: 2021-06-22 | Stop reason: HOSPADM

## 2021-06-21 RX ORDER — AMITRIPTYLINE HYDROCHLORIDE 25 MG/1
50 TABLET, FILM COATED ORAL NIGHTLY
Status: DISCONTINUED | OUTPATIENT
Start: 2021-06-21 | End: 2021-06-22 | Stop reason: HOSPADM

## 2021-06-21 RX ORDER — MAGNESIUM SULFATE HEPTAHYDRATE 40 MG/ML
4 INJECTION, SOLUTION INTRAVENOUS
Status: DISCONTINUED | OUTPATIENT
Start: 2021-06-21 | End: 2021-06-22 | Stop reason: HOSPADM

## 2021-06-21 RX ORDER — POTASSIUM CHLORIDE 7.45 MG/ML
40 INJECTION INTRAVENOUS
Status: DISCONTINUED | OUTPATIENT
Start: 2021-06-21 | End: 2021-06-22 | Stop reason: HOSPADM

## 2021-06-21 RX ORDER — LANOLIN ALCOHOL/MO/W.PET/CERES
1000 CREAM (GRAM) TOPICAL DAILY
Status: DISCONTINUED | OUTPATIENT
Start: 2021-06-22 | End: 2021-06-22 | Stop reason: HOSPADM

## 2021-06-21 RX ORDER — LOSARTAN POTASSIUM 50 MG/1
100 TABLET ORAL DAILY
Status: DISCONTINUED | OUTPATIENT
Start: 2021-06-22 | End: 2021-06-22 | Stop reason: HOSPADM

## 2021-06-21 RX ORDER — CARVEDILOL 12.5 MG/1
12.5 TABLET ORAL 2 TIMES DAILY WITH MEALS
Status: DISCONTINUED | OUTPATIENT
Start: 2021-06-21 | End: 2021-06-22 | Stop reason: HOSPADM

## 2021-06-21 RX ORDER — ASPIRIN 325 MG
325 TABLET ORAL
Status: COMPLETED | OUTPATIENT
Start: 2021-06-21 | End: 2021-06-21

## 2021-06-21 RX ORDER — ACETAMINOPHEN 325 MG/1
650 TABLET ORAL EVERY 6 HOURS PRN
Status: DISCONTINUED | OUTPATIENT
Start: 2021-06-21 | End: 2021-06-22 | Stop reason: HOSPADM

## 2021-06-21 RX ORDER — POTASSIUM CHLORIDE 20 MEQ/1
40 TABLET, EXTENDED RELEASE ORAL
Status: DISCONTINUED | OUTPATIENT
Start: 2021-06-21 | End: 2021-06-22 | Stop reason: HOSPADM

## 2021-06-21 RX ORDER — AMOXICILLIN AND CLAVULANATE POTASSIUM 875; 125 MG/1; MG/1
1 TABLET, FILM COATED ORAL EVERY 12 HOURS
Status: DISCONTINUED | OUTPATIENT
Start: 2021-06-21 | End: 2021-06-22 | Stop reason: HOSPADM

## 2021-06-21 RX ORDER — NICARDIPINE HYDROCHLORIDE 0.2 MG/ML
0-15 INJECTION INTRAVENOUS CONTINUOUS
Status: DISCONTINUED | OUTPATIENT
Start: 2021-06-21 | End: 2021-06-21

## 2021-06-21 RX ORDER — ONDANSETRON 2 MG/ML
4 INJECTION INTRAMUSCULAR; INTRAVENOUS EVERY 6 HOURS PRN
Status: DISCONTINUED | OUTPATIENT
Start: 2021-06-21 | End: 2021-06-22 | Stop reason: SDUPTHER

## 2021-06-21 RX ORDER — AMLODIPINE BESYLATE 5 MG/1
10 TABLET ORAL DAILY
Status: DISCONTINUED | OUTPATIENT
Start: 2021-06-22 | End: 2021-06-22 | Stop reason: HOSPADM

## 2021-06-21 RX ORDER — HYDRALAZINE HYDROCHLORIDE 20 MG/ML
10 INJECTION INTRAMUSCULAR; INTRAVENOUS EVERY 8 HOURS PRN
Status: DISCONTINUED | OUTPATIENT
Start: 2021-06-21 | End: 2021-06-22 | Stop reason: HOSPADM

## 2021-06-21 RX ORDER — PANTOPRAZOLE SODIUM 40 MG/1
40 TABLET, DELAYED RELEASE ORAL DAILY
Status: DISCONTINUED | OUTPATIENT
Start: 2021-06-22 | End: 2021-06-21

## 2021-06-21 RX ORDER — MAGNESIUM SULFATE HEPTAHYDRATE 40 MG/ML
2 INJECTION, SOLUTION INTRAVENOUS
Status: DISCONTINUED | OUTPATIENT
Start: 2021-06-21 | End: 2021-06-22 | Stop reason: HOSPADM

## 2021-06-21 RX ORDER — MECLIZINE HCL 12.5 MG 12.5 MG/1
25 TABLET ORAL
Status: COMPLETED | OUTPATIENT
Start: 2021-06-21 | End: 2021-06-21

## 2021-06-21 RX ORDER — ATORVASTATIN CALCIUM 40 MG/1
40 TABLET, FILM COATED ORAL NIGHTLY
Status: DISCONTINUED | OUTPATIENT
Start: 2021-06-21 | End: 2021-06-22 | Stop reason: HOSPADM

## 2021-06-21 RX ORDER — HYDRALAZINE HYDROCHLORIDE 25 MG/1
100 TABLET, FILM COATED ORAL 2 TIMES DAILY
Status: DISCONTINUED | OUTPATIENT
Start: 2021-06-21 | End: 2021-06-22 | Stop reason: HOSPADM

## 2021-06-21 RX ORDER — HYDROCODONE BITARTRATE AND ACETAMINOPHEN 5; 325 MG/1; MG/1
1 TABLET ORAL EVERY 6 HOURS PRN
Status: DISCONTINUED | OUTPATIENT
Start: 2021-06-21 | End: 2021-06-22 | Stop reason: HOSPADM

## 2021-06-21 RX ORDER — PANTOPRAZOLE SODIUM 40 MG/10ML
40 INJECTION, POWDER, LYOPHILIZED, FOR SOLUTION INTRAVENOUS 2 TIMES DAILY
Status: DISCONTINUED | OUTPATIENT
Start: 2021-06-21 | End: 2021-06-22

## 2021-06-21 RX ORDER — ASPIRIN 81 MG/1
81 TABLET ORAL DAILY
Status: DISCONTINUED | OUTPATIENT
Start: 2021-06-22 | End: 2021-06-22 | Stop reason: HOSPADM

## 2021-06-21 RX ORDER — OXYBUTYNIN CHLORIDE 10 MG/1
10 TABLET, EXTENDED RELEASE ORAL DAILY
Status: DISCONTINUED | OUTPATIENT
Start: 2021-06-22 | End: 2021-06-22 | Stop reason: HOSPADM

## 2021-06-21 RX ORDER — TAMSULOSIN HYDROCHLORIDE 0.4 MG/1
0.4 CAPSULE ORAL NIGHTLY
Status: DISCONTINUED | OUTPATIENT
Start: 2021-06-21 | End: 2021-06-22 | Stop reason: HOSPADM

## 2021-06-21 RX ADMIN — HYDRALAZINE HYDROCHLORIDE 100 MG: 25 TABLET, FILM COATED ORAL at 10:06

## 2021-06-21 RX ADMIN — NICARDIPINE HYDROCHLORIDE 1 MG/HR: 0.2 INJECTION INTRAVENOUS at 08:06

## 2021-06-21 RX ADMIN — CARVEDILOL 12.5 MG: 12.5 TABLET, FILM COATED ORAL at 10:06

## 2021-06-21 RX ADMIN — MECLIZINE HYDROCHLORIDE 25 MG: 12.5 TABLET ORAL at 10:06

## 2021-06-21 RX ADMIN — AMITRIPTYLINE HYDROCHLORIDE 50 MG: 25 TABLET, FILM COATED ORAL at 10:06

## 2021-06-21 RX ADMIN — TAMSULOSIN HYDROCHLORIDE 0.4 MG: 0.4 CAPSULE ORAL at 10:06

## 2021-06-21 RX ADMIN — ASPIRIN 325 MG ORAL TABLET 325 MG: 325 PILL ORAL at 10:06

## 2021-06-21 RX ADMIN — IOHEXOL 60 ML: 350 INJECTION, SOLUTION INTRAVENOUS at 07:06

## 2021-06-21 RX ADMIN — ATORVASTATIN CALCIUM 40 MG: 20 TABLET, FILM COATED ORAL at 10:06

## 2021-06-22 ENCOUNTER — CLINICAL SUPPORT (OUTPATIENT)
Dept: CARDIOLOGY | Facility: HOSPITAL | Age: 71
End: 2021-06-22
Attending: INTERNAL MEDICINE
Payer: MEDICARE

## 2021-06-22 VITALS
DIASTOLIC BLOOD PRESSURE: 80 MMHG | BODY MASS INDEX: 34.65 KG/M2 | HEIGHT: 66 IN | WEIGHT: 215.63 LBS | HEART RATE: 60 BPM | TEMPERATURE: 98 F | OXYGEN SATURATION: 96 % | SYSTOLIC BLOOD PRESSURE: 178 MMHG | RESPIRATION RATE: 18 BRPM

## 2021-06-22 LAB
ALBUMIN SERPL BCP-MCNC: 3.8 G/DL (ref 3.5–5.2)
ALP SERPL-CCNC: 56 U/L (ref 55–135)
ALT SERPL W/O P-5'-P-CCNC: 23 U/L (ref 10–44)
ANION GAP SERPL CALC-SCNC: 9 MMOL/L (ref 8–16)
AORTIC ROOT ANNULUS: 3.02 CM
AORTIC VALVE CUSP SEPERATION: 2.44 CM
AST SERPL-CCNC: 18 U/L (ref 10–40)
AV INDEX (PROSTH): 0.78
AV MEAN GRADIENT: 4 MMHG
AV PEAK GRADIENT: 8 MMHG
AV VALVE AREA: 2.47 CM2
AV VELOCITY RATIO: 73.77
BASOPHILS # BLD AUTO: 0.03 K/UL (ref 0–0.2)
BASOPHILS NFR BLD: 0.3 % (ref 0–1.9)
BILIRUB SERPL-MCNC: 0.8 MG/DL (ref 0.1–1)
BSA FOR ECHO PROCEDURE: 2.13 M2
BUN SERPL-MCNC: 18 MG/DL (ref 8–23)
CALCIUM SERPL-MCNC: 8.5 MG/DL (ref 8.7–10.5)
CHLORIDE SERPL-SCNC: 103 MMOL/L (ref 95–110)
CHOLEST SERPL-MCNC: 120 MG/DL (ref 120–199)
CHOLEST/HDLC SERPL: 2.7 {RATIO} (ref 2–5)
CO2 SERPL-SCNC: 27 MMOL/L (ref 23–29)
CREAT SERPL-MCNC: 0.8 MG/DL (ref 0.5–1.4)
CV ECHO LV RWT: 0.47 CM
DIFFERENTIAL METHOD: ABNORMAL
DOP CALC AO PEAK VEL: 1.4 M/S
DOP CALC AO VTI: 30.27 CM
DOP CALC LVOT AREA: 3.2 CM2
DOP CALC LVOT DIAMETER: 2.01 CM
DOP CALC LVOT PEAK VEL: 103.28 M/S
DOP CALC LVOT STROKE VOLUME: 74.91 CM3
DOP CALCLVOT PEAK VEL VTI: 23.62 CM
E WAVE DECELERATION TIME: 300.5 MSEC
E/A RATIO: 1.11
E/E' RATIO: 9.47 M/S
ECHO LV POSTERIOR WALL: 1.37 CM (ref 0.6–1.1)
EJECTION FRACTION: 60 %
EOSINOPHIL # BLD AUTO: 0.2 K/UL (ref 0–0.5)
EOSINOPHIL NFR BLD: 1.5 % (ref 0–8)
ERYTHROCYTE [DISTWIDTH] IN BLOOD BY AUTOMATED COUNT: 15.4 % (ref 11.5–14.5)
EST. GFR  (AFRICAN AMERICAN): >60 ML/MIN/1.73 M^2
EST. GFR  (NON AFRICAN AMERICAN): >60 ML/MIN/1.73 M^2
ESTIMATED AVG GLUCOSE: 126 MG/DL (ref 68–131)
FRACTIONAL SHORTENING: 38 % (ref 28–44)
GLUCOSE SERPL-MCNC: 108 MG/DL (ref 70–110)
GLUCOSE SERPL-MCNC: 118 MG/DL (ref 70–110)
GLUCOSE SERPL-MCNC: 118 MG/DL (ref 70–110)
GLUCOSE SERPL-MCNC: 192 MG/DL (ref 70–110)
HBA1C MFR BLD: 6 % (ref 4.5–6.2)
HCT VFR BLD AUTO: 37.7 % (ref 40–54)
HDLC SERPL-MCNC: 44 MG/DL (ref 40–75)
HDLC SERPL: 36.7 % (ref 20–50)
HGB BLD-MCNC: 12.1 G/DL (ref 14–18)
IMM GRANULOCYTES # BLD AUTO: 0.19 K/UL (ref 0–0.04)
IMM GRANULOCYTES NFR BLD AUTO: 1.9 % (ref 0–0.5)
INTERVENTRICULAR SEPTUM: 1.38 CM (ref 0.6–1.1)
LDLC SERPL CALC-MCNC: 30.4 MG/DL (ref 63–159)
LEFT ATRIUM SIZE: 4.53 CM
LEFT INTERNAL DIMENSION IN SYSTOLE: 3.6 CM (ref 2.1–4)
LEFT VENTRICLE DIASTOLIC VOLUME INDEX: 72.69 ML/M2
LEFT VENTRICLE DIASTOLIC VOLUME: 150.47 ML
LEFT VENTRICLE MASS INDEX: 172 G/M2
LEFT VENTRICLE SYSTOLIC VOLUME INDEX: 19 ML/M2
LEFT VENTRICLE SYSTOLIC VOLUME: 39.25 ML
LEFT VENTRICULAR INTERNAL DIMENSION IN DIASTOLE: 5.78 CM (ref 3.5–6)
LEFT VENTRICULAR MASS: 356.32 G
LV LATERAL E/E' RATIO: 9 M/S
LV SEPTAL E/E' RATIO: 10 M/S
LYMPHOCYTES # BLD AUTO: 1.1 K/UL (ref 1–4.8)
LYMPHOCYTES NFR BLD: 10.8 % (ref 18–48)
MAGNESIUM SERPL-MCNC: 2 MG/DL (ref 1.6–2.6)
MCH RBC QN AUTO: 26.9 PG (ref 27–31)
MCHC RBC AUTO-ENTMCNC: 32.1 G/DL (ref 32–36)
MCV RBC AUTO: 84 FL (ref 82–98)
MONOCYTES # BLD AUTO: 0.9 K/UL (ref 0.3–1)
MONOCYTES NFR BLD: 8.6 % (ref 4–15)
MV PEAK A VEL: 0.81 M/S
MV PEAK E VEL: 0.9 M/S
NEUTROPHILS # BLD AUTO: 7.6 K/UL (ref 1.8–7.7)
NEUTROPHILS NFR BLD: 76.9 % (ref 38–73)
NONHDLC SERPL-MCNC: 76 MG/DL
NRBC BLD-RTO: 0 /100 WBC
PISA TR MAX VEL: 1.96 M/S
PLATELET # BLD AUTO: 166 K/UL (ref 150–450)
PMV BLD AUTO: 12.1 FL (ref 9.2–12.9)
POTASSIUM SERPL-SCNC: 3.8 MMOL/L (ref 3.5–5.1)
PROT SERPL-MCNC: 6.3 G/DL (ref 6–8.4)
PV PEAK VELOCITY: 125.48 CM/S
RA PRESSURE: 8 MMHG
RBC # BLD AUTO: 4.5 M/UL (ref 4.6–6.2)
RIGHT VENTRICULAR END-DIASTOLIC DIMENSION: 363 CM
SODIUM SERPL-SCNC: 139 MMOL/L (ref 136–145)
TDI LATERAL: 0.1 M/S
TDI SEPTAL: 0.09 M/S
TDI: 0.1 M/S
TR MAX PG: 15 MMHG
TRIGL SERPL-MCNC: 228 MG/DL (ref 30–150)
TROPONIN I SERPL DL<=0.01 NG/ML-MCNC: <0.03 NG/ML
TV REST PULMONARY ARTERY PRESSURE: 23 MMHG
WBC # BLD AUTO: 9.84 K/UL (ref 3.9–12.7)

## 2021-06-22 PROCEDURE — 36415 COLL VENOUS BLD VENIPUNCTURE: CPT | Performed by: INTERNAL MEDICINE

## 2021-06-22 PROCEDURE — 96375 TX/PRO/DX INJ NEW DRUG ADDON: CPT | Mod: 59

## 2021-06-22 PROCEDURE — 85025 COMPLETE CBC W/AUTO DIFF WBC: CPT | Performed by: INTERNAL MEDICINE

## 2021-06-22 PROCEDURE — 83036 HEMOGLOBIN GLYCOSYLATED A1C: CPT | Performed by: INTERNAL MEDICINE

## 2021-06-22 PROCEDURE — 80053 COMPREHEN METABOLIC PANEL: CPT | Performed by: INTERNAL MEDICINE

## 2021-06-22 PROCEDURE — 93306 ECHO (CUPID ONLY): ICD-10-PCS | Mod: 26,,, | Performed by: INTERNAL MEDICINE

## 2021-06-22 PROCEDURE — 80061 LIPID PANEL: CPT | Performed by: INTERNAL MEDICINE

## 2021-06-22 PROCEDURE — 94761 N-INVAS EAR/PLS OXIMETRY MLT: CPT

## 2021-06-22 PROCEDURE — 83735 ASSAY OF MAGNESIUM: CPT | Performed by: INTERNAL MEDICINE

## 2021-06-22 PROCEDURE — 25000003 PHARM REV CODE 250: Performed by: INTERNAL MEDICINE

## 2021-06-22 PROCEDURE — 93306 TTE W/DOPPLER COMPLETE: CPT

## 2021-06-22 PROCEDURE — 84484 ASSAY OF TROPONIN QUANT: CPT | Performed by: INTERNAL MEDICINE

## 2021-06-22 PROCEDURE — 96376 TX/PRO/DX INJ SAME DRUG ADON: CPT | Mod: 59

## 2021-06-22 PROCEDURE — 96365 THER/PROPH/DIAG IV INF INIT: CPT | Mod: 59

## 2021-06-22 PROCEDURE — C9113 INJ PANTOPRAZOLE SODIUM, VIA: HCPCS | Performed by: INTERNAL MEDICINE

## 2021-06-22 PROCEDURE — 96366 THER/PROPH/DIAG IV INF ADDON: CPT

## 2021-06-22 PROCEDURE — 25000003 PHARM REV CODE 250: Performed by: HOSPITALIST

## 2021-06-22 PROCEDURE — 84484 ASSAY OF TROPONIN QUANT: CPT | Mod: 91 | Performed by: INTERNAL MEDICINE

## 2021-06-22 PROCEDURE — 97530 THERAPEUTIC ACTIVITIES: CPT

## 2021-06-22 PROCEDURE — G0378 HOSPITAL OBSERVATION PER HR: HCPCS

## 2021-06-22 PROCEDURE — 97162 PT EVAL MOD COMPLEX 30 MIN: CPT

## 2021-06-22 PROCEDURE — 63600175 PHARM REV CODE 636 W HCPCS: Performed by: HOSPITALIST

## 2021-06-22 PROCEDURE — 99900035 HC TECH TIME PER 15 MIN (STAT)

## 2021-06-22 PROCEDURE — 92523 SPEECH SOUND LANG COMPREHEN: CPT

## 2021-06-22 PROCEDURE — 63600175 PHARM REV CODE 636 W HCPCS: Performed by: INTERNAL MEDICINE

## 2021-06-22 PROCEDURE — 93306 TTE W/DOPPLER COMPLETE: CPT | Mod: 26,,, | Performed by: INTERNAL MEDICINE

## 2021-06-22 PROCEDURE — 92610 EVALUATE SWALLOWING FUNCTION: CPT

## 2021-06-22 RX ORDER — ENOXAPARIN SODIUM 100 MG/ML
40 INJECTION SUBCUTANEOUS EVERY 24 HOURS
Status: DISCONTINUED | OUTPATIENT
Start: 2021-06-22 | End: 2021-06-22 | Stop reason: HOSPADM

## 2021-06-22 RX ORDER — ONDANSETRON 2 MG/ML
4 INJECTION INTRAMUSCULAR; INTRAVENOUS EVERY 8 HOURS PRN
Status: DISCONTINUED | OUTPATIENT
Start: 2021-06-22 | End: 2021-06-22 | Stop reason: HOSPADM

## 2021-06-22 RX ORDER — PANTOPRAZOLE SODIUM 40 MG/1
40 TABLET, DELAYED RELEASE ORAL DAILY
Status: DISCONTINUED | OUTPATIENT
Start: 2021-06-22 | End: 2021-06-22 | Stop reason: HOSPADM

## 2021-06-22 RX ORDER — CLONIDINE HYDROCHLORIDE 0.1 MG/1
0.1 TABLET ORAL ONCE
Status: COMPLETED | OUTPATIENT
Start: 2021-06-22 | End: 2021-06-22

## 2021-06-22 RX ORDER — SODIUM CHLORIDE 0.9 % (FLUSH) 0.9 %
10 SYRINGE (ML) INJECTION
Status: DISCONTINUED | OUTPATIENT
Start: 2021-06-22 | End: 2021-06-22 | Stop reason: HOSPADM

## 2021-06-22 RX ORDER — NICARDIPINE HYDROCHLORIDE 0.2 MG/ML
0-15 INJECTION INTRAVENOUS CONTINUOUS
Status: DISCONTINUED | OUTPATIENT
Start: 2021-06-22 | End: 2021-06-22

## 2021-06-22 RX ORDER — AMLODIPINE BESYLATE 10 MG/1
10 TABLET ORAL DAILY
Qty: 30 TABLET | Refills: 0 | Status: SHIPPED | OUTPATIENT
Start: 2021-06-23 | End: 2021-10-18 | Stop reason: SDUPTHER

## 2021-06-22 RX ORDER — METFORMIN HYDROCHLORIDE 500 MG/1
500 TABLET ORAL 2 TIMES DAILY
Qty: 180 TABLET | Refills: 1
Start: 2021-06-23 | End: 2021-10-18 | Stop reason: SDUPTHER

## 2021-06-22 RX ORDER — CLONIDINE HYDROCHLORIDE 0.1 MG/1
0.1 TABLET ORAL EVERY 8 HOURS PRN
Qty: 30 TABLET | Refills: 0 | Status: SHIPPED | OUTPATIENT
Start: 2021-06-22 | End: 2024-01-25

## 2021-06-22 RX ORDER — DIAZEPAM 5 MG/1
5 TABLET ORAL NIGHTLY PRN
Qty: 6 TABLET | Refills: 0
Start: 2021-06-22 | End: 2021-11-19

## 2021-06-22 RX ORDER — PREDNISONE 20 MG/1
40 TABLET ORAL DAILY
Status: DISCONTINUED | OUTPATIENT
Start: 2021-06-22 | End: 2021-06-22 | Stop reason: HOSPADM

## 2021-06-22 RX ORDER — DIAZEPAM 5 MG/1
5 TABLET ORAL NIGHTLY PRN
Status: DISCONTINUED | OUTPATIENT
Start: 2021-06-22 | End: 2021-06-22 | Stop reason: HOSPADM

## 2021-06-22 RX ORDER — PREDNISONE 20 MG/1
40 TABLET ORAL DAILY
Qty: 7 TABLET | Refills: 0 | Status: SHIPPED | OUTPATIENT
Start: 2021-06-23 | End: 2021-06-30

## 2021-06-22 RX ORDER — AMOXICILLIN 250 MG
2 CAPSULE ORAL DAILY PRN
Status: DISCONTINUED | OUTPATIENT
Start: 2021-06-22 | End: 2021-06-22 | Stop reason: HOSPADM

## 2021-06-22 RX ADMIN — HYDROCODONE BITARTRATE AND ACETAMINOPHEN 1 TABLET: 5; 325 TABLET ORAL at 01:06

## 2021-06-22 RX ADMIN — ACETAMINOPHEN 650 MG: 325 TABLET, FILM COATED ORAL at 11:06

## 2021-06-22 RX ADMIN — HYDRALAZINE HYDROCHLORIDE 100 MG: 25 TABLET, FILM COATED ORAL at 09:06

## 2021-06-22 RX ADMIN — OXYBUTYNIN CHLORIDE 10 MG: 10 TABLET, EXTENDED RELEASE ORAL at 09:06

## 2021-06-22 RX ADMIN — CYANOCOBALAMIN TAB 1000 MCG 1000 MCG: 1000 TAB at 09:06

## 2021-06-22 RX ADMIN — PANTOPRAZOLE SODIUM 40 MG: 40 INJECTION, POWDER, LYOPHILIZED, FOR SOLUTION INTRAVENOUS at 09:06

## 2021-06-22 RX ADMIN — HYDRALAZINE HYDROCHLORIDE 10 MG: 20 INJECTION INTRAMUSCULAR; INTRAVENOUS at 10:06

## 2021-06-22 RX ADMIN — ACETAMINOPHEN 650 MG: 325 TABLET, FILM COATED ORAL at 12:06

## 2021-06-22 RX ADMIN — PANTOPRAZOLE SODIUM 40 MG: 40 TABLET, DELAYED RELEASE ORAL at 11:06

## 2021-06-22 RX ADMIN — PREDNISONE 40 MG: 20 TABLET ORAL at 03:06

## 2021-06-22 RX ADMIN — LOSARTAN POTASSIUM 100 MG: 50 TABLET ORAL at 09:06

## 2021-06-22 RX ADMIN — PANTOPRAZOLE SODIUM 40 MG: 40 INJECTION, POWDER, LYOPHILIZED, FOR SOLUTION INTRAVENOUS at 12:06

## 2021-06-22 RX ADMIN — ASPIRIN 81 MG: 81 TABLET, DELAYED RELEASE ORAL at 09:06

## 2021-06-22 RX ADMIN — CLONIDINE HYDROCHLORIDE 0.1 MG: 0.1 TABLET ORAL at 01:06

## 2021-06-22 RX ADMIN — AMOXICILLIN AND CLAVULANATE POTASSIUM 1 TABLET: 875; 125 TABLET, FILM COATED ORAL at 12:06

## 2021-06-22 RX ADMIN — AMOXICILLIN AND CLAVULANATE POTASSIUM 1 TABLET: 875; 125 TABLET, FILM COATED ORAL at 09:06

## 2021-06-22 RX ADMIN — AMLODIPINE BESYLATE 10 MG: 5 TABLET ORAL at 09:06

## 2021-06-23 ENCOUNTER — TELEPHONE (OUTPATIENT)
Dept: FAMILY MEDICINE | Facility: CLINIC | Age: 71
End: 2021-06-23

## 2021-06-23 ENCOUNTER — NURSE TRIAGE (OUTPATIENT)
Dept: ADMINISTRATIVE | Facility: CLINIC | Age: 71
End: 2021-06-23

## 2021-06-24 ENCOUNTER — OFFICE VISIT (OUTPATIENT)
Dept: FAMILY MEDICINE | Facility: CLINIC | Age: 71
End: 2021-06-24
Payer: MEDICARE

## 2021-06-24 VITALS
DIASTOLIC BLOOD PRESSURE: 80 MMHG | WEIGHT: 216 LBS | TEMPERATURE: 98 F | HEART RATE: 60 BPM | SYSTOLIC BLOOD PRESSURE: 162 MMHG | OXYGEN SATURATION: 95 % | HEIGHT: 66 IN | BODY MASS INDEX: 34.72 KG/M2

## 2021-06-24 DIAGNOSIS — I16.0 HYPERTENSIVE URGENCY: ICD-10-CM

## 2021-06-24 DIAGNOSIS — R42 VERTIGO: Primary | ICD-10-CM

## 2021-06-24 PROCEDURE — 3288F PR FALLS RISK ASSESSMENT DOCUMENTED: ICD-10-PCS | Mod: S$GLB,,, | Performed by: FAMILY MEDICINE

## 2021-06-24 PROCEDURE — 1125F PR PAIN SEVERITY QUANTIFIED, PAIN PRESENT: ICD-10-PCS | Mod: S$GLB,,, | Performed by: FAMILY MEDICINE

## 2021-06-24 PROCEDURE — 1125F AMNT PAIN NOTED PAIN PRSNT: CPT | Mod: S$GLB,,, | Performed by: FAMILY MEDICINE

## 2021-06-24 PROCEDURE — 1159F MED LIST DOCD IN RCRD: CPT | Mod: S$GLB,,, | Performed by: FAMILY MEDICINE

## 2021-06-24 PROCEDURE — 3288F FALL RISK ASSESSMENT DOCD: CPT | Mod: S$GLB,,, | Performed by: FAMILY MEDICINE

## 2021-06-24 PROCEDURE — 99214 PR OFFICE/OUTPT VISIT, EST, LEVL IV, 30-39 MIN: ICD-10-PCS | Mod: S$GLB,,, | Performed by: FAMILY MEDICINE

## 2021-06-24 PROCEDURE — 1101F PR PT FALLS ASSESS DOC 0-1 FALLS W/OUT INJ PAST YR: ICD-10-PCS | Mod: S$GLB,,, | Performed by: FAMILY MEDICINE

## 2021-06-24 PROCEDURE — 1159F PR MEDICATION LIST DOCUMENTED IN MEDICAL RECORD: ICD-10-PCS | Mod: S$GLB,,, | Performed by: FAMILY MEDICINE

## 2021-06-24 PROCEDURE — 1101F PT FALLS ASSESS-DOCD LE1/YR: CPT | Mod: S$GLB,,, | Performed by: FAMILY MEDICINE

## 2021-06-24 PROCEDURE — 3008F BODY MASS INDEX DOCD: CPT | Mod: S$GLB,,, | Performed by: FAMILY MEDICINE

## 2021-06-24 PROCEDURE — 3008F PR BODY MASS INDEX (BMI) DOCUMENTED: ICD-10-PCS | Mod: S$GLB,,, | Performed by: FAMILY MEDICINE

## 2021-06-24 PROCEDURE — 99214 OFFICE O/P EST MOD 30 MIN: CPT | Mod: S$GLB,,, | Performed by: FAMILY MEDICINE

## 2021-06-24 RX ORDER — LOSARTAN POTASSIUM 25 MG/1
25 TABLET ORAL DAILY
Qty: 30 TABLET | Refills: 0 | Status: SHIPPED | OUTPATIENT
Start: 2021-06-24 | End: 2021-07-01

## 2021-06-24 RX ORDER — LOSARTAN POTASSIUM 25 MG/1
25 TABLET ORAL DAILY
COMMUNITY
End: 2021-06-24 | Stop reason: SDUPTHER

## 2021-06-28 ENCOUNTER — TELEPHONE (OUTPATIENT)
Dept: FAMILY MEDICINE | Facility: CLINIC | Age: 71
End: 2021-06-28

## 2021-06-29 ENCOUNTER — TELEPHONE (OUTPATIENT)
Dept: FAMILY MEDICINE | Facility: CLINIC | Age: 71
End: 2021-06-29

## 2021-06-30 ENCOUNTER — TELEPHONE (OUTPATIENT)
Dept: FAMILY MEDICINE | Facility: CLINIC | Age: 71
End: 2021-06-30

## 2021-06-30 ENCOUNTER — HOSPITAL ENCOUNTER (OUTPATIENT)
Dept: RADIOLOGY | Facility: HOSPITAL | Age: 71
Discharge: HOME | End: 2021-06-30
Attending: FAMILY MEDICINE
Payer: MEDICARE

## 2021-06-30 DIAGNOSIS — I16.0 HYPERTENSIVE URGENCY: ICD-10-CM

## 2021-07-01 ENCOUNTER — OFFICE VISIT (OUTPATIENT)
Dept: FAMILY MEDICINE | Facility: CLINIC | Age: 71
End: 2021-07-01
Payer: MEDICARE

## 2021-07-01 ENCOUNTER — HOSPITAL ENCOUNTER (OUTPATIENT)
Dept: RADIOLOGY | Facility: HOSPITAL | Age: 71
Discharge: HOME | End: 2021-07-01
Attending: FAMILY MEDICINE
Payer: MEDICARE

## 2021-07-01 VITALS
HEART RATE: 58 BPM | WEIGHT: 218 LBS | TEMPERATURE: 99 F | SYSTOLIC BLOOD PRESSURE: 194 MMHG | DIASTOLIC BLOOD PRESSURE: 91 MMHG | HEIGHT: 66 IN | OXYGEN SATURATION: 95 % | BODY MASS INDEX: 35.03 KG/M2

## 2021-07-01 DIAGNOSIS — I10 HYPERTENSION, ESSENTIAL: Primary | ICD-10-CM

## 2021-07-01 DIAGNOSIS — R42 VERTIGO: ICD-10-CM

## 2021-07-01 PROCEDURE — 1126F PR PAIN SEVERITY QUANTIFIED, NO PAIN PRESENT: ICD-10-PCS | Mod: S$GLB,,, | Performed by: FAMILY MEDICINE

## 2021-07-01 PROCEDURE — 3008F BODY MASS INDEX DOCD: CPT | Mod: S$GLB,,, | Performed by: FAMILY MEDICINE

## 2021-07-01 PROCEDURE — 3288F FALL RISK ASSESSMENT DOCD: CPT | Mod: S$GLB,,, | Performed by: FAMILY MEDICINE

## 2021-07-01 PROCEDURE — 3044F HG A1C LEVEL LT 7.0%: CPT | Mod: S$GLB,,, | Performed by: FAMILY MEDICINE

## 2021-07-01 PROCEDURE — 3080F DIAST BP >= 90 MM HG: CPT | Mod: S$GLB,,, | Performed by: FAMILY MEDICINE

## 2021-07-01 PROCEDURE — 25500020 PHARM REV CODE 255: Mod: PO | Performed by: FAMILY MEDICINE

## 2021-07-01 PROCEDURE — 1126F AMNT PAIN NOTED NONE PRSNT: CPT | Mod: S$GLB,,, | Performed by: FAMILY MEDICINE

## 2021-07-01 PROCEDURE — 3080F PR MOST RECENT DIASTOLIC BLOOD PRESSURE >= 90 MM HG: ICD-10-PCS | Mod: S$GLB,,, | Performed by: FAMILY MEDICINE

## 2021-07-01 PROCEDURE — 3044F PR MOST RECENT HEMOGLOBIN A1C LEVEL <7.0%: ICD-10-PCS | Mod: S$GLB,,, | Performed by: FAMILY MEDICINE

## 2021-07-01 PROCEDURE — 74174 CTA ABD&PLVS W/CONTRAST: CPT | Mod: TC,PO

## 2021-07-01 PROCEDURE — 99213 PR OFFICE/OUTPT VISIT, EST, LEVL III, 20-29 MIN: ICD-10-PCS | Mod: S$GLB,,, | Performed by: FAMILY MEDICINE

## 2021-07-01 PROCEDURE — 1101F PR PT FALLS ASSESS DOC 0-1 FALLS W/OUT INJ PAST YR: ICD-10-PCS | Mod: S$GLB,,, | Performed by: FAMILY MEDICINE

## 2021-07-01 PROCEDURE — 3077F PR MOST RECENT SYSTOLIC BLOOD PRESSURE >= 140 MM HG: ICD-10-PCS | Mod: S$GLB,,, | Performed by: FAMILY MEDICINE

## 2021-07-01 PROCEDURE — 1159F MED LIST DOCD IN RCRD: CPT | Mod: S$GLB,,, | Performed by: FAMILY MEDICINE

## 2021-07-01 PROCEDURE — 1101F PT FALLS ASSESS-DOCD LE1/YR: CPT | Mod: S$GLB,,, | Performed by: FAMILY MEDICINE

## 2021-07-01 PROCEDURE — 3288F PR FALLS RISK ASSESSMENT DOCUMENTED: ICD-10-PCS | Mod: S$GLB,,, | Performed by: FAMILY MEDICINE

## 2021-07-01 PROCEDURE — 1159F PR MEDICATION LIST DOCUMENTED IN MEDICAL RECORD: ICD-10-PCS | Mod: S$GLB,,, | Performed by: FAMILY MEDICINE

## 2021-07-01 PROCEDURE — 99213 OFFICE O/P EST LOW 20 MIN: CPT | Mod: S$GLB,,, | Performed by: FAMILY MEDICINE

## 2021-07-01 PROCEDURE — 3008F PR BODY MASS INDEX (BMI) DOCUMENTED: ICD-10-PCS | Mod: S$GLB,,, | Performed by: FAMILY MEDICINE

## 2021-07-01 PROCEDURE — 3077F SYST BP >= 140 MM HG: CPT | Mod: S$GLB,,, | Performed by: FAMILY MEDICINE

## 2021-07-01 RX ORDER — HYDROCHLOROTHIAZIDE 12.5 MG/1
12.5 TABLET ORAL DAILY
Qty: 30 TABLET | Refills: 0 | Status: SHIPPED | OUTPATIENT
Start: 2021-07-01 | End: 2021-10-18 | Stop reason: SDUPTHER

## 2021-07-01 RX ORDER — HYDROCHLOROTHIAZIDE 12.5 MG/1
12.5 TABLET ORAL DAILY
COMMUNITY
End: 2021-07-01 | Stop reason: SDUPTHER

## 2021-07-01 RX ADMIN — IOHEXOL 100 ML: 350 INJECTION, SOLUTION INTRAVENOUS at 11:07

## 2021-07-06 PROBLEM — E66.811 OBESITY, CLASS I, BMI 30-34.9: Status: RESOLVED | Noted: 2019-01-18 | Resolved: 2021-07-06

## 2021-07-06 PROBLEM — I11.9 HYPERTENSIVE HEART DISEASE WITHOUT HEART FAILURE: Status: RESOLVED | Noted: 2017-08-24 | Resolved: 2021-07-06

## 2021-07-06 PROBLEM — M79.675 PAIN OF TOE OF LEFT FOOT: Status: RESOLVED | Noted: 2019-08-01 | Resolved: 2021-07-06

## 2021-07-06 PROBLEM — L03.032 CELLULITIS OF TOE OF LEFT FOOT: Status: RESOLVED | Noted: 2019-08-01 | Resolved: 2021-07-06

## 2021-07-06 PROBLEM — E78.5 HYPERLIPEMIA: Chronic | Status: RESOLVED | Noted: 2021-06-21 | Resolved: 2021-07-06

## 2021-07-06 PROBLEM — J32.2 ETHMOID SINUSITIS: Status: RESOLVED | Noted: 2021-06-21 | Resolved: 2021-07-06

## 2021-07-06 PROBLEM — H81.392 PERIPHERAL VERTIGO INVOLVING LEFT EAR: Status: RESOLVED | Noted: 2021-06-21 | Resolved: 2021-07-06

## 2021-07-06 PROBLEM — R42 VERTIGO: Status: ACTIVE | Noted: 2021-07-06

## 2021-07-06 PROBLEM — L60.0 INGROWN TOENAIL OF LEFT FOOT: Status: RESOLVED | Noted: 2019-08-01 | Resolved: 2021-07-06

## 2021-07-06 PROBLEM — E66.9 CLASS 2 OBESITY IN ADULT: Chronic | Status: RESOLVED | Noted: 2021-06-21 | Resolved: 2021-07-06

## 2021-07-06 PROBLEM — E66.9 OBESITY, CLASS I, BMI 30-34.9: Status: RESOLVED | Noted: 2019-01-18 | Resolved: 2021-07-06

## 2021-07-06 PROBLEM — M25.572 ACUTE LEFT ANKLE PAIN: Status: RESOLVED | Noted: 2019-12-17 | Resolved: 2021-07-06

## 2021-07-06 PROBLEM — T63.301A SPIDER BITE: Status: RESOLVED | Noted: 2021-04-05 | Resolved: 2021-07-06

## 2021-07-06 PROBLEM — I16.0 HYPERTENSIVE URGENCY: Status: RESOLVED | Noted: 2021-06-21 | Resolved: 2021-07-06

## 2021-07-06 PROBLEM — E66.812 CLASS 2 OBESITY IN ADULT: Chronic | Status: RESOLVED | Noted: 2021-06-21 | Resolved: 2021-07-06

## 2021-07-06 PROBLEM — R07.9 CHEST PAIN: Status: RESOLVED | Noted: 2021-06-21 | Resolved: 2021-07-06

## 2021-07-07 ENCOUNTER — PATIENT MESSAGE (OUTPATIENT)
Dept: ADMINISTRATIVE | Facility: HOSPITAL | Age: 71
End: 2021-07-07

## 2021-07-12 ENCOUNTER — OFFICE VISIT (OUTPATIENT)
Dept: FAMILY MEDICINE | Facility: CLINIC | Age: 71
End: 2021-07-12
Payer: MEDICARE

## 2021-07-12 VITALS
DIASTOLIC BLOOD PRESSURE: 78 MMHG | SYSTOLIC BLOOD PRESSURE: 152 MMHG | HEIGHT: 66 IN | BODY MASS INDEX: 35.2 KG/M2 | OXYGEN SATURATION: 98 % | WEIGHT: 219 LBS | HEART RATE: 58 BPM | TEMPERATURE: 98 F

## 2021-07-12 DIAGNOSIS — R42 VERTIGO: Primary | ICD-10-CM

## 2021-07-12 DIAGNOSIS — I10 HYPERTENSION, ESSENTIAL: ICD-10-CM

## 2021-07-12 PROCEDURE — 3078F DIAST BP <80 MM HG: CPT | Mod: S$GLB,,, | Performed by: FAMILY MEDICINE

## 2021-07-12 PROCEDURE — 1100F PR PT FALLS ASSESS DOC 2+ FALLS/FALL W/INJURY/YR: ICD-10-PCS | Mod: S$GLB,,, | Performed by: FAMILY MEDICINE

## 2021-07-12 PROCEDURE — 3288F PR FALLS RISK ASSESSMENT DOCUMENTED: ICD-10-PCS | Mod: S$GLB,,, | Performed by: FAMILY MEDICINE

## 2021-07-12 PROCEDURE — 99214 OFFICE O/P EST MOD 30 MIN: CPT | Mod: S$GLB,,, | Performed by: FAMILY MEDICINE

## 2021-07-12 PROCEDURE — 1159F MED LIST DOCD IN RCRD: CPT | Mod: S$GLB,,, | Performed by: FAMILY MEDICINE

## 2021-07-12 PROCEDURE — 3288F FALL RISK ASSESSMENT DOCD: CPT | Mod: S$GLB,,, | Performed by: FAMILY MEDICINE

## 2021-07-12 PROCEDURE — 1100F PTFALLS ASSESS-DOCD GE2>/YR: CPT | Mod: S$GLB,,, | Performed by: FAMILY MEDICINE

## 2021-07-12 PROCEDURE — 1126F PR PAIN SEVERITY QUANTIFIED, NO PAIN PRESENT: ICD-10-PCS | Mod: S$GLB,,, | Performed by: FAMILY MEDICINE

## 2021-07-12 PROCEDURE — 3008F BODY MASS INDEX DOCD: CPT | Mod: S$GLB,,, | Performed by: FAMILY MEDICINE

## 2021-07-12 PROCEDURE — 3077F SYST BP >= 140 MM HG: CPT | Mod: S$GLB,,, | Performed by: FAMILY MEDICINE

## 2021-07-12 PROCEDURE — 1126F AMNT PAIN NOTED NONE PRSNT: CPT | Mod: S$GLB,,, | Performed by: FAMILY MEDICINE

## 2021-07-12 PROCEDURE — 99214 PR OFFICE/OUTPT VISIT, EST, LEVL IV, 30-39 MIN: ICD-10-PCS | Mod: S$GLB,,, | Performed by: FAMILY MEDICINE

## 2021-07-12 PROCEDURE — 3077F PR MOST RECENT SYSTOLIC BLOOD PRESSURE >= 140 MM HG: ICD-10-PCS | Mod: S$GLB,,, | Performed by: FAMILY MEDICINE

## 2021-07-12 PROCEDURE — 3008F PR BODY MASS INDEX (BMI) DOCUMENTED: ICD-10-PCS | Mod: S$GLB,,, | Performed by: FAMILY MEDICINE

## 2021-07-12 PROCEDURE — 1159F PR MEDICATION LIST DOCUMENTED IN MEDICAL RECORD: ICD-10-PCS | Mod: S$GLB,,, | Performed by: FAMILY MEDICINE

## 2021-07-12 PROCEDURE — 3078F PR MOST RECENT DIASTOLIC BLOOD PRESSURE < 80 MM HG: ICD-10-PCS | Mod: S$GLB,,, | Performed by: FAMILY MEDICINE

## 2021-07-12 RX ORDER — MECLIZINE HYDROCHLORIDE 25 MG/1
25 TABLET ORAL EVERY 6 HOURS PRN
COMMUNITY
Start: 2021-06-23

## 2021-07-12 RX ORDER — HYDRALAZINE HYDROCHLORIDE 100 MG/1
100 TABLET, FILM COATED ORAL 3 TIMES DAILY
Qty: 270 TABLET | Refills: 1 | Status: SHIPPED | OUTPATIENT
Start: 2021-07-12 | End: 2022-04-14

## 2021-07-12 RX ORDER — CARVEDILOL 25 MG/1
25 TABLET ORAL 2 TIMES DAILY WITH MEALS
Qty: 180 TABLET | Refills: 1 | Status: SHIPPED | OUTPATIENT
Start: 2021-07-12 | End: 2022-02-03

## 2021-07-15 ENCOUNTER — TELEPHONE (OUTPATIENT)
Dept: FAMILY MEDICINE | Facility: CLINIC | Age: 71
End: 2021-07-15

## 2021-07-15 ENCOUNTER — LAB VISIT (OUTPATIENT)
Dept: LAB | Facility: HOSPITAL | Age: 71
End: 2021-07-15
Attending: FAMILY MEDICINE
Payer: MEDICARE

## 2021-07-15 DIAGNOSIS — I10 ESSENTIAL HYPERTENSION, MALIGNANT: Primary | ICD-10-CM

## 2021-07-15 PROCEDURE — 83835 ASSAY OF METANEPHRINES: CPT | Performed by: FAMILY MEDICINE

## 2021-07-15 PROCEDURE — 84585 ASSAY OF URINE VMA: CPT | Performed by: FAMILY MEDICINE

## 2021-07-18 PROBLEM — E11.9 TYPE 2 DIABETES MELLITUS, WITHOUT LONG-TERM CURRENT USE OF INSULIN: Chronic | Status: RESOLVED | Noted: 2021-06-21 | Resolved: 2021-07-18

## 2021-07-18 PROBLEM — E66.812 CLASS 2 SEVERE OBESITY DUE TO EXCESS CALORIES WITH SERIOUS COMORBIDITY AND BODY MASS INDEX (BMI) OF 35.0 TO 35.9 IN ADULT: Status: RESOLVED | Noted: 2017-08-24 | Resolved: 2021-07-18

## 2021-07-18 PROBLEM — D63.8 ANEMIA, CHRONIC DISEASE: Status: RESOLVED | Noted: 2018-12-28 | Resolved: 2021-07-18

## 2021-07-18 PROBLEM — C61 PROSTATE CANCER: Status: RESOLVED | Noted: 2018-08-20 | Resolved: 2021-07-18

## 2021-07-18 PROBLEM — R94.31 QT PROLONGATION: Status: RESOLVED | Noted: 2021-06-21 | Resolved: 2021-07-18

## 2021-07-18 PROBLEM — D69.6 THROMBOCYTOPENIA: Status: RESOLVED | Noted: 2018-12-28 | Resolved: 2021-07-18

## 2021-07-18 PROBLEM — R42 VERTIGO: Status: RESOLVED | Noted: 2021-07-06 | Resolved: 2021-07-18

## 2021-07-18 PROBLEM — E87.6 HYPOKALEMIA: Status: RESOLVED | Noted: 2020-02-14 | Resolved: 2021-07-18

## 2021-07-18 PROBLEM — E66.01 CLASS 2 SEVERE OBESITY DUE TO EXCESS CALORIES WITH SERIOUS COMORBIDITY AND BODY MASS INDEX (BMI) OF 35.0 TO 35.9 IN ADULT: Status: RESOLVED | Noted: 2017-08-24 | Resolved: 2021-07-18

## 2021-07-19 ENCOUNTER — OFFICE VISIT (OUTPATIENT)
Dept: FAMILY MEDICINE | Facility: CLINIC | Age: 71
End: 2021-07-19
Payer: MEDICARE

## 2021-07-19 VITALS
WEIGHT: 219 LBS | DIASTOLIC BLOOD PRESSURE: 63 MMHG | TEMPERATURE: 98 F | HEART RATE: 62 BPM | SYSTOLIC BLOOD PRESSURE: 136 MMHG | HEIGHT: 66 IN | BODY MASS INDEX: 35.2 KG/M2 | OXYGEN SATURATION: 96 %

## 2021-07-19 DIAGNOSIS — R42 VERTIGO: ICD-10-CM

## 2021-07-19 DIAGNOSIS — G47.33 OSA ON CPAP: ICD-10-CM

## 2021-07-19 DIAGNOSIS — I10 HYPERTENSION, ESSENTIAL: Primary | ICD-10-CM

## 2021-07-19 DIAGNOSIS — E11.9 TYPE 2 DIABETES MELLITUS WITHOUT COMPLICATION, WITHOUT LONG-TERM CURRENT USE OF INSULIN: ICD-10-CM

## 2021-07-19 PROCEDURE — 3078F DIAST BP <80 MM HG: CPT | Mod: S$GLB,,, | Performed by: FAMILY MEDICINE

## 2021-07-19 PROCEDURE — 1159F MED LIST DOCD IN RCRD: CPT | Mod: S$GLB,,, | Performed by: FAMILY MEDICINE

## 2021-07-19 PROCEDURE — 99213 OFFICE O/P EST LOW 20 MIN: CPT | Mod: S$GLB,,, | Performed by: FAMILY MEDICINE

## 2021-07-19 PROCEDURE — 1101F PR PT FALLS ASSESS DOC 0-1 FALLS W/OUT INJ PAST YR: ICD-10-PCS | Mod: S$GLB,,, | Performed by: FAMILY MEDICINE

## 2021-07-19 PROCEDURE — 1126F AMNT PAIN NOTED NONE PRSNT: CPT | Mod: S$GLB,,, | Performed by: FAMILY MEDICINE

## 2021-07-19 PROCEDURE — 3075F PR MOST RECENT SYSTOLIC BLOOD PRESS GE 130-139MM HG: ICD-10-PCS | Mod: S$GLB,,, | Performed by: FAMILY MEDICINE

## 2021-07-19 PROCEDURE — 1126F PR PAIN SEVERITY QUANTIFIED, NO PAIN PRESENT: ICD-10-PCS | Mod: S$GLB,,, | Performed by: FAMILY MEDICINE

## 2021-07-19 PROCEDURE — 3288F FALL RISK ASSESSMENT DOCD: CPT | Mod: S$GLB,,, | Performed by: FAMILY MEDICINE

## 2021-07-19 PROCEDURE — 3288F PR FALLS RISK ASSESSMENT DOCUMENTED: ICD-10-PCS | Mod: S$GLB,,, | Performed by: FAMILY MEDICINE

## 2021-07-19 PROCEDURE — 99213 PR OFFICE/OUTPT VISIT, EST, LEVL III, 20-29 MIN: ICD-10-PCS | Mod: S$GLB,,, | Performed by: FAMILY MEDICINE

## 2021-07-19 PROCEDURE — 3075F SYST BP GE 130 - 139MM HG: CPT | Mod: S$GLB,,, | Performed by: FAMILY MEDICINE

## 2021-07-19 PROCEDURE — 3044F HG A1C LEVEL LT 7.0%: CPT | Mod: S$GLB,,, | Performed by: FAMILY MEDICINE

## 2021-07-19 PROCEDURE — 1159F PR MEDICATION LIST DOCUMENTED IN MEDICAL RECORD: ICD-10-PCS | Mod: S$GLB,,, | Performed by: FAMILY MEDICINE

## 2021-07-19 PROCEDURE — 3044F PR MOST RECENT HEMOGLOBIN A1C LEVEL <7.0%: ICD-10-PCS | Mod: S$GLB,,, | Performed by: FAMILY MEDICINE

## 2021-07-19 PROCEDURE — 3008F PR BODY MASS INDEX (BMI) DOCUMENTED: ICD-10-PCS | Mod: S$GLB,,, | Performed by: FAMILY MEDICINE

## 2021-07-19 PROCEDURE — 1101F PT FALLS ASSESS-DOCD LE1/YR: CPT | Mod: S$GLB,,, | Performed by: FAMILY MEDICINE

## 2021-07-19 PROCEDURE — 3078F PR MOST RECENT DIASTOLIC BLOOD PRESSURE < 80 MM HG: ICD-10-PCS | Mod: S$GLB,,, | Performed by: FAMILY MEDICINE

## 2021-07-19 PROCEDURE — 3008F BODY MASS INDEX DOCD: CPT | Mod: S$GLB,,, | Performed by: FAMILY MEDICINE

## 2021-07-20 ENCOUNTER — TELEPHONE (OUTPATIENT)
Dept: FAMILY MEDICINE | Facility: CLINIC | Age: 71
End: 2021-07-20

## 2021-07-21 ENCOUNTER — TELEPHONE (OUTPATIENT)
Dept: FAMILY MEDICINE | Facility: CLINIC | Age: 71
End: 2021-07-21

## 2021-07-21 LAB
METANEPH 24H UR-MRATE: 2 UG/24 HR (ref 58–276)
METANEPHS 24H UR-MCNC: 76 UG/L
NORMETANEPHRINE 24H UR-MCNC: 397 UG/L
NORMETANEPHRINE 24H UR-MRATE: 10 UG/24 HR (ref 156–729)
TOTAL URINE VOLUME: 2800
VMA 24H UR-MRATE: 0.1 MG/24 HR (ref 0–7.5)
VMA UR-MCNC: 2.3 MG/L
VMA URINE VOLUME: 2800

## 2021-07-22 ENCOUNTER — TELEPHONE (OUTPATIENT)
Dept: FAMILY MEDICINE | Facility: CLINIC | Age: 71
End: 2021-07-22

## 2021-07-25 PROBLEM — G47.33 OSA (OBSTRUCTIVE SLEEP APNEA): Chronic | Status: RESOLVED | Noted: 2021-06-21 | Resolved: 2021-07-25

## 2021-08-03 LAB — HBA1C MFR BLD: 5.6 % (ref 4–6)

## 2021-08-10 LAB
LEFT EYE DM RETINOPATHY: POSITIVE
RIGHT EYE DM RETINOPATHY: POSITIVE

## 2021-08-11 ENCOUNTER — PATIENT OUTREACH (OUTPATIENT)
Dept: ADMINISTRATIVE | Facility: HOSPITAL | Age: 71
End: 2021-08-11

## 2021-08-17 ENCOUNTER — OFFICE VISIT (OUTPATIENT)
Dept: FAMILY MEDICINE | Facility: CLINIC | Age: 71
End: 2021-08-17
Payer: MEDICARE

## 2021-08-17 VITALS
HEART RATE: 72 BPM | TEMPERATURE: 98 F | HEIGHT: 66 IN | OXYGEN SATURATION: 99 % | BODY MASS INDEX: 35.36 KG/M2 | RESPIRATION RATE: 18 BRPM | DIASTOLIC BLOOD PRESSURE: 78 MMHG | WEIGHT: 220 LBS | SYSTOLIC BLOOD PRESSURE: 124 MMHG

## 2021-08-17 DIAGNOSIS — I25.10 MILD CAD: ICD-10-CM

## 2021-08-17 DIAGNOSIS — G45.9 TIA (TRANSIENT ISCHEMIC ATTACK): ICD-10-CM

## 2021-08-17 DIAGNOSIS — T46.4X5A COUGH DUE TO ACE INHIBITOR: ICD-10-CM

## 2021-08-17 DIAGNOSIS — I10 HYPERTENSION, ESSENTIAL: Primary | ICD-10-CM

## 2021-08-17 DIAGNOSIS — R05.8 COUGH DUE TO ACE INHIBITOR: ICD-10-CM

## 2021-08-17 DIAGNOSIS — E11.9 TYPE 2 DIABETES MELLITUS WITHOUT COMPLICATION, WITHOUT LONG-TERM CURRENT USE OF INSULIN: ICD-10-CM

## 2021-08-17 DIAGNOSIS — T46.6X5A MYALGIA DUE TO STATIN: ICD-10-CM

## 2021-08-17 DIAGNOSIS — E78.5 HYPERLIPIDEMIA, UNSPECIFIED HYPERLIPIDEMIA TYPE: ICD-10-CM

## 2021-08-17 DIAGNOSIS — M79.10 MYALGIA DUE TO STATIN: ICD-10-CM

## 2021-08-17 PROCEDURE — 1126F AMNT PAIN NOTED NONE PRSNT: CPT | Mod: S$GLB,,, | Performed by: FAMILY MEDICINE

## 2021-08-17 PROCEDURE — 3044F PR MOST RECENT HEMOGLOBIN A1C LEVEL <7.0%: ICD-10-PCS | Mod: S$GLB,,, | Performed by: FAMILY MEDICINE

## 2021-08-17 PROCEDURE — 1101F PT FALLS ASSESS-DOCD LE1/YR: CPT | Mod: S$GLB,,, | Performed by: FAMILY MEDICINE

## 2021-08-17 PROCEDURE — 3074F PR MOST RECENT SYSTOLIC BLOOD PRESSURE < 130 MM HG: ICD-10-PCS | Mod: S$GLB,,, | Performed by: FAMILY MEDICINE

## 2021-08-17 PROCEDURE — 1159F PR MEDICATION LIST DOCUMENTED IN MEDICAL RECORD: ICD-10-PCS | Mod: S$GLB,,, | Performed by: FAMILY MEDICINE

## 2021-08-17 PROCEDURE — 99213 PR OFFICE/OUTPT VISIT, EST, LEVL III, 20-29 MIN: ICD-10-PCS | Mod: S$GLB,,, | Performed by: FAMILY MEDICINE

## 2021-08-17 PROCEDURE — 1160F RVW MEDS BY RX/DR IN RCRD: CPT | Mod: S$GLB,,, | Performed by: FAMILY MEDICINE

## 2021-08-17 PROCEDURE — 3008F BODY MASS INDEX DOCD: CPT | Mod: S$GLB,,, | Performed by: FAMILY MEDICINE

## 2021-08-17 PROCEDURE — 1101F PR PT FALLS ASSESS DOC 0-1 FALLS W/OUT INJ PAST YR: ICD-10-PCS | Mod: S$GLB,,, | Performed by: FAMILY MEDICINE

## 2021-08-17 PROCEDURE — 1159F MED LIST DOCD IN RCRD: CPT | Mod: S$GLB,,, | Performed by: FAMILY MEDICINE

## 2021-08-17 PROCEDURE — 3044F HG A1C LEVEL LT 7.0%: CPT | Mod: S$GLB,,, | Performed by: FAMILY MEDICINE

## 2021-08-17 PROCEDURE — 3008F PR BODY MASS INDEX (BMI) DOCUMENTED: ICD-10-PCS | Mod: S$GLB,,, | Performed by: FAMILY MEDICINE

## 2021-08-17 PROCEDURE — 1160F PR REVIEW ALL MEDS BY PRESCRIBER/CLIN PHARMACIST DOCUMENTED: ICD-10-PCS | Mod: S$GLB,,, | Performed by: FAMILY MEDICINE

## 2021-08-17 PROCEDURE — 3078F DIAST BP <80 MM HG: CPT | Mod: S$GLB,,, | Performed by: FAMILY MEDICINE

## 2021-08-17 PROCEDURE — 3288F PR FALLS RISK ASSESSMENT DOCUMENTED: ICD-10-PCS | Mod: S$GLB,,, | Performed by: FAMILY MEDICINE

## 2021-08-17 PROCEDURE — 3078F PR MOST RECENT DIASTOLIC BLOOD PRESSURE < 80 MM HG: ICD-10-PCS | Mod: S$GLB,,, | Performed by: FAMILY MEDICINE

## 2021-08-17 PROCEDURE — 3288F FALL RISK ASSESSMENT DOCD: CPT | Mod: S$GLB,,, | Performed by: FAMILY MEDICINE

## 2021-08-17 PROCEDURE — 99213 OFFICE O/P EST LOW 20 MIN: CPT | Mod: S$GLB,,, | Performed by: FAMILY MEDICINE

## 2021-08-17 PROCEDURE — 3074F SYST BP LT 130 MM HG: CPT | Mod: S$GLB,,, | Performed by: FAMILY MEDICINE

## 2021-08-17 PROCEDURE — 1126F PR PAIN SEVERITY QUANTIFIED, NO PAIN PRESENT: ICD-10-PCS | Mod: S$GLB,,, | Performed by: FAMILY MEDICINE

## 2021-08-17 RX ORDER — ROSUVASTATIN CALCIUM 40 MG/1
40 TABLET, COATED ORAL DAILY
COMMUNITY
Start: 2021-08-04 | End: 2022-08-09 | Stop reason: SDUPTHER

## 2021-08-17 RX ORDER — OXYBUTYNIN CHLORIDE 5 MG/1
5 TABLET, EXTENDED RELEASE ORAL DAILY
COMMUNITY
Start: 2021-08-04 | End: 2021-10-18 | Stop reason: SDUPTHER

## 2021-08-17 RX ORDER — CLOPIDOGREL BISULFATE 75 MG/1
75 TABLET ORAL DAILY
COMMUNITY
End: 2023-01-03

## 2021-08-17 RX ORDER — MULTIVITAMIN
1 TABLET ORAL DAILY
COMMUNITY
Start: 2021-08-04 | End: 2023-07-19 | Stop reason: CLARIF

## 2021-08-17 RX ORDER — ROSUVASTATIN CALCIUM 10 MG/1
10 TABLET, COATED ORAL DAILY
Qty: 90 TABLET | Refills: 1 | Status: CANCELLED | OUTPATIENT
Start: 2021-08-17

## 2021-08-17 RX ORDER — OMEGA-3/DHA/EPA/FISH OIL 1000 MG
1 CAPSULE ORAL 2 TIMES DAILY
COMMUNITY
Start: 2021-08-04 | End: 2021-10-24 | Stop reason: SDUPTHER

## 2021-08-18 ENCOUNTER — PATIENT OUTREACH (OUTPATIENT)
Dept: ADMINISTRATIVE | Facility: HOSPITAL | Age: 71
End: 2021-08-18

## 2021-08-19 ENCOUNTER — PATIENT OUTREACH (OUTPATIENT)
Dept: ADMINISTRATIVE | Facility: HOSPITAL | Age: 71
End: 2021-08-19

## 2021-08-19 PROBLEM — T46.6X5A MYALGIA DUE TO STATIN: Status: ACTIVE | Noted: 2021-08-19

## 2021-08-19 PROBLEM — T46.4X5A COUGH DUE TO ACE INHIBITOR: Status: ACTIVE | Noted: 2021-08-19

## 2021-08-19 PROBLEM — G45.9 TIA (TRANSIENT ISCHEMIC ATTACK): Status: ACTIVE | Noted: 2021-08-19

## 2021-08-19 PROBLEM — R05.8 COUGH DUE TO ACE INHIBITOR: Status: ACTIVE | Noted: 2021-08-19

## 2021-08-19 PROBLEM — M79.10 MYALGIA DUE TO STATIN: Status: ACTIVE | Noted: 2021-08-19

## 2021-08-26 ENCOUNTER — LAB VISIT (OUTPATIENT)
Dept: LAB | Facility: HOSPITAL | Age: 71
End: 2021-08-26
Attending: INTERNAL MEDICINE
Payer: MEDICARE

## 2021-08-26 DIAGNOSIS — D63.8 ANEMIA, CHRONIC DISEASE: ICD-10-CM

## 2021-08-26 DIAGNOSIS — D69.6 THROMBOCYTOPENIA: ICD-10-CM

## 2021-08-26 DIAGNOSIS — D53.9 NUTRITIONAL ANEMIA, UNSPECIFIED: ICD-10-CM

## 2021-08-26 DIAGNOSIS — D64.9 NORMOCYTIC NORMOCHROMIC ANEMIA: ICD-10-CM

## 2021-08-26 LAB
ALBUMIN SERPL BCP-MCNC: 3.4 G/DL (ref 3.5–5.2)
ALP SERPL-CCNC: 61 U/L (ref 55–135)
ALT SERPL W/O P-5'-P-CCNC: 31 U/L (ref 10–44)
ANION GAP SERPL CALC-SCNC: 9 MMOL/L (ref 8–16)
AST SERPL-CCNC: 33 U/L (ref 10–40)
BASOPHILS # BLD AUTO: 0.04 K/UL (ref 0–0.2)
BASOPHILS NFR BLD: 0.4 % (ref 0–1.9)
BILIRUB SERPL-MCNC: 0.9 MG/DL (ref 0.1–1)
BUN SERPL-MCNC: 18 MG/DL (ref 8–23)
CALCIUM SERPL-MCNC: 8.6 MG/DL (ref 8.7–10.5)
CHLORIDE SERPL-SCNC: 101 MMOL/L (ref 95–110)
CO2 SERPL-SCNC: 31 MMOL/L (ref 23–29)
CREAT SERPL-MCNC: 0.8 MG/DL (ref 0.5–1.4)
DIFFERENTIAL METHOD: ABNORMAL
EOSINOPHIL # BLD AUTO: 0.5 K/UL (ref 0–0.5)
EOSINOPHIL NFR BLD: 4 % (ref 0–8)
ERYTHROCYTE [DISTWIDTH] IN BLOOD BY AUTOMATED COUNT: 15.8 % (ref 11.5–14.5)
EST. GFR  (AFRICAN AMERICAN): >60 ML/MIN/1.73 M^2
EST. GFR  (NON AFRICAN AMERICAN): >60 ML/MIN/1.73 M^2
FERRITIN SERPL-MCNC: 75 NG/ML (ref 20–300)
GLUCOSE SERPL-MCNC: 124 MG/DL (ref 70–110)
HCT VFR BLD AUTO: 35.8 % (ref 40–54)
HGB BLD-MCNC: 11.5 G/DL (ref 14–18)
IMM GRANULOCYTES # BLD AUTO: 0.29 K/UL (ref 0–0.04)
IMM GRANULOCYTES NFR BLD AUTO: 2.6 % (ref 0–0.5)
IRON SERPL-MCNC: 50 UG/DL (ref 45–160)
LYMPHOCYTES # BLD AUTO: 1.1 K/UL (ref 1–4.8)
LYMPHOCYTES NFR BLD: 9.9 % (ref 18–48)
MCH RBC QN AUTO: 26.9 PG (ref 27–31)
MCHC RBC AUTO-ENTMCNC: 32.1 G/DL (ref 32–36)
MCV RBC AUTO: 84 FL (ref 82–98)
MONOCYTES # BLD AUTO: 0.9 K/UL (ref 0.3–1)
MONOCYTES NFR BLD: 7.7 % (ref 4–15)
NEUTROPHILS # BLD AUTO: 8.6 K/UL (ref 1.8–7.7)
NEUTROPHILS NFR BLD: 75.4 % (ref 38–73)
NRBC BLD-RTO: 0 /100 WBC
PLATELET # BLD AUTO: 158 K/UL (ref 150–450)
PMV BLD AUTO: 12 FL (ref 9.2–12.9)
POTASSIUM SERPL-SCNC: 3.2 MMOL/L (ref 3.5–5.1)
PROT SERPL-MCNC: 6.6 G/DL (ref 6–8.4)
RBC # BLD AUTO: 4.27 M/UL (ref 4.6–6.2)
SATURATED IRON: 15 % (ref 20–50)
SODIUM SERPL-SCNC: 141 MMOL/L (ref 136–145)
TOTAL IRON BINDING CAPACITY: 326 UG/DL (ref 250–450)
TRANSFERRIN SERPL-MCNC: 233 MG/DL (ref 200–375)
WBC # BLD AUTO: 11.33 K/UL (ref 3.9–12.7)

## 2021-08-26 PROCEDURE — 36415 COLL VENOUS BLD VENIPUNCTURE: CPT | Performed by: INTERNAL MEDICINE

## 2021-08-26 PROCEDURE — 85025 COMPLETE CBC W/AUTO DIFF WBC: CPT | Performed by: INTERNAL MEDICINE

## 2021-08-26 PROCEDURE — 83540 ASSAY OF IRON: CPT | Performed by: INTERNAL MEDICINE

## 2021-08-26 PROCEDURE — 82728 ASSAY OF FERRITIN: CPT | Performed by: INTERNAL MEDICINE

## 2021-08-26 PROCEDURE — 80053 COMPREHEN METABOLIC PANEL: CPT | Performed by: INTERNAL MEDICINE

## 2021-09-07 ENCOUNTER — LAB VISIT (OUTPATIENT)
Dept: LAB | Facility: HOSPITAL | Age: 71
End: 2021-09-07
Attending: NURSE PRACTITIONER
Payer: MEDICARE

## 2021-09-07 DIAGNOSIS — C61 PROSTATE CANCER: ICD-10-CM

## 2021-09-07 DIAGNOSIS — N13.8 BPH WITH URINARY OBSTRUCTION: ICD-10-CM

## 2021-09-07 DIAGNOSIS — N40.1 BPH WITH URINARY OBSTRUCTION: ICD-10-CM

## 2021-09-07 LAB
COMPLEXED PSA SERPL-MCNC: 0.03 NG/ML (ref 0–4)
TESTOST SERPL-MCNC: 158 NG/DL (ref 304–1227)

## 2021-09-07 PROCEDURE — 36415 COLL VENOUS BLD VENIPUNCTURE: CPT | Performed by: NURSE PRACTITIONER

## 2021-09-07 PROCEDURE — 84403 ASSAY OF TOTAL TESTOSTERONE: CPT | Performed by: NURSE PRACTITIONER

## 2021-09-07 PROCEDURE — 84153 ASSAY OF PSA TOTAL: CPT | Performed by: NURSE PRACTITIONER

## 2021-09-28 ENCOUNTER — OFFICE VISIT (OUTPATIENT)
Dept: HEMATOLOGY/ONCOLOGY | Facility: CLINIC | Age: 71
End: 2021-09-28
Payer: MEDICARE

## 2021-09-28 VITALS
RESPIRATION RATE: 18 BRPM | DIASTOLIC BLOOD PRESSURE: 69 MMHG | HEIGHT: 66 IN | SYSTOLIC BLOOD PRESSURE: 111 MMHG | BODY MASS INDEX: 35.2 KG/M2 | WEIGHT: 219 LBS | HEART RATE: 55 BPM

## 2021-09-28 DIAGNOSIS — Z85.46 HISTORY OF PROSTATE CANCER: Chronic | ICD-10-CM

## 2021-09-28 DIAGNOSIS — D64.9 NORMOCYTIC NORMOCHROMIC ANEMIA: Primary | ICD-10-CM

## 2021-09-28 PROCEDURE — 3078F DIAST BP <80 MM HG: CPT | Mod: S$GLB,,, | Performed by: INTERNAL MEDICINE

## 2021-09-28 PROCEDURE — 3074F PR MOST RECENT SYSTOLIC BLOOD PRESSURE < 130 MM HG: ICD-10-PCS | Mod: S$GLB,,, | Performed by: INTERNAL MEDICINE

## 2021-09-28 PROCEDURE — 3078F PR MOST RECENT DIASTOLIC BLOOD PRESSURE < 80 MM HG: ICD-10-PCS | Mod: S$GLB,,, | Performed by: INTERNAL MEDICINE

## 2021-09-28 PROCEDURE — 1100F PTFALLS ASSESS-DOCD GE2>/YR: CPT | Mod: S$GLB,,, | Performed by: INTERNAL MEDICINE

## 2021-09-28 PROCEDURE — 1160F PR REVIEW ALL MEDS BY PRESCRIBER/CLIN PHARMACIST DOCUMENTED: ICD-10-PCS | Mod: S$GLB,,, | Performed by: INTERNAL MEDICINE

## 2021-09-28 PROCEDURE — 3074F SYST BP LT 130 MM HG: CPT | Mod: S$GLB,,, | Performed by: INTERNAL MEDICINE

## 2021-09-28 PROCEDURE — 3008F BODY MASS INDEX DOCD: CPT | Mod: S$GLB,,, | Performed by: INTERNAL MEDICINE

## 2021-09-28 PROCEDURE — 1126F PR PAIN SEVERITY QUANTIFIED, NO PAIN PRESENT: ICD-10-PCS | Mod: S$GLB,,, | Performed by: INTERNAL MEDICINE

## 2021-09-28 PROCEDURE — 3288F PR FALLS RISK ASSESSMENT DOCUMENTED: ICD-10-PCS | Mod: S$GLB,,, | Performed by: INTERNAL MEDICINE

## 2021-09-28 PROCEDURE — 3044F HG A1C LEVEL LT 7.0%: CPT | Mod: S$GLB,,, | Performed by: INTERNAL MEDICINE

## 2021-09-28 PROCEDURE — 4010F ACE/ARB THERAPY RXD/TAKEN: CPT | Mod: S$GLB,,, | Performed by: INTERNAL MEDICINE

## 2021-09-28 PROCEDURE — 3288F FALL RISK ASSESSMENT DOCD: CPT | Mod: S$GLB,,, | Performed by: INTERNAL MEDICINE

## 2021-09-28 PROCEDURE — 1126F AMNT PAIN NOTED NONE PRSNT: CPT | Mod: S$GLB,,, | Performed by: INTERNAL MEDICINE

## 2021-09-28 PROCEDURE — 1160F RVW MEDS BY RX/DR IN RCRD: CPT | Mod: S$GLB,,, | Performed by: INTERNAL MEDICINE

## 2021-09-28 PROCEDURE — 1100F PR PT FALLS ASSESS DOC 2+ FALLS/FALL W/INJURY/YR: ICD-10-PCS | Mod: S$GLB,,, | Performed by: INTERNAL MEDICINE

## 2021-09-28 PROCEDURE — 1159F MED LIST DOCD IN RCRD: CPT | Mod: S$GLB,,, | Performed by: INTERNAL MEDICINE

## 2021-09-28 PROCEDURE — 4010F PR ACE/ARB THEARPY RXD/TAKEN: ICD-10-PCS | Mod: S$GLB,,, | Performed by: INTERNAL MEDICINE

## 2021-09-28 PROCEDURE — 3044F PR MOST RECENT HEMOGLOBIN A1C LEVEL <7.0%: ICD-10-PCS | Mod: S$GLB,,, | Performed by: INTERNAL MEDICINE

## 2021-09-28 PROCEDURE — 1159F PR MEDICATION LIST DOCUMENTED IN MEDICAL RECORD: ICD-10-PCS | Mod: S$GLB,,, | Performed by: INTERNAL MEDICINE

## 2021-09-28 PROCEDURE — 99214 PR OFFICE/OUTPT VISIT, EST, LEVL IV, 30-39 MIN: ICD-10-PCS | Mod: S$GLB,,, | Performed by: INTERNAL MEDICINE

## 2021-09-28 PROCEDURE — 99214 OFFICE O/P EST MOD 30 MIN: CPT | Mod: S$GLB,,, | Performed by: INTERNAL MEDICINE

## 2021-09-28 PROCEDURE — 3066F PR DOCUMENTATION OF TREATMENT FOR NEPHROPATHY: ICD-10-PCS | Mod: S$GLB,,, | Performed by: INTERNAL MEDICINE

## 2021-09-28 PROCEDURE — 3061F PR NEG MICROALBUMINURIA RESULT DOCUMENTED/REVIEW: ICD-10-PCS | Mod: S$GLB,,, | Performed by: INTERNAL MEDICINE

## 2021-09-28 PROCEDURE — 3008F PR BODY MASS INDEX (BMI) DOCUMENTED: ICD-10-PCS | Mod: S$GLB,,, | Performed by: INTERNAL MEDICINE

## 2021-09-28 PROCEDURE — 3061F NEG MICROALBUMINURIA REV: CPT | Mod: S$GLB,,, | Performed by: INTERNAL MEDICINE

## 2021-09-28 PROCEDURE — 3066F NEPHROPATHY DOC TX: CPT | Mod: S$GLB,,, | Performed by: INTERNAL MEDICINE

## 2021-10-12 ENCOUNTER — TELEPHONE (OUTPATIENT)
Dept: FAMILY MEDICINE | Facility: CLINIC | Age: 71
End: 2021-10-12

## 2021-10-18 ENCOUNTER — OFFICE VISIT (OUTPATIENT)
Dept: FAMILY MEDICINE | Facility: CLINIC | Age: 71
End: 2021-10-18
Payer: MEDICARE

## 2021-10-18 ENCOUNTER — TELEPHONE (OUTPATIENT)
Dept: FAMILY MEDICINE | Facility: CLINIC | Age: 71
End: 2021-10-18

## 2021-10-18 ENCOUNTER — LAB VISIT (OUTPATIENT)
Dept: LAB | Facility: HOSPITAL | Age: 71
End: 2021-10-18
Attending: FAMILY MEDICINE
Payer: MEDICARE

## 2021-10-18 VITALS
TEMPERATURE: 97 F | SYSTOLIC BLOOD PRESSURE: 110 MMHG | OXYGEN SATURATION: 97 % | DIASTOLIC BLOOD PRESSURE: 60 MMHG | BODY MASS INDEX: 34.87 KG/M2 | WEIGHT: 217 LBS | HEART RATE: 60 BPM | HEIGHT: 66 IN

## 2021-10-18 DIAGNOSIS — I10 HYPERTENSION, ESSENTIAL: ICD-10-CM

## 2021-10-18 DIAGNOSIS — I87.8 VENOUS STASIS: ICD-10-CM

## 2021-10-18 DIAGNOSIS — G47.33 OSA ON CPAP: ICD-10-CM

## 2021-10-18 DIAGNOSIS — E78.5 HYPERLIPIDEMIA, UNSPECIFIED HYPERLIPIDEMIA TYPE: Primary | ICD-10-CM

## 2021-10-18 DIAGNOSIS — E78.5 HYPERLIPIDEMIA, UNSPECIFIED HYPERLIPIDEMIA TYPE: ICD-10-CM

## 2021-10-18 DIAGNOSIS — Z79.82 ASPIRIN LONG-TERM USE: ICD-10-CM

## 2021-10-18 DIAGNOSIS — E11.9 TYPE 2 DIABETES MELLITUS WITHOUT COMPLICATION, WITHOUT LONG-TERM CURRENT USE OF INSULIN: ICD-10-CM

## 2021-10-18 LAB
ALBUMIN SERPL BCP-MCNC: 3.7 G/DL (ref 3.5–5.2)
ALP SERPL-CCNC: 61 U/L (ref 55–135)
ALT SERPL W/O P-5'-P-CCNC: 22 U/L (ref 10–44)
ANION GAP SERPL CALC-SCNC: 12 MMOL/L (ref 8–16)
AST SERPL-CCNC: 19 U/L (ref 10–40)
BASOPHILS # BLD AUTO: 0.03 K/UL (ref 0–0.2)
BASOPHILS NFR BLD: 0.4 % (ref 0–1.9)
BILIRUB SERPL-MCNC: 0.6 MG/DL (ref 0.1–1)
BNP SERPL-MCNC: 28 PG/ML (ref 0–99)
BUN SERPL-MCNC: 19 MG/DL (ref 8–23)
CALCIUM SERPL-MCNC: 9 MG/DL (ref 8.7–10.5)
CHLORIDE SERPL-SCNC: 105 MMOL/L (ref 95–110)
CHOLEST SERPL-MCNC: 87 MG/DL (ref 120–199)
CHOLEST/HDLC SERPL: 2.7 {RATIO} (ref 2–5)
CO2 SERPL-SCNC: 25 MMOL/L (ref 23–29)
CREAT SERPL-MCNC: 0.9 MG/DL (ref 0.5–1.4)
DIFFERENTIAL METHOD: ABNORMAL
EOSINOPHIL # BLD AUTO: 0.4 K/UL (ref 0–0.5)
EOSINOPHIL NFR BLD: 6.1 % (ref 0–8)
ERYTHROCYTE [DISTWIDTH] IN BLOOD BY AUTOMATED COUNT: 14.3 % (ref 11.5–14.5)
EST. GFR  (AFRICAN AMERICAN): >60 ML/MIN/1.73 M^2
EST. GFR  (NON AFRICAN AMERICAN): >60 ML/MIN/1.73 M^2
GLUCOSE SERPL-MCNC: 131 MG/DL (ref 70–110)
HCT VFR BLD AUTO: 35 % (ref 40–54)
HDLC SERPL-MCNC: 32 MG/DL (ref 40–75)
HDLC SERPL: 36.8 % (ref 20–50)
HGB BLD-MCNC: 10.9 G/DL (ref 14–18)
IMM GRANULOCYTES # BLD AUTO: 0.04 K/UL (ref 0–0.04)
IMM GRANULOCYTES NFR BLD AUTO: 0.6 % (ref 0–0.5)
LDLC SERPL CALC-MCNC: 25 MG/DL (ref 63–159)
LYMPHOCYTES # BLD AUTO: 0.9 K/UL (ref 1–4.8)
LYMPHOCYTES NFR BLD: 13.1 % (ref 18–48)
MCH RBC QN AUTO: 27.5 PG (ref 27–31)
MCHC RBC AUTO-ENTMCNC: 31.1 G/DL (ref 32–36)
MCV RBC AUTO: 88 FL (ref 82–98)
MONOCYTES # BLD AUTO: 0.6 K/UL (ref 0.3–1)
MONOCYTES NFR BLD: 8.1 % (ref 4–15)
NEUTROPHILS # BLD AUTO: 4.9 K/UL (ref 1.8–7.7)
NEUTROPHILS NFR BLD: 71.7 % (ref 38–73)
NONHDLC SERPL-MCNC: 55 MG/DL
NRBC BLD-RTO: 0 /100 WBC
PLATELET # BLD AUTO: 167 K/UL (ref 150–450)
PMV BLD AUTO: 11.7 FL (ref 9.2–12.9)
POTASSIUM SERPL-SCNC: 3.1 MMOL/L (ref 3.5–5.1)
PROT SERPL-MCNC: 6.8 G/DL (ref 6–8.4)
RBC # BLD AUTO: 3.97 M/UL (ref 4.6–6.2)
SODIUM SERPL-SCNC: 142 MMOL/L (ref 136–145)
TRIGL SERPL-MCNC: 150 MG/DL (ref 30–150)
WBC # BLD AUTO: 6.77 K/UL (ref 3.9–12.7)

## 2021-10-18 PROCEDURE — 85025 COMPLETE CBC W/AUTO DIFF WBC: CPT | Performed by: FAMILY MEDICINE

## 2021-10-18 PROCEDURE — 80061 LIPID PANEL: CPT | Performed by: FAMILY MEDICINE

## 2021-10-18 PROCEDURE — 83880 ASSAY OF NATRIURETIC PEPTIDE: CPT | Performed by: FAMILY MEDICINE

## 2021-10-18 PROCEDURE — 3066F NEPHROPATHY DOC TX: CPT | Mod: S$GLB,,, | Performed by: FAMILY MEDICINE

## 2021-10-18 PROCEDURE — 1101F PT FALLS ASSESS-DOCD LE1/YR: CPT | Mod: S$GLB,,, | Performed by: FAMILY MEDICINE

## 2021-10-18 PROCEDURE — 99214 PR OFFICE/OUTPT VISIT, EST, LEVL IV, 30-39 MIN: ICD-10-PCS | Mod: S$GLB,,, | Performed by: FAMILY MEDICINE

## 2021-10-18 PROCEDURE — 3288F PR FALLS RISK ASSESSMENT DOCUMENTED: ICD-10-PCS | Mod: S$GLB,,, | Performed by: FAMILY MEDICINE

## 2021-10-18 PROCEDURE — 3061F PR NEG MICROALBUMINURIA RESULT DOCUMENTED/REVIEW: ICD-10-PCS | Mod: S$GLB,,, | Performed by: FAMILY MEDICINE

## 2021-10-18 PROCEDURE — 99214 OFFICE O/P EST MOD 30 MIN: CPT | Mod: S$GLB,,, | Performed by: FAMILY MEDICINE

## 2021-10-18 PROCEDURE — 3288F FALL RISK ASSESSMENT DOCD: CPT | Mod: S$GLB,,, | Performed by: FAMILY MEDICINE

## 2021-10-18 PROCEDURE — 1160F PR REVIEW ALL MEDS BY PRESCRIBER/CLIN PHARMACIST DOCUMENTED: ICD-10-PCS | Mod: S$GLB,,, | Performed by: FAMILY MEDICINE

## 2021-10-18 PROCEDURE — 4010F PR ACE/ARB THEARPY RXD/TAKEN: ICD-10-PCS | Mod: S$GLB,,, | Performed by: FAMILY MEDICINE

## 2021-10-18 PROCEDURE — 3074F SYST BP LT 130 MM HG: CPT | Mod: S$GLB,,, | Performed by: FAMILY MEDICINE

## 2021-10-18 PROCEDURE — 3044F HG A1C LEVEL LT 7.0%: CPT | Mod: S$GLB,,, | Performed by: FAMILY MEDICINE

## 2021-10-18 PROCEDURE — 1160F RVW MEDS BY RX/DR IN RCRD: CPT | Mod: S$GLB,,, | Performed by: FAMILY MEDICINE

## 2021-10-18 PROCEDURE — 1159F MED LIST DOCD IN RCRD: CPT | Mod: S$GLB,,, | Performed by: FAMILY MEDICINE

## 2021-10-18 PROCEDURE — 3061F NEG MICROALBUMINURIA REV: CPT | Mod: S$GLB,,, | Performed by: FAMILY MEDICINE

## 2021-10-18 PROCEDURE — 3074F PR MOST RECENT SYSTOLIC BLOOD PRESSURE < 130 MM HG: ICD-10-PCS | Mod: S$GLB,,, | Performed by: FAMILY MEDICINE

## 2021-10-18 PROCEDURE — 3044F PR MOST RECENT HEMOGLOBIN A1C LEVEL <7.0%: ICD-10-PCS | Mod: S$GLB,,, | Performed by: FAMILY MEDICINE

## 2021-10-18 PROCEDURE — 1159F PR MEDICATION LIST DOCUMENTED IN MEDICAL RECORD: ICD-10-PCS | Mod: S$GLB,,, | Performed by: FAMILY MEDICINE

## 2021-10-18 PROCEDURE — 3066F PR DOCUMENTATION OF TREATMENT FOR NEPHROPATHY: ICD-10-PCS | Mod: S$GLB,,, | Performed by: FAMILY MEDICINE

## 2021-10-18 PROCEDURE — 3078F PR MOST RECENT DIASTOLIC BLOOD PRESSURE < 80 MM HG: ICD-10-PCS | Mod: S$GLB,,, | Performed by: FAMILY MEDICINE

## 2021-10-18 PROCEDURE — 1101F PR PT FALLS ASSESS DOC 0-1 FALLS W/OUT INJ PAST YR: ICD-10-PCS | Mod: S$GLB,,, | Performed by: FAMILY MEDICINE

## 2021-10-18 PROCEDURE — 1125F PR PAIN SEVERITY QUANTIFIED, PAIN PRESENT: ICD-10-PCS | Mod: S$GLB,,, | Performed by: FAMILY MEDICINE

## 2021-10-18 PROCEDURE — 3008F PR BODY MASS INDEX (BMI) DOCUMENTED: ICD-10-PCS | Mod: S$GLB,,, | Performed by: FAMILY MEDICINE

## 2021-10-18 PROCEDURE — 1125F AMNT PAIN NOTED PAIN PRSNT: CPT | Mod: S$GLB,,, | Performed by: FAMILY MEDICINE

## 2021-10-18 PROCEDURE — 80053 COMPREHEN METABOLIC PANEL: CPT | Performed by: FAMILY MEDICINE

## 2021-10-18 PROCEDURE — 4010F ACE/ARB THERAPY RXD/TAKEN: CPT | Mod: S$GLB,,, | Performed by: FAMILY MEDICINE

## 2021-10-18 PROCEDURE — 3008F BODY MASS INDEX DOCD: CPT | Mod: S$GLB,,, | Performed by: FAMILY MEDICINE

## 2021-10-18 PROCEDURE — 36415 COLL VENOUS BLD VENIPUNCTURE: CPT | Performed by: FAMILY MEDICINE

## 2021-10-18 PROCEDURE — 83036 HEMOGLOBIN GLYCOSYLATED A1C: CPT | Performed by: FAMILY MEDICINE

## 2021-10-18 PROCEDURE — 3078F DIAST BP <80 MM HG: CPT | Mod: S$GLB,,, | Performed by: FAMILY MEDICINE

## 2021-10-18 RX ORDER — OXYBUTYNIN CHLORIDE 10 MG/1
10 TABLET, EXTENDED RELEASE ORAL DAILY
COMMUNITY
Start: 2021-09-17 | End: 2022-06-09

## 2021-10-18 RX ORDER — TIZANIDINE 4 MG/1
4 TABLET ORAL 3 TIMES DAILY PRN
COMMUNITY
Start: 2021-08-17 | End: 2021-11-19

## 2021-10-18 RX ORDER — HYDROCHLOROTHIAZIDE 12.5 MG/1
12.5 TABLET ORAL DAILY
Qty: 90 TABLET | Refills: 1 | Status: SHIPPED | OUTPATIENT
Start: 2021-10-18 | End: 2022-05-19

## 2021-10-18 RX ORDER — HYDROCODONE BITARTRATE AND ACETAMINOPHEN 10; 325 MG/1; MG/1
1 TABLET ORAL EVERY 6 HOURS PRN
COMMUNITY
Start: 2021-08-17 | End: 2022-06-09

## 2021-10-18 RX ORDER — POTASSIUM CHLORIDE 20 MEQ/1
20 TABLET, EXTENDED RELEASE ORAL 2 TIMES DAILY
Qty: 180 TABLET | Refills: 1 | Status: SHIPPED | OUTPATIENT
Start: 2021-10-18 | End: 2022-07-25

## 2021-10-18 RX ORDER — TERAZOSIN 10 MG/1
10 CAPSULE ORAL NIGHTLY
Qty: 90 CAPSULE | Refills: 1 | Status: SHIPPED | OUTPATIENT
Start: 2021-10-18 | End: 2022-07-01

## 2021-10-18 RX ORDER — AMLODIPINE BESYLATE 10 MG/1
10 TABLET ORAL DAILY
Qty: 90 TABLET | Refills: 1 | Status: SHIPPED | OUTPATIENT
Start: 2021-10-18 | End: 2022-01-17

## 2021-10-18 RX ORDER — OLMESARTAN MEDOXOMIL 40 MG/1
40 TABLET ORAL DAILY
Qty: 90 TABLET | Refills: 1 | Status: SHIPPED | OUTPATIENT
Start: 2021-10-18 | End: 2021-11-29

## 2021-10-18 RX ORDER — METFORMIN HYDROCHLORIDE 500 MG/1
500 TABLET ORAL 2 TIMES DAILY
Qty: 180 TABLET | Refills: 1 | Status: SHIPPED | OUTPATIENT
Start: 2021-10-18 | End: 2021-12-22

## 2021-10-18 RX ORDER — POTASSIUM CHLORIDE 20 MEQ/1
20 TABLET, EXTENDED RELEASE ORAL 2 TIMES DAILY
COMMUNITY
Start: 2021-05-11 | End: 2021-10-18 | Stop reason: SDUPTHER

## 2021-10-18 RX ORDER — TAMSULOSIN HYDROCHLORIDE 0.4 MG/1
0.4 CAPSULE ORAL NIGHTLY
Qty: 90 CAPSULE | Refills: 1 | Status: SHIPPED | OUTPATIENT
Start: 2021-10-18 | End: 2022-06-04

## 2021-10-18 RX ORDER — AMITRIPTYLINE HYDROCHLORIDE 50 MG/1
50 TABLET, FILM COATED ORAL NIGHTLY
Qty: 90 TABLET | Refills: 1 | Status: SHIPPED | OUTPATIENT
Start: 2021-10-18 | End: 2022-06-03

## 2021-10-18 RX ORDER — LOSARTAN POTASSIUM 25 MG/1
25 TABLET ORAL DAILY
Qty: 90 TABLET | Refills: 1 | Status: SHIPPED | OUTPATIENT
Start: 2021-10-18 | End: 2022-03-22

## 2021-10-19 LAB
ESTIMATED AVG GLUCOSE: 123 MG/DL (ref 68–131)
HBA1C MFR BLD: 5.9 % (ref 4.5–6.2)

## 2021-10-20 ENCOUNTER — TELEPHONE (OUTPATIENT)
Dept: FAMILY MEDICINE | Facility: CLINIC | Age: 71
End: 2021-10-20

## 2021-10-22 ENCOUNTER — PATIENT MESSAGE (OUTPATIENT)
Dept: FAMILY MEDICINE | Facility: CLINIC | Age: 71
End: 2021-10-22
Payer: MEDICARE

## 2021-10-22 ENCOUNTER — TELEPHONE (OUTPATIENT)
Dept: FAMILY MEDICINE | Facility: CLINIC | Age: 71
End: 2021-10-22

## 2021-10-26 ENCOUNTER — TELEPHONE (OUTPATIENT)
Dept: FAMILY MEDICINE | Facility: CLINIC | Age: 71
End: 2021-10-26
Payer: MEDICARE

## 2021-11-11 ENCOUNTER — PATIENT MESSAGE (OUTPATIENT)
Dept: FAMILY MEDICINE | Facility: CLINIC | Age: 71
End: 2021-11-11
Payer: MEDICARE

## 2021-11-19 ENCOUNTER — OFFICE VISIT (OUTPATIENT)
Dept: FAMILY MEDICINE | Facility: CLINIC | Age: 71
End: 2021-11-19
Payer: MEDICARE

## 2021-11-19 VITALS
BODY MASS INDEX: 34.39 KG/M2 | TEMPERATURE: 98 F | HEART RATE: 61 BPM | OXYGEN SATURATION: 95 % | SYSTOLIC BLOOD PRESSURE: 126 MMHG | DIASTOLIC BLOOD PRESSURE: 60 MMHG | WEIGHT: 214 LBS | HEIGHT: 66 IN

## 2021-11-19 DIAGNOSIS — I25.10 MILD CAD: ICD-10-CM

## 2021-11-19 DIAGNOSIS — M75.102 TEAR OF LEFT ROTATOR CUFF, UNSPECIFIED TEAR EXTENT, UNSPECIFIED WHETHER TRAUMATIC: ICD-10-CM

## 2021-11-19 DIAGNOSIS — G45.9 TIA (TRANSIENT ISCHEMIC ATTACK): Primary | ICD-10-CM

## 2021-11-19 DIAGNOSIS — G47.33 OSA ON CPAP: ICD-10-CM

## 2021-11-19 DIAGNOSIS — E78.5 HYPERLIPIDEMIA, UNSPECIFIED HYPERLIPIDEMIA TYPE: ICD-10-CM

## 2021-11-19 DIAGNOSIS — I10 HYPERTENSION, ESSENTIAL: ICD-10-CM

## 2021-11-19 DIAGNOSIS — E11.9 TYPE 2 DIABETES MELLITUS WITHOUT COMPLICATION, WITHOUT LONG-TERM CURRENT USE OF INSULIN: ICD-10-CM

## 2021-11-19 PROCEDURE — 4010F ACE/ARB THERAPY RXD/TAKEN: CPT | Mod: CPTII,S$GLB,, | Performed by: FAMILY MEDICINE

## 2021-11-19 PROCEDURE — 3066F PR DOCUMENTATION OF TREATMENT FOR NEPHROPATHY: ICD-10-PCS | Mod: CPTII,S$GLB,, | Performed by: FAMILY MEDICINE

## 2021-11-19 PROCEDURE — 3061F NEG MICROALBUMINURIA REV: CPT | Mod: CPTII,S$GLB,, | Performed by: FAMILY MEDICINE

## 2021-11-19 PROCEDURE — 90694 VACC AIIV4 NO PRSRV 0.5ML IM: CPT | Mod: S$GLB,,, | Performed by: FAMILY MEDICINE

## 2021-11-19 PROCEDURE — G0008 ADMIN INFLUENZA VIRUS VAC: HCPCS | Mod: S$GLB,,, | Performed by: FAMILY MEDICINE

## 2021-11-19 PROCEDURE — 99214 OFFICE O/P EST MOD 30 MIN: CPT | Mod: S$GLB,,, | Performed by: FAMILY MEDICINE

## 2021-11-19 PROCEDURE — 99214 PR OFFICE/OUTPT VISIT, EST, LEVL IV, 30-39 MIN: ICD-10-PCS | Mod: S$GLB,,, | Performed by: FAMILY MEDICINE

## 2021-11-19 PROCEDURE — G0008 FLU VACCINE - QUADRIVALENT - ADJUVANTED: ICD-10-PCS | Mod: S$GLB,,, | Performed by: FAMILY MEDICINE

## 2021-11-19 PROCEDURE — G0009 ADMIN PNEUMOCOCCAL VACCINE: HCPCS | Mod: S$GLB,,, | Performed by: FAMILY MEDICINE

## 2021-11-19 PROCEDURE — 3066F NEPHROPATHY DOC TX: CPT | Mod: CPTII,S$GLB,, | Performed by: FAMILY MEDICINE

## 2021-11-19 PROCEDURE — 90732 PNEUMOCOCCAL POLYSACCHARIDE VACCINE 23-VALENT =>2YO SQ IM: ICD-10-PCS | Mod: S$GLB,,, | Performed by: FAMILY MEDICINE

## 2021-11-19 PROCEDURE — 3061F PR NEG MICROALBUMINURIA RESULT DOCUMENTED/REVIEW: ICD-10-PCS | Mod: CPTII,S$GLB,, | Performed by: FAMILY MEDICINE

## 2021-11-19 PROCEDURE — 90694 FLU VACCINE - QUADRIVALENT - ADJUVANTED: ICD-10-PCS | Mod: S$GLB,,, | Performed by: FAMILY MEDICINE

## 2021-11-19 PROCEDURE — 4010F PR ACE/ARB THEARPY RXD/TAKEN: ICD-10-PCS | Mod: CPTII,S$GLB,, | Performed by: FAMILY MEDICINE

## 2021-11-19 PROCEDURE — G0009 PNEUMOCOCCAL POLYSACCHARIDE VACCINE 23-VALENT =>2YO SQ IM: ICD-10-PCS | Mod: S$GLB,,, | Performed by: FAMILY MEDICINE

## 2021-11-19 PROCEDURE — 90732 PPSV23 VACC 2 YRS+ SUBQ/IM: CPT | Mod: S$GLB,,, | Performed by: FAMILY MEDICINE

## 2021-11-19 RX ORDER — AMOXICILLIN AND CLAVULANATE POTASSIUM 875; 125 MG/1; MG/1
1 TABLET, FILM COATED ORAL 2 TIMES DAILY
COMMUNITY
Start: 2021-11-15 | End: 2021-12-28

## 2021-11-23 ENCOUNTER — TELEPHONE (OUTPATIENT)
Dept: UROLOGY | Facility: CLINIC | Age: 71
End: 2021-11-23
Payer: MEDICARE

## 2021-11-23 DIAGNOSIS — C61 PROSTATE CANCER: Primary | ICD-10-CM

## 2021-12-21 ENCOUNTER — LAB VISIT (OUTPATIENT)
Dept: LAB | Facility: HOSPITAL | Age: 71
End: 2021-12-21
Attending: INTERNAL MEDICINE
Payer: MEDICARE

## 2021-12-21 ENCOUNTER — PATIENT MESSAGE (OUTPATIENT)
Dept: FAMILY MEDICINE | Facility: CLINIC | Age: 71
End: 2021-12-21
Payer: MEDICARE

## 2021-12-21 DIAGNOSIS — D64.9 NORMOCYTIC NORMOCHROMIC ANEMIA: ICD-10-CM

## 2021-12-21 DIAGNOSIS — Z85.46 HISTORY OF PROSTATE CANCER: Chronic | ICD-10-CM

## 2021-12-21 LAB
ALBUMIN SERPL BCP-MCNC: 3.4 G/DL (ref 3.5–5.2)
ALP SERPL-CCNC: 71 U/L (ref 55–135)
ALT SERPL W/O P-5'-P-CCNC: 24 U/L (ref 10–44)
ANION GAP SERPL CALC-SCNC: 9 MMOL/L (ref 8–16)
AST SERPL-CCNC: 20 U/L (ref 10–40)
BASOPHILS # BLD AUTO: 0.03 K/UL (ref 0–0.2)
BASOPHILS NFR BLD: 0.4 % (ref 0–1.9)
BILIRUB SERPL-MCNC: 0.7 MG/DL (ref 0.1–1)
BUN SERPL-MCNC: 13 MG/DL (ref 8–23)
CALCIUM SERPL-MCNC: 8.7 MG/DL (ref 8.7–10.5)
CHLORIDE SERPL-SCNC: 101 MMOL/L (ref 95–110)
CO2 SERPL-SCNC: 30 MMOL/L (ref 23–29)
CREAT SERPL-MCNC: 1 MG/DL (ref 0.5–1.4)
DIFFERENTIAL METHOD: ABNORMAL
EOSINOPHIL # BLD AUTO: 0.5 K/UL (ref 0–0.5)
EOSINOPHIL NFR BLD: 6.4 % (ref 0–8)
ERYTHROCYTE [DISTWIDTH] IN BLOOD BY AUTOMATED COUNT: 14.6 % (ref 11.5–14.5)
EST. GFR  (AFRICAN AMERICAN): >60 ML/MIN/1.73 M^2
EST. GFR  (NON AFRICAN AMERICAN): >60 ML/MIN/1.73 M^2
FERRITIN SERPL-MCNC: 49 NG/ML (ref 20–300)
GLUCOSE SERPL-MCNC: 132 MG/DL (ref 70–110)
HCT VFR BLD AUTO: 34.6 % (ref 40–54)
HGB BLD-MCNC: 11.1 G/DL (ref 14–18)
IMM GRANULOCYTES # BLD AUTO: 0.2 K/UL (ref 0–0.04)
IMM GRANULOCYTES NFR BLD AUTO: 2.4 % (ref 0–0.5)
IRON SERPL-MCNC: 49 UG/DL (ref 45–160)
LYMPHOCYTES # BLD AUTO: 1.2 K/UL (ref 1–4.8)
LYMPHOCYTES NFR BLD: 14.4 % (ref 18–48)
MCH RBC QN AUTO: 26.5 PG (ref 27–31)
MCHC RBC AUTO-ENTMCNC: 32.1 G/DL (ref 32–36)
MCV RBC AUTO: 83 FL (ref 82–98)
MONOCYTES # BLD AUTO: 0.8 K/UL (ref 0.3–1)
MONOCYTES NFR BLD: 9.5 % (ref 4–15)
NEUTROPHILS # BLD AUTO: 5.6 K/UL (ref 1.8–7.7)
NEUTROPHILS NFR BLD: 66.9 % (ref 38–73)
NRBC BLD-RTO: 0 /100 WBC
PLATELET # BLD AUTO: 161 K/UL (ref 150–450)
PMV BLD AUTO: 10.9 FL (ref 9.2–12.9)
POTASSIUM SERPL-SCNC: 3.6 MMOL/L (ref 3.5–5.1)
PROT SERPL-MCNC: 6.6 G/DL (ref 6–8.4)
RBC # BLD AUTO: 4.19 M/UL (ref 4.6–6.2)
SATURATED IRON: 15 % (ref 20–50)
SODIUM SERPL-SCNC: 140 MMOL/L (ref 136–145)
TOTAL IRON BINDING CAPACITY: 322 UG/DL (ref 250–450)
TRANSFERRIN SERPL-MCNC: 230 MG/DL (ref 200–375)
WBC # BLD AUTO: 8.39 K/UL (ref 3.9–12.7)

## 2021-12-21 PROCEDURE — 36415 COLL VENOUS BLD VENIPUNCTURE: CPT | Performed by: INTERNAL MEDICINE

## 2021-12-21 PROCEDURE — 82728 ASSAY OF FERRITIN: CPT | Performed by: INTERNAL MEDICINE

## 2021-12-21 PROCEDURE — 84466 ASSAY OF TRANSFERRIN: CPT | Performed by: INTERNAL MEDICINE

## 2021-12-21 PROCEDURE — 85025 COMPLETE CBC W/AUTO DIFF WBC: CPT | Performed by: INTERNAL MEDICINE

## 2021-12-21 PROCEDURE — 80053 COMPREHEN METABOLIC PANEL: CPT | Performed by: INTERNAL MEDICINE

## 2021-12-22 RX ORDER — METFORMIN HYDROCHLORIDE 500 MG/1
TABLET ORAL
Qty: 180 TABLET | Refills: 1 | Status: SHIPPED | OUTPATIENT
Start: 2021-12-22 | End: 2022-06-24

## 2021-12-28 ENCOUNTER — OFFICE VISIT (OUTPATIENT)
Dept: HEMATOLOGY/ONCOLOGY | Facility: CLINIC | Age: 71
End: 2021-12-28
Payer: MEDICARE

## 2021-12-28 VITALS
BODY MASS INDEX: 34.87 KG/M2 | TEMPERATURE: 98 F | WEIGHT: 217 LBS | RESPIRATION RATE: 18 BRPM | HEIGHT: 66 IN | SYSTOLIC BLOOD PRESSURE: 154 MMHG | DIASTOLIC BLOOD PRESSURE: 69 MMHG | HEART RATE: 57 BPM

## 2021-12-28 DIAGNOSIS — Z85.46 HISTORY OF PROSTATE CANCER: Primary | Chronic | ICD-10-CM

## 2021-12-28 DIAGNOSIS — D64.9 NORMOCYTIC NORMOCHROMIC ANEMIA: ICD-10-CM

## 2021-12-28 PROCEDURE — 1159F PR MEDICATION LIST DOCUMENTED IN MEDICAL RECORD: ICD-10-PCS | Mod: S$GLB,,, | Performed by: INTERNAL MEDICINE

## 2021-12-28 PROCEDURE — 1159F MED LIST DOCD IN RCRD: CPT | Mod: S$GLB,,, | Performed by: INTERNAL MEDICINE

## 2021-12-28 PROCEDURE — 3077F PR MOST RECENT SYSTOLIC BLOOD PRESSURE >= 140 MM HG: ICD-10-PCS | Mod: S$GLB,,, | Performed by: INTERNAL MEDICINE

## 2021-12-28 PROCEDURE — 3078F PR MOST RECENT DIASTOLIC BLOOD PRESSURE < 80 MM HG: ICD-10-PCS | Mod: S$GLB,,, | Performed by: INTERNAL MEDICINE

## 2021-12-28 PROCEDURE — 4010F ACE/ARB THERAPY RXD/TAKEN: CPT | Mod: S$GLB,,, | Performed by: INTERNAL MEDICINE

## 2021-12-28 PROCEDURE — 3008F BODY MASS INDEX DOCD: CPT | Mod: S$GLB,,, | Performed by: INTERNAL MEDICINE

## 2021-12-28 PROCEDURE — 1160F RVW MEDS BY RX/DR IN RCRD: CPT | Mod: S$GLB,,, | Performed by: INTERNAL MEDICINE

## 2021-12-28 PROCEDURE — 4010F PR ACE/ARB THEARPY RXD/TAKEN: ICD-10-PCS | Mod: S$GLB,,, | Performed by: INTERNAL MEDICINE

## 2021-12-28 PROCEDURE — 3008F PR BODY MASS INDEX (BMI) DOCUMENTED: ICD-10-PCS | Mod: S$GLB,,, | Performed by: INTERNAL MEDICINE

## 2021-12-28 PROCEDURE — 3078F DIAST BP <80 MM HG: CPT | Mod: S$GLB,,, | Performed by: INTERNAL MEDICINE

## 2021-12-28 PROCEDURE — 1101F PT FALLS ASSESS-DOCD LE1/YR: CPT | Mod: S$GLB,,, | Performed by: INTERNAL MEDICINE

## 2021-12-28 PROCEDURE — 3077F SYST BP >= 140 MM HG: CPT | Mod: S$GLB,,, | Performed by: INTERNAL MEDICINE

## 2021-12-28 PROCEDURE — 3061F PR NEG MICROALBUMINURIA RESULT DOCUMENTED/REVIEW: ICD-10-PCS | Mod: S$GLB,,, | Performed by: INTERNAL MEDICINE

## 2021-12-28 PROCEDURE — 99214 PR OFFICE/OUTPT VISIT, EST, LEVL IV, 30-39 MIN: ICD-10-PCS | Mod: S$GLB,,, | Performed by: INTERNAL MEDICINE

## 2021-12-28 PROCEDURE — 1160F PR REVIEW ALL MEDS BY PRESCRIBER/CLIN PHARMACIST DOCUMENTED: ICD-10-PCS | Mod: S$GLB,,, | Performed by: INTERNAL MEDICINE

## 2021-12-28 PROCEDURE — 3044F PR MOST RECENT HEMOGLOBIN A1C LEVEL <7.0%: ICD-10-PCS | Mod: S$GLB,,, | Performed by: INTERNAL MEDICINE

## 2021-12-28 PROCEDURE — 3066F PR DOCUMENTATION OF TREATMENT FOR NEPHROPATHY: ICD-10-PCS | Mod: S$GLB,,, | Performed by: INTERNAL MEDICINE

## 2021-12-28 PROCEDURE — 3288F FALL RISK ASSESSMENT DOCD: CPT | Mod: S$GLB,,, | Performed by: INTERNAL MEDICINE

## 2021-12-28 PROCEDURE — 3066F NEPHROPATHY DOC TX: CPT | Mod: S$GLB,,, | Performed by: INTERNAL MEDICINE

## 2021-12-28 PROCEDURE — 3288F PR FALLS RISK ASSESSMENT DOCUMENTED: ICD-10-PCS | Mod: S$GLB,,, | Performed by: INTERNAL MEDICINE

## 2021-12-28 PROCEDURE — 1126F AMNT PAIN NOTED NONE PRSNT: CPT | Mod: S$GLB,,, | Performed by: INTERNAL MEDICINE

## 2021-12-28 PROCEDURE — 3044F HG A1C LEVEL LT 7.0%: CPT | Mod: S$GLB,,, | Performed by: INTERNAL MEDICINE

## 2021-12-28 PROCEDURE — 3061F NEG MICROALBUMINURIA REV: CPT | Mod: S$GLB,,, | Performed by: INTERNAL MEDICINE

## 2021-12-28 PROCEDURE — 1101F PR PT FALLS ASSESS DOC 0-1 FALLS W/OUT INJ PAST YR: ICD-10-PCS | Mod: S$GLB,,, | Performed by: INTERNAL MEDICINE

## 2021-12-28 PROCEDURE — 1126F PR PAIN SEVERITY QUANTIFIED, NO PAIN PRESENT: ICD-10-PCS | Mod: S$GLB,,, | Performed by: INTERNAL MEDICINE

## 2021-12-28 PROCEDURE — 99214 OFFICE O/P EST MOD 30 MIN: CPT | Mod: S$GLB,,, | Performed by: INTERNAL MEDICINE

## 2022-01-10 ENCOUNTER — OFFICE VISIT (OUTPATIENT)
Dept: RADIATION ONCOLOGY | Facility: CLINIC | Age: 72
End: 2022-01-10
Payer: MEDICARE

## 2022-01-10 VITALS
SYSTOLIC BLOOD PRESSURE: 150 MMHG | OXYGEN SATURATION: 97 % | RESPIRATION RATE: 16 BRPM | HEART RATE: 57 BPM | TEMPERATURE: 98 F | BODY MASS INDEX: 35.02 KG/M2 | WEIGHT: 217 LBS | DIASTOLIC BLOOD PRESSURE: 77 MMHG

## 2022-01-10 DIAGNOSIS — C61 PROSTATE CANCER: Primary | ICD-10-CM

## 2022-01-10 PROCEDURE — 3077F SYST BP >= 140 MM HG: CPT | Mod: S$GLB,,, | Performed by: RADIOLOGY

## 2022-01-10 PROCEDURE — 1159F PR MEDICATION LIST DOCUMENTED IN MEDICAL RECORD: ICD-10-PCS | Mod: S$GLB,,, | Performed by: RADIOLOGY

## 2022-01-10 PROCEDURE — 1101F PR PT FALLS ASSESS DOC 0-1 FALLS W/OUT INJ PAST YR: ICD-10-PCS | Mod: S$GLB,,, | Performed by: RADIOLOGY

## 2022-01-10 PROCEDURE — 1126F PR PAIN SEVERITY QUANTIFIED, NO PAIN PRESENT: ICD-10-PCS | Mod: S$GLB,,, | Performed by: RADIOLOGY

## 2022-01-10 PROCEDURE — 99215 OFFICE O/P EST HI 40 MIN: CPT | Mod: S$GLB,,, | Performed by: RADIOLOGY

## 2022-01-10 PROCEDURE — 3078F DIAST BP <80 MM HG: CPT | Mod: S$GLB,,, | Performed by: RADIOLOGY

## 2022-01-10 PROCEDURE — 1126F AMNT PAIN NOTED NONE PRSNT: CPT | Mod: S$GLB,,, | Performed by: RADIOLOGY

## 2022-01-10 PROCEDURE — 1160F PR REVIEW ALL MEDS BY PRESCRIBER/CLIN PHARMACIST DOCUMENTED: ICD-10-PCS | Mod: S$GLB,,, | Performed by: RADIOLOGY

## 2022-01-10 PROCEDURE — 3008F PR BODY MASS INDEX (BMI) DOCUMENTED: ICD-10-PCS | Mod: S$GLB,,, | Performed by: RADIOLOGY

## 2022-01-10 PROCEDURE — 3008F BODY MASS INDEX DOCD: CPT | Mod: S$GLB,,, | Performed by: RADIOLOGY

## 2022-01-10 PROCEDURE — 3078F PR MOST RECENT DIASTOLIC BLOOD PRESSURE < 80 MM HG: ICD-10-PCS | Mod: S$GLB,,, | Performed by: RADIOLOGY

## 2022-01-10 PROCEDURE — 3077F PR MOST RECENT SYSTOLIC BLOOD PRESSURE >= 140 MM HG: ICD-10-PCS | Mod: S$GLB,,, | Performed by: RADIOLOGY

## 2022-01-10 PROCEDURE — 3288F FALL RISK ASSESSMENT DOCD: CPT | Mod: S$GLB,,, | Performed by: RADIOLOGY

## 2022-01-10 PROCEDURE — 1159F MED LIST DOCD IN RCRD: CPT | Mod: S$GLB,,, | Performed by: RADIOLOGY

## 2022-01-10 PROCEDURE — 3288F PR FALLS RISK ASSESSMENT DOCUMENTED: ICD-10-PCS | Mod: S$GLB,,, | Performed by: RADIOLOGY

## 2022-01-10 PROCEDURE — 1101F PT FALLS ASSESS-DOCD LE1/YR: CPT | Mod: S$GLB,,, | Performed by: RADIOLOGY

## 2022-01-10 PROCEDURE — 1160F RVW MEDS BY RX/DR IN RCRD: CPT | Mod: S$GLB,,, | Performed by: RADIOLOGY

## 2022-01-10 PROCEDURE — 99215 PR OFFICE/OUTPT VISIT, EST, LEVL V, 40-54 MIN: ICD-10-PCS | Mod: S$GLB,,, | Performed by: RADIOLOGY

## 2022-01-10 RX ORDER — SILDENAFIL 25 MG/1
25 TABLET, FILM COATED ORAL DAILY PRN
Qty: 10 TABLET | Refills: 0 | Status: SHIPPED | OUTPATIENT
Start: 2022-01-10 | End: 2024-01-25

## 2022-01-10 NOTE — PROGRESS NOTES
Chris Hill Jr.  [id number]  1950  1/10/2022  Manpreet Gordon Md  [address] [place] [address] Dr  [address]  [place] [address]    DIAGNOSIS: High risk prostate adenocarcinoma, [id number] GS 4+5 iPSA 13.2  REASON FOR VISIT: Routine scheduled follow-up.    HISTORY OF PRESENT ILLNESS:   68M p/w elevated PSA.    PSA  6/18 12.5  7/18 13.2    *8/18 Dr. Stephens TRUS bx   - 3+3 adenoca R apex 5%; 4+3 adenoca L apex 55%; 4+5 adenoca L mid 80% and L base 95%   - no EPE; +PNI   - 4/6 cores+   - ADT placed    - RadOn consult  *8/18 CT AP/Bone scan: jenniffer    Patient was placed on long-term androgen deprivation therapy by Dr. Gordon and completed definitive radiotherapy to 7920 cGy in December 2018. Treatment was complicated by an unexpected lower back surgery and detection of pancytopenia by his GI doctor. Patient developed frequency and urgency of both urine and stool throughout treatment, managed medically.    INTERVAL HISTORY:  Patient completed hormone deprivation; reports slow resolution of hot flashes with continued poor libido and testicular atrophy.  Endorses ED without spontaneous erections .  Interest is returning.  He has nocturia x2-3.  He denies dysuria or hematuria.  He takes Flomax and 5 mg Ditropan q.h.s. He denies diarrhea or bright red blood per rectum.   Continues to follow with Dr. Gibson for Hematology for anemia.  He has follow-up colonoscopy planned.  Left shoulder surgery planned this week.  Reports he was diagnosed with TIA per Dr. Hines.    AUA 18 (10)  QOL 3 (2)  IIEF Na (24)    Review of Systems   Constitutional: Positive for fatigue. Negative for appetite change, chills, fever and unexpected weight change.   HENT:   Negative for lump/mass, mouth sores, sore throat, trouble swallowing and voice change.    Eyes: Negative for eye problems and icterus.   Respiratory: Negative for chest tightness, cough, hemoptysis and shortness of breath.    Cardiovascular: Negative for chest pain and leg swelling.    Gastrointestinal: Negative for abdominal distention, abdominal pain, blood in stool, constipation, nausea and vomiting.   Endocrine: Positive for hot flashes.   Genitourinary: Positive for nocturia. Negative for bladder incontinence, difficulty urinating, dysuria and hematuria.    Musculoskeletal: Positive for arthralgias and back pain. Negative for gait problem, neck pain and neck stiffness.   Neurological: Positive for dizziness. Negative for extremity weakness, gait problem, headaches, numbness and seizures.   Hematological: Negative for adenopathy.     Past Medical History:   Diagnosis Date    Anemia, chronic disease 12/28/2018    Coronary artery disease     mild    Diabetes mellitus     Diabetes mellitus, type 2     GERD (gastroesophageal reflux disease)     Heart murmur     Hyperlipidemia     Hypertension     Normocytic normochromic anemia 12/28/2018    Prostate cancer 8/20/2018    Sleep apnea     NOT USING [facility]    Thrombocytopenia 12/28/2018    Valvular regurgitation      Past Surgical History:   Procedure Laterality Date    BACK SURGERY  09/2019    CARDIAC CATHETERIZATION      EYE SURGERY      cataracts bilateral    TRANSRECTAL ULTRASOUND OF PROSTATE WITH INSERTION OF GOLD FIDUCIAL MARKER N/A 10/4/2018    Procedure: ULTRASOUND, PROSTATE, TRANSPERINEAL APPROACH, WITH GOLD FIDUCIAL MARKER INSERTION (with SPACEOAR transperineal biodegradable gel placement);  Surgeon: Manpreet Gordon MD;  Location: [facility];  Service: Urology;  Laterality: N/A;     Social History     Socioeconomic History    Marital status:    Tobacco Use    Smoking status: Never Smoker    Smokeless tobacco: Never Used   Substance and Sexual Activity    Alcohol use: No    Drug use: No     Family History   Problem Relation Age of Onset    Heart disease Mother     Heart disease Father      Medication List with Changes/Refills   Current Medications    AMITRIPTYLINE (ELAVIL) 50 MG TABLET    Take 1 tablet (50 mg  total) by mouth every evening.    AMLODIPINE (NORVASC) 10 MG TABLET    Take 1 tablet (10 mg total) by mouth once daily.    CARVEDILOL (COREG) 25 MG TABLET    Take 1 tablet (25 mg total) by mouth 2 (two) times daily with meals.    CLONIDINE (CATAPRES) 0.1 MG TABLET    Take 1 tablet (0.1 mg total) by mouth every 8 (eight) hours as needed (SBP greater than 180).    CLOPIDOGREL (PLAVIX) 75 MG TABLET    Take 75 mg by mouth once daily.    CO-ENZYME Q-10 30 MG CAPSULE    Take 30 mg by mouth 3 (three) times daily.    CYANOCOBALAMIN (VITAMIN B-12) 100 MCG TABLET    Take 1,000 mcg by mouth once daily.    FISH OIL-OMEGA-3 FATTY ACIDS 300-1,000 MG CAPSULE    Take 1 capsule by mouth 2 (two) times daily.    HYDRALAZINE (APRESOLINE) 100 MG TABLET    Take 1 tablet (100 mg total) by mouth 3 (three) times daily.    HYDROCHLOROTHIAZIDE (HYDRODIURIL) 12.5 MG TAB    Take 1 tablet (12.5 mg total) by mouth once daily.    HYDROCODONE-ACETAMINOPHEN (NORCO)  MG PER TABLET    Take 1 tablet by mouth every 6 (six) hours as needed.    IRON-VITAMIN C 100-250 MG, ICAR-C, 100-250 MG TAB    Take 1 tablet by mouth once daily    LOSARTAN (COZAAR) 25 MG TABLET    Take 1 tablet (25 mg total) by mouth once daily.    MECLIZINE (ANTIVERT) 25 MG TABLET    TAKE 1 TABLET BY MOUTH EVERY 6 HOURS AS NEEDED FOR DIZZINESS    METFORMIN (GLUCOPHAGE) 500 MG TABLET    Take 1 tablet by mouth twice daily    MULTIVITAMIN (THERAGRAN) PER TABLET    Take 1 tablet by mouth once daily.    OLMESARTAN (BENICAR) 40 MG TABLET    Take 1 tablet by mouth once daily    ONETOUCH VERIO STRP    USE 1 STRIP TO CHECK GLUCOSE TWICE DAILY    OXYBUTYNIN (DITROPAN-XL) 10 MG 24 HR TABLET    Take 10 mg by mouth once daily.    PANTOPRAZOLE (PROTONIX) 40 MG TABLET    Take 40 mg by mouth 2 (two) times daily as needed.    POTASSIUM CHLORIDE SA (K-DUR,KLOR-CON) 20 MEQ TABLET    Take 1 tablet (20 mEq total) by mouth 2 (two) times daily.    ROSUVASTATIN (CRESTOR) 40 MG TAB    Take 40 mg by mouth  once daily. Taking 20    TAMSULOSIN (FLOMAX) 0.4 MG CAP    Take 1 capsule (0.4 mg total) by mouth every evening.    TERAZOSIN (HYTRIN) 10 MG CAPSULE    Take 1 capsule (10 mg total) by mouth every evening.     Review of patient's allergies indicates:  No Known Allergies    QUALITY OF LIFE: 90%    Vitals:    01/10/22 1356   BP: (!) 150/77   Pulse: (!) 57   Resp: 16   Temp: 98.4 °F (36.9 °C)   SpO2: 97%   Weight: 98.4 kg (217 lb)   PainSc: 0-No pain     Body mass index is 35.02 kg/m².    PHYSICAL EXAM:   GENERAL: alert; in no apparent distress.   HEAD: normocephalic, atraumatic.  EYES: pupils are equal, round, reactive to light and accommodation. Sclera anicteric. Conjunctiva not injected.   NECK: no cervical motion rigidity; supple with no masses.  CHEST: Patient is speaking comfortably on room air with normal work of breathing without using accessory muscles of respiration.  ABDOMEN: soft, nontender, nondistended. Bowel sounds present.   MUSCULOSKELETAL: no tenderness to palpation along the spine or scapulae. Normal range of motion.  NEUROLOGIC: cranial nerves II-XII intact bilaterally. Strength 5/5 in bilateral upper and lower extremities. No sensory deficits appreciated. Normal gait.  EXTREMITIES: no clubbing, cyanosis, edema.  SKIN: no erythema, rashes, ulcerations noted.     ANCILLARY DATA:    PSA  TEST  12/21 0.01  9/21 0.03  158  5/21 <0.01  110  11/20 <0.01  9   5/20 <0.01  11/19 <0.01  5/19 0.02      ASSESSMENT: 71 y.o. male with High risk prostate adenocarcinoma, [id number] GS 4+5 iPSA 13.2 status post definitive radiotherapy to 7920 cGy ending December 2018; LT-ADT final depot 5/20.  PLAN:   Chris Hill Jr. presents 3 years out from definitive radiotherapy.  He is no longer on hormone deprivation and hot flashes are resolving.  He continues with poor libido and testicular atrophy.  He reports interest in sexual activity is returning.  I offered him a trial of low-dose Viagra p.r.n. and advised of adverse  effects of dizziness given his recent history of vertigo/possible TIA.  He will continue on Flomax and trial off of Ditropan.  Labs reviewed demonstrating testosterone reconstitution and PSA remains satisfactory at 0.01.    Return to clinic in  1yr.    All questions answered and contact information provided. Patient understands free to call us anytime with any questions or concerns regarding radiation therapy.    I have personally seen and evaluated this patient with a moderate to high complexity diagnosis.      Greater than 45 minutes were dedicated to reviewing/interpreting pertinent laboratory/imaging/pathology as well as follow-up with concurrent consultants; reviewing and performing history and physical; counseling patient on continuing oncologic recommendations; documentation in the electronic medical record including ordering of additional tests and/or radiation treatment protocol; and coordination of care with physicians with referrals placed as appropriate.     COVID-19 precautions discussed. Cancer Center policy for COVID-19 testing described.  Patient will be required to wear a mask when in the Cancer Center..    PHYSICIAN: Simone Santillan Jr, MD

## 2022-01-16 NOTE — TELEPHONE ENCOUNTER
No new care gaps identified.  Powered by [employer]. Reference number: [id number].   1/15/2022 7:08:29 PM CST

## 2022-01-17 RX ORDER — AMLODIPINE BESYLATE 10 MG/1
TABLET ORAL
Qty: 90 TABLET | Refills: 0 | Status: SHIPPED | OUTPATIENT
Start: 2022-01-17 | End: 2022-04-17

## 2022-02-03 ENCOUNTER — PATIENT MESSAGE (OUTPATIENT)
Dept: FAMILY MEDICINE | Facility: CLINIC | Age: 72
End: 2022-02-03
Payer: MEDICARE

## 2022-02-04 ENCOUNTER — OFFICE VISIT (OUTPATIENT)
Dept: FAMILY MEDICINE | Facility: CLINIC | Age: 72
End: 2022-02-04
Payer: MEDICARE

## 2022-02-04 VITALS
SYSTOLIC BLOOD PRESSURE: 136 MMHG | HEART RATE: 73 BPM | WEIGHT: 223 LBS | OXYGEN SATURATION: 97 % | DIASTOLIC BLOOD PRESSURE: 80 MMHG | BODY MASS INDEX: 35.84 KG/M2 | HEIGHT: 66 IN | TEMPERATURE: 98 F

## 2022-02-04 DIAGNOSIS — H60.391 INFECTION OF RIGHT EXTERNAL EAR: ICD-10-CM

## 2022-02-04 DIAGNOSIS — J02.9 PHARYNGITIS, UNSPECIFIED ETIOLOGY: Primary | ICD-10-CM

## 2022-02-04 DIAGNOSIS — Z12.11 COLON CANCER SCREENING: ICD-10-CM

## 2022-02-04 DIAGNOSIS — K21.9 GASTROESOPHAGEAL REFLUX DISEASE, UNSPECIFIED WHETHER ESOPHAGITIS PRESENT: ICD-10-CM

## 2022-02-04 DIAGNOSIS — E11.9 TYPE 2 DIABETES MELLITUS WITHOUT COMPLICATION, WITHOUT LONG-TERM CURRENT USE OF INSULIN: ICD-10-CM

## 2022-02-04 PROCEDURE — 3075F SYST BP GE 130 - 139MM HG: CPT | Mod: CPTII,S$GLB,, | Performed by: FAMILY MEDICINE

## 2022-02-04 PROCEDURE — 1101F PT FALLS ASSESS-DOCD LE1/YR: CPT | Mod: CPTII,S$GLB,, | Performed by: FAMILY MEDICINE

## 2022-02-04 PROCEDURE — 3075F PR MOST RECENT SYSTOLIC BLOOD PRESS GE 130-139MM HG: ICD-10-PCS | Mod: CPTII,S$GLB,, | Performed by: FAMILY MEDICINE

## 2022-02-04 PROCEDURE — 3079F PR MOST RECENT DIASTOLIC BLOOD PRESSURE 80-89 MM HG: ICD-10-PCS | Mod: CPTII,S$GLB,, | Performed by: FAMILY MEDICINE

## 2022-02-04 PROCEDURE — 3079F DIAST BP 80-89 MM HG: CPT | Mod: CPTII,S$GLB,, | Performed by: FAMILY MEDICINE

## 2022-02-04 PROCEDURE — 3288F FALL RISK ASSESSMENT DOCD: CPT | Mod: CPTII,S$GLB,, | Performed by: FAMILY MEDICINE

## 2022-02-04 PROCEDURE — 1159F MED LIST DOCD IN RCRD: CPT | Mod: CPTII,S$GLB,, | Performed by: FAMILY MEDICINE

## 2022-02-04 PROCEDURE — 1125F AMNT PAIN NOTED PAIN PRSNT: CPT | Mod: CPTII,S$GLB,, | Performed by: FAMILY MEDICINE

## 2022-02-04 PROCEDURE — 1125F PR PAIN SEVERITY QUANTIFIED, PAIN PRESENT: ICD-10-PCS | Mod: CPTII,S$GLB,, | Performed by: FAMILY MEDICINE

## 2022-02-04 PROCEDURE — 1101F PR PT FALLS ASSESS DOC 0-1 FALLS W/OUT INJ PAST YR: ICD-10-PCS | Mod: CPTII,S$GLB,, | Performed by: FAMILY MEDICINE

## 2022-02-04 PROCEDURE — 3008F PR BODY MASS INDEX (BMI) DOCUMENTED: ICD-10-PCS | Mod: CPTII,S$GLB,, | Performed by: FAMILY MEDICINE

## 2022-02-04 PROCEDURE — 1159F PR MEDICATION LIST DOCUMENTED IN MEDICAL RECORD: ICD-10-PCS | Mod: CPTII,S$GLB,, | Performed by: FAMILY MEDICINE

## 2022-02-04 PROCEDURE — 99214 OFFICE O/P EST MOD 30 MIN: CPT | Mod: S$GLB,,, | Performed by: FAMILY MEDICINE

## 2022-02-04 PROCEDURE — 99214 PR OFFICE/OUTPT VISIT, EST, LEVL IV, 30-39 MIN: ICD-10-PCS | Mod: S$GLB,,, | Performed by: FAMILY MEDICINE

## 2022-02-04 PROCEDURE — 3288F PR FALLS RISK ASSESSMENT DOCUMENTED: ICD-10-PCS | Mod: CPTII,S$GLB,, | Performed by: FAMILY MEDICINE

## 2022-02-04 PROCEDURE — 3008F BODY MASS INDEX DOCD: CPT | Mod: CPTII,S$GLB,, | Performed by: FAMILY MEDICINE

## 2022-02-04 RX ORDER — SULFAMETHOXAZOLE AND TRIMETHOPRIM 800; 160 MG/1; MG/1
1 TABLET ORAL 2 TIMES DAILY
Qty: 20 TABLET | Refills: 0 | Status: SHIPPED | OUTPATIENT
Start: 2022-02-04 | End: 2022-03-22

## 2022-02-04 RX ORDER — FAMOTIDINE 20 MG/1
20 TABLET, FILM COATED ORAL 2 TIMES DAILY
Qty: 60 TABLET | Refills: 2 | Status: SHIPPED | OUTPATIENT
Start: 2022-02-04 | End: 2022-08-09

## 2022-02-06 NOTE — PROGRESS NOTES
Pharyngitis symptoms.  Sore throat for 2 months.  Ear pain urgent care gave him a shot.  Then went back again on the 12th of January.  Positive for COVID.  Ears are bothering him.  Can not wear his hearing aids.  Right earache.  Lump in the right ear.  Surgery canceled due to the illness.  Now scheduled for February 18th.  Gastroesophageal reflux disease using medication twice a day and still has reflux.  Diabetes mellitus type 2. Foot exam is due.  Still little good bit of pain with the shoulder and needs the surgery.  Taking his blood thinners.  Colon cancer screening is due also.    Physical examination vital signs are noted.  Tympanic membranes without erythema.  The right canal is tender anteriorly.  Peers to be otitis externa but no true abscess.  Neck without bruit.  Chest clear.  Heart regular rate rhythm.  Abdomen bowel sounds positive nontender extremities were without edema 2+ pedal pulses.  Position sense vibratory sensation intact.  No calluses skin breakdown or ulcerations.  Sensation present 5 of 5 spots tested in each foot.      Impression.  Pharyngitis.  Right otitis externa.  Gastroesophageal reflux disease.  Diabetes mellitus type 2. Colon cancer screening.  Recent COVID infection.  Plan go for colonoscopy.  Bactrim DS b.i.d. for 10 days for the ear.  Add Pepcid 20 b.i.d. to his other reflux medications.  Diet weight reduction.  Surgery as planned.

## 2022-02-07 ENCOUNTER — TELEPHONE (OUTPATIENT)
Dept: FAMILY MEDICINE | Facility: CLINIC | Age: 72
End: 2022-02-07
Payer: MEDICARE

## 2022-03-06 NOTE — TELEPHONE ENCOUNTER
No new care gaps identified.  Powered by [employer]. Reference number: [id number].   3/06/2022 9:20:45 AM CST

## 2022-03-09 ENCOUNTER — PATIENT MESSAGE (OUTPATIENT)
Dept: FAMILY MEDICINE | Facility: CLINIC | Age: 72
End: 2022-03-09
Payer: MEDICARE

## 2022-03-09 RX ORDER — OLMESARTAN MEDOXOMIL 40 MG/1
40 TABLET ORAL DAILY
Qty: 90 TABLET | Refills: 1 | Status: SHIPPED | OUTPATIENT
Start: 2022-03-09 | End: 2022-09-14

## 2022-03-09 NOTE — TELEPHONE ENCOUNTER
No new care gaps identified.  Powered by [employer]. Reference number: [id number].   3/09/2022 12:45:23 PM CST

## 2022-03-10 RX ORDER — OLMESARTAN MEDOXOMIL 40 MG/1
TABLET ORAL
Qty: 90 TABLET | Refills: 0 | OUTPATIENT
Start: 2022-03-10

## 2022-03-11 NOTE — TELEPHONE ENCOUNTER
This medication has been handled/signed by PCP already. Will remove duplicate and close out encounter.

## 2022-03-22 ENCOUNTER — OFFICE VISIT (OUTPATIENT)
Dept: FAMILY MEDICINE | Facility: CLINIC | Age: 72
End: 2022-03-22
Payer: MEDICARE

## 2022-03-22 VITALS
SYSTOLIC BLOOD PRESSURE: 132 MMHG | TEMPERATURE: 99 F | DIASTOLIC BLOOD PRESSURE: 68 MMHG | BODY MASS INDEX: 35.68 KG/M2 | HEART RATE: 77 BPM | HEIGHT: 66 IN | OXYGEN SATURATION: 98 % | WEIGHT: 222 LBS

## 2022-03-22 DIAGNOSIS — E78.5 HYPERLIPIDEMIA, UNSPECIFIED HYPERLIPIDEMIA TYPE: ICD-10-CM

## 2022-03-22 DIAGNOSIS — Z79.02 VISIT FOR MONITORING PLAVIX THERAPY: ICD-10-CM

## 2022-03-22 DIAGNOSIS — D64.9 NORMOCYTIC NORMOCHROMIC ANEMIA: ICD-10-CM

## 2022-03-22 DIAGNOSIS — Z12.11 COLON CANCER SCREENING: ICD-10-CM

## 2022-03-22 DIAGNOSIS — I25.10 MILD CAD: ICD-10-CM

## 2022-03-22 DIAGNOSIS — E11.9 TYPE 2 DIABETES MELLITUS WITHOUT COMPLICATION, WITHOUT LONG-TERM CURRENT USE OF INSULIN: ICD-10-CM

## 2022-03-22 DIAGNOSIS — Z98.890 STATUS POST ARTHROSCOPY OF LEFT SHOULDER: ICD-10-CM

## 2022-03-22 DIAGNOSIS — G45.9 TIA (TRANSIENT ISCHEMIC ATTACK): Primary | ICD-10-CM

## 2022-03-22 DIAGNOSIS — Z51.81 VISIT FOR MONITORING PLAVIX THERAPY: ICD-10-CM

## 2022-03-22 DIAGNOSIS — I10 HYPERTENSION, ESSENTIAL: ICD-10-CM

## 2022-03-22 DIAGNOSIS — R22.41 LOCALIZED SWELLING OF RIGHT LOWER EXTREMITY: ICD-10-CM

## 2022-03-22 DIAGNOSIS — G47.33 OSA ON CPAP: ICD-10-CM

## 2022-03-22 PROCEDURE — 3288F FALL RISK ASSESSMENT DOCD: CPT | Mod: CPTII,S$GLB,, | Performed by: FAMILY MEDICINE

## 2022-03-22 PROCEDURE — 3008F BODY MASS INDEX DOCD: CPT | Mod: CPTII,S$GLB,, | Performed by: FAMILY MEDICINE

## 2022-03-22 PROCEDURE — 3075F SYST BP GE 130 - 139MM HG: CPT | Mod: CPTII,S$GLB,, | Performed by: FAMILY MEDICINE

## 2022-03-22 PROCEDURE — 3008F PR BODY MASS INDEX (BMI) DOCUMENTED: ICD-10-PCS | Mod: CPTII,S$GLB,, | Performed by: FAMILY MEDICINE

## 2022-03-22 PROCEDURE — 1159F PR MEDICATION LIST DOCUMENTED IN MEDICAL RECORD: ICD-10-PCS | Mod: CPTII,S$GLB,, | Performed by: FAMILY MEDICINE

## 2022-03-22 PROCEDURE — 3078F DIAST BP <80 MM HG: CPT | Mod: CPTII,S$GLB,, | Performed by: FAMILY MEDICINE

## 2022-03-22 PROCEDURE — 99214 OFFICE O/P EST MOD 30 MIN: CPT | Mod: S$GLB,,, | Performed by: FAMILY MEDICINE

## 2022-03-22 PROCEDURE — 3044F HG A1C LEVEL LT 7.0%: CPT | Mod: CPTII,S$GLB,, | Performed by: FAMILY MEDICINE

## 2022-03-22 PROCEDURE — 3078F PR MOST RECENT DIASTOLIC BLOOD PRESSURE < 80 MM HG: ICD-10-PCS | Mod: CPTII,S$GLB,, | Performed by: FAMILY MEDICINE

## 2022-03-22 PROCEDURE — 3044F PR MOST RECENT HEMOGLOBIN A1C LEVEL <7.0%: ICD-10-PCS | Mod: CPTII,S$GLB,, | Performed by: FAMILY MEDICINE

## 2022-03-22 PROCEDURE — 4010F ACE/ARB THERAPY RXD/TAKEN: CPT | Mod: CPTII,S$GLB,, | Performed by: FAMILY MEDICINE

## 2022-03-22 PROCEDURE — 1160F PR REVIEW ALL MEDS BY PRESCRIBER/CLIN PHARMACIST DOCUMENTED: ICD-10-PCS | Mod: CPTII,S$GLB,, | Performed by: FAMILY MEDICINE

## 2022-03-22 PROCEDURE — 4010F PR ACE/ARB THEARPY RXD/TAKEN: ICD-10-PCS | Mod: CPTII,S$GLB,, | Performed by: FAMILY MEDICINE

## 2022-03-22 PROCEDURE — 3075F PR MOST RECENT SYSTOLIC BLOOD PRESS GE 130-139MM HG: ICD-10-PCS | Mod: CPTII,S$GLB,, | Performed by: FAMILY MEDICINE

## 2022-03-22 PROCEDURE — 1160F RVW MEDS BY RX/DR IN RCRD: CPT | Mod: CPTII,S$GLB,, | Performed by: FAMILY MEDICINE

## 2022-03-22 PROCEDURE — 1101F PT FALLS ASSESS-DOCD LE1/YR: CPT | Mod: CPTII,S$GLB,, | Performed by: FAMILY MEDICINE

## 2022-03-22 PROCEDURE — 3288F PR FALLS RISK ASSESSMENT DOCUMENTED: ICD-10-PCS | Mod: CPTII,S$GLB,, | Performed by: FAMILY MEDICINE

## 2022-03-22 PROCEDURE — 99214 PR OFFICE/OUTPT VISIT, EST, LEVL IV, 30-39 MIN: ICD-10-PCS | Mod: S$GLB,,, | Performed by: FAMILY MEDICINE

## 2022-03-22 PROCEDURE — 1126F PR PAIN SEVERITY QUANTIFIED, NO PAIN PRESENT: ICD-10-PCS | Mod: CPTII,S$GLB,, | Performed by: FAMILY MEDICINE

## 2022-03-22 PROCEDURE — 1101F PR PT FALLS ASSESS DOC 0-1 FALLS W/OUT INJ PAST YR: ICD-10-PCS | Mod: CPTII,S$GLB,, | Performed by: FAMILY MEDICINE

## 2022-03-22 PROCEDURE — 1126F AMNT PAIN NOTED NONE PRSNT: CPT | Mod: CPTII,S$GLB,, | Performed by: FAMILY MEDICINE

## 2022-03-22 PROCEDURE — 1159F MED LIST DOCD IN RCRD: CPT | Mod: CPTII,S$GLB,, | Performed by: FAMILY MEDICINE

## 2022-03-22 RX ORDER — DIAZEPAM 5 MG/1
TABLET ORAL
COMMUNITY
End: 2022-03-22

## 2022-03-23 ENCOUNTER — HOSPITAL ENCOUNTER (OUTPATIENT)
Dept: RADIOLOGY | Facility: HOSPITAL | Age: 72
Discharge: HOME | End: 2022-03-23
Attending: FAMILY MEDICINE
Payer: MEDICARE

## 2022-03-23 DIAGNOSIS — R22.41 LOCALIZED SWELLING OF RIGHT LOWER EXTREMITY: ICD-10-CM

## 2022-03-23 PROCEDURE — 93971 EXTREMITY STUDY: CPT | Mod: TC,RT

## 2022-03-24 RX ORDER — LEVOTHYROXINE SODIUM 25 UG/1
25 TABLET ORAL
Qty: 30 TABLET | Refills: 0 | Status: SHIPPED | OUTPATIENT
Start: 2022-03-24 | End: 2022-04-07 | Stop reason: SDUPTHER

## 2022-03-24 RX ORDER — LEVOTHYROXINE SODIUM 25 UG/1
25 TABLET ORAL
COMMUNITY
End: 2022-03-24 | Stop reason: SDUPTHER

## 2022-03-25 NOTE — PROGRESS NOTES
Subjective:       Patient ID: Chris Hill Jr. is a 71 y.o. male.    Chief Complaint: Follow-up (3 month) and Medicare AWV Follow Up (3 month)    Had his left shoulder surgery did well with that.  Rotator cuff repair and biceps repair also.  Still in sling.  Diabetes mellitus type 2 glucose is running  fasting.  No further TIAs.  Back on his anticoagulants.  Coronary artery disease no angina.  Hypertension is doing well.  He is on his HCTZ 12.5 per day.  Hyperlipidemia check of this is due.  Colon cancer screening due soon.  Uses Dr. Blaine arriola.  Obstructive sleep apnea off his CPAP.  He is having right lower extremity swelling the past 2 months since his surgery.  No calf pain.  It has not gone down.  No chest pain no shortness of breath.  He is on his Plavix.    Physical examination vital signs noted.  Neck without bruit.  Chest clear.  Heart regular rate rhythm.  Left arm in sling.  Abdomen bowel sounds positive soft and nontender.  Extremities 1+ edema of the left ankle.  2+ of the right.  No calf tenderness.    Review of Systems    Objective:      Physical Exam    Assessment:       1. TIA (transient ischemic attack)    2. Type 2 diabetes mellitus without complication, without long-term current use of insulin    3. Hypertension, essential    4. Hyperlipidemia, unspecified hyperlipidemia type    5. Colon cancer screening    6. LORA on CPAP    7. Mild CAD    8. Status post arthroscopy of left shoulder    9. Localized swelling of right lower extremity    10. Visit for monitoring Plavix therapy    11. Normocytic normochromic anemia        Plan:       TIA (transient ischemic attack)  -     BNP; Future; Expected date: 03/22/2022    Type 2 diabetes mellitus without complication, without long-term current use of insulin  -     Hemoglobin A1C; Future; Expected date: 03/22/2022    Hypertension, essential  -     Comprehensive Metabolic Panel; Future; Expected date: 03/22/2022  -     TSH; Future; Expected date:  03/22/2022    Hyperlipidemia, unspecified hyperlipidemia type  -     Lipid Panel; Future; Expected date: 03/22/2022    Colon cancer screening    LORA on CPAP    Mild CAD  -     BNP; Future; Expected date: 03/22/2022    Status post arthroscopy of left shoulder    Localized swelling of right lower extremity  -     US Lower Extremity Veins Right; Future; Expected date: 03/22/2022    Visit for monitoring Plavix therapy  -     CBC Auto Differential; Future; Expected date: 03/22/2022    Normocytic normochromic anemia  -     Ferritin; Future; Expected date: 03/22/2022  -     Iron and TIBC; Future; Expected date: 03/22/2022      Venous ultrasound ordered.  CBC CMP lipids A1c ferritin iron TIBC TSH BNP ordered.  Treat further after results are back.

## 2022-04-07 ENCOUNTER — OFFICE VISIT (OUTPATIENT)
Dept: FAMILY MEDICINE | Facility: CLINIC | Age: 72
End: 2022-04-07
Payer: MEDICARE

## 2022-04-07 VITALS
HEIGHT: 66 IN | OXYGEN SATURATION: 97 % | WEIGHT: 222 LBS | HEART RATE: 76 BPM | BODY MASS INDEX: 35.68 KG/M2 | SYSTOLIC BLOOD PRESSURE: 138 MMHG | TEMPERATURE: 99 F | DIASTOLIC BLOOD PRESSURE: 88 MMHG

## 2022-04-07 DIAGNOSIS — E03.9 HYPOTHYROIDISM, UNSPECIFIED TYPE: ICD-10-CM

## 2022-04-07 DIAGNOSIS — D64.9 ANEMIA, UNSPECIFIED TYPE: ICD-10-CM

## 2022-04-07 DIAGNOSIS — M75.102 TEAR OF LEFT ROTATOR CUFF, UNSPECIFIED TEAR EXTENT, UNSPECIFIED WHETHER TRAUMATIC: Primary | ICD-10-CM

## 2022-04-07 DIAGNOSIS — R60.0 PEDAL EDEMA: ICD-10-CM

## 2022-04-07 PROCEDURE — 1160F RVW MEDS BY RX/DR IN RCRD: CPT | Mod: CPTII,S$GLB,, | Performed by: FAMILY MEDICINE

## 2022-04-07 PROCEDURE — 1101F PT FALLS ASSESS-DOCD LE1/YR: CPT | Mod: CPTII,S$GLB,, | Performed by: FAMILY MEDICINE

## 2022-04-07 PROCEDURE — 1101F PR PT FALLS ASSESS DOC 0-1 FALLS W/OUT INJ PAST YR: ICD-10-PCS | Mod: CPTII,S$GLB,, | Performed by: FAMILY MEDICINE

## 2022-04-07 PROCEDURE — 1160F PR REVIEW ALL MEDS BY PRESCRIBER/CLIN PHARMACIST DOCUMENTED: ICD-10-PCS | Mod: CPTII,S$GLB,, | Performed by: FAMILY MEDICINE

## 2022-04-07 PROCEDURE — 3044F PR MOST RECENT HEMOGLOBIN A1C LEVEL <7.0%: ICD-10-PCS | Mod: CPTII,S$GLB,, | Performed by: FAMILY MEDICINE

## 2022-04-07 PROCEDURE — 1159F MED LIST DOCD IN RCRD: CPT | Mod: CPTII,S$GLB,, | Performed by: FAMILY MEDICINE

## 2022-04-07 PROCEDURE — 1126F PR PAIN SEVERITY QUANTIFIED, NO PAIN PRESENT: ICD-10-PCS | Mod: CPTII,S$GLB,, | Performed by: FAMILY MEDICINE

## 2022-04-07 PROCEDURE — 99213 PR OFFICE/OUTPT VISIT, EST, LEVL III, 20-29 MIN: ICD-10-PCS | Mod: S$GLB,,, | Performed by: FAMILY MEDICINE

## 2022-04-07 PROCEDURE — 3288F FALL RISK ASSESSMENT DOCD: CPT | Mod: CPTII,S$GLB,, | Performed by: FAMILY MEDICINE

## 2022-04-07 PROCEDURE — 3008F BODY MASS INDEX DOCD: CPT | Mod: CPTII,S$GLB,, | Performed by: FAMILY MEDICINE

## 2022-04-07 PROCEDURE — 4010F ACE/ARB THERAPY RXD/TAKEN: CPT | Mod: CPTII,S$GLB,, | Performed by: FAMILY MEDICINE

## 2022-04-07 PROCEDURE — 3079F DIAST BP 80-89 MM HG: CPT | Mod: CPTII,S$GLB,, | Performed by: FAMILY MEDICINE

## 2022-04-07 PROCEDURE — 1159F PR MEDICATION LIST DOCUMENTED IN MEDICAL RECORD: ICD-10-PCS | Mod: CPTII,S$GLB,, | Performed by: FAMILY MEDICINE

## 2022-04-07 PROCEDURE — 3008F PR BODY MASS INDEX (BMI) DOCUMENTED: ICD-10-PCS | Mod: CPTII,S$GLB,, | Performed by: FAMILY MEDICINE

## 2022-04-07 PROCEDURE — 4010F PR ACE/ARB THEARPY RXD/TAKEN: ICD-10-PCS | Mod: CPTII,S$GLB,, | Performed by: FAMILY MEDICINE

## 2022-04-07 PROCEDURE — 3075F SYST BP GE 130 - 139MM HG: CPT | Mod: CPTII,S$GLB,, | Performed by: FAMILY MEDICINE

## 2022-04-07 PROCEDURE — 3044F HG A1C LEVEL LT 7.0%: CPT | Mod: CPTII,S$GLB,, | Performed by: FAMILY MEDICINE

## 2022-04-07 PROCEDURE — 99213 OFFICE O/P EST LOW 20 MIN: CPT | Mod: S$GLB,,, | Performed by: FAMILY MEDICINE

## 2022-04-07 PROCEDURE — 3288F PR FALLS RISK ASSESSMENT DOCUMENTED: ICD-10-PCS | Mod: CPTII,S$GLB,, | Performed by: FAMILY MEDICINE

## 2022-04-07 PROCEDURE — 3079F PR MOST RECENT DIASTOLIC BLOOD PRESSURE 80-89 MM HG: ICD-10-PCS | Mod: CPTII,S$GLB,, | Performed by: FAMILY MEDICINE

## 2022-04-07 PROCEDURE — 3075F PR MOST RECENT SYSTOLIC BLOOD PRESS GE 130-139MM HG: ICD-10-PCS | Mod: CPTII,S$GLB,, | Performed by: FAMILY MEDICINE

## 2022-04-07 PROCEDURE — 1126F AMNT PAIN NOTED NONE PRSNT: CPT | Mod: CPTII,S$GLB,, | Performed by: FAMILY MEDICINE

## 2022-04-07 RX ORDER — TRAMADOL HYDROCHLORIDE 50 MG/1
TABLET ORAL
COMMUNITY
Start: 2022-01-17 | End: 2023-07-19 | Stop reason: CLARIF

## 2022-04-07 RX ORDER — GABAPENTIN 300 MG/1
300 CAPSULE ORAL 3 TIMES DAILY
COMMUNITY
Start: 2022-03-28 | End: 2024-01-25

## 2022-04-07 RX ORDER — LEVOTHYROXINE SODIUM 50 UG/1
50 CAPSULE ORAL
Qty: 90 TABLET | Refills: 0 | Status: SHIPPED | OUTPATIENT
Start: 2022-04-07 | End: 2022-08-09 | Stop reason: SDUPTHER

## 2022-04-07 RX ORDER — OXYCODONE HYDROCHLORIDE 5 MG/1
TABLET ORAL
COMMUNITY
Start: 2022-01-06 | End: 2022-08-23

## 2022-04-07 RX ORDER — FUROSEMIDE 20 MG/1
TABLET ORAL
Qty: 15 TABLET | Refills: 0 | Status: SHIPPED | OUTPATIENT
Start: 2022-04-07 | End: 2022-08-09

## 2022-04-09 NOTE — PROGRESS NOTES
Subjective:       Patient ID: Chris Hill Jr. is a 71 y.o. male.    Chief Complaint: Follow-up (2 weeks)    Left shoulder rotator cuff repair doing better.  Anemia.  Hemoglobin 10.7 hypothyroidism.  On 25 mcg past 2 weeks.  TSH was greater than 5. Some pedal edema still.  On see TS E 0.5 daily.  BNP was 22. A1c 5.6.  Glucose 113 cholesterol 144 triglycerides 199 HDL 36 and LDL 62.  Ultrasound showed no DVT.  Still has a little with the pedal edema.    Physical examination vital signs noted.  Neck without bruit.  Chest clear.  Heart regular rate and rhythm.  Abdomen bowel sounds are positive nontender.  Extremities 1+ pedal edema on right.  No calf tenderness.      Review of Systems    Objective:      Physical Exam    Assessment:       1. Tear of left rotator cuff, unspecified tear extent, unspecified whether traumatic    2. Anemia, unspecified type    3. Hypothyroidism, unspecified type    4. Pedal edema        Plan:       Tear of left rotator cuff, unspecified tear extent, unspecified whether traumatic    Anemia, unspecified type    Hypothyroidism, unspecified type  -     T4, Free; Future; Expected date: 04/07/2022  -     TSH; Future; Expected date: 04/07/2022    Pedal edema  -     Basic Metabolic Panel; Future; Expected date: 04/07/2022    Other orders  -     levothyroxine (TIROSINT) 50 mcg Cap; Take 50 mcg by mouth before breakfast.  Dispense: 90 tablet; Refill: 0  -     furosemide (LASIX) 20 MG tablet; One tab qod prn edema  Dispense: 15 tablet; Refill: 0    Continue the levothyroxine 25 daily for 2 more weeks.  Then go up to 50 mcg daily 90 pills.  Follow-up in 2 months with a BMP free T4 TSH.  Lasix 20 every other day maximum p.r.n. edema.  Fifteen pills.

## 2022-04-14 RX ORDER — HYDRALAZINE HYDROCHLORIDE 100 MG/1
TABLET, FILM COATED ORAL
Qty: 270 TABLET | Refills: 0 | Status: SHIPPED | OUTPATIENT
Start: 2022-04-14 | End: 2022-08-16

## 2022-04-17 NOTE — PROGRESS NOTES
[facility] Hematology/Oncology  PROGRESS NOTE -  Follow-up Visit      Subjective:       Patient ID:   NAME: Chris Hill Jr. : 1950     71 y.o. male    Referring Doc: Tyrell  Other Physicians: Manpreet Gordon; Wiley; Cheyanne Alfaro        Chief Complaint:  prostate cancer; anem/tcp f/u         History of Present Illness:     Patient is being seen today in person..The patient is on today to go over the results of the recently ordered labs, tests and studies.  He is here with his wife.     He is doing a lot better with the vertigo issues. He is seeing Dr Mitchell Hines neurology and Dr Davin Carey with ENT, and they suspect it is a BP issue and possible mini-strokes. BP currently good. He is still having swelling in the lower legs and feet and is on lasix     He denies any CP, SOB, HA's or N/V.       He has been taking his B12. Dr Hines has him on plavix now. He saw Dr Pettit about two weeks ago and sees him again next week     He is having colonoscopy with Dr Alfaro in 2022.    He had recent left shoulder surgery in 2022 with Dr Albarran; he had covid 2022      He was supposed to f/u with Dr Gordon again in Dec 2021 but it was cancelled; he has been off the ADT for over a year; he sees  again in 2022 .    He saw Dr Santillan again in 2022         I discussed COVID19 precautions - he had his vaccinations          ROS:   GEN: normal without any fever, night sweats or weight loss  HEENT: normal with no HA's, sore throat, stiff neck, changes in vision  CV: normal with no CP, SOB, PND, JANSEN or orthopnea  PULM: normal with no SOB, cough, hemoptysis, sputum or pleuritic pain  GI: extreme reflux issues  : normal with no hematuria, dysuria  BREAST: normal with no mass, discharge, pain  SKIN: normal with no rash, erythema, bruising, or swelling    Allergies:  Review of patient's allergies indicates:  No Known Allergies    Medications:    Current Outpatient Medications:     amitriptyline (ELAVIL) 50  MG tablet, Take 1 tablet (50 mg total) by mouth every evening., Disp: 90 tablet, Rfl: 1    amLODIPine (NORVASC) 10 MG tablet, Take 1 tablet by mouth once daily, Disp: 90 tablet, Rfl: 0    carvediloL (COREG) 25 MG tablet, TAKE 1 TABLET BY MOUTH TWICE DAILY WITH MEALS, Disp: 180 tablet, Rfl: 0    clopidogreL (PLAVIX) 75 mg tablet, Take 75 mg by mouth once daily., Disp: , Rfl:     co-enzyme Q-10 30 mg capsule, Take 30 mg by mouth 3 (three) times daily., Disp: , Rfl:     cyanocobalamin (VITAMIN B-12) 100 MCG tablet, Take 1,000 mcg by mouth once daily., Disp: , Rfl:     famotidine (PEPCID) 20 MG tablet, Take 1 tablet (20 mg total) by mouth 2 (two) times daily., Disp: 60 tablet, Rfl: 2    fish oil-omega-3 fatty acids 300-1,000 mg capsule, Take 1 capsule by mouth 2 (two) times daily., Disp: , Rfl:     furosemide (LASIX) 20 MG tablet, One tab qod prn edema, Disp: 15 tablet, Rfl: 0    gabapentin (NEURONTIN) 300 MG capsule, TAKE 1 CAPSULE BY MOUTH AT BEDTIME STARTING THE NIGHT AFTER SURGERY, Disp: , Rfl:     hydrALAZINE (APRESOLINE) 100 MG tablet, TAKE 1 TABLET BY MOUTH THREE TIMES DAILY, Disp: 270 tablet, Rfl: 0    hydroCHLOROthiazide (HYDRODIURIL) 12.5 MG Tab, Take 1 tablet (12.5 mg total) by mouth once daily., Disp: 90 tablet, Rfl: 1    HYDROcodone-acetaminophen (NORCO)  mg per tablet, Take 1 tablet by mouth every 6 (six) hours as needed., Disp: , Rfl:     iron-vitamin C 100-250 mg, ICAR-C, 100-250 mg Tab, Take 1 tablet by mouth once daily, Disp: 30 tablet, Rfl: 0    levothyroxine (TIROSINT) 50 mcg Cap, Take 50 mcg by mouth before breakfast., Disp: 90 tablet, Rfl: 0    meclizine (ANTIVERT) 25 mg tablet, TAKE 1 TABLET BY MOUTH EVERY 6 HOURS AS NEEDED FOR DIZZINESS, Disp: , Rfl:     metFORMIN (GLUCOPHAGE) 500 MG tablet, Take 1 tablet by mouth twice daily, Disp: 180 tablet, Rfl: 1    multivitamin (THERAGRAN) per tablet, Take 1 tablet by mouth once daily., Disp: , Rfl:     olmesartan (BENICAR) 40 MG  "tablet, Take 1 tablet (40 mg total) by mouth once daily., Disp: 90 tablet, Rfl: 1    ONETOUCH VERIO Strp, USE 1 STRIP TO CHECK GLUCOSE TWICE DAILY, Disp: , Rfl: 1    oxybutynin (DITROPAN-XL) 10 MG 24 hr tablet, Take 10 mg by mouth once daily., Disp: , Rfl:     oxyCODONE (ROXICODONE) 5 MG immediate release tablet, TAKE 1 TABLET BY MOUTH EVERY 6 HOURS FOR 7 DAYS FOR SEVERE BREAKTHROUGH, Disp: , Rfl:     pantoprazole (PROTONIX) 40 MG tablet, Take 40 mg by mouth 2 (two) times daily as needed., Disp: , Rfl:     potassium chloride SA (K-DUR,KLOR-CON) 20 MEQ tablet, Take 1 tablet (20 mEq total) by mouth 2 (two) times daily., Disp: 180 tablet, Rfl: 1    rosuvastatin (CRESTOR) 40 MG Tab, Take 40 mg by mouth once daily. Taking 20, Disp: , Rfl:     sildenafiL (VIAGRA) 25 MG tablet, Take 1 tablet (25 mg total) by mouth daily as needed for Erectile Dysfunction. Take 1/2 to 1 tablet as needed for erectile dysfunction., Disp: 10 tablet, Rfl: 0    tamsulosin (FLOMAX) 0.4 mg Cap, Take 1 capsule (0.4 mg total) by mouth every evening., Disp: 90 capsule, Rfl: 1    terazosin (HYTRIN) 10 MG capsule, Take 1 capsule (10 mg total) by mouth every evening., Disp: 90 capsule, Rfl: 1    traMADoL (ULTRAM) 50 mg tablet, , Disp: , Rfl:     cloNIDine (CATAPRES) 0.1 MG tablet, Take 1 tablet (0.1 mg total) by mouth every 8 (eight) hours as needed (SBP greater than 180)., Disp: 30 tablet, Rfl: 0    Current Facility-Administered Medications:     leuprolide (6 month) injection 45 mg, 45 mg, Intramuscular, 1 time in Clinic/HOD, Manpreet Gordon MD    PMHx/PSHx Updates:  See patient's last visit with me on 12/28/2021  See H&P on 12/28/2018        Pathology:  Cancer Staging  No matching staging information was found for the patient.          Objective:     Vitals:  Blood pressure (!) 164/79, pulse 62, resp. rate 18, height 5' 6" (1.676 m), weight 101.2 kg (223 lb).        Physical Examination:   GEN: no apparent distress, comfortable; " AAOx3  HEAD: atraumatic and normocephalic  EYES: no conjunctival pallor or muddiness, no icterus; normal pupil reaction to ambient light  ENT: OMM, no pharyngeal erythema, external bilateral ears WNL; no visible thrush or ulcers  NECK: no masses or swelling, trachea midline, no visible LAD/LN's   CV: no palpitations; no pedal edema; no noticeable JVD or neck vein distension; pedal swelling  CHEST: Normal respiratory effort; chest wall breath movements symmetrical; no audible wheezing  ABDOM: non-distended; no bloating  MUSC/Skeletal: ROM normal; joints visibly normal; no deformities; positive arthropathy in hands  EXTREM: no clubbing, cyanosis, inflammation or swelling  SKIN: no rashes, lesions, ulcers, petechiae or subcutaneous nodules;   : no ellison  NEURO: moving all 4 extremities; AAOx3; no tremors  PSYCH: normal mood, affect and behavior  LYMPH: no visible LN's or LAD              Labs:        Lab Results   Component Value Date    WBC 6.60 03/23/2022    HGB 10.7 (L) 03/23/2022    HCT 34.9 (L) 03/23/2022    MCV 83 03/23/2022     03/23/2022     CMP  Sodium   Date Value Ref Range Status   03/23/2022 141 136 - 145 mmol/L Final   04/12/2019 138 134 - 144 mmol/L      Potassium   Date Value Ref Range Status   03/23/2022 3.7 3.5 - 5.1 mmol/L Final     Chloride   Date Value Ref Range Status   03/23/2022 101 95 - 110 mmol/L Final   04/12/2019 99 98 - 110 mmol/L      CO2   Date Value Ref Range Status   03/23/2022 26 23 - 29 mmol/L Final     Glucose   Date Value Ref Range Status   03/23/2022 113 (H) 70 - 110 mg/dL Final   04/12/2019 117 (H) 70 - 99 mg/dL      BUN   Date Value Ref Range Status   03/23/2022 17 8 - 23 mg/dL Final     Creatinine   Date Value Ref Range Status   03/23/2022 0.9 0.5 - 1.4 mg/dL Final   04/12/2019 0.72 0.60 - 1.40 mg/dL      Calcium   Date Value Ref Range Status   03/23/2022 9.0 8.7 - 10.5 mg/dL Final     Total Protein   Date Value Ref Range Status   03/23/2022 6.6 6.0 - 8.4 g/dL Final      Albumin   Date Value Ref Range Status   03/23/2022 3.5 3.5 - 5.2 g/dL Final   04/12/2019 3.3 3.1 - 4.7 g/dL      Total Bilirubin   Date Value Ref Range Status   03/23/2022 0.7 0.1 - 1.0 mg/dL Final     Comment:     For infants and newborns, interpretation of results should be based  on gestational age, weight and in agreement with clinical  observations.    Premature Infant recommended reference ranges:  Up to 24 hours.............<8.0 mg/dL  Up to 48 hours............<12.0 mg/dL  3-5 days..................<15.0 mg/dL  6-29 days.................<15.0 mg/dL       Alkaline Phosphatase   Date Value Ref Range Status   03/23/2022 48 (L) 55 - 135 U/L Final     AST   Date Value Ref Range Status   03/23/2022 17 10 - 40 U/L Final     ALT   Date Value Ref Range Status   03/23/2022 15 10 - 44 U/L Final     Anion Gap   Date Value Ref Range Status   03/23/2022 14 8 - 16 mmol/L Final     eGFR if    Date Value Ref Range Status   03/23/2022 >60.0 >60 mL/min/1.73 m^2 Final     eGFR if non    Date Value Ref Range Status   03/23/2022 >60.0 >60 mL/min/1.73 m^2 Final     Comment:     Calculation used to obtain the estimated glomerular filtration  rate (eGFR) is the CKD-EPI equation.        Lab Results   Component Value Date    IRON 43 (L) 03/23/2022    TIBC 290 03/23/2022    FERRITIN 42 03/23/2022            Radiology/Diagnostic Studies:    Us Abdomen Complete    Result Date: 1/2/2019  Abdominal ultrasound Clinical history is anemia, prostate cancer The pancreas, aorta and IVC are normal. The liver is hyperechoic compatible with fatty infiltration. There is hepatopedal flow within the portal vein. The common bile duct measures 6 mm. The patient has had a cholecystectomy. The kidneys are normal in size and echogenicity. The spleen is normal in size measuring 12 cm. IMPRESSION: Fatty infiltration of the liver otherwise negative abdominal ultrasound Read and electronically signed by: Marry Goldberg MD  on 1/2/2019 9:49 AM CST LEYDI HERNANDEZ MD      I have reviewed all available lab results and radiology reports.    Assessment/Plan:   (1) 71 y.o. male with diagnosis of prostate cancer who has been referred by Simone Santillan Jr., MD for evaluation by medical oncology. Patient with a high risk adenocarcinoma of the prostate with [id number] with GS of 4+5.   - he started androgen depravation therapy and monotherapy XRT (IMRT)  - followed by Dr Gordon with  and currently on Trelstar  - followed by Dr Santillan with Rad/onc and completed XRt on 12/18/2018  - latest PSA at 0.01 in May 2020 and he had his last lupron shot done in April/May 2020  - he sees Dr Gordon again in Nov 2020 1/13/2021:  - He saw Dr Gordon again in Nov 2020 and they are repeating his PSA next month with plans to repeat every 3 months for now.     4/13/2021:  - seeing Dr Gordon with labs in near future  - PSA on 2/23/2021 was 0.01    9/28/2021:  - He sees Dr Gordon again in Dec 2021 and the latest PSA was a little higher at 0.03; he has been off the ADT for about a year  - discussed and will make referral to Dr Andrea Scanlon at [facility] with -Oncology    12/28/2021:  - he did not see Dr Scanlon at [facility] as of yet - will check on the referral  - He was supposed to f/u with Dr Gordon again in Dec 2021 but it was cancelled; he has been off the ADT for over a year; he sees MITRA again in June 2022 and Dr Santillan again in Jan 2022  - PSA down to 0.01 now and good    4/18/2022:  - he sees Dr Gordon again in June 2022         (2) CAD and non-rheumatic MR; mild CVA in 1993; Hypertensive Heart disease - followed by Dr Jama with cardiology     (3) HTN and hypercholesterolemia     (4) LORA     (5) DM    (6) Chronic back pain issues - required recent lumbar surgery with Dr Chris Fofana at [facility]     (7) Hx/of nightly fevers     (8) Chronic diarhhea - followed by Dr Alfaro with GI and s/p recent endoscopies     (9) Mild thrombocytopenia resolved with last  platelet count down at 141,000  - no history of hepatitis or liver problems; no alcoholism  - he has positive antiplatelet antibody screens both direct and indirect consistent with an underlying chronic ITP condition  - his flow cytometry showed no evidence of leukemia but he did have a relative increase in eosinophils    1/13/2021:  - latest platelet count at 129,000 which should be adequate for most surgeries    4/13/2021:  - labs pending    9/28/2021:  - latest platelet count is WNL    12/28/2021:  - latest plat wnl     (10) Mild anemia with latest hgb at 11.5 and stable   - NCNC parameters  - iron panel was WNL with recent labs  - OBS was negative on 11/15  - hx/of colon polyps in past  - followed by Dr Alfaro with GI with planned repeat endoscopies in near future    1/13/2021:  - hgb at 11.5 with normal iron panel; stable    4/13/2021:  - labs pending    9/28/2021:  - latest hgb at 11.5 and relatively stable  - iron panel is adequate    12/28/2021:  - latest hgb at 11.1 and adequate  - iron panel wnl    4/18/2022:  - hgb at 10.7 and a little lower  - his iron was a little low despite the oral iron  - discussed IV iron and he is agreeable    (11) Esophageal polyp   - He had scope with EGD with Dr Alfaroand they found an esophageal polyp which was cauterized. He saw Dr Santillan  and he recommended that the patient have the polyp removed.   - he is having repeat EGD to re-evaluate the polyp next month      1/13/2021:  -He previously had scope with EGD with Dr Alfaro  and they found an esophageal polyp which was cauterized.  -  He subsequently saw Dr Santillan and he recommended that the patient have the polyp removed.   - He had repeat EGD but it was incomplete due residual gastric contents and it was to be rescheduled for the following month but he did not have it done as of yet.    4/13/2021:  - he is still having persistent and severe reflux  - he needs to f/u with Dr Alfaro    (12) Possible ministrokes  and vertigo - seeing Dr mitchell Hines and Dr Davin murray with ENT  - now on plavix    VISIT DIAGNOSES:      Normocytic normochromic anemia    History of prostate cancer          PLAN:  1. Proceed with rad/onc, GI and  directives  2. Check CBC/plat/iron panel every 3 months for now    3. F/u with PCP, , Rad/onc etc - sees Dr Gordon in June 2022  4. continue ICAR-C daily po but set up to IV irons  5. F/u with GI  as directed  6. Check on the referral to Dr Scanlon at [facility] for evaluation just out of abundance of precaution    RTC in 3-4 months    Fax note to Samy Santillan III, MD, and Wiley Gordon Albright; Mitchell Hines; Ghislaine    Discussion:       Total Time spent on patient:    I spent over 15 mins of time with the patient. Reviewed results of the recently ordered labs, tests, reports and studies; made directives with regards to the results. Over half of this time was spent couseling and coordinating care, making treatment and analytical decisions; ordering necessary labs, tests and studies; and discussing treatment options and setting up treatment plan(s) if indicated.        COVID-19 Discussion:    I had long discussion with patient and any applicable family about the COVID-19 coronavirus epidemic and the recommended precautions with regard to cancer and/or hematology patients. I have re-iterated the CDC recommendations for adequate hand washing, use of hand -like products, and coughing into elbow, etc. In addition, especially for our patients who are on chemotherapy and/or our otherwise immunocompromised patients, I have recommended avoidance of crowds, including movie theaters, restaurants, churches, etc. I have recommended avoidance of any sick or symptomatic family members and/or friends. I have also recommended avoidance of any raw and unwashed food products, and general avoidance of food items that have not been prepared by themselves. The patient has been asked to call us  immediately with any symptom developments, issues, questions or other general concerns.       Iron Infusion Therapy Discussion:     I provided literature/learning materials on the particular IV iron regimen and discussed the potential side-effect profiles of the drug(s). I discussed the importance of compliance with obtaining and monitoring requested lab work, and went over the potential risk for the development of anaphylactic shock, bronchospasm, dysrhythmia, liver and/or kidney damage, and respiratory/cardiovascular arrest and/or failure. I discussed the potential risks for development of alopecia, fevers, itching, chills and/or rigors, cold sensory issues, ringing in ears, vertigo and neuropathy, all of which are usually acute but sometimes could end up being chronic and life-long. I discussed the risks of hand-foot syndrome and rashes, and development of other autoimmune mediated processes such as pneumonitis and colitis which could be life threatening.     The patient's consent has been obtained to proceed with the IV iron therapy.The patient will be referred to [facility] for training and education on IV iron therapy, use of antiemetics and/or anti-diarrheals, use of NSAID's, potential IV iron therapy side-effects, and any specific recommendations and precautions with the particular IV iron agents.      I answered all of the patient's (and family's, if applicable) questions to the best of my ability and to their complete satisfaction. The patient acknowledged full understanding of the risks, recommendations and plan(s).         I have explained all of the above in detail and the patient understands all of the current recommendation(s). I have answered all of their questions to the best of my ability and to their complete satisfaction.   The patient is to continue with the current management plan.            Electronically signed by Cyrus Gibson MD

## 2022-04-18 ENCOUNTER — OFFICE VISIT (OUTPATIENT)
Dept: HEMATOLOGY/ONCOLOGY | Facility: CLINIC | Age: 72
End: 2022-04-18
Payer: MEDICARE

## 2022-04-18 VITALS
WEIGHT: 223 LBS | HEART RATE: 62 BPM | BODY MASS INDEX: 35.84 KG/M2 | RESPIRATION RATE: 18 BRPM | SYSTOLIC BLOOD PRESSURE: 164 MMHG | HEIGHT: 66 IN | DIASTOLIC BLOOD PRESSURE: 79 MMHG

## 2022-04-18 DIAGNOSIS — D64.9 NORMOCYTIC NORMOCHROMIC ANEMIA: Primary | ICD-10-CM

## 2022-04-18 DIAGNOSIS — Z12.5 ENCOUNTER FOR SCREENING FOR MALIGNANT NEOPLASM OF PROSTATE: ICD-10-CM

## 2022-04-18 DIAGNOSIS — Z85.46 HISTORY OF PROSTATE CANCER: Chronic | ICD-10-CM

## 2022-04-18 DIAGNOSIS — D50.9 IRON DEFICIENCY ANEMIA, UNSPECIFIED IRON DEFICIENCY ANEMIA TYPE: ICD-10-CM

## 2022-04-18 PROCEDURE — 3044F HG A1C LEVEL LT 7.0%: CPT | Mod: S$GLB,,, | Performed by: INTERNAL MEDICINE

## 2022-04-18 PROCEDURE — 4010F ACE/ARB THERAPY RXD/TAKEN: CPT | Mod: S$GLB,,, | Performed by: INTERNAL MEDICINE

## 2022-04-18 PROCEDURE — 3078F DIAST BP <80 MM HG: CPT | Mod: S$GLB,,, | Performed by: INTERNAL MEDICINE

## 2022-04-18 PROCEDURE — 99214 OFFICE O/P EST MOD 30 MIN: CPT | Mod: S$GLB,,, | Performed by: INTERNAL MEDICINE

## 2022-04-18 PROCEDURE — 1160F PR REVIEW ALL MEDS BY PRESCRIBER/CLIN PHARMACIST DOCUMENTED: ICD-10-PCS | Mod: S$GLB,,, | Performed by: INTERNAL MEDICINE

## 2022-04-18 PROCEDURE — 1101F PR PT FALLS ASSESS DOC 0-1 FALLS W/OUT INJ PAST YR: ICD-10-PCS | Mod: S$GLB,,, | Performed by: INTERNAL MEDICINE

## 2022-04-18 PROCEDURE — 3288F PR FALLS RISK ASSESSMENT DOCUMENTED: ICD-10-PCS | Mod: S$GLB,,, | Performed by: INTERNAL MEDICINE

## 2022-04-18 PROCEDURE — 1101F PT FALLS ASSESS-DOCD LE1/YR: CPT | Mod: S$GLB,,, | Performed by: INTERNAL MEDICINE

## 2022-04-18 PROCEDURE — 1159F MED LIST DOCD IN RCRD: CPT | Mod: S$GLB,,, | Performed by: INTERNAL MEDICINE

## 2022-04-18 PROCEDURE — 1160F RVW MEDS BY RX/DR IN RCRD: CPT | Mod: S$GLB,,, | Performed by: INTERNAL MEDICINE

## 2022-04-18 PROCEDURE — 3288F FALL RISK ASSESSMENT DOCD: CPT | Mod: S$GLB,,, | Performed by: INTERNAL MEDICINE

## 2022-04-18 PROCEDURE — 1126F AMNT PAIN NOTED NONE PRSNT: CPT | Mod: S$GLB,,, | Performed by: INTERNAL MEDICINE

## 2022-04-18 PROCEDURE — 1159F PR MEDICATION LIST DOCUMENTED IN MEDICAL RECORD: ICD-10-PCS | Mod: S$GLB,,, | Performed by: INTERNAL MEDICINE

## 2022-04-18 PROCEDURE — 3077F SYST BP >= 140 MM HG: CPT | Mod: S$GLB,,, | Performed by: INTERNAL MEDICINE

## 2022-04-18 PROCEDURE — 3044F PR MOST RECENT HEMOGLOBIN A1C LEVEL <7.0%: ICD-10-PCS | Mod: S$GLB,,, | Performed by: INTERNAL MEDICINE

## 2022-04-18 PROCEDURE — 3078F PR MOST RECENT DIASTOLIC BLOOD PRESSURE < 80 MM HG: ICD-10-PCS | Mod: S$GLB,,, | Performed by: INTERNAL MEDICINE

## 2022-04-18 PROCEDURE — 99214 PR OFFICE/OUTPT VISIT, EST, LEVL IV, 30-39 MIN: ICD-10-PCS | Mod: S$GLB,,, | Performed by: INTERNAL MEDICINE

## 2022-04-18 PROCEDURE — 1126F PR PAIN SEVERITY QUANTIFIED, NO PAIN PRESENT: ICD-10-PCS | Mod: S$GLB,,, | Performed by: INTERNAL MEDICINE

## 2022-04-18 PROCEDURE — 4010F PR ACE/ARB THEARPY RXD/TAKEN: ICD-10-PCS | Mod: S$GLB,,, | Performed by: INTERNAL MEDICINE

## 2022-04-18 PROCEDURE — 3008F BODY MASS INDEX DOCD: CPT | Mod: S$GLB,,, | Performed by: INTERNAL MEDICINE

## 2022-04-18 PROCEDURE — 3077F PR MOST RECENT SYSTOLIC BLOOD PRESSURE >= 140 MM HG: ICD-10-PCS | Mod: S$GLB,,, | Performed by: INTERNAL MEDICINE

## 2022-04-18 PROCEDURE — 3008F PR BODY MASS INDEX (BMI) DOCUMENTED: ICD-10-PCS | Mod: S$GLB,,, | Performed by: INTERNAL MEDICINE

## 2022-04-18 RX ORDER — SODIUM CHLORIDE 0.9 % (FLUSH) 0.9 %
10 SYRINGE (ML) INJECTION
Status: CANCELLED | OUTPATIENT
Start: 2022-04-27

## 2022-04-18 RX ORDER — DIPHENHYDRAMINE HYDROCHLORIDE 50 MG/ML
50 INJECTION INTRAMUSCULAR; INTRAVENOUS ONCE AS NEEDED
Status: CANCELLED | OUTPATIENT
Start: 2022-04-27

## 2022-04-18 RX ORDER — SODIUM CHLORIDE 9 MG/ML
INJECTION, SOLUTION INTRAVENOUS CONTINUOUS
Status: CANCELLED | OUTPATIENT
Start: 2022-04-27

## 2022-04-18 RX ORDER — HEPARIN 100 UNIT/ML
5 SYRINGE INTRAVENOUS
Status: CANCELLED | OUTPATIENT
Start: 2022-04-27

## 2022-04-18 RX ORDER — DIPHENHYDRAMINE HYDROCHLORIDE 50 MG/ML
25 INJECTION INTRAMUSCULAR; INTRAVENOUS
Status: CANCELLED
Start: 2022-04-27

## 2022-04-18 RX ORDER — EPINEPHRINE 0.3 MG/.3ML
0.3 INJECTION SUBCUTANEOUS ONCE AS NEEDED
Status: CANCELLED | OUTPATIENT
Start: 2022-04-27

## 2022-04-18 RX ORDER — METHYLPREDNISOLONE SOD SUCC 125 MG
125 VIAL (EA) INJECTION ONCE AS NEEDED
Status: CANCELLED | OUTPATIENT
Start: 2022-04-27

## 2022-04-21 ENCOUNTER — PATIENT MESSAGE (OUTPATIENT)
Dept: FAMILY MEDICINE | Facility: CLINIC | Age: 72
End: 2022-04-21
Payer: MEDICARE

## 2022-04-21 ENCOUNTER — TELEPHONE (OUTPATIENT)
Dept: FAMILY MEDICINE | Facility: CLINIC | Age: 72
End: 2022-04-21
Payer: MEDICARE

## 2022-04-21 RX ORDER — LEVOTHYROXINE SODIUM 50 UG/1
50 TABLET ORAL
Qty: 90 TABLET | Refills: 2 | Status: CANCELLED | OUTPATIENT
Start: 2022-04-21 | End: 2023-04-21

## 2022-04-21 RX ORDER — LEVOTHYROXINE SODIUM 50 UG/1
50 TABLET ORAL
Qty: 90 TABLET | Refills: 2 | Status: SHIPPED | OUTPATIENT
Start: 2022-04-21 | End: 2023-01-23 | Stop reason: SDUPTHER

## 2022-04-21 NOTE — TELEPHONE ENCOUNTER
No new care gaps identified.  Powered by "[employer] - The Simple, Fast and Elegant Tablet [employer] App" by [email or web address]. Reference number: [id number].   4/21/2022 12:09:34 PM CDT

## 2022-04-28 ENCOUNTER — PATIENT MESSAGE (OUTPATIENT)
Dept: UROLOGY | Facility: CLINIC | Age: 72
End: 2022-04-28

## 2022-04-28 ENCOUNTER — INFUSION (OUTPATIENT)
Dept: INFUSION THERAPY | Facility: HOSPITAL | Age: 72
End: 2022-04-28
Attending: INTERNAL MEDICINE
Payer: MEDICARE

## 2022-04-28 ENCOUNTER — HOSPITAL ENCOUNTER (OUTPATIENT)
Dept: RADIOLOGY | Facility: HOSPITAL | Age: 72
Discharge: HOME | End: 2022-04-28
Attending: NURSE PRACTITIONER
Payer: MEDICARE

## 2022-04-28 ENCOUNTER — OFFICE VISIT (OUTPATIENT)
Dept: UROLOGY | Facility: CLINIC | Age: 72
End: 2022-04-28
Payer: MEDICARE

## 2022-04-28 VITALS
OXYGEN SATURATION: 96 % | BODY MASS INDEX: 35.76 KG/M2 | TEMPERATURE: 98 F | DIASTOLIC BLOOD PRESSURE: 64 MMHG | HEART RATE: 61 BPM | RESPIRATION RATE: 18 BRPM | SYSTOLIC BLOOD PRESSURE: 132 MMHG | WEIGHT: 222.5 LBS | HEIGHT: 66 IN

## 2022-04-28 VITALS — HEIGHT: 66 IN | BODY MASS INDEX: 34.44 KG/M2 | WEIGHT: 214.31 LBS

## 2022-04-28 DIAGNOSIS — N45.3 EPIDIDYMOORCHITIS: ICD-10-CM

## 2022-04-28 DIAGNOSIS — N50.89 LUMP IN THE TESTICLE: ICD-10-CM

## 2022-04-28 DIAGNOSIS — D50.9 IRON DEFICIENCY ANEMIA, UNSPECIFIED IRON DEFICIENCY ANEMIA TYPE: Primary | ICD-10-CM

## 2022-04-28 DIAGNOSIS — N50.812 PAIN IN LEFT TESTICLE: Primary | ICD-10-CM

## 2022-04-28 DIAGNOSIS — N50.812 PAIN IN LEFT TESTICLE: ICD-10-CM

## 2022-04-28 PROCEDURE — 99214 PR OFFICE/OUTPT VISIT, EST, LEVL IV, 30-39 MIN: ICD-10-PCS | Mod: S$GLB,,, | Performed by: NURSE PRACTITIONER

## 2022-04-28 PROCEDURE — 1160F RVW MEDS BY RX/DR IN RCRD: CPT | Mod: CPTII,S$GLB,, | Performed by: NURSE PRACTITIONER

## 2022-04-28 PROCEDURE — 99999 PR PBB SHADOW E&M-EST. PATIENT-LVL V: ICD-10-PCS | Mod: PBBFAC,,, | Performed by: NURSE PRACTITIONER

## 2022-04-28 PROCEDURE — 76870 US SCROTUM AND TESTICLES: ICD-10-PCS | Mod: 26,,, | Performed by: RADIOLOGY

## 2022-04-28 PROCEDURE — 76870 US EXAM SCROTUM: CPT | Mod: 26,,, | Performed by: RADIOLOGY

## 2022-04-28 PROCEDURE — 76870 US EXAM SCROTUM: CPT | Mod: TC

## 2022-04-28 PROCEDURE — 3044F HG A1C LEVEL LT 7.0%: CPT | Mod: CPTII,S$GLB,, | Performed by: NURSE PRACTITIONER

## 2022-04-28 PROCEDURE — 3044F PR MOST RECENT HEMOGLOBIN A1C LEVEL <7.0%: ICD-10-PCS | Mod: CPTII,S$GLB,, | Performed by: NURSE PRACTITIONER

## 2022-04-28 PROCEDURE — 3008F PR BODY MASS INDEX (BMI) DOCUMENTED: ICD-10-PCS | Mod: CPTII,S$GLB,, | Performed by: NURSE PRACTITIONER

## 2022-04-28 PROCEDURE — 4010F ACE/ARB THERAPY RXD/TAKEN: CPT | Mod: CPTII,S$GLB,, | Performed by: NURSE PRACTITIONER

## 2022-04-28 PROCEDURE — 63600175 PHARM REV CODE 636 W HCPCS: Performed by: INTERNAL MEDICINE

## 2022-04-28 PROCEDURE — 99999 PR PBB SHADOW E&M-EST. PATIENT-LVL V: CPT | Mod: PBBFAC,,, | Performed by: NURSE PRACTITIONER

## 2022-04-28 PROCEDURE — 1159F PR MEDICATION LIST DOCUMENTED IN MEDICAL RECORD: ICD-10-PCS | Mod: CPTII,S$GLB,, | Performed by: NURSE PRACTITIONER

## 2022-04-28 PROCEDURE — 3008F BODY MASS INDEX DOCD: CPT | Mod: CPTII,S$GLB,, | Performed by: NURSE PRACTITIONER

## 2022-04-28 PROCEDURE — 4010F PR ACE/ARB THEARPY RXD/TAKEN: ICD-10-PCS | Mod: CPTII,S$GLB,, | Performed by: NURSE PRACTITIONER

## 2022-04-28 PROCEDURE — 96367 TX/PROPH/DG ADDL SEQ IV INF: CPT

## 2022-04-28 PROCEDURE — 1160F PR REVIEW ALL MEDS BY PRESCRIBER/CLIN PHARMACIST DOCUMENTED: ICD-10-PCS | Mod: CPTII,S$GLB,, | Performed by: NURSE PRACTITIONER

## 2022-04-28 PROCEDURE — 99214 OFFICE O/P EST MOD 30 MIN: CPT | Mod: S$GLB,,, | Performed by: NURSE PRACTITIONER

## 2022-04-28 PROCEDURE — 96365 THER/PROPH/DIAG IV INF INIT: CPT

## 2022-04-28 PROCEDURE — 25000003 PHARM REV CODE 250: Performed by: INTERNAL MEDICINE

## 2022-04-28 PROCEDURE — 1159F MED LIST DOCD IN RCRD: CPT | Mod: CPTII,S$GLB,, | Performed by: NURSE PRACTITIONER

## 2022-04-28 RX ORDER — DIPHENHYDRAMINE HYDROCHLORIDE 50 MG/ML
25 INJECTION INTRAMUSCULAR; INTRAVENOUS
Status: CANCELLED
Start: 2022-05-05

## 2022-04-28 RX ORDER — SODIUM CHLORIDE 0.9 % (FLUSH) 0.9 %
10 SYRINGE (ML) INJECTION
Status: CANCELLED | OUTPATIENT
Start: 2022-05-05

## 2022-04-28 RX ORDER — HEPARIN 100 UNIT/ML
5 SYRINGE INTRAVENOUS
Status: CANCELLED | OUTPATIENT
Start: 2022-05-05

## 2022-04-28 RX ORDER — EPINEPHRINE 0.3 MG/.3ML
0.3 INJECTION SUBCUTANEOUS ONCE AS NEEDED
Status: CANCELLED | OUTPATIENT
Start: 2022-05-05

## 2022-04-28 RX ORDER — SODIUM CHLORIDE 9 MG/ML
INJECTION, SOLUTION INTRAVENOUS CONTINUOUS
Status: CANCELLED | OUTPATIENT
Start: 2022-05-05

## 2022-04-28 RX ORDER — IBUPROFEN 600 MG/1
600 TABLET ORAL EVERY 6 HOURS PRN
Qty: 20 TABLET | Refills: 0 | Status: SHIPPED | OUTPATIENT
Start: 2022-04-28

## 2022-04-28 RX ORDER — SODIUM CHLORIDE 0.9 % (FLUSH) 0.9 %
10 SYRINGE (ML) INJECTION
Status: DISCONTINUED | OUTPATIENT
Start: 2022-04-28 | End: 2022-04-28 | Stop reason: HOSPADM

## 2022-04-28 RX ORDER — DIPHENHYDRAMINE HYDROCHLORIDE 50 MG/ML
50 INJECTION INTRAMUSCULAR; INTRAVENOUS ONCE AS NEEDED
Status: CANCELLED | OUTPATIENT
Start: 2022-05-05

## 2022-04-28 RX ORDER — SULFAMETHOXAZOLE AND TRIMETHOPRIM 800; 160 MG/1; MG/1
1 TABLET ORAL 2 TIMES DAILY
Qty: 42 TABLET | Refills: 0 | Status: SHIPPED | OUTPATIENT
Start: 2022-04-28 | End: 2022-05-19

## 2022-04-28 RX ORDER — METHYLPREDNISOLONE SOD SUCC 125 MG
125 VIAL (EA) INJECTION ONCE AS NEEDED
Status: CANCELLED | OUTPATIENT
Start: 2022-05-05

## 2022-04-28 RX ORDER — SODIUM CHLORIDE 9 MG/ML
INJECTION, SOLUTION INTRAVENOUS CONTINUOUS
Status: DISCONTINUED | OUTPATIENT
Start: 2022-04-28 | End: 2022-04-28 | Stop reason: HOSPADM

## 2022-04-28 RX ADMIN — SODIUM CHLORIDE: 9 INJECTION, SOLUTION INTRAVENOUS at 09:04

## 2022-04-28 RX ADMIN — FERRIC CARBOXYMALTOSE INJECTION 750 MG: 50 INJECTION, SOLUTION INTRAVENOUS at 10:04

## 2022-04-28 RX ADMIN — DIPHENHYDRAMINE HYDROCHLORIDE 25 MG: 50 INJECTION INTRAMUSCULAR; INTRAVENOUS at 09:04

## 2022-04-28 NOTE — PLAN OF CARE
Problem: Fatigue  Goal: Improved Activity Tolerance  Outcome: Ongoing, Progressing  Intervention: Promote Improved Energy  Flowsheets (Taken 4/28/2022 1044)  Fatigue Management: frequent rest breaks encouraged

## 2022-04-28 NOTE — PROGRESS NOTES
"[facility] Note  Staff: ANGELO Crespo-C    PCP: MD Wilver  Urologist:  MD Heidi    Chief Complaint: Testicle lump/pain    Subjective:        HPI: Chris Hill Jr. is a 71 y.o. male presents today in office with c/o a "lump" within pt's LEFT testicle area that pt initially noticed last night.  Therefore pt and wife are in clinic today for further evaluation of his symptoms.    No recent fever, malaise, etc. At this time.  No changes to his urination habits.  Pt states today every time he touches the lump he has severe pains.  No gross hematuria noted.     HX:  Hx of prostate cancer  Hx of Nayely 4 + 5 equals 9 prostate cancer (cT1c, iPSA 13.2) treated with androgen deprivation therapy external beam radiation.    He presented 10/4/18 for fiducial marker placement as well as SpaceOAR placement. Vol 33.4g at time of markers. At time of SpaceOar he did note daily diarrhea for weeks prior.  8/13/18 eligard 22.5 3 mos depot; 11/13/18 trelstar 22.5mg 6 mos depot.   - 2 weeks prior did interrupt his XRT for lumbar back surgery as his chronic back pain was limiting his ability to lay flat on radiation table.   See previous OV notes for detailed hx at this time.    Current  meds:  Oxybutynin 10 mg daily.  Tamsulosin 0.4 mg daily.    REVIEW OF SYSTEMS:  A comprehensive 10 system review was performed and is negative except as noted above in HPI    PMHx:  Past Medical History:   Diagnosis Date    Anemia, chronic disease 12/28/2018    Coronary artery disease     mild    Diabetes mellitus     Diabetes mellitus, type 2     GERD (gastroesophageal reflux disease)     Heart murmur     Hyperlipidemia     Hypertension     Iron deficiency anemia 4/18/2022    Normocytic normochromic anemia 12/28/2018    Prostate cancer 8/20/2018    Sleep apnea     NOT USING [facility]    Thrombocytopenia 12/28/2018    Valvular regurgitation      PSHx:  Past Surgical History:   Procedure Laterality Date    " BACK SURGERY  09/2019    CARDIAC CATHETERIZATION      EYE SURGERY      cataracts bilateral    TRANSRECTAL ULTRASOUND OF PROSTATE WITH INSERTION OF GOLD FIDUCIAL MARKER N/A 10/4/2018    Procedure: ULTRASOUND, PROSTATE, TRANSPERINEAL APPROACH, WITH GOLD FIDUCIAL MARKER INSERTION (with SPACEOAR transperineal biodegradable gel placement);  Surgeon: Manpreet Gordon MD;  Location: [facility];  Service: Urology;  Laterality: N/A;     Allergies:  Patient has no known allergies.    Medications: reviewed     Objective:   There were no vitals filed for this visit.    General:WDWN in NAD  Eyes: PERRLA, normal conjunctiva  Respiratory: no increased work on breathing, clear to auscultation  Cardiovascular: regular rate and rhythm. No obvious extremity edema.  GI: palpation of masses. No tenderness. No hepatosplenomegaly to palpation.  Musculoskeletal: normal range of motion of bilateral upper extremities. Normal muscle strength and tone.  Skin: no obvious rashes or lesions. No tightening of skin noted.  Neurologic: CN grossly normal. Normal sensation.   Psychiatric: awake, alert and oriented x 3. Mood and affect normal. Cooperative.     Exam performed by me in office today:  No scrotal rashes, cysts or lesions  Left Testicle/Epididymis normal in size, nickel size enlarged cyst-movable and palpable with Moderate tenderness on palpation.  Right Testicle/Epididymis-normal in size, no masses, no tenderness noted.  Urethral meatus normal without discharge    Assessment:       1. Pain in left testicle    2. Lump in the testicle    3. Epididymoorchitis          Plan:   Left Testicle nodule vs. Infected cysts within epididymis?    1.  Ultrasound of the Scrotum/Testicles to be performed today.  --Bactrim DS BID x 21 days prescribed to pt during ov today.  --Ibuprofen 600 mg prn inflammation was prescribed today.    2.  The following instructions were given to the patient to assist with discomfort:  -Use jockstrap or the best  supportive underwear he can get for relief of pressure.  -Alternate ice packs/heating pad to areas.  -Take OTC anti-inflammatories  -Sit in a warm tub of water greater than 15 minutes  -Elevate Scrotal Sac    F/u-Our office will contact this pt after we receive and review ALL imaging results to determine best POC for this patient.  Pt verbalized understanding at conclusion of appointment today.  Pt was instructed to contact our office should their symptoms worsen or any new  complaints.    [facility]: Active    Apolonia Tolentino, SANAZC

## 2022-05-05 ENCOUNTER — INFUSION (OUTPATIENT)
Dept: INFUSION THERAPY | Facility: HOSPITAL | Age: 72
End: 2022-05-05
Attending: INTERNAL MEDICINE
Payer: MEDICARE

## 2022-05-05 VITALS
SYSTOLIC BLOOD PRESSURE: 147 MMHG | DIASTOLIC BLOOD PRESSURE: 74 MMHG | HEIGHT: 66 IN | RESPIRATION RATE: 18 BRPM | HEART RATE: 58 BPM | OXYGEN SATURATION: 99 % | WEIGHT: 222.38 LBS | TEMPERATURE: 97 F | BODY MASS INDEX: 35.74 KG/M2

## 2022-05-05 DIAGNOSIS — D50.9 IRON DEFICIENCY ANEMIA, UNSPECIFIED IRON DEFICIENCY ANEMIA TYPE: Primary | ICD-10-CM

## 2022-05-05 PROCEDURE — 63600175 PHARM REV CODE 636 W HCPCS: Performed by: INTERNAL MEDICINE

## 2022-05-05 PROCEDURE — 25000003 PHARM REV CODE 250: Performed by: INTERNAL MEDICINE

## 2022-05-05 PROCEDURE — 96365 THER/PROPH/DIAG IV INF INIT: CPT

## 2022-05-05 PROCEDURE — 96367 TX/PROPH/DG ADDL SEQ IV INF: CPT

## 2022-05-05 RX ORDER — EPINEPHRINE 0.3 MG/.3ML
0.3 INJECTION SUBCUTANEOUS ONCE AS NEEDED
OUTPATIENT
Start: 2022-05-12

## 2022-05-05 RX ORDER — SODIUM CHLORIDE 9 MG/ML
INJECTION, SOLUTION INTRAVENOUS CONTINUOUS
Status: CANCELLED | OUTPATIENT
Start: 2022-05-12

## 2022-05-05 RX ORDER — SODIUM CHLORIDE 0.9 % (FLUSH) 0.9 %
10 SYRINGE (ML) INJECTION
Status: DISCONTINUED | OUTPATIENT
Start: 2022-05-05 | End: 2022-05-05 | Stop reason: HOSPADM

## 2022-05-05 RX ORDER — DIPHENHYDRAMINE HYDROCHLORIDE 50 MG/ML
25 INJECTION INTRAMUSCULAR; INTRAVENOUS
Start: 2022-05-12

## 2022-05-05 RX ORDER — SODIUM CHLORIDE 9 MG/ML
INJECTION, SOLUTION INTRAVENOUS CONTINUOUS
Status: DISCONTINUED | OUTPATIENT
Start: 2022-05-05 | End: 2022-05-05 | Stop reason: HOSPADM

## 2022-05-05 RX ORDER — HEPARIN 100 UNIT/ML
5 SYRINGE INTRAVENOUS
OUTPATIENT
Start: 2022-05-12

## 2022-05-05 RX ORDER — SODIUM CHLORIDE 0.9 % (FLUSH) 0.9 %
10 SYRINGE (ML) INJECTION
Status: CANCELLED | OUTPATIENT
Start: 2022-05-12

## 2022-05-05 RX ORDER — METHYLPREDNISOLONE SOD SUCC 125 MG
125 VIAL (EA) INJECTION ONCE AS NEEDED
OUTPATIENT
Start: 2022-05-12

## 2022-05-05 RX ORDER — DIPHENHYDRAMINE HYDROCHLORIDE 50 MG/ML
50 INJECTION INTRAMUSCULAR; INTRAVENOUS ONCE AS NEEDED
OUTPATIENT
Start: 2022-05-12

## 2022-05-05 RX ADMIN — FERRIC CARBOXYMALTOSE INJECTION 750 MG: 50 INJECTION, SOLUTION INTRAVENOUS at 09:05

## 2022-05-05 RX ADMIN — DIPHENHYDRAMINE HYDROCHLORIDE 25 MG: 50 INJECTION INTRAMUSCULAR; INTRAVENOUS at 09:05

## 2022-05-05 RX ADMIN — SODIUM CHLORIDE: 9 INJECTION, SOLUTION INTRAVENOUS at 09:05

## 2022-05-05 NOTE — PLAN OF CARE
Problem: Fatigue  Goal: Improved Activity Tolerance  Outcome: Ongoing, Progressing  Intervention: Promote Improved Energy  Flowsheets (Taken 5/5/2022 0923)  Fatigue Management: frequent rest breaks encouraged

## 2022-05-07 NOTE — TELEPHONE ENCOUNTER
No new care gaps identified.  [facility] Embedded Care Gaps. Reference number: [id number]. 5/07/2022   6:28:53 PM CDT

## 2022-05-09 RX ORDER — CARVEDILOL 25 MG/1
TABLET ORAL
Qty: 180 TABLET | Refills: 0 | Status: SHIPPED | OUTPATIENT
Start: 2022-05-09 | End: 2022-08-03

## 2022-05-09 NOTE — TELEPHONE ENCOUNTER
Refill Routing Note   Medication(s) are not appropriate for processing by [facility] for the following reason(s):      - Required vitals are abnormal    [facility] action(s):  Defer          Medication reconciliation completed: No     Appointments  past 12m or future 3m with PCP    Date Provider   Last Visit   4/7/2022 Samy Pettit III, MD   Next Visit   6/9/2022 Samy Pettit III, MD   ED visits in past 90 days: 0        Note composed:10:30 AM 05/09/2022

## 2022-05-19 ENCOUNTER — PATIENT MESSAGE (OUTPATIENT)
Dept: FAMILY MEDICINE | Facility: CLINIC | Age: 72
End: 2022-05-19
Payer: MEDICARE

## 2022-05-19 RX ORDER — HYDROCHLOROTHIAZIDE 12.5 MG/1
TABLET ORAL
Qty: 90 TABLET | Refills: 0 | Status: SHIPPED | OUTPATIENT
Start: 2022-05-19 | End: 2022-06-09

## 2022-05-19 NOTE — TELEPHONE ENCOUNTER
Refill Routing Note   Medication(s) are not appropriate for processing by [facility] for the following reason(s):      - Required vitals are abnormal    [facility] action(s):  Defer          Medication reconciliation completed: No     Appointments  past 12m or future 3m with PCP    Date Provider   Last Visit   4/7/2022 Samy Pettit III, MD   Next Visit   6/9/2022 Samy Pettit III, MD   ED visits in past 90 days: 0        Note composed:6:59 PM 05/19/2022           Skin Substitute Text: The defect edges were debeveled with a #15 scalpel blade.  Given the location of the defect, shape of the defect and the proximity to free margins a skin substitute graft was deemed most appropriate.  The graft material was trimmed to fit the size of the defect. The graft was then placed in the primary defect and oriented appropriately.

## 2022-05-19 NOTE — TELEPHONE ENCOUNTER
No new care gaps identified.  [facility] Embedded Care Gaps. Reference number: [id number]. 5/19/2022   10:16:09 AM ERICK

## 2022-05-31 ENCOUNTER — TELEPHONE (OUTPATIENT)
Dept: UROLOGY | Facility: CLINIC | Age: 72
End: 2022-05-31
Payer: MEDICARE

## 2022-05-31 DIAGNOSIS — C61 PROSTATE CANCER: Primary | ICD-10-CM

## 2022-06-01 ENCOUNTER — PATIENT MESSAGE (OUTPATIENT)
Dept: FAMILY MEDICINE | Facility: CLINIC | Age: 72
End: 2022-06-01
Payer: MEDICARE

## 2022-06-01 NOTE — TELEPHONE ENCOUNTER
Spoke w pt he was informed MD will be out of the office on 6/14  Pt was informed of md fischer with prior NP appt pt voiced ok   appt moved to NP schedule and f/u scheduled in 6 mths with psa prior pt voiced ok

## 2022-06-01 NOTE — TELEPHONE ENCOUNTER
Please advise out of office when scheduled on 6/14  Reviewed recent 4/22 np visit with np o'jacobo  Switch June visit to np to review labs, have routine f/u, and reeval testicle vinod last visit after u/s and abx  Then book in with me 6 mos later with psa prior

## 2022-06-03 NOTE — TELEPHONE ENCOUNTER
Refill Routing Note   Medication(s) are not appropriate for processing by [facility] for the following reason(s):      - Drug-Drug Interaction (traMADoL and amitriptyline)  - Drug-Disease Interaction (Prostate cancer and tamsulosin)    [facility] action(s):  Defer Medication-related problems identified:   Drug-drug interaction  Drug-disease interaction        --->Care Gap information included in message below if applicable.   Medication reconciliation completed: No   Automatic Epic Generated Protocol Data:        Requested Prescriptions   Pending Prescriptions Disp Refills    tamsulosin (FLOMAX) 0.4 mg Cap [Pharmacy Med Name: Tamsulosin HCl 0.4 MG Oral Capsule] 90 capsule 0     Sig: TAKE 1 CAPSULE BY MOUTH ONCE DAILY IN THE EVENING       Urology: Alpha-Adrenergic Blocker Failed - 6/2/2022  7:35 PM        Failed - Prostate Cancer is not on problem list        Passed - Patient is at least 18 years old        Passed - BP > 90/50 mmH     BP Readings from Last 3 Encounters:   05/05/22 (!) 147/74   04/28/22 132/64   04/18/22 (!) 164/79               Passed - Valid encounter within last 15 months     Recent Visits  Date Type Provider Dept   04/07/22 Office Visit Samy Pettit III, MD [facility]   03/22/22 Office Visit Samy Pettit III, MD [facility]   02/04/22 Office Visit Samy Pettit III, MD [facility]   11/19/21 Office Visit Samy Pettit III, MD [facility]   10/18/21 Office Visit Samy Pettit III, MD [facility]   08/17/21 Office Visit Samy Pettit III, MD [facility]   07/19/21 Office Visit Samy Pettit III, MD [facility]   07/12/21 Office Visit Samy Pettit III, MD [facility]   07/01/21 Office Visit Samy Pettit III, MD [facility]   06/24/21 Office Visit Samy Pettit III, MD [facility]   Showing  recent visits within past 720 days and meeting all other requirements  Future Appointments  No visits were found meeting these conditions.  Showing future appointments within next 150 days and meeting all other requirements      Future Appointments              In 5 days [facility], [facility], [facility]    In 6 days [facility] Pettit III, [facility], O at [facility]    In 1 week ANGELO [place] - Urology, [facility]    In 2 weeks Samy Pettit III, MD [facility], O at [facility]    In 1 month Cyrus Gibson MD [facility] - Hematology Oncology, [facility]    In 6 months MD Cornelia Mon[place]oll - Urology, [place]    In 7 months [facility] RAD ONC, PHYSICIAN SCHED [facility] - Radiation Oncology, [facility]                Passed - Matches previous order       Previous Authorizing Provider: Samy Pettit III, MD (tamsulosin (FLOMAX) 0.4 mg Cap)  Previous Authorizing Provider: Samy Pettit III, MD (amitriptyline (ELAVIL) 50 MG tablet)  [facility]: [facility] [address] [place] [address]  [facility]: [facility] [address] [facility] [place] [address] [facility]            Passed - No ED/Hospital visits since last PCP visit     Last PCP Visit: 4/7/2022 Last Admission: 6/21/2021 Last ED Visit: 6/21/2021            amitriptyline (ELAVIL) 50 MG tablet [Pharmacy Med Name: Amitriptyline HCl 50 MG Oral Tablet] 90 tablet 3     Sig: TAKE 1 TABLET BY MOUTH ONCE DAILY IN THE EVENING       Psychiatry:  Antidepressants - Heterocyclics (TCAs) Passed - 6/2/2022  7:35 PM        Passed - Patient is at least 18 years old        Passed - Valid encounter within last 15 months     Recent Visits  Date Type Provider Dept   04/07/22 Office Visit Samy Pettit III, MD [facility]   03/22/22 Office Visit Samy Pettit III, MD [facility]   02/04/22 Office Visit Samy Pettit  MD MILADYS [facility]   11/19/21 Office Visit Samy Pettit III, MD [facility]   10/18/21 Office Visit Samy Pettit III, MD [facility]   08/17/21 Office Visit Samy Pettit III, MD [facility]   07/19/21 Office Visit Samy Pettit III, MD [facility]   07/12/21 Office Visit Samy Pettit III, MD [facility]   07/01/21 Office Visit Samy Pettit III, MD [facility]   06/24/21 Office Visit Samy Pettit III, MD [facility]   Showing recent visits within past 720 days and meeting all other requirements  Future Appointments  No visits were found meeting these conditions.  Showing future appointments within next 150 days and meeting all other requirements      Future Appointments              In 5 days [facility], [facility], [facility]    In 6 days Samy Pettit III, MD [facility], O at [facility]    In 1 week ANGELO Alva [place] - Urology, [place] [facility]    In 2 weeks Samy Pettit III, MD [facility], O at [facility]    In 1 month Cyrus Gibson MD [facility] - Hematology Oncology, [facility]    In 6 months Manpreet Gordon MD [place] - Urology, [place]    In 7 months [facility] RAD ONC, PHYSICIAN SCHED [facility] - Radiation Oncology, [facility]                Passed - Matches previous order       Previous Authorizing Provider: Samy Pettit III, MD (tamsulosin (FLOMAX) 0.4 mg Cap)  Previous Authorizing Provider: Samy Pettit III, MD (amitriptyline (ELAVIL) 50 MG tablet)  [facility] [address]  [facility] [address]            Passed - No ED/Hospital visits since last PCP visit     Last PCP Visit: 4/7/2022 Last Admission: 6/21/2021 Last ED Visit: 6/21/2021                 Appointments  past 12m or future 3m with PCP    Date Provider   Last Visit   4/7/2022 Samy Pettit III, MD   Next Visit   6/9/2022 Samy Pettit III, MD   ED visits in past 90 days: 0        Note composed:4:47 PM 06/03/2022

## 2022-06-03 NOTE — TELEPHONE ENCOUNTER
No new care gaps identified.  [facility] Embedded Care Gaps. Reference number: [id number]. 6/02/2022   7:35:54 PM CDT

## 2022-06-04 RX ORDER — TAMSULOSIN HYDROCHLORIDE 0.4 MG/1
CAPSULE ORAL
Qty: 90 CAPSULE | Refills: 0 | Status: SHIPPED | OUTPATIENT
Start: 2022-06-04 | End: 2022-09-05

## 2022-06-04 RX ORDER — AMITRIPTYLINE HYDROCHLORIDE 50 MG/1
TABLET, FILM COATED ORAL
Qty: 90 TABLET | Refills: 3 | Status: SHIPPED | OUTPATIENT
Start: 2022-06-04 | End: 2023-10-01 | Stop reason: SDUPTHER

## 2022-06-06 ENCOUNTER — PATIENT MESSAGE (OUTPATIENT)
Dept: FAMILY MEDICINE | Facility: CLINIC | Age: 72
End: 2022-06-06
Payer: MEDICARE

## 2022-06-08 ENCOUNTER — LAB VISIT (OUTPATIENT)
Dept: LAB | Facility: HOSPITAL | Age: 72
End: 2022-06-08
Attending: UROLOGY
Payer: MEDICARE

## 2022-06-08 ENCOUNTER — TELEPHONE (OUTPATIENT)
Dept: FAMILY MEDICINE | Facility: CLINIC | Age: 72
End: 2022-06-08
Payer: MEDICARE

## 2022-06-08 DIAGNOSIS — C61 PROSTATE CANCER: ICD-10-CM

## 2022-06-08 LAB — COMPLEXED PSA SERPL-MCNC: 0.07 NG/ML (ref 0–4)

## 2022-06-08 PROCEDURE — 84153 ASSAY OF PSA TOTAL: CPT | Performed by: UROLOGY

## 2022-06-08 NOTE — TELEPHONE ENCOUNTER
----- Message from Samy Pettit III, MD sent at 6/8/2022 11:17 AM CDT -----  K very low.kcl 10. 2 a day #60. Bmp 7 days and fu.  ABNORMAL followup to discuss further action.  Left message for patient to call back re potassium low need to discuss and call med in to pharm ?

## 2022-06-08 NOTE — TELEPHONE ENCOUNTER
----- Message from Ruslan Magana sent at 6/8/2022 11:54 AM CDT -----  Contact: Self  Type:  Patient Returning Call    Who Called:  Patient  Who Left Message for Patient:   Tanika  Does the patient know what this is regarding?:  Yes  Best Call Back Number:  [phone]   Additional Information:     Called pt back he states yes he has been taking his potassium and has a fu tomm anyway will discuss furter  At apt

## 2022-06-09 ENCOUNTER — OFFICE VISIT (OUTPATIENT)
Dept: FAMILY MEDICINE | Facility: CLINIC | Age: 72
End: 2022-06-09
Payer: MEDICARE

## 2022-06-09 VITALS
HEIGHT: 66 IN | SYSTOLIC BLOOD PRESSURE: 138 MMHG | WEIGHT: 221 LBS | BODY MASS INDEX: 35.52 KG/M2 | HEART RATE: 75 BPM | OXYGEN SATURATION: 98 % | DIASTOLIC BLOOD PRESSURE: 84 MMHG | TEMPERATURE: 99 F

## 2022-06-09 DIAGNOSIS — K22.2 ESOPHAGEAL STRICTURE: ICD-10-CM

## 2022-06-09 DIAGNOSIS — K21.9 GASTROESOPHAGEAL REFLUX DISEASE, UNSPECIFIED WHETHER ESOPHAGITIS PRESENT: ICD-10-CM

## 2022-06-09 DIAGNOSIS — K63.5 POLYP OF COLON, UNSPECIFIED PART OF COLON, UNSPECIFIED TYPE: ICD-10-CM

## 2022-06-09 DIAGNOSIS — E11.9 TYPE 2 DIABETES MELLITUS WITHOUT COMPLICATION, WITHOUT LONG-TERM CURRENT USE OF INSULIN: Primary | ICD-10-CM

## 2022-06-09 DIAGNOSIS — D64.9 ANEMIA, UNSPECIFIED TYPE: ICD-10-CM

## 2022-06-09 DIAGNOSIS — E87.6 HYPOKALEMIA: ICD-10-CM

## 2022-06-09 DIAGNOSIS — G47.33 OSA (OBSTRUCTIVE SLEEP APNEA): ICD-10-CM

## 2022-06-09 PROCEDURE — 3288F PR FALLS RISK ASSESSMENT DOCUMENTED: ICD-10-PCS | Mod: CPTII,S$GLB,, | Performed by: FAMILY MEDICINE

## 2022-06-09 PROCEDURE — 99214 PR OFFICE/OUTPT VISIT, EST, LEVL IV, 30-39 MIN: ICD-10-PCS | Mod: S$GLB,,, | Performed by: FAMILY MEDICINE

## 2022-06-09 PROCEDURE — 3008F PR BODY MASS INDEX (BMI) DOCUMENTED: ICD-10-PCS | Mod: CPTII,S$GLB,, | Performed by: FAMILY MEDICINE

## 2022-06-09 PROCEDURE — 3079F PR MOST RECENT DIASTOLIC BLOOD PRESSURE 80-89 MM HG: ICD-10-PCS | Mod: CPTII,S$GLB,, | Performed by: FAMILY MEDICINE

## 2022-06-09 PROCEDURE — 3075F SYST BP GE 130 - 139MM HG: CPT | Mod: CPTII,S$GLB,, | Performed by: FAMILY MEDICINE

## 2022-06-09 PROCEDURE — 99214 OFFICE O/P EST MOD 30 MIN: CPT | Mod: S$GLB,,, | Performed by: FAMILY MEDICINE

## 2022-06-09 PROCEDURE — 1101F PR PT FALLS ASSESS DOC 0-1 FALLS W/OUT INJ PAST YR: ICD-10-PCS | Mod: CPTII,S$GLB,, | Performed by: FAMILY MEDICINE

## 2022-06-09 PROCEDURE — 1159F MED LIST DOCD IN RCRD: CPT | Mod: CPTII,S$GLB,, | Performed by: FAMILY MEDICINE

## 2022-06-09 PROCEDURE — 3008F BODY MASS INDEX DOCD: CPT | Mod: CPTII,S$GLB,, | Performed by: FAMILY MEDICINE

## 2022-06-09 PROCEDURE — 1101F PT FALLS ASSESS-DOCD LE1/YR: CPT | Mod: CPTII,S$GLB,, | Performed by: FAMILY MEDICINE

## 2022-06-09 PROCEDURE — 3079F DIAST BP 80-89 MM HG: CPT | Mod: CPTII,S$GLB,, | Performed by: FAMILY MEDICINE

## 2022-06-09 PROCEDURE — 3288F FALL RISK ASSESSMENT DOCD: CPT | Mod: CPTII,S$GLB,, | Performed by: FAMILY MEDICINE

## 2022-06-09 PROCEDURE — 1126F AMNT PAIN NOTED NONE PRSNT: CPT | Mod: CPTII,S$GLB,, | Performed by: FAMILY MEDICINE

## 2022-06-09 PROCEDURE — 4010F PR ACE/ARB THEARPY RXD/TAKEN: ICD-10-PCS | Mod: CPTII,S$GLB,, | Performed by: FAMILY MEDICINE

## 2022-06-09 PROCEDURE — 3044F PR MOST RECENT HEMOGLOBIN A1C LEVEL <7.0%: ICD-10-PCS | Mod: CPTII,S$GLB,, | Performed by: FAMILY MEDICINE

## 2022-06-09 PROCEDURE — 3075F PR MOST RECENT SYSTOLIC BLOOD PRESS GE 130-139MM HG: ICD-10-PCS | Mod: CPTII,S$GLB,, | Performed by: FAMILY MEDICINE

## 2022-06-09 PROCEDURE — 1159F PR MEDICATION LIST DOCUMENTED IN MEDICAL RECORD: ICD-10-PCS | Mod: CPTII,S$GLB,, | Performed by: FAMILY MEDICINE

## 2022-06-09 PROCEDURE — 1160F RVW MEDS BY RX/DR IN RCRD: CPT | Mod: CPTII,S$GLB,, | Performed by: FAMILY MEDICINE

## 2022-06-09 PROCEDURE — 3044F HG A1C LEVEL LT 7.0%: CPT | Mod: CPTII,S$GLB,, | Performed by: FAMILY MEDICINE

## 2022-06-09 PROCEDURE — 1160F PR REVIEW ALL MEDS BY PRESCRIBER/CLIN PHARMACIST DOCUMENTED: ICD-10-PCS | Mod: CPTII,S$GLB,, | Performed by: FAMILY MEDICINE

## 2022-06-09 PROCEDURE — 1126F PR PAIN SEVERITY QUANTIFIED, NO PAIN PRESENT: ICD-10-PCS | Mod: CPTII,S$GLB,, | Performed by: FAMILY MEDICINE

## 2022-06-09 PROCEDURE — 4010F ACE/ARB THERAPY RXD/TAKEN: CPT | Mod: CPTII,S$GLB,, | Performed by: FAMILY MEDICINE

## 2022-06-09 RX ORDER — SPIRONOLACTONE 25 MG/1
25 TABLET ORAL DAILY
Qty: 30 TABLET | Refills: 1 | Status: SHIPPED | OUTPATIENT
Start: 2022-06-09 | End: 2022-08-03

## 2022-06-09 RX ORDER — SPIRONOLACTONE 25 MG/1
25 TABLET ORAL DAILY
COMMUNITY
End: 2022-06-09 | Stop reason: SDUPTHER

## 2022-06-11 PROBLEM — K63.5 POLYP OF COLON: Status: ACTIVE | Noted: 2022-06-11

## 2022-06-12 NOTE — PROGRESS NOTES
Hypokalemia.  Potassium 3.1.  Off the Lasix.  Some diarrhea about once a week.  Potassium chloride 20 mEq b.i.d..  On HCTZ 12.5 daily.  Obstructive sleep apnea some congestion so off the CPAP.  Diabetes mellitus type 2 good control.  Gastroesophageal reflux disease on Protonix b.i.d. helping.  Esophageal stricture.  Swallowing okay.  Anemia 2 iron infusions.  Colon polyps x4 in 2019. Due for repeat now.  TSH is 2.38 free T4 is 1 PSA 0.07 sees Dr. Gordon.  Has done well with his shoulder surgery.  He is been released by the surgeon.    Physical examination.  Vital signs noted.  Neck without bruit.  Chest clear.  Heart regular rate rhythm.  Abdomen bowel sounds are positive soft and nontender.  Subjective:       Patient ID: Chris Hill Jr. is a 71 y.o. male.    Chief Complaint: Follow-up (2 month)    HPI    Objective:        Assessment:       1. Type 2 diabetes mellitus without complication, without long-term current use of insulin    2. Anemia, unspecified type    3. Gastroesophageal reflux disease, unspecified whether esophagitis present    4. Esophageal stricture    5. Polyp of colon, unspecified part of colon, unspecified type    6. LORA (obstructive sleep apnea)    7. Hypokalemia        Plan:       Type 2 diabetes mellitus without complication, without long-term current use of insulin    Anemia, unspecified type    Gastroesophageal reflux disease, unspecified whether esophagitis present    Esophageal stricture    Polyp of colon, unspecified part of colon, unspecified type    LORA (obstructive sleep apnea)    Hypokalemia  -     Basic Metabolic Panel; Future; Expected date: 06/16/2022    Other orders  -     spironolactone (ALDACTONE) 25 MG tablet; Take 1 tablet (25 mg total) by mouth once daily.  Dispense: 30 tablet; Refill: 1    Discontinue the hydrochlorothiazide.  Start Aldactone 25 daily is place.  See if this will grease the potassium.  Decrease his potassium 20 mEq b.i.d. to just 20 mEq once a day.  Get a  BMP in 7 days.  Lasix p.r.n. only.  Mag-Ox 250 per day.  Weight reduction.  Colonoscopy by Dr. Blaine arriola later this month.  Follow-up in 2 months.

## 2022-06-14 ENCOUNTER — OFFICE VISIT (OUTPATIENT)
Dept: UROLOGY | Facility: CLINIC | Age: 72
End: 2022-06-14
Payer: MEDICARE

## 2022-06-14 VITALS
HEART RATE: 55 BPM | BODY MASS INDEX: 35.65 KG/M2 | WEIGHT: 221.81 LBS | HEIGHT: 66 IN | DIASTOLIC BLOOD PRESSURE: 72 MMHG | SYSTOLIC BLOOD PRESSURE: 146 MMHG

## 2022-06-14 DIAGNOSIS — N13.8 BPH WITH URINARY OBSTRUCTION: ICD-10-CM

## 2022-06-14 DIAGNOSIS — N40.1 BPH WITH URINARY OBSTRUCTION: ICD-10-CM

## 2022-06-14 DIAGNOSIS — C61 PROSTATE CANCER: Primary | ICD-10-CM

## 2022-06-14 LAB
BILIRUB SERPL-MCNC: ABNORMAL MG/DL
BLOOD URINE, POC: ABNORMAL
CLARITY, POC UA: CLEAR
COLOR, POC UA: YELLOW
GLUCOSE UR QL STRIP: ABNORMAL
KETONES UR QL STRIP: ABNORMAL
LEUKOCYTE ESTERASE URINE, POC: ABNORMAL
NITRITE, POC UA: ABNORMAL
PH, POC UA: 5.5
POC RESIDUAL URINE VOLUME: 26 ML (ref 0–100)
PROTEIN, POC: ABNORMAL
SPECIFIC GRAVITY, POC UA: 1.01
UROBILINOGEN, POC UA: ABNORMAL

## 2022-06-14 PROCEDURE — 99214 PR OFFICE/OUTPT VISIT, EST, LEVL IV, 30-39 MIN: ICD-10-PCS | Mod: S$GLB,,, | Performed by: NURSE PRACTITIONER

## 2022-06-14 PROCEDURE — 99999 PR PBB SHADOW E&M-EST. PATIENT-LVL V: CPT | Mod: PBBFAC,,, | Performed by: NURSE PRACTITIONER

## 2022-06-14 PROCEDURE — 1159F PR MEDICATION LIST DOCUMENTED IN MEDICAL RECORD: ICD-10-PCS | Mod: CPTII,S$GLB,, | Performed by: NURSE PRACTITIONER

## 2022-06-14 PROCEDURE — 99214 OFFICE O/P EST MOD 30 MIN: CPT | Mod: S$GLB,,, | Performed by: NURSE PRACTITIONER

## 2022-06-14 PROCEDURE — 3044F PR MOST RECENT HEMOGLOBIN A1C LEVEL <7.0%: ICD-10-PCS | Mod: CPTII,S$GLB,, | Performed by: NURSE PRACTITIONER

## 2022-06-14 PROCEDURE — 4010F ACE/ARB THERAPY RXD/TAKEN: CPT | Mod: CPTII,S$GLB,, | Performed by: NURSE PRACTITIONER

## 2022-06-14 PROCEDURE — 4010F PR ACE/ARB THEARPY RXD/TAKEN: ICD-10-PCS | Mod: CPTII,S$GLB,, | Performed by: NURSE PRACTITIONER

## 2022-06-14 PROCEDURE — 1160F RVW MEDS BY RX/DR IN RCRD: CPT | Mod: CPTII,S$GLB,, | Performed by: NURSE PRACTITIONER

## 2022-06-14 PROCEDURE — 3044F HG A1C LEVEL LT 7.0%: CPT | Mod: CPTII,S$GLB,, | Performed by: NURSE PRACTITIONER

## 2022-06-14 PROCEDURE — 3008F BODY MASS INDEX DOCD: CPT | Mod: CPTII,S$GLB,, | Performed by: NURSE PRACTITIONER

## 2022-06-14 PROCEDURE — 81002 URINALYSIS NONAUTO W/O SCOPE: CPT | Mod: S$GLB,,, | Performed by: NURSE PRACTITIONER

## 2022-06-14 PROCEDURE — 3078F DIAST BP <80 MM HG: CPT | Mod: CPTII,S$GLB,, | Performed by: NURSE PRACTITIONER

## 2022-06-14 PROCEDURE — 81002 POCT URINE DIPSTICK WITHOUT MICROSCOPE: ICD-10-PCS | Mod: S$GLB,,, | Performed by: NURSE PRACTITIONER

## 2022-06-14 PROCEDURE — 3078F PR MOST RECENT DIASTOLIC BLOOD PRESSURE < 80 MM HG: ICD-10-PCS | Mod: CPTII,S$GLB,, | Performed by: NURSE PRACTITIONER

## 2022-06-14 PROCEDURE — 51798 POCT BLADDER SCAN: ICD-10-PCS | Mod: S$GLB,,, | Performed by: NURSE PRACTITIONER

## 2022-06-14 PROCEDURE — 1160F PR REVIEW ALL MEDS BY PRESCRIBER/CLIN PHARMACIST DOCUMENTED: ICD-10-PCS | Mod: CPTII,S$GLB,, | Performed by: NURSE PRACTITIONER

## 2022-06-14 PROCEDURE — 3077F PR MOST RECENT SYSTOLIC BLOOD PRESSURE >= 140 MM HG: ICD-10-PCS | Mod: CPTII,S$GLB,, | Performed by: NURSE PRACTITIONER

## 2022-06-14 PROCEDURE — 99999 PR PBB SHADOW E&M-EST. PATIENT-LVL V: ICD-10-PCS | Mod: PBBFAC,,, | Performed by: NURSE PRACTITIONER

## 2022-06-14 PROCEDURE — 51798 US URINE CAPACITY MEASURE: CPT | Mod: S$GLB,,, | Performed by: NURSE PRACTITIONER

## 2022-06-14 PROCEDURE — 1159F MED LIST DOCD IN RCRD: CPT | Mod: CPTII,S$GLB,, | Performed by: NURSE PRACTITIONER

## 2022-06-14 PROCEDURE — 3008F PR BODY MASS INDEX (BMI) DOCUMENTED: ICD-10-PCS | Mod: CPTII,S$GLB,, | Performed by: NURSE PRACTITIONER

## 2022-06-14 PROCEDURE — 3077F SYST BP >= 140 MM HG: CPT | Mod: CPTII,S$GLB,, | Performed by: NURSE PRACTITIONER

## 2022-06-14 NOTE — PROGRESS NOTES
[facility] Note  Staff: MAGUE Crespo    PCP: MD Wilver  Urologist:  MD Heidi    Chief Complaint: F/UP-BPH with LUTS, Hx prostate cancer    Subjective:        HPI: Chris Hill Jr. is a 71 y.o. male presents today for routine recheck.  Pt was last evaluated by me on 22.  Pt is accompanied by wife to ov today.     HX:  Hx of prostate cancer  Hx of Nayely 4 + 5 equals 9 prostate cancer (cT1c, iPSA 13.2) treated with androgen deprivation therapy external beam radiation.    He presented 10/4/18 for fiducial marker placement as well as SpaceOAR placement. Vol 33.4g at time of markers. At time of SpaceOar he did note daily diarrhea for weeks prior.  18 eligard 22.5 3 mos depot; 18 trelstar 22.5mg 6 mos depot.   - 2 weeks prior did interrupt his XRT for lumbar back surgery as his chronic back pain was limiting his ability to lay flat on radiation table.   See previous OV notes for detailed hx at this time.  Completed XRT on 18.     Current  meds:  Oxybutynin 10 mg daily.  Tamsulosin 0.4 mg daily.     TODAY:  UA in office today showed trace ketones, overall normal findings.  Pt denies gross hematuria/dysuria at this time.  Pt overall doing well with no complaints voiced at this time.  *Recent psa level on 22 took slight increase at 0.07 from 0.01 on 21 -discussed with MD prior to OV with pt today for review.  Last XRT was 2018.    AUA SS Today:  10/3  Feeling of ICBE: 0  Frequency:1  Intermittency:1  Urgency:5  Weak urine stream:1  Strainin  Nocturia:2  PVR by bladder scan performed by MA today:  26 mL    Last PSA Screening:   Lab Results   Component Value Date    PSADIAG 0.07 2022    PSADIAG 0.01 2021    PSADIAG 0.03 2021     REVIEW OF SYSTEMS:  A comprehensive 10 system review was performed and is negative except as noted above in HPI    PMHx:  Past Medical History:   Diagnosis Date    Anemia, chronic disease 2018     Coronary artery disease     mild    Diabetes mellitus     Diabetes mellitus, type 2     GERD (gastroesophageal reflux disease)     Heart murmur     Hyperlipidemia     Hypertension     Iron deficiency anemia 4/18/2022    Normocytic normochromic anemia 12/28/2018    Prostate cancer 8/20/2018    Sleep apnea     NOT USING [facility]    Thrombocytopenia 12/28/2018    Valvular regurgitation      PSHx:  Past Surgical History:   Procedure Laterality Date    BACK SURGERY  09/2019    CARDIAC CATHETERIZATION      EYE SURGERY      cataracts bilateral    TRANSRECTAL ULTRASOUND OF PROSTATE WITH INSERTION OF GOLD FIDUCIAL MARKER N/A 10/4/2018    Procedure: ULTRASOUND, PROSTATE, TRANSPERINEAL APPROACH, WITH GOLD FIDUCIAL MARKER INSERTION (with SPACEOAR transperineal biodegradable gel placement);  Surgeon: Manpreet Gordon MD;  [facility]: [facility];  Service: Urology;  Laterality: N/A;     Allergies:  Patient has no known allergies.    Medications: reviewed   Objective:     Vitals:    06/14/22 0931   BP: (!) 146/72   Pulse: (!) 55     General:WDWN in NAD  Eyes: PERRLA, normal conjunctiva  Respiratory: no increased work on breathing, clear to auscultation  Cardiovascular: regular rate and rhythm. No obvious extremity edema.  GI: palpation of masses. No tenderness. No hepatosplenomegaly to palpation.  Musculoskeletal: normal range of motion of bilateral upper extremities. Normal muscle strength and tone.  Skin: no obvious rashes or lesions. No tightening of skin noted.  Neurologic: CN grossly normal. Normal sensation.   Psychiatric: awake, alert and oriented x 3. Mood and affect normal. Cooperative.    Assessment:       1. Prostate cancer    2. BPH with urinary obstruction          Plan:     Reviewed interim labs and medical record as summarized above.  Noted that his PSA went to undetectable with suppression from ADT.  Completed last hormone shot December of 2018, thus completing treatment.  Few months later PSA became  slightly detectable.  We have followed this and again noted that it is normal for PSA to reconstitute as testosterone reconstitutes in the absence of androgen deprivation therapy which it was seen to due to values ago.  His slight doubling in the last 6 months is of minimal concern as there is low margin for error of doubling up these low values and testosterone may be continuing to reconstitute.  We did discuss the importance of continuing to follow PSA closely usually every 6 months, but with this most recent trending up, will follow closely for every 3 months at this time for the next 2 values, and check a testosterone with the next value to see if it has continued to rise correlating with his rising PSA.   Certainly if the PSA continues to trend up without any signs of stabilization or kobe point, can always have him see Radiation Oncology again to discuss further ADT imaging studies etc..  His LUTS are otherwise well controlled on Flomax at this time.  We did discuss conservative recommendations for urgency and frequency which were provided in writing.  Will continue to follow up his control of LUTS on Flomax at each subsequent visit     F/UP with MD as scheduled in the future with PSA level and Testosterone level to be done at 3 mos, 6 mos, etc.  Pt verbalized understanding at this time.    [facility]: Active    Apolonia Tolentino, MAGUE

## 2022-06-15 ENCOUNTER — TELEPHONE (OUTPATIENT)
Dept: UROLOGY | Facility: CLINIC | Age: 72
End: 2022-06-15
Payer: MEDICARE

## 2022-06-15 NOTE — TELEPHONE ENCOUNTER
----- Message from Chastity Drake sent at 6/15/2022  4:06 PM CDT -----  Type:  Patient Returning Call         Who Called:  ISMAEL HOLCOMB JR. [[id number]]         Who Left Message for Patient: n/a         Does the patient know what this is regarding?: no         Would the patient rather a call back or a response via [facility]?  Call back         Best Call Back Number:[phone]         Additional Information:

## 2022-06-17 ENCOUNTER — TELEPHONE (OUTPATIENT)
Dept: HEMATOLOGY/ONCOLOGY | Facility: CLINIC | Age: 72
End: 2022-06-17

## 2022-06-17 ENCOUNTER — PATIENT MESSAGE (OUTPATIENT)
Dept: FAMILY MEDICINE | Facility: CLINIC | Age: 72
End: 2022-06-17
Payer: MEDICARE

## 2022-06-17 ENCOUNTER — LAB VISIT (OUTPATIENT)
Dept: LAB | Facility: HOSPITAL | Age: 72
End: 2022-06-17
Attending: INTERNAL MEDICINE
Payer: MEDICARE

## 2022-06-17 DIAGNOSIS — D64.9 NORMOCYTIC NORMOCHROMIC ANEMIA: ICD-10-CM

## 2022-06-17 DIAGNOSIS — D50.9 IRON DEFICIENCY ANEMIA, UNSPECIFIED IRON DEFICIENCY ANEMIA TYPE: ICD-10-CM

## 2022-06-17 LAB
ALBUMIN SERPL BCP-MCNC: 3.6 G/DL (ref 3.5–5.2)
ALP SERPL-CCNC: 57 U/L (ref 55–135)
ALT SERPL W/O P-5'-P-CCNC: 15 U/L (ref 10–44)
ANION GAP SERPL CALC-SCNC: 6 MMOL/L (ref 8–16)
AST SERPL-CCNC: 20 U/L (ref 10–40)
BASOPHILS # BLD AUTO: 0.04 K/UL (ref 0–0.2)
BASOPHILS NFR BLD: 0.5 % (ref 0–1.9)
BILIRUB SERPL-MCNC: 0.4 MG/DL (ref 0.1–1)
BUN SERPL-MCNC: 14 MG/DL (ref 8–23)
CALCIUM SERPL-MCNC: 8.7 MG/DL (ref 8.7–10.5)
CHLORIDE SERPL-SCNC: 105 MMOL/L (ref 95–110)
CO2 SERPL-SCNC: 28 MMOL/L (ref 23–29)
CREAT SERPL-MCNC: 1 MG/DL (ref 0.5–1.4)
DIFFERENTIAL METHOD: ABNORMAL
EOSINOPHIL # BLD AUTO: 0.6 K/UL (ref 0–0.5)
EOSINOPHIL NFR BLD: 7.7 % (ref 0–8)
ERYTHROCYTE [DISTWIDTH] IN BLOOD BY AUTOMATED COUNT: 16.3 % (ref 11.5–14.5)
EST. GFR  (AFRICAN AMERICAN): >60 ML/MIN/1.73 M^2
EST. GFR  (NON AFRICAN AMERICAN): >60 ML/MIN/1.73 M^2
FERRITIN SERPL-MCNC: 314 NG/ML (ref 20–300)
GLUCOSE SERPL-MCNC: 106 MG/DL (ref 70–110)
HCT VFR BLD AUTO: 38.8 % (ref 40–54)
HGB BLD-MCNC: 12.5 G/DL (ref 14–18)
IMM GRANULOCYTES # BLD AUTO: 0.09 K/UL (ref 0–0.04)
IMM GRANULOCYTES NFR BLD AUTO: 1.2 % (ref 0–0.5)
IRON SERPL-MCNC: 65 UG/DL (ref 45–160)
LYMPHOCYTES # BLD AUTO: 1.3 K/UL (ref 1–4.8)
LYMPHOCYTES NFR BLD: 17.9 % (ref 18–48)
MCH RBC QN AUTO: 27.2 PG (ref 27–31)
MCHC RBC AUTO-ENTMCNC: 32.2 G/DL (ref 32–36)
MCV RBC AUTO: 84 FL (ref 82–98)
MONOCYTES # BLD AUTO: 0.6 K/UL (ref 0.3–1)
MONOCYTES NFR BLD: 7.8 % (ref 4–15)
NEUTROPHILS # BLD AUTO: 4.8 K/UL (ref 1.8–7.7)
NEUTROPHILS NFR BLD: 64.9 % (ref 38–73)
NRBC BLD-RTO: 0 /100 WBC
PLATELET # BLD AUTO: 156 K/UL (ref 150–450)
PMV BLD AUTO: 11.8 FL (ref 9.2–12.9)
POTASSIUM SERPL-SCNC: 3.9 MMOL/L (ref 3.5–5.1)
PROT SERPL-MCNC: 6.5 G/DL (ref 6–8.4)
RBC # BLD AUTO: 4.6 M/UL (ref 4.6–6.2)
SATURATED IRON: 26 % (ref 20–50)
SODIUM SERPL-SCNC: 139 MMOL/L (ref 136–145)
TOTAL IRON BINDING CAPACITY: 252 UG/DL (ref 250–450)
TRANSFERRIN SERPL-MCNC: 180 MG/DL (ref 200–375)
WBC # BLD AUTO: 7.43 K/UL (ref 3.9–12.7)

## 2022-06-17 PROCEDURE — 36415 COLL VENOUS BLD VENIPUNCTURE: CPT | Performed by: INTERNAL MEDICINE

## 2022-06-17 PROCEDURE — 84466 ASSAY OF TRANSFERRIN: CPT | Performed by: INTERNAL MEDICINE

## 2022-06-17 PROCEDURE — 80053 COMPREHEN METABOLIC PANEL: CPT | Performed by: INTERNAL MEDICINE

## 2022-06-17 PROCEDURE — 82728 ASSAY OF FERRITIN: CPT | Performed by: INTERNAL MEDICINE

## 2022-06-17 PROCEDURE — 85025 COMPLETE CBC W/AUTO DIFF WBC: CPT | Performed by: INTERNAL MEDICINE

## 2022-06-17 NOTE — TELEPHONE ENCOUNTER
----- Message from Dorothea Arias sent at 6/17/2022  9:13 AM CDT -----  The patient had labs done today next door and he wants to discuss the results. He also said he went to have labs done for another doctor and he did not know that he had labs for Dr. Gibson. # [phone]

## 2022-06-17 NOTE — TELEPHONE ENCOUNTER
Spoke with pt, relayed that we don't have all lab results back yet. Told pt that once received and reviewed we will return his call.  Pt also wants to know why he needs to have labs done monthly and not every 3 months.  Jerry

## 2022-06-23 ENCOUNTER — PATIENT MESSAGE (OUTPATIENT)
Dept: FAMILY MEDICINE | Facility: CLINIC | Age: 72
End: 2022-06-23
Payer: MEDICARE

## 2022-06-27 ENCOUNTER — LAB VISIT (OUTPATIENT)
Dept: LAB | Facility: HOSPITAL | Age: 72
End: 2022-06-27
Attending: FAMILY MEDICINE
Payer: MEDICARE

## 2022-06-27 DIAGNOSIS — E87.6 HYPOKALEMIA: ICD-10-CM

## 2022-06-27 LAB
ANION GAP SERPL CALC-SCNC: 11 MMOL/L (ref 8–16)
BUN SERPL-MCNC: 16 MG/DL (ref 8–23)
CALCIUM SERPL-MCNC: 8.4 MG/DL (ref 8.7–10.5)
CHLORIDE SERPL-SCNC: 98 MMOL/L (ref 95–110)
CO2 SERPL-SCNC: 23 MMOL/L (ref 23–29)
CREAT SERPL-MCNC: 1 MG/DL (ref 0.5–1.4)
EST. GFR  (AFRICAN AMERICAN): >60 ML/MIN/1.73 M^2
EST. GFR  (NON AFRICAN AMERICAN): >60 ML/MIN/1.73 M^2
GLUCOSE SERPL-MCNC: 119 MG/DL (ref 70–110)
POTASSIUM SERPL-SCNC: 3.9 MMOL/L (ref 3.5–5.1)
SODIUM SERPL-SCNC: 132 MMOL/L (ref 136–145)

## 2022-06-27 PROCEDURE — 80048 BASIC METABOLIC PNL TOTAL CA: CPT | Performed by: FAMILY MEDICINE

## 2022-06-27 PROCEDURE — 36415 COLL VENOUS BLD VENIPUNCTURE: CPT | Performed by: FAMILY MEDICINE

## 2022-07-01 RX ORDER — TERAZOSIN 10 MG/1
CAPSULE ORAL
Qty: 90 CAPSULE | Refills: 0 | Status: SHIPPED | OUTPATIENT
Start: 2022-07-01 | End: 2023-01-02 | Stop reason: SDUPTHER

## 2022-07-01 NOTE — TELEPHONE ENCOUNTER
No new care gaps identified.  [facility] Embedded Care Gaps. Reference number: [id number]. 7/01/2022   12:59:48 PM CDT

## 2022-07-01 NOTE — TELEPHONE ENCOUNTER
Refill Routing Note   Medication(s) are not appropriate for processing by [facility] for the following reason(s):      - Required vitals are abnormal    [facility] action(s):  Defer       Medication Therapy Plan: Last BP 06/14/22 (!) 146/72  Medication reconciliation completed: No     Appointments  past 12m or future 3m with PCP    Date Provider   Last Visit   6/9/2022 Samy Pettit III, MD   Next Visit   8/9/2022 Samy Pettit III, MD   ED visits in past 90 days: 0        Note composed:1:01 PM 07/01/2022

## 2022-07-07 LAB — CRC RECOMMENDATION EXT: NORMAL

## 2022-07-13 ENCOUNTER — PATIENT OUTREACH (OUTPATIENT)
Dept: ADMINISTRATIVE | Facility: HOSPITAL | Age: 72
End: 2022-07-13
Payer: MEDICARE

## 2022-07-13 NOTE — LETTER
AUTHORIZATION FOR RELEASE OF   CONFIDENTIAL INFORMATION    DR MAHONEY    We are seeing Chris Hill Jr., date of birth 1950, in the clinic at [facility]. Samy Pettit III, MD is the patient's PCP. Chris JAIN Sergio Howell has an outstanding lab/procedure at the time we reviewed his chart. In order to help keep his health information updated, he has authorized us to request the following medical record(s):        (  )  MAMMOGRAM                                      (  )  COLONOSCOPY      (  )  PAP SMEAR                                          (  )  OUTSIDE LAB RESULTS     (  )  DEXA SCAN                                          (X  )  EYE EXAM            (  )  FOOT EXAM                                          (  )  ENTIRE RECORD     (  )  OUTSIDE IMMUNIZATIONS                 (  )  _______________         Please fax records to [facility], Clinton H Sharp III, MD, [phone]    Thank you in advance,      Rufina MEDRANO  Care Coordinator  [facility] [address] [place] [address]  Phone [phone]  Fax [phone]        Patient Name: Chris Hill JrAdams  : 1950  Patient Phone #: [phone]

## 2022-07-13 NOTE — LETTER
AUTHORIZATION FOR RELEASE OF   CONFIDENTIAL INFORMATION    DR JEFFERSON    We are seeing Chris Hill Jr., date of birth 1950, in the clinic at [facility]. Samy Pettit III, MD is the patient's PCP. Chris JAIN Sergio Howell has an outstanding lab/procedure at the time we reviewed his chart. In order to help keep his health information updated, he has authorized us to request the following medical record(s):        (  )  MAMMOGRAM                                      (  )  COLONOSCOPY      (  )  PAP SMEAR                                          (  )  OUTSIDE LAB RESULTS     (  )  DEXA SCAN                                          (  X)  EYE EXAM            (  )  FOOT EXAM                                          (  )  ENTIRE RECORD     (  )  OUTSIDE IMMUNIZATIONS                 (  )  _______________         Please fax records to [facility], Clinton H Sharp III, MD, [phone]    Thank you in advance,      Rufina MEDRANO  Care Coordinator  [facility]  [address] [place] [address]  Phone [phone]  Fax [phone]          Patient Name: Chris Hill JrAdams  : 1950  Patient Phone #: [phone]

## 2022-07-15 ENCOUNTER — PATIENT MESSAGE (OUTPATIENT)
Dept: FAMILY MEDICINE | Facility: CLINIC | Age: 72
End: 2022-07-15
Payer: MEDICARE

## 2022-07-15 ENCOUNTER — PATIENT OUTREACH (OUTPATIENT)
Dept: ADMINISTRATIVE | Facility: HOSPITAL | Age: 72
End: 2022-07-15
Payer: MEDICARE

## 2022-07-15 NOTE — PROGRESS NOTES
[facility] Hematology/Oncology  PROGRESS NOTE -  Follow-up Visit      Subjective:       Patient ID:   NAME: Chris Hill Jr. : 1950     71 y.o. male    Referring Doc: Tyrell  Other Physicians: Manpreet Gordon; Wiley; Jacob Alfaro        Chief Complaint:  prostate cancer; anem/tcp f/u         History of Present Illness:     Patient is being seen today in person..The patient is on today to go over the results of the recently ordered labs, tests and studies.  He is here with his wife.     His swelling in the lower legs and feet is now improved    He is on thyroid meds for his thyroid per Dr Pettit     He denies any CP, SOB, HA's or N/V.  Some JANSEN     He has been taking his B12. Dr Hines has him on plavix now.      He had recent colonoscopy with Dr Alfaro in 2022 with only 3 polyps removed    He had recent left shoulder surgery in 2022 with Dr Albarran; he had covid 2022      He saw Dr Gordon in 2022 with mild increase in PSA with planned repat in 3 months. He is off ADT. He sees Dr Gordon again in Aug 2022              I discussed COVID19 precautions - he had his vaccinations          ROS:   GEN: normal without any fever, night sweats or weight loss  HEENT: normal with no HA's, sore throat, stiff neck, changes in vision  CV: normal with no CP, SOB, PND, JANSEN or orthopnea  PULM: normal with no SOB, cough, hemoptysis, sputum or pleuritic pain  GI: extreme reflux issues  : normal with no hematuria, dysuria  BREAST: normal with no mass, discharge, pain  SKIN: normal with no rash, erythema, bruising, or swelling    Allergies:  Review of patient's allergies indicates:  No Known Allergies    Medications:    Current Outpatient Medications:     amitriptyline (ELAVIL) 50 MG tablet, TAKE 1 TABLET BY MOUTH ONCE DAILY IN THE EVENING, Disp: 90 tablet, Rfl: 3    amLODIPine (NORVASC) 10 MG tablet, Take 1 tablet by mouth once daily, Disp: 90 tablet, Rfl: 0    carvediloL (COREG) 25 MG tablet, TAKE 1 TABLET BY  MOUTH TWICE DAILY WITH MEALS, Disp: 180 tablet, Rfl: 0    clopidogreL (PLAVIX) 75 mg tablet, Take 75 mg by mouth once daily., Disp: , Rfl:     co-enzyme Q-10 30 mg capsule, Take 30 mg by mouth 3 (three) times daily., Disp: , Rfl:     cyanocobalamin (VITAMIN B-12) 100 MCG tablet, Take 1,000 mcg by mouth once daily., Disp: , Rfl:     famotidine (PEPCID) 20 MG tablet, Take 1 tablet (20 mg total) by mouth 2 (two) times daily., Disp: 60 tablet, Rfl: 2    fish oil-omega-3 fatty acids 300-1,000 mg capsule, Take 1 capsule by mouth 2 (two) times daily., Disp: , Rfl:     furosemide (LASIX) 20 MG tablet, One tab qod prn edema, Disp: 15 tablet, Rfl: 0    gabapentin (NEURONTIN) 300 MG capsule, TAKE 1 CAPSULE BY MOUTH AT BEDTIME STARTING THE NIGHT AFTER SURGERY, Disp: , Rfl:     hydrALAZINE (APRESOLINE) 100 MG tablet, TAKE 1 TABLET BY MOUTH THREE TIMES DAILY, Disp: 270 tablet, Rfl: 0    ibuprofen (ADVIL,MOTRIN) 600 MG tablet, Take 1 tablet (600 mg total) by mouth every 6 (six) hours as needed for Pain., Disp: 20 tablet, Rfl: 0    iron-vitamin C 100-250 mg, ICAR-C, 100-250 mg Tab, Take 1 tablet by mouth once daily, Disp: 30 tablet, Rfl: 0    levothyroxine (SYNTHROID) 50 MCG tablet, Take 1 tablet (50 mcg total) by mouth before breakfast., Disp: 90 tablet, Rfl: 2    levothyroxine (TIROSINT) 50 mcg Cap, Take 50 mcg by mouth before breakfast., Disp: 90 tablet, Rfl: 0    meclizine (ANTIVERT) 25 mg tablet, TAKE 1 TABLET BY MOUTH EVERY 6 HOURS AS NEEDED FOR DIZZINESS, Disp: , Rfl:     metFORMIN (GLUCOPHAGE) 500 MG tablet, Take 1 tablet by mouth twice daily, Disp: 180 tablet, Rfl: 3    metFORMIN (GLUCOPHAGE) 500 MG tablet, Take 1 tablet (500 mg total) by mouth 2 (two) times daily., Disp: 180 tablet, Rfl: 1    multivitamin (THERAGRAN) per tablet, Take 1 tablet by mouth once daily., Disp: , Rfl:     olmesartan (BENICAR) 40 MG tablet, Take 1 tablet (40 mg total) by mouth once daily., Disp: 90 tablet, Rfl: 1    ONETOUCH  VERIO Strp, USE 1 STRIP TO CHECK GLUCOSE TWICE DAILY, Disp: , Rfl: 1    oxyCODONE (ROXICODONE) 5 MG immediate release tablet, TAKE 1 TABLET BY MOUTH EVERY 6 HOURS FOR 7 DAYS FOR SEVERE BREAKTHROUGH, Disp: , Rfl:     pantoprazole (PROTONIX) 40 MG tablet, Take 40 mg by mouth 2 (two) times daily as needed., Disp: , Rfl:     potassium chloride SA (K-DUR,KLOR-CON) 20 MEQ tablet, Take 1 tablet (20 mEq total) by mouth 2 (two) times daily., Disp: 180 tablet, Rfl: 1    rosuvastatin (CRESTOR) 40 MG Tab, Take 40 mg by mouth once daily. Taking 20, Disp: , Rfl:     sildenafiL (VIAGRA) 25 MG tablet, Take 1 tablet (25 mg total) by mouth daily as needed for Erectile Dysfunction. Take 1/2 to 1 tablet as needed for erectile dysfunction., Disp: 10 tablet, Rfl: 0    spironolactone (ALDACTONE) 25 MG tablet, Take 1 tablet (25 mg total) by mouth once daily., Disp: 30 tablet, Rfl: 1    tamsulosin (FLOMAX) 0.4 mg Cap, TAKE 1 CAPSULE BY MOUTH ONCE DAILY IN THE EVENING, Disp: 90 capsule, Rfl: 0    terazosin (HYTRIN) 10 MG capsule, TAKE 1 CAPSULE BY MOUTH ONCE DAILY IN THE EVENING, Disp: 90 capsule, Rfl: 0    terazosin (HYTRIN) 10 MG capsule, Take 1 capsule (10 mg total) by mouth every evening., Disp: 90 capsule, Rfl: 1    traMADoL (ULTRAM) 50 mg tablet, , Disp: , Rfl:     amoxicillin-clavulanate 875-125mg (AUGMENTIN) 875-125 mg per tablet, Take 1 tablet by mouth 2 (two) times daily., Disp: , Rfl:     cloNIDine (CATAPRES) 0.1 MG tablet, Take 1 tablet (0.1 mg total) by mouth every 8 (eight) hours as needed (SBP greater than 180)., Disp: 30 tablet, Rfl: 0    Current Facility-Administered Medications:     leuprolide (6 month) injection 45 mg, 45 mg, Intramuscular, 1 time in Clinic/HOD, Manpreet Gordon MD    PMHx/PSHx Updates:  See patient's last visit with me on 4/18/2022  See H&P on 12/28/2018        Pathology:  Cancer Staging  No matching staging information was found for the patient.          Objective:     Vitals:  Blood  "pressure 135/72, pulse (!) 54, temperature 98 °F (36.7 °C), resp. rate 18, height 5' 6" (1.676 m), weight 100.2 kg (220 lb 12.8 oz).        Physical Examination:   GEN: no apparent distress, comfortable; AAOx3  HEAD: atraumatic and normocephalic  EYES: no conjunctival pallor or muddiness, no icterus; normal pupil reaction to ambient light  ENT: OMM, no pharyngeal erythema, external bilateral ears WNL; no visible thrush or ulcers  NECK: no masses or swelling, trachea midline, no visible LAD/LN's   CV: no palpitations; no pedal edema; no noticeable JVD or neck vein distension; pedal swelling better  CHEST: Normal respiratory effort; chest wall breath movements symmetrical; no audible wheezing  ABDOM: non-distended; no bloating  MUSC/Skeletal: ROM normal; joints visibly normal; no deformities; positive arthropathy in hands  EXTREM: no clubbing, cyanosis, inflammation or swelling  SKIN: no rashes, lesions, ulcers, petechiae or subcutaneous nodules;   : no ellison  NEURO: moving all 4 extremities; AAOx3; no tremors  PSYCH: normal mood, affect and behavior  LYMPH: no visible LN's or LAD              Labs:        Lab Results   Component Value Date    WBC 7.43 06/17/2022    HGB 12.5 (L) 06/17/2022    HCT 38.8 (L) 06/17/2022    MCV 84 06/17/2022     06/17/2022     CMP  Sodium   Date Value Ref Range Status   06/27/2022 132 (L) 136 - 145 mmol/L Final   04/12/2019 138 134 - 144 mmol/L      Potassium   Date Value Ref Range Status   06/27/2022 3.9 3.5 - 5.1 mmol/L Final     Chloride   Date Value Ref Range Status   06/27/2022 98 95 - 110 mmol/L Final   04/12/2019 99 98 - 110 mmol/L      CO2   Date Value Ref Range Status   06/27/2022 23 23 - 29 mmol/L Final     Glucose   Date Value Ref Range Status   06/27/2022 119 (H) 70 - 110 mg/dL Final   04/12/2019 117 (H) 70 - 99 mg/dL      BUN   Date Value Ref Range Status   06/27/2022 16 8 - 23 mg/dL Final     Creatinine   Date Value Ref Range Status   06/27/2022 1.0 0.5 - 1.4 mg/dL " Final   04/12/2019 0.72 0.60 - 1.40 mg/dL      Calcium   Date Value Ref Range Status   06/27/2022 8.4 (L) 8.7 - 10.5 mg/dL Final     Total Protein   Date Value Ref Range Status   06/17/2022 6.5 6.0 - 8.4 g/dL Final     Albumin   Date Value Ref Range Status   06/17/2022 3.6 3.5 - 5.2 g/dL Final   04/12/2019 3.3 3.1 - 4.7 g/dL      Total Bilirubin   Date Value Ref Range Status   06/17/2022 0.4 0.1 - 1.0 mg/dL Final     Comment:     For infants and newborns, interpretation of results should be based  on gestational age, weight and in agreement with clinical  observations.    Premature Infant recommended reference ranges:  Up to 24 hours.............<8.0 mg/dL  Up to 48 hours............<12.0 mg/dL  3-5 days..................<15.0 mg/dL  6-29 days.................<15.0 mg/dL       Alkaline Phosphatase   Date Value Ref Range Status   06/17/2022 57 55 - 135 U/L Final     AST   Date Value Ref Range Status   06/17/2022 20 10 - 40 U/L Final     ALT   Date Value Ref Range Status   06/17/2022 15 10 - 44 U/L Final     Anion Gap   Date Value Ref Range Status   06/27/2022 11 8 - 16 mmol/L Final     eGFR if    Date Value Ref Range Status   06/27/2022 >60.0 >60 mL/min/1.73 m^2 Final     eGFR if non    Date Value Ref Range Status   06/27/2022 >60.0 >60 mL/min/1.73 m^2 Final     Comment:     Calculation used to obtain the estimated glomerular filtration  rate (eGFR) is the CKD-EPI equation.        Lab Results   Component Value Date    IRON 65 06/17/2022    TIBC 252 06/17/2022    FERRITIN 314 (H) 06/17/2022            Radiology/Diagnostic Studies:    Us Abdomen Complete    Result Date: 1/2/2019  Abdominal ultrasound Clinical history is anemia, prostate cancer The pancreas, aorta and IVC are normal. The liver is hyperechoic compatible with fatty infiltration. There is hepatopedal flow within the portal vein. The common bile duct measures 6 mm. The patient has had a cholecystectomy. The kidneys are normal  in size and echogenicity. The spleen is normal in size measuring 12 cm. IMPRESSION: Fatty infiltration of the liver otherwise negative abdominal ultrasound Read and electronically signed by: Marry Goldberg MD on 1/2/2019 9:49 AM CST MARRY GOLDBERG MD      I have reviewed all available lab results and radiology reports.    Assessment/Plan:   (1) 71 y.o. male with diagnosis of prostate cancer who has been referred by Simone Santillan Jr., MD for evaluation by medical oncology. Patient with a high risk adenocarcinoma of the prostate with [id number] with GS of 4+5.   - he started androgen depravation therapy and monotherapy XRT (IMRT)  - followed by Dr Gordon with  and currently on Trelstar  - followed by Dr Santillan with Rad/onc and completed XRt on 12/18/2018  - latest PSA at 0.01 in May 2020 and he had his last lupron shot done in April/May 2020  - he sees Dr Gordon again in Nov 2020 1/13/2021:  - He saw Dr Gordon again in Nov 2020 and they are repeating his PSA next month with plans to repeat every 3 months for now.     4/13/2021:  - seeing Dr Gordon with labs in near future  - PSA on 2/23/2021 was 0.01    9/28/2021:  - He sees Dr Gordon again in Dec 2021 and the latest PSA was a little higher at 0.03; he has been off the ADT for about a year  - discussed and will make referral to Dr Andrea Scanlon at [facility] with -Oncology    12/28/2021:  - he did not see Dr Scanlon at [facility] as of yet - will check on the referral  - He was supposed to f/u with Dr Gordon again in Dec 2021 but it was cancelled; he has been off the ADT for over a year; he sees  again in June 2022 and Dr Santillan again in Jan 2022  - PSA down to 0.01 now and good    4/18/2022:  - he sees Dr Gordon again in June 2022 7/18/2022:  - mild increase in PSA up to 0.07 from 0.01  - planned repeat level in 3 months per  with follow-up with Dr Gordon again in Aug 2022  - he never did go see Dr Sartor         (2) CAD and non-rheumatic MR; mild CVA in  1993; Hypertensive Heart disease - followed by Dr Jama with cardiology     (3) HTN and hypercholesterolemia     (4) LORA     (5) DM    (6) Chronic back pain issues - required recent lumbar surgery with Dr Chris Fofana [facility]     (7) Hx/of nightly fevers     (8) Chronic diarhhea - followed by Dr Alfaro with GI and s/p recent endoscopies     (9) Mild thrombocytopenia resolved with last platelet count down at 141,000  - no history of hepatitis or liver problems; no alcoholism  - he has positive antiplatelet antibody screens both direct and indirect consistent with an underlying chronic ITP condition  - his flow cytometry showed no evidence of leukemia but he did have a relative increase in eosinophils    1/13/2021:  - latest platelet count at 129,000 which should be adequate for most surgeries    4/13/2021:  - labs pending    9/28/2021:  - latest platelet count is WNL    12/28/2021:  - latest plat wnl    7/18/2022:  - latest plats wnl at 158,000     (10) Mild anemia with latest hgb at 11.5 and stable   - NCNC parameters  - iron panel was WNL with recent labs  - OBS was negative on 11/15  - hx/of colon polyps in past  - followed by Dr Alfaro with GI with planned repeat endoscopies in near future    1/13/2021:  - hgb at 11.5 with normal iron panel; stable    4/13/2021:  - labs pending    9/28/2021:  - latest hgb at 11.5 and relatively stable  - iron panel is adequate    12/28/2021:  - latest hgb at 11.1 and adequate  - iron panel wnl    4/18/2022:  - hgb at 10.7 and a little lower  - his iron was a little low despite the oral iron  - discussed IV iron and he is agreeable    7/18/2022:  - hgb better at 12.5  - s/p IV iron x2  - on oral iron    (11) Esophageal polyp   - He had scope with EGD with Dr Alfaroand they found an esophageal polyp which was cauterized. He saw Dr Santillan  and he recommended that the patient have the polyp removed.   - he is having repeat EGD to re-evaluate the polyp next  month      1/13/2021:  -He previously had scope with EGD with Dr Alfaro  and they found an esophageal polyp which was cauterized.  -  He subsequently saw Dr Santillan and he recommended that the patient have the polyp removed.   - He had repeat EGD but it was incomplete due residual gastric contents and it was to be rescheduled for the following month but he did not have it done as of yet.    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 F/u with rad/onc, GI and  directives  2. Check CBC/plat/iron panel every 3 months for now    3. F/u with PCP, , Rad/onc etc - sees Dr Gordon in Aug 2022  4. continue ICAR-C daily po andset up IV irons as needed  5. Recommend that patient see cardiology for his JANSEN issues       RTC in 3-4 months    Fax note to Samy Santillan III, MD, and Wiley Gordon Albright; Mitchell Scanlon    Discussion:       Total Time spent on patient:    I spent over 15 mins of time with the patient. Reviewed results of the recently ordered labs, tests, reports and studies; made directives with regards to the results. Over half of this time was spent couseling and coordinating care, making treatment and analytical decisions; ordering necessary labs, tests and studies; and discussing treatment options and setting up treatment plan(s) if indicated.        COVID-19 Discussion:    I had long discussion with patient and any applicable family about the COVID-19 coronavirus epidemic and the recommended precautions with regard to cancer and/or hematology patients. I have re-iterated the CDC recommendations for adequate hand washing, use of  hand -like products, and coughing into elbow, etc. In addition, especially for our patients who are on chemotherapy and/or our otherwise immunocompromised patients, I have recommended avoidance of crowds, including movie theaters, restaurants, churches, etc. I have recommended avoidance of any sick or symptomatic family members and/or friends. I have also recommended avoidance of any raw and unwashed food products, and general avoidance of food items that have not been prepared by themselves. The patient has been asked to call us immediately with any symptom developments, issues, questions or other general concerns.       Iron Infusion Therapy Discussion:     I provided literature/learning materials on the particular IV iron regimen and discussed the potential side-effect profiles of the drug(s). I discussed the importance of compliance with obtaining and monitoring requested lab work, and went over the potential risk for the development of anaphylactic shock, bronchospasm, dysrhythmia, liver and/or kidney damage, and respiratory/cardiovascular arrest and/or failure. I discussed the potential risks for development of alopecia, fevers, itching, chills and/or rigors, cold sensory issues, ringing in ears, vertigo and neuropathy, all of which are usually acute but sometimes could end up being chronic and life-long. I discussed the risks of hand-foot syndrome and rashes, and development of other autoimmune mediated processes such as pneumonitis and colitis which could be life threatening.     The patient's consent has been obtained to proceed with the IV iron therapy.The patient will be referred to [facility] for training and education on IV iron therapy, use of antiemetics and/or anti-diarrheals, use of NSAID's, potential IV iron therapy side-effects, and any specific recommendations and precautions with the particular IV iron agents.      I answered all of the patient's (and  family's, if applicable) questions to the best of my ability and to their complete satisfaction. The patient acknowledged full understanding of the risks, recommendations and plan(s).         I have explained all of the above in detail and the patient understands all of the current recommendation(s). I have answered all of their questions to the best of my ability and to their complete satisfaction.   The patient is to continue with the current management plan.            Electronically signed by Cyrus Gibson MD

## 2022-07-18 ENCOUNTER — OFFICE VISIT (OUTPATIENT)
Dept: HEMATOLOGY/ONCOLOGY | Facility: CLINIC | Age: 72
End: 2022-07-18
Payer: MEDICARE

## 2022-07-18 VITALS
TEMPERATURE: 98 F | RESPIRATION RATE: 18 BRPM | BODY MASS INDEX: 35.49 KG/M2 | HEIGHT: 66 IN | SYSTOLIC BLOOD PRESSURE: 135 MMHG | DIASTOLIC BLOOD PRESSURE: 72 MMHG | WEIGHT: 220.81 LBS | HEART RATE: 54 BPM

## 2022-07-18 DIAGNOSIS — D50.9 IRON DEFICIENCY ANEMIA, UNSPECIFIED IRON DEFICIENCY ANEMIA TYPE: ICD-10-CM

## 2022-07-18 DIAGNOSIS — D64.9 NORMOCYTIC NORMOCHROMIC ANEMIA: Primary | ICD-10-CM

## 2022-07-18 PROCEDURE — 3008F PR BODY MASS INDEX (BMI) DOCUMENTED: ICD-10-PCS | Mod: CPTII,S$GLB,, | Performed by: INTERNAL MEDICINE

## 2022-07-18 PROCEDURE — 1101F PT FALLS ASSESS-DOCD LE1/YR: CPT | Mod: CPTII,S$GLB,, | Performed by: INTERNAL MEDICINE

## 2022-07-18 PROCEDURE — 3075F SYST BP GE 130 - 139MM HG: CPT | Mod: CPTII,S$GLB,, | Performed by: INTERNAL MEDICINE

## 2022-07-18 PROCEDURE — 99214 PR OFFICE/OUTPT VISIT, EST, LEVL IV, 30-39 MIN: ICD-10-PCS | Mod: S$GLB,,, | Performed by: INTERNAL MEDICINE

## 2022-07-18 PROCEDURE — 3044F HG A1C LEVEL LT 7.0%: CPT | Mod: CPTII,S$GLB,, | Performed by: INTERNAL MEDICINE

## 2022-07-18 PROCEDURE — 1159F PR MEDICATION LIST DOCUMENTED IN MEDICAL RECORD: ICD-10-PCS | Mod: CPTII,S$GLB,, | Performed by: INTERNAL MEDICINE

## 2022-07-18 PROCEDURE — 4010F PR ACE/ARB THEARPY RXD/TAKEN: ICD-10-PCS | Mod: CPTII,S$GLB,, | Performed by: INTERNAL MEDICINE

## 2022-07-18 PROCEDURE — 3075F PR MOST RECENT SYSTOLIC BLOOD PRESS GE 130-139MM HG: ICD-10-PCS | Mod: CPTII,S$GLB,, | Performed by: INTERNAL MEDICINE

## 2022-07-18 PROCEDURE — 3008F BODY MASS INDEX DOCD: CPT | Mod: CPTII,S$GLB,, | Performed by: INTERNAL MEDICINE

## 2022-07-18 PROCEDURE — 1160F PR REVIEW ALL MEDS BY PRESCRIBER/CLIN PHARMACIST DOCUMENTED: ICD-10-PCS | Mod: CPTII,S$GLB,, | Performed by: INTERNAL MEDICINE

## 2022-07-18 PROCEDURE — 3078F PR MOST RECENT DIASTOLIC BLOOD PRESSURE < 80 MM HG: ICD-10-PCS | Mod: CPTII,S$GLB,, | Performed by: INTERNAL MEDICINE

## 2022-07-18 PROCEDURE — 3288F FALL RISK ASSESSMENT DOCD: CPT | Mod: CPTII,S$GLB,, | Performed by: INTERNAL MEDICINE

## 2022-07-18 PROCEDURE — 4010F ACE/ARB THERAPY RXD/TAKEN: CPT | Mod: CPTII,S$GLB,, | Performed by: INTERNAL MEDICINE

## 2022-07-18 PROCEDURE — 1160F RVW MEDS BY RX/DR IN RCRD: CPT | Mod: CPTII,S$GLB,, | Performed by: INTERNAL MEDICINE

## 2022-07-18 PROCEDURE — 99214 OFFICE O/P EST MOD 30 MIN: CPT | Mod: S$GLB,,, | Performed by: INTERNAL MEDICINE

## 2022-07-18 PROCEDURE — 1159F MED LIST DOCD IN RCRD: CPT | Mod: CPTII,S$GLB,, | Performed by: INTERNAL MEDICINE

## 2022-07-18 PROCEDURE — 1101F PR PT FALLS ASSESS DOC 0-1 FALLS W/OUT INJ PAST YR: ICD-10-PCS | Mod: CPTII,S$GLB,, | Performed by: INTERNAL MEDICINE

## 2022-07-18 PROCEDURE — 3044F PR MOST RECENT HEMOGLOBIN A1C LEVEL <7.0%: ICD-10-PCS | Mod: CPTII,S$GLB,, | Performed by: INTERNAL MEDICINE

## 2022-07-18 PROCEDURE — 3288F PR FALLS RISK ASSESSMENT DOCUMENTED: ICD-10-PCS | Mod: CPTII,S$GLB,, | Performed by: INTERNAL MEDICINE

## 2022-07-18 PROCEDURE — 3078F DIAST BP <80 MM HG: CPT | Mod: CPTII,S$GLB,, | Performed by: INTERNAL MEDICINE

## 2022-07-18 RX ORDER — AMOXICILLIN AND CLAVULANATE POTASSIUM 875; 125 MG/1; MG/1
1 TABLET, FILM COATED ORAL 2 TIMES DAILY
COMMUNITY
Start: 2022-02-03 | End: 2022-08-09

## 2022-07-23 NOTE — TELEPHONE ENCOUNTER
No new care gaps identified.  [facility] Embedded Care Gaps. Reference number: [id number]. 7/23/2022   2:43:19 PM CDT

## 2022-07-25 ENCOUNTER — HOSPITAL ENCOUNTER (EMERGENCY)
Facility: HOSPITAL | Age: 72
Discharge: HOME | End: 2022-07-25
Attending: EMERGENCY MEDICINE
Payer: MEDICARE

## 2022-07-25 ENCOUNTER — TELEPHONE (OUTPATIENT)
Dept: FAMILY MEDICINE | Facility: CLINIC | Age: 72
End: 2022-07-25
Payer: MEDICARE

## 2022-07-25 VITALS
DIASTOLIC BLOOD PRESSURE: 72 MMHG | SYSTOLIC BLOOD PRESSURE: 149 MMHG | OXYGEN SATURATION: 98 % | RESPIRATION RATE: 16 BRPM | HEIGHT: 66 IN | BODY MASS INDEX: 35.36 KG/M2 | HEART RATE: 58 BPM | TEMPERATURE: 98 F | WEIGHT: 220 LBS

## 2022-07-25 DIAGNOSIS — R20.0 NUMBNESS: ICD-10-CM

## 2022-07-25 DIAGNOSIS — G45.9 TIA (TRANSIENT ISCHEMIC ATTACK): Primary | ICD-10-CM

## 2022-07-25 LAB
ALBUMIN SERPL BCP-MCNC: 4.1 G/DL (ref 3.5–5.2)
ALP SERPL-CCNC: 58 U/L (ref 55–135)
ALT SERPL W/O P-5'-P-CCNC: 24 U/L (ref 10–44)
ANION GAP SERPL CALC-SCNC: 9 MMOL/L (ref 8–16)
AST SERPL-CCNC: 23 U/L (ref 10–40)
BASOPHILS # BLD AUTO: 0.05 K/UL (ref 0–0.2)
BASOPHILS NFR BLD: 0.6 % (ref 0–1.9)
BILIRUB SERPL-MCNC: 0.7 MG/DL (ref 0.1–1)
BNP SERPL-MCNC: 39 PG/ML (ref 0–99)
BUN SERPL-MCNC: 14 MG/DL (ref 8–23)
CALCIUM SERPL-MCNC: 8.9 MG/DL (ref 8.7–10.5)
CHLORIDE SERPL-SCNC: 99 MMOL/L (ref 95–110)
CO2 SERPL-SCNC: 26 MMOL/L (ref 23–29)
CREAT SERPL-MCNC: 1 MG/DL (ref 0.5–1.4)
DIFFERENTIAL METHOD: ABNORMAL
EOSINOPHIL # BLD AUTO: 0.6 K/UL (ref 0–0.5)
EOSINOPHIL NFR BLD: 7 % (ref 0–8)
ERYTHROCYTE [DISTWIDTH] IN BLOOD BY AUTOMATED COUNT: 14.9 % (ref 11.5–14.5)
EST. GFR  (AFRICAN AMERICAN): >60 ML/MIN/1.73 M^2
EST. GFR  (NON AFRICAN AMERICAN): >60 ML/MIN/1.73 M^2
GLUCOSE SERPL-MCNC: 95 MG/DL (ref 70–110)
HCT VFR BLD AUTO: 41.8 % (ref 40–54)
HGB BLD-MCNC: 13.3 G/DL (ref 14–18)
IMM GRANULOCYTES # BLD AUTO: 0.13 K/UL (ref 0–0.04)
IMM GRANULOCYTES NFR BLD AUTO: 1.5 % (ref 0–0.5)
LYMPHOCYTES # BLD AUTO: 1.6 K/UL (ref 1–4.8)
LYMPHOCYTES NFR BLD: 17.5 % (ref 18–48)
MCH RBC QN AUTO: 27.5 PG (ref 27–31)
MCHC RBC AUTO-ENTMCNC: 31.8 G/DL (ref 32–36)
MCV RBC AUTO: 86 FL (ref 82–98)
MONOCYTES # BLD AUTO: 0.8 K/UL (ref 0.3–1)
MONOCYTES NFR BLD: 8.6 % (ref 4–15)
NEUTROPHILS # BLD AUTO: 5.7 K/UL (ref 1.8–7.7)
NEUTROPHILS NFR BLD: 64.8 % (ref 38–73)
NRBC BLD-RTO: 0 /100 WBC
PLATELET # BLD AUTO: 161 K/UL (ref 150–450)
PMV BLD AUTO: 11.9 FL (ref 9.2–12.9)
POTASSIUM SERPL-SCNC: 4.3 MMOL/L (ref 3.5–5.1)
PROT SERPL-MCNC: 7.2 G/DL (ref 6–8.4)
RBC # BLD AUTO: 4.84 M/UL (ref 4.6–6.2)
SARS-COV-2 RDRP RESP QL NAA+PROBE: NEGATIVE
SODIUM SERPL-SCNC: 134 MMOL/L (ref 136–145)
TROPONIN I SERPL DL<=0.01 NG/ML-MCNC: <0.03 NG/ML
WBC # BLD AUTO: 8.84 K/UL (ref 3.9–12.7)

## 2022-07-25 PROCEDURE — 84484 ASSAY OF TROPONIN QUANT: CPT | Performed by: EMERGENCY MEDICINE

## 2022-07-25 PROCEDURE — 93010 EKG 12-LEAD: ICD-10-PCS | Mod: ,,, | Performed by: INTERNAL MEDICINE

## 2022-07-25 PROCEDURE — 83880 ASSAY OF NATRIURETIC PEPTIDE: CPT | Performed by: EMERGENCY MEDICINE

## 2022-07-25 PROCEDURE — 99285 EMERGENCY DEPT VISIT HI MDM: CPT | Mod: 25

## 2022-07-25 PROCEDURE — 93010 ELECTROCARDIOGRAM REPORT: CPT | Mod: ,,, | Performed by: INTERNAL MEDICINE

## 2022-07-25 PROCEDURE — 85025 COMPLETE CBC W/AUTO DIFF WBC: CPT | Performed by: EMERGENCY MEDICINE

## 2022-07-25 PROCEDURE — 25000003 PHARM REV CODE 250: Performed by: EMERGENCY MEDICINE

## 2022-07-25 PROCEDURE — 80053 COMPREHEN METABOLIC PANEL: CPT | Performed by: EMERGENCY MEDICINE

## 2022-07-25 PROCEDURE — 25500020 PHARM REV CODE 255: Performed by: EMERGENCY MEDICINE

## 2022-07-25 PROCEDURE — 93005 ELECTROCARDIOGRAM TRACING: CPT | Performed by: INTERNAL MEDICINE

## 2022-07-25 PROCEDURE — U0002 COVID-19 LAB TEST NON-CDC: HCPCS | Performed by: EMERGENCY MEDICINE

## 2022-07-25 RX ORDER — MAGNESIUM 250 MG
1 TABLET ORAL NIGHTLY
COMMUNITY

## 2022-07-25 RX ORDER — AMLODIPINE BESYLATE 10 MG/1
TABLET ORAL
Qty: 90 TABLET | Refills: 3 | Status: SHIPPED | OUTPATIENT
Start: 2022-07-25 | End: 2023-01-02 | Stop reason: SDUPTHER

## 2022-07-25 RX ORDER — ASPIRIN 325 MG
325 TABLET ORAL
Status: COMPLETED | OUTPATIENT
Start: 2022-07-25 | End: 2022-07-25

## 2022-07-25 RX ORDER — CARVEDILOL 12.5 MG/1
12.5 TABLET ORAL 2 TIMES DAILY WITH MEALS
COMMUNITY
End: 2022-08-03 | Stop reason: DRUGHIGH

## 2022-07-25 RX ORDER — POTASSIUM CHLORIDE 20 MEQ/1
TABLET, EXTENDED RELEASE ORAL
Qty: 180 TABLET | Refills: 3 | Status: SHIPPED | OUTPATIENT
Start: 2022-07-25 | End: 2023-07-27

## 2022-07-25 RX ADMIN — ASPIRIN 325 MG ORAL TABLET 325 MG: 325 PILL ORAL at 05:07

## 2022-07-25 RX ADMIN — IOHEXOL 100 ML: 350 INJECTION, SOLUTION INTRAVENOUS at 03:07

## 2022-07-25 NOTE — CONSULTS
Former[facility]  Department of Neurology  Neurology Consultation Note        PATIENT NAME: Chris Hill Jr.  MRN: [id number]  CSN: [id number]      TODAY'S DATE: 07/25/2022  ADMIT DATE: 7/25/2022                            CONSULTING PROVIDER: Elvis Rollins MD  CONSULT REQUESTED BY: Mini Hayes MD      Reason for consult: Dizziness and numbness in the L face       History obtained from the patient.    HPI per EMR: Chris Hill Jr. is a 71 y.o. male with a history of HTN, HLD, DM, CAD presented emergency department with numbness on the left side of the face and dizziness which started Thursday morning when he woke up.  Patient had previous history of similar symptoms.  Patient states the numbness is persistent at this time.  Patient was told that he had transient ischemic attacks in the past and is on Plavix.    Patient had dizziness in the past about a year ago and he has followed with ENT and Neurology. ENT workup was unremarkable. He was put on Plavix and Crestor by neurologist with improvement in symptoms.     Now he again started having similar dizziness with room spinning sensation which started 4 days ago. This dizziness has been episodic lasting from few minutes to about 3 hrs. He denies any gait imbalance, vision loss, unilateral weakness. He continues to have L face numbness.         Neurology consult:     PREVIOUS MEDICAL HISTORY:  Past Medical History:   Diagnosis Date    Anemia, chronic disease 12/28/2018    Coronary artery disease     mild    Diabetes mellitus     Diabetes mellitus, type 2     GERD (gastroesophageal reflux disease)     Heart murmur     Hyperlipidemia     Hypertension     Iron deficiency anemia 4/18/2022    Normocytic normochromic anemia 12/28/2018    Prostate cancer 8/20/2018    Sleep apnea     NOT USING [facility]    Thrombocytopenia 12/28/2018    Valvular regurgitation      PREVIOUS SURGICAL HISTORY:  Past Surgical History:   Procedure Laterality Date    BACK  SURGERY  09/2019    CARDIAC CATHETERIZATION      EYE SURGERY      cataracts bilateral    TRANSRECTAL ULTRASOUND OF PROSTATE WITH INSERTION OF GOLD FIDUCIAL MARKER N/A 10/4/2018    Procedure: ULTRASOUND, PROSTATE, TRANSPERINEAL APPROACH, WITH GOLD FIDUCIAL MARKER INSERTION (with SPACEOAR transperineal biodegradable gel placement);  Surgeon: Manpreet Gordon MD;  Location[facility];  Service: Urology;  Laterality: N/A;     FAMILY MEDICAL HISTORY:  Family History   Problem Relation Age of Onset    Heart disease Mother     Heart disease Father      SOCIAL HISTORY:  Social History     Tobacco Use    Smoking status: Never Smoker    Smokeless tobacco: Never Used   Substance Use Topics    Alcohol use: No    Drug use: No     ALLERGIES:  Review of patient's allergies indicates:  No Known Allergies  HOME MEDICATIONS:  Prior to Admission medications    Medication Sig Start Date End Date Taking? Authorizing Provider   amitriptyline (ELAVIL) 50 MG tablet TAKE 1 TABLET BY MOUTH ONCE DAILY IN THE EVENING  Patient taking differently: Take 50 mg by mouth every evening. 6/4/22  Yes Samy Pettit III, MD   amLODIPine (NORVASC) 10 MG tablet Take 1 tablet by mouth once daily  Patient taking differently: Take 10 mg by mouth once daily. 7/25/22  Yes Samy Pettit III, MD   carvediloL (COREG) 12.5 MG tablet Take 12.5 mg by mouth 2 (two) times daily with meals.   Yes Historical Provider   clopidogreL (PLAVIX) 75 mg tablet Take 75 mg by mouth once daily.   Yes Historical Provider   co-enzyme Q-10 30 mg capsule Take 30 mg by mouth 3 (three) times daily.   Yes Historical Provider   cyanocobalamin (VITAMIN B-12) 100 MCG tablet Take 1,000 mcg by mouth once daily.   Yes Historical Provider   famotidine (PEPCID) 20 MG tablet Take 1 tablet (20 mg total) by mouth 2 (two) times daily. 2/4/22 2/4/23 Yes Samy Pettit III, MD   fish oil-omega-3 fatty acids 300-1,000 mg capsule Take 1 capsule by mouth 2 (two) times daily.   Yes  Historical Provider   gabapentin (NEURONTIN) 300 MG capsule Take 300 mg by mouth 3 (three) times daily. 3/28/22  Yes Historical Provider   hydrALAZINE (APRESOLINE) 100 MG tablet TAKE 1 TABLET BY MOUTH THREE TIMES DAILY  Patient taking differently: Take 100 mg by mouth 3 (three) times daily. 4/14/22  Yes Samy Pettit III, MD   iron-vitamin C 100-250 mg, ICAR-C, 100-250 mg Tab Take 1 tablet by mouth once daily 11/10/20  Yes Cyrus Gibson MD   levothyroxine (SYNTHROID) 50 MCG tablet Take 1 tablet (50 mcg total) by mouth before breakfast. 4/21/22 4/21/23 Yes Samy Pettit III, MD   magnesium 250 mg Tab Take 1 tablet by mouth once daily.   Yes Historical Provider   meclizine (ANTIVERT) 25 mg tablet Take 25 mg by mouth every 6 (six) hours as needed. 6/23/21  Yes Historical Provider   metFORMIN (GLUCOPHAGE) 500 MG tablet Take 1 tablet by mouth twice daily  Patient taking differently: Take 500 mg by mouth 2 (two) times daily with meals. 6/24/22  Yes Samy Pettit III, MD   olmesartan (BENICAR) 40 MG tablet Take 1 tablet (40 mg total) by mouth once daily. 3/9/22  Yes Samy Pettit III, MD   pantoprazole (PROTONIX) 40 MG tablet Take 40 mg by mouth 2 (two) times daily as needed. 1/6/21  Yes Historical Provider   potassium chloride SA (K-DUR,KLOR-CON) 20 MEQ tablet Take 1 tablet by mouth twice daily  Patient taking differently: Take 20 mEq by mouth once daily. 7/25/22  Yes Samy Pettit III, MD   rosuvastatin (CRESTOR) 40 MG Tab Take 40 mg by mouth once daily. 8/4/21  Yes Historical Provider   spironolactone (ALDACTONE) 25 MG tablet Take 1 tablet (25 mg total) by mouth once daily. 6/9/22  Yes Samy Pettit III, MD   tamsulosin (FLOMAX) 0.4 mg Cap TAKE 1 CAPSULE BY MOUTH ONCE DAILY IN THE EVENING  Patient taking differently: Take 0.4 mg by mouth every evening. 6/4/22  Yes Samy Pettit III, MD   terazosin (HYTRIN) 10 MG capsule Take 1 capsule (10 mg total) by mouth every evening. 7/1/22  Yes Samy  H. Wilver HOOK MD   amoxicillin-clavulanate 875-125mg (AUGMENTIN) 875-125 mg per tablet Take 1 tablet by mouth 2 (two) times daily. 2/3/22   Historical Provider   carvediloL (COREG) 25 MG tablet TAKE 1 TABLET BY MOUTH TWICE DAILY WITH MEALS 5/9/22   Samy H. Wilver HOOK MD   cloNIDine (CATAPRES) 0.1 MG tablet Take 1 tablet (0.1 mg total) by mouth every 8 (eight) hours as needed (SBP greater than 180). 6/22/21 7/25/22  Americo Jennings, DO   furosemide (LASIX) 20 MG tablet One tab qod prn edema 4/7/22   Samy H. Wilver HOOK MD   ibuprofen (ADVIL,MOTRIN) 600 MG tablet Take 1 tablet (600 mg total) by mouth every 6 (six) hours as needed for Pain. 4/28/22   Apolonia Tolentino, FNP   levothyroxine (TIROSINT) 50 mcg Cap Take 50 mcg by mouth before breakfast. 4/7/22   Samy H. Wilver HOOK MD   metFORMIN (GLUCOPHAGE) 500 MG tablet Take 1 tablet (500 mg total) by mouth 2 (two) times daily. 6/24/22   Samy H. Wilver HOOK MD   multivitamin (THERAGRAN) per tablet Take 1 tablet by mouth once daily. 8/4/21   Historical Provider   THOMAS Oconnor USE 1 STRIP TO CHECK GLUCOSE TWICE DAILY 8/14/19   Historical Provider   oxyCODONE (ROXICODONE) 5 MG immediate release tablet TAKE 1 TABLET BY MOUTH EVERY 6 HOURS FOR 7 DAYS FOR SEVERE BREAKTHROUGH 1/6/22   Historical Provider   sildenafiL (VIAGRA) 25 MG tablet Take 1 tablet (25 mg total) by mouth daily as needed for Erectile Dysfunction. Take 1/2 to 1 tablet as needed for erectile dysfunction. 1/10/22 1/10/23  Simone Santillan Jr., MD   terazosin (HYTRIN) 10 MG capsule TAKE 1 CAPSULE BY MOUTH ONCE DAILY IN THE EVENING 7/1/22   Samy H. Wilver HOOK MD   traMADoL (ULTRAM) 50 mg tablet  1/17/22   Historical Provider   amLODIPine (NORVASC) 10 MG tablet Take 1 tablet by mouth once daily 4/17/22 7/25/22  Samy H. Wilver HOOK MD   potassium chloride SA (K-DUR,KLOR-CON) 20 MEQ tablet Take 1 tablet (20 mEq total) by mouth 2 (two) times daily. 10/18/21 7/25/22  Samy Pettit III, MD  "    CURRENT SCHEDULED MEDICATIONS:   leuprolide acetate (6 month)  45 mg Intramuscular 1 time in Clinic/HOD     CURRENT INFUSIONS:    CURRENT PRN MEDICATIONS:      REVIEW OF SYSTEMS:  Please refer to the HPI for all pertinent positive and negative findings. A comprehensive review of all other systems was negative.       PHYSICAL EXAM:  Patient Vitals for the past 24 hrs:   BP Temp Temp src Pulse Resp SpO2 Height Weight   07/25/22 1603 -- 97.8 °F (36.6 °C) Oral -- -- -- -- --   07/25/22 1348 (!) 140/80 -- -- 66 18 97 % 5' 6" (1.676 m) 99.8 kg (220 lb)       GENERAL APPEARANCE: Alert, well-developed, well-nourished male in no acute distress.  HEENT: Normocephalic and atraumatic. PERRL. Oropharynx unremarkable.  PULM: Normal respiratory effort. No accessory muscle use.  CV: RRR.  ABDOMEN: Soft, nontender.  EXTREMITIES: No obvious signs of vascular compromise. Pulses present. No cyanosis, clubbing or edema.  SKIN: Clear; no rashes, lesions or skin breaks in exposed areas.    NEURO:  MENTAL STATUS: Patient awake and oriented to time, place, and person, recent/remote memory normal, attention span/concentration normal, speech fluent without paraphasic errors, good comprehension with appropriate thought content and fund of knowledge appropriate for patient's level of education.  Affect euthymic.    CRANIAL NERVES:  CN I: Not tested.  CN II: Fundoscopic exam deferred.  CN III, IV, VI: Pupils equal, round and reactive to light.  Extraocular movements full and intact.  CN V: Facial sensation normal.  CN VII: Facial asymmetry absent.  CN VIII: Hearing grossly normal and equal bilaterally.  No skew deviation or pathologic nystagmus.  CN IX, X: Palate elevates symmetrically. Speech/articulation is clear without dysarthria.  CN XI: Shoulder shrug and chin rotation equal with good strength.  CN XII: Tongue protrusion midline.    MOTOR:  Bulk normal. Tone normal and symmetric throughout.  Abnormal movements absent.  Tremor: none " present.  Strength 5/5 throughout except/unless specified below:.    REFLEXES:  DTRs 2+ throughout.  Plantar response downgoing bilaterally.  SENSATION: decreased on the L face.  COORDINATION: normal finger-to-nose.  STATION: not tested.  GAIT: not tested.      NIHSS:  1a      Level of Consciousness (alert, drowsy, etc.):   0=alert; keenly responsive  1b.     Level of Consciousness Questions (month, age): 0= able to answer both questions  1c.     Level of Consciousness Commands (open, close eyes, make fist, let go):  0=Answers both tasks correctly  2.      Best Gaze (eyes open - patient follows examiner's finger or face):      0=normal  3.      Visual (introduce visual stimulus/threat to patient's visual field quadrants):  0=No visual loss  4.      Facial Palsy (show teeth, raise eyebrows and squeeze eyes shut):        0=Normal symmetric movement  5a.     Motor Arm - Left (elevate extremity 90 degrees and score drift/movement):       0=No drift, limb holds 90 (or 45) degrees for full 10 seconds  5b.     Motor Arm - Right (elevate extremity 90 degrees and score drift/movement):      0=No drift, limb holds 90 (or 45) degrees for full 10 seconds  6a.     Motor Leg - Left (elevate extremity 30 degrees and score drift/movement):       0=No drift, limb holds 90 (or 45) degrees for full 10 seconds  6b      Motor Leg - Right (elevate extremity 30 degrees and score drift/movement):      0=No drift, limb holds 90 (or 45) degrees for full 10 seconds  7.      Limb Ataxia (finger-nose, heel down shin):      0=Absent  8.      Sensory (pin prick to face, arm, trunk, and leg - compare side to side):        1=Mild to moderate sensory loss; patient feels pinprick is less sharp or is dull on the affected side; there is a loss of superficial pain with pinprick but patient is aware He is being touched  9.      Best Language (name items, describe a picture and read sentences):      0=No aphasia, normal  10.     Dysarthria (evaluate  speech clarity by patient repeating listed words): 0=Normal  11.     Extinction and Inattention (use prior testing to identify neglect or double simultaneous stimuli testing):      0=No abnormality          NIH Stroke Scale Total:         0      Labs:  Recent Labs   Lab 07/25/22  1445   *   K 4.3   CL 99   CO2 26   BUN 14   CREATININE 1.0   GLU 95   CALCIUM 8.9     Recent Labs   Lab 07/25/22  1445   WBC 8.84   HGB 13.3*   HCT 41.8        Recent Labs   Lab 07/25/22  1445   ALBUMIN 4.1   PROT 7.2   BILITOT 0.7   ALKPHOS 58   ALT 24   AST 23     Lab Results   Component Value Date    INR 1.0 06/21/2021     Lab Results   Component Value Date    TRIG 199 (H) 03/23/2022    HDL 36 (L) 03/23/2022    CHOLHDL 25.0 03/23/2022     Lab Results   Component Value Date    HGBA1C 5.6 03/23/2022     No results found for: PROTEINCSF, GLUCCSF, WBCCSF    Imaging:  I have reviewed and interpreted the pertinent imaging and lab results.      CT Head Without Contrast  CMS MANDATED QUALITY DATA - CT RADIATION  436    All CT scans at this facility utilize dose modulation, iterative reconstruction, and/or weight based dosing when appropriate to reduce radiation dose to as low as reasonably achievable.    CT HEAD WITHOUT IV CONTRAST    CLINICAL HISTORY:  71 years Male Dizziness, persistent/recurrent, cardiac or vascular cause suspected    COMPARISON: Brain MRI June 21, 2021    FINDINGS: Negative for acute intracranial hemorrhage, midline shift, or mass effect. Ventricles and sulci are normal in size. Gray-white differentiation is maintained. Cerebellar hemispheres and brainstem are unremarkable. Atherosclerotic calcification of intracranial carotid arteries.    No calvarial lesion or fracture. Mastoid air cells are clear. Mucosal thickening involving frontal sinuses, ethmoid air cells, sphenoid sinuses and maxillary sinuses.    IMPRESSION:    No CT evidence of acute intracranial pathology.    Paranasal sinus mucosal  thickening.    Electronically signed by:  Cameron Drake MD  7/25/2022 4:09 PM CDT Workstation: [id number]  X-Ray Chest PA And Lateral  CHEST PA AND LATERAL    CLINICAL DATA:Congestive heart failure, dizziness. Comparison is made to prior studies dating back to 2019.    FINDINGS: PA and lateral views demonstrate no cardiac, pulmonary, or acute osseous abnormalities. Changes of degenerative disc disease at the thoracolumbar junction and mild ventral wedging of T12 are chronic.    IMPRESSION:  1. No acute process.    Electronically signed by:  Davin Jamison MD  7/25/2022 2:20 PM CDT Workstation: [id number]         ASSESSMENT & PLAN:    Dizziness   Numbness on L face      Plan:   · Etiology of dizziness is unknown. Will need to rule out posterior circulation CVA with dizziness and L face numbness.   · CTH negative for acute abnormality.   · CTA showed no LVO. R vertebral artery at V4 segment showed moderate atherosclerosis with stenosis  · MRI brain pending   · Continue with Plavis and Crestor for stroke prevention  · Meclizine PRN for dizziness  · PT OT and Vestibular therapy  · Discussed lifestyle modifications as prophylactic measures for stroke prevention including, adequate blood pressure management, healthy diet and regular exercise.        Thank you kindly for including us in the care of this patient. Please do not hesitate to contact us with any questions.        42 minutes of care time has been spent evaluating with the patient. Time includes chart review not limited to diagnostic imaging, labs, and vitals, patient assessment, discussion with family and nursing, current order evaluations, and new order entries.       Elvis Rollins MD  Neurology/vascular Neurology  Date of Service: 07/25/2022  4:27  PM    --------------------------------------------------------------------------------------------------------------------------------------------------------------------------------------------------------------------------------------------------------------  Please note: This note was transcribed using voice recognition software. Because of this technology there are often uinintended grammatical, spelling, and other transcription errors. Please disregard these errors.

## 2022-07-25 NOTE — TELEPHONE ENCOUNTER
----- Message from Aurelio Hugo sent at 7/25/2022 10:54 AM CDT -----  Type:  Same Day Appointment Request    Caller is requesting a same day appointment.  Caller declined first available appointment listed below.      Name of Caller:  Pt  When is the first available appointment?  7/27  Symptoms:  dizzy spells/ numbness in face  Best Call Back Number:  [phone]    Additional Information:   Please advise -- Thank you

## 2022-07-25 NOTE — ED PROVIDER NOTES
Encounter Date: 7/25/2022       History     Chief Complaint   Patient presents with    Numbness     Left sided facial numbness since Thursday     Dizziness     When moving too fast     71-year-old male presented emergency department with numbness on the left side of the face and dizziness which started Thursday morning when he woke up.  Patient had previous history of similar symptoms.  Patient states the numbness is persistent at this time.  Patient was told that he had transient ischemic attacks in the past and is on Plavix.  Patient also complains of having dizziness and headaches on and off for the past few days and patient states when he gets these dizzy spells which is associated with movement of head he also gets brief episodes of blurry vision.  Patient does not have any blurry vision or dizziness at this time.  Denies chest pain or shortness of breath or abdominal pain.  Patient has associated nausea along with headache.  Patient states he has mild residual headache.  Denies photophobia or neck stiffness.        Review of patient's allergies indicates:  No Known Allergies  Past Medical History:   Diagnosis Date    Anemia, chronic disease 12/28/2018    Coronary artery disease     mild    Diabetes mellitus     Diabetes mellitus, type 2     GERD (gastroesophageal reflux disease)     Heart murmur     Hyperlipidemia     Hypertension     Iron deficiency anemia 4/18/2022    Normocytic normochromic anemia 12/28/2018    Prostate cancer 8/20/2018    Sleep apnea     NOT USING [facility]    Thrombocytopenia 12/28/2018    Valvular regurgitation      Past Surgical History:   Procedure Laterality Date    BACK SURGERY  09/2019    CARDIAC CATHETERIZATION      EYE SURGERY      cataracts bilateral    TRANSRECTAL ULTRASOUND OF PROSTATE WITH INSERTION OF GOLD FIDUCIAL MARKER N/A 10/4/2018    Procedure: ULTRASOUND, PROSTATE, TRANSPERINEAL APPROACH, WITH GOLD FIDUCIAL MARKER INSERTION (with SPACEOAR transperineal  biodegradable gel placement);  Surgeon: Manpreet Gordon MD;  Location: [facility];  Service: Urology;  Laterality: N/A;     Family History   Problem Relation Age of Onset    Heart disease Mother     Heart disease Father      Social History     Tobacco Use    Smoking status: Never Smoker    Smokeless tobacco: Never Used   Substance Use Topics    Alcohol use: No    Drug use: No     Review of Systems   Constitutional: Negative.    HENT: Negative.    Eyes: Negative.    Respiratory: Negative.    Cardiovascular: Negative.    Gastrointestinal: Positive for nausea.   Endocrine: Negative.    Genitourinary: Negative.    Musculoskeletal: Negative.    Skin: Negative.    Allergic/Immunologic: Negative.    Neurological: Positive for dizziness, numbness and headaches.   Hematological: Negative.    Psychiatric/Behavioral: Negative.    All other systems reviewed and are negative.      Physical Exam     Initial Vitals   BP Pulse Resp Temp SpO2   07/25/22 1348 07/25/22 1348 07/25/22 1348 07/25/22 1603 07/25/22 1348   (!) 140/80 66 18 97.8 °F (36.6 °C) 97 %      MAP       --                Physical Exam    Nursing note and vitals reviewed.  Constitutional: He appears well-developed and well-nourished.   HENT:   Head: Normocephalic and atraumatic.   Nose: Nose normal.   Eyes: Conjunctivae and EOM are normal.   Neck: No tracheal deviation present.   Normal range of motion.  Cardiovascular: Normal rate, regular rhythm, normal heart sounds and intact distal pulses. Exam reveals no friction rub.    No murmur heard.  Pulmonary/Chest: Breath sounds normal. No respiratory distress. He has no wheezes. He has no rales.   Abdominal: Abdomen is soft. He exhibits no distension. There is no abdominal tenderness.   Musculoskeletal:         General: No tenderness or edema. Normal range of motion.      Cervical back: Normal range of motion.     Neurological: He is alert and oriented to person, place, and time. He has normal strength. A sensory  deficit is present.   Numbness on the left side of the face with decreased sensation.  Extraocular movements are intact.  Visual fields are intact.  Vision intact strength is equal on both sides.  Normal gait   Skin: Skin is warm and dry. Capillary refill takes less than 2 seconds.   Psychiatric: He has a normal mood and affect. Thought content normal.         ED Course   Procedures  Labs Reviewed   CBC W/ AUTO DIFFERENTIAL - Abnormal; Notable for the following components:       Result Value    Hemoglobin 13.3 (*)     MCHC 31.8 (*)     RDW 14.9 (*)     Immature Granulocytes 1.5 (*)     Immature Grans (Abs) 0.13 (*)     Eos # 0.6 (*)     Lymph % 17.5 (*)     All other components within normal limits   COMPREHENSIVE METABOLIC PANEL - Abnormal; Notable for the following components:    Sodium 134 (*)     All other components within normal limits   SARS-COV-2 RNA AMPLIFICATION, QUAL   TROPONIN I   B-TYPE NATRIURETIC PEPTIDE     EKG Readings: (Independently Interpreted)   Initial Reading: No STEMI. Rhythm: Sinus Bradycardia. Ectopy: No Ectopy. Conduction: Normal.     ECG Results          EKG 12-lead (In process)  Result time 07/25/22 17:02:40    In process by Interface, Lab In [facility] (07/25/22 17:02:40)                 Narrative:    Test Reason : R06.02,    Vent. Rate : 049 BPM     Atrial Rate : 049 BPM     P-R Int : 174 ms          QRS Dur : 098 ms      QT Int : 484 ms       P-R-T Axes : 057 008 031 degrees     QTc Int : 437 ms    Sinus bradycardia  Otherwise normal ECG  When compared with ECG of 21-JUN-2021 20:57,  No significant change was found    Referred By: [email or web address]   SELF           Confirmed By:                             Imaging Results          CTA Head and Neck (xpd) (Final result)  Result time 07/25/22 16:25:49    Final result by Alfred Delcid MD (07/25/22 16:25:49)                 Narrative:    CMS MANDATED QUALITY DATA - CT RADIATION - 436    All CT scans at this facility utilize dose modulation,  iterative reconstruction, and/or weight based dosing when appropriate to reduce radiation dose to as low as reasonably achievable.    CMS MANDATED QUALITY DATA - CAROTID - 195    All measurements and percent stenosis described below were determined using NASCET criteria or criteria similar to NASCET, as defined by the Society of Radiologists in Ultrasound Consensus Conference, Radiology, 2003        Reason: Stroke/TIA, determine embolic source    Technique: CT angiography of brain and neck with 100 mL Omnipaque 350.  Maximum intensity projection coronal reformations were obtained at a separate workstation and stored in the patient's permanent medical record.    COMPARISON: CTA 6/21/2021    CTA BRAIN:  VASCULAR FINDINGS:  Very mild calcified plaque affects cavernous and supraclinoid segments of bilateral intracranial internal carotid arteries without narrowing, unchanged.    Major intracranial arteries show no intraluminal filling the stenosis, or dissection. Negative for aneurysm or evidence of vasculitis.    Opacified visualized dural venous sinuses are unremarkable.    NONVASCULAR FINDINGS:  No abnormal intra-axial or extra-axial enhancement. No midline shift. Paranasal sinus mucosal thickening of frontal, ethmoid, maxillary, and left sphenoid sinus is noted.      CTA NECK:  VASCULAR FINDINGS:  Conventional 3 branch vessel aortic arch anatomy is present.    Mild calcified plaque affects the left carotid bulb without narrowing. Partially calcified plaque in proximal left internal carotid artery is unchanged and without narrowing. Proximal left ICA remains tortuous. Left ECA branches opacified. Left vertebral artery is patent    Right brachiocephalic artery is patent. Mild calcified plaque in right carotid bulb causes no narrowing, unchanged. Right ICA is patent. Right ECA branches opacified. Right vertebral artery is patent.    NONVASCULAR FINDINGS:  Degenerative changes affect the spine.    IMPRESSION:    1. Mild  atherosclerotic plaque involving bilateral carotid bulbs and proximal left ICA, without cervical ICA stenosis.  2. Very mild calcified plaque involving intracranial ICAs, without other abnormality of the major intracranial arteries.    Electronically signed by:  Alfred Delcid MD  7/25/2022 4:25 PM CDT Workstation: [id number]                             CT Head Without Contrast (Final result)  Result time 07/25/22 16:09:47    Final result by Cameron Drake MD (07/25/22 16:09:47)                 Narrative:    CMS MANDATED QUALITY DATA - CT RADIATION  436    All CT scans at this facility utilize dose modulation, iterative reconstruction, and/or weight based dosing when appropriate to reduce radiation dose to as low as reasonably achievable.    CT HEAD WITHOUT IV CONTRAST    CLINICAL HISTORY:  71 years Male Dizziness, persistent/recurrent, cardiac or vascular cause suspected    COMPARISON: Brain MRI June 21, 2021    FINDINGS: Negative for acute intracranial hemorrhage, midline shift, or mass effect. Ventricles and sulci are normal in size. Gray-white differentiation is maintained. Cerebellar hemispheres and brainstem are unremarkable. Atherosclerotic calcification of intracranial carotid arteries.    No calvarial lesion or fracture. Mastoid air cells are clear. Mucosal thickening involving frontal sinuses, ethmoid air cells, sphenoid sinuses and maxillary sinuses.    IMPRESSION:    No CT evidence of acute intracranial pathology.    Paranasal sinus mucosal thickening.    Electronically signed by:  Cameron Drake MD  7/25/2022 4:09 PM CDT Workstation: [id number]                             X-Ray Chest PA And Lateral (Final result)  Result time 07/25/22 14:20:37   Procedure changed from X-Ray Chest AP Portable     Final result by Davin Jamison MD (07/25/22 14:20:37)                 Narrative:    CHEST PA AND LATERAL    CLINICAL DATA:Congestive heart failure, dizziness. Comparison is made to prior studies dating  back to 2019.    FINDINGS: PA and lateral views demonstrate no cardiac, pulmonary, or acute osseous abnormalities. Changes of degenerative disc disease at the thoracolumbar junction and mild ventral wedging of T12 are chronic.    IMPRESSION:  1. No acute process.    Electronically signed by:  Davin Jamison MD  7/25/2022 2:20 PM CDT Workstation: [id number]                               Medications   iohexoL (OMNIPAQUE 350) injection 100 mL (100 mLs Intravenous Given 7/25/22 7413)   aspirin tablet 325 mg (325 mg Oral Given 7/25/22 3105)     Medical Decision Making:   Differential Diagnosis:   71-year-old male with left facial numbness ongoing for the past 4 days.  Patient seen by neurologist and evaluated by him.  CT scan did not show any acute changes.  Neurologist recommended discharging patient after MRI of the brain as long as MRI does not show evidence of acute stroke.  Patient does not have any focal weakness and otherwise feels well.  Screening labs and workup reviewed.  Initial plan was to admit into inpatient workup however neurologist recommended MRI and if MRI does not show any acute stroke can discharge patient for outpatient workup.  Patient is in agreement with this plan and patient hemodynamically stable  Clinical Tests:   Lab Tests: Reviewed  Radiological Study: Reviewed  Medical Tests: Reviewed                      Clinical Impression:   Final diagnoses:  [R06.02] Shortness of breath                 Mini Hayes MD  07/25/22 3527

## 2022-07-25 NOTE — TELEPHONE ENCOUNTER
Refill Decision Note   Chris Hill  is requesting a refill authorization.  Brief Assessment and Rationale for Refill:  Approve    -Medication-Related Problems Identified: Drug-disease interaction  Medication Therapy Plan:       Medication Reconciliation Completed: No   Comments:     No Care Gaps recommended.     Note composed:12:28 PM 07/25/2022

## 2022-08-03 ENCOUNTER — OFFICE VISIT (OUTPATIENT)
Dept: CARDIOLOGY | Facility: CLINIC | Age: 72
End: 2022-08-03
Payer: MEDICARE

## 2022-08-03 VITALS
WEIGHT: 223.13 LBS | BODY MASS INDEX: 35.86 KG/M2 | HEART RATE: 60 BPM | HEIGHT: 66 IN | SYSTOLIC BLOOD PRESSURE: 120 MMHG | DIASTOLIC BLOOD PRESSURE: 62 MMHG

## 2022-08-03 DIAGNOSIS — G45.9 TIA (TRANSIENT ISCHEMIC ATTACK): ICD-10-CM

## 2022-08-03 DIAGNOSIS — R00.1 BRADYCARDIA: Primary | ICD-10-CM

## 2022-08-03 DIAGNOSIS — E78.5 HYPERLIPIDEMIA, UNSPECIFIED HYPERLIPIDEMIA TYPE: ICD-10-CM

## 2022-08-03 DIAGNOSIS — G47.33 OSA ON CPAP: ICD-10-CM

## 2022-08-03 DIAGNOSIS — I10 HYPERTENSION, ESSENTIAL: ICD-10-CM

## 2022-08-03 DIAGNOSIS — D50.9 IRON DEFICIENCY ANEMIA, UNSPECIFIED IRON DEFICIENCY ANEMIA TYPE: ICD-10-CM

## 2022-08-03 DIAGNOSIS — I25.10 MILD CAD: ICD-10-CM

## 2022-08-03 DIAGNOSIS — E11.9 TYPE 2 DIABETES MELLITUS WITHOUT COMPLICATION, WITHOUT LONG-TERM CURRENT USE OF INSULIN: ICD-10-CM

## 2022-08-03 PROCEDURE — 3044F HG A1C LEVEL LT 7.0%: CPT | Mod: CPTII,S$GLB,, | Performed by: PHYSICIAN ASSISTANT

## 2022-08-03 PROCEDURE — 3078F DIAST BP <80 MM HG: CPT | Mod: CPTII,S$GLB,, | Performed by: PHYSICIAN ASSISTANT

## 2022-08-03 PROCEDURE — 4010F ACE/ARB THERAPY RXD/TAKEN: CPT | Mod: CPTII,S$GLB,, | Performed by: PHYSICIAN ASSISTANT

## 2022-08-03 PROCEDURE — 3008F BODY MASS INDEX DOCD: CPT | Mod: CPTII,S$GLB,, | Performed by: PHYSICIAN ASSISTANT

## 2022-08-03 PROCEDURE — 3008F PR BODY MASS INDEX (BMI) DOCUMENTED: ICD-10-PCS | Mod: CPTII,S$GLB,, | Performed by: PHYSICIAN ASSISTANT

## 2022-08-03 PROCEDURE — 1125F AMNT PAIN NOTED PAIN PRSNT: CPT | Mod: CPTII,S$GLB,, | Performed by: PHYSICIAN ASSISTANT

## 2022-08-03 PROCEDURE — 1125F PR PAIN SEVERITY QUANTIFIED, PAIN PRESENT: ICD-10-PCS | Mod: CPTII,S$GLB,, | Performed by: PHYSICIAN ASSISTANT

## 2022-08-03 PROCEDURE — 3078F PR MOST RECENT DIASTOLIC BLOOD PRESSURE < 80 MM HG: ICD-10-PCS | Mod: CPTII,S$GLB,, | Performed by: PHYSICIAN ASSISTANT

## 2022-08-03 PROCEDURE — 3074F PR MOST RECENT SYSTOLIC BLOOD PRESSURE < 130 MM HG: ICD-10-PCS | Mod: CPTII,S$GLB,, | Performed by: PHYSICIAN ASSISTANT

## 2022-08-03 PROCEDURE — 3044F PR MOST RECENT HEMOGLOBIN A1C LEVEL <7.0%: ICD-10-PCS | Mod: CPTII,S$GLB,, | Performed by: PHYSICIAN ASSISTANT

## 2022-08-03 PROCEDURE — 99214 OFFICE O/P EST MOD 30 MIN: CPT | Mod: S$GLB,,, | Performed by: PHYSICIAN ASSISTANT

## 2022-08-03 PROCEDURE — 1101F PT FALLS ASSESS-DOCD LE1/YR: CPT | Mod: CPTII,S$GLB,, | Performed by: PHYSICIAN ASSISTANT

## 2022-08-03 PROCEDURE — 1101F PR PT FALLS ASSESS DOC 0-1 FALLS W/OUT INJ PAST YR: ICD-10-PCS | Mod: CPTII,S$GLB,, | Performed by: PHYSICIAN ASSISTANT

## 2022-08-03 PROCEDURE — 99214 PR OFFICE/OUTPT VISIT, EST, LEVL IV, 30-39 MIN: ICD-10-PCS | Mod: S$GLB,,, | Performed by: PHYSICIAN ASSISTANT

## 2022-08-03 PROCEDURE — 3288F FALL RISK ASSESSMENT DOCD: CPT | Mod: CPTII,S$GLB,, | Performed by: PHYSICIAN ASSISTANT

## 2022-08-03 PROCEDURE — 4010F PR ACE/ARB THEARPY RXD/TAKEN: ICD-10-PCS | Mod: CPTII,S$GLB,, | Performed by: PHYSICIAN ASSISTANT

## 2022-08-03 PROCEDURE — 3074F SYST BP LT 130 MM HG: CPT | Mod: CPTII,S$GLB,, | Performed by: PHYSICIAN ASSISTANT

## 2022-08-03 PROCEDURE — 99999 PR PBB SHADOW E&M-EST. PATIENT-LVL III: CPT | Mod: PBBFAC,,, | Performed by: PHYSICIAN ASSISTANT

## 2022-08-03 PROCEDURE — 99999 PR PBB SHADOW E&M-EST. PATIENT-LVL III: ICD-10-PCS | Mod: PBBFAC,,, | Performed by: PHYSICIAN ASSISTANT

## 2022-08-03 PROCEDURE — 3288F PR FALLS RISK ASSESSMENT DOCUMENTED: ICD-10-PCS | Mod: CPTII,S$GLB,, | Performed by: PHYSICIAN ASSISTANT

## 2022-08-03 NOTE — PROGRESS NOTES
Subjective:    Patient ID:  Chris Hill Jr. is a 72 y.o. male who presents for follow-up of TIA-symptoms.       HPI  Mr. Hill is a very pleasant gentleman who follows with Dr. Jama. He presents today for follow up after a recent ER visit for TIA-symptoms. He has a hx of TIAs, and previously saw Dr. Hines, but had not had any symptoms in more than 8 months. On 6/21, he presented to the ER with c/o numbness on the left side of the face, headache,  and dizziness. His work up, which included a CT head, CTA Head and Neck, and MRI brain, were all unremarkable. He did have bradycardia during his stay with HRs in the 40s-50s. He normally runs in the 60s at home. BP has been well controlled. He is on Coreg 25mg BID.     He saw Dr. Hines last week and the current operating dx is complicated migraines, as his symptoms are always the same and they are occurring daily. He remains on Plavix and his statin was increased. Since increasing statin, he is having more muscle aches and pains. He is on CoQ10 daily.       Review of Systems   Constitutional: Negative for chills, diaphoresis, fever, weight gain and weight loss.   HENT: Negative for sore throat.    Eyes: Negative for blurred vision, vision loss in left eye, vision loss in right eye and visual disturbance.   Cardiovascular: Negative for chest pain, claudication, dyspnea on exertion, leg swelling, near-syncope, orthopnea, palpitations, paroxysmal nocturnal dyspnea and syncope.   Respiratory: Negative for cough, hemoptysis, shortness of breath, sputum production and wheezing.    Endocrine: Negative for cold intolerance and heat intolerance.   Hematologic/Lymphatic: Negative for adenopathy. Does not bruise/bleed easily.   Skin: Negative for rash.   Musculoskeletal: Negative for falls, muscle weakness and myalgias.   Gastrointestinal: Negative for abdominal pain, change in bowel habit, constipation, diarrhea, melena and nausea.   Genitourinary: Negative for bladder  "incontinence.   Neurological: Negative for dizziness, focal weakness, headaches, light-headedness, numbness and weakness.   Psychiatric/Behavioral: Negative for altered mental status.         Vitals:    08/03/22 1412   BP: 120/62   BP Location: Left arm   Patient Position: Sitting   BP Method: Large (Automatic)   Pulse: 60   Weight: 101.2 kg (223 lb 1.7 oz)   Height: 5' 6" (1.676 m)     Body mass index is 36.01 kg/m².    Objective:    Physical Exam  Constitutional:       Appearance: He is well-developed.   HENT:      Head: Normocephalic and atraumatic.   Eyes:      Extraocular Movements: Extraocular movements intact.      Pupils: Pupils are equal, round, and reactive to light.   Neck:      Thyroid: No thyromegaly.      Vascular: No JVD.      Trachea: No tracheal deviation.   Cardiovascular:      Rate and Rhythm: Normal rate and regular rhythm.      Chest Wall: PMI is not displaced.      Pulses: Normal pulses and intact distal pulses.      Heart sounds: S1 normal and S2 normal. No murmur heard.    No friction rub. No gallop.   Pulmonary:      Effort: Pulmonary effort is normal. No respiratory distress.      Breath sounds: Normal breath sounds. No wheezing or rales.   Chest:      Chest wall: No tenderness.   Abdominal:      General: Bowel sounds are normal. There is no distension.      Palpations: Abdomen is soft. There is no mass.      Tenderness: There is no abdominal tenderness.   Musculoskeletal:         General: No tenderness. Normal range of motion.      Cervical back: Neck supple.   Skin:     General: Skin is warm and dry.      Findings: No rash.   Neurological:      General: No focal deficit present.      Mental Status: He is alert and oriented to person, place, and time.   Psychiatric:         Mood and Affect: Mood normal.         Behavior: Behavior normal.           Assessment:       Problem List Items Addressed This Visit        Cardiology Problems    Hyperlipidemia    Hypertension, essential    Mild CAD    " TIA (transient ischemic attack)       Other    Iron deficiency anemia    LORA on CPAP    Type 2 diabetes mellitus without complication, without long-term current use of insulin      Other Visit Diagnoses     Bradycardia    -  Primary    Relevant Orders    Cardiac event monitor             Plan:       28 day event monitor to evaluate for occult a-fib and assess for significant bradycardia. If ILR is needed in the future, most insurance companies now require at least 28 days of monitoring prior to approving implant.   If he has significant bradycardia, would decrease Coreg and add ARB for BP control. He has a hx of cough with ACEi.   Continue current cardiac medications.   Risk factor modification including diet and exercise.   F/U with Dr. Jama in 6 months.

## 2022-08-09 ENCOUNTER — OFFICE VISIT (OUTPATIENT)
Dept: FAMILY MEDICINE | Facility: CLINIC | Age: 72
End: 2022-08-09
Payer: MEDICARE

## 2022-08-09 VITALS
DIASTOLIC BLOOD PRESSURE: 75 MMHG | OXYGEN SATURATION: 96 % | SYSTOLIC BLOOD PRESSURE: 129 MMHG | TEMPERATURE: 97 F | HEIGHT: 66 IN | BODY MASS INDEX: 35.84 KG/M2 | WEIGHT: 223 LBS | HEART RATE: 58 BPM

## 2022-08-09 DIAGNOSIS — R51.9 NONINTRACTABLE HEADACHE, UNSPECIFIED CHRONICITY PATTERN, UNSPECIFIED HEADACHE TYPE: ICD-10-CM

## 2022-08-09 DIAGNOSIS — R20.0 FACIAL NUMBNESS: ICD-10-CM

## 2022-08-09 DIAGNOSIS — R42 DIZZINESS: Primary | ICD-10-CM

## 2022-08-09 PROCEDURE — 1160F PR REVIEW ALL MEDS BY PRESCRIBER/CLIN PHARMACIST DOCUMENTED: ICD-10-PCS | Mod: CPTII,S$GLB,, | Performed by: FAMILY MEDICINE

## 2022-08-09 PROCEDURE — 3078F PR MOST RECENT DIASTOLIC BLOOD PRESSURE < 80 MM HG: ICD-10-PCS | Mod: CPTII,S$GLB,, | Performed by: FAMILY MEDICINE

## 2022-08-09 PROCEDURE — 3288F PR FALLS RISK ASSESSMENT DOCUMENTED: ICD-10-PCS | Mod: CPTII,S$GLB,, | Performed by: FAMILY MEDICINE

## 2022-08-09 PROCEDURE — 99214 OFFICE O/P EST MOD 30 MIN: CPT | Mod: S$GLB,,, | Performed by: FAMILY MEDICINE

## 2022-08-09 PROCEDURE — 1101F PT FALLS ASSESS-DOCD LE1/YR: CPT | Mod: CPTII,S$GLB,, | Performed by: FAMILY MEDICINE

## 2022-08-09 PROCEDURE — 3008F PR BODY MASS INDEX (BMI) DOCUMENTED: ICD-10-PCS | Mod: CPTII,S$GLB,, | Performed by: FAMILY MEDICINE

## 2022-08-09 PROCEDURE — 3078F DIAST BP <80 MM HG: CPT | Mod: CPTII,S$GLB,, | Performed by: FAMILY MEDICINE

## 2022-08-09 PROCEDURE — 99214 PR OFFICE/OUTPT VISIT, EST, LEVL IV, 30-39 MIN: ICD-10-PCS | Mod: S$GLB,,, | Performed by: FAMILY MEDICINE

## 2022-08-09 PROCEDURE — 1101F PR PT FALLS ASSESS DOC 0-1 FALLS W/OUT INJ PAST YR: ICD-10-PCS | Mod: CPTII,S$GLB,, | Performed by: FAMILY MEDICINE

## 2022-08-09 PROCEDURE — 3074F SYST BP LT 130 MM HG: CPT | Mod: CPTII,S$GLB,, | Performed by: FAMILY MEDICINE

## 2022-08-09 PROCEDURE — 1126F PR PAIN SEVERITY QUANTIFIED, NO PAIN PRESENT: ICD-10-PCS | Mod: CPTII,S$GLB,, | Performed by: FAMILY MEDICINE

## 2022-08-09 PROCEDURE — 1126F AMNT PAIN NOTED NONE PRSNT: CPT | Mod: CPTII,S$GLB,, | Performed by: FAMILY MEDICINE

## 2022-08-09 PROCEDURE — 1160F RVW MEDS BY RX/DR IN RCRD: CPT | Mod: CPTII,S$GLB,, | Performed by: FAMILY MEDICINE

## 2022-08-09 PROCEDURE — 3288F FALL RISK ASSESSMENT DOCD: CPT | Mod: CPTII,S$GLB,, | Performed by: FAMILY MEDICINE

## 2022-08-09 PROCEDURE — 1159F MED LIST DOCD IN RCRD: CPT | Mod: CPTII,S$GLB,, | Performed by: FAMILY MEDICINE

## 2022-08-09 PROCEDURE — 1159F PR MEDICATION LIST DOCUMENTED IN MEDICAL RECORD: ICD-10-PCS | Mod: CPTII,S$GLB,, | Performed by: FAMILY MEDICINE

## 2022-08-09 PROCEDURE — 3044F HG A1C LEVEL LT 7.0%: CPT | Mod: CPTII,S$GLB,, | Performed by: FAMILY MEDICINE

## 2022-08-09 PROCEDURE — 3044F PR MOST RECENT HEMOGLOBIN A1C LEVEL <7.0%: ICD-10-PCS | Mod: CPTII,S$GLB,, | Performed by: FAMILY MEDICINE

## 2022-08-09 PROCEDURE — 3008F BODY MASS INDEX DOCD: CPT | Mod: CPTII,S$GLB,, | Performed by: FAMILY MEDICINE

## 2022-08-09 PROCEDURE — 4010F ACE/ARB THERAPY RXD/TAKEN: CPT | Mod: CPTII,S$GLB,, | Performed by: FAMILY MEDICINE

## 2022-08-09 PROCEDURE — 3074F PR MOST RECENT SYSTOLIC BLOOD PRESSURE < 130 MM HG: ICD-10-PCS | Mod: CPTII,S$GLB,, | Performed by: FAMILY MEDICINE

## 2022-08-09 PROCEDURE — 4010F PR ACE/ARB THEARPY RXD/TAKEN: ICD-10-PCS | Mod: CPTII,S$GLB,, | Performed by: FAMILY MEDICINE

## 2022-08-09 RX ORDER — TOPIRAMATE 25 MG/1
TABLET ORAL
Qty: 60 TABLET | Refills: 0 | Status: SHIPPED | OUTPATIENT
Start: 2022-08-09 | End: 2022-08-23

## 2022-08-09 RX ORDER — ROSUVASTATIN CALCIUM 20 MG/1
20 TABLET, COATED ORAL DAILY
Qty: 90 TABLET | Refills: 0 | Status: SHIPPED | OUTPATIENT
Start: 2022-08-09 | End: 2023-04-18 | Stop reason: SDUPTHER

## 2022-08-10 NOTE — PROGRESS NOTES
Subjective:       Patient ID: Chris Hill Jr. is a 72 y.o. male.    Chief Complaint: Dizziness    Or having episodes of facial numbness.  Room tumor 3 times a day.  Last anywhere from 45 minutes to 3 hours.  Headache with some.  Facial numbness with some in the headache.  Did me occur later.  Very dizzy with the episodes.  Saw Dr. Hines last week.  Change his mind about the diagnosis from TIA to complicated migraine.  He did increase the Crestor to 40 mg. Cardiology is ordered to monitor.  Went to the emergency room.  Had MRI which was negative.  CT.  Heart rate was good.  Little bradycardic.  CTA done.  Frontal headache with the episodes.  Face hurts primarily on the left side but is not painful at all between the episodes.    Physical examination vital signs noted.  Pupils are equal round regular active light accommodation.  Cranial nerves intact.  Motor intact.  Neck without bruit.  Chest clear.  Heart regular rate rhythm.  No sensitivity of the face at present.          Objective:        Assessment:       1. Dizziness    2. Nonintractable headache, unspecified chronicity pattern, unspecified headache type    3. BMI 35.0-35.9,adult    4. Facial numbness        Plan:       Dizziness  -     Sedimentation rate; Future; Expected date: 08/09/2022  -     C-Reactive Protein; Future; Expected date: 08/09/2022  -     ANTI-NEUTROPHILIC CYTOPLASMIC ANTIBODY; Future; Expected date: 08/09/2022    Nonintractable headache, unspecified chronicity pattern, unspecified headache type    BMI 35.0-35.9,adult  -     topiramate (TOPAMAX) 25 MG tablet; Take 25 mg nightly for 5 days then one tab bid  Dispense: 60 tablet; Refill: 0    Facial numbness    Other orders  -     rosuvastatin (CRESTOR) 20 MG tablet; Take 1 tablet (20 mg total) by mouth once daily.  Dispense: 90 tablet; Refill: 0    Lower the receive a statin to 20 mg.  Use Co Q10 200 per day.  Get a sed rate CRP and ANCA.  Follow-up in 2 weeks.  Start Topamax 25 mg HS for 5  days then 25 b.i.d. 60 pills.  Continue his Plavix.

## 2022-08-15 ENCOUNTER — TELEPHONE (OUTPATIENT)
Dept: FAMILY MEDICINE | Facility: CLINIC | Age: 72
End: 2022-08-15
Payer: MEDICARE

## 2022-08-19 DIAGNOSIS — H81.399 OTHER PERIPHERAL VERTIGO, UNSPECIFIED EAR: Primary | ICD-10-CM

## 2022-08-22 DIAGNOSIS — H81.399 OTHER PERIPHERAL VERTIGO, UNSPECIFIED EAR: Primary | ICD-10-CM

## 2022-08-23 ENCOUNTER — OFFICE VISIT (OUTPATIENT)
Dept: FAMILY MEDICINE | Facility: CLINIC | Age: 72
End: 2022-08-23
Payer: MEDICARE

## 2022-08-23 ENCOUNTER — HOSPITAL ENCOUNTER (OUTPATIENT)
Dept: RADIOLOGY | Facility: HOSPITAL | Age: 72
Discharge: HOME | End: 2022-08-23
Attending: PSYCHIATRY & NEUROLOGY
Payer: MEDICARE

## 2022-08-23 ENCOUNTER — PATIENT MESSAGE (OUTPATIENT)
Dept: CARDIOLOGY | Facility: CLINIC | Age: 72
End: 2022-08-23
Payer: MEDICARE

## 2022-08-23 VITALS
HEART RATE: 62 BPM | DIASTOLIC BLOOD PRESSURE: 62 MMHG | WEIGHT: 226 LBS | HEIGHT: 66 IN | SYSTOLIC BLOOD PRESSURE: 112 MMHG | BODY MASS INDEX: 36.32 KG/M2 | OXYGEN SATURATION: 98 % | TEMPERATURE: 98 F

## 2022-08-23 DIAGNOSIS — R51.9 NONINTRACTABLE HEADACHE, UNSPECIFIED CHRONICITY PATTERN, UNSPECIFIED HEADACHE TYPE: Primary | ICD-10-CM

## 2022-08-23 DIAGNOSIS — I95.1 ORTHOSTASIS: ICD-10-CM

## 2022-08-23 DIAGNOSIS — H81.399 OTHER PERIPHERAL VERTIGO, UNSPECIFIED EAR: ICD-10-CM

## 2022-08-23 DIAGNOSIS — R20.0 FACIAL NUMBNESS: ICD-10-CM

## 2022-08-23 DIAGNOSIS — R42 DIZZINESS: ICD-10-CM

## 2022-08-23 DIAGNOSIS — I10 HYPERTENSION, ESSENTIAL: ICD-10-CM

## 2022-08-23 PROCEDURE — 1126F PR PAIN SEVERITY QUANTIFIED, NO PAIN PRESENT: ICD-10-PCS | Mod: CPTII,S$GLB,, | Performed by: FAMILY MEDICINE

## 2022-08-23 PROCEDURE — 25500020 PHARM REV CODE 255: Performed by: RADIOLOGY

## 2022-08-23 PROCEDURE — 1160F PR REVIEW ALL MEDS BY PRESCRIBER/CLIN PHARMACIST DOCUMENTED: ICD-10-PCS | Mod: CPTII,S$GLB,, | Performed by: FAMILY MEDICINE

## 2022-08-23 PROCEDURE — 99213 OFFICE O/P EST LOW 20 MIN: CPT | Mod: S$GLB,,, | Performed by: FAMILY MEDICINE

## 2022-08-23 PROCEDURE — 70552 MRI BRAIN STEM W/DYE: CPT | Mod: TC

## 2022-08-23 PROCEDURE — 3008F BODY MASS INDEX DOCD: CPT | Mod: CPTII,S$GLB,, | Performed by: FAMILY MEDICINE

## 2022-08-23 PROCEDURE — 1159F MED LIST DOCD IN RCRD: CPT | Mod: CPTII,S$GLB,, | Performed by: FAMILY MEDICINE

## 2022-08-23 PROCEDURE — 99213 PR OFFICE/OUTPT VISIT, EST, LEVL III, 20-29 MIN: ICD-10-PCS | Mod: S$GLB,,, | Performed by: FAMILY MEDICINE

## 2022-08-23 PROCEDURE — 3078F DIAST BP <80 MM HG: CPT | Mod: CPTII,S$GLB,, | Performed by: FAMILY MEDICINE

## 2022-08-23 PROCEDURE — 3074F SYST BP LT 130 MM HG: CPT | Mod: CPTII,S$GLB,, | Performed by: FAMILY MEDICINE

## 2022-08-23 PROCEDURE — 4010F PR ACE/ARB THEARPY RXD/TAKEN: ICD-10-PCS | Mod: CPTII,S$GLB,, | Performed by: FAMILY MEDICINE

## 2022-08-23 PROCEDURE — 3044F PR MOST RECENT HEMOGLOBIN A1C LEVEL <7.0%: ICD-10-PCS | Mod: CPTII,S$GLB,, | Performed by: FAMILY MEDICINE

## 2022-08-23 PROCEDURE — 3288F PR FALLS RISK ASSESSMENT DOCUMENTED: ICD-10-PCS | Mod: CPTII,S$GLB,, | Performed by: FAMILY MEDICINE

## 2022-08-23 PROCEDURE — 1159F PR MEDICATION LIST DOCUMENTED IN MEDICAL RECORD: ICD-10-PCS | Mod: CPTII,S$GLB,, | Performed by: FAMILY MEDICINE

## 2022-08-23 PROCEDURE — 1101F PR PT FALLS ASSESS DOC 0-1 FALLS W/OUT INJ PAST YR: ICD-10-PCS | Mod: CPTII,S$GLB,, | Performed by: FAMILY MEDICINE

## 2022-08-23 PROCEDURE — 3008F PR BODY MASS INDEX (BMI) DOCUMENTED: ICD-10-PCS | Mod: CPTII,S$GLB,, | Performed by: FAMILY MEDICINE

## 2022-08-23 PROCEDURE — 1160F RVW MEDS BY RX/DR IN RCRD: CPT | Mod: CPTII,S$GLB,, | Performed by: FAMILY MEDICINE

## 2022-08-23 PROCEDURE — 3044F HG A1C LEVEL LT 7.0%: CPT | Mod: CPTII,S$GLB,, | Performed by: FAMILY MEDICINE

## 2022-08-23 PROCEDURE — 4010F ACE/ARB THERAPY RXD/TAKEN: CPT | Mod: CPTII,S$GLB,, | Performed by: FAMILY MEDICINE

## 2022-08-23 PROCEDURE — 3078F PR MOST RECENT DIASTOLIC BLOOD PRESSURE < 80 MM HG: ICD-10-PCS | Mod: CPTII,S$GLB,, | Performed by: FAMILY MEDICINE

## 2022-08-23 PROCEDURE — 3074F PR MOST RECENT SYSTOLIC BLOOD PRESSURE < 130 MM HG: ICD-10-PCS | Mod: CPTII,S$GLB,, | Performed by: FAMILY MEDICINE

## 2022-08-23 PROCEDURE — 3288F FALL RISK ASSESSMENT DOCD: CPT | Mod: CPTII,S$GLB,, | Performed by: FAMILY MEDICINE

## 2022-08-23 PROCEDURE — A9585 GADOBUTROL INJECTION: HCPCS | Performed by: RADIOLOGY

## 2022-08-23 PROCEDURE — 1126F AMNT PAIN NOTED NONE PRSNT: CPT | Mod: CPTII,S$GLB,, | Performed by: FAMILY MEDICINE

## 2022-08-23 PROCEDURE — 1101F PT FALLS ASSESS-DOCD LE1/YR: CPT | Mod: CPTII,S$GLB,, | Performed by: FAMILY MEDICINE

## 2022-08-23 RX ORDER — GADOBUTROL 604.72 MG/ML
10 INJECTION INTRAVENOUS
Status: COMPLETED | OUTPATIENT
Start: 2022-08-23 | End: 2022-08-23

## 2022-08-23 RX ADMIN — GADOBUTROL 10 ML: 604.72 INJECTION INTRAVENOUS at 01:08

## 2022-08-23 NOTE — PROGRESS NOTES
Subjective:       Patient ID: Chris Hill Jr. is a 72 y.o. male.    Chief Complaint: Follow-up    HPI   Patient presents to clinic for follow up. Did not do blood work that was ordered at last visit. Will do today. Headaches are unstable. Has good and bad days. Followed up with Donny last week after intense headache after yawning. [place] his jaw pop before headache. States neurologist is not confident they are migraines. MRI scheduled for this afternoon. Donny discontinued Topamax. He is generally feeling well right now. Hypertension slightly low today. Decrease Amlodipine to 5 mg. Cardiovascular ok. Denies chest pain or palpitations. Hyperlipidemia. Decreased Crestor to 20 mg after last visit. Also taking CoQ10.   Review of Systems   Respiratory: Negative.    Cardiovascular: Negative.  Negative for chest pain and palpitations.   Neurological: Positive for dizziness and headaches.   Psychiatric/Behavioral: Negative.          Objective:      Physical Exam  Constitutional:       Appearance: Normal appearance.   Eyes:      Extraocular Movements: Extraocular movements intact.      Pupils: Pupils are equal, round, and reactive to light.   Neck:      Vascular: No carotid bruit.   Cardiovascular:      Rate and Rhythm: Normal rate and regular rhythm.      Pulses: Normal pulses.      Heart sounds: Normal heart sounds.   Pulmonary:      Effort: Pulmonary effort is normal.      Breath sounds: Normal breath sounds.   Lymphadenopathy:      Cervical: No cervical adenopathy.   Skin:     General: Skin is warm and dry.   Neurological:      General: No focal deficit present.      Mental Status: He is alert.   Psychiatric:         Mood and Affect: Mood normal.         Behavior: Behavior normal.         Assessment/Plan:     Nonintractable headache, unspecified chronicity pattern, unspecified headache type    Dizziness    BMI 36.0-36.9,adult    Facial numbness    Hypertension, essential    Orthostasis       Do the MRI with and  without contrast as ordered by Neurology.  Decrease his amlodipine to 5 mg a day.  Half of ATN.  Do my labs in addition to the neurologist labs.  Follow up here in a couple of weeks.

## 2022-08-24 ENCOUNTER — PATIENT MESSAGE (OUTPATIENT)
Dept: CARDIOLOGY | Facility: CLINIC | Age: 72
End: 2022-08-24
Payer: MEDICARE

## 2022-08-29 ENCOUNTER — HOSPITAL ENCOUNTER (OUTPATIENT)
Dept: RADIOLOGY | Facility: HOSPITAL | Age: 72
Discharge: HOME | End: 2022-08-29
Attending: PSYCHIATRY & NEUROLOGY
Payer: MEDICARE

## 2022-08-29 DIAGNOSIS — H81.399 OTHER PERIPHERAL VERTIGO, UNSPECIFIED EAR: ICD-10-CM

## 2022-08-29 PROCEDURE — A9585 GADOBUTROL INJECTION: HCPCS | Performed by: NURSE PRACTITIONER

## 2022-08-29 PROCEDURE — 25500020 PHARM REV CODE 255: Performed by: NURSE PRACTITIONER

## 2022-08-29 PROCEDURE — 70553 MRI BRAIN STEM W/O & W/DYE: CPT | Mod: TC

## 2022-08-29 RX ORDER — GADOBUTROL 604.72 MG/ML
10 INJECTION INTRAVENOUS
Status: COMPLETED | OUTPATIENT
Start: 2022-08-29 | End: 2022-08-29

## 2022-08-29 RX ADMIN — GADOBUTROL 10 ML: 604.72 INJECTION INTRAVENOUS at 09:08

## 2022-09-06 ENCOUNTER — TELEPHONE (OUTPATIENT)
Dept: RADIOLOGY | Facility: HOSPITAL | Age: 72
End: 2022-09-06

## 2022-09-06 DIAGNOSIS — H81.399 PERIPHERAL VERTIGO, UNSPECIFIED LATERALITY: Primary | ICD-10-CM

## 2022-09-06 NOTE — NURSING
Pre procedure instructions given to pt for lumbar puncture verbalized understanding, to arrive at 9 am on 9/20/22 , may drink fluids and eat a light breakfast, to stop his plavix ,fish oil and ultram 7 days prior to his procedure per md order, will have family to drive him home after his procedure

## 2022-09-12 ENCOUNTER — LAB VISIT (OUTPATIENT)
Dept: LAB | Facility: HOSPITAL | Age: 72
End: 2022-09-12
Attending: NURSE PRACTITIONER
Payer: MEDICARE

## 2022-09-12 DIAGNOSIS — N13.8 BPH WITH URINARY OBSTRUCTION: ICD-10-CM

## 2022-09-12 DIAGNOSIS — C61 PROSTATE CANCER: ICD-10-CM

## 2022-09-12 DIAGNOSIS — N40.1 BPH WITH URINARY OBSTRUCTION: ICD-10-CM

## 2022-09-12 LAB
COMPLEXED PSA SERPL-MCNC: 0.07 NG/ML (ref 0–4)
TESTOST SERPL-MCNC: 160 NG/DL (ref 304–1227)

## 2022-09-12 PROCEDURE — 84403 ASSAY OF TOTAL TESTOSTERONE: CPT | Performed by: NURSE PRACTITIONER

## 2022-09-12 PROCEDURE — 84153 ASSAY OF PSA TOTAL: CPT | Performed by: NURSE PRACTITIONER

## 2022-09-12 PROCEDURE — 36415 COLL VENOUS BLD VENIPUNCTURE: CPT | Performed by: NURSE PRACTITIONER

## 2022-09-19 ENCOUNTER — LAB VISIT (OUTPATIENT)
Dept: LAB | Facility: HOSPITAL | Age: 72
End: 2022-09-19
Attending: FAMILY MEDICINE
Payer: MEDICARE

## 2022-09-19 ENCOUNTER — OFFICE VISIT (OUTPATIENT)
Dept: FAMILY MEDICINE | Facility: CLINIC | Age: 72
End: 2022-09-19
Payer: MEDICARE

## 2022-09-19 VITALS
WEIGHT: 227.19 LBS | SYSTOLIC BLOOD PRESSURE: 122 MMHG | OXYGEN SATURATION: 97 % | HEIGHT: 66 IN | DIASTOLIC BLOOD PRESSURE: 68 MMHG | TEMPERATURE: 98 F | BODY MASS INDEX: 36.51 KG/M2 | RESPIRATION RATE: 18 BRPM | HEART RATE: 70 BPM

## 2022-09-19 DIAGNOSIS — I10 HYPERTENSION, ESSENTIAL: Primary | ICD-10-CM

## 2022-09-19 DIAGNOSIS — E11.9 TYPE 2 DIABETES MELLITUS WITHOUT COMPLICATION, WITHOUT LONG-TERM CURRENT USE OF INSULIN: ICD-10-CM

## 2022-09-19 DIAGNOSIS — E11.319 TYPE 2 DIABETES MELLITUS WITH RETINOPATHY OF BOTH EYES, WITHOUT LONG-TERM CURRENT USE OF INSULIN, MACULAR EDEMA PRESENCE UNSPECIFIED, UNSPECIFIED RETINOPATHY SEVERITY: ICD-10-CM

## 2022-09-19 DIAGNOSIS — R42 DIZZINESS: ICD-10-CM

## 2022-09-19 PROBLEM — E11.3293 TYPE 2 DIABETES MELLITUS WITH BOTH EYES AFFECTED BY MILD NONPROLIFERATIVE RETINOPATHY WITHOUT MACULAR EDEMA, WITHOUT LONG-TERM CURRENT USE OF INSULIN: Status: ACTIVE | Noted: 2021-01-13

## 2022-09-19 PROBLEM — E11.3293 TYPE 2 DIABETES MELLITUS WITH BOTH EYES AFFECTED BY MILD NONPROLIFERATIVE RETINOPATHY WITHOUT MACULAR EDEMA, WITHOUT LONG-TERM CURRENT USE OF INSULIN: Status: RESOLVED | Noted: 2021-01-13 | Resolved: 2022-09-19

## 2022-09-19 LAB
ESTIMATED AVG GLUCOSE: 126 MG/DL (ref 68–131)
HBA1C MFR BLD: 6 % (ref 4.5–6.2)

## 2022-09-19 PROCEDURE — 1101F PR PT FALLS ASSESS DOC 0-1 FALLS W/OUT INJ PAST YR: ICD-10-PCS | Mod: CPTII,S$GLB,, | Performed by: FAMILY MEDICINE

## 2022-09-19 PROCEDURE — 3074F PR MOST RECENT SYSTOLIC BLOOD PRESSURE < 130 MM HG: ICD-10-PCS | Mod: CPTII,S$GLB,, | Performed by: FAMILY MEDICINE

## 2022-09-19 PROCEDURE — 3074F SYST BP LT 130 MM HG: CPT | Mod: CPTII,S$GLB,, | Performed by: FAMILY MEDICINE

## 2022-09-19 PROCEDURE — 99213 PR OFFICE/OUTPT VISIT, EST, LEVL III, 20-29 MIN: ICD-10-PCS | Mod: 25,S$GLB,, | Performed by: FAMILY MEDICINE

## 2022-09-19 PROCEDURE — G0008 ADMIN INFLUENZA VIRUS VAC: HCPCS | Mod: S$GLB,,, | Performed by: FAMILY MEDICINE

## 2022-09-19 PROCEDURE — 3078F DIAST BP <80 MM HG: CPT | Mod: CPTII,S$GLB,, | Performed by: FAMILY MEDICINE

## 2022-09-19 PROCEDURE — 1126F AMNT PAIN NOTED NONE PRSNT: CPT | Mod: CPTII,S$GLB,, | Performed by: FAMILY MEDICINE

## 2022-09-19 PROCEDURE — 1160F PR REVIEW ALL MEDS BY PRESCRIBER/CLIN PHARMACIST DOCUMENTED: ICD-10-PCS | Mod: CPTII,S$GLB,, | Performed by: FAMILY MEDICINE

## 2022-09-19 PROCEDURE — 3008F PR BODY MASS INDEX (BMI) DOCUMENTED: ICD-10-PCS | Mod: CPTII,S$GLB,, | Performed by: FAMILY MEDICINE

## 2022-09-19 PROCEDURE — 1160F RVW MEDS BY RX/DR IN RCRD: CPT | Mod: CPTII,S$GLB,, | Performed by: FAMILY MEDICINE

## 2022-09-19 PROCEDURE — 83036 HEMOGLOBIN GLYCOSYLATED A1C: CPT | Performed by: FAMILY MEDICINE

## 2022-09-19 PROCEDURE — 1159F MED LIST DOCD IN RCRD: CPT | Mod: CPTII,S$GLB,, | Performed by: FAMILY MEDICINE

## 2022-09-19 PROCEDURE — 1159F PR MEDICATION LIST DOCUMENTED IN MEDICAL RECORD: ICD-10-PCS | Mod: CPTII,S$GLB,, | Performed by: FAMILY MEDICINE

## 2022-09-19 PROCEDURE — 3078F PR MOST RECENT DIASTOLIC BLOOD PRESSURE < 80 MM HG: ICD-10-PCS | Mod: CPTII,S$GLB,, | Performed by: FAMILY MEDICINE

## 2022-09-19 PROCEDURE — 1126F PR PAIN SEVERITY QUANTIFIED, NO PAIN PRESENT: ICD-10-PCS | Mod: CPTII,S$GLB,, | Performed by: FAMILY MEDICINE

## 2022-09-19 PROCEDURE — 36415 COLL VENOUS BLD VENIPUNCTURE: CPT | Performed by: FAMILY MEDICINE

## 2022-09-19 PROCEDURE — 3288F PR FALLS RISK ASSESSMENT DOCUMENTED: ICD-10-PCS | Mod: CPTII,S$GLB,, | Performed by: FAMILY MEDICINE

## 2022-09-19 PROCEDURE — 3044F PR MOST RECENT HEMOGLOBIN A1C LEVEL <7.0%: ICD-10-PCS | Mod: CPTII,S$GLB,, | Performed by: FAMILY MEDICINE

## 2022-09-19 PROCEDURE — 99213 OFFICE O/P EST LOW 20 MIN: CPT | Mod: 25,S$GLB,, | Performed by: FAMILY MEDICINE

## 2022-09-19 PROCEDURE — 3044F HG A1C LEVEL LT 7.0%: CPT | Mod: CPTII,S$GLB,, | Performed by: FAMILY MEDICINE

## 2022-09-19 PROCEDURE — 90694 FLU VACCINE - QUADRIVALENT - ADJUVANTED: ICD-10-PCS | Mod: S$GLB,,, | Performed by: FAMILY MEDICINE

## 2022-09-19 PROCEDURE — 4010F ACE/ARB THERAPY RXD/TAKEN: CPT | Mod: CPTII,S$GLB,, | Performed by: FAMILY MEDICINE

## 2022-09-19 PROCEDURE — G0008 FLU VACCINE - QUADRIVALENT - ADJUVANTED: ICD-10-PCS | Mod: S$GLB,,, | Performed by: FAMILY MEDICINE

## 2022-09-19 PROCEDURE — 4010F PR ACE/ARB THEARPY RXD/TAKEN: ICD-10-PCS | Mod: CPTII,S$GLB,, | Performed by: FAMILY MEDICINE

## 2022-09-19 PROCEDURE — 1101F PT FALLS ASSESS-DOCD LE1/YR: CPT | Mod: CPTII,S$GLB,, | Performed by: FAMILY MEDICINE

## 2022-09-19 PROCEDURE — 3008F BODY MASS INDEX DOCD: CPT | Mod: CPTII,S$GLB,, | Performed by: FAMILY MEDICINE

## 2022-09-19 PROCEDURE — 90694 VACC AIIV4 NO PRSRV 0.5ML IM: CPT | Mod: S$GLB,,, | Performed by: FAMILY MEDICINE

## 2022-09-19 PROCEDURE — 3288F FALL RISK ASSESSMENT DOCD: CPT | Mod: CPTII,S$GLB,, | Performed by: FAMILY MEDICINE

## 2022-09-19 NOTE — PROGRESS NOTES
Subjective:       Patient ID: Chris Hill Jr. is a 72 y.o. male.    Chief Complaint: Follow-up (2 week)    Follow-up  Pertinent negatives include no chest pain.   Patient presents to clinic for follow up. Hypertension stable. Decreased Amlodipine to 5 mg after last visit. Cardiovascular ok. Denies chest pain or palpitations. Still experiencing dizziness. Waxes and wanes. Debilitating when it happens. Denies ears ringing or buzzing. Headaches have been better. MRI was normal. Neurologist wants to do spinal tap but he is hesitant. Advised him to do it. Complaining of recent overproduction of salvia. Possibly due to medication. Type 2 DM. A1C due. Eye exam up to date. Due for tetanus, shingles, and influenza vaccination.   Review of Systems   Respiratory: Negative.     Cardiovascular: Negative.  Negative for chest pain and palpitations.   Neurological:  Positive for dizziness.   Psychiatric/Behavioral: Negative.         Objective:      Physical Exam  Constitutional:       Appearance: Normal appearance.   Neck:      Vascular: No carotid bruit.   Cardiovascular:      Rate and Rhythm: Normal rate and regular rhythm.      Pulses: Normal pulses.      Heart sounds: Normal heart sounds.   Pulmonary:      Effort: Pulmonary effort is normal.      Breath sounds: Normal breath sounds.   Lymphadenopathy:      Cervical: No cervical adenopathy.   Skin:     General: Skin is warm and dry.   Neurological:      Mental Status: He is alert.   Psychiatric:         Mood and Affect: Mood normal.         Behavior: Behavior normal.       Assessment/Plan:     Hypertension, essential    Dizziness    Type 2 diabetes mellitus without complication, without long-term current use of insulin  -     Hemoglobin A1C; Future; Expected date: 09/19/2022    Type 2 diabetes mellitus with retinopathy of both eyes, without long-term current use of insulin, macular edema presence unspecified, unspecified retinopathy severity    Other orders  -     Influenza  (FLUAD) - Quadrivalent (Adjuvanted) *Preferred* (65+) (PF)     Do lumbar puncture as planned by Neurology.  Continue amlodipine 5 mg daily.  Get copy of eye exam from [facility].  Said it was done his home.  In says he brought in a copy of this.  Get hemoglobin A1c.  Flu shot high-dose.

## 2022-09-20 ENCOUNTER — HOSPITAL ENCOUNTER (OUTPATIENT)
Dept: RADIOLOGY | Facility: HOSPITAL | Age: 72
Discharge: HOME | End: 2022-09-20
Attending: PSYCHIATRY & NEUROLOGY
Payer: MEDICARE

## 2022-09-20 ENCOUNTER — PATIENT OUTREACH (OUTPATIENT)
Dept: ADMINISTRATIVE | Facility: HOSPITAL | Age: 72
End: 2022-09-20
Payer: MEDICARE

## 2022-09-20 VITALS
TEMPERATURE: 99 F | SYSTOLIC BLOOD PRESSURE: 146 MMHG | BODY MASS INDEX: 35.43 KG/M2 | OXYGEN SATURATION: 95 % | DIASTOLIC BLOOD PRESSURE: 70 MMHG | HEIGHT: 66 IN | WEIGHT: 220.44 LBS | RESPIRATION RATE: 12 BRPM | HEART RATE: 58 BPM

## 2022-09-20 DIAGNOSIS — H81.399 PERIPHERAL VERTIGO, UNSPECIFIED LATERALITY: ICD-10-CM

## 2022-09-20 LAB
CLARITY CSF: CLEAR
COLOR CSF: COLORLESS
GLUCOSE CSF-MCNC: 68 MG/DL (ref 40–70)
INDIA INK PREP CSF: NORMAL
LYMPHOCYTES NFR CSF MANUAL: 20 % (ref 40–80)
MONOS+MACROS NFR CSF MANUAL: 60 % (ref 15–45)
NEUTROPHILS NFR CSF MANUAL: 20 % (ref 0–6)
PROT CSF-MCNC: 62 MG/DL (ref 15–40)
RBC # CSF: 2 /CU MM
SPECIMEN VOL CSF: 3 ML
WBC # CSF: 1 /CU MM (ref 0–5)

## 2022-09-20 PROCEDURE — 87102 FUNGUS ISOLATION CULTURE: CPT | Performed by: PSYCHIATRY & NEUROLOGY

## 2022-09-20 PROCEDURE — 25000003 PHARM REV CODE 250: Performed by: RADIOLOGY

## 2022-09-20 PROCEDURE — 87210 SMEAR WET MOUNT SALINE/INK: CPT | Performed by: PSYCHIATRY & NEUROLOGY

## 2022-09-20 PROCEDURE — 89051 BODY FLUID CELL COUNT: CPT | Performed by: PSYCHIATRY & NEUROLOGY

## 2022-09-20 PROCEDURE — 88112 CYTOPATH CELL ENHANCE TECH: CPT | Mod: TC

## 2022-09-20 PROCEDURE — 87070 CULTURE OTHR SPECIMN AEROBIC: CPT | Performed by: PSYCHIATRY & NEUROLOGY

## 2022-09-20 PROCEDURE — 36415 COLL VENOUS BLD VENIPUNCTURE: CPT | Performed by: PSYCHIATRY & NEUROLOGY

## 2022-09-20 PROCEDURE — 87899 AGENT NOS ASSAY W/OPTIC: CPT | Performed by: PSYCHIATRY & NEUROLOGY

## 2022-09-20 PROCEDURE — 87206 SMEAR FLUORESCENT/ACID STAI: CPT | Performed by: PSYCHIATRY & NEUROLOGY

## 2022-09-20 PROCEDURE — 62328 DX LMBR SPI PNXR W/FLUOR/CT: CPT

## 2022-09-20 PROCEDURE — 87116 MYCOBACTERIA CULTURE: CPT | Performed by: PSYCHIATRY & NEUROLOGY

## 2022-09-20 PROCEDURE — 87205 SMEAR GRAM STAIN: CPT | Performed by: PSYCHIATRY & NEUROLOGY

## 2022-09-20 PROCEDURE — 82945 GLUCOSE OTHER FLUID: CPT | Performed by: PSYCHIATRY & NEUROLOGY

## 2022-09-20 PROCEDURE — 84157 ASSAY OF PROTEIN OTHER: CPT | Performed by: PSYCHIATRY & NEUROLOGY

## 2022-09-20 PROCEDURE — 87118 MYCOBACTERIC IDENTIFICATION: CPT | Performed by: PSYCHIATRY & NEUROLOGY

## 2022-09-20 PROCEDURE — 86592 SYPHILIS TEST NON-TREP QUAL: CPT | Performed by: PSYCHIATRY & NEUROLOGY

## 2022-09-20 PROCEDURE — 30000890 LABCORP MISCELLANEOUS TEST: Performed by: PSYCHIATRY & NEUROLOGY

## 2022-09-20 RX ORDER — LIDOCAINE HYDROCHLORIDE 10 MG/ML
5 INJECTION INFILTRATION; PERINEURAL ONCE
Status: COMPLETED | OUTPATIENT
Start: 2022-09-20 | End: 2022-09-20

## 2022-09-20 RX ADMIN — LIDOCAINE HYDROCHLORIDE 4 ML: 10 INJECTION, SOLUTION EPIDURAL; INFILTRATION; INTRACAUDAL; PERINEURAL at 10:09

## 2022-09-20 NOTE — LETTER
AUTHORIZATION FOR RELEASE OF   CONFIDENTIAL INFORMATION    VISION WORKS    We are seeing Chris Hill Jr., date of birth 1950, in the clinic at [facility]. Samy Pettit III, MD is the patient's PCP. Chris Hill Jr. has an outstanding lab/procedure at the time we reviewed his chart. In order to help keep his health information updated, he has authorized us to request the following medical record(s):        (  )  MAMMOGRAM                                      (  )  COLONOSCOPY      (  )  PAP SMEAR                                          (  )  OUTSIDE LAB RESULTS     (  )  DEXA SCAN                                          (  X)  EYE EXAM            (  )  FOOT EXAM                                          (  )  ENTIRE RECORD     (  )  OUTSIDE IMMUNIZATIONS                 (  )  _______________         Please fax records to [facility], Clinton H Sharp III, MD, [phone]    Thank you in advance,       Rufina MEDRANO  Care Coordinator  [facility] [address] [place] [address]  Phone [phone]  Fax [phone]           Patient Name: Chris Hill Jr.  : 1950  Patient Phone #: [phone]

## 2022-09-20 NOTE — LETTER
AUTHORIZATION FOR RELEASE OF   CONFIDENTIAL INFORMATION    Dr Judd    We are seeing Chris Hill Jr., date of birth 1950, in the clinic at [facility]. Samy Pettit III, MD is the patient's PCP. Chris JAIN Sergio Howell has an outstanding lab/procedure at the time we reviewed his chart. In order to help keep his health information updated, he has authorized us to request the following medical record(s):        (  )  MAMMOGRAM                                      (  )  COLONOSCOPY      (  )  PAP SMEAR                                          (  )  OUTSIDE LAB RESULTS     (  )  DEXA SCAN                                          ( X )  EYE EXAM            (  )  FOOT EXAM                                          (  )  ENTIRE RECORD     (  )  OUTSIDE IMMUNIZATIONS                 (  )  _______________         Please fax records to [facility], Clinton H Sharp III, MD, [phone]    Thank you in advance,       Rufina MEDRANO  Care Coordinator  [facility] [address] [place] [address]  Phone [phone]  Fax [phone]           Patient Name: Chris Hill JrAdams  : 1950  Patient Phone #: [phone]

## 2022-09-23 ENCOUNTER — PATIENT OUTREACH (OUTPATIENT)
Dept: ADMINISTRATIVE | Facility: HOSPITAL | Age: 72
End: 2022-09-23
Payer: MEDICARE

## 2022-09-23 DIAGNOSIS — E11.9 TYPE 2 DIABETES MELLITUS WITHOUT COMPLICATION, WITHOUT LONG-TERM CURRENT USE OF INSULIN: Primary | ICD-10-CM

## 2022-09-23 LAB
BACTERIA CSF CULT: NO GROWTH
GRAM STN SPEC: NORMAL
GRAM STN SPEC: NORMAL
LABCORP MISC TEST CODE: NORMAL
LABCORP MISC TEST NAME: NORMAL
LABCORP MISCELLANEOUS TEST: NORMAL
REAGIN AB CSF QL: NON REACTIVE

## 2022-09-23 NOTE — PROGRESS NOTES
NO EYE EXAM FROM [facility] COMPLETED AT HOME IN 2022 WAS FOUND IN MEDIA.  EYE EXAM REQUEST SENT TO DR DENZEL AMAYA ORDER PLACED FOR MICRO ALBUMIN AND SCHEDULED WITH FUTURE LAB APPOINTMENT

## 2022-09-23 NOTE — LETTER
AUTHORIZATION FOR RELEASE OF   CONFIDENTIAL INFORMATION    DR MAHONEY    We are seeing Chris Hill Jr., date of birth 1950, in the clinic at [facility]. Samy Pettit III, MD is the patient's PCP. Chris JAIN Sergio Howell has an outstanding lab/procedure at the time we reviewed his chart. In order to help keep his health information updated, he has authorized us to request the following medical record(s):        (  )  MAMMOGRAM                                      (  )  COLONOSCOPY      (  )  PAP SMEAR                                          (  )  OUTSIDE LAB RESULTS     (  )  DEXA SCAN                                          (  X)  EYE EXAM            (  )  FOOT EXAM                                          (  )  ENTIRE RECORD     (  )  OUTSIDE IMMUNIZATIONS                 (  )  _______________         Please fax records to [facility], Clinton H Sharp III, MD,[phone]    Thank you in advance,       Rfuina MEDRANO  Care Coordinator  [facility] [address] [place] [address]  Phone [phone]  Fax [phone]           Patient Name: Chris Hill JrAdams  : 1950  Patient Phone #: [phone]

## 2022-09-28 ENCOUNTER — PATIENT OUTREACH (OUTPATIENT)
Dept: ADMINISTRATIVE | Facility: HOSPITAL | Age: 72
End: 2022-09-28
Payer: MEDICARE

## 2022-10-03 ENCOUNTER — PATIENT OUTREACH (OUTPATIENT)
Dept: ADMINISTRATIVE | Facility: HOSPITAL | Age: 72
End: 2022-10-03
Payer: MEDICARE

## 2022-10-03 NOTE — PROGRESS NOTES
DUPLICATE EYE EXAM RECEIVED. LAST EYE EXAM WITH DR INFANTE 8/10/21 AS OF 10/3/22. 8/10/21 ALREADY UPDATED IN .

## 2022-10-14 LAB
LEFT EYE DM RETINOPATHY: POSITIVE
RIGHT EYE DM RETINOPATHY: POSITIVE

## 2022-10-24 LAB — FUNGUS SPEC CULT: NORMAL

## 2022-10-26 ENCOUNTER — PATIENT OUTREACH (OUTPATIENT)
Dept: ADMINISTRATIVE | Facility: HOSPITAL | Age: 72
End: 2022-10-26
Payer: MEDICARE

## 2022-11-03 LAB
ACID FAST MOD KINY STN SPEC: NORMAL
MYCOBACTERIUM SPEC QL CULT: NORMAL

## 2022-11-07 RX ORDER — CARVEDILOL 25 MG/1
TABLET ORAL
Qty: 180 TABLET | Refills: 0 | OUTPATIENT
Start: 2022-11-07

## 2022-11-07 NOTE — TELEPHONE ENCOUNTER
No new care gaps identified.  [facility] Embedded Care Gaps. Reference number: [id number]. 11/07/2022   9:22:19 AM CST

## 2022-11-07 NOTE — TELEPHONE ENCOUNTER
Refill Decision Note   Chris Hill  is requesting a refill authorization.  Brief Assessment and Rationale for Refill:  Quick Discontinue     Medication Therapy Plan:  Pended in another encounter    Medication Reconciliation Completed: No   Comments:     No Care Gaps recommended.     Note composed:4:39 PM 11/07/2022

## 2022-11-08 ENCOUNTER — PATIENT MESSAGE (OUTPATIENT)
Dept: FAMILY MEDICINE | Facility: CLINIC | Age: 72
End: 2022-11-08
Payer: MEDICARE

## 2022-11-10 ENCOUNTER — LAB VISIT (OUTPATIENT)
Dept: LAB | Facility: HOSPITAL | Age: 72
End: 2022-11-10
Attending: INTERNAL MEDICINE
Payer: MEDICARE

## 2022-11-10 DIAGNOSIS — D50.9 IRON DEFICIENCY ANEMIA, UNSPECIFIED IRON DEFICIENCY ANEMIA TYPE: ICD-10-CM

## 2022-11-10 DIAGNOSIS — D64.9 NORMOCYTIC NORMOCHROMIC ANEMIA: ICD-10-CM

## 2022-11-10 LAB
ALBUMIN SERPL BCP-MCNC: 3.8 G/DL (ref 3.5–5.2)
ALP SERPL-CCNC: 69 U/L (ref 55–135)
ALT SERPL W/O P-5'-P-CCNC: 22 U/L (ref 10–44)
ANION GAP SERPL CALC-SCNC: 10 MMOL/L (ref 8–16)
AST SERPL-CCNC: 18 U/L (ref 10–40)
BASOPHILS # BLD AUTO: 0.04 K/UL (ref 0–0.2)
BASOPHILS NFR BLD: 0.4 % (ref 0–1.9)
BILIRUB SERPL-MCNC: 0.4 MG/DL (ref 0.1–1)
BUN SERPL-MCNC: 23 MG/DL (ref 8–23)
CALCIUM SERPL-MCNC: 9.1 MG/DL (ref 8.7–10.5)
CHLORIDE SERPL-SCNC: 101 MMOL/L (ref 95–110)
CO2 SERPL-SCNC: 26 MMOL/L (ref 23–29)
CREAT SERPL-MCNC: 1.1 MG/DL (ref 0.5–1.4)
DIFFERENTIAL METHOD: ABNORMAL
EOSINOPHIL # BLD AUTO: 0.5 K/UL (ref 0–0.5)
EOSINOPHIL NFR BLD: 5.2 % (ref 0–8)
ERYTHROCYTE [DISTWIDTH] IN BLOOD BY AUTOMATED COUNT: 13.4 % (ref 11.5–14.5)
EST. GFR  (NO RACE VARIABLE): >60 ML/MIN/1.73 M^2
FERRITIN SERPL-MCNC: 229 NG/ML (ref 20–300)
GLUCOSE SERPL-MCNC: 109 MG/DL (ref 70–110)
HCT VFR BLD AUTO: 39.4 % (ref 40–54)
HGB BLD-MCNC: 12.5 G/DL (ref 14–18)
IMM GRANULOCYTES # BLD AUTO: 0.39 K/UL (ref 0–0.04)
IMM GRANULOCYTES NFR BLD AUTO: 4.2 % (ref 0–0.5)
IRON SERPL-MCNC: 79 UG/DL (ref 45–160)
LYMPHOCYTES # BLD AUTO: 1.4 K/UL (ref 1–4.8)
LYMPHOCYTES NFR BLD: 14.8 % (ref 18–48)
MCH RBC QN AUTO: 28.2 PG (ref 27–31)
MCHC RBC AUTO-ENTMCNC: 31.7 G/DL (ref 32–36)
MCV RBC AUTO: 89 FL (ref 82–98)
MONOCYTES # BLD AUTO: 0.7 K/UL (ref 0.3–1)
MONOCYTES NFR BLD: 7.9 % (ref 4–15)
NEUTROPHILS # BLD AUTO: 6.3 K/UL (ref 1.8–7.7)
NEUTROPHILS NFR BLD: 67.5 % (ref 38–73)
NRBC BLD-RTO: 0 /100 WBC
PLATELET # BLD AUTO: 159 K/UL (ref 150–450)
PMV BLD AUTO: 11.2 FL (ref 9.2–12.9)
POTASSIUM SERPL-SCNC: 4.3 MMOL/L (ref 3.5–5.1)
PROT SERPL-MCNC: 6.6 G/DL (ref 6–8.4)
RBC # BLD AUTO: 4.43 M/UL (ref 4.6–6.2)
SATURATED IRON: 27 % (ref 20–50)
SODIUM SERPL-SCNC: 137 MMOL/L (ref 136–145)
TOTAL IRON BINDING CAPACITY: 297 UG/DL (ref 250–450)
TRANSFERRIN SERPL-MCNC: 212 MG/DL (ref 200–375)
WBC # BLD AUTO: 9.31 K/UL (ref 3.9–12.7)

## 2022-11-10 PROCEDURE — 82728 ASSAY OF FERRITIN: CPT | Performed by: INTERNAL MEDICINE

## 2022-11-10 PROCEDURE — 84466 ASSAY OF TRANSFERRIN: CPT | Performed by: INTERNAL MEDICINE

## 2022-11-10 PROCEDURE — 80053 COMPREHEN METABOLIC PANEL: CPT | Performed by: INTERNAL MEDICINE

## 2022-11-10 PROCEDURE — 36415 COLL VENOUS BLD VENIPUNCTURE: CPT | Performed by: INTERNAL MEDICINE

## 2022-11-10 PROCEDURE — 85025 COMPLETE CBC W/AUTO DIFF WBC: CPT | Performed by: INTERNAL MEDICINE

## 2022-11-14 NOTE — PROGRESS NOTES
[facility] Hematology/Oncology  PROGRESS NOTE -  Follow-up Visit      Subjective:       Patient ID:   NAME: Chris Hill Jr. : 1950     72 y.o. male    Referring Doc: Tyrell  Other Physicians: Manpreet Gordon; Wiley; Wilvre; Dominic        Chief Complaint:  prostate cancer; anem/tcp f/u         History of Present Illness:     Patient is being seen today in person..The patient is on today to go over the results of the recently ordered labs, tests and studies.  He is here with his wife.      He is continued on oral iron    He saw Dr Pettit in 2022     He denies any CP, SOB, HA's or N/V.  He had had some dizzy spells and had LP.  He has been taking his B12. Dr Hines has him on plavix still. Dr Hines has referred him to a vertigo specialist Dr Perrin at [facility].      He previously had colonoscopy with Dr Alfaro in 2022 with only 3 polyps removed     His latest PSA was 0.07 and he sees Dr Gordon again in Dec 2022              I discussed COVID19 precautions - he had his vaccinations          ROS:   GEN: normal without any fever, night sweats or weight loss; intermittent dizzy spells  HEENT: normal with no HA's, sore throat, stiff neck, changes in vision  CV: normal with no CP, SOB, PND, JANSEN or orthopnea  PULM: normal with no SOB, cough, hemoptysis, sputum or pleuritic pain  GI: extreme reflux issues  : normal with no hematuria, dysuria  BREAST: some bilateral breast tenderness since starting thyroid meds  SKIN: normal with no rash, erythema, bruising, or swelling    Allergies:  Review of patient's allergies indicates:  No Known Allergies    Medications:    Current Outpatient Medications:     amitriptyline (ELAVIL) 50 MG tablet, TAKE 1 TABLET BY MOUTH ONCE DAILY IN THE EVENING (Patient taking differently: Take 50 mg by mouth every evening.), Disp: 90 tablet, Rfl: 3    amLODIPine (NORVASC) 10 MG tablet, Take 1 tablet by mouth once daily (Patient taking differently: Take 5 mg by mouth once daily.),  Disp: 90 tablet, Rfl: 3    carvediloL (COREG) 25 MG tablet, Take 1 tablet (25 mg total) by mouth 2 (two) times daily with meals., Disp: 180 tablet, Rfl: 0    cloNIDine (CATAPRES) 0.1 MG tablet, Take 1 tablet (0.1 mg total) by mouth every 8 (eight) hours as needed (SBP greater than 180)., Disp: 30 tablet, Rfl: 0    clopidogreL (PLAVIX) 75 mg tablet, Take 75 mg by mouth once daily. Hold till after precedure, Disp: , Rfl:     co-enzyme Q-10 30 mg capsule, Take 30 mg by mouth 3 (three) times daily., Disp: , Rfl:     cyanocobalamin (VITAMIN B-12) 100 MCG tablet, Take 1,000 mcg by mouth once daily., Disp: , Rfl:     fish oil-omega-3 fatty acids 300-1,000 mg capsule, Take 1 capsule by mouth 2 (two) times daily., Disp: , Rfl:     gabapentin (NEURONTIN) 300 MG capsule, Take 300 mg by mouth 3 (three) times daily., Disp: , Rfl:     hydrALAZINE (APRESOLINE) 100 MG tablet, TAKE 1 TABLET BY MOUTH THREE TIMES DAILY, Disp: 270 tablet, Rfl: 0    ibuprofen (ADVIL,MOTRIN) 600 MG tablet, Take 1 tablet (600 mg total) by mouth every 6 (six) hours as needed for Pain., Disp: 20 tablet, Rfl: 0    iron-vitamin C 100-250 mg, ICAR-C, 100-250 mg Tab, Take 1 tablet by mouth once daily, Disp: 30 tablet, Rfl: 0    levothyroxine (SYNTHROID) 50 MCG tablet, Take 1 tablet (50 mcg total) by mouth before breakfast., Disp: 90 tablet, Rfl: 2    magnesium 250 mg Tab, Take 1 tablet by mouth once daily., Disp: , Rfl:     meclizine (ANTIVERT) 25 mg tablet, Take 25 mg by mouth every 6 (six) hours as needed., Disp: , Rfl:     metFORMIN (GLUCOPHAGE) 500 MG tablet, Take 1 tablet (500 mg total) by mouth 2 (two) times daily., Disp: 180 tablet, Rfl: 1    multivitamin (THERAGRAN) per tablet, Take 1 tablet by mouth once daily., Disp: , Rfl:     olmesartan (BENICAR) 40 MG tablet, Take 1 tablet by mouth once daily, Disp: 90 tablet, Rfl: 3    pantoprazole (PROTONIX) 40 MG tablet, Take 40 mg by mouth 2 (two) times daily as needed., Disp: , Rfl:     potassium chloride SA  "(K-DUR,KLOR-CON) 20 MEQ tablet, Take 1 tablet by mouth twice daily (Patient taking differently: Take 20 mEq by mouth once daily.), Disp: 180 tablet, Rfl: 3    rosuvastatin (CRESTOR) 20 MG tablet, Take 1 tablet (20 mg total) by mouth once daily., Disp: 90 tablet, Rfl: 0    sildenafiL (VIAGRA) 25 MG tablet, Take 1 tablet (25 mg total) by mouth daily as needed for Erectile Dysfunction. Take 1/2 to 1 tablet as needed for erectile dysfunction., Disp: 10 tablet, Rfl: 0    spironolactone (ALDACTONE) 25 MG tablet, Take 1 tablet by mouth once daily, Disp: 90 tablet, Rfl: 0    tamsulosin (FLOMAX) 0.4 mg Cap, TAKE 1 CAPSULE BY MOUTH ONCE DAILY IN THE EVENING, Disp: 90 capsule, Rfl: 0    terazosin (HYTRIN) 10 MG capsule, TAKE 1 CAPSULE BY MOUTH ONCE DAILY IN THE EVENING, Disp: 90 capsule, Rfl: 0    traMADoL (ULTRAM) 50 mg tablet, , Disp: , Rfl:     PMHx/PSHx Updates:  See patient's last visit with me on 7/18/2022  See H&P on 12/28/2018        Pathology:  Cancer Staging  No matching staging information was found for the patient.          Objective:     Vitals:  Blood pressure (!) 153/70, pulse (!) 59, temperature 98.4 °F (36.9 °C), resp. rate 18, height 5' 6" (1.676 m), weight 101.8 kg (224 lb 6.4 oz).        Physical Examination:   GEN: no apparent distress, comfortable; AAOx3  HEAD: atraumatic and normocephalic  EYES: no conjunctival pallor or muddiness, no icterus; normal pupil reaction to ambient light  ENT: OMM, no pharyngeal erythema, external bilateral ears WNL; no visible thrush or ulcers  NECK: no masses or swelling, trachea midline, no visible LAD/LN's   CV: no palpitations; no pedal edema; no noticeable JVD or neck vein distension; pedal swelling better  CHEST: Normal respiratory effort; chest wall breath movements symmetrical; no audible wheezing  ABDOM: non-distended; no bloating  MUSC/Skeletal: ROM normal; joints visibly normal; no deformities; positive arthropathy in hands  EXTREM: no clubbing, cyanosis, " inflammation or swelling  SKIN: no rashes, lesions, ulcers, petechiae or subcutaneous nodules;   : no ellison  NEURO: moving all 4 extremities; AAOx3; no tremors  PSYCH: normal mood, affect and behavior  LYMPH: no visible LN's or LAD              Labs:        Lab Results   Component Value Date    WBC 9.31 11/10/2022    HGB 12.5 (L) 11/10/2022    HCT 39.4 (L) 11/10/2022    MCV 89 11/10/2022     11/10/2022     CMP  Sodium   Date Value Ref Range Status   11/10/2022 137 136 - 145 mmol/L Final   04/12/2019 138 134 - 144 mmol/L      Potassium   Date Value Ref Range Status   11/10/2022 4.3 3.5 - 5.1 mmol/L Final     Chloride   Date Value Ref Range Status   11/10/2022 101 95 - 110 mmol/L Final   04/12/2019 99 98 - 110 mmol/L      CO2   Date Value Ref Range Status   11/10/2022 26 23 - 29 mmol/L Final     Glucose   Date Value Ref Range Status   11/10/2022 109 70 - 110 mg/dL Final   04/12/2019 117 (H) 70 - 99 mg/dL      BUN   Date Value Ref Range Status   11/10/2022 23 8 - 23 mg/dL Final     Creatinine   Date Value Ref Range Status   11/10/2022 1.1 0.5 - 1.4 mg/dL Final   04/12/2019 0.72 0.60 - 1.40 mg/dL      Calcium   Date Value Ref Range Status   11/10/2022 9.1 8.7 - 10.5 mg/dL Final     Total Protein   Date Value Ref Range Status   11/10/2022 6.6 6.0 - 8.4 g/dL Final     Albumin   Date Value Ref Range Status   11/10/2022 3.8 3.5 - 5.2 g/dL Final   04/12/2019 3.3 3.1 - 4.7 g/dL      Total Bilirubin   Date Value Ref Range Status   11/10/2022 0.4 0.1 - 1.0 mg/dL Final     Comment:     For infants and newborns, interpretation of results should be based  on gestational age, weight and in agreement with clinical  observations.    Premature Infant recommended reference ranges:  Up to 24 hours.............<8.0 mg/dL  Up to 48 hours............<12.0 mg/dL  3-5 days..................<15.0 mg/dL  6-29 days.................<15.0 mg/dL       Alkaline Phosphatase   Date Value Ref Range Status   11/10/2022 69 55 - 135 U/L Final      AST   Date Value Ref Range Status   11/10/2022 18 10 - 40 U/L Final     ALT   Date Value Ref Range Status   11/10/2022 22 10 - 44 U/L Final     Anion Gap   Date Value Ref Range Status   11/10/2022 10 8 - 16 mmol/L Final     eGFR if    Date Value Ref Range Status   07/25/2022 >60.0 >60 mL/min/1.73 m^2 Final     eGFR if non    Date Value Ref Range Status   07/25/2022 >60.0 >60 mL/min/1.73 m^2 Final     Comment:     Calculation used to obtain the estimated glomerular filtration  rate (eGFR) is the CKD-EPI equation.        Lab Results   Component Value Date    IRON 79 11/10/2022    TIBC 297 11/10/2022    FERRITIN 229 11/10/2022            Radiology/Diagnostic Studies:    Us Abdomen Complete    Result Date: 1/2/2019  Abdominal ultrasound Clinical history is anemia, prostate cancer The pancreas, aorta and IVC are normal. The liver is hyperechoic compatible with fatty infiltration. There is hepatopedal flow within the portal vein. The common bile duct measures 6 mm. The patient has had a cholecystectomy. The kidneys are normal in size and echogenicity. The spleen is normal in size measuring 12 cm. IMPRESSION: Fatty infiltration of the liver otherwise negative abdominal ultrasound Read and electronically signed by: Marry Hernandez MD on 1/2/2019 9:49 AM CST MARRY HERNANDEZ MD      I have reviewed all available lab results and radiology reports.    Assessment/Plan:   (1) 72 y.o. male with diagnosis of prostate cancer who has been referred by Simone Santillan Jr., MD for evaluation by medical oncology. Patient with a high risk adenocarcinoma of the prostate with [id number] with GS of 4+5.   - he started androgen depravation therapy and monotherapy XRT (IMRT)  - followed by Dr Gordon with  and currently on Trelstar  - followed by Dr Santillan with Rad/onc and completed XRt on 12/18/2018  - latest PSA at 0.01 in May 2020 and he had his last lupron shot done in April/May 2020  - he sees   Heidi again in Nov 2020 1/13/2021:  - He saw Dr Gordon again in Nov 2020 and they are repeating his PSA next month with plans to repeat every 3 months for now.     4/13/2021:  - seeing Dr Gordon with labs in near future  - PSA on 2/23/2021 was 0.01    9/28/2021:  - He sees Dr Gordon again in Dec 2021 and the latest PSA was a little higher at 0.03; he has been off the ADT for about a year  - discussed and will make referral to Dr Andrea Scanlon at [facility] with -Oncology    12/28/2021:  - he did not see Dr Scanlon at [facility] as of yet - will check on the referral  - He was supposed to f/u with Dr Gordon again in Dec 2021 but it was cancelled; he has been off the ADT for over a year; he sees  again in June 2022 and Dr Santillan again in Jan 2022  - PSA down to 0.01 now and good    4/18/2022:  - he sees Dr Gordon again in June 2022 7/18/2022:  - mild increase in PSA up to 0.07 from 0.01  - planned repeat level in 3 months per  with follow-up with Dr Gordon again in Aug 2022  - he never did go see Dr Scanlon    11/15/2022:  - His latest PSA was 0.07 and he sees Dr Gordon again in Dec 2022         (2) CAD and non-rheumatic MR; mild CVA in 1993; Hypertensive Heart disease - followed by Dr Jama with cardiology     (3) HTN and hypercholesterolemia     (4) LORA     (5) DM    (6) Chronic back pain issues - required recent lumbar surgery with Dr Chris Fofana at [facility]     (7) Hx/of nightly fevers     (8) Chronic diarhhea - followed by Dr Alfaro with GI and s/p recent endoscopies     (9) Mild thrombocytopenia resolved with last platelet count down at 141,000  - no history of hepatitis or liver problems; no alcoholism  - he has positive antiplatelet antibody screens both direct and indirect consistent with an underlying chronic ITP condition  - his flow cytometry showed no evidence of leukemia but he did have a relative increase in eosinophils    1/13/2021:  - latest platelet count at 129,000 which should be adequate for  most surgeries    4/13/2021:  - labs pending    9/28/2021:  - latest platelet count is WNL    12/28/2021:  - latest plat wnl    7/18/2022:  - latest plats wnl at 158,000    11/15/2022:  - plats are good at 159,000     (10) Mild anemia with latest hgb at 11.5 and stable   - NCNC parameters  - iron panel was WNL with recent labs  - OBS was negative on 11/15  - hx/of colon polyps in past  - followed by Dr Alfaro with GI with planned repeat endoscopies in near future    1/13/2021:  - hgb at 11.5 with normal iron panel; stable    4/13/2021:  - labs pending    9/28/2021:  - latest hgb at 11.5 and relatively stable  - iron panel is adequate    12/28/2021:  - latest hgb at 11.1 and adequate  - iron panel wnl    4/18/2022:  - hgb at 10.7 and a little lower  - his iron was a little low despite the oral iron  - discussed IV iron and he is agreeable    7/18/2022:  - hgb better at 12.5  - s/p IV iron x2  - on oral iron    11/15/2022:  - latest hgb at 12.5 and iorn panel stable  - continued on oral iron    (11) Esophageal polyp   - He had scope with EGD with Dr Alfaroand they found an esophageal polyp which was cauterized. He saw Dr Santillan  and he recommended that the patient have the polyp removed.   - he is having repeat EGD to re-evaluate the polyp next month      1/13/2021:  -He previously had scope with EGD with Dr Alfaro  and they found an esophageal polyp which was cauterized.  -  He subsequently saw Dr Santillan and he recommended that the patient have the polyp removed.   - He had repeat EGD but it was incomplete due residual gastric contents and it was to be rescheduled for the following month but he did not have it done as of yet.    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 F/u with rad/onc, GI and  directives  2. Check CBC/plat/iron panel every 3 months for now    3. F/u with PCP, , Rad/onc etc - sees Dr Gordon in Dec 2022  4. continue ICAR-C daily po and set up IV irons as needed  5. Patient to check with neurology on referral to vertigo specialist       RTC in 6 months    Fax note to Samy Santillan III, MD, and Wiley Gordon Albright; James Houser; Sartor    Discussion:       Total Time spent on patient:    I spent over 15 mins of time with the patient. Reviewed results of the recently ordered labs, tests, reports and studies; made directives with regards to the results. Over half of this time was spent couseling and coordinating care, making treatment and analytical decisions; ordering necessary labs, tests and studies; and discussing treatment options and setting up treatment plan(s) if indicated.        COVID-19 Discussion:    I had long discussion with patient and any applicable family about the COVID-19 coronavirus epidemic and the recommended precautions with regard to cancer and/or hematology patients. I have re-iterated the CDC recommendations for adequate hand washing, use of hand -like products, and coughing into elbow, etc. In addition, especially for our patients who are on chemotherapy and/or our otherwise immunocompromised patients, I have recommended avoidance of crowds, including movie theaters, restaurants, churches, etc. I have recommended avoidance of any sick or symptomatic family members and/or friends. I have also recommended avoidance of any raw and unwashed food products, and general avoidance of food items that have not been prepared by themselves. The patient has been asked to call us immediately with any symptom developments, issues, questions or other general concerns.        Iron Infusion Therapy Discussion:     I provided literature/learning materials on the particular IV iron regimen and discussed the potential side-effect profiles of the drug(s). I discussed the importance of compliance with obtaining and monitoring requested lab work, and went over the potential risk for the development of anaphylactic shock, bronchospasm, dysrhythmia, liver and/or kidney damage, and respiratory/cardiovascular arrest and/or failure. I discussed the potential risks for development of alopecia, fevers, itching, chills and/or rigors, cold sensory issues, ringing in ears, vertigo and neuropathy, all of which are usually acute but sometimes could end up being chronic and life-long. I discussed the risks of hand-foot syndrome and rashes, and development of other autoimmune mediated processes such as pneumonitis and colitis which could be life threatening.     The patient's consent has been obtained to proceed with the IV iron therapy.The patient will be referred to [facility] for training and education on IV iron therapy, use of antiemetics and/or anti-diarrheals, use of NSAID's, potential IV iron therapy side-effects, and any specific recommendations and precautions with the particular IV iron agents.      I answered all of the patient's (and family's, if applicable) questions to the best of my ability and to their complete satisfaction. The patient acknowledged full understanding of the risks, recommendations and plan(s).         I have explained all of the above in detail and the patient understands all of the current recommendation(s). I have answered all of their questions to the best of my ability and to their complete satisfaction.   The patient is to continue with the current management plan.            Electronically signed by Cyrus Gibson MD

## 2022-11-15 ENCOUNTER — OFFICE VISIT (OUTPATIENT)
Dept: HEMATOLOGY/ONCOLOGY | Facility: CLINIC | Age: 72
End: 2022-11-15
Payer: MEDICARE

## 2022-11-15 VITALS
BODY MASS INDEX: 36.06 KG/M2 | HEART RATE: 59 BPM | TEMPERATURE: 98 F | RESPIRATION RATE: 18 BRPM | SYSTOLIC BLOOD PRESSURE: 153 MMHG | WEIGHT: 224.38 LBS | HEIGHT: 66 IN | DIASTOLIC BLOOD PRESSURE: 70 MMHG

## 2022-11-15 DIAGNOSIS — Z85.46 HISTORY OF PROSTATE CANCER: Chronic | ICD-10-CM

## 2022-11-15 DIAGNOSIS — D64.9 NORMOCYTIC NORMOCHROMIC ANEMIA: ICD-10-CM

## 2022-11-15 DIAGNOSIS — D50.9 IRON DEFICIENCY ANEMIA, UNSPECIFIED IRON DEFICIENCY ANEMIA TYPE: Primary | ICD-10-CM

## 2022-11-15 PROCEDURE — 3078F PR MOST RECENT DIASTOLIC BLOOD PRESSURE < 80 MM HG: ICD-10-PCS | Mod: CPTII,S$GLB,, | Performed by: INTERNAL MEDICINE

## 2022-11-15 PROCEDURE — 3077F PR MOST RECENT SYSTOLIC BLOOD PRESSURE >= 140 MM HG: ICD-10-PCS | Mod: CPTII,S$GLB,, | Performed by: INTERNAL MEDICINE

## 2022-11-15 PROCEDURE — 3288F PR FALLS RISK ASSESSMENT DOCUMENTED: ICD-10-PCS | Mod: CPTII,S$GLB,, | Performed by: INTERNAL MEDICINE

## 2022-11-15 PROCEDURE — 4010F PR ACE/ARB THEARPY RXD/TAKEN: ICD-10-PCS | Mod: CPTII,S$GLB,, | Performed by: INTERNAL MEDICINE

## 2022-11-15 PROCEDURE — 3078F DIAST BP <80 MM HG: CPT | Mod: CPTII,S$GLB,, | Performed by: INTERNAL MEDICINE

## 2022-11-15 PROCEDURE — 1125F PR PAIN SEVERITY QUANTIFIED, PAIN PRESENT: ICD-10-PCS | Mod: CPTII,S$GLB,, | Performed by: INTERNAL MEDICINE

## 2022-11-15 PROCEDURE — 1160F RVW MEDS BY RX/DR IN RCRD: CPT | Mod: CPTII,S$GLB,, | Performed by: INTERNAL MEDICINE

## 2022-11-15 PROCEDURE — 1101F PT FALLS ASSESS-DOCD LE1/YR: CPT | Mod: CPTII,S$GLB,, | Performed by: INTERNAL MEDICINE

## 2022-11-15 PROCEDURE — 99213 PR OFFICE/OUTPT VISIT, EST, LEVL III, 20-29 MIN: ICD-10-PCS | Mod: S$GLB,,, | Performed by: INTERNAL MEDICINE

## 2022-11-15 PROCEDURE — 3077F SYST BP >= 140 MM HG: CPT | Mod: CPTII,S$GLB,, | Performed by: INTERNAL MEDICINE

## 2022-11-15 PROCEDURE — 1159F PR MEDICATION LIST DOCUMENTED IN MEDICAL RECORD: ICD-10-PCS | Mod: CPTII,S$GLB,, | Performed by: INTERNAL MEDICINE

## 2022-11-15 PROCEDURE — 3044F PR MOST RECENT HEMOGLOBIN A1C LEVEL <7.0%: ICD-10-PCS | Mod: CPTII,S$GLB,, | Performed by: INTERNAL MEDICINE

## 2022-11-15 PROCEDURE — 3008F BODY MASS INDEX DOCD: CPT | Mod: CPTII,S$GLB,, | Performed by: INTERNAL MEDICINE

## 2022-11-15 PROCEDURE — 1101F PR PT FALLS ASSESS DOC 0-1 FALLS W/OUT INJ PAST YR: ICD-10-PCS | Mod: CPTII,S$GLB,, | Performed by: INTERNAL MEDICINE

## 2022-11-15 PROCEDURE — 1160F PR REVIEW ALL MEDS BY PRESCRIBER/CLIN PHARMACIST DOCUMENTED: ICD-10-PCS | Mod: CPTII,S$GLB,, | Performed by: INTERNAL MEDICINE

## 2022-11-15 PROCEDURE — 3044F HG A1C LEVEL LT 7.0%: CPT | Mod: CPTII,S$GLB,, | Performed by: INTERNAL MEDICINE

## 2022-11-15 PROCEDURE — 1125F AMNT PAIN NOTED PAIN PRSNT: CPT | Mod: CPTII,S$GLB,, | Performed by: INTERNAL MEDICINE

## 2022-11-15 PROCEDURE — 99213 OFFICE O/P EST LOW 20 MIN: CPT | Mod: S$GLB,,, | Performed by: INTERNAL MEDICINE

## 2022-11-15 PROCEDURE — 3008F PR BODY MASS INDEX (BMI) DOCUMENTED: ICD-10-PCS | Mod: CPTII,S$GLB,, | Performed by: INTERNAL MEDICINE

## 2022-11-15 PROCEDURE — 4010F ACE/ARB THERAPY RXD/TAKEN: CPT | Mod: CPTII,S$GLB,, | Performed by: INTERNAL MEDICINE

## 2022-11-15 PROCEDURE — 1159F MED LIST DOCD IN RCRD: CPT | Mod: CPTII,S$GLB,, | Performed by: INTERNAL MEDICINE

## 2022-11-15 PROCEDURE — 3288F FALL RISK ASSESSMENT DOCD: CPT | Mod: CPTII,S$GLB,, | Performed by: INTERNAL MEDICINE

## 2022-12-02 ENCOUNTER — OFFICE VISIT (OUTPATIENT)
Dept: FAMILY MEDICINE | Facility: CLINIC | Age: 72
End: 2022-12-02
Payer: MEDICARE

## 2022-12-02 VITALS
TEMPERATURE: 97 F | SYSTOLIC BLOOD PRESSURE: 122 MMHG | OXYGEN SATURATION: 96 % | WEIGHT: 229 LBS | HEART RATE: 62 BPM | BODY MASS INDEX: 36.8 KG/M2 | DIASTOLIC BLOOD PRESSURE: 68 MMHG | HEIGHT: 66 IN

## 2022-12-02 DIAGNOSIS — N62 GYNECOMASTIA: ICD-10-CM

## 2022-12-02 DIAGNOSIS — D69.2 SENILE PURPURA: ICD-10-CM

## 2022-12-02 DIAGNOSIS — I10 HYPERTENSION, ESSENTIAL: Primary | ICD-10-CM

## 2022-12-02 PROCEDURE — 3078F DIAST BP <80 MM HG: CPT | Mod: CPTII,S$GLB,, | Performed by: FAMILY MEDICINE

## 2022-12-02 PROCEDURE — 3008F BODY MASS INDEX DOCD: CPT | Mod: CPTII,S$GLB,, | Performed by: FAMILY MEDICINE

## 2022-12-02 PROCEDURE — 1160F RVW MEDS BY RX/DR IN RCRD: CPT | Mod: CPTII,S$GLB,, | Performed by: FAMILY MEDICINE

## 2022-12-02 PROCEDURE — 1101F PR PT FALLS ASSESS DOC 0-1 FALLS W/OUT INJ PAST YR: ICD-10-PCS | Mod: CPTII,S$GLB,, | Performed by: FAMILY MEDICINE

## 2022-12-02 PROCEDURE — 3074F SYST BP LT 130 MM HG: CPT | Mod: CPTII,S$GLB,, | Performed by: FAMILY MEDICINE

## 2022-12-02 PROCEDURE — 1159F MED LIST DOCD IN RCRD: CPT | Mod: CPTII,S$GLB,, | Performed by: FAMILY MEDICINE

## 2022-12-02 PROCEDURE — 99213 PR OFFICE/OUTPT VISIT, EST, LEVL III, 20-29 MIN: ICD-10-PCS | Mod: S$GLB,,, | Performed by: FAMILY MEDICINE

## 2022-12-02 PROCEDURE — 3061F NEG MICROALBUMINURIA REV: CPT | Mod: CPTII,S$GLB,, | Performed by: FAMILY MEDICINE

## 2022-12-02 PROCEDURE — 1125F PR PAIN SEVERITY QUANTIFIED, PAIN PRESENT: ICD-10-PCS | Mod: CPTII,S$GLB,, | Performed by: FAMILY MEDICINE

## 2022-12-02 PROCEDURE — 1125F AMNT PAIN NOTED PAIN PRSNT: CPT | Mod: CPTII,S$GLB,, | Performed by: FAMILY MEDICINE

## 2022-12-02 PROCEDURE — 3288F FALL RISK ASSESSMENT DOCD: CPT | Mod: CPTII,S$GLB,, | Performed by: FAMILY MEDICINE

## 2022-12-02 PROCEDURE — 3008F PR BODY MASS INDEX (BMI) DOCUMENTED: ICD-10-PCS | Mod: CPTII,S$GLB,, | Performed by: FAMILY MEDICINE

## 2022-12-02 PROCEDURE — 3066F NEPHROPATHY DOC TX: CPT | Mod: CPTII,S$GLB,, | Performed by: FAMILY MEDICINE

## 2022-12-02 PROCEDURE — 1160F PR REVIEW ALL MEDS BY PRESCRIBER/CLIN PHARMACIST DOCUMENTED: ICD-10-PCS | Mod: CPTII,S$GLB,, | Performed by: FAMILY MEDICINE

## 2022-12-02 PROCEDURE — 1101F PT FALLS ASSESS-DOCD LE1/YR: CPT | Mod: CPTII,S$GLB,, | Performed by: FAMILY MEDICINE

## 2022-12-02 PROCEDURE — 3061F PR NEG MICROALBUMINURIA RESULT DOCUMENTED/REVIEW: ICD-10-PCS | Mod: CPTII,S$GLB,, | Performed by: FAMILY MEDICINE

## 2022-12-02 PROCEDURE — 3078F PR MOST RECENT DIASTOLIC BLOOD PRESSURE < 80 MM HG: ICD-10-PCS | Mod: CPTII,S$GLB,, | Performed by: FAMILY MEDICINE

## 2022-12-02 PROCEDURE — 4010F ACE/ARB THERAPY RXD/TAKEN: CPT | Mod: CPTII,S$GLB,, | Performed by: FAMILY MEDICINE

## 2022-12-02 PROCEDURE — 1159F PR MEDICATION LIST DOCUMENTED IN MEDICAL RECORD: ICD-10-PCS | Mod: CPTII,S$GLB,, | Performed by: FAMILY MEDICINE

## 2022-12-02 PROCEDURE — 4010F PR ACE/ARB THEARPY RXD/TAKEN: ICD-10-PCS | Mod: CPTII,S$GLB,, | Performed by: FAMILY MEDICINE

## 2022-12-02 PROCEDURE — 3044F HG A1C LEVEL LT 7.0%: CPT | Mod: CPTII,S$GLB,, | Performed by: FAMILY MEDICINE

## 2022-12-02 PROCEDURE — 3288F PR FALLS RISK ASSESSMENT DOCUMENTED: ICD-10-PCS | Mod: CPTII,S$GLB,, | Performed by: FAMILY MEDICINE

## 2022-12-02 PROCEDURE — 3044F PR MOST RECENT HEMOGLOBIN A1C LEVEL <7.0%: ICD-10-PCS | Mod: CPTII,S$GLB,, | Performed by: FAMILY MEDICINE

## 2022-12-02 PROCEDURE — 99213 OFFICE O/P EST LOW 20 MIN: CPT | Mod: S$GLB,,, | Performed by: FAMILY MEDICINE

## 2022-12-02 PROCEDURE — 3074F PR MOST RECENT SYSTOLIC BLOOD PRESSURE < 130 MM HG: ICD-10-PCS | Mod: CPTII,S$GLB,, | Performed by: FAMILY MEDICINE

## 2022-12-02 PROCEDURE — 3066F PR DOCUMENTATION OF TREATMENT FOR NEPHROPATHY: ICD-10-PCS | Mod: CPTII,S$GLB,, | Performed by: FAMILY MEDICINE

## 2022-12-03 LAB
ALBUMIN/CREAT UR: 6 MCG/MG CREAT
CREAT UR-MCNC: 32 MG/DL (ref 20–320)
MICROALBUMIN UR-MCNC: 0.2 MG/DL
SERVICE CMNT-IMP: NORMAL

## 2022-12-04 NOTE — PROGRESS NOTES
Subjective:       Patient ID: Chris Hill Jr. is a 72 y.o. male.    Chief Complaint: Follow-up    Developing some breast tenderness.  Right greater than left.  Had the same problem he thinks before when he took some Aldactone.  Hypertension is okay now.  On 20 mEq of potassium a day.  Still having his episodes of vertigo.  Plavix did not have anything to do with it.  Certainly does not seem to be TIAs.  He did not get to see the physician to evaluate this yet.  Because they are out of practice now.  He is trying to contact the neurologist about of an alternate person to see him.  Also having lot of senile purpura on his forearms.  Would like to go back to just the aspirin.    Physical examination.  Vital signs are noted.  Prominent senile purpura on the forearms.  Neck without bruit.  Chest clear.  Heart regular rate rhythm.  Both breast are tender to palpation.      Objective:        Assessment:       1. Hypertension, essential    2. Senile purpura    3. Gynecomastia          Plan:       Hypertension, essential    Senile purpura    Gynecomastia      Discontinue the Aldactone.  Check is microalbumin.  Monitor his blood pressure.  Follow-up in 1 month.  Check with Dr. Hines regarding the person for the vertigo.  He may discontinue the Plavix in favor of aspirin.

## 2022-12-14 ENCOUNTER — LAB VISIT (OUTPATIENT)
Dept: LAB | Facility: HOSPITAL | Age: 72
End: 2022-12-14
Attending: NURSE PRACTITIONER
Payer: MEDICARE

## 2022-12-14 DIAGNOSIS — N40.1 BPH WITH URINARY OBSTRUCTION: ICD-10-CM

## 2022-12-14 DIAGNOSIS — E11.9 TYPE 2 DIABETES MELLITUS WITHOUT COMPLICATION, WITHOUT LONG-TERM CURRENT USE OF INSULIN: ICD-10-CM

## 2022-12-14 DIAGNOSIS — C61 PROSTATE CANCER: ICD-10-CM

## 2022-12-14 DIAGNOSIS — N13.8 BPH WITH URINARY OBSTRUCTION: ICD-10-CM

## 2022-12-14 LAB
ALBUMIN/CREAT UR: 8.8 UG/MG (ref 0–30)
COMPLEXED PSA SERPL-MCNC: 0.11 NG/ML (ref 0–4)
CREAT UR-MCNC: 137 MG/DL (ref 23–375)
MICROALBUMIN UR DL<=1MG/L-MCNC: 12 UG/ML
TESTOST SERPL-MCNC: 201 NG/DL (ref 304–1227)

## 2022-12-14 PROCEDURE — 82043 UR ALBUMIN QUANTITATIVE: CPT | Performed by: FAMILY MEDICINE

## 2022-12-14 PROCEDURE — 84153 ASSAY OF PSA TOTAL: CPT | Performed by: NURSE PRACTITIONER

## 2022-12-14 PROCEDURE — 36415 COLL VENOUS BLD VENIPUNCTURE: CPT | Performed by: NURSE PRACTITIONER

## 2022-12-14 PROCEDURE — 82570 ASSAY OF URINE CREATININE: CPT | Performed by: FAMILY MEDICINE

## 2022-12-14 PROCEDURE — 84403 ASSAY OF TOTAL TESTOSTERONE: CPT | Performed by: NURSE PRACTITIONER

## 2022-12-18 NOTE — PROGRESS NOTES
[facility] Urology Progress Note     Chris Hill Jr is a 72 y.o. male who presents for prostate cancer follow up     Hx of Nayely 4 + 5 equals 9 prostate cancer (cT1c, iPSA 13.2) treated with androgen deprivation therapy external beam radiation.    He presented 10/4/18 for fiducial marker placement as well as SpaceOAR placement. Vol 33.4g at time of markers. At time of SpaceOar he did note daily diarrhea for weeks prior.  8/13/18 eligard 22.5 3 mos depot; 11/13/18 trelstar 22.5mg 6 mos depot.   - 2 weeks prior did interrupt his XRT for lumbar back surgery as his chronic back pain was limiting his ability to lay flat on radiation table. Only missed 2d of therapy.  He noted at that time his diarrhea had persisted. Using prn immodium     Completed XRT 12/18/18. PSA 0.1 on 12/13/18. Has followed up with Dr Gibson for chronic ITP and anemia, stable.  Has been followed by GI. Dr Tavares, with EGD 12/5/18 and colonoscopy 2/11/19 with a few cecal polyps removed and diverticulosis (repeat 3 yrs)     Dr Santillan on 1/9/19: Patient reports fatigue with energy level at 30%. Of note he is undergoing workup for low blood counts with Dr. Gibson.  He reports frequency, urgency, nocturia of urine are resolving however he continues with urgency of stool. He reports frequency has improved with Imodium. AUA SS 17/5 from 10/2     2/12/19: 2/6/19 PSA 0.02, T <4, Cr 0.8, AUA symptom score:  23/5 unhappy (5:  Intermittency, urgency; 4:  Emptying, frequency; 3:  Weak stream; 2:  Sleeping)  He did start oxybutynin 5 mg b.i.d. which has significantly helped decrease the hot flashes.  He still gets some though they are not as intense as they were before  Since starting it, and further away from radiation, his urgency is somewhat better.  He does have daytime frequency of 8-10 times at nighttime frequency of 1-2 times.  Having both urinary urgency and fecal urgency.  His diarrhea is somewhat better.  GI evaluation has been overall  unrevealing of a etiology of chronic diarrhea.  He does report urinary intermittency but no urinary hesitancy. He did have back surgery after 22 radiation treatments, though he does note that his fecal and urinary urgency predated his back surgery. On Hytrin 10 mg in morning, so started Flomax 0.4 mg in the evening to have increased dose of alpha blocker.     5/25/19: improved voiding taking Hytrin in the morning and Flomax in the evening. His diarrhea has improved. Hot flashes went away a bit and well controlled with Ditropan b.i.d.  AUA symptom score:  9/1, please (3:  Emptying, urgency; 1:  Intermittency, weak stream, sleeping). 5/8/19: PSA  0.02 and T 10.  ADT renewed with Lupron 45mg     11/19/19: psa undetectable <0.01, T 7. Chronic ITP. LUTS stable with progressive urgency. AUA SS:  15/3, mixed (5:  Urgency; 3:  Emptying, weak stream; 2:  Intermittency; 1:  Frequency, sleeping).  Medications helped 5/10. Hytrin a.m., Flomax p.m., Ditropan 5 mg b.i.d. Still has mild PV dribble and has minimal UUI  Alternates constipation/diarrhea - may have worse urinary symptoms when constipated. Lots of water during day - rare soda. DTF 4-5x. 2/5 are urgent. Another back surgery 2 mos ago. Urgency may be worse since then? hasnt considered. PVR by bladder scan 0cc.  Deferred cysto in favor of continued medical management, ADT renewed with Lupron 45mg     5/2020 NP OQuin - final Lupron/ADT injection.   11/21/20: AUA SS: 11/2 (3:  Emptying; 2:  Urgency, weak stream, sleeping; 1:  Frequency, intermittency). PVR 45cc. 11/13/20 psa <0.01 and T 9.  Still having some hot flashes. Slight increase in energy after radiation complete  6/13/21: denies gross hematuria/dysuria at this time. 5/24/2021: Testosterone level-110, PSA level-<0.01. Oxybutynin 5XL nightly, flomax 0.4, hytrin 10. Most bothered by urgency  - incd oxybutynin to 10mg XL. PSA 9/7/21 0.03, psa 12/21/21 0.01, psa 6/8/22 is 0.07, psa 9/12/22 is 0.07  Last o/v 6/14/22 with  "NP noting psa 0.07 as above. AUA SS: 10/3 (ntf 2, urgency 5, freq/int/weak 1). Pvr 26cc  Last saw matilda singh 1/10/22, also followed by Dr Gibson last seen 11/14/22. Following psa    He returns today noting  PSA 12/14/22 is 0.11 with T rise to 201  AUA SS: 13/3 (4:  Urgency; 2: Emptying, frequency, weak stream, sleeping; 1: Intermittency) medication helps 5/10 PVR 0 cc urinalysis dipstick negative  Urination not as bad as has been. Some night doesn't get up, last night got up 4x.  Interim issues with diuretic and went on spironolactone and then had issues with breast tenderness which improved since discontinuing  Has been having bad dizziness to point during day even has trouble seein and has to sit down. Has seen neurologist, there was concern for TIA then decided complex migraine, but still unknown. When has bad dizzy spells, cant function. Last week 4 days out of 7. Feels spinning at time, sometimes nauseated.  Partial erections returning    Focused Physical Exam:    Vitals:    12/20/22 0920   BP: (!) 174/75   Pulse: (!) 56   Resp: 16     Body mass index is 36.96 kg/m².   Height: 5' 6" (167.6 cm)     Abdomen: Soft, non-tender, nondistended, no CVA tenderness        Recent Results (from the past 336 hour(s))   Testosterone    Collection Time: 12/14/22  7:39 AM   Result Value Ref Range    Testosterone, Total 201 (L) 304 - 1227 ng/dL   Prostate Specific Antigen, Diagnostic    Collection Time: 12/14/22  7:39 AM   Result Value Ref Range    PSA Diagnostic 0.11 0.00 - 4.00 ng/mL   Microalbumin/Creatinine Ratio, Urine    Collection Time: 12/14/22  7:45 AM   Result Value Ref Range    Microalbumin, Urine 12.0 ug/mL    Creatinine, Urine 137.0 23.0 - 375.0 mg/dL    Microalb/Creat Ratio 8.8 0.0 - 30.0 ug/mg   POCT Urinalysis(Instrument)    Collection Time: 12/20/22  9:37 AM   Result Value Ref Range    Color, POC UA Yellow Yellow, Straw, Colorless    Clarity, POC UA Clear Clear    Glucose, POC UA Negative Negative    " Bilirubin, POC UA Negative Negative    Ketones, POC UA Trace (A) Negative    Spec Grav POC UA >=1.030 1.005 - 1.030    Blood, POC UA Negative Negative    pH, POC UA 5.5 5.0 - 8.0    Protein, POC  (A) Negative    Urobilinogen, POC UA 0.2 <=1.0    Nitrite, POC UA Negative Negative    WBC, POC UA Negative Negative   POCT Bladder Scan    Collection Time: 12/20/22  9:43 AM   Result Value Ref Range    POC Residual Urine Volume 0 0 - 100 mL       ASSESSMENT   1. Prostate cancer  POCT Urinalysis(Instrument)    POCT Bladder Scan    Prostate Specific Antigen, Diagnostic    TESTOSTERONE    Prostate Specific Antigen, Diagnostic    Testosterone          Plan    Overall doing well having completed ADT and XRT for his high-grade prostate cancer.  Last ADT injection May 2020, so completed therapy completely December 2021, 1 year ago.  His PSA was suppressed while on ADT, and since discontinuing has been slowly rising, but so far has correlated with continual rise in testosterone.  PSA was stable at 0.07 for quite some time, and most recently adina to 0.11, but testosterone has increased as well in the interim.  We did discuss reconstitution of PSA with reconstitution of testosterone in the absence of hormone deprivation therapy, and he does have upcoming follow-up with Radiation Oncology and will review trends and further recommendations regarding continued PSA/testosterone monitoring, when possible further imaging or ADT would be considered etc..  Will follow recommendations.    He otherwise is not having significant bother in his urinary symptoms which he feels stable on his current medical regimen, however on discussing his recent issues with dizziness and dizzy spells, did note that while at my last point of care in 2019 with splitting dose of Flomax and Hytrin morning and evening, is now taking them all in the evening.  What he describes is seemingly more significant than just alpha-blocker dizziness, and has been worked  up by Neurology, but in the interim at least advised taking Hytrin in the morning and Flomax in the evening pending further discussion with Dr. Pettit.  Patient is unclear if his Hytrin dose has increased since that time as well, and depending on further neurologic workup and workup of dizzy spells may need to consider discontinuing alpha blockers both as part of his urologic and blood pressure regimen and see if this makes any difference.  Will defer to primary care but in the interim, advise splitting the dose.    Will continue to follow PSA Q3 months with testosterone and RTC 6 mos for np reeval luts and med regimen etc, pending plan adjustment per review above but radonc/hemonc etc.      Total time spent in/on encounter today, including face to face time with patient, counseling, medical record review, interpretation of tests/results, , and treatment plan coordination: 35 minutes

## 2022-12-20 ENCOUNTER — OFFICE VISIT (OUTPATIENT)
Dept: UROLOGY | Facility: CLINIC | Age: 72
End: 2022-12-20
Payer: MEDICARE

## 2022-12-20 VITALS
HEART RATE: 56 BPM | BODY MASS INDEX: 36.96 KG/M2 | DIASTOLIC BLOOD PRESSURE: 75 MMHG | RESPIRATION RATE: 16 BRPM | SYSTOLIC BLOOD PRESSURE: 174 MMHG | HEIGHT: 66 IN

## 2022-12-20 DIAGNOSIS — C61 PROSTATE CANCER: Primary | ICD-10-CM

## 2022-12-20 LAB
BILIRUBIN, UA POC OHS: NEGATIVE
BLOOD, UA POC OHS: NEGATIVE
CLARITY, UA POC OHS: CLEAR
COLOR, UA POC OHS: YELLOW
GLUCOSE, UA POC OHS: NEGATIVE
KETONES, UA POC OHS: ABNORMAL
LEUKOCYTES, UA POC OHS: NEGATIVE
NITRITE, UA POC OHS: NEGATIVE
PH, UA POC OHS: 5.5
POC RESIDUAL URINE VOLUME: 0 ML (ref 0–100)
PROTEIN, UA POC OHS: 100
SPECIFIC GRAVITY, UA POC OHS: >=1.03
UROBILINOGEN, UA POC OHS: 0.2

## 2022-12-20 PROCEDURE — 3044F PR MOST RECENT HEMOGLOBIN A1C LEVEL <7.0%: ICD-10-PCS | Mod: CPTII,S$GLB,, | Performed by: UROLOGY

## 2022-12-20 PROCEDURE — 1101F PT FALLS ASSESS-DOCD LE1/YR: CPT | Mod: CPTII,S$GLB,, | Performed by: UROLOGY

## 2022-12-20 PROCEDURE — 99999 PR PBB SHADOW E&M-EST. PATIENT-LVL IV: ICD-10-PCS | Mod: PBBFAC,,, | Performed by: UROLOGY

## 2022-12-20 PROCEDURE — 3008F BODY MASS INDEX DOCD: CPT | Mod: CPTII,S$GLB,, | Performed by: UROLOGY

## 2022-12-20 PROCEDURE — 81003 POCT URINALYSIS(INSTRUMENT): ICD-10-PCS | Mod: QW,S$GLB,, | Performed by: UROLOGY

## 2022-12-20 PROCEDURE — 3044F HG A1C LEVEL LT 7.0%: CPT | Mod: CPTII,S$GLB,, | Performed by: UROLOGY

## 2022-12-20 PROCEDURE — 3077F PR MOST RECENT SYSTOLIC BLOOD PRESSURE >= 140 MM HG: ICD-10-PCS | Mod: CPTII,S$GLB,, | Performed by: UROLOGY

## 2022-12-20 PROCEDURE — 3061F PR NEG MICROALBUMINURIA RESULT DOCUMENTED/REVIEW: ICD-10-PCS | Mod: CPTII,S$GLB,, | Performed by: UROLOGY

## 2022-12-20 PROCEDURE — 3288F FALL RISK ASSESSMENT DOCD: CPT | Mod: CPTII,S$GLB,, | Performed by: UROLOGY

## 2022-12-20 PROCEDURE — 99214 PR OFFICE/OUTPT VISIT, EST, LEVL IV, 30-39 MIN: ICD-10-PCS | Mod: S$GLB,,, | Performed by: UROLOGY

## 2022-12-20 PROCEDURE — 51798 POCT BLADDER SCAN: ICD-10-PCS | Mod: S$GLB,,, | Performed by: UROLOGY

## 2022-12-20 PROCEDURE — 81003 URINALYSIS AUTO W/O SCOPE: CPT | Mod: QW,S$GLB,, | Performed by: UROLOGY

## 2022-12-20 PROCEDURE — 3061F NEG MICROALBUMINURIA REV: CPT | Mod: CPTII,S$GLB,, | Performed by: UROLOGY

## 2022-12-20 PROCEDURE — 51798 US URINE CAPACITY MEASURE: CPT | Mod: S$GLB,,, | Performed by: UROLOGY

## 2022-12-20 PROCEDURE — 4010F ACE/ARB THERAPY RXD/TAKEN: CPT | Mod: CPTII,S$GLB,, | Performed by: UROLOGY

## 2022-12-20 PROCEDURE — 3078F PR MOST RECENT DIASTOLIC BLOOD PRESSURE < 80 MM HG: ICD-10-PCS | Mod: CPTII,S$GLB,, | Performed by: UROLOGY

## 2022-12-20 PROCEDURE — 99214 OFFICE O/P EST MOD 30 MIN: CPT | Mod: S$GLB,,, | Performed by: UROLOGY

## 2022-12-20 PROCEDURE — 4010F PR ACE/ARB THEARPY RXD/TAKEN: ICD-10-PCS | Mod: CPTII,S$GLB,, | Performed by: UROLOGY

## 2022-12-20 PROCEDURE — 3066F NEPHROPATHY DOC TX: CPT | Mod: CPTII,S$GLB,, | Performed by: UROLOGY

## 2022-12-20 PROCEDURE — 1160F PR REVIEW ALL MEDS BY PRESCRIBER/CLIN PHARMACIST DOCUMENTED: ICD-10-PCS | Mod: CPTII,S$GLB,, | Performed by: UROLOGY

## 2022-12-20 PROCEDURE — 1159F PR MEDICATION LIST DOCUMENTED IN MEDICAL RECORD: ICD-10-PCS | Mod: CPTII,S$GLB,, | Performed by: UROLOGY

## 2022-12-20 PROCEDURE — 3066F PR DOCUMENTATION OF TREATMENT FOR NEPHROPATHY: ICD-10-PCS | Mod: CPTII,S$GLB,, | Performed by: UROLOGY

## 2022-12-20 PROCEDURE — 1160F RVW MEDS BY RX/DR IN RCRD: CPT | Mod: CPTII,S$GLB,, | Performed by: UROLOGY

## 2022-12-20 PROCEDURE — 3077F SYST BP >= 140 MM HG: CPT | Mod: CPTII,S$GLB,, | Performed by: UROLOGY

## 2022-12-20 PROCEDURE — 1126F AMNT PAIN NOTED NONE PRSNT: CPT | Mod: CPTII,S$GLB,, | Performed by: UROLOGY

## 2022-12-20 PROCEDURE — 3288F PR FALLS RISK ASSESSMENT DOCUMENTED: ICD-10-PCS | Mod: CPTII,S$GLB,, | Performed by: UROLOGY

## 2022-12-20 PROCEDURE — 1101F PR PT FALLS ASSESS DOC 0-1 FALLS W/OUT INJ PAST YR: ICD-10-PCS | Mod: CPTII,S$GLB,, | Performed by: UROLOGY

## 2022-12-20 PROCEDURE — 1159F MED LIST DOCD IN RCRD: CPT | Mod: CPTII,S$GLB,, | Performed by: UROLOGY

## 2022-12-20 PROCEDURE — 1126F PR PAIN SEVERITY QUANTIFIED, NO PAIN PRESENT: ICD-10-PCS | Mod: CPTII,S$GLB,, | Performed by: UROLOGY

## 2022-12-20 PROCEDURE — 3008F PR BODY MASS INDEX (BMI) DOCUMENTED: ICD-10-PCS | Mod: CPTII,S$GLB,, | Performed by: UROLOGY

## 2022-12-20 PROCEDURE — 99999 PR PBB SHADOW E&M-EST. PATIENT-LVL IV: CPT | Mod: PBBFAC,,, | Performed by: UROLOGY

## 2022-12-20 PROCEDURE — 3078F DIAST BP <80 MM HG: CPT | Mod: CPTII,S$GLB,, | Performed by: UROLOGY

## 2023-01-03 ENCOUNTER — HOSPITAL ENCOUNTER (OUTPATIENT)
Dept: RADIOLOGY | Facility: HOSPITAL | Age: 73
Discharge: HOME | End: 2023-01-03
Attending: FAMILY MEDICINE
Payer: MEDICARE

## 2023-01-03 ENCOUNTER — OFFICE VISIT (OUTPATIENT)
Dept: FAMILY MEDICINE | Facility: CLINIC | Age: 73
End: 2023-01-03
Payer: MEDICARE

## 2023-01-03 VITALS
HEIGHT: 66 IN | BODY MASS INDEX: 38.31 KG/M2 | TEMPERATURE: 98 F | WEIGHT: 238.38 LBS | DIASTOLIC BLOOD PRESSURE: 70 MMHG | OXYGEN SATURATION: 96 % | HEART RATE: 68 BPM | SYSTOLIC BLOOD PRESSURE: 136 MMHG

## 2023-01-03 DIAGNOSIS — I70.0 AORTIC ATHEROSCLEROSIS: ICD-10-CM

## 2023-01-03 DIAGNOSIS — E11.319 TYPE 2 DIABETES MELLITUS WITH RETINOPATHY OF BOTH EYES, WITHOUT LONG-TERM CURRENT USE OF INSULIN, MACULAR EDEMA PRESENCE UNSPECIFIED, UNSPECIFIED RETINOPATHY SEVERITY: ICD-10-CM

## 2023-01-03 DIAGNOSIS — R60.0 PEDAL EDEMA: ICD-10-CM

## 2023-01-03 DIAGNOSIS — C61 PROSTATE CANCER: ICD-10-CM

## 2023-01-03 DIAGNOSIS — K21.9 GASTROESOPHAGEAL REFLUX DISEASE, UNSPECIFIED WHETHER ESOPHAGITIS PRESENT: ICD-10-CM

## 2023-01-03 DIAGNOSIS — E66.01 MORBID (SEVERE) OBESITY DUE TO EXCESS CALORIES: ICD-10-CM

## 2023-01-03 DIAGNOSIS — K92.0 HEMATEMESIS, UNSPECIFIED WHETHER NAUSEA PRESENT: ICD-10-CM

## 2023-01-03 DIAGNOSIS — N62 GYNECOMASTIA: ICD-10-CM

## 2023-01-03 DIAGNOSIS — I10 HYPERTENSION, ESSENTIAL: Primary | ICD-10-CM

## 2023-01-03 DIAGNOSIS — R04.2 HEMOPTYSIS: ICD-10-CM

## 2023-01-03 DIAGNOSIS — R42 VERTIGO: ICD-10-CM

## 2023-01-03 DIAGNOSIS — Z79.82 ASPIRIN LONG-TERM USE: ICD-10-CM

## 2023-01-03 DIAGNOSIS — I10 HYPERTENSION, ESSENTIAL: ICD-10-CM

## 2023-01-03 DIAGNOSIS — R51.9 NONINTRACTABLE HEADACHE, UNSPECIFIED CHRONICITY PATTERN, UNSPECIFIED HEADACHE TYPE: ICD-10-CM

## 2023-01-03 DIAGNOSIS — K13.79 BLOOD IN MOUTH OF UNKNOWN SOURCE: ICD-10-CM

## 2023-01-03 DIAGNOSIS — D69.2 SENILE PURPURA: ICD-10-CM

## 2023-01-03 PROCEDURE — 3078F PR MOST RECENT DIASTOLIC BLOOD PRESSURE < 80 MM HG: ICD-10-PCS | Mod: CPTII,S$GLB,, | Performed by: FAMILY MEDICINE

## 2023-01-03 PROCEDURE — 1101F PR PT FALLS ASSESS DOC 0-1 FALLS W/OUT INJ PAST YR: ICD-10-PCS | Mod: CPTII,S$GLB,, | Performed by: FAMILY MEDICINE

## 2023-01-03 PROCEDURE — 99214 OFFICE O/P EST MOD 30 MIN: CPT | Mod: S$GLB,,, | Performed by: FAMILY MEDICINE

## 2023-01-03 PROCEDURE — 1160F RVW MEDS BY RX/DR IN RCRD: CPT | Mod: CPTII,S$GLB,, | Performed by: FAMILY MEDICINE

## 2023-01-03 PROCEDURE — 3288F PR FALLS RISK ASSESSMENT DOCUMENTED: ICD-10-PCS | Mod: CPTII,S$GLB,, | Performed by: FAMILY MEDICINE

## 2023-01-03 PROCEDURE — 3008F BODY MASS INDEX DOCD: CPT | Mod: CPTII,S$GLB,, | Performed by: FAMILY MEDICINE

## 2023-01-03 PROCEDURE — 3075F SYST BP GE 130 - 139MM HG: CPT | Mod: CPTII,S$GLB,, | Performed by: FAMILY MEDICINE

## 2023-01-03 PROCEDURE — 1160F PR REVIEW ALL MEDS BY PRESCRIBER/CLIN PHARMACIST DOCUMENTED: ICD-10-PCS | Mod: CPTII,S$GLB,, | Performed by: FAMILY MEDICINE

## 2023-01-03 PROCEDURE — 3288F FALL RISK ASSESSMENT DOCD: CPT | Mod: CPTII,S$GLB,, | Performed by: FAMILY MEDICINE

## 2023-01-03 PROCEDURE — 1159F PR MEDICATION LIST DOCUMENTED IN MEDICAL RECORD: ICD-10-PCS | Mod: CPTII,S$GLB,, | Performed by: FAMILY MEDICINE

## 2023-01-03 PROCEDURE — 3075F PR MOST RECENT SYSTOLIC BLOOD PRESS GE 130-139MM HG: ICD-10-PCS | Mod: CPTII,S$GLB,, | Performed by: FAMILY MEDICINE

## 2023-01-03 PROCEDURE — 1101F PT FALLS ASSESS-DOCD LE1/YR: CPT | Mod: CPTII,S$GLB,, | Performed by: FAMILY MEDICINE

## 2023-01-03 PROCEDURE — 3008F PR BODY MASS INDEX (BMI) DOCUMENTED: ICD-10-PCS | Mod: CPTII,S$GLB,, | Performed by: FAMILY MEDICINE

## 2023-01-03 PROCEDURE — 1159F MED LIST DOCD IN RCRD: CPT | Mod: CPTII,S$GLB,, | Performed by: FAMILY MEDICINE

## 2023-01-03 PROCEDURE — 71046 X-RAY EXAM CHEST 2 VIEWS: CPT | Mod: TC

## 2023-01-03 PROCEDURE — 99214 PR OFFICE/OUTPT VISIT, EST, LEVL IV, 30-39 MIN: ICD-10-PCS | Mod: S$GLB,,, | Performed by: FAMILY MEDICINE

## 2023-01-03 PROCEDURE — 3078F DIAST BP <80 MM HG: CPT | Mod: CPTII,S$GLB,, | Performed by: FAMILY MEDICINE

## 2023-01-03 RX ORDER — HYDROCHLOROTHIAZIDE 12.5 MG/1
12.5 TABLET ORAL DAILY
COMMUNITY
End: 2023-01-03 | Stop reason: SDUPTHER

## 2023-01-03 RX ORDER — POTASSIUM CHLORIDE 750 MG/1
TABLET, EXTENDED RELEASE ORAL
Qty: 90 TABLET | Refills: 0 | Status: CANCELLED | OUTPATIENT
Start: 2023-01-03

## 2023-01-03 RX ORDER — HYDROCHLOROTHIAZIDE 12.5 MG/1
12.5 TABLET ORAL DAILY
Qty: 30 TABLET | Refills: 0 | Status: SHIPPED | OUTPATIENT
Start: 2023-01-03 | End: 2023-07-19 | Stop reason: CLARIF

## 2023-01-03 RX ORDER — ASPIRIN 81 MG/1
81 TABLET ORAL NIGHTLY
COMMUNITY

## 2023-01-03 NOTE — PROGRESS NOTES
Subjective:       Patient ID: Chris Hill Jr. is a 72 y.o. male.    Chief Complaint: Hypertension and Leg Swelling    Patient presents to clinic for follow up. History of TIA. No further stroke like episodes. Aspirin use. Hypertension stable. CAD. Cardiovascular ok. Denies chest pain or palpitations. Pedal edema after discontinuing dieretic. Weight up 9 lbs. BMI 38. Hyperlipidemia. LORA on CPAP. Woke up this morning after removing CPAP mask with blood in his mouth. No epistaxis. Denies melena. Some upper abdominal discomfort. Gynecomastia. Tenderness and swelling to breasts per patient. Still experiencing dizziness. Appointment with neurologist 1/17. Type 2 DM. GERD. No dysphagia. Burning sensation in chest. Advised to follow up with Dr. Jacobo for EGD. Due for tetanus, shingles, and Covid booster. Recommended Bivalent in place of booster.  History of prostate cancer.  Review of Systems   HENT:          Blood in mouth    Respiratory: Negative.     Cardiovascular: Negative.  Negative for chest pain and palpitations.   Gastrointestinal:  Positive for abdominal pain. Negative for blood in stool.   Neurological:  Positive for dizziness.   Hematological: Negative.    Psychiatric/Behavioral: Negative.         Objective:      Physical Exam  Constitutional:       Appearance: Normal appearance.   HENT:      Mouth/Throat:      Mouth: Mucous membranes are moist.      Pharynx: Oropharynx is clear.   Neck:      Vascular: No carotid bruit.   Cardiovascular:      Rate and Rhythm: Normal rate and regular rhythm.      Pulses: Normal pulses.      Heart sounds: Normal heart sounds.   Pulmonary:      Effort: Pulmonary effort is normal.      Breath sounds: Normal breath sounds.   Abdominal:      General: Abdomen is flat. Bowel sounds are normal.      Palpations: Abdomen is soft.      Tenderness: There is abdominal tenderness.   Musculoskeletal:         General: Tenderness present.      Right lower leg: Edema present.      Left lower  leg: Edema present.      Comments: Breast tenderness   Lymphadenopathy:      Cervical: No cervical adenopathy.   Skin:     General: Skin is warm and dry.   Neurological:      Mental Status: He is alert.   Psychiatric:         Mood and Affect: Mood normal.         Behavior: Behavior normal.       Assessment/Plan:     Hypertension, essential  -     X-Ray Chest PA And Lateral; Future; Expected date: 01/03/2023  -     Basic Metabolic Panel; Future; Expected date: 01/17/2023    Pedal edema    Aspirin long-term use    Gynecomastia    Vertigo    BMI 38.0-38.9,adult    Nonintractable headache, unspecified chronicity pattern, unspecified headache type    Gastroesophageal reflux disease, unspecified whether esophagitis present    Blood in mouth of unknown source  -     CBC Auto Differential; Future; Expected date: 01/03/2023    Hemoptysis    Hematemesis, unspecified whether nausea present    Aortic atherosclerosis    Type 2 diabetes mellitus with retinopathy of both eyes, without long-term current use of insulin, macular edema presence unspecified, unspecified retinopathy severity    Senile purpura    Prostate cancer    Morbid (severe) obesity due to excess calories    Other orders  -     hydroCHLOROthiazide (HYDRODIURIL) 12.5 MG Tab; Take 1 tablet (12.5 mg total) by mouth once daily.  Dispense: 30 tablet; Refill: 0     Can not be certain if this is hemoptysis or hematemesis.  Is having good bit of burning in the upper abdomen.  Will get a stool for occult blood.  Stat CBC.  Hold his aspirin.  See Dr. Ramirez regarding the vertigo.  Add Ace and Arb angioedema to his allergies.  HCTZ 12.5 daily.  Increase the potassium to 30 mEq daily.  See if we can get the edema down.  Check a BMP in 14 days and follow-up.  Get a chest x-ray.  Two Dr. All bright.

## 2023-01-04 PROBLEM — I70.0 AORTIC ATHEROSCLEROSIS: Status: ACTIVE | Noted: 2023-01-04

## 2023-01-17 ENCOUNTER — OFFICE VISIT (OUTPATIENT)
Dept: RADIATION ONCOLOGY | Facility: CLINIC | Age: 73
End: 2023-01-17
Payer: MEDICARE

## 2023-01-17 VITALS
OXYGEN SATURATION: 95 % | RESPIRATION RATE: 16 BRPM | HEART RATE: 60 BPM | DIASTOLIC BLOOD PRESSURE: 70 MMHG | SYSTOLIC BLOOD PRESSURE: 169 MMHG

## 2023-01-17 DIAGNOSIS — C61 PROSTATE CANCER: Primary | ICD-10-CM

## 2023-01-17 PROCEDURE — 3078F DIAST BP <80 MM HG: CPT | Mod: CPTII,S$GLB,, | Performed by: RADIOLOGY

## 2023-01-17 PROCEDURE — 99214 OFFICE O/P EST MOD 30 MIN: CPT | Mod: S$GLB,,, | Performed by: RADIOLOGY

## 2023-01-17 PROCEDURE — 1160F PR REVIEW ALL MEDS BY PRESCRIBER/CLIN PHARMACIST DOCUMENTED: ICD-10-PCS | Mod: CPTII,S$GLB,, | Performed by: RADIOLOGY

## 2023-01-17 PROCEDURE — 1101F PR PT FALLS ASSESS DOC 0-1 FALLS W/OUT INJ PAST YR: ICD-10-PCS | Mod: CPTII,S$GLB,, | Performed by: RADIOLOGY

## 2023-01-17 PROCEDURE — 1101F PT FALLS ASSESS-DOCD LE1/YR: CPT | Mod: CPTII,S$GLB,, | Performed by: RADIOLOGY

## 2023-01-17 PROCEDURE — 1126F AMNT PAIN NOTED NONE PRSNT: CPT | Mod: CPTII,S$GLB,, | Performed by: RADIOLOGY

## 2023-01-17 PROCEDURE — 1126F PR PAIN SEVERITY QUANTIFIED, NO PAIN PRESENT: ICD-10-PCS | Mod: CPTII,S$GLB,, | Performed by: RADIOLOGY

## 2023-01-17 PROCEDURE — 3077F SYST BP >= 140 MM HG: CPT | Mod: CPTII,S$GLB,, | Performed by: RADIOLOGY

## 2023-01-17 PROCEDURE — 3078F PR MOST RECENT DIASTOLIC BLOOD PRESSURE < 80 MM HG: ICD-10-PCS | Mod: CPTII,S$GLB,, | Performed by: RADIOLOGY

## 2023-01-17 PROCEDURE — 3288F FALL RISK ASSESSMENT DOCD: CPT | Mod: CPTII,S$GLB,, | Performed by: RADIOLOGY

## 2023-01-17 PROCEDURE — 3288F PR FALLS RISK ASSESSMENT DOCUMENTED: ICD-10-PCS | Mod: CPTII,S$GLB,, | Performed by: RADIOLOGY

## 2023-01-17 PROCEDURE — 1159F MED LIST DOCD IN RCRD: CPT | Mod: CPTII,S$GLB,, | Performed by: RADIOLOGY

## 2023-01-17 PROCEDURE — 99214 PR OFFICE/OUTPT VISIT, EST, LEVL IV, 30-39 MIN: ICD-10-PCS | Mod: S$GLB,,, | Performed by: RADIOLOGY

## 2023-01-17 PROCEDURE — 3077F PR MOST RECENT SYSTOLIC BLOOD PRESSURE >= 140 MM HG: ICD-10-PCS | Mod: CPTII,S$GLB,, | Performed by: RADIOLOGY

## 2023-01-17 PROCEDURE — 1159F PR MEDICATION LIST DOCUMENTED IN MEDICAL RECORD: ICD-10-PCS | Mod: CPTII,S$GLB,, | Performed by: RADIOLOGY

## 2023-01-17 PROCEDURE — 1160F RVW MEDS BY RX/DR IN RCRD: CPT | Mod: CPTII,S$GLB,, | Performed by: RADIOLOGY

## 2023-01-17 NOTE — PROGRESS NOTES
Chris Hill Jr.  [id number]  1950  1/17/2023  No referring provider defined for this encounter.    DIAGNOSIS: High risk prostate adenocarcinoma, [id number] GS 4+5 iPSA 13.2  REASON FOR VISIT: Routine scheduled follow-up.    HISTORY OF PRESENT ILLNESS:   72M p/w elevated PSA.    PSA  6/18 12.5  7/18 13.2    *8/18 Dr. Stephens TRUS bx   - 3+3 adenoca R apex 5%; 4+3 adenoca L apex 55%; 4+5 adenoca L mid 80% and L base 95%   - no EPE; +PNI   - 4/6 cores+   - ADT placed    - RadOnc consult  *8/18 CT AP/Bone scan: jenniffer    Patient was placed on long-term androgen deprivation therapy by Dr. Gordon and completed definitive radiotherapy to 7920 cGy in December 2018. Treatment was complicated by an unexpected lower back surgery and detection of pancytopenia by his GI doctor. Patient developed frequency and urgency of both urine and stool throughout treatment, managed medically.    INTERVAL HISTORY:  Patient reports sexual interest has corrected but erections are inadequate for activity.  Minimal benefit to Viagra.  He has nocturia x2-3.  He denies dysuria or hematuria.  He takes Flomax and hytrin. He denies diarrhea or bright red blood per rectum.       Continues to follow with Dr. Gibson for anemia, thrombocytopenia.  Colonoscopy with 3 polyps removed.  Awaiting ENT appointment to workup vertigo.    AUA 11 (10)  QOL 3 (2)  IIEF 5 (24)    Review of Systems   Constitutional:  Negative for appetite change, chills, fever and unexpected weight change.   HENT:   Negative for lump/mass, mouth sores, sore throat, trouble swallowing and voice change.    Eyes:  Negative for eye problems and icterus.   Respiratory:  Negative for chest tightness, cough, hemoptysis and shortness of breath.    Cardiovascular:  Negative for chest pain and leg swelling.   Gastrointestinal:  Negative for abdominal distention, abdominal pain, blood in stool, constipation, nausea and vomiting.   Genitourinary:  Positive for nocturia. Negative for bladder  incontinence, difficulty urinating, dysuria and hematuria.    Musculoskeletal:  Positive for arthralgias and back pain. Negative for gait problem, neck pain and neck stiffness.   Neurological:  Positive for dizziness. Negative for extremity weakness, gait problem, headaches, numbness and seizures.   Hematological:  Negative for adenopathy.   Past Medical History:   Diagnosis Date    Anemia, chronic disease 12/28/2018    Arthritis     Back pain     radiates both legs mainly rt leg    Benign paroxysmal vertigo, bilateral     Diabetes mellitus     Diabetes mellitus, type 2     JANSEN (dyspnea on exertion)     GERD (gastroesophageal reflux disease)     Heart murmur     Hyperlipidemia     Hypertension     Iron deficiency anemia 04/18/2022    Neuropathy     Normocytic normochromic anemia 12/28/2018    Prostate cancer 08/20/2018    Sleep apnea     NOT USING [facility]    Stroke     Thrombocytopenia 12/28/2018    Thyroid disease     Urinary frequency     Valvular regurgitation      Past Surgical History:   Procedure Laterality Date    ANKLE SURGERY Left     APPENDECTOMY      BACK SURGERY  09/2019    CARDIAC CATHETERIZATION      CERVICAL FUSION      EYE SURGERY      cataracts bilateral    GALLBLADDER SURGERY      HAND SURGERY Bilateral     rt hand x4 left x3    JOINT REPLACEMENT      KNEE SURGERY      8 on left knee and 7 on rt knee    NASAL SINUS SURGERY      x5    SHOULDER SURGERY Bilateral     3 on left 2 on right    TRANSRECTAL ULTRASOUND OF PROSTATE WITH INSERTION OF GOLD FIDUCIAL MARKER N/A 10/04/2018    Procedure: ULTRASOUND, PROSTATE, TRANSPERINEAL APPROACH, WITH GOLD FIDUCIAL MARKER INSERTION (with SPACEOAR transperineal biodegradable gel placement);  Surgeon: Manpreet Gordon MD;  Location: [facility];  Service: Urology;  Laterality: N/A;     Social History     Socioeconomic History    Marital status:    Tobacco Use    Smoking status: Never    Smokeless tobacco: Never   Substance  and Sexual Activity    Alcohol use: No    Drug use: No     Family History   Problem Relation Age of Onset    Heart disease Mother     Heart disease Father      Medication List with Changes/Refills   Current Medications    AMITRIPTYLINE (ELAVIL) 50 MG TABLET    TAKE 1 TABLET BY MOUTH ONCE DAILY IN THE EVENING    AMLODIPINE (NORVASC) 10 MG TABLET    Take 1 tablet (10 mg total) by mouth once daily.    ASPIRIN (ECOTRIN) 81 MG EC TABLET    Take 81 mg by mouth once daily.    CARVEDILOL (COREG) 25 MG TABLET    Take 1 tablet (25 mg total) by mouth 2 (two) times daily with meals.    CLONIDINE (CATAPRES) 0.1 MG TABLET    Take 1 tablet (0.1 mg total) by mouth every 8 (eight) hours as needed (SBP greater than 180).    CO-ENZYME Q-10 30 MG CAPSULE    Take 30 mg by mouth 3 (three) times daily.    CYANOCOBALAMIN (VITAMIN B-12) 100 MCG TABLET    Take 1,000 mcg by mouth once daily.    FISH OIL-OMEGA-3 FATTY ACIDS 300-1,000 MG CAPSULE    Take 1 capsule by mouth 2 (two) times daily.    GABAPENTIN (NEURONTIN) 300 MG CAPSULE    Take 300 mg by mouth 3 (three) times daily.    HYDRALAZINE (APRESOLINE) 100 MG TABLET    Take 1 tablet (100 mg total) by mouth 3 (three) times daily.    HYDROCHLOROTHIAZIDE (HYDRODIURIL) 12.5 MG TAB    Take 1 tablet (12.5 mg total) by mouth once daily.    IBUPROFEN (ADVIL,MOTRIN) 600 MG TABLET    Take 1 tablet (600 mg total) by mouth every 6 (six) hours as needed for Pain.    IRON-VITAMIN C 100-250 MG, ICAR-C, 100-250 MG TAB    Take 1 tablet by mouth once daily    LEVOTHYROXINE (SYNTHROID) 50 MCG TABLET    Take 1 tablet (50 mcg total) by mouth before breakfast.    MAGNESIUM 250 MG TAB    Take 1 tablet by mouth once daily.    MECLIZINE (ANTIVERT) 25 MG TABLET    Take 25 mg by mouth every 6 (six) hours as needed.    METFORMIN (GLUCOPHAGE) 500 MG TABLET    Take 1 tablet (500 mg total) by mouth 2 (two) times daily.    MULTIVITAMIN (THERAGRAN) PER TABLET    Take 1 tablet by mouth once daily.    OLMESARTAN  (BENICAR) 40 MG TABLET    Take 1 tablet by mouth once daily    PANTOPRAZOLE (PROTONIX) 40 MG TABLET    Take 40 mg by mouth 2 (two) times daily as needed.    POTASSIUM CHLORIDE SA (K-DUR,KLOR-CON) 20 MEQ TABLET    Take 1 tablet by mouth twice daily    ROSUVASTATIN (CRESTOR) 20 MG TABLET    Take 1 tablet (20 mg total) by mouth once daily.    SILDENAFIL (VIAGRA) 25 MG TABLET    Take 1 tablet (25 mg total) by mouth daily as needed for Erectile Dysfunction. Take 1/2 to 1 tablet as needed for erectile dysfunction.    TAMSULOSIN (FLOMAX) 0.4 MG CAP    TAKE 1 CAPSULE BY MOUTH ONCE DAILY IN THE EVENING    TERAZOSIN (HYTRIN) 10 MG CAPSULE    Take 1 capsule (10 mg total) by mouth every evening.    TRAMADOL (ULTRAM) 50 MG TABLET         Review of patient's allergies indicates:  No Known Allergies    QUALITY OF LIFE: 90%    Vitals:    01/17/23 1447   BP: (!) 169/70   Pulse: 60   Resp: 16   SpO2: 95%   PainSc: 0-No pain     There is no height or weight on file to calculate BMI.    PHYSICAL EXAM:   GENERAL: alert; in no apparent distress.   HEAD: normocephalic, atraumatic.  EYES: pupils are equal, round, reactive to light and accommodation. Sclera anicteric. Conjunctiva not injected.   NECK: no cervical motion rigidity; supple with no masses.  CHEST: Patient is speaking comfortably on room air with normal work of breathing without using accessory muscles of respiration.  ABDOMEN: soft, nontender, nondistended. Bowel sounds present.   MUSCULOSKELETAL: no tenderness to palpation along the spine or scapulae. Normal range of motion.  NEUROLOGIC: cranial nerves II-XII intact bilaterally. Strength 5/5 in bilateral upper and lower extremities. No sensory deficits appreciated. Normal gait.  EXTREMITIES: no clubbing, cyanosis, edema.  SKIN: no erythema, rashes, ulcerations noted.     ANCILLARY DATA:     PSA  TEST  12/22 0.11  201  9/22 0.07  160  6/22 0.07  12/21 0.01  9/21 0.03  158  5/21 <0.01  110  11/20 <0.01  9   5/20 <0.01  11/19 <0.01  5/19 0.02      ASSESSMENT: 72 y.o. male with High risk prostate adenocarcinoma, [id number] GS 4+5 iPSA 13.2 status post definitive radiotherapy to 7920 cGy ending December 2018; LT-ADT final depot 5/20.  PLAN:     - He reports mixed urinary quality of life at today's exam.  He denies obstructive symptoms.  He continues to take Flomax and Hytrin.  Main complaint is erectile dysfunction, poorly responsive to Viagra.  Suspect this is multifactorial including low testosterone, age, vasculopathy, post-RT.  Described treatment ladder including PDE inhibitors, injections/pellet, vacuum, implant and advised discuss further with Urology.  Do not recommend testosterone supplementation.  - TEST continues to slowly increase with corresponding increase in PSA, most recently 0.11.  Continue to trend per URO.  - follow up with Dr. Gordon; Dr. Gibson  - Return to clinic 1yr.    All questions answered and contact information provided. Patient understands free to call us anytime with any questions or concerns regarding radiation therapy.    I have personally seen and evaluated this patient with a moderate to high complexity diagnosis.      Greater than 30 minutes were dedicated to reviewing/interpreting pertinent laboratory/imaging/pathology as well as follow-up with concurrent consultants; reviewing and performing history and physical; counseling patient on continuing oncologic recommendations; documentation in the electronic medical record including ordering of additional tests and/or radiation treatment protocol; and coordination of care with physicians with referrals placed as appropriate.     COVID-19 precautions and [facility] policy discussed.      PHYSICIAN: Simone Santillan Jr, MD

## 2023-01-18 ENCOUNTER — OFFICE VISIT (OUTPATIENT)
Dept: FAMILY MEDICINE | Facility: CLINIC | Age: 73
End: 2023-01-18
Payer: MEDICARE

## 2023-01-18 ENCOUNTER — LAB VISIT (OUTPATIENT)
Dept: LAB | Facility: HOSPITAL | Age: 73
End: 2023-01-18
Attending: FAMILY MEDICINE
Payer: MEDICARE

## 2023-01-18 VITALS
DIASTOLIC BLOOD PRESSURE: 76 MMHG | SYSTOLIC BLOOD PRESSURE: 130 MMHG | HEART RATE: 58 BPM | WEIGHT: 232.88 LBS | BODY MASS INDEX: 37.43 KG/M2 | HEIGHT: 66 IN | OXYGEN SATURATION: 95 % | TEMPERATURE: 98 F

## 2023-01-18 DIAGNOSIS — E87.6 HYPOKALEMIA: ICD-10-CM

## 2023-01-18 DIAGNOSIS — E53.8 VITAMIN B12 DEFICIENCY: ICD-10-CM

## 2023-01-18 DIAGNOSIS — I10 HYPERTENSION, ESSENTIAL: ICD-10-CM

## 2023-01-18 DIAGNOSIS — D64.9 ANEMIA, UNSPECIFIED TYPE: ICD-10-CM

## 2023-01-18 DIAGNOSIS — R42 VERTIGO: ICD-10-CM

## 2023-01-18 DIAGNOSIS — I10 HYPERTENSION, ESSENTIAL: Primary | ICD-10-CM

## 2023-01-18 LAB
ANION GAP SERPL CALC-SCNC: 11 MMOL/L (ref 8–16)
BASOPHILS # BLD AUTO: 0.04 K/UL (ref 0–0.2)
BASOPHILS NFR BLD: 0.6 % (ref 0–1.9)
BUN SERPL-MCNC: 12 MG/DL (ref 8–23)
CALCIUM SERPL-MCNC: 9 MG/DL (ref 8.7–10.5)
CHLORIDE SERPL-SCNC: 100 MMOL/L (ref 95–110)
CO2 SERPL-SCNC: 30 MMOL/L (ref 23–29)
CREAT SERPL-MCNC: 1 MG/DL (ref 0.5–1.4)
DIFFERENTIAL METHOD: ABNORMAL
EOSINOPHIL # BLD AUTO: 0.5 K/UL (ref 0–0.5)
EOSINOPHIL NFR BLD: 6.5 % (ref 0–8)
ERYTHROCYTE [DISTWIDTH] IN BLOOD BY AUTOMATED COUNT: 13.8 % (ref 11.5–14.5)
EST. GFR  (NO RACE VARIABLE): >60 ML/MIN/1.73 M^2
GLUCOSE SERPL-MCNC: 100 MG/DL (ref 70–110)
HCT VFR BLD AUTO: 38.3 % (ref 40–54)
HGB BLD-MCNC: 12.2 G/DL (ref 14–18)
IMM GRANULOCYTES # BLD AUTO: 0.05 K/UL (ref 0–0.04)
IMM GRANULOCYTES NFR BLD AUTO: 0.7 % (ref 0–0.5)
LYMPHOCYTES # BLD AUTO: 1.3 K/UL (ref 1–4.8)
LYMPHOCYTES NFR BLD: 18.7 % (ref 18–48)
MCH RBC QN AUTO: 28.2 PG (ref 27–31)
MCHC RBC AUTO-ENTMCNC: 31.9 G/DL (ref 32–36)
MCV RBC AUTO: 89 FL (ref 82–98)
MONOCYTES # BLD AUTO: 0.7 K/UL (ref 0.3–1)
MONOCYTES NFR BLD: 9.4 % (ref 4–15)
NEUTROPHILS # BLD AUTO: 4.5 K/UL (ref 1.8–7.7)
NEUTROPHILS NFR BLD: 64.1 % (ref 38–73)
NRBC BLD-RTO: 0 /100 WBC
PLATELET # BLD AUTO: 158 K/UL (ref 150–450)
PMV BLD AUTO: 11.3 FL (ref 9.2–12.9)
POTASSIUM SERPL-SCNC: 3.1 MMOL/L (ref 3.5–5.1)
RBC # BLD AUTO: 4.32 M/UL (ref 4.6–6.2)
SODIUM SERPL-SCNC: 141 MMOL/L (ref 136–145)
WBC # BLD AUTO: 6.95 K/UL (ref 3.9–12.7)

## 2023-01-18 PROCEDURE — 3288F FALL RISK ASSESSMENT DOCD: CPT | Mod: CPTII,S$GLB,, | Performed by: FAMILY MEDICINE

## 2023-01-18 PROCEDURE — 36415 COLL VENOUS BLD VENIPUNCTURE: CPT | Performed by: FAMILY MEDICINE

## 2023-01-18 PROCEDURE — 1159F MED LIST DOCD IN RCRD: CPT | Mod: CPTII,S$GLB,, | Performed by: FAMILY MEDICINE

## 2023-01-18 PROCEDURE — 1101F PT FALLS ASSESS-DOCD LE1/YR: CPT | Mod: CPTII,S$GLB,, | Performed by: FAMILY MEDICINE

## 2023-01-18 PROCEDURE — 3075F PR MOST RECENT SYSTOLIC BLOOD PRESS GE 130-139MM HG: ICD-10-PCS | Mod: CPTII,S$GLB,, | Performed by: FAMILY MEDICINE

## 2023-01-18 PROCEDURE — 3078F DIAST BP <80 MM HG: CPT | Mod: CPTII,S$GLB,, | Performed by: FAMILY MEDICINE

## 2023-01-18 PROCEDURE — 1159F PR MEDICATION LIST DOCUMENTED IN MEDICAL RECORD: ICD-10-PCS | Mod: CPTII,S$GLB,, | Performed by: FAMILY MEDICINE

## 2023-01-18 PROCEDURE — 99213 OFFICE O/P EST LOW 20 MIN: CPT | Mod: S$GLB,,, | Performed by: FAMILY MEDICINE

## 2023-01-18 PROCEDURE — 1101F PR PT FALLS ASSESS DOC 0-1 FALLS W/OUT INJ PAST YR: ICD-10-PCS | Mod: CPTII,S$GLB,, | Performed by: FAMILY MEDICINE

## 2023-01-18 PROCEDURE — 80048 BASIC METABOLIC PNL TOTAL CA: CPT | Performed by: FAMILY MEDICINE

## 2023-01-18 PROCEDURE — 3075F SYST BP GE 130 - 139MM HG: CPT | Mod: CPTII,S$GLB,, | Performed by: FAMILY MEDICINE

## 2023-01-18 PROCEDURE — 85025 COMPLETE CBC W/AUTO DIFF WBC: CPT | Performed by: FAMILY MEDICINE

## 2023-01-18 PROCEDURE — 3078F PR MOST RECENT DIASTOLIC BLOOD PRESSURE < 80 MM HG: ICD-10-PCS | Mod: CPTII,S$GLB,, | Performed by: FAMILY MEDICINE

## 2023-01-18 PROCEDURE — 1126F AMNT PAIN NOTED NONE PRSNT: CPT | Mod: CPTII,S$GLB,, | Performed by: FAMILY MEDICINE

## 2023-01-18 PROCEDURE — 3008F BODY MASS INDEX DOCD: CPT | Mod: CPTII,S$GLB,, | Performed by: FAMILY MEDICINE

## 2023-01-18 PROCEDURE — 1160F RVW MEDS BY RX/DR IN RCRD: CPT | Mod: CPTII,S$GLB,, | Performed by: FAMILY MEDICINE

## 2023-01-18 PROCEDURE — 99213 PR OFFICE/OUTPT VISIT, EST, LEVL III, 20-29 MIN: ICD-10-PCS | Mod: S$GLB,,, | Performed by: FAMILY MEDICINE

## 2023-01-18 PROCEDURE — 3288F PR FALLS RISK ASSESSMENT DOCUMENTED: ICD-10-PCS | Mod: CPTII,S$GLB,, | Performed by: FAMILY MEDICINE

## 2023-01-18 PROCEDURE — 1160F PR REVIEW ALL MEDS BY PRESCRIBER/CLIN PHARMACIST DOCUMENTED: ICD-10-PCS | Mod: CPTII,S$GLB,, | Performed by: FAMILY MEDICINE

## 2023-01-18 PROCEDURE — 1126F PR PAIN SEVERITY QUANTIFIED, NO PAIN PRESENT: ICD-10-PCS | Mod: CPTII,S$GLB,, | Performed by: FAMILY MEDICINE

## 2023-01-18 PROCEDURE — 3008F PR BODY MASS INDEX (BMI) DOCUMENTED: ICD-10-PCS | Mod: CPTII,S$GLB,, | Performed by: FAMILY MEDICINE

## 2023-01-19 NOTE — PROGRESS NOTES
Subjective:       Patient ID: Chris Hill Jr. is a 72 y.o. male.    Chief Complaint: Follow-up    Hypertension is controlled.  Cardiovascular no chest pain or palpitations.  In general feeling okay.  He waited some time in order to be able to see the vertigo specialist and then on the way there he was blocked by an accident and could got get to the appointment.  He is not been able to reschedule yet.  He is not had any more blood come up into his mouth.  Stool was heme-positive but this was the very next day after he had the bleeding episode.  Still unclear where the blood came from.  Chest x-ray was clear.  No real GI upset.  Colonoscopy is current.  He had a EGD a little over 2 years ago.  Slight anemia hemoglobin is 11.9 previously was 12.5.  Hypokalemia potassium down to 3.4 with the hydrochlorothiazide.  Due for repeat today.  Using his B12 daily.      Physical examination.  Vital signs noted.  Neck without bruit.  Chest clear.  Heart regular rate rhythm.  Neurologically intact.      Objective:        Assessment:       1. Hypertension, essential    2. Vertigo    3. Anemia, unspecified type    4. Hypokalemia    5. Vitamin B12 deficiency          Plan:       Hypertension, essential  -     CBC Auto Differential; Future; Expected date: 01/18/2023  -     Basic Metabolic Panel; Future; Expected date: 02/01/2023    Vertigo    Anemia, unspecified type  -     CBC Auto Differential; Future; Expected date: 01/18/2023  -     Basic Metabolic Panel; Future; Expected date: 02/01/2023    Hypokalemia  -     Basic Metabolic Panel; Future; Expected date: 02/01/2023    Vitamin B12 deficiency    Hemoptysis versus hematemesis.  See Dr. Blaine arriola for possible EGD.  Repeat the CBC and BMP.  Follow-up here in 3 months.  See the vertigo specialist as planned.

## 2023-01-23 RX ORDER — LEVOTHYROXINE SODIUM 50 UG/1
TABLET ORAL
Qty: 90 TABLET | Refills: 0 | OUTPATIENT
Start: 2023-01-23

## 2023-01-23 NOTE — TELEPHONE ENCOUNTER
Quick DC. Duplicate Request-  med pended in previous encounter, awaiting assessment    Refill Authorization Note   Chris Hill  is requesting a refill authorization.  Brief Assessment and Rationale for Refill:  Quick Discontinue  Medication Therapy Plan:       Medication Reconciliation Completed:  No      Comments:     Note composed:12:51 PM 01/23/2023

## 2023-01-23 NOTE — TELEPHONE ENCOUNTER
No new care gaps identified.  [facility] Embedded Care Gaps. Reference number: [id number]. 1/23/2023   7:30:23 AM CST

## 2023-02-07 ENCOUNTER — OFFICE VISIT (OUTPATIENT)
Dept: CARDIOLOGY | Facility: CLINIC | Age: 73
End: 2023-02-07
Payer: MEDICARE

## 2023-02-07 VITALS
BODY MASS INDEX: 37.69 KG/M2 | HEIGHT: 66 IN | HEART RATE: 54 BPM | WEIGHT: 234.56 LBS | SYSTOLIC BLOOD PRESSURE: 138 MMHG | DIASTOLIC BLOOD PRESSURE: 80 MMHG

## 2023-02-07 DIAGNOSIS — I49.9 IRREGULAR HEART BEATS: ICD-10-CM

## 2023-02-07 DIAGNOSIS — I25.10 MILD CAD: Primary | ICD-10-CM

## 2023-02-07 DIAGNOSIS — G47.33 OSA ON CPAP: Chronic | ICD-10-CM

## 2023-02-07 DIAGNOSIS — I70.0 AORTIC ATHEROSCLEROSIS: Chronic | ICD-10-CM

## 2023-02-07 DIAGNOSIS — I10 HYPERTENSION, ESSENTIAL: Chronic | ICD-10-CM

## 2023-02-07 DIAGNOSIS — I34.0 NON-RHEUMATIC MITRAL REGURGITATION: ICD-10-CM

## 2023-02-07 DIAGNOSIS — E66.01 SEVERE OBESITY (BMI 35.0-39.9) WITH COMORBIDITY: Chronic | ICD-10-CM

## 2023-02-07 DIAGNOSIS — R42 DIZZINESS: Chronic | ICD-10-CM

## 2023-02-07 DIAGNOSIS — R00.1 BRADYCARDIA: ICD-10-CM

## 2023-02-07 PROCEDURE — 3008F PR BODY MASS INDEX (BMI) DOCUMENTED: ICD-10-PCS | Mod: CPTII,S$GLB,, | Performed by: INTERNAL MEDICINE

## 2023-02-07 PROCEDURE — 99999 PR PBB SHADOW E&M-EST. PATIENT-LVL II: CPT | Mod: PBBFAC,,, | Performed by: INTERNAL MEDICINE

## 2023-02-07 PROCEDURE — 3079F DIAST BP 80-89 MM HG: CPT | Mod: CPTII,S$GLB,, | Performed by: INTERNAL MEDICINE

## 2023-02-07 PROCEDURE — 1126F AMNT PAIN NOTED NONE PRSNT: CPT | Mod: CPTII,S$GLB,, | Performed by: INTERNAL MEDICINE

## 2023-02-07 PROCEDURE — 99999 PR PBB SHADOW E&M-EST. PATIENT-LVL II: ICD-10-PCS | Mod: PBBFAC,,, | Performed by: INTERNAL MEDICINE

## 2023-02-07 PROCEDURE — 1159F PR MEDICATION LIST DOCUMENTED IN MEDICAL RECORD: ICD-10-PCS | Mod: CPTII,S$GLB,, | Performed by: INTERNAL MEDICINE

## 2023-02-07 PROCEDURE — 99214 OFFICE O/P EST MOD 30 MIN: CPT | Mod: S$GLB,,, | Performed by: INTERNAL MEDICINE

## 2023-02-07 PROCEDURE — 1126F PR PAIN SEVERITY QUANTIFIED, NO PAIN PRESENT: ICD-10-PCS | Mod: CPTII,S$GLB,, | Performed by: INTERNAL MEDICINE

## 2023-02-07 PROCEDURE — 1159F MED LIST DOCD IN RCRD: CPT | Mod: CPTII,S$GLB,, | Performed by: INTERNAL MEDICINE

## 2023-02-07 PROCEDURE — 3008F BODY MASS INDEX DOCD: CPT | Mod: CPTII,S$GLB,, | Performed by: INTERNAL MEDICINE

## 2023-02-07 PROCEDURE — 99214 PR OFFICE/OUTPT VISIT, EST, LEVL IV, 30-39 MIN: ICD-10-PCS | Mod: S$GLB,,, | Performed by: INTERNAL MEDICINE

## 2023-02-07 PROCEDURE — 3075F SYST BP GE 130 - 139MM HG: CPT | Mod: CPTII,S$GLB,, | Performed by: INTERNAL MEDICINE

## 2023-02-07 PROCEDURE — 3079F PR MOST RECENT DIASTOLIC BLOOD PRESSURE 80-89 MM HG: ICD-10-PCS | Mod: CPTII,S$GLB,, | Performed by: INTERNAL MEDICINE

## 2023-02-07 PROCEDURE — 3075F PR MOST RECENT SYSTOLIC BLOOD PRESS GE 130-139MM HG: ICD-10-PCS | Mod: CPTII,S$GLB,, | Performed by: INTERNAL MEDICINE

## 2023-02-07 RX ORDER — CEFUROXIME AXETIL 250 MG/1
250 TABLET ORAL
COMMUNITY
Start: 2023-02-06 | End: 2023-02-16

## 2023-02-07 RX ORDER — EPLERENONE 25 MG/1
25 TABLET, FILM COATED ORAL DAILY
Qty: 30 TABLET | Refills: 2 | Status: SHIPPED | OUTPATIENT
Start: 2023-02-07 | End: 2023-05-08

## 2023-02-07 NOTE — PROGRESS NOTES
Subjective:    Patient ID:  Chris Hill Jr. is a 72 y.o. male who presents for Bradycardia (6 month f/u ) and Hypertension        HPI  RECENT LABS NOTED BMP WITH POTASSIUM OF 3.1, HEMOGLOBIN 12.2, /145. WAS HAVING ? MINI STROKES / DIZZY SPELLS, L SIDED FACIAL NUMBNESS, DR LEE STARTED PLAVIX, AND FENOFIBRATE, THEN TOLD COMPLEX MIGRAINE, THEN NOT SURE, WENT WITH EPISODES, EYES WERE JUMPING, TO SEE ENT VERTIGO SPECIALIST, SINUS INFECTION SAW DR MIGUEL, BRAIN MRI IN AUGUST [place], ID WELL WITH SPIRONOLACTONE NO EDEMA, BUT GYNECOMASTIA, SEE ROS    Past Medical History:   Diagnosis Date    Anemia, chronic disease 12/28/2018    Arthritis     Back pain     radiates both legs mainly rt leg    Benign paroxysmal vertigo, bilateral     Diabetes mellitus     Diabetes mellitus, type 2     JANSEN (dyspnea on exertion)     GERD (gastroesophageal reflux disease)     Heart murmur     Hyperlipidemia     Hypertension     Iron deficiency anemia 04/18/2022    Neuropathy     Normocytic normochromic anemia 12/28/2018    Prostate cancer 08/20/2018    Sleep apnea     NOT USING [facility]    Stroke     Thrombocytopenia 12/28/2018    Thyroid disease     Urinary frequency     Valvular regurgitation      Past Surgical History:   Procedure Laterality Date    ANKLE SURGERY Left     APPENDECTOMY      BACK SURGERY  09/2019    CARDIAC CATHETERIZATION      CERVICAL FUSION      EYE SURGERY      cataracts bilateral    GALLBLADDER SURGERY      HAND SURGERY Bilateral     rt hand x4 left x3    JOINT REPLACEMENT      KNEE SURGERY      8 on left knee and 7 on rt knee    NASAL SINUS SURGERY      x5    SHOULDER SURGERY Bilateral     3 on left 2 on right    TRANSRECTAL ULTRASOUND OF PROSTATE WITH INSERTION OF GOLD FIDUCIAL MARKER N/A 10/04/2018    Procedure: ULTRASOUND, PROSTATE, TRANSPERINEAL APPROACH, WITH GOLD FIDUCIAL MARKER INSERTION (with SPACEOAR transperineal biodegradable gel placement);  Surgeon: Manpreet Gordon MD;  [facility];  Service:  Urology;  Laterality: N/A;     Family History   Problem Relation Age of Onset    Heart disease Mother     Heart disease Father      Social History     Socioeconomic History    Marital status:    Tobacco Use    Smoking status: Never    Smokeless tobacco: Never   Substance and Sexual Activity    Alcohol use: No    Drug use: No       Review of patient's allergies indicates:  No Known Allergies    Current Outpatient Medications:     amLODIPine (NORVASC) 10 MG tablet, Take 1 tablet (10 mg total) by mouth once daily., Disp: 90 tablet, Rfl: 3    aspirin (ECOTRIN) 81 MG EC tablet, Take 81 mg by mouth once daily., Disp: , Rfl:     carvediloL (COREG) 25 MG tablet, Take 1 tablet (25 mg total) by mouth 2 (two) times daily with meals., Disp: 180 tablet, Rfl: 0    cefUROXime (CEFTIN) 250 MG tablet, Take 250 mg by mouth., Disp: , Rfl:     cloNIDine (CATAPRES) 0.1 MG tablet, Take 1 tablet (0.1 mg total) by mouth every 8 (eight) hours as needed (SBP greater than 180)., Disp: 30 tablet, Rfl: 0    co-enzyme Q-10 30 mg capsule, Take 30 mg by mouth 3 (three) times daily., Disp: , Rfl:     cyanocobalamin (VITAMIN B-12) 100 MCG tablet, Take 1,000 mcg by mouth once daily., Disp: , Rfl:     fish oil-omega-3 fatty acids 300-1,000 mg capsule, Take 1 capsule by mouth 2 (two) times daily., Disp: , Rfl:     gabapentin (NEURONTIN) 300 MG capsule, Take 300 mg by mouth 3 (three) times daily., Disp: , Rfl:     hydrALAZINE (APRESOLINE) 100 MG tablet, Take 1 tablet (100 mg total) by mouth 3 (three) times daily., Disp: 270 tablet, Rfl: 0    hydroCHLOROthiazide (HYDRODIURIL) 12.5 MG Tab, Take 1 tablet (12.5 mg total) by mouth once daily., Disp: 30 tablet, Rfl: 0    ibuprofen (ADVIL,MOTRIN) 600 MG tablet, Take 1 tablet (600 mg total) by mouth every 6 (six) hours as needed for Pain., Disp: 20 tablet, Rfl: 0    iron-vitamin C 100-250 mg, ICAR-C, 100-250 mg Tab, Take 1 tablet by mouth once daily, Disp: 30 tablet, Rfl: 0    levothyroxine (SYNTHROID)  50 MCG tablet, Take 1 tablet (50 mcg total) by mouth before breakfast., Disp: 90 tablet, Rfl: 2    magnesium 250 mg Tab, Take 1 tablet by mouth once daily., Disp: , Rfl:     meclizine (ANTIVERT) 25 mg tablet, Take 25 mg by mouth every 6 (six) hours as needed., Disp: , Rfl:     metFORMIN (GLUCOPHAGE) 500 MG tablet, Take 1 tablet (500 mg total) by mouth 2 (two) times daily., Disp: 180 tablet, Rfl: 1    multivitamin (THERAGRAN) per tablet, Take 1 tablet by mouth once daily., Disp: , Rfl:     olmesartan (BENICAR) 40 MG tablet, Take 1 tablet by mouth once daily, Disp: 90 tablet, Rfl: 3    pantoprazole (PROTONIX) 40 MG tablet, Take 40 mg by mouth 2 (two) times daily as needed., Disp: , Rfl:     potassium chloride SA (K-DUR,KLOR-CON) 20 MEQ tablet, Take 1 tablet by mouth twice daily (Patient taking differently: Take 20 mEq by mouth once daily.), Disp: 180 tablet, Rfl: 3    rosuvastatin (CRESTOR) 20 MG tablet, Take 1 tablet (20 mg total) by mouth once daily., Disp: 90 tablet, Rfl: 0    sildenafiL (VIAGRA) 25 MG tablet, Take 1 tablet (25 mg total) by mouth daily as needed for Erectile Dysfunction. Take 1/2 to 1 tablet as needed for erectile dysfunction., Disp: 10 tablet, Rfl: 0    tamsulosin (FLOMAX) 0.4 mg Cap, TAKE 1 CAPSULE BY MOUTH ONCE DAILY IN THE EVENING, Disp: 90 capsule, Rfl: 0    terazosin (HYTRIN) 10 MG capsule, Take 1 capsule (10 mg total) by mouth every evening., Disp: 90 capsule, Rfl: 0    traMADoL (ULTRAM) 50 mg tablet, , Disp: , Rfl:     amitriptyline (ELAVIL) 50 MG tablet, TAKE 1 TABLET BY MOUTH ONCE DAILY IN THE EVENING (Patient taking differently: Take 50 mg by mouth every evening.), Disp: 90 tablet, Rfl: 3    eplerenone (INSPRA) 25 MG Tab, Take 1 tablet (25 mg total) by mouth once daily., Disp: 30 tablet, Rfl: 2    Review of Systems   Constitutional: Negative for chills, diaphoresis, fever and weight gain.   HENT:  Negative for sore throat.    Eyes:  Positive for visual disturbance (TRANSIENT/ [facility]).  "Negative for blurred vision.   Cardiovascular:  Positive for leg swelling. Negative for chest pain, claudication, cyanosis, dyspnea on exertion, near-syncope, orthopnea, palpitations, paroxysmal nocturnal dyspnea and syncope. Irregular heartbeat: ??.  Respiratory:  Negative for cough, hemoptysis, shortness of breath and wheezing.    Endocrine: Negative for cold intolerance and heat intolerance.   Hematologic/Lymphatic: Negative for adenopathy. Does not bruise/bleed easily.   Skin:  Negative for rash.   Musculoskeletal:  Negative for falls, muscle weakness and myalgias.   Gastrointestinal:  Negative for abdominal pain, change in bowel habit, constipation, diarrhea, jaundice, melena and nausea.   Genitourinary:  Negative for bladder incontinence.   Neurological:  Positive for numbness (L FACE) and vertigo. Negative for dizziness, focal weakness, headaches, light-headedness and weakness.   Psychiatric/Behavioral:  Negative for altered mental status.    Allergic/Immunologic: Negative for hives and persistent infections.      Objective:      Vitals:    02/07/23 1311 02/07/23 1320   BP: (!) 180/81 138/80   Pulse: (!) 54    Weight: 106.4 kg (234 lb 9.1 oz)    Height: 5' 6" (1.676 m)    PainSc: 0-No pain      Body mass index is 37.86 kg/m².    Physical Exam  Constitutional:       Appearance: He is well-developed. He is obese.   HENT:      Head: Normocephalic and atraumatic.   Eyes:      Extraocular Movements: Extraocular movements intact.      Conjunctiva/sclera: Conjunctivae normal.      Pupils: Pupils are equal, round, and reactive to light.   Neck:      Thyroid: No thyromegaly.      Vascular: No JVD.      Trachea: No tracheal deviation.   Cardiovascular:      Rate and Rhythm: Normal rate and regular rhythm.      Chest Wall: PMI is not displaced.      Pulses: Normal pulses and intact distal pulses.           Carotid pulses are 2+ on the right side and 2+ on the left side.       Radial pulses are 2+ on the right side and " 2+ on the left side.        Posterior tibial pulses are 2+ on the right side and 2+ on the left side.      Heart sounds: S1 normal and S2 normal. Murmur heard.   Systolic murmur is present with a grade of 1/6 at the lower left sternal border.     No friction rub. No gallop.   Pulmonary:      Effort: Pulmonary effort is normal. No respiratory distress.      Breath sounds: Normal breath sounds. No wheezing or rales.   Chest:      Chest wall: No tenderness.   Abdominal:      General: Bowel sounds are normal.      Palpations: Abdomen is soft. There is no mass.      Tenderness: There is no abdominal tenderness.   Musculoskeletal:         General: Normal range of motion.      Cervical back: Neck supple.      Right lower le+ Edema present.      Left lower le+ Edema present.   Skin:     General: Skin is warm and dry.      Findings: No rash.   Neurological:      General: No focal deficit present.      Mental Status: He is alert and oriented to person, place, and time.      Cranial Nerves: Cranial nerve deficit (HEARING AIDS) present.   Psychiatric:         Mood and Affect: Mood normal.         Behavior: Behavior normal.               ..    Chemistry        Component Value Date/Time     2023 1310     2019 1022    K 3.1 (L) 2023 1310     2023 1310    CL 99 2019 1022    CO2 30 (H) 2023 1310    BUN 12 2023 1310    CREATININE 1.0 2023 1310    CREATININE 0.72 2019 1022     2023 1310     (H) 2019 1022        Component Value Date/Time    CALCIUM 9.0 2023 1310    ALKPHOS 69 11/10/2022 0913    AST 18 11/10/2022 0913    ALT 22 11/10/2022 0913    BILITOT 0.4 11/10/2022 0913    ESTGFRAFRICA >60.0 2022 1445    EGFRNONAA >60.0 2022 1445            ..  Lab Results   Component Value Date    CHOL 144 2022    CHOL 87 (L) 10/18/2021    CHOL 120 2021     Lab Results   Component Value Date    HDL 36 (L)  03/23/2022    HDL 32 (L) 10/18/2021    HDL 44 06/22/2021     Lab Results   Component Value Date    LDLCALC 68.2 03/23/2022    LDLCALC 25.0 (L) 10/18/2021    LDLCALC 30.4 (L) 06/22/2021     Lab Results   Component Value Date    TRIG 199 (H) 03/23/2022    TRIG 150 10/18/2021    TRIG 228 (H) 06/22/2021     Lab Results   Component Value Date    CHOLHDL 25.0 03/23/2022    CHOLHDL 36.8 10/18/2021    CHOLHDL 36.7 06/22/2021     ..  Lab Results   Component Value Date    WBC 6.95 01/18/2023    HGB 12.2 (L) 01/18/2023    HCT 38.3 (L) 01/18/2023    MCV 89 01/18/2023     01/18/2023       Test(s) Reviewed  I have reviewed the following in detail:  [] Stress test   [] Angiography   [] Echocardiogram   [] Labs   [] Other:       Assessment:         ICD-10-CM ICD-9-CM   1. Mild CAD  I25.10 414.00   2. Non-rheumatic mitral regurgitation  I34.0 424.0   3. Dizziness  R42 780.4   4. Irregular heart beats  I49.9 427.9   5. Aortic atherosclerosis  I70.0 440.0   6. Hypertension, essential  I10 401.9   7. Bradycardia  R00.1 427.89   8. Severe obesity (BMI 35.0-39.9) with comorbidity  E66.01 278.01   9. LORA on CPAP  G47.33 327.23    Z99.89 V46.8     Problem List Items Addressed This Visit          Cardiac/Vascular    Non-rheumatic mitral regurgitation    Mild CAD - Primary    Hypertension, essential    Aortic atherosclerosis    Irregular heart beats    Relevant Orders    Cardiac event monitor       Endocrine    Severe obesity (BMI 35.0-39.9) with comorbidity       Other    LORA on CPAP    Dizziness    Relevant Orders    Cardiac event monitor     Other Visit Diagnoses       Bradycardia        Relevant Orders    Cardiac event monitor             Plan:         MOSTLY NEURO SX, OR ENT RELATED, DOUBT ANY SIGNIFICANT ARRHYTHMIA CHECK EVENT MONITOR, ADD INSPRA, INSTEAD OF SPIRONOLACTONE SINCE IT CAUSED GYNECOMASTIA, MAY CUT DOWN ON HYDROCHLOROTHIAZIDE TO EVERY OTHER DAY EDEMA DID NOT CHANGE MUCH WITH IT,ALL OF CV CLINICALLY STABLE, NO ANGINA,  NO HF, NO TIA, ASSESS CLINICAL ARRHYTHMIA,CONTINUE CURRENT MEDS, EDUCATION, DIET, EXERCISE  , WEIGHT LOSS DISCUSSED PLAN WITH THE PATIENT AND HIS WIFE, RETURN TO CLINIC IN 3-4 MO  Mild CAD    Non-rheumatic mitral regurgitation    Dizziness  Comments:  MOSTLY VERTIGO  Orders:  -     Cardiac event monitor; Future    Irregular heart beats  Comments:  MONITOR  Orders:  -     Cardiac event monitor; Future    Aortic atherosclerosis  Comments:  NO EMBOLIZATION    Hypertension, essential    Bradycardia  -     Cardiac event monitor; Future    Severe obesity (BMI 35.0-39.9) with comorbidity    LORA on CPAP    Other orders  -     eplerenone (INSPRA) 25 MG Tab; Take 1 tablet (25 mg total) by mouth once daily.  Dispense: 30 tablet; Refill: 2    RTC Low level/low impact aerobic exercise 5x's/wk. Heart healthy diet and risk factor modification.    See labs and med orders.    Aerobic exercise 5x's/wk. Heart healthy diet and risk factor modification.    See labs and med orders.

## 2023-02-09 RX ORDER — CARVEDILOL 25 MG/1
TABLET ORAL
Qty: 180 TABLET | Refills: 3 | Status: SHIPPED | OUTPATIENT
Start: 2023-02-09 | End: 2024-02-17

## 2023-02-09 NOTE — TELEPHONE ENCOUNTER
No new care gaps identified.  [facility] Embedded Care Gaps. Reference number: [id number]. 2/09/2023   9:05:46 AM CST

## 2023-02-09 NOTE — TELEPHONE ENCOUNTER
Refill Decision Note   Chris Hill  is requesting a refill authorization.  Brief Assessment and Rationale for Refill:  Approve     Medication Therapy Plan:       Medication Reconciliation Completed: No   Comments:     No Care Gaps recommended.     Note composed:1:17 PM 02/09/2023

## 2023-03-06 ENCOUNTER — PES CALL (OUTPATIENT)
Dept: ADMINISTRATIVE | Facility: CLINIC | Age: 73
End: 2023-03-06
Payer: MEDICARE

## 2023-03-16 ENCOUNTER — CLINICAL SUPPORT (OUTPATIENT)
Dept: CARDIOLOGY | Facility: HOSPITAL | Age: 73
End: 2023-03-16
Attending: INTERNAL MEDICINE
Payer: MEDICARE

## 2023-03-16 DIAGNOSIS — I49.9 IRREGULAR HEART BEATS: ICD-10-CM

## 2023-03-16 DIAGNOSIS — R00.1 BRADYCARDIA: ICD-10-CM

## 2023-03-16 DIAGNOSIS — R42 DIZZINESS: ICD-10-CM

## 2023-03-16 PROCEDURE — 93270 REMOTE 30 DAY ECG REV/REPORT: CPT | Mod: PO

## 2023-03-16 PROCEDURE — 93272 CARDIAC EVENT MONITOR (CUPID ONLY): ICD-10-PCS | Mod: ,,, | Performed by: INTERNAL MEDICINE

## 2023-03-16 PROCEDURE — 93272 ECG/REVIEW INTERPRET ONLY: CPT | Mod: ,,, | Performed by: INTERNAL MEDICINE

## 2023-03-22 ENCOUNTER — LAB VISIT (OUTPATIENT)
Dept: LAB | Facility: HOSPITAL | Age: 73
End: 2023-03-22
Attending: UROLOGY
Payer: MEDICARE

## 2023-03-22 DIAGNOSIS — C61 PROSTATE CANCER: ICD-10-CM

## 2023-03-22 LAB
COMPLEXED PSA SERPL-MCNC: 0.09 NG/ML (ref 0–4)
TESTOST SERPL-MCNC: 280 NG/DL (ref 304–1227)

## 2023-03-22 PROCEDURE — 84403 ASSAY OF TOTAL TESTOSTERONE: CPT | Performed by: UROLOGY

## 2023-03-22 PROCEDURE — 84153 ASSAY OF PSA TOTAL: CPT | Performed by: UROLOGY

## 2023-03-22 PROCEDURE — 36415 COLL VENOUS BLD VENIPUNCTURE: CPT | Performed by: UROLOGY

## 2023-03-27 ENCOUNTER — TELEPHONE (OUTPATIENT)
Dept: FAMILY MEDICINE | Facility: CLINIC | Age: 73
End: 2023-03-27
Payer: MEDICARE

## 2023-03-27 ENCOUNTER — PATIENT MESSAGE (OUTPATIENT)
Dept: FAMILY MEDICINE | Facility: CLINIC | Age: 73
End: 2023-03-27
Payer: MEDICARE

## 2023-03-27 DIAGNOSIS — I10 HYPERTENSION, ESSENTIAL: Primary | ICD-10-CM

## 2023-03-31 DIAGNOSIS — E11.9 TYPE 2 DIABETES MELLITUS WITHOUT COMPLICATION: ICD-10-CM

## 2023-04-03 ENCOUNTER — PATIENT MESSAGE (OUTPATIENT)
Dept: ADMINISTRATIVE | Facility: HOSPITAL | Age: 73
End: 2023-04-03
Payer: MEDICARE

## 2023-04-11 ENCOUNTER — PATIENT MESSAGE (OUTPATIENT)
Dept: ADMINISTRATIVE | Facility: HOSPITAL | Age: 73
End: 2023-04-11
Payer: MEDICARE

## 2023-04-13 ENCOUNTER — LAB VISIT (OUTPATIENT)
Dept: LAB | Facility: HOSPITAL | Age: 73
End: 2023-04-13
Attending: FAMILY MEDICINE
Payer: MEDICARE

## 2023-04-13 DIAGNOSIS — E11.9 TYPE 2 DIABETES MELLITUS WITHOUT COMPLICATION: ICD-10-CM

## 2023-04-13 DIAGNOSIS — I10 HYPERTENSION, ESSENTIAL: ICD-10-CM

## 2023-04-13 LAB
ANION GAP SERPL CALC-SCNC: 6 MMOL/L (ref 8–16)
BASOPHILS # BLD AUTO: 0.04 K/UL (ref 0–0.2)
BASOPHILS NFR BLD: 0.5 % (ref 0–1.9)
BUN SERPL-MCNC: 19 MG/DL (ref 8–23)
CALCIUM SERPL-MCNC: 9 MG/DL (ref 8.7–10.5)
CHLORIDE SERPL-SCNC: 107 MMOL/L (ref 95–110)
CHOLEST SERPL-MCNC: 201 MG/DL (ref 120–199)
CHOLEST/HDLC SERPL: 6.7 {RATIO} (ref 2–5)
CO2 SERPL-SCNC: 27 MMOL/L (ref 23–29)
CREAT SERPL-MCNC: 1.3 MG/DL (ref 0.5–1.4)
DIFFERENTIAL METHOD: ABNORMAL
EOSINOPHIL # BLD AUTO: 0.5 K/UL (ref 0–0.5)
EOSINOPHIL NFR BLD: 7.1 % (ref 0–8)
ERYTHROCYTE [DISTWIDTH] IN BLOOD BY AUTOMATED COUNT: 14 % (ref 11.5–14.5)
EST. GFR  (NO RACE VARIABLE): 58.7 ML/MIN/1.73 M^2
ESTIMATED AVG GLUCOSE: 126 MG/DL (ref 68–131)
GLUCOSE SERPL-MCNC: 113 MG/DL (ref 70–110)
HBA1C MFR BLD: 6 % (ref 4.5–6.2)
HCT VFR BLD AUTO: 41.4 % (ref 40–54)
HDLC SERPL-MCNC: 30 MG/DL (ref 40–75)
HDLC SERPL: 14.9 % (ref 20–50)
HGB BLD-MCNC: 12.9 G/DL (ref 14–18)
IMM GRANULOCYTES # BLD AUTO: 0.13 K/UL (ref 0–0.04)
IMM GRANULOCYTES NFR BLD AUTO: 1.7 % (ref 0–0.5)
LDLC SERPL CALC-MCNC: ABNORMAL MG/DL (ref 63–159)
LYMPHOCYTES # BLD AUTO: 1.4 K/UL (ref 1–4.8)
LYMPHOCYTES NFR BLD: 18.1 % (ref 18–48)
MCH RBC QN AUTO: 27.3 PG (ref 27–31)
MCHC RBC AUTO-ENTMCNC: 31.2 G/DL (ref 32–36)
MCV RBC AUTO: 88 FL (ref 82–98)
MONOCYTES # BLD AUTO: 0.6 K/UL (ref 0.3–1)
MONOCYTES NFR BLD: 8.3 % (ref 4–15)
NEUTROPHILS # BLD AUTO: 4.9 K/UL (ref 1.8–7.7)
NEUTROPHILS NFR BLD: 64.3 % (ref 38–73)
NONHDLC SERPL-MCNC: 171 MG/DL
NRBC BLD-RTO: 0 /100 WBC
PLATELET # BLD AUTO: 157 K/UL (ref 150–450)
PMV BLD AUTO: 11.9 FL (ref 9.2–12.9)
POTASSIUM SERPL-SCNC: 3.6 MMOL/L (ref 3.5–5.1)
RBC # BLD AUTO: 4.73 M/UL (ref 4.6–6.2)
SODIUM SERPL-SCNC: 140 MMOL/L (ref 136–145)
TRIGL SERPL-MCNC: 519 MG/DL (ref 30–150)
WBC # BLD AUTO: 7.56 K/UL (ref 3.9–12.7)

## 2023-04-13 PROCEDURE — 85025 COMPLETE CBC W/AUTO DIFF WBC: CPT | Performed by: FAMILY MEDICINE

## 2023-04-13 PROCEDURE — 36415 COLL VENOUS BLD VENIPUNCTURE: CPT | Performed by: FAMILY MEDICINE

## 2023-04-13 PROCEDURE — 80061 LIPID PANEL: CPT | Performed by: FAMILY MEDICINE

## 2023-04-13 PROCEDURE — 80048 BASIC METABOLIC PNL TOTAL CA: CPT | Performed by: FAMILY MEDICINE

## 2023-04-13 PROCEDURE — 83036 HEMOGLOBIN GLYCOSYLATED A1C: CPT | Performed by: FAMILY MEDICINE

## 2023-04-18 ENCOUNTER — OFFICE VISIT (OUTPATIENT)
Dept: FAMILY MEDICINE | Facility: CLINIC | Age: 73
End: 2023-04-18
Payer: MEDICARE

## 2023-04-18 VITALS
BODY MASS INDEX: 36.64 KG/M2 | DIASTOLIC BLOOD PRESSURE: 74 MMHG | TEMPERATURE: 98 F | HEIGHT: 66 IN | OXYGEN SATURATION: 98 % | SYSTOLIC BLOOD PRESSURE: 130 MMHG | WEIGHT: 228 LBS | HEART RATE: 68 BPM

## 2023-04-18 DIAGNOSIS — I10 HYPERTENSION, ESSENTIAL: Primary | ICD-10-CM

## 2023-04-18 DIAGNOSIS — E78.5 HYPERLIPIDEMIA, UNSPECIFIED HYPERLIPIDEMIA TYPE: ICD-10-CM

## 2023-04-18 DIAGNOSIS — R42 VERTIGO: ICD-10-CM

## 2023-04-18 DIAGNOSIS — I25.10 MILD CAD: ICD-10-CM

## 2023-04-18 DIAGNOSIS — T46.6X5A MYALGIA DUE TO STATIN: ICD-10-CM

## 2023-04-18 DIAGNOSIS — E11.319 TYPE 2 DIABETES MELLITUS WITH RETINOPATHY OF BOTH EYES, WITHOUT LONG-TERM CURRENT USE OF INSULIN, MACULAR EDEMA PRESENCE UNSPECIFIED, UNSPECIFIED RETINOPATHY SEVERITY: ICD-10-CM

## 2023-04-18 DIAGNOSIS — G47.33 OSA ON CPAP: ICD-10-CM

## 2023-04-18 DIAGNOSIS — M79.10 MYALGIA DUE TO STATIN: ICD-10-CM

## 2023-04-18 DIAGNOSIS — Z79.82 ASPIRIN LONG-TERM USE: ICD-10-CM

## 2023-04-18 DIAGNOSIS — R19.7 DIARRHEA, UNSPECIFIED TYPE: ICD-10-CM

## 2023-04-18 DIAGNOSIS — E78.1 HYPERTRIGLYCERIDEMIA: ICD-10-CM

## 2023-04-18 PROCEDURE — 3044F HG A1C LEVEL LT 7.0%: CPT | Mod: CPTII,S$GLB,, | Performed by: FAMILY MEDICINE

## 2023-04-18 PROCEDURE — 1125F PR PAIN SEVERITY QUANTIFIED, PAIN PRESENT: ICD-10-PCS | Mod: CPTII,S$GLB,, | Performed by: FAMILY MEDICINE

## 2023-04-18 PROCEDURE — 3078F DIAST BP <80 MM HG: CPT | Mod: CPTII,S$GLB,, | Performed by: FAMILY MEDICINE

## 2023-04-18 PROCEDURE — 3075F SYST BP GE 130 - 139MM HG: CPT | Mod: CPTII,S$GLB,, | Performed by: FAMILY MEDICINE

## 2023-04-18 PROCEDURE — 1160F RVW MEDS BY RX/DR IN RCRD: CPT | Mod: CPTII,S$GLB,, | Performed by: FAMILY MEDICINE

## 2023-04-18 PROCEDURE — 1160F PR REVIEW ALL MEDS BY PRESCRIBER/CLIN PHARMACIST DOCUMENTED: ICD-10-PCS | Mod: CPTII,S$GLB,, | Performed by: FAMILY MEDICINE

## 2023-04-18 PROCEDURE — 3044F PR MOST RECENT HEMOGLOBIN A1C LEVEL <7.0%: ICD-10-PCS | Mod: CPTII,S$GLB,, | Performed by: FAMILY MEDICINE

## 2023-04-18 PROCEDURE — 1101F PT FALLS ASSESS-DOCD LE1/YR: CPT | Mod: CPTII,S$GLB,, | Performed by: FAMILY MEDICINE

## 2023-04-18 PROCEDURE — 1159F PR MEDICATION LIST DOCUMENTED IN MEDICAL RECORD: ICD-10-PCS | Mod: CPTII,S$GLB,, | Performed by: FAMILY MEDICINE

## 2023-04-18 PROCEDURE — 3008F BODY MASS INDEX DOCD: CPT | Mod: CPTII,S$GLB,, | Performed by: FAMILY MEDICINE

## 2023-04-18 PROCEDURE — 3288F PR FALLS RISK ASSESSMENT DOCUMENTED: ICD-10-PCS | Mod: CPTII,S$GLB,, | Performed by: FAMILY MEDICINE

## 2023-04-18 PROCEDURE — 3008F PR BODY MASS INDEX (BMI) DOCUMENTED: ICD-10-PCS | Mod: CPTII,S$GLB,, | Performed by: FAMILY MEDICINE

## 2023-04-18 PROCEDURE — 1125F AMNT PAIN NOTED PAIN PRSNT: CPT | Mod: CPTII,S$GLB,, | Performed by: FAMILY MEDICINE

## 2023-04-18 PROCEDURE — 4010F PR ACE/ARB THEARPY RXD/TAKEN: ICD-10-PCS | Mod: CPTII,S$GLB,, | Performed by: FAMILY MEDICINE

## 2023-04-18 PROCEDURE — 99214 PR OFFICE/OUTPT VISIT, EST, LEVL IV, 30-39 MIN: ICD-10-PCS | Mod: S$GLB,,, | Performed by: FAMILY MEDICINE

## 2023-04-18 PROCEDURE — 4010F ACE/ARB THERAPY RXD/TAKEN: CPT | Mod: CPTII,S$GLB,, | Performed by: FAMILY MEDICINE

## 2023-04-18 PROCEDURE — 3075F PR MOST RECENT SYSTOLIC BLOOD PRESS GE 130-139MM HG: ICD-10-PCS | Mod: CPTII,S$GLB,, | Performed by: FAMILY MEDICINE

## 2023-04-18 PROCEDURE — 99214 OFFICE O/P EST MOD 30 MIN: CPT | Mod: S$GLB,,, | Performed by: FAMILY MEDICINE

## 2023-04-18 PROCEDURE — 3288F FALL RISK ASSESSMENT DOCD: CPT | Mod: CPTII,S$GLB,, | Performed by: FAMILY MEDICINE

## 2023-04-18 PROCEDURE — 1159F MED LIST DOCD IN RCRD: CPT | Mod: CPTII,S$GLB,, | Performed by: FAMILY MEDICINE

## 2023-04-18 PROCEDURE — 1101F PR PT FALLS ASSESS DOC 0-1 FALLS W/OUT INJ PAST YR: ICD-10-PCS | Mod: CPTII,S$GLB,, | Performed by: FAMILY MEDICINE

## 2023-04-18 PROCEDURE — 3078F PR MOST RECENT DIASTOLIC BLOOD PRESSURE < 80 MM HG: ICD-10-PCS | Mod: CPTII,S$GLB,, | Performed by: FAMILY MEDICINE

## 2023-04-18 RX ORDER — METRONIDAZOLE 250 MG/1
250 TABLET ORAL 4 TIMES DAILY
Qty: 40 TABLET | Refills: 0 | Status: SHIPPED | OUTPATIENT
Start: 2023-04-18 | End: 2023-07-07

## 2023-04-18 RX ORDER — AMOXICILLIN AND CLAVULANATE POTASSIUM 875; 125 MG/1; MG/1
1 TABLET, FILM COATED ORAL 2 TIMES DAILY
Qty: 20 TABLET | Refills: 0 | Status: SHIPPED | OUTPATIENT
Start: 2023-04-18 | End: 2023-07-07

## 2023-04-18 RX ORDER — ESOMEPRAZOLE MAGNESIUM 40 MG/1
40 CAPSULE, DELAYED RELEASE ORAL 2 TIMES DAILY
COMMUNITY
Start: 2023-01-27

## 2023-04-18 RX ORDER — ROSUVASTATIN CALCIUM 10 MG/1
10 TABLET, COATED ORAL DAILY
Qty: 90 TABLET | Refills: 1 | Status: SHIPPED | OUTPATIENT
Start: 2023-04-18 | End: 2023-07-24

## 2023-04-20 PROBLEM — E78.1 HYPERTRIGLYCERIDEMIA: Status: ACTIVE | Noted: 2023-04-20

## 2023-05-08 RX ORDER — EPLERENONE 25 MG/1
TABLET, FILM COATED ORAL
Qty: 30 TABLET | Refills: 0 | Status: SHIPPED | OUTPATIENT
Start: 2023-05-08 | End: 2023-06-20 | Stop reason: SDUPTHER

## 2023-05-21 RX ORDER — HYDRALAZINE HYDROCHLORIDE 100 MG/1
TABLET, FILM COATED ORAL
Qty: 270 TABLET | Refills: 0 | Status: SHIPPED | OUTPATIENT
Start: 2023-05-21 | End: 2023-05-22

## 2023-05-22 RX ORDER — HYDRALAZINE HYDROCHLORIDE 100 MG/1
TABLET, FILM COATED ORAL
Qty: 270 TABLET | Refills: 0 | Status: SHIPPED | OUTPATIENT
Start: 2023-05-22 | End: 2023-10-16 | Stop reason: SDUPTHER

## 2023-05-30 ENCOUNTER — TELEPHONE (OUTPATIENT)
Dept: HEMATOLOGY/ONCOLOGY | Facility: CLINIC | Age: 73
End: 2023-05-30

## 2023-06-19 RX ORDER — TAMSULOSIN HYDROCHLORIDE 0.4 MG/1
1 CAPSULE ORAL NIGHTLY
Qty: 90 CAPSULE | Refills: 0 | Status: SHIPPED | OUTPATIENT
Start: 2023-06-19 | End: 2023-07-19 | Stop reason: CLARIF

## 2023-06-19 RX ORDER — TAMSULOSIN HYDROCHLORIDE 0.4 MG/1
CAPSULE ORAL
Qty: 90 CAPSULE | Refills: 3 | Status: SHIPPED | OUTPATIENT
Start: 2023-06-19 | End: 2023-07-24

## 2023-06-19 NOTE — TELEPHONE ENCOUNTER
No care due was identified.  [facility] Embedded Care Due Messages. Reference number: [id number].   6/19/2023 3:02:46 PM CDT

## 2023-06-19 NOTE — TELEPHONE ENCOUNTER
Refill Routing Note   Medication(s) are not appropriate for processing by [facility] for the following reason(s):      Prostate Cancer is on problem list    [facility] action(s):  Defer Labs due          Appointments  past 12m or future 3m with PCP    Date Provider   Last Visit   4/18/2023 Samy Pettit III, MD   Next Visit   7/24/2023 Samy Pettit III, MD   ED visits in past 90 days: 0        Note composed:2:24 PM 06/19/2023

## 2023-06-19 NOTE — TELEPHONE ENCOUNTER
Care Due:                  Date            Visit Type   Department     Provider  --------------------------------------------------------------------------------                                [facility]                              [facility]  [facility]      [facility]   [place]                              EP -                              PRIMARY      [facility] ANELSPAULA  Next Visit: 07-      CARE ([facility])   [facility]  [place]                                                            Last  Test          Frequency    Reason                     Performed    Due Date  --------------------------------------------------------------------------------    TSH.........  12 months..  levothyroxine............  06- 06-    [facility] Embedded Care Due Messages. Reference number: [id number].   6/19/2023 5:31:07 AM ERICK

## 2023-06-20 ENCOUNTER — PATIENT MESSAGE (OUTPATIENT)
Dept: FAMILY MEDICINE | Facility: CLINIC | Age: 73
End: 2023-06-20
Payer: MEDICARE

## 2023-06-20 ENCOUNTER — OFFICE VISIT (OUTPATIENT)
Dept: CARDIOLOGY | Facility: CLINIC | Age: 73
End: 2023-06-20
Payer: MEDICARE

## 2023-06-20 ENCOUNTER — LAB VISIT (OUTPATIENT)
Dept: LAB | Facility: HOSPITAL | Age: 73
End: 2023-06-20
Attending: INTERNAL MEDICINE
Payer: MEDICARE

## 2023-06-20 VITALS
DIASTOLIC BLOOD PRESSURE: 73 MMHG | WEIGHT: 221.56 LBS | HEART RATE: 50 BPM | HEIGHT: 66 IN | BODY MASS INDEX: 35.61 KG/M2 | SYSTOLIC BLOOD PRESSURE: 120 MMHG

## 2023-06-20 DIAGNOSIS — I47.29 NSVT (NONSUSTAINED VENTRICULAR TACHYCARDIA): Primary | ICD-10-CM

## 2023-06-20 DIAGNOSIS — E66.01 SEVERE OBESITY (BMI 35.0-39.9) WITH COMORBIDITY: Chronic | ICD-10-CM

## 2023-06-20 DIAGNOSIS — E78.5 HYPERLIPIDEMIA, UNSPECIFIED HYPERLIPIDEMIA TYPE: ICD-10-CM

## 2023-06-20 DIAGNOSIS — I10 HYPERTENSION, ESSENTIAL: ICD-10-CM

## 2023-06-20 DIAGNOSIS — I10 HYPERTENSION, ESSENTIAL: Primary | ICD-10-CM

## 2023-06-20 DIAGNOSIS — I34.0 NON-RHEUMATIC MITRAL REGURGITATION: Chronic | ICD-10-CM

## 2023-06-20 DIAGNOSIS — I47.29 NSVT (NONSUSTAINED VENTRICULAR TACHYCARDIA): ICD-10-CM

## 2023-06-20 DIAGNOSIS — C61 PROSTATE CANCER: ICD-10-CM

## 2023-06-20 DIAGNOSIS — R06.02 SOB (SHORTNESS OF BREATH): ICD-10-CM

## 2023-06-20 DIAGNOSIS — I10 HYPERTENSION, ESSENTIAL: Chronic | ICD-10-CM

## 2023-06-20 LAB
ALBUMIN SERPL BCP-MCNC: 3.6 G/DL (ref 3.5–5.2)
ALP SERPL-CCNC: 67 U/L (ref 55–135)
ALT SERPL W/O P-5'-P-CCNC: 15 U/L (ref 10–44)
ANION GAP SERPL CALC-SCNC: 8 MMOL/L (ref 8–16)
AST SERPL-CCNC: 16 U/L (ref 10–40)
BASOPHILS # BLD AUTO: 0.04 K/UL (ref 0–0.2)
BASOPHILS NFR BLD: 0.4 % (ref 0–1.9)
BILIRUB SERPL-MCNC: 0.4 MG/DL (ref 0.1–1)
BUN SERPL-MCNC: 17 MG/DL (ref 8–23)
CALCIUM SERPL-MCNC: 9.5 MG/DL (ref 8.7–10.5)
CHLORIDE SERPL-SCNC: 107 MMOL/L (ref 95–110)
CHOLEST SERPL-MCNC: 92 MG/DL (ref 120–199)
CHOLEST/HDLC SERPL: 3.2 {RATIO} (ref 2–5)
CO2 SERPL-SCNC: 25 MMOL/L (ref 23–29)
CREAT SERPL-MCNC: 1 MG/DL (ref 0.5–1.4)
DIFFERENTIAL METHOD: ABNORMAL
EOSINOPHIL # BLD AUTO: 0.7 K/UL (ref 0–0.5)
EOSINOPHIL NFR BLD: 7.5 % (ref 0–8)
ERYTHROCYTE [DISTWIDTH] IN BLOOD BY AUTOMATED COUNT: 14.4 % (ref 11.5–14.5)
EST. GFR  (NO RACE VARIABLE): >60 ML/MIN/1.73 M^2
GLUCOSE SERPL-MCNC: 112 MG/DL (ref 70–110)
HCT VFR BLD AUTO: 39.3 % (ref 40–54)
HDLC SERPL-MCNC: 29 MG/DL (ref 40–75)
HDLC SERPL: 31.5 % (ref 20–50)
HGB BLD-MCNC: 12.3 G/DL (ref 14–18)
IMM GRANULOCYTES # BLD AUTO: 0.06 K/UL (ref 0–0.04)
IMM GRANULOCYTES NFR BLD AUTO: 0.6 % (ref 0–0.5)
LDLC SERPL CALC-MCNC: 12.2 MG/DL (ref 63–159)
LYMPHOCYTES # BLD AUTO: 1.3 K/UL (ref 1–4.8)
LYMPHOCYTES NFR BLD: 14.1 % (ref 18–48)
MAGNESIUM SERPL-MCNC: 1.9 MG/DL (ref 1.6–2.6)
MCH RBC QN AUTO: 27 PG (ref 27–31)
MCHC RBC AUTO-ENTMCNC: 31.3 G/DL (ref 32–36)
MCV RBC AUTO: 86 FL (ref 82–98)
MONOCYTES # BLD AUTO: 0.8 K/UL (ref 0.3–1)
MONOCYTES NFR BLD: 8.7 % (ref 4–15)
NEUTROPHILS # BLD AUTO: 6.4 K/UL (ref 1.8–7.7)
NEUTROPHILS NFR BLD: 68.7 % (ref 38–73)
NONHDLC SERPL-MCNC: 63 MG/DL
NRBC BLD-RTO: 0 /100 WBC
PLATELET # BLD AUTO: 177 K/UL (ref 150–450)
PMV BLD AUTO: 12.9 FL (ref 9.2–12.9)
POTASSIUM SERPL-SCNC: 4.5 MMOL/L (ref 3.5–5.1)
POTASSIUM SERPL-SCNC: 4.6 MMOL/L (ref 3.5–5.1)
PROT SERPL-MCNC: 6.9 G/DL (ref 6–8.4)
RBC # BLD AUTO: 4.56 M/UL (ref 4.6–6.2)
SODIUM SERPL-SCNC: 140 MMOL/L (ref 136–145)
TRIGL SERPL-MCNC: 254 MG/DL (ref 30–150)
WBC # BLD AUTO: 9.36 K/UL (ref 3.9–12.7)

## 2023-06-20 PROCEDURE — 3288F PR FALLS RISK ASSESSMENT DOCUMENTED: ICD-10-PCS | Mod: CPTII,S$GLB,, | Performed by: INTERNAL MEDICINE

## 2023-06-20 PROCEDURE — 1101F PT FALLS ASSESS-DOCD LE1/YR: CPT | Mod: CPTII,S$GLB,, | Performed by: INTERNAL MEDICINE

## 2023-06-20 PROCEDURE — 99214 OFFICE O/P EST MOD 30 MIN: CPT | Mod: S$GLB,,, | Performed by: INTERNAL MEDICINE

## 2023-06-20 PROCEDURE — 3008F PR BODY MASS INDEX (BMI) DOCUMENTED: ICD-10-PCS | Mod: CPTII,S$GLB,, | Performed by: INTERNAL MEDICINE

## 2023-06-20 PROCEDURE — 84153 ASSAY OF PSA TOTAL: CPT | Performed by: UROLOGY

## 2023-06-20 PROCEDURE — 3044F PR MOST RECENT HEMOGLOBIN A1C LEVEL <7.0%: ICD-10-PCS | Mod: CPTII,S$GLB,, | Performed by: INTERNAL MEDICINE

## 2023-06-20 PROCEDURE — 1126F AMNT PAIN NOTED NONE PRSNT: CPT | Mod: CPTII,S$GLB,, | Performed by: INTERNAL MEDICINE

## 2023-06-20 PROCEDURE — 3074F SYST BP LT 130 MM HG: CPT | Mod: CPTII,S$GLB,, | Performed by: INTERNAL MEDICINE

## 2023-06-20 PROCEDURE — 99999 PR PBB SHADOW E&M-EST. PATIENT-LVL IV: CPT | Mod: PBBFAC,,, | Performed by: INTERNAL MEDICINE

## 2023-06-20 PROCEDURE — 3288F FALL RISK ASSESSMENT DOCD: CPT | Mod: CPTII,S$GLB,, | Performed by: INTERNAL MEDICINE

## 2023-06-20 PROCEDURE — 3078F DIAST BP <80 MM HG: CPT | Mod: CPTII,S$GLB,, | Performed by: INTERNAL MEDICINE

## 2023-06-20 PROCEDURE — 1159F MED LIST DOCD IN RCRD: CPT | Mod: CPTII,S$GLB,, | Performed by: INTERNAL MEDICINE

## 2023-06-20 PROCEDURE — 80061 LIPID PANEL: CPT | Performed by: FAMILY MEDICINE

## 2023-06-20 PROCEDURE — 3008F BODY MASS INDEX DOCD: CPT | Mod: CPTII,S$GLB,, | Performed by: INTERNAL MEDICINE

## 2023-06-20 PROCEDURE — 4010F ACE/ARB THERAPY RXD/TAKEN: CPT | Mod: CPTII,S$GLB,, | Performed by: INTERNAL MEDICINE

## 2023-06-20 PROCEDURE — 36415 COLL VENOUS BLD VENIPUNCTURE: CPT | Mod: PO | Performed by: UROLOGY

## 2023-06-20 PROCEDURE — 80053 COMPREHEN METABOLIC PANEL: CPT | Performed by: FAMILY MEDICINE

## 2023-06-20 PROCEDURE — 84403 ASSAY OF TOTAL TESTOSTERONE: CPT | Performed by: UROLOGY

## 2023-06-20 PROCEDURE — 99214 PR OFFICE/OUTPT VISIT, EST, LEVL IV, 30-39 MIN: ICD-10-PCS | Mod: S$GLB,,, | Performed by: INTERNAL MEDICINE

## 2023-06-20 PROCEDURE — 1159F PR MEDICATION LIST DOCUMENTED IN MEDICAL RECORD: ICD-10-PCS | Mod: CPTII,S$GLB,, | Performed by: INTERNAL MEDICINE

## 2023-06-20 PROCEDURE — 4010F PR ACE/ARB THEARPY RXD/TAKEN: ICD-10-PCS | Mod: CPTII,S$GLB,, | Performed by: INTERNAL MEDICINE

## 2023-06-20 PROCEDURE — 3078F PR MOST RECENT DIASTOLIC BLOOD PRESSURE < 80 MM HG: ICD-10-PCS | Mod: CPTII,S$GLB,, | Performed by: INTERNAL MEDICINE

## 2023-06-20 PROCEDURE — 83735 ASSAY OF MAGNESIUM: CPT | Performed by: INTERNAL MEDICINE

## 2023-06-20 PROCEDURE — 1126F PR PAIN SEVERITY QUANTIFIED, NO PAIN PRESENT: ICD-10-PCS | Mod: CPTII,S$GLB,, | Performed by: INTERNAL MEDICINE

## 2023-06-20 PROCEDURE — 99999 PR PBB SHADOW E&M-EST. PATIENT-LVL IV: ICD-10-PCS | Mod: PBBFAC,,, | Performed by: INTERNAL MEDICINE

## 2023-06-20 PROCEDURE — 84132 ASSAY OF SERUM POTASSIUM: CPT | Performed by: INTERNAL MEDICINE

## 2023-06-20 PROCEDURE — 3044F HG A1C LEVEL LT 7.0%: CPT | Mod: CPTII,S$GLB,, | Performed by: INTERNAL MEDICINE

## 2023-06-20 PROCEDURE — 85025 COMPLETE CBC W/AUTO DIFF WBC: CPT | Performed by: FAMILY MEDICINE

## 2023-06-20 PROCEDURE — 1101F PR PT FALLS ASSESS DOC 0-1 FALLS W/OUT INJ PAST YR: ICD-10-PCS | Mod: CPTII,S$GLB,, | Performed by: INTERNAL MEDICINE

## 2023-06-20 PROCEDURE — 3074F PR MOST RECENT SYSTOLIC BLOOD PRESSURE < 130 MM HG: ICD-10-PCS | Mod: CPTII,S$GLB,, | Performed by: INTERNAL MEDICINE

## 2023-06-20 RX ORDER — EPLERENONE 25 MG/1
25 TABLET, FILM COATED ORAL DAILY
Qty: 30 TABLET | Refills: 3 | Status: SHIPPED | OUTPATIENT
Start: 2023-06-20 | End: 2023-09-25 | Stop reason: SDUPTHER

## 2023-06-20 NOTE — PROGRESS NOTES
Subjective:    Patient ID:  Chris Hill Jr. is a 72 y.o. male who presents for Irregular Heart Beat and Hypertension        HPI  DISCUSSED TESTS EVENT MONITOR NORMAL SINUS RHYTHM WITH FREQUENT PVCS AND PACS 1 SHORT RUN OF NONSUSTAINED VENTRICULAR TACHYCARDIA 8 BEATS WAS TO HAVE NUCLEAR STRESS TEST, HAD FLU FOR TWICE IN LAST 5 WEEKS, WAS  IN [place]/ [place], C/O COST EPLERONONE, LESS EDEMA, SEE ROS    Past Medical History:   Diagnosis Date    Anemia, chronic disease 12/28/2018    Arthritis     Back pain     radiates both legs mainly rt leg    Benign paroxysmal vertigo, bilateral     Diabetes mellitus     Diabetes mellitus, type 2     JANSEN (dyspnea on exertion)     GERD (gastroesophageal reflux disease)     Heart murmur     Hyperlipidemia     Hypertension     Iron deficiency anemia 04/18/2022    Neuropathy     Normocytic normochromic anemia 12/28/2018    Prostate cancer 08/20/2018    Sleep apnea     NOT USING [facility]    Stroke     Thrombocytopenia 12/28/2018    Thyroid disease     Urinary frequency     Valvular regurgitation      Past Surgical History:   Procedure Laterality Date    ANKLE SURGERY Left     APPENDECTOMY      BACK SURGERY  09/2019    CARDIAC CATHETERIZATION      CERVICAL FUSION      EYE SURGERY      cataracts bilateral    GALLBLADDER SURGERY      HAND SURGERY Bilateral     rt hand x4 left x3    JOINT REPLACEMENT      KNEE SURGERY      8 on left knee and 7 on rt knee    NASAL SINUS SURGERY      x5    SHOULDER SURGERY Bilateral     3 on left 2 on right    TRANSRECTAL ULTRASOUND OF PROSTATE WITH INSERTION OF GOLD FIDUCIAL MARKER N/A 10/04/2018    Procedure: ULTRASOUND, PROSTATE, TRANSPERINEAL APPROACH, WITH GOLD FIDUCIAL MARKER INSERTION (with SPACEOAR transperineal biodegradable gel placement);  Surgeon: Manpreet Gordon MD;  Location: [facility];  Service: Urology;  Laterality: N/A;     Family History   Problem Relation Age of Onset    Heart disease Mother     Heart disease Father      Social History      Socioeconomic History    Marital status:    Tobacco Use    Smoking status: Never    Smokeless tobacco: Never   Substance and Sexual Activity    Alcohol use: No    Drug use: No       Review of patient's allergies indicates:  No Known Allergies    Current Outpatient Medications:     amitriptyline (ELAVIL) 50 MG tablet, TAKE 1 TABLET BY MOUTH ONCE DAILY IN THE EVENING (Patient taking differently: Take 50 mg by mouth every evening.), Disp: 90 tablet, Rfl: 3    amLODIPine (NORVASC) 10 MG tablet, Take 1 tablet (10 mg total) by mouth once daily., Disp: 90 tablet, Rfl: 3    amoxicillin-clavulanate 875-125mg (AUGMENTIN) 875-125 mg per tablet, Take 1 tablet by mouth 2 (two) times daily., Disp: 20 tablet, Rfl: 0    aspirin (ECOTRIN) 81 MG EC tablet, Take 81 mg by mouth once daily., Disp: , Rfl:     carvediloL (COREG) 25 MG tablet, TAKE 1 TABLET BY MOUTH TWICE DAILY WITH MEALS, Disp: 180 tablet, Rfl: 3    cloNIDine (CATAPRES) 0.1 MG tablet, Take 1 tablet (0.1 mg total) by mouth every 8 (eight) hours as needed (SBP greater than 180)., Disp: 30 tablet, Rfl: 0    co-enzyme Q-10 30 mg capsule, Take 30 mg by mouth 3 (three) times daily., Disp: , Rfl:     cyanocobalamin (VITAMIN B-12) 100 MCG tablet, Take 1,000 mcg by mouth once daily., Disp: , Rfl:     esomeprazole (NEXIUM) 40 MG capsule, Take 40 mg by mouth., Disp: , Rfl:     fish oil-omega-3 fatty acids 300-1,000 mg capsule, Take 1 capsule by mouth 2 (two) times daily., Disp: , Rfl:     gabapentin (NEURONTIN) 300 MG capsule, Take 300 mg by mouth 3 (three) times daily., Disp: , Rfl:     hydrALAZINE (APRESOLINE) 100 MG tablet, TAKE 1 TABLET BY MOUTH THREE TIMES DAILY, Disp: 270 tablet, Rfl: 0    hydroCHLOROthiazide (HYDRODIURIL) 12.5 MG Tab, Take 1 tablet (12.5 mg total) by mouth once daily., Disp: 30 tablet, Rfl: 0    ibuprofen (ADVIL,MOTRIN) 600 MG tablet, Take 1 tablet (600 mg total) by mouth every 6 (six) hours as needed for Pain., Disp: 20 tablet, Rfl: 0     iron-vitamin C 100-250 mg, ICAR-C, 100-250 mg Tab, Take 1 tablet by mouth once daily, Disp: 30 tablet, Rfl: 0    levothyroxine (SYNTHROID) 50 MCG tablet, Take 1 tablet (50 mcg total) by mouth before breakfast., Disp: 90 tablet, Rfl: 2    magnesium 250 mg Tab, Take 1 tablet by mouth once daily., Disp: , Rfl:     meclizine (ANTIVERT) 25 mg tablet, Take 25 mg by mouth every 6 (six) hours as needed., Disp: , Rfl:     metFORMIN (GLUCOPHAGE) 500 MG tablet, Take 1 tablet (500 mg total) by mouth 2 (two) times daily. (Patient taking differently: Take 500 mg by mouth Daily.), Disp: 180 tablet, Rfl: 1    metroNIDAZOLE (FLAGYL) 250 MG tablet, Take 1 tablet (250 mg total) by mouth 4 (four) times daily., Disp: 40 tablet, Rfl: 0    multivitamin (THERAGRAN) per tablet, Take 1 tablet by mouth once daily., Disp: , Rfl:     olmesartan (BENICAR) 40 MG tablet, Take 1 tablet by mouth once daily, Disp: 90 tablet, Rfl: 3    pantoprazole (PROTONIX) 40 MG tablet, Take 40 mg by mouth 2 (two) times daily as needed., Disp: , Rfl:     potassium chloride SA (K-DUR,KLOR-CON) 20 MEQ tablet, Take 1 tablet by mouth twice daily (Patient taking differently: Take 20 mEq by mouth once daily.), Disp: 180 tablet, Rfl: 3    rosuvastatin (CRESTOR) 10 MG tablet, Take 1 tablet (10 mg total) by mouth once daily., Disp: 90 tablet, Rfl: 1    tamsulosin (FLOMAX) 0.4 mg Cap, TAKE 1 CAPSULE BY MOUTH ONCE DAILY IN THE EVENING, Disp: 90 capsule, Rfl: 3    tamsulosin (FLOMAX) 0.4 mg Cap, Take 1 capsule (0.4 mg total) by mouth every evening., Disp: 90 capsule, Rfl: 0    terazosin (HYTRIN) 10 MG capsule, Take 1 capsule (10 mg total) by mouth every evening., Disp: 90 capsule, Rfl: 0    traMADoL (ULTRAM) 50 mg tablet, , Disp: , Rfl:     eplerenone (INSPRA) 25 MG Tab, Take 1 tablet (25 mg total) by mouth once daily., Disp: 30 tablet, Rfl: 3    sildenafiL (VIAGRA) 25 MG tablet, Take 1 tablet (25 mg total) by mouth daily as needed for Erectile Dysfunction. Take 1/2 to 1  "tablet as needed for erectile dysfunction., Disp: 10 tablet, Rfl: 0    Review of Systems   Constitutional: Positive for weight loss. Negative for chills, diaphoresis and fever.   HENT:  Positive for congestion (WAS TO HAVE SINUS SURGERY). Negative for sore throat.    Eyes:  Negative for blurred vision and pain.   Cardiovascular:  Negative for chest pain, claudication, cyanosis, dyspnea on exertion (MILD), irregular heartbeat, leg swelling, near-syncope, orthopnea, palpitations, paroxysmal nocturnal dyspnea and syncope.   Respiratory:  Positive for cough and shortness of breath (SOME). Negative for hemoptysis and wheezing.    Endocrine: Negative for cold intolerance and heat intolerance.   Hematologic/Lymphatic: Negative for adenopathy. Does not bruise/bleed easily.   Skin:  Negative for rash.   Musculoskeletal:  Negative for falls, muscle weakness and myalgias.   Gastrointestinal:  Negative for abdominal pain, change in bowel habit, jaundice, melena and nausea.   Genitourinary:  Negative for bladder incontinence.   Neurological:  Positive for headaches. Negative for dizziness, focal weakness, light-headedness, numbness and weakness.   Psychiatric/Behavioral:  Negative for altered mental status.    Allergic/Immunologic: Negative for hives and persistent infections.      Objective:      Vitals:    06/20/23 1455   BP: 120/73   Pulse: (!) 50   Weight: 100.5 kg (221 lb 9 oz)   Height: 5' 6" (1.676 m)   PainSc: 0-No pain     Body mass index is 35.76 kg/m².    Physical Exam  Constitutional:       Appearance: He is well-developed. He is obese.   HENT:      Head: Normocephalic and atraumatic.   Eyes:      General: No scleral icterus.     Extraocular Movements: Extraocular movements intact.   Neck:      Thyroid: No thyromegaly.      Vascular: No JVD.      Trachea: No tracheal deviation.   Cardiovascular:      Rate and Rhythm: Normal rate and regular rhythm.      Chest Wall: PMI is not displaced.      Pulses: Normal pulses and " intact distal pulses.           Carotid pulses are 2+ on the right side and 2+ on the left side.       Radial pulses are 2+ on the right side and 2+ on the left side.        Posterior tibial pulses are 2+ on the right side and 2+ on the left side.      Heart sounds: S1 normal and S2 normal. Murmur heard.   Systolic murmur is present with a grade of 1/6 at the lower left sternal border.     No friction rub. No gallop.   Pulmonary:      Effort: Pulmonary effort is normal. No respiratory distress.      Breath sounds: Normal breath sounds. No wheezing or rales.   Chest:      Chest wall: No tenderness.   Abdominal:      General: Bowel sounds are normal.      Palpations: Abdomen is soft. There is no mass.      Tenderness: There is no abdominal tenderness.   Musculoskeletal:         General: Normal range of motion.      Cervical back: Neck supple.      Right lower leg: No edema.      Left lower leg: No edema.   Skin:     General: Skin is warm and dry.      Findings: No rash.   Neurological:      General: No focal deficit present.      Mental Status: He is alert and oriented to person, place, and time.      Cranial Nerves: Cranial nerve deficit (HEARING AIDS) present.   Psychiatric:         Mood and Affect: Mood normal.         Behavior: Behavior normal.               ..    Chemistry        Component Value Date/Time     06/20/2023 1532     04/12/2019 1022    K 4.6 06/20/2023 1532    K 4.5 06/20/2023 1532     06/20/2023 1532    CL 99 04/12/2019 1022    CO2 25 06/20/2023 1532    BUN 17 06/20/2023 1532    CREATININE 1.0 06/20/2023 1532    CREATININE 0.72 04/12/2019 1022     (H) 06/20/2023 1532     (H) 04/12/2019 1022        Component Value Date/Time    CALCIUM 9.5 06/20/2023 1532    ALKPHOS 67 06/20/2023 1532    AST 16 06/20/2023 1532    ALT 15 06/20/2023 1532    BILITOT 0.4 06/20/2023 1532    ESTGFRAFRICA >60.0 07/25/2022 1445    EGFRNONAA >60.0 07/25/2022 1445            ..  Lab Results    Component Value Date    CHOL 92 (L) 06/20/2023    CHOL 201 (H) 04/13/2023    CHOL 144 03/23/2022     Lab Results   Component Value Date    HDL 29 (L) 06/20/2023    HDL 30 (L) 04/13/2023    HDL 36 (L) 03/23/2022     Lab Results   Component Value Date    LDLCALC 12.2 (L) 06/20/2023    LDLCALC Invalid, Trig>400.0 04/13/2023    LDLCALC 68.2 03/23/2022     Lab Results   Component Value Date    TRIG 254 (H) 06/20/2023    TRIG 519 (H) 04/13/2023    TRIG 199 (H) 03/23/2022     Lab Results   Component Value Date    CHOLHDL 31.5 06/20/2023    CHOLHDL 14.9 (L) 04/13/2023    CHOLHDL 25.0 03/23/2022     ..  Lab Results   Component Value Date    WBC 9.36 06/20/2023    HGB 12.3 (L) 06/20/2023    HCT 39.3 (L) 06/20/2023    MCV 86 06/20/2023     06/20/2023       Test(s) Reviewed  I have reviewed the following in detail:  [] Stress test   [] Angiography   [] Echocardiogram   [] Labs   [x] Other:       Assessment:         ICD-10-CM ICD-9-CM   1. NSVT (nonsustained ventricular tachycardia)  I47.29 427.1   2. Non-rheumatic mitral regurgitation  I34.0 424.0   3. SOB (shortness of breath)  R06.02 786.05   4. Severe obesity (BMI 35.0-39.9) with comorbidity  E66.01 278.01   5. Hypertension, essential  I10 401.9     Problem List Items Addressed This Visit          Pulmonary    SOB (shortness of breath)    Relevant Orders    Nuclear Stress - Cardiology Interpreted       Cardiac/Vascular    Non-rheumatic mitral regurgitation    Relevant Orders    Nuclear Stress - Cardiology Interpreted    Hypertension, essential    NSVT (nonsustained ventricular tachycardia) - Primary    Relevant Orders    Nuclear Stress - Cardiology Interpreted    POTASSIUM (Completed)    Magnesium (Completed)       Endocrine    Severe obesity (BMI 35.0-39.9) with comorbidity        Plan:         CHECK POTASSIUM AND MAGNESIUM, ASSESS FOR ISCHEMIA NUCLEAR STRESS TEST, ANGIOGRAM FROM FEW YEARS AGO SHOWED MILD CAD , NO OVERT HEART FAILURE NO TIA TYPE SYMPTOMS NO  NEAR-SYNCOPE BLOOD PRESSURE BETTER CONTROLLED DISCUSSED PLAN WITH THE PATIENT HIS WIFE, RETURN TO CLINIC AFTER TESTS  NSVT (nonsustained ventricular tachycardia)  -     Nuclear Stress - Cardiology Interpreted; Future  -     POTASSIUM; Future; Expected date: 06/20/2023  -     Magnesium; Future; Expected date: 06/20/2023    Non-rheumatic mitral regurgitation  -     Nuclear Stress - Cardiology Interpreted; Future    SOB (shortness of breath)  -     Nuclear Stress - Cardiology Interpreted; Future    Severe obesity (BMI 35.0-39.9) with comorbidity    Hypertension, essential  Comments:  BETTER CONTROLLED    Other orders  -     eplerenone (INSPRA) 25 MG Tab; Take 1 tablet (25 mg total) by mouth once daily.  Dispense: 30 tablet; Refill: 3    RTC Low level/low impact aerobic exercise 5x's/wk. Heart healthy diet and risk factor modification.    See labs and med orders.    Aerobic exercise 5x's/wk. Heart healthy diet and risk factor modification.    See labs and med orders.

## 2023-06-21 LAB
COMPLEXED PSA SERPL-MCNC: 0.14 NG/ML (ref 0–4)
COMPLEXED PSA SERPL-MCNC: 0.14 NG/ML (ref 0–4)
TESTOST SERPL-MCNC: 224 NG/DL (ref 304–1227)

## 2023-06-26 ENCOUNTER — PATIENT MESSAGE (OUTPATIENT)
Dept: FAMILY MEDICINE | Facility: CLINIC | Age: 73
End: 2023-06-26
Payer: MEDICARE

## 2023-06-26 LAB — C DIFF GDH STL QL: NORMAL

## 2023-07-07 ENCOUNTER — OFFICE VISIT (OUTPATIENT)
Dept: UROLOGY | Facility: CLINIC | Age: 73
End: 2023-07-07
Payer: MEDICARE

## 2023-07-07 ENCOUNTER — PATIENT MESSAGE (OUTPATIENT)
Dept: RADIOLOGY | Facility: HOSPITAL | Age: 73
End: 2023-07-07
Payer: MEDICARE

## 2023-07-07 DIAGNOSIS — C61 PROSTATE CANCER: Primary | ICD-10-CM

## 2023-07-07 LAB
BILIRUBIN, UA POC OHS: NEGATIVE
BLOOD, UA POC OHS: NEGATIVE
CLARITY, UA POC OHS: CLEAR
COLOR, UA POC OHS: YELLOW
GLUCOSE, UA POC OHS: NEGATIVE
KETONES, UA POC OHS: NEGATIVE
LEUKOCYTES, UA POC OHS: NEGATIVE
NITRITE, UA POC OHS: NEGATIVE
PH, UA POC OHS: 7
POC RESIDUAL URINE VOLUME: 0 ML (ref 0–100)
PROTEIN, UA POC OHS: 30
SPECIFIC GRAVITY, UA POC OHS: 1.02
UROBILINOGEN, UA POC OHS: 0.2

## 2023-07-07 PROCEDURE — 4010F ACE/ARB THERAPY RXD/TAKEN: CPT | Mod: CPTII,S$GLB,, | Performed by: NURSE PRACTITIONER

## 2023-07-07 PROCEDURE — 3288F PR FALLS RISK ASSESSMENT DOCUMENTED: ICD-10-PCS | Mod: CPTII,S$GLB,, | Performed by: NURSE PRACTITIONER

## 2023-07-07 PROCEDURE — 1159F PR MEDICATION LIST DOCUMENTED IN MEDICAL RECORD: ICD-10-PCS | Mod: CPTII,S$GLB,, | Performed by: NURSE PRACTITIONER

## 2023-07-07 PROCEDURE — 1101F PT FALLS ASSESS-DOCD LE1/YR: CPT | Mod: CPTII,S$GLB,, | Performed by: NURSE PRACTITIONER

## 2023-07-07 PROCEDURE — 99999 PR PBB SHADOW E&M-EST. PATIENT-LVL II: ICD-10-PCS | Mod: PBBFAC,,, | Performed by: NURSE PRACTITIONER

## 2023-07-07 PROCEDURE — 81003 URINALYSIS AUTO W/O SCOPE: CPT | Mod: QW,S$GLB,, | Performed by: NURSE PRACTITIONER

## 2023-07-07 PROCEDURE — 3288F FALL RISK ASSESSMENT DOCD: CPT | Mod: CPTII,S$GLB,, | Performed by: NURSE PRACTITIONER

## 2023-07-07 PROCEDURE — 3044F PR MOST RECENT HEMOGLOBIN A1C LEVEL <7.0%: ICD-10-PCS | Mod: CPTII,S$GLB,, | Performed by: NURSE PRACTITIONER

## 2023-07-07 PROCEDURE — 99214 OFFICE O/P EST MOD 30 MIN: CPT | Mod: S$GLB,,, | Performed by: NURSE PRACTITIONER

## 2023-07-07 PROCEDURE — 99999 PR PBB SHADOW E&M-EST. PATIENT-LVL II: CPT | Mod: PBBFAC,,, | Performed by: NURSE PRACTITIONER

## 2023-07-07 PROCEDURE — 51798 POCT BLADDER SCAN: ICD-10-PCS | Mod: S$GLB,,, | Performed by: NURSE PRACTITIONER

## 2023-07-07 PROCEDURE — 3044F HG A1C LEVEL LT 7.0%: CPT | Mod: CPTII,S$GLB,, | Performed by: NURSE PRACTITIONER

## 2023-07-07 PROCEDURE — 1160F PR REVIEW ALL MEDS BY PRESCRIBER/CLIN PHARMACIST DOCUMENTED: ICD-10-PCS | Mod: CPTII,S$GLB,, | Performed by: NURSE PRACTITIONER

## 2023-07-07 PROCEDURE — 1160F RVW MEDS BY RX/DR IN RCRD: CPT | Mod: CPTII,S$GLB,, | Performed by: NURSE PRACTITIONER

## 2023-07-07 PROCEDURE — 4010F PR ACE/ARB THEARPY RXD/TAKEN: ICD-10-PCS | Mod: CPTII,S$GLB,, | Performed by: NURSE PRACTITIONER

## 2023-07-07 PROCEDURE — 1159F MED LIST DOCD IN RCRD: CPT | Mod: CPTII,S$GLB,, | Performed by: NURSE PRACTITIONER

## 2023-07-07 PROCEDURE — 1101F PR PT FALLS ASSESS DOC 0-1 FALLS W/OUT INJ PAST YR: ICD-10-PCS | Mod: CPTII,S$GLB,, | Performed by: NURSE PRACTITIONER

## 2023-07-07 PROCEDURE — 81003 POCT URINALYSIS(INSTRUMENT): ICD-10-PCS | Mod: QW,S$GLB,, | Performed by: NURSE PRACTITIONER

## 2023-07-07 PROCEDURE — 51798 US URINE CAPACITY MEASURE: CPT | Mod: S$GLB,,, | Performed by: NURSE PRACTITIONER

## 2023-07-07 PROCEDURE — 99214 PR OFFICE/OUTPT VISIT, EST, LEVL IV, 30-39 MIN: ICD-10-PCS | Mod: S$GLB,,, | Performed by: NURSE PRACTITIONER

## 2023-07-07 NOTE — Clinical Note
Please review recent PSA labs and advise at this time.  Pt and family are worried the levels keep rising. Testosterone went back down.  Thanks.

## 2023-07-07 NOTE — PROGRESS NOTES
[facility] Note  Staff: ANGELO Crespo-C    PCP: MD Wilver  Urologist:  MD Heidi    Chief Complaint: F/UP-Prostate CA    Subjective:        HPI: Chris Hill Jr. is a 72 y.o. male who presents for prostate cancer follow up     Hx of [place] 4 + 5 equals 9 prostate cancer (cT1c, iPSA 13.2) treated with androgen deprivation therapy external beam radiation.    He presented 10/4/18 for fiducial marker placement as well as SpaceOAR placement. Vol 33.4g at time of markers. At time of SpaceOar he did note daily diarrhea for weeks prior.  8/13/18 eligard 22.5 3 mos depot; 11/13/18 trelstar 22.5mg 6 mos depot.   - 2 weeks prior did interrupt his XRT for lumbar back surgery as his chronic back pain was limiting his ability to lay flat on radiation table. Only missed 2d of therapy.  He noted at that time his diarrhea had persisted. Using prn immodium     Completed XRT 12/18/18. PSA 0.1 on 12/13/18. Has followed up with Dr Gibson for chronic ITP and anemia, stable.  Has been followed by GI. Dr Tavares, with EGD 12/5/18 and colonoscopy 2/11/19 with a few cecal polyps removed and diverticulosis (repeat 3 yrs)     Dr Santillan on 1/9/19: Patient reports fatigue with energy level at 30%. Of note he is undergoing workup for low blood counts with Dr. Gibson.  He reports frequency, urgency, nocturia of urine are resolving however he continues with urgency of stool. He reports frequency has improved with Imodium. AUA SS 17/5 from 10/2     2/12/19: 2/6/19 PSA 0.02, T <4, Cr 0.8, AUA symptom score:  23/5 unhappy (5:  Intermittency, urgency; 4:  Emptying, frequency; 3:  Weak stream; 2:  Sleeping)  He did start oxybutynin 5 mg b.i.d. which has significantly helped decrease the hot flashes.  He still gets some though they are not as intense as they were before  Since starting it, and further away from radiation, his urgency is somewhat better.  He does have daytime frequency of 8-10 times at nighttime  frequency of 1-2 times.  Having both urinary urgency and fecal urgency.  His diarrhea is somewhat better.  GI evaluation has been overall unrevealing of a etiology of chronic diarrhea.  He does report urinary intermittency but no urinary hesitancy. He did have back surgery after 22 radiation treatments, though he does note that his fecal and urinary urgency predated his back surgery. On Hytrin 10 mg in morning, so started Flomax 0.4 mg in the evening to have increased dose of alpha blocker.     5/25/19: improved voiding taking Hytrin in the morning and Flomax in the evening. His diarrhea has improved. Hot flashes went away a bit and well controlled with Ditropan b.i.d.  AUA symptom score:  9/1, please (3:  Emptying, urgency; 1:  Intermittency, weak stream, sleeping). 5/8/19: PSA  0.02 and T 10.  ADT renewed with Lupron 45mg     11/19/19: psa undetectable <0.01, T 7. Chronic ITP. LUTS stable with progressive urgency. AUA SS:  15/3, mixed (5:  Urgency; 3:  Emptying, weak stream; 2:  Intermittency; 1:  Frequency, sleeping).  Medications helped 5/10. Hytrin a.m., Flomax p.m., Ditropan 5 mg b.i.d. Still has mild PV dribble and has minimal UUI  Alternates constipation/diarrhea - may have worse urinary symptoms when constipated. Lots of water during day - rare soda. DTF 4-5x. 2/5 are urgent. Another back surgery 2 mos ago. Urgency may be worse since then? hasnt considered. PVR by bladder scan 0cc.  Deferred cysto in favor of continued medical management, ADT renewed with Lupron 45mg     5/2020 NP OQuin - final Lupron/ADT injection.   11/21/20: AUA SS: 11/2 (3:  Emptying; 2:  Urgency, weak stream, sleeping; 1:  Frequency, intermittency). PVR 45cc. 11/13/20 psa <0.01 and T 9.  Still having some hot flashes. Slight increase in energy after radiation complete  6/13/21: denies gross hematuria/dysuria at this time. 5/24/2021: Testosterone level-110, PSA level-<0.01. Oxybutynin 5XL nightly, flomax 0.4, hytrin 10. Most bothered by  urgency  - incd oxybutynin to 10mg XL. PSA 9/7/21 0.03, psa 12/21/21 0.01, psa 6/8/22 is 0.07, psa 9/12/22 is 0.07  Last o/v 6/14/22 with NP noting psa 0.07 as above. AUA SS: 10/3 (ntf 2, urgency 5, freq/int/weak 1). Pvr 26cc  Last saw matilda singh 1/10/22, also followed by Dr Gibson last seen 11/14/22. Following psa     OV with MD 12/22:  PSA 12/14/22 is 0.11 with T rise to 201  AUA SS: 13/3 (4:  Urgency; 2: Emptying, frequency, weak stream, sleeping; 1: Intermittency) medication helps 5/10 PVR 0 cc urinalysis dipstick negative  Urination not as bad as has been. Some night doesn't get up, last night got up 4x.  Interim issues with diuretic and went on spironolactone and then had issues with breast tenderness which improved since discontinuing  Has been having bad dizziness to point during day even has trouble seein and has to sit down. Has seen neurologist, there was concern for TIA then decided complex migraine, but still unknown. When has bad dizzy spells, cant function. Last week 4 days out of 7. Feels spinning at time, sometimes nauseated.  Partial erections returning     TODAY:  PSA on 6/20 was 0.14 with T at 224, from PSA of 0.09 on 3/22 and T-280 6 mos ago.  UA performed in office today showed 30 of Protein, otherwise normal findings.  PVR by bladder scan is 0 mL  AUA SS Today:  2/1 Pleased   Feeling of ICBE:1  Nocturia:1  Pt denies gross hematuria or dysuria at this time.  Taking Flomax 0.4 mg daily with no problems noted.    REVIEW OF SYSTEMS:  A comprehensive 10 system review was performed and is negative except as noted above in HPI    PMHx:  Past Medical History:   Diagnosis Date    Anemia, chronic disease 12/28/2018    Arthritis     Back pain     radiates both legs mainly rt leg    Benign paroxysmal vertigo, bilateral     Diabetes mellitus     Diabetes mellitus, type 2     JASNEN (dyspnea on exertion)     GERD (gastroesophageal reflux disease)     Heart murmur     Hyperlipidemia     Hypertension      Iron deficiency anemia 04/18/2022    Neuropathy     Normocytic normochromic anemia 12/28/2018    Prostate cancer 08/20/2018    Sleep apnea     NOT USING [facility]PC[facility]    Stroke     Thrombocytopenia 12/28/2018    Thyroid disease     Urinary frequency     Valvular regurgitation      PSHx:  Past Surgical History:   Procedure Laterality Date    ANKLE SURGERY Left     APPENDECTOMY      BACK SURGERY  09/2019    CARDIAC CATHETERIZATION      CERVICAL FUSION      EYE SURGERY      cataracts bilateral    GALLBLADDER SURGERY      HAND SURGERY Bilateral     rt hand x4 left x3    JOINT REPLACEMENT      KNEE SURGERY      8 on left knee and 7 on rt knee    NASAL SINUS SURGERY      x5    SHOULDER SURGERY Bilateral     3 on left 2 on right    TRANSRECTAL ULTRASOUND OF PROSTATE WITH INSERTION OF GOLD FIDUCIAL MARKER N/A 10/04/2018    Procedure: ULTRASOUND, PROSTATE, TRANSPERINEAL APPROACH, WITH GOLD FIDUCIAL MARKER INSERTION (with SPACEOAR transperineal biodegradable gel placement);  Surgeon: Manpreet Gordon MD;  Location: [facility];  Service: Urology;  Laterality: N/A;     Allergies:  Patient has no known allergies.    Medications: reviewed   Objective:   There were no vitals filed for this visit.    General:WDWN in NAD  Eyes: PERRLA, normal conjunctiva  Respiratory: no increased work on breathing, clear to auscultation  Cardiovascular: regular rate and rhythm. No obvious extremity edema.  GI: palpation of masses. No tenderness. No hepatosplenomegaly to palpation.  Musculoskeletal: normal range of motion of bilateral upper extremities. Normal muscle strength and tone.  Skin: no obvious rashes or lesions. No tightening of skin noted.  Neurologic: CN grossly normal. Normal sensation.   Psychiatric: awake, alert and oriented x 3. Mood and affect normal. Cooperative.      Assessment:       1. Prostate cancer          Plan:   Prostate CA:    Given the recent elevation of PSA level from 0.09 in March and now to 0.14 in 3 mos timeframe, I will  forward to MD for further advice on to further evaluation and treatment at this time.  May need to consult pt's Hem/Onc also.      Therefore I have instructed this pt that we will f/up with him once I consult with MD.  Pt and family verbalized understanding at this time.    [facility]: Active    Apolonia Tolentino, ANGELO-C

## 2023-07-11 ENCOUNTER — HOSPITAL ENCOUNTER (OUTPATIENT)
Dept: RADIOLOGY | Facility: HOSPITAL | Age: 73
Discharge: HOME | End: 2023-07-11
Attending: INTERNAL MEDICINE
Payer: MEDICARE

## 2023-07-11 ENCOUNTER — CLINICAL SUPPORT (OUTPATIENT)
Dept: CARDIOLOGY | Facility: HOSPITAL | Age: 73
End: 2023-07-11
Attending: INTERNAL MEDICINE
Payer: MEDICARE

## 2023-07-11 VITALS — WEIGHT: 221 LBS | HEIGHT: 66 IN | BODY MASS INDEX: 35.52 KG/M2

## 2023-07-11 DIAGNOSIS — I34.0 NON-RHEUMATIC MITRAL REGURGITATION: Chronic | ICD-10-CM

## 2023-07-11 DIAGNOSIS — I47.29 NSVT (NONSUSTAINED VENTRICULAR TACHYCARDIA): ICD-10-CM

## 2023-07-11 DIAGNOSIS — R06.02 SOB (SHORTNESS OF BREATH): ICD-10-CM

## 2023-07-11 LAB
CV PHARM DOSE: 0.4 MG
CV STRESS BASE HR: 52 BPM
DIASTOLIC BLOOD PRESSURE: 64 MMHG
NUC REST EJECTION FRACTION: 53
OHS CV CPX 1 MINUTE RECOVERY HEART RATE: 69 BPM
OHS CV CPX 85 PERCENT MAX PREDICTED HEART RATE MALE: 127
OHS CV CPX MAX PREDICTED HEART RATE: 149
OHS CV CPX PATIENT IS FEMALE: 0
OHS CV CPX PATIENT IS MALE: 1
OHS CV CPX PEAK DIASTOLIC BLOOD PRESSURE: 55 MMHG
OHS CV CPX PEAK HEAR RATE: 75 BPM
OHS CV CPX PEAK RATE PRESSURE PRODUCT: 8925
OHS CV CPX PEAK SYSTOLIC BLOOD PRESSURE: 119 MMHG
OHS CV CPX PERCENT MAX PREDICTED HEART RATE ACHIEVED: 50
OHS CV CPX RATE PRESSURE PRODUCT PRESENTING: 6136
OHS CV PHARM TIME: 1503 MIN
SYSTOLIC BLOOD PRESSURE: 118 MMHG

## 2023-07-11 PROCEDURE — 93018 CV STRESS TEST I&R ONLY: CPT | Mod: ,,, | Performed by: INTERNAL MEDICINE

## 2023-07-11 PROCEDURE — 93016 NUCLEAR STRESS - CARDIOLOGY INTERPRETED (CUPID ONLY): ICD-10-PCS | Mod: ,,, | Performed by: INTERNAL MEDICINE

## 2023-07-11 PROCEDURE — 63600175 PHARM REV CODE 636 W HCPCS: Mod: PO | Performed by: INTERNAL MEDICINE

## 2023-07-11 PROCEDURE — A9502 TC99M TETROFOSMIN: HCPCS | Mod: PO

## 2023-07-11 PROCEDURE — 78452 HT MUSCLE IMAGE SPECT MULT: CPT | Mod: 26,,, | Performed by: INTERNAL MEDICINE

## 2023-07-11 PROCEDURE — 78452 HT MUSCLE IMAGE SPECT MULT: CPT | Mod: PO

## 2023-07-11 PROCEDURE — 93018 PR CARDIAC STRESS TST,INTERP/REPT ONLY: ICD-10-PCS | Mod: ,,, | Performed by: INTERNAL MEDICINE

## 2023-07-11 PROCEDURE — 78452 NUCLEAR STRESS - CARDIOLOGY INTERPRETED (CUPID ONLY): ICD-10-PCS | Mod: 26,,, | Performed by: INTERNAL MEDICINE

## 2023-07-11 PROCEDURE — 93017 CV STRESS TEST TRACING ONLY: CPT | Mod: PO

## 2023-07-11 PROCEDURE — 93016 CV STRESS TEST SUPVJ ONLY: CPT | Mod: ,,, | Performed by: INTERNAL MEDICINE

## 2023-07-11 RX ORDER — REGADENOSON 0.08 MG/ML
0.4 INJECTION, SOLUTION INTRAVENOUS
Status: COMPLETED | OUTPATIENT
Start: 2023-07-11 | End: 2023-07-11

## 2023-07-11 RX ORDER — AMINOPHYLLINE 25 MG/ML
75 INJECTION, SOLUTION INTRAVENOUS
Status: COMPLETED | OUTPATIENT
Start: 2023-07-11 | End: 2023-07-11

## 2023-07-11 RX ADMIN — REGADENOSON 0.4 MG: 0.08 INJECTION, SOLUTION INTRAVENOUS at 03:07

## 2023-07-11 RX ADMIN — AMINOPHYLLINE 75 MG: 25 INJECTION, SOLUTION INTRAVENOUS at 03:07

## 2023-07-12 RX ORDER — TERAZOSIN 10 MG/1
10 CAPSULE ORAL NIGHTLY
Qty: 90 CAPSULE | Refills: 0 | Status: SHIPPED | OUTPATIENT
Start: 2023-07-12 | End: 2023-10-01 | Stop reason: SDUPTHER

## 2023-07-12 NOTE — TELEPHONE ENCOUNTER
No care due was identified.  [facility] Embedded Care Due Messages. Reference number: [id number].   7/11/2023 9:06:37 PM CDT

## 2023-07-13 ENCOUNTER — TELEPHONE (OUTPATIENT)
Dept: HEMATOLOGY/ONCOLOGY | Facility: CLINIC | Age: 73
End: 2023-07-13

## 2023-07-13 ENCOUNTER — TELEPHONE (OUTPATIENT)
Dept: UROLOGY | Facility: CLINIC | Age: 73
End: 2023-07-13
Payer: MEDICARE

## 2023-07-13 DIAGNOSIS — C61 PROSTATE CANCER: Primary | ICD-10-CM

## 2023-07-13 DIAGNOSIS — D50.9 IRON DEFICIENCY ANEMIA, UNSPECIFIED IRON DEFICIENCY ANEMIA TYPE: Primary | ICD-10-CM

## 2023-07-13 NOTE — TELEPHONE ENCOUNTER
Urology Telephone Encounter:    Please contact pt and advise of the following:    Due to his recent PSA lab results, Dr. Gordon has reviewed last office visit notes along with past lab results.      Our current recommendations for f/up is as follows:  We will repeat a PSA level and Testosterone Level in 3 months, then again in six months.  Schedule f/up visit in 6 months AFTER the 6 mos lab work has been completed at that time.  Orders are in Epic, please schedule with pt--thanks.

## 2023-07-14 ENCOUNTER — PATIENT MESSAGE (OUTPATIENT)
Dept: CARDIOLOGY | Facility: CLINIC | Age: 73
End: 2023-07-14
Payer: MEDICARE

## 2023-07-14 ENCOUNTER — TELEPHONE (OUTPATIENT)
Dept: CARDIOLOGY | Facility: CLINIC | Age: 73
End: 2023-07-14
Payer: MEDICARE

## 2023-07-18 ENCOUNTER — TELEPHONE (OUTPATIENT)
Dept: HEMATOLOGY/ONCOLOGY | Facility: CLINIC | Age: 73
End: 2023-07-18

## 2023-07-18 ENCOUNTER — TELEPHONE (OUTPATIENT)
Dept: UROLOGY | Facility: CLINIC | Age: 73
End: 2023-07-18
Payer: MEDICARE

## 2023-07-18 DIAGNOSIS — R94.39 POSITIVE CARDIAC STRESS TEST: Primary | ICD-10-CM

## 2023-07-18 RX ORDER — SODIUM CHLORIDE 0.9 % (FLUSH) 0.9 %
10 SYRINGE (ML) INJECTION
Status: SHIPPED | OUTPATIENT
Start: 2023-07-18

## 2023-07-18 RX ORDER — SODIUM CHLORIDE 9 MG/ML
INJECTION, SOLUTION INTRAVENOUS ONCE
Status: CANCELLED | OUTPATIENT
Start: 2023-07-18 | End: 2023-07-18

## 2023-07-18 NOTE — TELEPHONE ENCOUNTER
----- Message from Alexa Sellers sent at 7/18/2023  9:54 AM CDT -----  Contact: self    ----- Message -----  From: Dionne Munoz  Sent: 7/18/2023   9:50 AM CDT  To: Heidi KEENE Staff    Type:  Patient Returning Call    Who Called:  patient  Who Left Message for Patient:  flaquita  Does the patient know what this is regarding?:  [facility] appts but couldn't see anything logged as to what   Best Call Back Number:  [phone]  Additional Information:  Thanks

## 2023-07-18 NOTE — TELEPHONE ENCOUNTER
Called to schedule pt for 3m, 6m labs and 6m f/u following 6m labs. Appts scheduled, pt voiced understanding

## 2023-07-18 NOTE — PROGRESS NOTES
Angiogram    Arrive for procedure at: [facility] on 7/20/23. Your procedure is at 3 pm with an arrival time of 1 pm. Enter through the main entrance and check in at the reception desk. They will direct you to the cath lab upstairs.    You will receive a phone call from [facility] Pre-Op Department with further instructions prior to your scheduled procedure.    Notify the nurse if you are ALLERGIC TO IODINE.    FASTING: You MAY NOT have anything to eat or drink AFTER MIDNIGHT the day before your procedure. If your procedure is scheduled in the afternoon, you may have a LIGHT BREAKFAST 6-8 hours prior to your procedure.  For example: Two slices of toast; black coffee or black tea.    MEDICATIONS: You may take your regular morning medications with water. If there are any medications that you should not take, you will be instructed to hold them for that morning.    CARDIOLOGY PRE-PROCEDURE MEDICATION ORDERS:  ** Please hold any medications that are checked below:    HOLD   # OF DAYS TO HOLD  Metformin    Day before procedure & morning of procedure    Hctz--do not take the morning of your       WHAT TO EXPECT:    How long will the procedure take?  The procedure will take an average of 1 - 2 hours to perform.  After the procedure, you will need to lay flat for around 4 - 6 hours to minimize bleeding from the puncture site. If the wrist is accessed you will need to keep your arm still as instructed by the nurse.    When can I go home?  You may be able to be discharged home that same afternoon if there were no complications.  If you have one of the following: balloon; stent; pacemaker or defibrillator procedures, you may spend one night for observation.  Your doctor will determine your discharge based upon your progress.  The results of your procedure will be discussed with you before you are discharged.  Any further testing or procedures will be scheduled for you either before you leave or you will be  instructed to call for a future appointment.      TRANSPORTATION:  PLEASE ARRANGE TO HAVE SOMEONE DRIVE YOU HOME FOLLOWING YOUR PROCEDURE, YOU WILL NOT BE ALLOWED TO DRIVE.

## 2023-07-19 ENCOUNTER — LAB VISIT (OUTPATIENT)
Dept: LAB | Facility: HOSPITAL | Age: 73
End: 2023-07-19
Attending: INTERNAL MEDICINE
Payer: MEDICARE

## 2023-07-19 DIAGNOSIS — D50.9 IRON DEFICIENCY ANEMIA, UNSPECIFIED IRON DEFICIENCY ANEMIA TYPE: ICD-10-CM

## 2023-07-19 LAB
ALBUMIN SERPL BCP-MCNC: 3.7 G/DL (ref 3.5–5.2)
ALP SERPL-CCNC: 56 U/L (ref 55–135)
ALT SERPL W/O P-5'-P-CCNC: 17 U/L (ref 10–44)
ANION GAP SERPL CALC-SCNC: 6 MMOL/L (ref 8–16)
AST SERPL-CCNC: 17 U/L (ref 10–40)
BASOPHILS # BLD AUTO: 0.03 K/UL (ref 0–0.2)
BASOPHILS NFR BLD: 0.3 % (ref 0–1.9)
BILIRUB SERPL-MCNC: 0.5 MG/DL (ref 0.1–1)
BUN SERPL-MCNC: 17 MG/DL (ref 8–23)
CALCIUM SERPL-MCNC: 8.7 MG/DL (ref 8.7–10.5)
CHLORIDE SERPL-SCNC: 106 MMOL/L (ref 95–110)
CO2 SERPL-SCNC: 26 MMOL/L (ref 23–29)
CREAT SERPL-MCNC: 1.1 MG/DL (ref 0.5–1.4)
DIFFERENTIAL METHOD: ABNORMAL
EOSINOPHIL # BLD AUTO: 0.8 K/UL (ref 0–0.5)
EOSINOPHIL NFR BLD: 9 % (ref 0–8)
ERYTHROCYTE [DISTWIDTH] IN BLOOD BY AUTOMATED COUNT: 14.6 % (ref 11.5–14.5)
EST. GFR  (NO RACE VARIABLE): >60 ML/MIN/1.73 M^2
FERRITIN SERPL-MCNC: 255 NG/ML (ref 20–300)
GLUCOSE SERPL-MCNC: 124 MG/DL (ref 70–110)
HCT VFR BLD AUTO: 39 % (ref 40–54)
HGB BLD-MCNC: 12.3 G/DL (ref 14–18)
IMM GRANULOCYTES # BLD AUTO: 0.07 K/UL (ref 0–0.04)
IMM GRANULOCYTES NFR BLD AUTO: 0.8 % (ref 0–0.5)
IRON SERPL-MCNC: 73 UG/DL (ref 45–160)
LYMPHOCYTES # BLD AUTO: 1.6 K/UL (ref 1–4.8)
LYMPHOCYTES NFR BLD: 17.8 % (ref 18–48)
MCH RBC QN AUTO: 28 PG (ref 27–31)
MCHC RBC AUTO-ENTMCNC: 31.5 G/DL (ref 32–36)
MCV RBC AUTO: 89 FL (ref 82–98)
MONOCYTES # BLD AUTO: 0.7 K/UL (ref 0.3–1)
MONOCYTES NFR BLD: 8.1 % (ref 4–15)
NEUTROPHILS # BLD AUTO: 5.6 K/UL (ref 1.8–7.7)
NEUTROPHILS NFR BLD: 64 % (ref 38–73)
NRBC BLD-RTO: 0 /100 WBC
PLATELET # BLD AUTO: 155 K/UL (ref 150–450)
PMV BLD AUTO: 12.3 FL (ref 9.2–12.9)
POTASSIUM SERPL-SCNC: 4 MMOL/L (ref 3.5–5.1)
PROT SERPL-MCNC: 6.7 G/DL (ref 6–8.4)
RBC # BLD AUTO: 4.4 M/UL (ref 4.6–6.2)
SATURATED IRON: 28 % (ref 20–50)
SODIUM SERPL-SCNC: 138 MMOL/L (ref 136–145)
TOTAL IRON BINDING CAPACITY: 259 UG/DL (ref 250–450)
TRANSFERRIN SERPL-MCNC: 185 MG/DL (ref 200–375)
WBC # BLD AUTO: 8.74 K/UL (ref 3.9–12.7)

## 2023-07-19 PROCEDURE — 82728 ASSAY OF FERRITIN: CPT | Performed by: INTERNAL MEDICINE

## 2023-07-19 PROCEDURE — 85025 COMPLETE CBC W/AUTO DIFF WBC: CPT | Performed by: INTERNAL MEDICINE

## 2023-07-19 PROCEDURE — 84466 ASSAY OF TRANSFERRIN: CPT | Performed by: INTERNAL MEDICINE

## 2023-07-19 PROCEDURE — 80053 COMPREHEN METABOLIC PANEL: CPT | Performed by: INTERNAL MEDICINE

## 2023-07-19 PROCEDURE — 36415 COLL VENOUS BLD VENIPUNCTURE: CPT | Performed by: INTERNAL MEDICINE

## 2023-07-19 NOTE — PROGRESS NOTES
[facility] Hematology/Oncology  PROGRESS NOTE -  Follow-up Visit      Subjective:       Patient ID:   NAME: Chris Hill Jr. : 1950     72 y.o. male    Referring Doc: Tyrell  Other Physicians: Manpreet Gordon; Wiley; Jacob Alfaro        Chief Complaint:  prostate cancer; anem/tcp f/u         History of Present Illness:     Patient is being seen today in person..The patient is on today to go over the results of the recently ordered labs, tests and studies.  He is here with his wife.      He is continued on oral iron    He had stress test which was abnormal and they are planning angiogram later today with Dr Jama    He saw Dr Pettit in 2023 and is seeing him again on Monday    His PSA has increased to 0.14 and he is seeing Dr Gordon; he saw O'Rufina last week. He sees Dr Gordon again in 3 months. He is planning to see Dr Santillan again in near future     He denies any CP, SOB, HA's or N/V.   He has been taking his B12.     Dr Hines has him on plavix and Dr Hines has referred him to a vertigo specialist Dr Perrin at [facility].      He previously had colonoscopy with Dr Alfaro in 2022 with only 3 polyps removed                    I discussed COVID19 precautions - he had his vaccinations          ROS:   GEN: normal without any fever, night sweats or weight loss; intermittent dizzy spells  HEENT: normal with no HA's, sore throat, stiff neck, changes in vision  CV: normal with no CP, SOB, PND, JANSEN or orthopnea  PULM: normal with no SOB, cough, hemoptysis, sputum or pleuritic pain  GI: extreme reflux issues  : normal with no hematuria, dysuria  BREAST: some bilateral breast tenderness since starting thyroid meds  SKIN: normal with no rash, erythema, bruising, or swelling    Allergies:  Review of patient's allergies indicates:  No Known Allergies    Medications:    Current Outpatient Medications:     amitriptyline (ELAVIL) 50 MG tablet, TAKE 1 TABLET BY MOUTH ONCE DAILY IN THE EVENING (Patient taking  differently: Take 50 mg by mouth every evening.), Disp: 90 tablet, Rfl: 3    amLODIPine (NORVASC) 10 MG tablet, Take 1 tablet (10 mg total) by mouth once daily., Disp: 90 tablet, Rfl: 3    ascorbic acid, vitamin C, (VITAMIN C) 1000 MG tablet, Take 1,000 mg by mouth 2 (two) times daily., Disp: , Rfl:     aspirin (ECOTRIN) 81 MG EC tablet, Take 81 mg by mouth every evening., Disp: , Rfl:     carvediloL (COREG) 25 MG tablet, TAKE 1 TABLET BY MOUTH TWICE DAILY WITH MEALS, Disp: 180 tablet, Rfl: 3    cloNIDine (CATAPRES) 0.1 MG tablet, Take 1 tablet (0.1 mg total) by mouth every 8 (eight) hours as needed (SBP greater than 180)., Disp: 30 tablet, Rfl: 0    co-enzyme Q-10 30 mg capsule, Take 30 mg by mouth 3 (three) times daily., Disp: , Rfl:     cyanocobalamin (VITAMIN B-12) 100 MCG tablet, Take 1,000 mcg by mouth once daily., Disp: , Rfl:     eplerenone (INSPRA) 25 MG Tab, Take 1 tablet (25 mg total) by mouth once daily. (Patient taking differently: Take 25 mg by mouth every evening.), Disp: 30 tablet, Rfl: 3    esomeprazole (NEXIUM) 40 MG capsule, Take 40 mg by mouth 2 (two) times daily., Disp: , Rfl:     fish oil-omega-3 fatty acids 300-1,000 mg capsule, Take 2 capsules by mouth every evening., Disp: , Rfl:     gabapentin (NEURONTIN) 300 MG capsule, Take 300 mg by mouth 3 (three) times daily., Disp: , Rfl:     hydrALAZINE (APRESOLINE) 100 MG tablet, TAKE 1 TABLET BY MOUTH THREE TIMES DAILY, Disp: 270 tablet, Rfl: 0    ibuprofen (ADVIL,MOTRIN) 600 MG tablet, Take 1 tablet (600 mg total) by mouth every 6 (six) hours as needed for Pain., Disp: 20 tablet, Rfl: 0    iron-vitamin C 100-250 mg, ICAR-C, 100-250 mg Tab, Take 1 tablet by mouth once daily, Disp: 30 tablet, Rfl: 0    levothyroxine (SYNTHROID) 50 MCG tablet, Take 1 tablet (50 mcg total) by mouth before breakfast., Disp: 90 tablet, Rfl: 2    magnesium 250 mg Tab, Take 1 tablet by mouth every evening., Disp: , Rfl:     meclizine (ANTIVERT) 25 mg tablet, Take 25 mg by  "mouth every 6 (six) hours as needed., Disp: , Rfl:     metFORMIN (GLUCOPHAGE) 500 MG tablet, Take 1 tablet (500 mg total) by mouth 2 (two) times daily. (Patient taking differently: Take 500 mg by mouth Daily.), Disp: 180 tablet, Rfl: 1    olmesartan (BENICAR) 40 MG tablet, Take 1 tablet by mouth once daily, Disp: 90 tablet, Rfl: 3    potassium chloride SA (K-DUR,KLOR-CON) 20 MEQ tablet, Take 1 tablet by mouth twice daily (Patient taking differently: Take 20 mEq by mouth 2 (two) times daily.), Disp: 180 tablet, Rfl: 3    rosuvastatin (CRESTOR) 10 MG tablet, Take 1 tablet (10 mg total) by mouth once daily. (Patient taking differently: Take 10 mg by mouth every evening.), Disp: 90 tablet, Rfl: 1    tamsulosin (FLOMAX) 0.4 mg Cap, TAKE 1 CAPSULE BY MOUTH ONCE DAILY IN THE EVENING, Disp: 90 capsule, Rfl: 3    terazosin (HYTRIN) 10 MG capsule, Take 1 capsule (10 mg total) by mouth every evening. (Patient taking differently: Take 10 mg by mouth once daily.), Disp: 90 capsule, Rfl: 0    vitamin D (VITAMIN D3) 1000 units Tab, Take 1,000 Units by mouth 2 (two) times a day., Disp: , Rfl:     sildenafiL (VIAGRA) 25 MG tablet, Take 1 tablet (25 mg total) by mouth daily as needed for Erectile Dysfunction. Take 1/2 to 1 tablet as needed for erectile dysfunction., Disp: 10 tablet, Rfl: 0    Current Facility-Administered Medications:     sodium chloride 0.9% flush 10 mL, 10 mL, Intravenous, PRN, Carol Jama MD    PMHx/PSHx Updates:  See patient's last visit with me on 11/15/2022  See H&P on 12/28/2018        Pathology:  Cancer Staging  No matching staging information was found for the patient.          Objective:     Vitals:  Blood pressure 134/81, pulse (!) 54, temperature 97.4 °F (36.3 °C), resp. rate 18, height 5' 6" (1.676 m), weight 99.6 kg (219 lb 8 oz).        Physical Examination:   GEN: no apparent distress, comfortable; AAOx3  HEAD: atraumatic and normocephalic  EYES: no conjunctival pallor or muddiness, no icterus; " normal pupil reaction to ambient light  ENT: OMM, no pharyngeal erythema, external bilateral ears WNL; no visible thrush or ulcers  NECK: no masses or swelling, trachea midline, no visible LAD/LN's   CV: no palpitations; no pedal edema; no noticeable JVD or neck vein distension; pedal swelling better  CHEST: Normal respiratory effort; chest wall breath movements symmetrical; no audible wheezing  ABDOM: non-distended; no bloating  MUSC/Skeletal: ROM normal; joints visibly normal; no deformities; positive arthropathy in hands  EXTREM: no clubbing, cyanosis, inflammation or swelling  SKIN: no rashes, lesions, ulcers, petechiae or subcutaneous nodules;   : no ellison  NEURO: moving all 4 extremities; AAOx3; no tremors  PSYCH: normal mood, affect and behavior  LYMPH: no visible LN's or LAD              Labs:        Lab Results   Component Value Date    WBC 8.74 07/19/2023    HGB 12.3 (L) 07/19/2023    HCT 39.0 (L) 07/19/2023    MCV 89 07/19/2023     07/19/2023     CMP  Sodium   Date Value Ref Range Status   07/19/2023 138 136 - 145 mmol/L Final   04/12/2019 138 134 - 144 mmol/L      Potassium   Date Value Ref Range Status   07/19/2023 4.0 3.5 - 5.1 mmol/L Final     Chloride   Date Value Ref Range Status   07/19/2023 106 95 - 110 mmol/L Final   04/12/2019 99 98 - 110 mmol/L      CO2   Date Value Ref Range Status   07/19/2023 26 23 - 29 mmol/L Final     Glucose   Date Value Ref Range Status   07/19/2023 124 (H) 70 - 110 mg/dL Final   04/12/2019 117 (H) 70 - 99 mg/dL      BUN   Date Value Ref Range Status   07/19/2023 17 8 - 23 mg/dL Final     Creatinine   Date Value Ref Range Status   07/19/2023 1.1 0.5 - 1.4 mg/dL Final   04/12/2019 0.72 0.60 - 1.40 mg/dL      Calcium   Date Value Ref Range Status   07/19/2023 8.7 8.7 - 10.5 mg/dL Final     Total Protein   Date Value Ref Range Status   07/19/2023 6.7 6.0 - 8.4 g/dL Final     Albumin   Date Value Ref Range Status   07/19/2023 3.7 3.5 - 5.2 g/dL Final   04/12/2019  3.3 3.1 - 4.7 g/dL      Total Bilirubin   Date Value Ref Range Status   07/19/2023 0.5 0.1 - 1.0 mg/dL Final     Comment:     For infants and newborns, interpretation of results should be based  on gestational age, weight and in agreement with clinical  observations.    Premature Infant recommended reference ranges:  Up to 24 hours.............<8.0 mg/dL  Up to 48 hours............<12.0 mg/dL  3-5 days..................<15.0 mg/dL  6-29 days.................<15.0 mg/dL       Alkaline Phosphatase   Date Value Ref Range Status   07/19/2023 56 55 - 135 U/L Final     AST   Date Value Ref Range Status   07/19/2023 17 10 - 40 U/L Final     ALT   Date Value Ref Range Status   07/19/2023 17 10 - 44 U/L Final     Anion Gap   Date Value Ref Range Status   07/19/2023 6 (L) 8 - 16 mmol/L Final     eGFR if    Date Value Ref Range Status   07/25/2022 >60.0 >60 mL/min/1.73 m^2 Final     eGFR if non    Date Value Ref Range Status   07/25/2022 >60.0 >60 mL/min/1.73 m^2 Final     Comment:     Calculation used to obtain the estimated glomerular filtration  rate (eGFR) is the CKD-EPI equation.        Lab Results   Component Value Date    IRON 73 07/19/2023    TIBC 259 07/19/2023    FERRITIN 255 07/19/2023            Radiology/Diagnostic Studies:    Us Abdomen Complete    Result Date: 1/2/2019  Abdominal ultrasound Clinical history is anemia, prostate cancer The pancreas, aorta and IVC are normal. The liver is hyperechoic compatible with fatty infiltration. There is hepatopedal flow within the portal vein. The common bile duct measures 6 mm. The patient has had a cholecystectomy. The kidneys are normal in size and echogenicity. The spleen is normal in size measuring 12 cm. IMPRESSION: Fatty infiltration of the liver otherwise negative abdominal ultrasound Read and electronically signed by: Marry Hernandez MD on 1/2/2019 9:49 AM CST MARRY HERNANDEZ MD      I have reviewed all available lab results and  radiology reports.    Assessment/Plan:   (1) 72 y.o. male with diagnosis of prostate cancer who has been referred by Simone Santillan Jr., MD for evaluation by medical oncology. Patient with a high risk adenocarcinoma of the prostate with [id number] with GS of 4+5.   - he started androgen depravation therapy and monotherapy XRT (IMRT)  - followed by Dr Gordon with  and currently on Trelstar  - followed by Dr Santillan with Rad/onc and completed XRt on 12/18/2018  - latest PSA at 0.01 in May 2020 and he had his last lupron shot done in April/May 2020  - he sees Dr Gordon again in Nov 2020 1/13/2021:  - He saw Dr Gordon again in Nov 2020 and they are repeating his PSA next month with plans to repeat every 3 months for now.     4/13/2021:  - seeing Dr Gordon with labs in near future  - PSA on 2/23/2021 was 0.01    9/28/2021:  - He sees Dr Gordon again in Dec 2021 and the latest PSA was a little higher at 0.03; he has been off the ADT for about a year  - discussed and will make referral to Dr Andrea Scanlon at [facility] with -Oncology    12/28/2021:  - he did not see Dr Scanlon at [facility] as of yet - will check on the referral  - He was supposed to f/u with Dr Gordon again in Dec 2021 but it was cancelled; he has been off the ADT for over a year; he sees  again in June 2022 and Dr Santillan again in Jan 2022  - PSA down to 0.01 now and good    4/18/2022:  - he sees Dr Gordon again in June 2022 7/18/2022:  - mild increase in PSA up to 0.07 from 0.01  - planned repeat level in 3 months per  with follow-up with Dr Gordon again in Aug 2022  - he never did go see Dr Scanlon    11/15/2022:  - His latest PSA was 0.07 and he sees Dr Gordon again in Dec 2022    7/20/2023:  - His PSA has increased to 0.14 and he is seeing Dr Gordon; he saw O'Rufina last week. He sees Dr Gordon again in 3 months. He is planning to see Dr Santillan again in near future  -  planning to repeat PSA again in 3 months       (2) CAD and non-rheumatic MR;  mild CVA in 1993; Hypertensive Heart disease - followed by Dr Jama with cardiology     (3) HTN and hypercholesterolemia     (4) LORA     (5) DM    (6) Chronic back pain issues - required recent lumbar surgery with Dr Chris Fofana at [facility]     (7) Hx/of nightly fevers     (8) Chronic diarhhea - followed by Dr Alfaro with GI and s/p recent endoscopies     (9) Mild thrombocytopenia resolved with last platelet count down at 141,000  - no history of hepatitis or liver problems; no alcoholism  - he has positive antiplatelet antibody screens both direct and indirect consistent with an underlying chronic ITP condition  - his flow cytometry showed no evidence of leukemia but he did have a relative increase in eosinophils    1/13/2021:  - latest platelet count at 129,000 which should be adequate for most surgeries    4/13/2021:  - labs pending    9/28/2021:  - latest platelet count is WNL    12/28/2021:  - latest plat wnl    7/18/2022:  - latest plats wnl at 158,000    11/15/2022:  - plats are good at 159,000    7/20/2023:  - plats currently WNL - 177,000     (10) Mild anemia with latest hgb at 11.5 and stable   - NCNC parameters  - iron panel was WNL with recent labs  - OBS was negative on 11/15  - hx/of colon polyps in past  - followed by Dr Alfaro with GI with planned repeat endoscopies in near future    1/13/2021:  - hgb at 11.5 with normal iron panel; stable    4/13/2021:  - labs pending    9/28/2021:  - latest hgb at 11.5 and relatively stable  - iron panel is adequate    12/28/2021:  - latest hgb at 11.1 and adequate  - iron panel wnl    4/18/2022:  - hgb at 10.7 and a little lower  - his iron was a little low despite the oral iron  - discussed IV iron and he is agreeable    7/18/2022:  - hgb better at 12.5  - s/p IV iron x2  - on oral iron    11/15/2022:  - latest hgb at 12.5 and iorn panel stable  - continued on oral iron    7/20/2023:  - hgb at 12.3  - iron panel adequate  - on oral iron daily    (11)  Esophageal polyp   - He had scope with EGD with Dr Alfaroand they found an esophageal polyp which was cauterized. He saw Dr Santillan  and he recommended that the patient have the polyp removed.   - he is having repeat EGD to re-evaluate the polyp next month      1/13/2021:  -He previously had scope with EGD with Dr Alfaro  and they found an esophageal polyp which was cauterized.  -  He subsequently saw Dr Santillan and he recommended that the patient have the polyp removed.   - He had repeat EGD but it was incomplete due residual gastric contents and it was to be rescheduled for the following month but he did not have it done as of yet.    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 F/u with rad/onc, GI and  directives  2. Check CBC/plat/iron panel every 3 months for now    3. F/u with PCP, , Rad/onc etc - sees Dr Gordon again in 3 months  4. continue ICAR-C daily po and set up IV irons as needed  5. F/u with neurology       RTC in 3-4 months    Fax note to Samy Santillan III, MD, and Wiley Gordon Albright; Mitchell Scanlon    Discussion:       Total Time spent on patient:    I spent over 15 mins of time with the patient. Reviewed results of the recently ordered labs, tests, reports and studies; made directives with regards to the results. Over half of this time was spent couseling and coordinating care, making treatment and analytical decisions; ordering necessary labs, tests and studies; and discussing treatment options and setting up treatment plan(s) if indicated.        COVID-19 Discussion:    I  had long discussion with patient and any applicable family about the COVID-19 coronavirus epidemic and the recommended precautions with regard to cancer and/or hematology patients. I have re-iterated the CDC recommendations for adequate hand washing, use of hand -like products, and coughing into elbow, etc. In addition, especially for our patients who are on chemotherapy and/or our otherwise immunocompromised patients, I have recommended avoidance of crowds, including movie theaters, restaurants, churches, etc. I have recommended avoidance of any sick or symptomatic family members and/or friends. I have also recommended avoidance of any raw and unwashed food products, and general avoidance of food items that have not been prepared by themselves. The patient has been asked to call us immediately with any symptom developments, issues, questions or other general concerns.       Iron Infusion Therapy Discussion:     I provided literature/learning materials on the particular IV iron regimen and discussed the potential side-effect profiles of the drug(s). I discussed the importance of compliance with obtaining and monitoring requested lab work, and went over the potential risk for the development of anaphylactic shock, bronchospasm, dysrhythmia, liver and/or kidney damage, and respiratory/cardiovascular arrest and/or failure. I discussed the potential risks for development of alopecia, fevers, itching, chills and/or rigors, cold sensory issues, ringing in ears, vertigo and neuropathy, all of which are usually acute but sometimes could end up being chronic and life-long. I discussed the risks of hand-foot syndrome and rashes, and development of other autoimmune mediated processes such as pneumonitis and colitis which could be life threatening.     The patient's consent has been obtained to proceed with the IV iron therapy.The patient will be referred to [facility] for training and  education on IV iron therapy, use of antiemetics and/or anti-diarrheals, use of NSAID's, potential IV iron therapy side-effects, and any specific recommendations and precautions with the particular IV iron agents.      I answered all of the patient's (and family's, if applicable) questions to the best of my ability and to their complete satisfaction. The patient acknowledged full understanding of the risks, recommendations and plan(s).         I have explained all of the above in detail and the patient understands all of the current recommendation(s). I have answered all of their questions to the best of my ability and to their complete satisfaction.   The patient is to continue with the current management plan.            Electronically signed by Cyrus Gibson MD

## 2023-07-20 ENCOUNTER — OFFICE VISIT (OUTPATIENT)
Dept: HEMATOLOGY/ONCOLOGY | Facility: CLINIC | Age: 73
End: 2023-07-20
Payer: MEDICARE

## 2023-07-20 VITALS
TEMPERATURE: 97 F | HEIGHT: 66 IN | RESPIRATION RATE: 18 BRPM | WEIGHT: 219.5 LBS | BODY MASS INDEX: 35.27 KG/M2 | HEART RATE: 54 BPM | SYSTOLIC BLOOD PRESSURE: 134 MMHG | DIASTOLIC BLOOD PRESSURE: 81 MMHG

## 2023-07-20 DIAGNOSIS — C61 PROSTATE CANCER: Primary | ICD-10-CM

## 2023-07-20 DIAGNOSIS — E53.8 VITAMIN B12 DEFICIENCY: ICD-10-CM

## 2023-07-20 DIAGNOSIS — D50.9 IRON DEFICIENCY ANEMIA, UNSPECIFIED IRON DEFICIENCY ANEMIA TYPE: ICD-10-CM

## 2023-07-20 DIAGNOSIS — D64.9 ANEMIA, UNSPECIFIED TYPE: ICD-10-CM

## 2023-07-20 PROCEDURE — 99213 PR OFFICE/OUTPT VISIT, EST, LEVL III, 20-29 MIN: ICD-10-PCS | Mod: S$GLB,,, | Performed by: INTERNAL MEDICINE

## 2023-07-20 PROCEDURE — 3079F PR MOST RECENT DIASTOLIC BLOOD PRESSURE 80-89 MM HG: ICD-10-PCS | Mod: CPTII,S$GLB,, | Performed by: INTERNAL MEDICINE

## 2023-07-20 PROCEDURE — 1160F RVW MEDS BY RX/DR IN RCRD: CPT | Mod: CPTII,S$GLB,, | Performed by: INTERNAL MEDICINE

## 2023-07-20 PROCEDURE — 3288F PR FALLS RISK ASSESSMENT DOCUMENTED: ICD-10-PCS | Mod: CPTII,S$GLB,, | Performed by: INTERNAL MEDICINE

## 2023-07-20 PROCEDURE — 3044F PR MOST RECENT HEMOGLOBIN A1C LEVEL <7.0%: ICD-10-PCS | Mod: CPTII,S$GLB,, | Performed by: INTERNAL MEDICINE

## 2023-07-20 PROCEDURE — 1101F PR PT FALLS ASSESS DOC 0-1 FALLS W/OUT INJ PAST YR: ICD-10-PCS | Mod: CPTII,S$GLB,, | Performed by: INTERNAL MEDICINE

## 2023-07-20 PROCEDURE — 1101F PT FALLS ASSESS-DOCD LE1/YR: CPT | Mod: CPTII,S$GLB,, | Performed by: INTERNAL MEDICINE

## 2023-07-20 PROCEDURE — 3075F PR MOST RECENT SYSTOLIC BLOOD PRESS GE 130-139MM HG: ICD-10-PCS | Mod: CPTII,S$GLB,, | Performed by: INTERNAL MEDICINE

## 2023-07-20 PROCEDURE — 3075F SYST BP GE 130 - 139MM HG: CPT | Mod: CPTII,S$GLB,, | Performed by: INTERNAL MEDICINE

## 2023-07-20 PROCEDURE — 99213 OFFICE O/P EST LOW 20 MIN: CPT | Mod: S$GLB,,, | Performed by: INTERNAL MEDICINE

## 2023-07-20 PROCEDURE — 3008F BODY MASS INDEX DOCD: CPT | Mod: CPTII,S$GLB,, | Performed by: INTERNAL MEDICINE

## 2023-07-20 PROCEDURE — 3044F HG A1C LEVEL LT 7.0%: CPT | Mod: CPTII,S$GLB,, | Performed by: INTERNAL MEDICINE

## 2023-07-20 PROCEDURE — 1159F PR MEDICATION LIST DOCUMENTED IN MEDICAL RECORD: ICD-10-PCS | Mod: CPTII,S$GLB,, | Performed by: INTERNAL MEDICINE

## 2023-07-20 PROCEDURE — 1125F AMNT PAIN NOTED PAIN PRSNT: CPT | Mod: CPTII,S$GLB,, | Performed by: INTERNAL MEDICINE

## 2023-07-20 PROCEDURE — 1159F MED LIST DOCD IN RCRD: CPT | Mod: CPTII,S$GLB,, | Performed by: INTERNAL MEDICINE

## 2023-07-20 PROCEDURE — 1160F PR REVIEW ALL MEDS BY PRESCRIBER/CLIN PHARMACIST DOCUMENTED: ICD-10-PCS | Mod: CPTII,S$GLB,, | Performed by: INTERNAL MEDICINE

## 2023-07-20 PROCEDURE — 4010F PR ACE/ARB THEARPY RXD/TAKEN: ICD-10-PCS | Mod: CPTII,S$GLB,, | Performed by: INTERNAL MEDICINE

## 2023-07-20 PROCEDURE — 3008F PR BODY MASS INDEX (BMI) DOCUMENTED: ICD-10-PCS | Mod: CPTII,S$GLB,, | Performed by: INTERNAL MEDICINE

## 2023-07-20 PROCEDURE — 4010F ACE/ARB THERAPY RXD/TAKEN: CPT | Mod: CPTII,S$GLB,, | Performed by: INTERNAL MEDICINE

## 2023-07-20 PROCEDURE — 1125F PR PAIN SEVERITY QUANTIFIED, PAIN PRESENT: ICD-10-PCS | Mod: CPTII,S$GLB,, | Performed by: INTERNAL MEDICINE

## 2023-07-20 PROCEDURE — 3079F DIAST BP 80-89 MM HG: CPT | Mod: CPTII,S$GLB,, | Performed by: INTERNAL MEDICINE

## 2023-07-20 PROCEDURE — 3288F FALL RISK ASSESSMENT DOCD: CPT | Mod: CPTII,S$GLB,, | Performed by: INTERNAL MEDICINE

## 2023-07-24 ENCOUNTER — LAB VISIT (OUTPATIENT)
Dept: LAB | Facility: HOSPITAL | Age: 73
End: 2023-07-24
Attending: FAMILY MEDICINE
Payer: MEDICARE

## 2023-07-24 ENCOUNTER — OFFICE VISIT (OUTPATIENT)
Dept: FAMILY MEDICINE | Facility: CLINIC | Age: 73
End: 2023-07-24
Payer: MEDICARE

## 2023-07-24 ENCOUNTER — TELEPHONE (OUTPATIENT)
Dept: CARDIOLOGY | Facility: CLINIC | Age: 73
End: 2023-07-24
Payer: MEDICARE

## 2023-07-24 VITALS
TEMPERATURE: 98 F | OXYGEN SATURATION: 94 % | WEIGHT: 223.44 LBS | SYSTOLIC BLOOD PRESSURE: 124 MMHG | HEART RATE: 59 BPM | DIASTOLIC BLOOD PRESSURE: 70 MMHG | HEIGHT: 66 IN | BODY MASS INDEX: 35.91 KG/M2

## 2023-07-24 DIAGNOSIS — D64.9 ANEMIA, UNSPECIFIED TYPE: ICD-10-CM

## 2023-07-24 DIAGNOSIS — R05.9 COUGH, UNSPECIFIED TYPE: ICD-10-CM

## 2023-07-24 DIAGNOSIS — E03.9 HYPOTHYROIDISM, UNSPECIFIED TYPE: ICD-10-CM

## 2023-07-24 DIAGNOSIS — I25.10 CORONARY ARTERY DISEASE, UNSPECIFIED VESSEL OR LESION TYPE, UNSPECIFIED WHETHER ANGINA PRESENT, UNSPECIFIED WHETHER NATIVE OR TRANSPLANTED HEART: ICD-10-CM

## 2023-07-24 DIAGNOSIS — C61 PROSTATE CANCER: ICD-10-CM

## 2023-07-24 DIAGNOSIS — K21.9 GASTROESOPHAGEAL REFLUX DISEASE, UNSPECIFIED WHETHER ESOPHAGITIS PRESENT: ICD-10-CM

## 2023-07-24 DIAGNOSIS — E11.319 TYPE 2 DIABETES MELLITUS WITH RETINOPATHY OF BOTH EYES, WITHOUT LONG-TERM CURRENT USE OF INSULIN, MACULAR EDEMA PRESENCE UNSPECIFIED, UNSPECIFIED RETINOPATHY SEVERITY: ICD-10-CM

## 2023-07-24 DIAGNOSIS — R09.81 CONGESTED NOSE: ICD-10-CM

## 2023-07-24 DIAGNOSIS — G47.33 OSA ON CPAP: ICD-10-CM

## 2023-07-24 DIAGNOSIS — I10 HYPERTENSION, ESSENTIAL: ICD-10-CM

## 2023-07-24 DIAGNOSIS — E78.1 HYPERTRIGLYCERIDEMIA: Primary | ICD-10-CM

## 2023-07-24 DIAGNOSIS — I70.0 AORTIC ATHEROSCLEROSIS: ICD-10-CM

## 2023-07-24 DIAGNOSIS — Z79.82 ASPIRIN LONG-TERM USE: ICD-10-CM

## 2023-07-24 DIAGNOSIS — E78.5 HYPERLIPIDEMIA, UNSPECIFIED HYPERLIPIDEMIA TYPE: ICD-10-CM

## 2023-07-24 LAB — TSH SERPL DL<=0.005 MIU/L-ACNC: 1.31 UIU/ML (ref 0.34–5.6)

## 2023-07-24 PROCEDURE — 1101F PR PT FALLS ASSESS DOC 0-1 FALLS W/OUT INJ PAST YR: ICD-10-PCS | Mod: CPTII,S$GLB,, | Performed by: FAMILY MEDICINE

## 2023-07-24 PROCEDURE — 3078F DIAST BP <80 MM HG: CPT | Mod: CPTII,S$GLB,, | Performed by: FAMILY MEDICINE

## 2023-07-24 PROCEDURE — 1101F PT FALLS ASSESS-DOCD LE1/YR: CPT | Mod: CPTII,S$GLB,, | Performed by: FAMILY MEDICINE

## 2023-07-24 PROCEDURE — 3074F SYST BP LT 130 MM HG: CPT | Mod: CPTII,S$GLB,, | Performed by: FAMILY MEDICINE

## 2023-07-24 PROCEDURE — 3288F FALL RISK ASSESSMENT DOCD: CPT | Mod: CPTII,S$GLB,, | Performed by: FAMILY MEDICINE

## 2023-07-24 PROCEDURE — 3008F PR BODY MASS INDEX (BMI) DOCUMENTED: ICD-10-PCS | Mod: CPTII,S$GLB,, | Performed by: FAMILY MEDICINE

## 2023-07-24 PROCEDURE — 99214 OFFICE O/P EST MOD 30 MIN: CPT | Mod: S$GLB,,, | Performed by: FAMILY MEDICINE

## 2023-07-24 PROCEDURE — 3008F BODY MASS INDEX DOCD: CPT | Mod: CPTII,S$GLB,, | Performed by: FAMILY MEDICINE

## 2023-07-24 PROCEDURE — 36415 COLL VENOUS BLD VENIPUNCTURE: CPT | Performed by: FAMILY MEDICINE

## 2023-07-24 PROCEDURE — 4010F PR ACE/ARB THEARPY RXD/TAKEN: ICD-10-PCS | Mod: CPTII,S$GLB,, | Performed by: FAMILY MEDICINE

## 2023-07-24 PROCEDURE — 84443 ASSAY THYROID STIM HORMONE: CPT | Performed by: FAMILY MEDICINE

## 2023-07-24 PROCEDURE — 3078F PR MOST RECENT DIASTOLIC BLOOD PRESSURE < 80 MM HG: ICD-10-PCS | Mod: CPTII,S$GLB,, | Performed by: FAMILY MEDICINE

## 2023-07-24 PROCEDURE — 3044F PR MOST RECENT HEMOGLOBIN A1C LEVEL <7.0%: ICD-10-PCS | Mod: CPTII,S$GLB,, | Performed by: FAMILY MEDICINE

## 2023-07-24 PROCEDURE — 1159F MED LIST DOCD IN RCRD: CPT | Mod: CPTII,S$GLB,, | Performed by: FAMILY MEDICINE

## 2023-07-24 PROCEDURE — 1160F RVW MEDS BY RX/DR IN RCRD: CPT | Mod: CPTII,S$GLB,, | Performed by: FAMILY MEDICINE

## 2023-07-24 PROCEDURE — 1159F PR MEDICATION LIST DOCUMENTED IN MEDICAL RECORD: ICD-10-PCS | Mod: CPTII,S$GLB,, | Performed by: FAMILY MEDICINE

## 2023-07-24 PROCEDURE — 1126F PR PAIN SEVERITY QUANTIFIED, NO PAIN PRESENT: ICD-10-PCS | Mod: CPTII,S$GLB,, | Performed by: FAMILY MEDICINE

## 2023-07-24 PROCEDURE — 3074F PR MOST RECENT SYSTOLIC BLOOD PRESSURE < 130 MM HG: ICD-10-PCS | Mod: CPTII,S$GLB,, | Performed by: FAMILY MEDICINE

## 2023-07-24 PROCEDURE — 99214 PR OFFICE/OUTPT VISIT, EST, LEVL IV, 30-39 MIN: ICD-10-PCS | Mod: S$GLB,,, | Performed by: FAMILY MEDICINE

## 2023-07-24 PROCEDURE — 1126F AMNT PAIN NOTED NONE PRSNT: CPT | Mod: CPTII,S$GLB,, | Performed by: FAMILY MEDICINE

## 2023-07-24 PROCEDURE — 1160F PR REVIEW ALL MEDS BY PRESCRIBER/CLIN PHARMACIST DOCUMENTED: ICD-10-PCS | Mod: CPTII,S$GLB,, | Performed by: FAMILY MEDICINE

## 2023-07-24 PROCEDURE — 3044F HG A1C LEVEL LT 7.0%: CPT | Mod: CPTII,S$GLB,, | Performed by: FAMILY MEDICINE

## 2023-07-24 PROCEDURE — 3288F PR FALLS RISK ASSESSMENT DOCUMENTED: ICD-10-PCS | Mod: CPTII,S$GLB,, | Performed by: FAMILY MEDICINE

## 2023-07-24 PROCEDURE — 4010F ACE/ARB THERAPY RXD/TAKEN: CPT | Mod: CPTII,S$GLB,, | Performed by: FAMILY MEDICINE

## 2023-07-24 RX ORDER — METFORMIN HYDROCHLORIDE 500 MG/1
500 TABLET, EXTENDED RELEASE ORAL
Qty: 90 TABLET | Refills: 1 | Status: SHIPPED | OUTPATIENT
Start: 2023-07-24 | End: 2023-10-24 | Stop reason: SDUPTHER

## 2023-07-24 RX ORDER — IRBESARTAN 300 MG/1
TABLET ORAL
COMMUNITY

## 2023-07-24 RX ORDER — ROSUVASTATIN CALCIUM 5 MG/1
5 TABLET, COATED ORAL DAILY
COMMUNITY
End: 2023-07-24 | Stop reason: SDUPTHER

## 2023-07-24 RX ORDER — CLONAZEPAM 0.5 MG/1
TABLET ORAL
COMMUNITY
Start: 2023-02-28

## 2023-07-24 RX ORDER — DOXYCYCLINE HYCLATE 100 MG
100 TABLET ORAL 2 TIMES DAILY
COMMUNITY
End: 2023-07-24 | Stop reason: SDUPTHER

## 2023-07-24 RX ORDER — ROSUVASTATIN CALCIUM 5 MG/1
5 TABLET, COATED ORAL DAILY
Qty: 90 TABLET | Refills: 1 | Status: SHIPPED | OUTPATIENT
Start: 2023-07-24 | End: 2023-10-24 | Stop reason: SDUPTHER

## 2023-07-24 RX ORDER — HYDROCHLOROTHIAZIDE 12.5 MG/1
12.5 TABLET ORAL DAILY
COMMUNITY

## 2023-07-24 RX ORDER — DOXYCYCLINE HYCLATE 100 MG
100 TABLET ORAL 2 TIMES DAILY
Qty: 20 TABLET | Refills: 0 | Status: SHIPPED | OUTPATIENT
Start: 2023-07-24 | End: 2024-01-25

## 2023-07-24 RX ORDER — DIAZEPAM 5 MG/1
5 TABLET ORAL 4 TIMES DAILY PRN
COMMUNITY
Start: 2023-04-05

## 2023-07-24 RX ORDER — METFORMIN HYDROCHLORIDE 500 MG/1
500 TABLET, EXTENDED RELEASE ORAL
COMMUNITY
End: 2023-07-24 | Stop reason: SDUPTHER

## 2023-07-24 NOTE — TELEPHONE ENCOUNTER
----- Message from Tata Gordon sent at 7/24/2023  9:28 AM CDT -----  Contact: Pt  Type:  Sooner Apoointment Request    Caller is requesting a sooner appointment.  Caller declined first available appointment listed below.  Caller will not accept being placed on the waitlist and is requesting a message be sent to doctor.  Name of Caller:Pt  When is the first available appointment? N/A  Symptoms: angiogram follow up  Would the patient rather a call back or a response via [facility]? call  Best Call Back Number:[phone]  Additional Information: Pt needs to schedule a follow up in August. Pt was told to follow up in a month. Please call pt back to advise.

## 2023-07-25 PROBLEM — E03.9 HYPOTHYROIDISM: Status: ACTIVE | Noted: 2023-07-25

## 2023-07-26 NOTE — PROGRESS NOTES
Subjective:       Patient ID: Chris Hill Jr. is a 72 y.o. male.    Chief Complaint: Follow-up    Congested and coughing.  No fever.  Had angiogram at [facility].  Mild coronary artery disease 25% lad lesion.  A little diffuse calcification.  Gastroesophageal reflux disease Nexium 40 b.i.d. working.  Hyperlipidemia cholesterol 92 HDL twenty nine LDL is 12 point 2 very low.  Triglycerides 254.  Hypothyroidism.  Glucose little elevated at 124.  Some anemia.  Hemoglobin is 12.3.  Coronary artery disease.  No anginal chest pain.  Using aspirin.  Prostate cancer.  PSA is 0.14 has come up slightly was 0.09 0.11 previously.  A1c is 6.  BMI is at 36.  Diabetes mellitus type 2 with retinopathy.  GFR is greater than 60 A1c is 6.  Obstructive sleep apnea on his CPAP.  Tolerating it benefitting.    Physical examination.  Vital signs noted.  Tympanic membranes without erythema.  Pharynx without erythema or exudate.  Neck without bruit.  Chest clear.  Heart regular rate rhythm.  Extremities are without edema positive pulses.        Objective:        Assessment:       1. Hypertriglyceridemia    2. Hypertension, essential    3. Hyperlipidemia, unspecified hyperlipidemia type    4. Aortic atherosclerosis    5. Prostate cancer    6. LORA on CPAP    7. Cough, unspecified type    8. Congested nose    9. Gastroesophageal reflux disease, unspecified whether esophagitis present    10. Hypothyroidism, unspecified type    11. Anemia, unspecified type    12. Coronary artery disease, unspecified vessel or lesion type, unspecified whether angina present, unspecified whether native or transplanted heart    13. BMI 36.0-36.9,adult    14. Aspirin long-term use    15. Type 2 diabetes mellitus with retinopathy of both eyes, without long-term current use of insulin, macular edema presence unspecified, unspecified retinopathy severity        Plan:       Hypertriglyceridemia    Hypertension, essential    Hyperlipidemia, unspecified  hyperlipidemia type  -     Lipid Panel; Future; Expected date: 09/25/2023    Aortic atherosclerosis    Prostate cancer    LORA on CPAP    Cough, unspecified type    Congested nose    Gastroesophageal reflux disease, unspecified whether esophagitis present    Hypothyroidism, unspecified type  -     TSH; Future; Expected date: 07/24/2023    Anemia, unspecified type    Coronary artery disease, unspecified vessel or lesion type, unspecified whether angina present, unspecified whether native or transplanted heart    BMI 36.0-36.9,adult    Aspirin long-term use    Type 2 diabetes mellitus with retinopathy of both eyes, without long-term current use of insulin, macular edema presence unspecified, unspecified retinopathy severity  -     Hemoglobin A1C; Future; Expected date: 09/25/2023    Other orders  -     metFORMIN (GLUCOPHAGE-XR) 500 MG ER 24hr tablet; Take 1 tablet (500 mg total) by mouth daily with breakfast.  Dispense: 90 tablet; Refill: 1  -     rosuvastatin (CRESTOR) 5 MG tablet; Take 1 tablet (5 mg total) by mouth once daily.  Dispense: 90 tablet; Refill: 1  -     doxycycline (VIBRA-TABS) 100 MG tablet; Take 1 tablet (100 mg total) by mouth 2 (two) times daily.  Dispense: 20 tablet; Refill: 0    Change his metformin to the ER preparation 500 mg daily 90 with a refill.  Check TSH.  Discontinue Flomax.  Change Crestor to 5 mg daily.  Half a 10.  Follow-up in 3 months with an A1c and lipids.  Low-fat diet discussed.  Vibramycin 100 mg b.i.d. for 10 days.

## 2023-07-28 RX ORDER — POTASSIUM CHLORIDE 20 MEQ/1
20 TABLET, EXTENDED RELEASE ORAL 2 TIMES DAILY
Qty: 180 TABLET | Refills: 3 | Status: SHIPPED | OUTPATIENT
Start: 2023-07-28 | End: 2023-10-24 | Stop reason: SDUPTHER

## 2023-07-28 NOTE — TELEPHONE ENCOUNTER
No care due was identified.  [facility] Embedded Care Due Messages. Reference number: [id number].   7/28/2023 10:56:13 AM CDT

## 2023-08-04 LAB
LEFT EYE DM RETINOPATHY: POSITIVE
RIGHT EYE DM RETINOPATHY: POSITIVE

## 2023-08-17 ENCOUNTER — OUTSIDE PLACE OF SERVICE (OUTPATIENT)
Dept: ADMINISTRATIVE | Facility: HOSPITAL | Age: 73
End: 2023-08-17
Payer: MEDICARE

## 2023-08-21 ENCOUNTER — OFFICE VISIT (OUTPATIENT)
Dept: CARDIOLOGY | Facility: CLINIC | Age: 73
End: 2023-08-21
Payer: MEDICARE

## 2023-08-21 VITALS
HEIGHT: 66 IN | DIASTOLIC BLOOD PRESSURE: 74 MMHG | HEART RATE: 52 BPM | BODY MASS INDEX: 35.19 KG/M2 | SYSTOLIC BLOOD PRESSURE: 148 MMHG | WEIGHT: 218.94 LBS

## 2023-08-21 DIAGNOSIS — I10 HYPERTENSION, ESSENTIAL: ICD-10-CM

## 2023-08-21 DIAGNOSIS — E78.5 HYPERLIPIDEMIA, UNSPECIFIED HYPERLIPIDEMIA TYPE: ICD-10-CM

## 2023-08-21 DIAGNOSIS — I47.29 NSVT (NONSUSTAINED VENTRICULAR TACHYCARDIA): ICD-10-CM

## 2023-08-21 DIAGNOSIS — I25.10 CORONARY ARTERY DISEASE, UNSPECIFIED VESSEL OR LESION TYPE, UNSPECIFIED WHETHER ANGINA PRESENT, UNSPECIFIED WHETHER NATIVE OR TRANSPLANTED HEART: ICD-10-CM

## 2023-08-21 PROCEDURE — 1159F PR MEDICATION LIST DOCUMENTED IN MEDICAL RECORD: ICD-10-PCS | Mod: CPTII,S$GLB,,

## 2023-08-21 PROCEDURE — 99214 OFFICE O/P EST MOD 30 MIN: CPT | Mod: S$GLB,,,

## 2023-08-21 PROCEDURE — 3008F PR BODY MASS INDEX (BMI) DOCUMENTED: ICD-10-PCS | Mod: CPTII,S$GLB,,

## 2023-08-21 PROCEDURE — 3077F PR MOST RECENT SYSTOLIC BLOOD PRESSURE >= 140 MM HG: ICD-10-PCS | Mod: CPTII,S$GLB,,

## 2023-08-21 PROCEDURE — 3077F SYST BP >= 140 MM HG: CPT | Mod: CPTII,S$GLB,,

## 2023-08-21 PROCEDURE — 1160F RVW MEDS BY RX/DR IN RCRD: CPT | Mod: CPTII,S$GLB,,

## 2023-08-21 PROCEDURE — 3044F PR MOST RECENT HEMOGLOBIN A1C LEVEL <7.0%: ICD-10-PCS | Mod: CPTII,S$GLB,,

## 2023-08-21 PROCEDURE — 99999 PR PBB SHADOW E&M-EST. PATIENT-LVL IV: ICD-10-PCS | Mod: PBBFAC,,,

## 2023-08-21 PROCEDURE — 3288F FALL RISK ASSESSMENT DOCD: CPT | Mod: CPTII,S$GLB,,

## 2023-08-21 PROCEDURE — 3288F PR FALLS RISK ASSESSMENT DOCUMENTED: ICD-10-PCS | Mod: CPTII,S$GLB,,

## 2023-08-21 PROCEDURE — 1126F PR PAIN SEVERITY QUANTIFIED, NO PAIN PRESENT: ICD-10-PCS | Mod: CPTII,S$GLB,,

## 2023-08-21 PROCEDURE — 1159F MED LIST DOCD IN RCRD: CPT | Mod: CPTII,S$GLB,,

## 2023-08-21 PROCEDURE — 3008F BODY MASS INDEX DOCD: CPT | Mod: CPTII,S$GLB,,

## 2023-08-21 PROCEDURE — 1101F PR PT FALLS ASSESS DOC 0-1 FALLS W/OUT INJ PAST YR: ICD-10-PCS | Mod: CPTII,S$GLB,,

## 2023-08-21 PROCEDURE — 1160F PR REVIEW ALL MEDS BY PRESCRIBER/CLIN PHARMACIST DOCUMENTED: ICD-10-PCS | Mod: CPTII,S$GLB,,

## 2023-08-21 PROCEDURE — 99999 PR PBB SHADOW E&M-EST. PATIENT-LVL IV: CPT | Mod: PBBFAC,,,

## 2023-08-21 PROCEDURE — 1126F AMNT PAIN NOTED NONE PRSNT: CPT | Mod: CPTII,S$GLB,,

## 2023-08-21 PROCEDURE — 1101F PT FALLS ASSESS-DOCD LE1/YR: CPT | Mod: CPTII,S$GLB,,

## 2023-08-21 PROCEDURE — 3078F PR MOST RECENT DIASTOLIC BLOOD PRESSURE < 80 MM HG: ICD-10-PCS | Mod: CPTII,S$GLB,,

## 2023-08-21 PROCEDURE — 4010F PR ACE/ARB THEARPY RXD/TAKEN: ICD-10-PCS | Mod: CPTII,S$GLB,,

## 2023-08-21 PROCEDURE — 4010F ACE/ARB THERAPY RXD/TAKEN: CPT | Mod: CPTII,S$GLB,,

## 2023-08-21 PROCEDURE — 3078F DIAST BP <80 MM HG: CPT | Mod: CPTII,S$GLB,,

## 2023-08-21 PROCEDURE — 99214 PR OFFICE/OUTPT VISIT, EST, LEVL IV, 30-39 MIN: ICD-10-PCS | Mod: S$GLB,,,

## 2023-08-21 PROCEDURE — 3044F HG A1C LEVEL LT 7.0%: CPT | Mod: CPTII,S$GLB,,

## 2023-08-21 NOTE — PROGRESS NOTES
Subjective:    Patient ID:  Chris Hill Jr. is a 73 y.o. male patient here for evaluation No chief complaint on file.    History of Present Illness:     Mr. Hill is a 73 year old M who follows with Dr. Jama here today for a [facility] follow up. His main concern today is that during his stress test and during a sinus surgery on 8/16 he had episodes of hypotension. During the nasal surgery they had to use dobutrex, epinephrine, and calcium to bring his BP up.   However, at home and today in clinic BP is in 140/70s.   No CV symptoms otherwise. Radial access healed.             Most Recent Echocardiogram Results  Results for orders placed during the hospital encounter of 06/21/21    Echo Saline Bubble? Yes    Interpretation Summary  · The estimated PA systolic pressure is 23 mmHg.  · The left ventricle is normal in size with mild concentric hypertrophy and normal systolic function.  · No interatrial septal defect present.  · The estimated ejection fraction is 60%.  · Normal left ventricular diastolic function.  · Normal right ventricular size with normal right ventricular systolic function.  · Moderate left atrial enlargement.  · Mild-to-moderate mitral regurgitation.  · Mild aortic regurgitation.      Most Recent Nuclear Stress Test Results  Results for orders placed during the hospital encounter of 07/11/23    Nuclear Stress - Cardiology Interpreted    Interpretation Summary    Abnormal myocardial perfusion scan.    There are two significant perfusion abnormalities.    Perfusion Abnormality #1 - There is a moderate intensity, small to moderate sized, reversible perfusion abnormality that is consistent with ischemia in the apical wall(s).    Perfusion Abnormality #2 - There is a small to moderate sized, mild intensity, reversible perfusion abnormality that is consistent with ischemia in the basal to mid inferior wall(s).    There are no other significant perfusion abnormalities.    The gated perfusion images showed  an ejection fraction of 53% at rest.    The ECG portion of the study is negative for ischemia.    The patient reported chest pain during the stress test.    There were no arrhythmias during stress.      Most Recent Cardiac PET Stress Test Results  No results found for this or any previous visit.      Most Recent Cardiovascular Angiogram results  Results for orders placed during the hospital encounter of 07/20/23    Cardiac catheterization    Conclusion    The left ventricular end diastolic pressure was elevated.    The pre-procedure left ventricular end diastolic pressure was 26.    The estimated blood loss was none.    There was minimal non-obstructive coronary artery disease..    The procedure log was documented by Documenter: Aidan Sawant and verified by Carol Jama MD.    Date: 7/20/2023  Time: 9:03 PM      Other Most Recent Cardiology Results  Results for orders placed in visit on 03/16/23    Cardiac event monitor    Interpretation Summary  · Patient monitored for 13d 13h 4m  · The predominant rhythm is normal sinus rhythm  · 7,997 PACs with PAC burden of 1%  · 16,796 PVCs with PVC burden of 2%  · One short run of nonsustained ventricular tachycardia 8 beats at a rate of 183 beats per minute  · No atrial fibrillation, no heart block      REVIEW OF SYSTEMS: As noted in HPI   CARDIOVASCULAR: No recent chest pain, palpitations, arm/neck/jaw pain, or edema.  RESPIRATORY: No recent fever, cough, SOB.  : No blood in the urine  GI: No reflux, nausea, vomiting, or blood in stool.   MUSCULOSKELETAL: No falls.   NEURO: No headaches, syncope, or dizziness.  EYES: No sudden changes in vision.     Past Medical History:   Diagnosis Date    Anemia, chronic disease 12/28/2018    Arthritis     Back pain     radiates both legs mainly rt leg    Benign paroxysmal vertigo, bilateral     Diabetes mellitus     Diabetes mellitus, type 2     JANSEN (dyspnea on exertion)     GERD (gastroesophageal reflux disease)     Heart murmur      Hyperlipidemia     Hypertension     Iron deficiency anemia 04/18/2022    Neuropathy     Normocytic normochromic anemia 12/28/2018    Prostate cancer 08/20/2018    Sleep apnea     uses CPAP    Stroke     Thrombocytopenia 12/28/2018    Thyroid disease     Urinary frequency     Valvular regurgitation      Past Surgical History:   Procedure Laterality Date    ANGIOGRAM, CORONARY, WITH LEFT HEART CATHETERIZATION  7/20/2023    Procedure: Left Heart Cath;  Surgeon: Carol Jama MD;  Location: [facility] CATH;  Service: Cardiology;;    ANKLE SURGERY Left     APPENDECTOMY      BACK SURGERY  09/2019    CARDIAC CATHETERIZATION      CERVICAL FUSION      CORONARY ANGIOGRAPHY  7/20/2023    Procedure: Coronary angiogram study;  Surgeon: Carol Jama MD;  Location: [facility] CATH;  Service: Cardiology;;    EYE SURGERY      cataracts bilateral    GALLBLADDER SURGERY      HAND SURGERY Bilateral     rt hand x4 left x3    JOINT REPLACEMENT Bilateral     knee    KNEE SURGERY      8 on left knee and 7 on rt knee    NASAL SINUS SURGERY      x5    SHOULDER SURGERY Bilateral     3 on left 2 on right    TRANSRECTAL ULTRASOUND OF PROSTATE WITH INSERTION OF GOLD FIDUCIAL MARKER N/A 10/04/2018    Procedure: ULTRASOUND, PROSTATE, TRANSPERINEAL APPROACH, WITH GOLD FIDUCIAL MARKER INSERTION (with SPACEOAR transperineal biodegradable gel placement);  Surgeon: Manpreet Gordon MD;  Location: [facility] OR;  Service: Urology;  Laterality: N/A;     Social History     Tobacco Use    Smoking status: Never    Smokeless tobacco: Never   Substance Use Topics    Alcohol use: No    Drug use: No         Objective      Vitals:    08/21/23 1342   BP: (!) 148/74   Pulse: (!) 52       LAST EKG  Results for orders placed or performed during the hospital encounter of 07/20/23   EKG 12-lead    Collection Time: 07/20/23  1:38 PM    Narrative    Test Reason : R94.39,    Vent. Rate : 050 BPM     Atrial Rate : 050 BPM     P-R Int : 164 ms          QRS Dur : 096 ms      QT Int :  488 ms       P-R-T Axes : 038 005 028 degrees     QTc Int : 444 ms    Sinus bradycardia  Otherwise normal ECG  Confirmed by Edilson Portillo ([id number]) on 7/29/2023 10:47:54 PM    Referred By:             Confirmed By:Edilson Portillo     LIPIDS - LAST 2   Lab Results   Component Value Date    CHOL 92 (L) 06/20/2023    CHOL 201 (H) 04/13/2023    HDL 29 (L) 06/20/2023    HDL 30 (L) 04/13/2023    LDLCALC 12.2 (L) 06/20/2023    LDLCALC Invalid, Trig>400.0 04/13/2023    TRIG 254 (H) 06/20/2023    TRIG 519 (H) 04/13/2023    CHOLHDL 31.5 06/20/2023    CHOLHDL 14.9 (L) 04/13/2023     CARDIAC PROFILE - LAST 2  Lab Results   Component Value Date    BNP 39 07/25/2022    BNP 22 03/23/2022    TROPONINI <0.030 07/25/2022    TROPONINI <0.030 06/22/2021      CBC - LAST 2  Lab Results   Component Value Date    WBC 8.74 07/19/2023    WBC 9.36 06/20/2023    RBC 4.40 (L) 07/19/2023    RBC 4.56 (L) 06/20/2023    HGB 12.3 (L) 07/19/2023    HGB 12.3 (L) 06/20/2023    HCT 39.0 (L) 07/19/2023    HCT 39.3 (L) 06/20/2023     07/19/2023     06/20/2023     Lab Results   Component Value Date    LABPT 12.8 06/21/2021    LABPT 13.8 11/21/2020    INR 1.0 06/21/2021    INR 1.1 11/21/2020    APTT 26.2 06/21/2021     CHEMISTRY - LAST 2  Lab Results   Component Value Date     07/19/2023     06/20/2023    K 4.0 07/19/2023    K 4.6 06/20/2023    K 4.5 06/20/2023     07/19/2023     06/20/2023    CO2 26 07/19/2023    CO2 25 06/20/2023    ANIONGAP 6 (L) 07/19/2023    ANIONGAP 8 06/20/2023    BUN 17 07/19/2023    BUN 17 06/20/2023    CREATININE 1.1 07/19/2023    CREATININE 1.0 06/20/2023     (H) 07/19/2023     (H) 06/20/2023    CALCIUM 8.7 07/19/2023    CALCIUM 9.5 06/20/2023    MG 1.9 06/20/2023    MG 2.0 06/22/2021    ALBUMIN 3.7 07/19/2023    ALBUMIN 3.6 06/20/2023    PROT 6.7 07/19/2023    PROT 6.9 06/20/2023    ALKPHOS 56 07/19/2023    ALKPHOS 67 06/20/2023    ALT 17 07/19/2023    ALT 15 06/20/2023    AST 17  07/19/2023    AST 16 06/20/2023    BILITOT 0.5 07/19/2023    BILITOT 0.4 06/20/2023      ENDOCRINE - LAST 2  Lab Results   Component Value Date    HGBA1C 6.0 04/13/2023    HGBA1C 6.0 09/19/2022    TSH 1.310 07/24/2023    TSH 2.384 06/08/2022        PHYSICAL EXAM  CONSTITUTIONAL: Well built, well nourished in no apparent distress  NECK: no carotid bruit, no JVD  LUNGS: CTA  CHEST WALL: no tenderness  HEART: regular rate and rhythm, S1, S2 normal, no murmur, click, rub or gallop   ABDOMEN: soft, non-tender; bowel sounds normal; no masses,  no organomegaly  EXTREMITIES: Extremities normal, no edema, no calf tenderness noted  NEURO: AAO X 3    I HAVE REVIEWED :    The vital signs, most recent cardiac testing, and most recent pertinent non-cardiology provider notes.    Current Outpatient Medications   Medication Instructions    amitriptyline (ELAVIL) 50 MG tablet TAKE 1 TABLET BY MOUTH ONCE DAILY IN THE EVENING    amLODIPine (NORVASC) 10 mg, Oral, Daily    ascorbic acid (vitamin C) (VITAMIN C) 1,000 mg, Oral, 2 times daily    aspirin (ECOTRIN) 81 mg, Oral, Nightly    carvediloL (COREG) 25 MG tablet TAKE 1 TABLET BY MOUTH TWICE DAILY WITH MEALS    clonazePAM (KLONOPIN) 0.5 MG tablet Oral    cloNIDine (CATAPRES) 0.1 mg, Oral, Every 8 hours PRN    co-enzyme Q-10 30 mg, Oral, 3 times daily    cyanocobalamin (VITAMIN B-12) 1,000 mcg, Oral, Daily    diazePAM (VALIUM) 5 mg, Oral, 4 times daily PRN    doxycycline (VIBRA-TABS) 100 mg, Oral, 2 times daily    eplerenone (INSPRA) 25 mg, Oral, Daily    esomeprazole (NEXIUM) 40 mg, Oral, 2 times daily    fish oil-omega-3 fatty acids 300-1,000 mg capsule 2 capsules, Oral, Nightly    gabapentin (NEURONTIN) 300 mg, Oral, 3 times daily    hydrALAZINE (APRESOLINE) 100 MG tablet TAKE 1 TABLET BY MOUTH THREE TIMES DAILY    hydroCHLOROthiazide (HYDRODIURIL) 12.5 mg, Oral, Daily    ibuprofen (ADVIL,MOTRIN) 600 mg, Oral, Every 6 hours PRN    irbesartan (AVAPRO) 300 MG tablet No dose, route, or  frequency recorded.    iron-vitamin C 100-250 mg, ICAR-C, 100-250 mg Tab 1 tablet, Oral, Daily    levothyroxine (SYNTHROID) 50 mcg, Oral, Before breakfast    magnesium 250 mg Tab 1 tablet, Oral, Nightly    meclizine (ANTIVERT) 25 mg, Oral, Every 6 hours PRN    metFORMIN (GLUCOPHAGE-XR) 500 mg, Oral, With breakfast    olmesartan (BENICAR) 40 MG tablet Take 1 tablet by mouth once daily    potassium chloride SA (K-DUR,KLOR-CON) 20 MEQ tablet 20 mEq, Oral, 2 times daily    rosuvastatin (CRESTOR) 5 mg, Oral, Daily    sildenafiL (VIAGRA) 25 mg, Oral, Daily PRN, Take 1/2 to 1 tablet as needed for erectile dysfunction.    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     F/u as previously scheduled     Naomi Peters, PA-C [facility]   Office: [phone]

## 2023-08-21 NOTE — ASSESSMENT & PLAN NOTE
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

## 2023-09-21 ENCOUNTER — PATIENT MESSAGE (OUTPATIENT)
Dept: UROLOGY | Facility: CLINIC | Age: 73
End: 2023-09-21
Payer: MEDICARE

## 2023-09-22 ENCOUNTER — TELEPHONE (OUTPATIENT)
Dept: HEMATOLOGY/ONCOLOGY | Facility: CLINIC | Age: 73
End: 2023-09-22

## 2023-09-22 DIAGNOSIS — D64.9 ANEMIA, UNSPECIFIED TYPE: ICD-10-CM

## 2023-09-22 DIAGNOSIS — D50.9 IRON DEFICIENCY ANEMIA, UNSPECIFIED IRON DEFICIENCY ANEMIA TYPE: Primary | ICD-10-CM

## 2023-09-25 RX ORDER — OLMESARTAN MEDOXOMIL 40 MG/1
40 TABLET ORAL DAILY
Qty: 90 TABLET | Refills: 3 | Status: SHIPPED | OUTPATIENT
Start: 2023-09-25

## 2023-09-25 NOTE — TELEPHONE ENCOUNTER
No care due was identified.  [facility] Embedded Care Due Messages. Reference number: [id number].   9/25/2023 7:50:56 AM CDT

## 2023-10-02 ENCOUNTER — LAB VISIT (OUTPATIENT)
Dept: LAB | Facility: HOSPITAL | Age: 73
End: 2023-10-02
Attending: NURSE PRACTITIONER
Payer: MEDICARE

## 2023-10-02 DIAGNOSIS — C61 PROSTATE CANCER: ICD-10-CM

## 2023-10-02 LAB — COMPLEXED PSA SERPL-MCNC: 0.1 NG/ML (ref 0–4)

## 2023-10-02 PROCEDURE — 84153 ASSAY OF PSA TOTAL: CPT | Performed by: NURSE PRACTITIONER

## 2023-10-02 PROCEDURE — 84403 ASSAY OF TOTAL TESTOSTERONE: CPT | Performed by: NURSE PRACTITIONER

## 2023-10-02 RX ORDER — TERAZOSIN 10 MG/1
10 CAPSULE ORAL
Qty: 90 CAPSULE | Refills: 0 | OUTPATIENT
Start: 2023-10-02

## 2023-10-02 RX ORDER — AMITRIPTYLINE HYDROCHLORIDE 50 MG/1
50 TABLET, FILM COATED ORAL NIGHTLY
Qty: 90 TABLET | Refills: 0 | OUTPATIENT
Start: 2023-10-02

## 2023-10-02 RX ORDER — AMLODIPINE BESYLATE 5 MG/1
5 TABLET ORAL
Qty: 90 TABLET | Refills: 0 | OUTPATIENT
Start: 2023-10-02

## 2023-10-02 NOTE — TELEPHONE ENCOUNTER
Refill Decision Note   Chris Hill  is requesting a refill authorization.  Brief Assessment and Rationale for Refill:  Quick Discontinue     Medication Therapy Plan:  Duplicate Request-  medications pended in previous encounter, awaiting assessment      Comments:     Note composed:1:34 PM 10/02/2023             Appointments     Last Visit   7/24/2023 Samy Pettit III, MD   Next Visit   10/24/2023 Samy Pettit III, MD

## 2023-10-02 NOTE — TELEPHONE ENCOUNTER
No care due was identified.  [facility] Embedded Care Due Messages. Reference number: [id number].   10/02/2023 9:28:06 AM CDT

## 2023-10-04 LAB — TESTOST SERPL-MCNC: 215 NG/DL (ref 264–916)

## 2023-10-16 RX ORDER — LEVOTHYROXINE SODIUM 50 UG/1
50 TABLET ORAL
Qty: 90 TABLET | Refills: 3 | Status: SHIPPED | OUTPATIENT
Start: 2023-10-16

## 2023-10-16 RX ORDER — HYDRALAZINE HYDROCHLORIDE 100 MG/1
100 TABLET, FILM COATED ORAL 3 TIMES DAILY
Qty: 270 TABLET | Refills: 0 | Status: SHIPPED | OUTPATIENT
Start: 2023-10-16

## 2023-10-16 RX ORDER — LEVOTHYROXINE SODIUM 50 UG/1
50 TABLET ORAL
Qty: 90 TABLET | Refills: 2 | Status: SHIPPED | OUTPATIENT
Start: 2023-10-16 | End: 2024-10-15

## 2023-10-16 NOTE — TELEPHONE ENCOUNTER
No care due was identified.  Health [facility] Embedded Care Due Messages. Reference number: [id number].   10/16/2023 6:04:33 PM CDT

## 2023-10-16 NOTE — TELEPHONE ENCOUNTER
Refill Decision Note   Chris Hill  is requesting a refill authorization.  Brief Assessment and Rationale for Refill:  Approve     Medication Therapy Plan:  TSH-WNL      Comments:     Note composed:6:38 PM 10/16/2023             Appointments     Last Visit   7/24/2023 Samy Pettit III, MD   Next Visit   10/24/2023 Samy Pettit III, MD

## 2023-10-16 NOTE — TELEPHONE ENCOUNTER
No care due was identified.  Health [facility] Embedded Care Due Messages. Reference number: [id number].   10/16/2023 6:01:28 PM CDT

## 2023-10-17 ENCOUNTER — PATIENT MESSAGE (OUTPATIENT)
Dept: FAMILY MEDICINE | Facility: CLINIC | Age: 73
End: 2023-10-17
Payer: MEDICARE

## 2023-10-17 ENCOUNTER — LAB VISIT (OUTPATIENT)
Dept: LAB | Facility: HOSPITAL | Age: 73
End: 2023-10-17
Attending: INTERNAL MEDICINE
Payer: MEDICARE

## 2023-10-17 DIAGNOSIS — D64.9 ANEMIA, UNSPECIFIED TYPE: ICD-10-CM

## 2023-10-17 DIAGNOSIS — D50.9 IRON DEFICIENCY ANEMIA, UNSPECIFIED IRON DEFICIENCY ANEMIA TYPE: ICD-10-CM

## 2023-10-17 LAB
ALBUMIN SERPL BCP-MCNC: 3.9 G/DL (ref 3.5–5.2)
ALP SERPL-CCNC: 60 U/L (ref 55–135)
ALT SERPL W/O P-5'-P-CCNC: 42 U/L (ref 10–44)
ANION GAP SERPL CALC-SCNC: 6 MMOL/L (ref 8–16)
AST SERPL-CCNC: 25 U/L (ref 10–40)
BASOPHILS # BLD AUTO: 0.04 K/UL (ref 0–0.2)
BASOPHILS NFR BLD: 0.5 % (ref 0–1.9)
BILIRUB SERPL-MCNC: 0.3 MG/DL (ref 0.1–1)
BUN SERPL-MCNC: 18 MG/DL (ref 8–23)
CALCIUM SERPL-MCNC: 8.5 MG/DL (ref 8.7–10.5)
CHLORIDE SERPL-SCNC: 105 MMOL/L (ref 95–110)
CO2 SERPL-SCNC: 30 MMOL/L (ref 23–29)
CREAT SERPL-MCNC: 1.1 MG/DL (ref 0.5–1.4)
DIFFERENTIAL METHOD: ABNORMAL
EOSINOPHIL # BLD AUTO: 0.5 K/UL (ref 0–0.5)
EOSINOPHIL NFR BLD: 7.2 % (ref 0–8)
ERYTHROCYTE [DISTWIDTH] IN BLOOD BY AUTOMATED COUNT: 13.7 % (ref 11.5–14.5)
EST. GFR  (NO RACE VARIABLE): >60 ML/MIN/1.73 M^2
FERRITIN SERPL-MCNC: 172.5 NG/ML (ref 20–300)
GLUCOSE SERPL-MCNC: 100 MG/DL (ref 70–110)
HCT VFR BLD AUTO: 38.5 % (ref 40–54)
HGB BLD-MCNC: 12.1 G/DL (ref 14–18)
IMM GRANULOCYTES # BLD AUTO: 0.07 K/UL (ref 0–0.04)
IMM GRANULOCYTES NFR BLD AUTO: 1 % (ref 0–0.5)
IRON SERPL-MCNC: 66 UG/DL (ref 45–160)
LYMPHOCYTES # BLD AUTO: 1.5 K/UL (ref 1–4.8)
LYMPHOCYTES NFR BLD: 20.1 % (ref 18–48)
MCH RBC QN AUTO: 27.8 PG (ref 27–31)
MCHC RBC AUTO-ENTMCNC: 31.4 G/DL (ref 32–36)
MCV RBC AUTO: 88 FL (ref 82–98)
MONOCYTES # BLD AUTO: 0.6 K/UL (ref 0.3–1)
MONOCYTES NFR BLD: 8.6 % (ref 4–15)
NEUTROPHILS # BLD AUTO: 4.6 K/UL (ref 1.8–7.7)
NEUTROPHILS NFR BLD: 62.6 % (ref 38–73)
NRBC BLD-RTO: 0 /100 WBC
PLATELET # BLD AUTO: 119 K/UL (ref 150–450)
PMV BLD AUTO: 12.3 FL (ref 9.2–12.9)
POTASSIUM SERPL-SCNC: 4 MMOL/L (ref 3.5–5.1)
PROT SERPL-MCNC: 6.3 G/DL (ref 6–8.4)
RBC # BLD AUTO: 4.36 M/UL (ref 4.6–6.2)
SATURATED IRON: 26 % (ref 20–50)
SODIUM SERPL-SCNC: 141 MMOL/L (ref 136–145)
TOTAL IRON BINDING CAPACITY: 255 UG/DL (ref 250–450)
TRANSFERRIN SERPL-MCNC: 182 MG/DL (ref 200–375)
WBC # BLD AUTO: 7.32 K/UL (ref 3.9–12.7)

## 2023-10-17 PROCEDURE — 84466 ASSAY OF TRANSFERRIN: CPT | Performed by: INTERNAL MEDICINE

## 2023-10-17 PROCEDURE — 36415 COLL VENOUS BLD VENIPUNCTURE: CPT | Performed by: INTERNAL MEDICINE

## 2023-10-17 PROCEDURE — 83540 ASSAY OF IRON: CPT | Performed by: INTERNAL MEDICINE

## 2023-10-17 PROCEDURE — 82728 ASSAY OF FERRITIN: CPT | Performed by: INTERNAL MEDICINE

## 2023-10-17 PROCEDURE — 85025 COMPLETE CBC W/AUTO DIFF WBC: CPT | Performed by: INTERNAL MEDICINE

## 2023-10-17 PROCEDURE — 80053 COMPREHEN METABOLIC PANEL: CPT | Performed by: INTERNAL MEDICINE

## 2023-10-17 NOTE — PROGRESS NOTES
[facility] Hematology/Oncology  PROGRESS NOTE -  Follow-up Visit      Subjective:       Patient ID:   NAME: Chris Hill Jr. : 1950     73 y.o. male    Referring Doc: Tyrell  Other Physicians: Manpreet Gordon; Wiley; Wilver; Dominic        Chief Complaint:  prostate cancer; anem/tcp f/u         History of Present Illness:     Patient is being seen today in person..The patient is on today to go over the results of the recently ordered labs, tests and studies.  He is here with his wife.     He recently figured out that his gabapentin was causing his dizziness and he has since discontinued the drug    He is continued on oral iron and B12    He had an angiogram  with Dr Jama with minimal blockages    He saw Dr Pettit in 2023 and is seeing him again on Tuesday    His PSA has since declined to 0.1 and he has been followed by Randa/Heidi    He denies any CP, SOB, HA's or N/V. He had nasal surgery in Aug 2023      He previously had colonoscopy with Dr Alfaro in 2022 with only 3 polyps removed          I discussed COVID19 precautions - he had his vaccinations          ROS:   GEN: normal without any fever, night sweats or weight loss;no further dizzy spells  HEENT: normal with no HA's, sore throat, stiff neck, changes in vision  CV: normal with no CP, SOB, PND, AJNSEN or orthopnea  PULM: normal with no SOB, cough, hemoptysis, sputum or pleuritic pain  GI: extreme reflux issues  : normal with no hematuria, dysuria   SKIN: normal with no rash, erythema, bruising, or swelling    Allergies:  Review of patient's allergies indicates:  No Known Allergies    Medications:    Current Outpatient Medications:     amitriptyline (ELAVIL) 50 MG tablet, Take 1 tablet (50 mg total) by mouth every evening., Disp: 90 tablet, Rfl: 3    amLODIPine (NORVASC) 10 MG tablet, Take 1 tablet (10 mg total) by mouth once daily., Disp: 90 tablet, Rfl: 3    ascorbic acid, vitamin C, (VITAMIN C) 1000 MG tablet, Take 1,000 mg by mouth 2 (two)  times daily., Disp: , Rfl:     aspirin (ECOTRIN) 81 MG EC tablet, Take 81 mg by mouth every evening., Disp: , Rfl:     carvediloL (COREG) 25 MG tablet, TAKE 1 TABLET BY MOUTH TWICE DAILY WITH MEALS, Disp: 180 tablet, Rfl: 3    clonazePAM (KLONOPIN) 0.5 MG tablet, Take by mouth., Disp: , Rfl:     co-enzyme Q-10 30 mg capsule, Take 30 mg by mouth 3 (three) times daily., Disp: , Rfl:     cyanocobalamin (VITAMIN B-12) 100 MCG tablet, Take 1,000 mcg by mouth once daily., Disp: , Rfl:     diazePAM (VALIUM) 5 MG tablet, Take 5 mg by mouth 4 (four) times daily as needed., Disp: , Rfl:     doxycycline (VIBRA-TABS) 100 MG tablet, Take 1 tablet (100 mg total) by mouth 2 (two) times daily., Disp: 20 tablet, Rfl: 0    eplerenone (INSPRA) 25 MG Tab, Take 1 tablet (25 mg total) by mouth once daily., Disp: 30 tablet, Rfl: 3    esomeprazole (NEXIUM) 40 MG capsule, Take 40 mg by mouth 2 (two) times daily., Disp: , Rfl:     fish oil-omega-3 fatty acids 300-1,000 mg capsule, Take 2 capsules by mouth every evening., Disp: , Rfl:     gabapentin (NEURONTIN) 300 MG capsule, Take 300 mg by mouth 3 (three) times daily., Disp: , Rfl:     hydrALAZINE (APRESOLINE) 100 MG tablet, Take 1 tablet (100 mg total) by mouth 3 (three) times daily., Disp: 270 tablet, Rfl: 0    hydroCHLOROthiazide (HYDRODIURIL) 12.5 MG Tab, Take 12.5 mg by mouth once daily., Disp: , Rfl:     ibuprofen (ADVIL,MOTRIN) 600 MG tablet, Take 1 tablet (600 mg total) by mouth every 6 (six) hours as needed for Pain., Disp: 20 tablet, Rfl: 0    irbesartan (AVAPRO) 300 MG tablet, , Disp: , Rfl:     iron-vitamin C 100-250 mg, ICAR-C, 100-250 mg Tab, Take 1 tablet by mouth once daily, Disp: 30 tablet, Rfl: 0    levothyroxine (SYNTHROID) 50 MCG tablet, Take 1 tablet (50 mcg total) by mouth before breakfast., Disp: 90 tablet, Rfl: 2    levothyroxine (SYNTHROID) 50 MCG tablet, TAKE 1 TABLET BY MOUTH BEFORE BREAKFAST, Disp: 90 tablet, Rfl: 3    magnesium 250 mg Tab, Take 1 tablet by mouth  "every evening., Disp: , Rfl:     meclizine (ANTIVERT) 25 mg tablet, Take 25 mg by mouth every 6 (six) hours as needed., Disp: , Rfl:     metFORMIN (GLUCOPHAGE-XR) 500 MG ER 24hr tablet, Take 1 tablet (500 mg total) by mouth daily with breakfast., Disp: 90 tablet, Rfl: 1    olmesartan (BENICAR) 40 MG tablet, Take 1 tablet (40 mg total) by mouth once daily., Disp: 90 tablet, Rfl: 3    potassium chloride SA (K-DUR,KLOR-CON) 20 MEQ tablet, Take 1 tablet (20 mEq total) by mouth 2 (two) times daily., Disp: 180 tablet, Rfl: 3    rosuvastatin (CRESTOR) 5 MG tablet, Take 1 tablet (5 mg total) by mouth once daily., Disp: 90 tablet, Rfl: 1    terazosin (HYTRIN) 10 MG capsule, Take 1 capsule (10 mg total) by mouth every evening., Disp: 90 capsule, Rfl: 3    vitamin D (VITAMIN D3) 1000 units Tab, Take 1,000 Units by mouth 2 (two) times a day., Disp: , Rfl:     cloNIDine (CATAPRES) 0.1 MG tablet, Take 1 tablet (0.1 mg total) by mouth every 8 (eight) hours as needed (SBP greater than 180)., Disp: 30 tablet, Rfl: 0    sildenafiL (VIAGRA) 25 MG tablet, Take 1 tablet (25 mg total) by mouth daily as needed for Erectile Dysfunction. Take 1/2 to 1 tablet as needed for erectile dysfunction., Disp: 10 tablet, Rfl: 0    Current Facility-Administered Medications:     sodium chloride 0.9% flush 10 mL, 10 mL, Intravenous, PRN, Carol Jama MD    PMHx/PSHx Updates:  See patient's last visit with me on  7/20/2023  See H&P on 12/28/2018        Pathology:  Cancer Staging  No matching staging information was found for the patient.          Objective:     Vitals:  Blood pressure (!) 165/74, pulse (!) 53, temperature 97.9 °F (36.6 °C), resp. rate 16, height 5' 6" (1.676 m), weight 102.8 kg (226 lb 9.6 oz).        Physical Examination:   GEN: no apparent distress, comfortable; AAOx3; overweight  HEAD: atraumatic and normocephalic  EYES: no conjunctival pallor or muddiness, no icterus; normal pupil reaction to ambient light  ENT: OMM, no pharyngeal " erythema, external bilateral ears WNL; no visible thrush or ulcers  NECK: no masses or swelling, trachea midline, no visible LAD/LN's   CV: no palpitations; no pedal edema; no noticeable JVD or neck vein distension; pedal swelling resolved  CHEST: Normal respiratory effort; chest wall breath movements symmetrical; no audible wheezing  ABDOM: non-distended; no bloating  MUSC/Skeletal: ROM normal; joints visibly normal; no deformities; positive arthropathy in hands  EXTREM: no clubbing, cyanosis, inflammation or swelling  SKIN: no rashes, lesions, ulcers, petechiae or subcutaneous nodules;   : no ellison  NEURO: moving all 4 extremities; AAOx3; no tremors  PSYCH: normal mood, affect and behavior  LYMPH: no visible LN's or LAD              Labs:        Lab Results   Component Value Date    WBC 7.32 10/17/2023    HGB 12.1 (L) 10/17/2023    HCT 38.5 (L) 10/17/2023    MCV 88 10/17/2023     (L) 10/17/2023     CMP  Sodium   Date Value Ref Range Status   10/17/2023 141 136 - 145 mmol/L Final   04/12/2019 138 134 - 144 mmol/L      Potassium   Date Value Ref Range Status   10/17/2023 4.0 3.5 - 5.1 mmol/L Final     Chloride   Date Value Ref Range Status   10/17/2023 105 95 - 110 mmol/L Final   04/12/2019 99 98 - 110 mmol/L      CO2   Date Value Ref Range Status   10/17/2023 30 (H) 23 - 29 mmol/L Final     Glucose   Date Value Ref Range Status   10/17/2023 100 70 - 110 mg/dL Final   04/12/2019 117 (H) 70 - 99 mg/dL      BUN   Date Value Ref Range Status   10/17/2023 18 8 - 23 mg/dL Final     Creatinine   Date Value Ref Range Status   10/17/2023 1.1 0.5 - 1.4 mg/dL Final   04/12/2019 0.72 0.60 - 1.40 mg/dL      Calcium   Date Value Ref Range Status   10/17/2023 8.5 (L) 8.7 - 10.5 mg/dL Final     Total Protein   Date Value Ref Range Status   10/17/2023 6.3 6.0 - 8.4 g/dL Final     Albumin   Date Value Ref Range Status   10/17/2023 3.9 3.5 - 5.2 g/dL Final   04/12/2019 3.3 3.1 - 4.7 g/dL      Total Bilirubin   Date Value Ref  Range Status   10/17/2023 0.3 0.1 - 1.0 mg/dL Final     Comment:     For infants and newborns, interpretation of results should be based  on gestational age, weight and in agreement with clinical  observations.    Premature Infant recommended reference ranges:  Up to 24 hours.............<8.0 mg/dL  Up to 48 hours............<12.0 mg/dL  3-5 days..................<15.0 mg/dL  6-29 days.................<15.0 mg/dL       Alkaline Phosphatase   Date Value Ref Range Status   10/17/2023 60 55 - 135 U/L Final     AST   Date Value Ref Range Status   10/17/2023 25 10 - 40 U/L Final     ALT   Date Value Ref Range Status   10/17/2023 42 10 - 44 U/L Final     Anion Gap   Date Value Ref Range Status   10/17/2023 6 (L) 8 - 16 mmol/L Final     eGFR if    Date Value Ref Range Status   07/25/2022 >60.0 >60 mL/min/1.73 m^2 Final     eGFR if non    Date Value Ref Range Status   07/25/2022 >60.0 >60 mL/min/1.73 m^2 Final     Comment:     Calculation used to obtain the estimated glomerular filtration  rate (eGFR) is the CKD-EPI equation.        Lab Results   Component Value Date    IRON 66 10/17/2023    TIBC 255 10/17/2023    FERRITIN 172.5 10/17/2023            Radiology/Diagnostic Studies:    Us Abdomen Complete    Result Date: 1/2/2019  Abdominal ultrasound Clinical history is anemia, prostate cancer The pancreas, aorta and IVC are normal. The liver is hyperechoic compatible with fatty infiltration. There is hepatopedal flow within the portal vein. The common bile duct measures 6 mm. The patient has had a cholecystectomy. The kidneys are normal in size and echogenicity. The spleen is normal in size measuring 12 cm. IMPRESSION: Fatty infiltration of the liver otherwise negative abdominal ultrasound Read and electronically signed by: Marry Hernandez MD on 1/2/2019 9:49 AM CST MARRY HERNANDEZ MD      I have reviewed all available lab results and radiology reports.    Assessment/Plan:   (1) 73 y.o.  male with diagnosis of prostate cancer who has been referred by Simone Santillan Jr., MD for evaluation by medical oncology. Patient with a high risk adenocarcinoma of the prostate with [id number] with GS of 4+5.   - he started androgen depravation therapy and monotherapy XRT (IMRT)  - followed by Dr Gordon with  and currently on Trelstar  - followed by Dr Santillan with Rad/onc and completed XRt on 12/18/2018  - latest PSA at 0.01 in May 2020 and he had his last lupron shot done in April/May 2020  - he sees Dr Gordon again in Nov 2020 1/13/2021:  - He saw Dr Gordon again in Nov 2020 and they are repeating his PSA next month with plans to repeat every 3 months for now.     4/13/2021:  - seeing Dr Gordon with labs in near future  - PSA on 2/23/2021 was 0.01    9/28/2021:  - He sees Dr Gordon again in Dec 2021 and the latest PSA was a little higher at 0.03; he has been off the ADT for about a year  - discussed and will make referral to Dr Andrea Scanlon at [facility] with -Oncology    12/28/2021:  - he did not see Dr Scanlon at [facility] as of yet - will check on the referral  - He was supposed to f/u with Dr Gordon again in Dec 2021 but it was cancelled; he has been off the ADT for over a year; he sees  again in June 2022 and Dr Santillan again in Jan 2022  - PSA down to 0.01 now and good    4/18/2022:  - he sees Dr Gordon again in June 2022 7/18/2022:  - mild increase in PSA up to 0.07 from 0.01  - planned repeat level in 3 months per  with follow-up with Dr Gordon again in Aug 2022  - he never did go see Dr Scanlon    11/15/2022:  - His latest PSA was 0.07 and he sees Dr Gordon again in Dec 2022    7/20/2023:  - His PSA has increased to 0.14 and he is seeing Dr Gordon; he saw O'Rufina last week. He sees Dr Gordon again in 3 months. He is planning to see Dr Santillan again in near future  -  planning to repeat PSA again in 3 months    10/19/2023:  - PSA back down to 0.1  - followed by Dr Gordon/[place]       (2) CAD and  non-rheumatic MR; mild CVA in 1993; Hypertensive Heart disease - followed by Dr Jama with cardiology     (3) HTN and hypercholesterolemia     (4) LORA     (5) DM    (6) Chronic back pain issues - required recent lumbar surgery with Dr Chris Fofana at [facility]     (7) Hx/of nightly fevers     (8) Chronic diarhhea - followed by Dr Alfaro with GI and s/p recent endoscopies     (9) Mild thrombocytopenia resolved with last platelet count down at 141,000  - no history of hepatitis or liver problems; no alcoholism  - he has positive antiplatelet antibody screens both direct and indirect consistent with an underlying chronic ITP condition  - his flow cytometry showed no evidence of leukemia but he did have a relative increase in eosinophils    1/13/2021:  - latest platelet count at 129,000 which should be adequate for most surgeries    4/13/2021:  - labs pending    9/28/2021:  - latest platelet count is WNL    12/28/2021:  - latest plat wnl    7/18/2022:  - latest plats wnl at 158,000    11/15/2022:  - plats are good at 159,000    7/20/2023:  - plats currently WNL - 177,000     (10) Mild anemia with latest hgb at 11.5 and stable   - NCNC parameters  - iron panel was WNL with recent labs  - OBS was negative on 11/15  - hx/of colon polyps in past  - followed by Dr Alfaro with GI with planned repeat endoscopies in near future    1/13/2021:  - hgb at 11.5 with normal iron panel; stable    4/13/2021:  - labs pending    9/28/2021:  - latest hgb at 11.5 and relatively stable  - iron panel is adequate    12/28/2021:  - latest hgb at 11.1 and adequate  - iron panel wnl    4/18/2022:  - hgb at 10.7 and a little lower  - his iron was a little low despite the oral iron  - discussed IV iron and he is agreeable    7/18/2022:  - hgb better at 12.5  - s/p IV iron x2  - on oral iron    11/15/2022:  - latest hgb at 12.5 and iorn panel stable  - continued on oral iron    7/20/2023:  - hgb at 12.3  - iron panel adequate  - on oral iron  daily    10/19/2023:  - latest hgb at 12.1  - iron panel adequate  - plats at 119,000 and a little lower this time    (11) Esophageal polyp   - He had scope with EGD with Dr Alfaroand they found an esophageal polyp which was cauterized. He saw Dr Santillan  and he recommended that the patient have the polyp removed.   - he is having repeat EGD to re-evaluate the polyp next month      1/13/2021:  -He previously had scope with EGD with Dr Alfaro  and they found an esophageal polyp which was cauterized.  -  He subsequently saw Dr Santillan and he recommended that the patient have the polyp removed.   - He had repeat EGD but it was incomplete due residual gastric contents and it was to be rescheduled for the following month but he did not have it done as of yet.    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 F/u with rad/onc, GI and  directives  2. Check CBC/plat/iron panel every 3 months for now    3. F/u with PCP, , Rad/onc etc   4. continue ICAR-C daily po and set up IV irons as needed  5. F/u with neurology  6. Check up to date B12/folate with next labs       RTC in 6 months    Fax note to Wilver Santillan Clinton H. III, MD, and Wiley Gordon Albright; Mitchell Scanlon    Discussion:       Total Time spent on patient:    I spent over 15 mins of time with the patient. Reviewed results of the recently ordered labs, tests, reports and studies; made directives with regards to the results. Over half of this time was spent couseling and coordinating care, making treatment and analytical decisions; ordering  necessary labs, tests and studies; and discussing treatment options and setting up treatment plan(s) if indicated.        COVID-19 Discussion:    I had long discussion with patient and any applicable family about the COVID-19 coronavirus epidemic and the recommended precautions with regard to cancer and/or hematology patients. I have re-iterated the CDC recommendations for adequate hand washing, use of hand -like products, and coughing into elbow, etc. In addition, especially for our patients who are on chemotherapy and/or our otherwise immunocompromised patients, I have recommended avoidance of crowds, including movie theaters, restaurants, churches, etc. I have recommended avoidance of any sick or symptomatic family members and/or friends. I have also recommended avoidance of any raw and unwashed food products, and general avoidance of food items that have not been prepared by themselves. The patient has been asked to call us immediately with any symptom developments, issues, questions or other general concerns.       Iron Infusion Therapy Discussion:     I provided literature/learning materials on the particular IV iron regimen and discussed the potential side-effect profiles of the drug(s). I discussed the importance of compliance with obtaining and monitoring requested lab work, and went over the potential risk for the development of anaphylactic shock, bronchospasm, dysrhythmia, liver and/or kidney damage, and respiratory/cardiovascular arrest and/or failure. I discussed the potential risks for development of alopecia, fevers, itching, chills and/or rigors, cold sensory issues, ringing in ears, vertigo and neuropathy, all of which are usually acute but sometimes could end up being chronic and life-long. I discussed the risks of hand-foot syndrome and rashes, and development of other autoimmune mediated processes such as pneumonitis and colitis which could be life threatening.     The patient's consent  has been obtained to proceed with the IV iron therapy.The patient will be referred to [facility] for training and education on IV iron therapy, use of antiemetics and/or anti-diarrheals, use of NSAID's, potential IV iron therapy side-effects, and any specific recommendations and precautions with the particular IV iron agents.      I answered all of the patient's (and family's, if applicable) questions to the best of my ability and to their complete satisfaction. The patient acknowledged full understanding of the risks, recommendations and plan(s).         I have explained all of the above in detail and the patient understands all of the current recommendation(s). I have answered all of their questions to the best of my ability and to their complete satisfaction.   The patient is to continue with the current management plan.            Electronically signed by Cyrus Gibson MD

## 2023-10-19 ENCOUNTER — OFFICE VISIT (OUTPATIENT)
Dept: HEMATOLOGY/ONCOLOGY | Facility: CLINIC | Age: 73
End: 2023-10-19
Payer: MEDICARE

## 2023-10-19 VITALS
DIASTOLIC BLOOD PRESSURE: 74 MMHG | RESPIRATION RATE: 16 BRPM | TEMPERATURE: 98 F | HEIGHT: 66 IN | HEART RATE: 53 BPM | SYSTOLIC BLOOD PRESSURE: 165 MMHG | WEIGHT: 226.63 LBS | BODY MASS INDEX: 36.42 KG/M2

## 2023-10-19 DIAGNOSIS — E53.8 VITAMIN B12 DEFICIENCY: ICD-10-CM

## 2023-10-19 DIAGNOSIS — D64.9 ANEMIA, UNSPECIFIED TYPE: ICD-10-CM

## 2023-10-19 DIAGNOSIS — D50.9 IRON DEFICIENCY ANEMIA, UNSPECIFIED IRON DEFICIENCY ANEMIA TYPE: Primary | ICD-10-CM

## 2023-10-19 DIAGNOSIS — Z85.46 HISTORY OF PROSTATE CANCER: Chronic | ICD-10-CM

## 2023-10-19 PROCEDURE — 1126F PR PAIN SEVERITY QUANTIFIED, NO PAIN PRESENT: ICD-10-PCS | Mod: CPTII,S$GLB,, | Performed by: INTERNAL MEDICINE

## 2023-10-19 PROCEDURE — 3288F PR FALLS RISK ASSESSMENT DOCUMENTED: ICD-10-PCS | Mod: CPTII,S$GLB,, | Performed by: INTERNAL MEDICINE

## 2023-10-19 PROCEDURE — 3077F SYST BP >= 140 MM HG: CPT | Mod: CPTII,S$GLB,, | Performed by: INTERNAL MEDICINE

## 2023-10-19 PROCEDURE — 3077F PR MOST RECENT SYSTOLIC BLOOD PRESSURE >= 140 MM HG: ICD-10-PCS | Mod: CPTII,S$GLB,, | Performed by: INTERNAL MEDICINE

## 2023-10-19 PROCEDURE — 3008F PR BODY MASS INDEX (BMI) DOCUMENTED: ICD-10-PCS | Mod: CPTII,S$GLB,, | Performed by: INTERNAL MEDICINE

## 2023-10-19 PROCEDURE — 3044F PR MOST RECENT HEMOGLOBIN A1C LEVEL <7.0%: ICD-10-PCS | Mod: CPTII,S$GLB,, | Performed by: INTERNAL MEDICINE

## 2023-10-19 PROCEDURE — 4010F PR ACE/ARB THEARPY RXD/TAKEN: ICD-10-PCS | Mod: CPTII,S$GLB,, | Performed by: INTERNAL MEDICINE

## 2023-10-19 PROCEDURE — 1159F PR MEDICATION LIST DOCUMENTED IN MEDICAL RECORD: ICD-10-PCS | Mod: CPTII,S$GLB,, | Performed by: INTERNAL MEDICINE

## 2023-10-19 PROCEDURE — 3044F HG A1C LEVEL LT 7.0%: CPT | Mod: CPTII,S$GLB,, | Performed by: INTERNAL MEDICINE

## 2023-10-19 PROCEDURE — 1159F MED LIST DOCD IN RCRD: CPT | Mod: CPTII,S$GLB,, | Performed by: INTERNAL MEDICINE

## 2023-10-19 PROCEDURE — 3078F DIAST BP <80 MM HG: CPT | Mod: CPTII,S$GLB,, | Performed by: INTERNAL MEDICINE

## 2023-10-19 PROCEDURE — 3078F PR MOST RECENT DIASTOLIC BLOOD PRESSURE < 80 MM HG: ICD-10-PCS | Mod: CPTII,S$GLB,, | Performed by: INTERNAL MEDICINE

## 2023-10-19 PROCEDURE — 99213 PR OFFICE/OUTPT VISIT, EST, LEVL III, 20-29 MIN: ICD-10-PCS | Mod: S$GLB,,, | Performed by: INTERNAL MEDICINE

## 2023-10-19 PROCEDURE — 3008F BODY MASS INDEX DOCD: CPT | Mod: CPTII,S$GLB,, | Performed by: INTERNAL MEDICINE

## 2023-10-19 PROCEDURE — 1160F PR REVIEW ALL MEDS BY PRESCRIBER/CLIN PHARMACIST DOCUMENTED: ICD-10-PCS | Mod: CPTII,S$GLB,, | Performed by: INTERNAL MEDICINE

## 2023-10-19 PROCEDURE — 1126F AMNT PAIN NOTED NONE PRSNT: CPT | Mod: CPTII,S$GLB,, | Performed by: INTERNAL MEDICINE

## 2023-10-19 PROCEDURE — 4010F ACE/ARB THERAPY RXD/TAKEN: CPT | Mod: CPTII,S$GLB,, | Performed by: INTERNAL MEDICINE

## 2023-10-19 PROCEDURE — 1101F PR PT FALLS ASSESS DOC 0-1 FALLS W/OUT INJ PAST YR: ICD-10-PCS | Mod: CPTII,S$GLB,, | Performed by: INTERNAL MEDICINE

## 2023-10-19 PROCEDURE — 1101F PT FALLS ASSESS-DOCD LE1/YR: CPT | Mod: CPTII,S$GLB,, | Performed by: INTERNAL MEDICINE

## 2023-10-19 PROCEDURE — 3288F FALL RISK ASSESSMENT DOCD: CPT | Mod: CPTII,S$GLB,, | Performed by: INTERNAL MEDICINE

## 2023-10-19 PROCEDURE — 1160F RVW MEDS BY RX/DR IN RCRD: CPT | Mod: CPTII,S$GLB,, | Performed by: INTERNAL MEDICINE

## 2023-10-19 PROCEDURE — 99213 OFFICE O/P EST LOW 20 MIN: CPT | Mod: S$GLB,,, | Performed by: INTERNAL MEDICINE

## 2023-10-20 ENCOUNTER — LAB VISIT (OUTPATIENT)
Dept: LAB | Facility: HOSPITAL | Age: 73
End: 2023-10-20
Attending: INTERNAL MEDICINE
Payer: MEDICARE

## 2023-10-20 DIAGNOSIS — D50.9 IRON DEFICIENCY ANEMIA, UNSPECIFIED IRON DEFICIENCY ANEMIA TYPE: ICD-10-CM

## 2023-10-20 DIAGNOSIS — E53.8 VITAMIN B12 DEFICIENCY: ICD-10-CM

## 2023-10-20 DIAGNOSIS — D64.9 ANEMIA, UNSPECIFIED TYPE: ICD-10-CM

## 2023-10-20 LAB
ALBUMIN SERPL BCP-MCNC: 3.9 G/DL (ref 3.5–5.2)
ALP SERPL-CCNC: 68 U/L (ref 55–135)
ALT SERPL W/O P-5'-P-CCNC: 24 U/L (ref 10–44)
ANION GAP SERPL CALC-SCNC: 7 MMOL/L (ref 8–16)
AST SERPL-CCNC: 16 U/L (ref 10–40)
BASOPHILS # BLD AUTO: 0.04 K/UL (ref 0–0.2)
BASOPHILS NFR BLD: 0.5 % (ref 0–1.9)
BILIRUB SERPL-MCNC: 0.3 MG/DL (ref 0.1–1)
BUN SERPL-MCNC: 16 MG/DL (ref 8–23)
CALCIUM SERPL-MCNC: 9.2 MG/DL (ref 8.7–10.5)
CHLORIDE SERPL-SCNC: 103 MMOL/L (ref 95–110)
CHOLEST SERPL-MCNC: 120 MG/DL (ref 120–199)
CHOLEST/HDLC SERPL: 3.5 {RATIO} (ref 2–5)
CO2 SERPL-SCNC: 28 MMOL/L (ref 23–29)
CREAT SERPL-MCNC: 1 MG/DL (ref 0.5–1.4)
DIFFERENTIAL METHOD: ABNORMAL
EOSINOPHIL # BLD AUTO: 0.5 K/UL (ref 0–0.5)
EOSINOPHIL NFR BLD: 7 % (ref 0–8)
ERYTHROCYTE [DISTWIDTH] IN BLOOD BY AUTOMATED COUNT: 13.6 % (ref 11.5–14.5)
EST. GFR  (NO RACE VARIABLE): >60 ML/MIN/1.73 M^2
FERRITIN SERPL-MCNC: 192.6 NG/ML (ref 20–300)
FOLATE SERPL-MCNC: 9.4 NG/ML (ref 4–24)
GLUCOSE SERPL-MCNC: 118 MG/DL (ref 70–110)
HCT VFR BLD AUTO: 41.8 % (ref 40–54)
HDLC SERPL-MCNC: 34 MG/DL (ref 40–75)
HDLC SERPL: 28.3 % (ref 20–50)
HGB BLD-MCNC: 13.2 G/DL (ref 14–18)
IMM GRANULOCYTES # BLD AUTO: 0.05 K/UL (ref 0–0.04)
IMM GRANULOCYTES NFR BLD AUTO: 0.7 % (ref 0–0.5)
IRON SERPL-MCNC: 138 UG/DL (ref 45–160)
LDLC SERPL CALC-MCNC: 30.2 MG/DL (ref 63–159)
LYMPHOCYTES # BLD AUTO: 1.2 K/UL (ref 1–4.8)
LYMPHOCYTES NFR BLD: 16.9 % (ref 18–48)
MCH RBC QN AUTO: 27.4 PG (ref 27–31)
MCHC RBC AUTO-ENTMCNC: 31.6 G/DL (ref 32–36)
MCV RBC AUTO: 87 FL (ref 82–98)
MONOCYTES # BLD AUTO: 0.6 K/UL (ref 0.3–1)
MONOCYTES NFR BLD: 8.4 % (ref 4–15)
NEUTROPHILS # BLD AUTO: 4.9 K/UL (ref 1.8–7.7)
NEUTROPHILS NFR BLD: 66.5 % (ref 38–73)
NONHDLC SERPL-MCNC: 86 MG/DL
NRBC BLD-RTO: 0 /100 WBC
PLATELET # BLD AUTO: 138 K/UL (ref 150–450)
PMV BLD AUTO: 12.1 FL (ref 9.2–12.9)
POTASSIUM SERPL-SCNC: 4 MMOL/L (ref 3.5–5.1)
PROT SERPL-MCNC: 6.5 G/DL (ref 6–8.4)
RBC # BLD AUTO: 4.81 M/UL (ref 4.6–6.2)
SATURATED IRON: 51 % (ref 20–50)
SODIUM SERPL-SCNC: 138 MMOL/L (ref 136–145)
TOTAL IRON BINDING CAPACITY: 273 UG/DL (ref 250–450)
TRANSFERRIN SERPL-MCNC: 195 MG/DL (ref 200–375)
TRIGL SERPL-MCNC: 279 MG/DL (ref 30–150)
VIT B12 SERPL-MCNC: 1486 PG/ML (ref 210–950)
WBC # BLD AUTO: 7.29 K/UL (ref 3.9–12.7)

## 2023-10-20 PROCEDURE — 80061 LIPID PANEL: CPT | Performed by: INTERNAL MEDICINE

## 2023-10-20 PROCEDURE — 80053 COMPREHEN METABOLIC PANEL: CPT | Performed by: INTERNAL MEDICINE

## 2023-10-20 PROCEDURE — 82607 VITAMIN B-12: CPT | Performed by: INTERNAL MEDICINE

## 2023-10-20 PROCEDURE — 85025 COMPLETE CBC W/AUTO DIFF WBC: CPT | Performed by: INTERNAL MEDICINE

## 2023-10-20 PROCEDURE — 83540 ASSAY OF IRON: CPT | Performed by: INTERNAL MEDICINE

## 2023-10-20 PROCEDURE — 82728 ASSAY OF FERRITIN: CPT | Performed by: INTERNAL MEDICINE

## 2023-10-20 PROCEDURE — 84466 ASSAY OF TRANSFERRIN: CPT | Performed by: INTERNAL MEDICINE

## 2023-10-20 PROCEDURE — 82746 ASSAY OF FOLIC ACID SERUM: CPT | Performed by: INTERNAL MEDICINE

## 2023-10-24 ENCOUNTER — OFFICE VISIT (OUTPATIENT)
Dept: FAMILY MEDICINE | Facility: CLINIC | Age: 73
End: 2023-10-24
Payer: MEDICARE

## 2023-10-24 VITALS
WEIGHT: 224.44 LBS | DIASTOLIC BLOOD PRESSURE: 74 MMHG | HEIGHT: 66 IN | BODY MASS INDEX: 36.07 KG/M2 | OXYGEN SATURATION: 97 % | TEMPERATURE: 99 F | HEART RATE: 72 BPM | SYSTOLIC BLOOD PRESSURE: 134 MMHG

## 2023-10-24 DIAGNOSIS — E53.8 VITAMIN B12 DEFICIENCY: ICD-10-CM

## 2023-10-24 DIAGNOSIS — M54.9 CHRONIC BACK PAIN, UNSPECIFIED BACK LOCATION, UNSPECIFIED BACK PAIN LATERALITY: ICD-10-CM

## 2023-10-24 DIAGNOSIS — G47.33 OSA ON CPAP: ICD-10-CM

## 2023-10-24 DIAGNOSIS — G89.29 CHRONIC BACK PAIN, UNSPECIFIED BACK LOCATION, UNSPECIFIED BACK PAIN LATERALITY: ICD-10-CM

## 2023-10-24 DIAGNOSIS — I25.10 CORONARY ARTERY DISEASE, UNSPECIFIED VESSEL OR LESION TYPE, UNSPECIFIED WHETHER ANGINA PRESENT, UNSPECIFIED WHETHER NATIVE OR TRANSPLANTED HEART: ICD-10-CM

## 2023-10-24 DIAGNOSIS — I10 HYPERTENSION, ESSENTIAL: Primary | ICD-10-CM

## 2023-10-24 DIAGNOSIS — E11.319 TYPE 2 DIABETES MELLITUS WITH RETINOPATHY OF BOTH EYES, WITHOUT LONG-TERM CURRENT USE OF INSULIN, MACULAR EDEMA PRESENCE UNSPECIFIED, UNSPECIFIED RETINOPATHY SEVERITY: ICD-10-CM

## 2023-10-24 DIAGNOSIS — E78.5 HYPERLIPIDEMIA, UNSPECIFIED HYPERLIPIDEMIA TYPE: ICD-10-CM

## 2023-10-24 PROBLEM — R42 DIZZINESS: Status: RESOLVED | Noted: 2023-02-07 | Resolved: 2023-10-24

## 2023-10-24 PROCEDURE — 1159F MED LIST DOCD IN RCRD: CPT | Mod: CPTII,S$GLB,, | Performed by: FAMILY MEDICINE

## 2023-10-24 PROCEDURE — 1101F PT FALLS ASSESS-DOCD LE1/YR: CPT | Mod: CPTII,S$GLB,, | Performed by: FAMILY MEDICINE

## 2023-10-24 PROCEDURE — 3075F SYST BP GE 130 - 139MM HG: CPT | Mod: CPTII,S$GLB,, | Performed by: FAMILY MEDICINE

## 2023-10-24 PROCEDURE — 1126F PR PAIN SEVERITY QUANTIFIED, NO PAIN PRESENT: ICD-10-PCS | Mod: CPTII,S$GLB,, | Performed by: FAMILY MEDICINE

## 2023-10-24 PROCEDURE — 3044F HG A1C LEVEL LT 7.0%: CPT | Mod: CPTII,S$GLB,, | Performed by: FAMILY MEDICINE

## 2023-10-24 PROCEDURE — 3078F DIAST BP <80 MM HG: CPT | Mod: CPTII,S$GLB,, | Performed by: FAMILY MEDICINE

## 2023-10-24 PROCEDURE — 1160F PR REVIEW ALL MEDS BY PRESCRIBER/CLIN PHARMACIST DOCUMENTED: ICD-10-PCS | Mod: CPTII,S$GLB,, | Performed by: FAMILY MEDICINE

## 2023-10-24 PROCEDURE — 99214 OFFICE O/P EST MOD 30 MIN: CPT | Mod: S$GLB,,, | Performed by: FAMILY MEDICINE

## 2023-10-24 PROCEDURE — 90694 FLU VACCINE - QUADRIVALENT - ADJUVANTED: ICD-10-PCS | Mod: S$GLB,,, | Performed by: FAMILY MEDICINE

## 2023-10-24 PROCEDURE — 1160F RVW MEDS BY RX/DR IN RCRD: CPT | Mod: CPTII,S$GLB,, | Performed by: FAMILY MEDICINE

## 2023-10-24 PROCEDURE — 3075F PR MOST RECENT SYSTOLIC BLOOD PRESS GE 130-139MM HG: ICD-10-PCS | Mod: CPTII,S$GLB,, | Performed by: FAMILY MEDICINE

## 2023-10-24 PROCEDURE — 3008F PR BODY MASS INDEX (BMI) DOCUMENTED: ICD-10-PCS | Mod: CPTII,S$GLB,, | Performed by: FAMILY MEDICINE

## 2023-10-24 PROCEDURE — 4010F ACE/ARB THERAPY RXD/TAKEN: CPT | Mod: CPTII,S$GLB,, | Performed by: FAMILY MEDICINE

## 2023-10-24 PROCEDURE — 1126F AMNT PAIN NOTED NONE PRSNT: CPT | Mod: CPTII,S$GLB,, | Performed by: FAMILY MEDICINE

## 2023-10-24 PROCEDURE — G0008 FLU VACCINE - QUADRIVALENT - ADJUVANTED: ICD-10-PCS | Mod: S$GLB,,, | Performed by: FAMILY MEDICINE

## 2023-10-24 PROCEDURE — 4010F PR ACE/ARB THEARPY RXD/TAKEN: ICD-10-PCS | Mod: CPTII,S$GLB,, | Performed by: FAMILY MEDICINE

## 2023-10-24 PROCEDURE — 3078F PR MOST RECENT DIASTOLIC BLOOD PRESSURE < 80 MM HG: ICD-10-PCS | Mod: CPTII,S$GLB,, | Performed by: FAMILY MEDICINE

## 2023-10-24 PROCEDURE — 3044F PR MOST RECENT HEMOGLOBIN A1C LEVEL <7.0%: ICD-10-PCS | Mod: CPTII,S$GLB,, | Performed by: FAMILY MEDICINE

## 2023-10-24 PROCEDURE — 99214 PR OFFICE/OUTPT VISIT, EST, LEVL IV, 30-39 MIN: ICD-10-PCS | Mod: S$GLB,,, | Performed by: FAMILY MEDICINE

## 2023-10-24 PROCEDURE — G0008 ADMIN INFLUENZA VIRUS VAC: HCPCS | Mod: S$GLB,,, | Performed by: FAMILY MEDICINE

## 2023-10-24 PROCEDURE — 3288F FALL RISK ASSESSMENT DOCD: CPT | Mod: CPTII,S$GLB,, | Performed by: FAMILY MEDICINE

## 2023-10-24 PROCEDURE — 90694 VACC AIIV4 NO PRSRV 0.5ML IM: CPT | Mod: S$GLB,,, | Performed by: FAMILY MEDICINE

## 2023-10-24 PROCEDURE — 1101F PR PT FALLS ASSESS DOC 0-1 FALLS W/OUT INJ PAST YR: ICD-10-PCS | Mod: CPTII,S$GLB,, | Performed by: FAMILY MEDICINE

## 2023-10-24 PROCEDURE — 3008F BODY MASS INDEX DOCD: CPT | Mod: CPTII,S$GLB,, | Performed by: FAMILY MEDICINE

## 2023-10-24 PROCEDURE — 3288F PR FALLS RISK ASSESSMENT DOCUMENTED: ICD-10-PCS | Mod: CPTII,S$GLB,, | Performed by: FAMILY MEDICINE

## 2023-10-24 PROCEDURE — 1159F PR MEDICATION LIST DOCUMENTED IN MEDICAL RECORD: ICD-10-PCS | Mod: CPTII,S$GLB,, | Performed by: FAMILY MEDICINE

## 2023-10-24 RX ORDER — PREGABALIN 50 MG/1
50 CAPSULE ORAL 2 TIMES DAILY
Qty: 60 CAPSULE | Refills: 2 | Status: SHIPPED | OUTPATIENT
Start: 2023-10-24 | End: 2024-01-25

## 2023-10-24 RX ORDER — POTASSIUM CHLORIDE 20 MEQ/1
20 TABLET, EXTENDED RELEASE ORAL 2 TIMES DAILY
Qty: 180 TABLET | Refills: 3 | Status: SHIPPED | OUTPATIENT
Start: 2023-10-24

## 2023-10-24 RX ORDER — TOPIRAMATE 25 MG/1
50 TABLET ORAL 2 TIMES DAILY
COMMUNITY
Start: 2023-08-21

## 2023-10-24 RX ORDER — PREGABALIN 50 MG/1
50 CAPSULE ORAL 3 TIMES DAILY
COMMUNITY
End: 2023-10-24 | Stop reason: SDUPTHER

## 2023-10-24 RX ORDER — METFORMIN HYDROCHLORIDE 500 MG/1
500 TABLET, EXTENDED RELEASE ORAL
Qty: 90 TABLET | Refills: 1 | Status: SHIPPED | OUTPATIENT
Start: 2023-10-24

## 2023-10-24 RX ORDER — HYDROCODONE BITARTRATE AND ACETAMINOPHEN 10; 325 MG/1; MG/1
1 TABLET ORAL EVERY 4 HOURS PRN
COMMUNITY
Start: 2023-08-16

## 2023-10-24 RX ORDER — ROSUVASTATIN CALCIUM 5 MG/1
5 TABLET, COATED ORAL DAILY
Qty: 90 TABLET | Refills: 1 | Status: SHIPPED | OUTPATIENT
Start: 2023-10-24

## 2023-10-26 NOTE — PROGRESS NOTES
Subjective:       Patient ID: Chris Hill Jr. is a 73 y.o. male.    Chief Complaint: Follow-up    He discontinued his gabapentin has had no further attacks of vertigo.  Off it for 6 weeks now.  Was on 900 mg a day.  Has stopped all of the vertigo/TIA type symptoms.  Hypertension is controlled.  Hyperlipidemia cholesterol 120 HDL 34 LDL 30.  Triglycerides 279.  Coronary artery disease but no anginal chest pain.  Chronic back pain issues still.  BMI is 36.  Diabetes mellitus type 2 with retinopathy.  Glucose 118 A1c 5.9 very good control.  B12 deficiency on his medication B12 level 1486.  Obstructive sleep apnea on his CPAP compliant and benefitting.  Ferritin normal 193.  Hemoglobin 13.2.      Physical examination.  Vital signs noted.  Neck without bruit no adenopathy.  Chest clear.  Heart regular rate rhythm.  Abdomen bowel sounds are positive soft nontender.  Extremities are without edema positive pulses.          Objective:        Assessment:       1. Hypertension, essential    2. Hyperlipidemia, unspecified hyperlipidemia type    3. Coronary artery disease, unspecified vessel or lesion type, unspecified whether angina present, unspecified whether native or transplanted heart    4. LORA on CPAP    5. Chronic back pain, unspecified back location, unspecified back pain laterality    6. BMI 36.0-36.9,adult    7. Vitamin B12 deficiency    8. Type 2 diabetes mellitus with retinopathy of both eyes, without long-term current use of insulin, macular edema presence unspecified, unspecified retinopathy severity        Plan:       Hypertension, essential    Hyperlipidemia, unspecified hyperlipidemia type    Coronary artery disease, unspecified vessel or lesion type, unspecified whether angina present, unspecified whether native or transplanted heart    LORA on CPAP    Chronic back pain, unspecified back location, unspecified back pain laterality    BMI 36.0-36.9,adult    Vitamin B12 deficiency    Type 2 diabetes mellitus  with retinopathy of both eyes, without long-term current use of insulin, macular edema presence unspecified, unspecified retinopathy severity    Other orders  -     rosuvastatin (CRESTOR) 5 MG tablet; Take 1 tablet (5 mg total) by mouth once daily.  Dispense: 90 tablet; Refill: 1  -     metFORMIN (GLUCOPHAGE-XR) 500 MG ER 24hr tablet; Take 1 tablet (500 mg total) by mouth daily with breakfast.  Dispense: 90 tablet; Refill: 1  -     potassium chloride SA (K-DUR,KLOR-CON) 20 MEQ tablet; Take 1 tablet (20 mEq total) by mouth 2 (two) times daily.  Dispense: 180 tablet; Refill: 3  -     Influenza - Quadrivalent (Adjuvanted)  -     pregabalin (LYRICA) 50 MG capsule; Take 1 capsule (50 mg total) by mouth 2 (two) times daily.  Dispense: 60 capsule; Refill: 2      Refilled his medications.  Flu shot high-dose today.  Try Lyrica 50 mg b.i.d. for his back pain.  Sixty with 2 refills.  Follow-up on this in a month or so.  Stay off of the gabapentin.

## 2023-11-22 RX ORDER — EPLERENONE 25 MG/1
25 TABLET, FILM COATED ORAL DAILY
Qty: 30 TABLET | Refills: 3 | Status: SHIPPED | OUTPATIENT
Start: 2023-11-22 | End: 2024-04-01

## 2023-12-04 ENCOUNTER — PATIENT MESSAGE (OUTPATIENT)
Dept: FAMILY MEDICINE | Facility: CLINIC | Age: 73
End: 2023-12-04
Payer: MEDICARE

## 2024-01-08 ENCOUNTER — LAB VISIT (OUTPATIENT)
Dept: LAB | Facility: HOSPITAL | Age: 74
End: 2024-01-08
Attending: NURSE PRACTITIONER
Payer: MEDICARE

## 2024-01-08 ENCOUNTER — PATIENT MESSAGE (OUTPATIENT)
Dept: FAMILY MEDICINE | Facility: CLINIC | Age: 74
End: 2024-01-08
Payer: MEDICARE

## 2024-01-08 DIAGNOSIS — E11.319 TYPE 2 DIABETES MELLITUS WITH RETINOPATHY OF BOTH EYES, WITHOUT LONG-TERM CURRENT USE OF INSULIN, MACULAR EDEMA PRESENCE UNSPECIFIED, UNSPECIFIED RETINOPATHY SEVERITY: ICD-10-CM

## 2024-01-08 DIAGNOSIS — I10 HYPERTENSION, ESSENTIAL: Primary | ICD-10-CM

## 2024-01-08 DIAGNOSIS — E03.9 HYPOTHYROIDISM, UNSPECIFIED TYPE: ICD-10-CM

## 2024-01-08 DIAGNOSIS — C61 PROSTATE CANCER: ICD-10-CM

## 2024-01-08 DIAGNOSIS — E78.5 HYPERLIPIDEMIA, UNSPECIFIED HYPERLIPIDEMIA TYPE: ICD-10-CM

## 2024-01-08 LAB — COMPLEXED PSA SERPL-MCNC: 0.11 NG/ML (ref 0–4)

## 2024-01-08 PROCEDURE — 84403 ASSAY OF TOTAL TESTOSTERONE: CPT | Performed by: NURSE PRACTITIONER

## 2024-01-08 PROCEDURE — 84153 ASSAY OF PSA TOTAL: CPT | Performed by: NURSE PRACTITIONER

## 2024-01-09 LAB — TESTOST SERPL-MCNC: 263 NG/DL (ref 264–916)

## 2024-01-22 ENCOUNTER — OFFICE VISIT (OUTPATIENT)
Dept: UROLOGY | Facility: CLINIC | Age: 74
End: 2024-01-22
Payer: MEDICARE

## 2024-01-22 DIAGNOSIS — C61 PROSTATE CANCER: Primary | ICD-10-CM

## 2024-01-22 LAB
BILIRUBIN, UA POC OHS: NEGATIVE
BLOOD, UA POC OHS: NEGATIVE
CLARITY, UA POC OHS: CLEAR
COLOR, UA POC OHS: YELLOW
GLUCOSE, UA POC OHS: NEGATIVE
KETONES, UA POC OHS: NEGATIVE
LEUKOCYTES, UA POC OHS: NEGATIVE
NITRITE, UA POC OHS: NEGATIVE
PH, UA POC OHS: 5.5
POC RESIDUAL URINE VOLUME: 20 ML (ref 0–100)
PROTEIN, UA POC OHS: NEGATIVE
SPECIFIC GRAVITY, UA POC OHS: 1.01
UROBILINOGEN, UA POC OHS: 0.2

## 2024-01-22 PROCEDURE — 3288F FALL RISK ASSESSMENT DOCD: CPT | Mod: CPTII,S$GLB,, | Performed by: NURSE PRACTITIONER

## 2024-01-22 PROCEDURE — G2211 COMPLEX E/M VISIT ADD ON: HCPCS | Mod: S$GLB,,, | Performed by: NURSE PRACTITIONER

## 2024-01-22 PROCEDURE — 81003 URINALYSIS AUTO W/O SCOPE: CPT | Mod: QW,S$GLB,, | Performed by: NURSE PRACTITIONER

## 2024-01-22 PROCEDURE — 1101F PT FALLS ASSESS-DOCD LE1/YR: CPT | Mod: CPTII,S$GLB,, | Performed by: NURSE PRACTITIONER

## 2024-01-22 PROCEDURE — 99999 PR PBB SHADOW E&M-EST. PATIENT-LVL IV: CPT | Mod: PBBFAC,,, | Performed by: NURSE PRACTITIONER

## 2024-01-22 PROCEDURE — 51798 US URINE CAPACITY MEASURE: CPT | Mod: S$GLB,,, | Performed by: NURSE PRACTITIONER

## 2024-01-22 PROCEDURE — 99214 OFFICE O/P EST MOD 30 MIN: CPT | Mod: S$GLB,,, | Performed by: NURSE PRACTITIONER

## 2024-01-22 PROCEDURE — 1160F RVW MEDS BY RX/DR IN RCRD: CPT | Mod: CPTII,S$GLB,, | Performed by: NURSE PRACTITIONER

## 2024-01-22 PROCEDURE — 1159F MED LIST DOCD IN RCRD: CPT | Mod: CPTII,S$GLB,, | Performed by: NURSE PRACTITIONER

## 2024-01-22 NOTE — PROGRESS NOTES
[facility] Note  Staff: ANGELO Crespo-C    PCP: FERNIE Pettit  Urologist:  FERNIE Gordon    Chief Complaint: F/UP-Hx of Prostate CA    Subjective:        HPI: Chris Hill Jr. is a 73 y.o. male who presents for prostate cancer follow up.  Pt was last evaluated by me on 7/7/23.     Hx of [place] 4 + 5 equals 9 prostate cancer (cT1c, iPSA 13.2) treated with androgen deprivation therapy external beam radiation.    He presented 10/4/18 for fiducial marker placement as well as SpaceOAR placement. Vol 33.4g at time of markers. At time of SpaceOar he did note daily diarrhea for weeks prior.  8/13/18 eligard 22.5 3 mos depot; 11/13/18 trelstar 22.5mg 6 mos depot.   - 2 weeks prior did interrupt his XRT for lumbar back surgery as his chronic back pain was limiting his ability to lay flat on radiation table. Only missed 2d of therapy.  He noted at that time his diarrhea had persisted. Using prn immodium     Completed XRT 12/18/18. PSA 0.1 on 12/13/18. Has followed up with Dr Gbison for chronic ITP and anemia, stable.  Has been followed by GI. Dr Tavares, with EGD 12/5/18 and colonoscopy 2/11/19 with a few cecal polyps removed and diverticulosis (repeat 3 yrs)     Dr Santillan on 1/9/19: Patient reports fatigue with energy level at 30%. Of note he is undergoing workup for low blood counts with Dr. Gibson.  He reports frequency, urgency, nocturia of urine are resolving however he continues with urgency of stool. He reports frequency has improved with Imodium. AUA SS 17/5 from 10/2     2/12/19: 2/6/19 PSA 0.02, T <4, Cr 0.8, AUA symptom score:  23/5 unhappy (5:  Intermittency, urgency; 4:  Emptying, frequency; 3:  Weak stream; 2:  Sleeping)  He did start oxybutynin 5 mg b.i.d. which has significantly helped decrease the hot flashes.  He still gets some though they are not as intense as they were before  Since starting it, and further away from radiation, his urgency is somewhat better.  He does  have daytime frequency of 8-10 times at nighttime frequency of 1-2 times.  Having both urinary urgency and fecal urgency.  His diarrhea is somewhat better.  GI evaluation has been overall unrevealing of a etiology of chronic diarrhea.  He does report urinary intermittency but no urinary hesitancy. He did have back surgery after 22 radiation treatments, though he does note that his fecal and urinary urgency predated his back surgery. On Hytrin 10 mg in morning, so started Flomax 0.4 mg in the evening to have increased dose of alpha blocker.     5/25/19: improved voiding taking Hytrin in the morning and Flomax in the evening. His diarrhea has improved. Hot flashes went away a bit and well controlled with Ditropan b.i.d.  AUA symptom score:  9/1, please (3:  Emptying, urgency; 1:  Intermittency, weak stream, sleeping). 5/8/19: PSA  0.02 and T 10.  ADT renewed with Lupron 45mg     11/19/19: psa undetectable <0.01, T 7. Chronic ITP. LUTS stable with progressive urgency. AUA SS:  15/3, mixed (5:  Urgency; 3:  Emptying, weak stream; 2:  Intermittency; 1:  Frequency, sleeping).  Medications helped 5/10. Hytrin a.m., Flomax p.m., Ditropan 5 mg b.i.d. Still has mild PV dribble and has minimal UUI  Alternates constipation/diarrhea - may have worse urinary symptoms when constipated. Lots of water during day - rare soda. DTF 4-5x. 2/5 are urgent. Another back surgery 2 mos ago. Urgency may be worse since then? hasnt considered. PVR by bladder scan 0cc.  Deferred cysto in favor of continued medical management, ADT renewed with Lupron 45mg     5/2020 NP OQuin - final Lupron/ADT injection.   11/21/20: AUA SS: 11/2 (3:  Emptying; 2:  Urgency, weak stream, sleeping; 1:  Frequency, intermittency). PVR 45cc. 11/13/20 psa <0.01 and T 9.  Still having some hot flashes. Slight increase in energy after radiation complete  6/13/21: denies gross hematuria/dysuria at this time. 5/24/2021: Testosterone level-110, PSA level-<0.01. Oxybutynin  5XL nightly, flomax 0.4, hytrin 10. Most bothered by urgency  - incd oxybutynin to 10mg XL. PSA 9/7/21 0.03, psa 12/21/21 0.01, psa 6/8/22 is 0.07, psa 9/12/22 is 0.07  Last o/v 6/14/22 with NP noting psa 0.07 as above. AUA SS: 10/3 (ntf 2, urgency 5, freq/int/weak 1). Pvr 26cc  Last saw matilda singh 1/10/22, also followed by Dr Gibson last seen 11/14/22. Following psa     OV with MD 12/22:  PSA 12/14/22 is 0.11 with T rise to 201  AUA SS: 13/3 (4:  Urgency; 2: Emptying, frequency, weak stream, sleeping; 1: Intermittency) medication helps 5/10 PVR 0 cc urinalysis dipstick negative  Urination not as bad as has been. Some night doesn't get up, last night got up 4x.  Interim issues with diuretic and went on spironolactone and then had issues with breast tenderness which improved since discontinuing  Has been having bad dizziness to point during day even has trouble seein and has to sit down. Has seen neurologist, there was concern for TIA then decided complex migraine, but still unknown. When has bad dizzy spells, cant function. Last week 4 days out of 7. Feels spinning at time, sometimes nauseated.  Partial erections returning     OV 7/7/23:  PSA on 6/20 was 0.14 with T at 224, from PSA of 0.09 on 3/22 and T-280 6 mos ago.  UA performed in office today showed 30 of Protein, otherwise normal findings.  PVR by bladder scan is 0 mL  AUA SS Today:  2/1 Pleased   Feeling of ICBE:1  Nocturia:1  Pt denies gross hematuria or dysuria at this time.  Taking Flomax 0.4 mg daily with no problems noted.    OV 1/22/24:  Labs done on 1/8/24: PSA was 0.11              Testosterone level increased to 263 from 215-3 mos ago.  UA today showed normal findings  PVR by bladder scan is 20 mL  Pt denies dysuria or gross hematuria  Nocturia 1x nightly, otherwise doing well with no complaints.    REVIEW OF SYSTEMS:  A comprehensive 10 system review was performed and is negative except as noted above in HPI    PMHx:  Past Medical History:    Diagnosis Date    Anemia, chronic disease 12/28/2018    Arthritis     Back pain     radiates both legs mainly rt leg    Benign paroxysmal vertigo, bilateral     Diabetes mellitus     Diabetes mellitus, type 2     JANSEN (dyspnea on exertion)     GERD (gastroesophageal reflux disease)     Heart murmur     Hyperlipidemia     Hypertension     Iron deficiency anemia 04/18/2022    Neuropathy     Normocytic normochromic anemia 12/28/2018    Prostate cancer 08/20/2018    Sleep apnea     uses CPAP    Stroke     Thrombocytopenia 12/28/2018    Thyroid disease     Urinary frequency     Valvular regurgitation      PSHx:  Past Surgical History:   Procedure Laterality Date    ANGIOGRAM, CORONARY, WITH LEFT HEART CATHETERIZATION  7/20/2023    Procedure: Left Heart Cath;  Surgeon: Carol Jama MD;  [facility];  Service: Cardiology;;    ANKLE SURGERY Left     APPENDECTOMY      BACK SURGERY  09/2019    CARDIAC CATHETERIZATION      CERVICAL FUSION      CORONARY ANGIOGRAPHY  7/20/2023    Procedure: Coronary angiogram study;  Surgeon: Carol Jama MD;  [facility];  Service: Cardiology;;    EYE SURGERY      cataracts bilateral    GALLBLADDER SURGERY      HAND SURGERY Bilateral     rt hand x4 left x3    JOINT REPLACEMENT Bilateral     knee    KNEE SURGERY      8 on left knee and 7 on rt knee    NASAL SINUS SURGERY      x5    SHOULDER SURGERY Bilateral     3 on left 2 on right    TRANSRECTAL ULTRASOUND OF PROSTATE WITH INSERTION OF GOLD FIDUCIAL MARKER N/A 10/04/2018    Procedure: ULTRASOUND, PROSTATE, TRANSPERINEAL APPROACH, WITH GOLD FIDUCIAL MARKER INSERTION (with SPACEOAR transperineal biodegradable gel placement);  Surgeon: Manpreet Gordon MD;  Location: [facility];  Service: Urology;  Laterality: N/A;     Allergies:  Patient has no known allergies.    Medications: reviewed   Objective:   There were no vitals filed for this visit.    General:WDWN in NAD  Eyes: PERRLA, normal conjunctiva  Respiratory: no increased  work on breathing, clear to auscultation  Cardiovascular: regular rate and rhythm. No obvious extremity edema.  GI: palpation of masses. No tenderness. No hepatosplenomegaly to palpation.  Musculoskeletal: normal range of motion of bilateral upper extremities. Normal muscle strength and tone.  Skin: no obvious rashes or lesions. No tightening of skin noted.  Neurologic: CN grossly normal. Normal sensation.   Psychiatric: awake, alert and oriented x 3. Mood and affect normal. Cooperative.    Assessment:       1. Prostate cancer          Plan:     PSA and Testosterone to be performed in 3 mos, then again in 6 mos.    F/u with MD in six months after 6 mos labs have been completed.  Pt verbalized understanding.    [facility]: Active    ANGELO Collazo-NANCI  Visit today is associated with current or anticipated ongoing medical care related to this patient's single serious condition/complex condition.

## 2024-01-23 ENCOUNTER — LAB VISIT (OUTPATIENT)
Dept: LAB | Facility: HOSPITAL | Age: 74
End: 2024-01-23
Attending: FAMILY MEDICINE
Payer: MEDICARE

## 2024-01-23 DIAGNOSIS — I10 HYPERTENSION, ESSENTIAL: ICD-10-CM

## 2024-01-23 DIAGNOSIS — E78.5 HYPERLIPIDEMIA, UNSPECIFIED HYPERLIPIDEMIA TYPE: ICD-10-CM

## 2024-01-23 DIAGNOSIS — E11.319 TYPE 2 DIABETES MELLITUS WITH RETINOPATHY OF BOTH EYES, WITHOUT LONG-TERM CURRENT USE OF INSULIN, MACULAR EDEMA PRESENCE UNSPECIFIED, UNSPECIFIED RETINOPATHY SEVERITY: ICD-10-CM

## 2024-01-23 DIAGNOSIS — E03.9 HYPOTHYROIDISM, UNSPECIFIED TYPE: ICD-10-CM

## 2024-01-23 LAB
ALBUMIN/CREAT UR: 10.6 UG/MG (ref 0–30)
CHOLEST SERPL-MCNC: 97 MG/DL (ref 120–199)
CHOLEST/HDLC SERPL: 2.9 {RATIO} (ref 2–5)
CREAT UR-MCNC: 248.6 MG/DL (ref 23–375)
ESTIMATED AVG GLUCOSE: 120 MG/DL (ref 68–131)
HBA1C MFR BLD: 5.8 % (ref 4.5–6.2)
HDLC SERPL-MCNC: 33 MG/DL (ref 40–75)
HDLC SERPL: 34 % (ref 20–50)
LDLC SERPL CALC-MCNC: 10.2 MG/DL (ref 63–159)
MICROALBUMIN UR DL<=1MG/L-MCNC: 26.4 UG/ML
NONHDLC SERPL-MCNC: 64 MG/DL
TRIGL SERPL-MCNC: 269 MG/DL (ref 30–150)
TSH SERPL DL<=0.005 MIU/L-ACNC: 2.4 UIU/ML (ref 0.34–5.6)

## 2024-01-23 PROCEDURE — 84443 ASSAY THYROID STIM HORMONE: CPT | Performed by: FAMILY MEDICINE

## 2024-01-23 PROCEDURE — 83036 HEMOGLOBIN GLYCOSYLATED A1C: CPT | Performed by: FAMILY MEDICINE

## 2024-01-23 PROCEDURE — 80061 LIPID PANEL: CPT | Performed by: FAMILY MEDICINE

## 2024-01-23 PROCEDURE — 36415 COLL VENOUS BLD VENIPUNCTURE: CPT | Performed by: FAMILY MEDICINE

## 2024-01-23 PROCEDURE — 82043 UR ALBUMIN QUANTITATIVE: CPT | Performed by: FAMILY MEDICINE

## 2024-01-25 ENCOUNTER — OFFICE VISIT (OUTPATIENT)
Dept: FAMILY MEDICINE | Facility: CLINIC | Age: 74
End: 2024-01-25
Payer: MEDICARE

## 2024-01-25 VITALS
WEIGHT: 238.13 LBS | DIASTOLIC BLOOD PRESSURE: 74 MMHG | HEIGHT: 66 IN | OXYGEN SATURATION: 97 % | HEART RATE: 62 BPM | SYSTOLIC BLOOD PRESSURE: 132 MMHG | TEMPERATURE: 98 F | BODY MASS INDEX: 38.27 KG/M2

## 2024-01-25 DIAGNOSIS — I10 HYPERTENSION, ESSENTIAL: ICD-10-CM

## 2024-01-25 DIAGNOSIS — I25.10 CORONARY ARTERY DISEASE, UNSPECIFIED VESSEL OR LESION TYPE, UNSPECIFIED WHETHER ANGINA PRESENT, UNSPECIFIED WHETHER NATIVE OR TRANSPLANTED HEART: ICD-10-CM

## 2024-01-25 DIAGNOSIS — D69.2 SENILE PURPURA: ICD-10-CM

## 2024-01-25 DIAGNOSIS — G47.33 OSA ON CPAP: ICD-10-CM

## 2024-01-25 DIAGNOSIS — E66.01 SEVERE OBESITY (BMI 35.0-39.9) WITH COMORBIDITY: ICD-10-CM

## 2024-01-25 DIAGNOSIS — E11.319 TYPE 2 DIABETES MELLITUS WITH RETINOPATHY OF BOTH EYES, WITHOUT LONG-TERM CURRENT USE OF INSULIN, MACULAR EDEMA PRESENCE UNSPECIFIED, UNSPECIFIED RETINOPATHY SEVERITY: Primary | ICD-10-CM

## 2024-01-25 DIAGNOSIS — E78.5 HYPERLIPIDEMIA, UNSPECIFIED HYPERLIPIDEMIA TYPE: ICD-10-CM

## 2024-01-25 DIAGNOSIS — I47.29 NSVT (NONSUSTAINED VENTRICULAR TACHYCARDIA): ICD-10-CM

## 2024-01-25 DIAGNOSIS — I70.0 AORTIC ATHEROSCLEROSIS: ICD-10-CM

## 2024-01-25 DIAGNOSIS — Z79.82 ASPIRIN LONG-TERM USE: ICD-10-CM

## 2024-01-25 DIAGNOSIS — E03.9 HYPOTHYROIDISM, UNSPECIFIED TYPE: ICD-10-CM

## 2024-01-25 PROCEDURE — 3008F BODY MASS INDEX DOCD: CPT | Mod: CPTII,S$GLB,, | Performed by: FAMILY MEDICINE

## 2024-01-25 PROCEDURE — 3288F FALL RISK ASSESSMENT DOCD: CPT | Mod: CPTII,S$GLB,, | Performed by: FAMILY MEDICINE

## 2024-01-25 PROCEDURE — 99999 PR PBB SHADOW E&M-EST. PATIENT-LVL V: CPT | Mod: PBBFAC,,, | Performed by: FAMILY MEDICINE

## 2024-01-25 PROCEDURE — 3078F DIAST BP <80 MM HG: CPT | Mod: CPTII,S$GLB,, | Performed by: FAMILY MEDICINE

## 2024-01-25 PROCEDURE — 1160F RVW MEDS BY RX/DR IN RCRD: CPT | Mod: CPTII,S$GLB,, | Performed by: FAMILY MEDICINE

## 2024-01-25 PROCEDURE — 99214 OFFICE O/P EST MOD 30 MIN: CPT | Mod: S$GLB,,, | Performed by: FAMILY MEDICINE

## 2024-01-25 PROCEDURE — 3066F NEPHROPATHY DOC TX: CPT | Mod: CPTII,S$GLB,, | Performed by: FAMILY MEDICINE

## 2024-01-25 PROCEDURE — 3044F HG A1C LEVEL LT 7.0%: CPT | Mod: CPTII,S$GLB,, | Performed by: FAMILY MEDICINE

## 2024-01-25 PROCEDURE — 3061F NEG MICROALBUMINURIA REV: CPT | Mod: CPTII,S$GLB,, | Performed by: FAMILY MEDICINE

## 2024-01-25 PROCEDURE — 1125F AMNT PAIN NOTED PAIN PRSNT: CPT | Mod: CPTII,S$GLB,, | Performed by: FAMILY MEDICINE

## 2024-01-25 PROCEDURE — 3075F SYST BP GE 130 - 139MM HG: CPT | Mod: CPTII,S$GLB,, | Performed by: FAMILY MEDICINE

## 2024-01-25 PROCEDURE — 1159F MED LIST DOCD IN RCRD: CPT | Mod: CPTII,S$GLB,, | Performed by: FAMILY MEDICINE

## 2024-01-25 PROCEDURE — 1101F PT FALLS ASSESS-DOCD LE1/YR: CPT | Mod: CPTII,S$GLB,, | Performed by: FAMILY MEDICINE

## 2024-01-28 NOTE — PROGRESS NOTES
Subjective:       Patient ID: Chris Hill Jr. is a 73 y.o. male.    Chief Complaint: No chief complaint on file.    Hypertension is controlled.  No chest pain no palpitations.  Hyperlipidemia cholesterol 93 HDL 33 LDL is only 10 triglycerides 269.  Coronary artery disease no anginal chest pain.  History of some nonsustained V-tach.  BMI is 38.  Aspirin use.  Diabetes mellitus type 2 with retinopathy A1c good at 5.8 fasting glucose 118 GFR is over 60.  Hypothyroidism TSH is 2.39.  Microalbumin is positive.  Obstructive sleep apnea using CPAP regularly and benefitting.  Senile purpuric.  No bleeding.  Aortic atherosclerosis no claudication.    Physical examination.  Vital signs noted.  Neck without bruit.  Chest clear.  Heart regular rate and rhythm.  Abdomen bowel sounds are positive soft nontender.  Extremities without edema positive pedal pulses.  2+.        Objective:        Assessment:       1. Type 2 diabetes mellitus with retinopathy of both eyes, without long-term current use of insulin, macular edema presence unspecified, unspecified retinopathy severity    2. Severe obesity (BMI 35.0-39.9) with comorbidity    3. Senile purpura    4. Aortic atherosclerosis    5. Hypertension, essential    6. Coronary artery disease, unspecified vessel or lesion type, unspecified whether angina present, unspecified whether native or transplanted heart    7. Hyperlipidemia, unspecified hyperlipidemia type    8. Aspirin long-term use    9. Hypothyroidism, unspecified type    10. LORA on CPAP    11. NSVT (nonsustained ventricular tachycardia)        Plan:       Type 2 diabetes mellitus with retinopathy of both eyes, without long-term current use of insulin, macular edema presence unspecified, unspecified retinopathy severity    Severe obesity (BMI 35.0-39.9) with comorbidity    Senile purpura    Aortic atherosclerosis    Hypertension, essential    Coronary artery disease, unspecified vessel or lesion type, unspecified whether  angina present, unspecified whether native or transplanted heart    Hyperlipidemia, unspecified hyperlipidemia type    Aspirin long-term use    Hypothyroidism, unspecified type    LORA on CPAP    NSVT (nonsustained ventricular tachycardia)    RSV vaccine.  Diet weight reduction.  Follow-up in 3 months with labs at that time.

## 2024-02-17 RX ORDER — CARVEDILOL 25 MG/1
TABLET ORAL
Qty: 180 TABLET | Refills: 3 | Status: SHIPPED | OUTPATIENT
Start: 2024-02-17

## 2024-02-17 NOTE — TELEPHONE ENCOUNTER
Refill Decision Note   Chris Hill  is requesting a refill authorization.  Brief Assessment and Rationale for Refill:  Approve     Medication Therapy Plan:         Comments:     Note composed:4:12 PM 02/17/2024

## 2024-02-17 NOTE — TELEPHONE ENCOUNTER
No care due was identified.  [facility] Embedded Care Due Messages. Reference number: [id number].   2/17/2024 11:02:20 AM CST

## 2024-03-29 DIAGNOSIS — I10 HYPERTENSION, ESSENTIAL: Primary | ICD-10-CM

## 2024-03-29 DIAGNOSIS — I25.10 CORONARY ARTERY DISEASE, UNSPECIFIED VESSEL OR LESION TYPE, UNSPECIFIED WHETHER ANGINA PRESENT, UNSPECIFIED WHETHER NATIVE OR TRANSPLANTED HEART: ICD-10-CM

## 2024-04-01 RX ORDER — EPLERENONE 25 MG/1
25 TABLET, FILM COATED ORAL
Qty: 30 TABLET | Refills: 0 | Status: SHIPPED | OUTPATIENT
Start: 2024-04-01 | End: 2024-05-23 | Stop reason: SDUPTHER

## 2024-04-08 ENCOUNTER — LAB VISIT (OUTPATIENT)
Dept: LAB | Facility: HOSPITAL | Age: 74
End: 2024-04-08
Attending: NURSE PRACTITIONER
Payer: MEDICARE

## 2024-04-08 DIAGNOSIS — C61 PROSTATE CANCER: ICD-10-CM

## 2024-04-08 LAB — COMPLEXED PSA SERPL-MCNC: 0.11 NG/ML (ref 0–4)

## 2024-04-08 PROCEDURE — 84153 ASSAY OF PSA TOTAL: CPT | Performed by: NURSE PRACTITIONER

## 2024-04-08 PROCEDURE — 84403 ASSAY OF TOTAL TESTOSTERONE: CPT | Performed by: NURSE PRACTITIONER

## 2024-04-10 LAB — TESTOST SERPL-MCNC: 233 NG/DL (ref 264–916)

## 2024-04-11 ENCOUNTER — PATIENT MESSAGE (OUTPATIENT)
Dept: FAMILY MEDICINE | Facility: CLINIC | Age: 74
End: 2024-04-11
Payer: MEDICARE

## 2024-04-11 ENCOUNTER — PATIENT MESSAGE (OUTPATIENT)
Dept: HEMATOLOGY/ONCOLOGY | Facility: CLINIC | Age: 74
End: 2024-04-11

## 2024-04-12 ENCOUNTER — TELEPHONE (OUTPATIENT)
Dept: HEMATOLOGY/ONCOLOGY | Facility: CLINIC | Age: 74
End: 2024-04-12

## 2024-04-12 DIAGNOSIS — D64.9 ANEMIA, UNSPECIFIED TYPE: ICD-10-CM

## 2024-04-12 DIAGNOSIS — E53.8 VITAMIN B12 DEFICIENCY: ICD-10-CM

## 2024-04-12 DIAGNOSIS — E03.9 HYPOTHYROIDISM, UNSPECIFIED TYPE: ICD-10-CM

## 2024-04-12 DIAGNOSIS — I10 HYPERTENSION, ESSENTIAL: ICD-10-CM

## 2024-04-12 DIAGNOSIS — E78.5 HYPERLIPIDEMIA, UNSPECIFIED HYPERLIPIDEMIA TYPE: ICD-10-CM

## 2024-04-12 DIAGNOSIS — D50.9 IRON DEFICIENCY ANEMIA, UNSPECIFIED IRON DEFICIENCY ANEMIA TYPE: Primary | ICD-10-CM

## 2024-04-12 DIAGNOSIS — E11.319 TYPE 2 DIABETES MELLITUS WITH RETINOPATHY OF BOTH EYES, WITHOUT LONG-TERM CURRENT USE OF INSULIN, MACULAR EDEMA PRESENCE UNSPECIFIED, UNSPECIFIED RETINOPATHY SEVERITY: Primary | ICD-10-CM

## 2024-04-15 ENCOUNTER — TELEPHONE (OUTPATIENT)
Dept: HEMATOLOGY/ONCOLOGY | Facility: CLINIC | Age: 74
End: 2024-04-15

## 2024-04-17 NOTE — PROGRESS NOTES
[facility] Hematology/Oncology  PROGRESS NOTE -  Follow-up Visit      Subjective:       Patient ID:   NAME: Chris Hill Jr. : 1950     73 y.o. male    Referring Doc: Tyrell  Other Physicians: Manpreet Gordon; Wiley; Wilver; Dominic        Chief Complaint:  prostate cancer; anem/tcp f/u         History of Present Illness:     Patient is being seen today in person..The patient is on today to go over the results of the recently ordered labs, tests and studies.  He is here with his wife.     He had colonoscopy just this am. He had EGD back in 2024 with Dr Alfaro. Per patient only one little polyp on colon which was removed and some mild diverticulosis.     He is continued on oral iron and B12    He saw Dr Pettit in 2024 and Dr Jama in 2024    He saw Randa with  in 2024     He previously had nasal surgery with Dr Davin Carey with ENT and still has some nasal issues. Also seeing audiology and due to see Dr Heredia    He denies any CP, SOB, HA's or N/V.               I discussed COVID19 precautions - he had his vaccinations          ROS:   GEN: normal without any fever, night sweats or weight loss;no further dizzy spells  HEENT: normal with no HA's, sore throat, stiff neck, changes in vision  CV: normal with no CP, SOB, PND, JANSEN or orthopnea  PULM: normal with no SOB, cough, hemoptysis, sputum or pleuritic pain  GI: extreme reflux issues  : normal with no hematuria, dysuria   SKIN: normal with no rash, erythema, bruising, or swelling    Allergies:  Review of patient's allergies indicates:  No Known Allergies    Medications:    Current Outpatient Medications:     amitriptyline (ELAVIL) 50 MG tablet, Take 1 tablet (50 mg total) by mouth every evening., Disp: 90 tablet, Rfl: 3    amLODIPine (NORVASC) 10 MG tablet, Take 1 tablet (10 mg total) by mouth once daily. (Patient taking differently: Take 5 mg by mouth once daily.), Disp: 90 tablet, Rfl: 3    ascorbic acid, vitamin C, (VITAMIN C) 1000 MG  tablet, Take 1,000 mg by mouth 2 (two) times daily., Disp: , Rfl:     aspirin (ECOTRIN) 81 MG EC tablet, Take 81 mg by mouth every evening., Disp: , Rfl:     carvediloL (COREG) 25 MG tablet, TAKE 1 TABLET BY MOUTH TWICE DAILY WITH MEALS, Disp: 180 tablet, Rfl: 3    clonazePAM (KLONOPIN) 0.5 MG tablet, Take by mouth., Disp: , Rfl:     co-enzyme Q-10 30 mg capsule, Take 30 mg by mouth 3 (three) times daily., Disp: , Rfl:     cyanocobalamin (VITAMIN B-12) 100 MCG tablet, Take 1,000 mcg by mouth once daily., Disp: , Rfl:     diazePAM (VALIUM) 5 MG tablet, Take 5 mg by mouth 4 (four) times daily as needed., Disp: , Rfl:     eplerenone (INSPRA) 25 MG Tab, Take 1 tablet by mouth once daily, Disp: 30 tablet, Rfl: 0    esomeprazole (NEXIUM) 40 MG capsule, Take 40 mg by mouth 2 (two) times daily., Disp: , Rfl:     fish oil-omega-3 fatty acids 300-1,000 mg capsule, Take 2 capsules by mouth every evening., Disp: , Rfl:     hydrALAZINE (APRESOLINE) 100 MG tablet, Take 1 tablet (100 mg total) by mouth 3 (three) times daily. (Patient taking differently: Take 100 mg by mouth 2 (two) times a day.), Disp: 270 tablet, Rfl: 0    hydroCHLOROthiazide (HYDRODIURIL) 12.5 MG Tab, Take 12.5 mg by mouth once daily., Disp: , Rfl:     HYDROcodone-acetaminophen (NORCO)  mg per tablet, Take 1 tablet by mouth every 4 (four) hours as needed. for pain., Disp: , Rfl:     ibuprofen (ADVIL,MOTRIN) 600 MG tablet, Take 1 tablet (600 mg total) by mouth every 6 (six) hours as needed for Pain., Disp: 20 tablet, Rfl: 0    irbesartan (AVAPRO) 300 MG tablet, , Disp: , Rfl:     iron-vitamin C 100-250 mg, ICAR-C, 100-250 mg Tab, Take 1 tablet by mouth once daily, Disp: 30 tablet, Rfl: 0    levothyroxine (SYNTHROID) 50 MCG tablet, Take 1 tablet (50 mcg total) by mouth before breakfast., Disp: 90 tablet, Rfl: 2    levothyroxine (SYNTHROID) 50 MCG tablet, TAKE 1 TABLET BY MOUTH BEFORE BREAKFAST, Disp: 90 tablet, Rfl: 3    magnesium 250 mg Tab, Take 1 tablet  "by mouth every evening., Disp: , Rfl:     meclizine (ANTIVERT) 25 mg tablet, Take 25 mg by mouth every 6 (six) hours as needed., Disp: , Rfl:     metFORMIN (GLUCOPHAGE-XR) 500 MG ER 24hr tablet, Take 1 tablet (500 mg total) by mouth daily with breakfast., Disp: 90 tablet, Rfl: 1    olmesartan (BENICAR) 40 MG tablet, Take 1 tablet (40 mg total) by mouth once daily., Disp: 90 tablet, Rfl: 3    potassium chloride SA (K-DUR,KLOR-CON) 20 MEQ tablet, Take 1 tablet (20 mEq total) by mouth 2 (two) times daily., Disp: 180 tablet, Rfl: 3    rosuvastatin (CRESTOR) 5 MG tablet, Take 1 tablet (5 mg total) by mouth once daily., Disp: 90 tablet, Rfl: 1    terazosin (HYTRIN) 10 MG capsule, Take 1 capsule (10 mg total) by mouth every evening., Disp: 90 capsule, Rfl: 3    topiramate (TOPAMAX) 25 MG tablet, Take 50 mg by mouth 2 (two) times daily., Disp: , Rfl:     vitamin D (VITAMIN D3) 1000 units Tab, Take 1,000 Units by mouth 2 (two) times a day., Disp: , Rfl:     cloNIDine (CATAPRES) 0.1 MG tablet, Take 1 tablet (0.1 mg total) by mouth every 8 (eight) hours as needed (SBP greater than 180)., Disp: 30 tablet, Rfl: 0    sildenafiL (VIAGRA) 25 MG tablet, Take 1 tablet (25 mg total) by mouth daily as needed for Erectile Dysfunction. Take 1/2 to 1 tablet as needed for erectile dysfunction., Disp: 10 tablet, Rfl: 0    Current Facility-Administered Medications:     sodium chloride 0.9% flush 10 mL, 10 mL, Intravenous, PRN, Carol Jama MD    PMHx/PSHx Updates:  See patient's last visit with me on  10/19/2023  See H&P on 12/28/2018        Pathology:  Cancer Staging  No matching staging information was found for the patient.          Objective:     Vitals:  Blood pressure 135/67, pulse 63, temperature 97.5 °F (36.4 °C), resp. rate 16, height 5' 6" (1.676 m), weight 102 kg (224 lb 14.4 oz).        Physical Examination:   GEN: no apparent distress, comfortable; AAOx3; overweight  HEAD: atraumatic and normocephalic  EYES: no conjunctival " pallor or muddiness, no icterus; normal pupil reaction to ambient light  ENT: OMM, no pharyngeal erythema, external bilateral ears WNL; no visible thrush or ulcers  NECK: no masses or swelling, trachea midline, no visible LAD/LN's   CV: no palpitations; no pedal edema; no noticeable JVD or neck vein distension; pedal swelling resolved  CHEST: Normal respiratory effort; chest wall breath movements symmetrical; no audible wheezing  ABDOM: non-distended; no bloating  MUSC/Skeletal: ROM normal; joints visibly normal; no deformities; positive arthropathy in hands  EXTREM: no clubbing, cyanosis, inflammation or swelling  SKIN: no rashes, lesions, ulcers, petechiae or subcutaneous nodules;   : no ellison  NEURO: moving all 4 extremities; AAOx3; no tremors  PSYCH: normal mood, affect and behavior  LYMPH: no visible LN's or LAD              Labs:        Lab Results   Component Value Date    WBC 8.48 04/18/2024    HGB 12.4 (L) 04/18/2024    HCT 39.2 (L) 04/18/2024    MCV 88 04/18/2024     (L) 04/18/2024     CMP  Sodium   Date Value Ref Range Status   04/18/2024 139 136 - 145 mmol/L Final   04/12/2019 138 134 - 144 mmol/L      Potassium   Date Value Ref Range Status   04/18/2024 4.1 3.5 - 5.1 mmol/L Final     Chloride   Date Value Ref Range Status   04/18/2024 104 95 - 110 mmol/L Final   04/12/2019 99 98 - 110 mmol/L      CO2   Date Value Ref Range Status   04/18/2024 29 23 - 29 mmol/L Final     Glucose   Date Value Ref Range Status   04/18/2024 116 (H) 70 - 110 mg/dL Final   04/12/2019 117 (H) 70 - 99 mg/dL      BUN   Date Value Ref Range Status   04/18/2024 16 8 - 23 mg/dL Final     Creatinine   Date Value Ref Range Status   04/18/2024 1.0 0.5 - 1.4 mg/dL Final   04/12/2019 0.72 0.60 - 1.40 mg/dL      Calcium   Date Value Ref Range Status   04/18/2024 8.4 (L) 8.7 - 10.5 mg/dL Final     Total Protein   Date Value Ref Range Status   04/18/2024 6.2 6.0 - 8.4 g/dL Final     Albumin   Date Value Ref Range Status    04/18/2024 3.8 3.5 - 5.2 g/dL Final   04/12/2019 3.3 3.1 - 4.7 g/dL      Total Bilirubin   Date Value Ref Range Status   04/18/2024 0.4 0.1 - 1.0 mg/dL Final     Comment:     For infants and newborns, interpretation of results should be based  on gestational age, weight and in agreement with clinical  observations.    Premature Infant recommended reference ranges:  Up to 24 hours.............<8.0 mg/dL  Up to 48 hours............<12.0 mg/dL  3-5 days..................<15.0 mg/dL  6-29 days.................<15.0 mg/dL       Alkaline Phosphatase   Date Value Ref Range Status   04/18/2024 64 55 - 135 U/L Final     AST   Date Value Ref Range Status   04/18/2024 14 10 - 40 U/L Final     ALT   Date Value Ref Range Status   04/18/2024 14 10 - 44 U/L Final     Anion Gap   Date Value Ref Range Status   04/18/2024 6 (L) 8 - 16 mmol/L Final     eGFR if    Date Value Ref Range Status   07/25/2022 >60.0 >60 mL/min/1.73 m^2 Final     eGFR if non    Date Value Ref Range Status   07/25/2022 >60.0 >60 mL/min/1.73 m^2 Final     Comment:     Calculation used to obtain the estimated glomerular filtration  rate (eGFR) is the CKD-EPI equation.        Lab Results   Component Value Date    IRON 57 04/18/2024    TIBC 266 04/18/2024    FERRITIN 173.2 04/18/2024        Lab Results   Component Value Date    [id number] >1500 (H) 04/18/2024     Lab Results   Component Value Date    FOLATE 9.6 04/18/2024           Radiology/Diagnostic Studies:    Us Abdomen Complete    Result Date: 1/2/2019  Abdominal ultrasound Clinical history is anemia, prostate cancer The pancreas, aorta and IVC are normal. The liver is hyperechoic compatible with fatty infiltration. There is hepatopedal flow within the portal vein. The common bile duct measures 6 mm. The patient has had a cholecystectomy. The kidneys are normal in size and echogenicity. The spleen is normal in size measuring 12 cm. IMPRESSION: Fatty infiltration of the liver  otherwise negative abdominal ultrasound Read and electronically signed by: Marry Goldberg MD on 1/2/2019 9:49 AM CST MARRY GOLDBERG MD      I have reviewed all available lab results and radiology reports.    Assessment/Plan:   (1) 73 y.o. male with diagnosis of prostate cancer who has been referred by Simone Santillan Jr., MD for evaluation by medical oncology. Patient with a high risk adenocarcinoma of the prostate with [id number] with GS of 4+5.   - he started androgen depravation therapy and monotherapy XRT (IMRT)  - followed by Dr Gordon with  and currently on Trelstar  - followed by Dr Santillan with Rad/onc and completed XRt on 12/18/2018  - latest PSA at 0.01 in May 2020 and he had his last lupron shot done in April/May 2020  - he sees Dr Gordon again in Nov 2020 1/13/2021:  - He saw Dr Gordon again in Nov 2020 and they are repeating his PSA next month with plans to repeat every 3 months for now.     4/13/2021:  - seeing Dr Gordon with labs in near future  - PSA on 2/23/2021 was 0.01    9/28/2021:  - He sees Dr Gordon again in Dec 2021 and the latest PSA was a little higher at 0.03; he has been off the ADT for about a year  - discussed and will make referral to Dr Andrea Scanlon at [facility] with -Oncology    12/28/2021:  - he did not see Dr Scanlon at [facility] as of yet - will check on the referral  - He was supposed to f/u with Dr Gordon again in Dec 2021 but it was cancelled; he has been off the ADT for over a year; he sees  again in June 2022 and Dr Santillan again in Jan 2022  - PSA down to 0.01 now and good    4/18/2022:  - he sees Dr Gordon again in June 2022 7/18/2022:  - mild increase in PSA up to 0.07 from 0.01  - planned repeat level in 3 months per  with follow-up with Dr Gordon again in Aug 2022  - he never did go see Dr Scanlon    11/15/2022:  - His latest PSA was 0.07 and he sees Dr Gordon again in Dec 2022    7/20/2023:  - His PSA has increased to 0.14 and he is seeing Dr Gordon; he saw  Randa last week. He sees Dr Gordon again in 3 months. He is planning to see Dr Santillan again in near future  -  planning to repeat PSA again in 3 months    10/19/2023:  - PSA back down to 0.1  - followed by Dr Gordon/[place]    4/22/2024:  - latest PSA was 0.11 and stable  - followed by Randa with  and sees them again in July 2024       (2) CAD and non-rheumatic MR; mild CVA in 1993; Hypertensive Heart disease - followed by Dr Jama with cardiology     (3) HTN and hypercholesterolemia     (4) LORA     (5) DM    (6) Chronic back pain issues - required recent lumbar surgery with Dr Chris Fofana at [facility]     (7) Hx/of nightly fevers     (8) Chronic diarhhea - followed by Dr Alfaro with GI and s/p recent endoscopies     (9) Mild thrombocytopenia resolved with last platelet count down at 141,000  - no history of hepatitis or liver problems; no alcoholism  - he has positive antiplatelet antibody screens both direct and indirect consistent with an underlying chronic ITP condition  - his flow cytometry showed no evidence of leukemia but he did have a relative increase in eosinophils    1/13/2021:  - latest platelet count at 129,000 which should be adequate for most surgeries    4/13/2021:  - labs pending    9/28/2021:  - latest platelet count is WNL    12/28/2021:  - latest plat wnl    7/18/2022:  - latest plats wnl at 158,000    11/15/2022:  - plats are good at 159,000    7/20/2023:  - plats currently WNL - 177,000    4/22/2024:  - latest plats at 147,000 and WNL     (10) Mild anemia with latest hgb at 11.5 and stable   - NCNC parameters  - iron panel was WNL with recent labs  - OBS was negative on 11/15  - hx/of colon polyps in past  - followed by Dr Alfaro with GI with planned repeat endoscopies in near future    1/13/2021:  - hgb at 11.5 with normal iron panel; stable    4/13/2021:  - labs pending    9/28/2021:  - latest hgb at 11.5 and relatively stable  - iron panel is adequate    12/28/2021:  - latest hgb  at 11.1 and adequate  - iron panel wnl    4/18/2022:  - hgb at 10.7 and a little lower  - his iron was a little low despite the oral iron  - discussed IV iron and he is agreeable    7/18/2022:  - hgb better at 12.5  - s/p IV iron x2  - on oral iron    11/15/2022:  - latest hgb at 12.5 and iorn panel stable  - continued on oral iron    7/20/2023:  - hgb at 12.3  - iron panel adequate  - on oral iron daily    10/19/2023:  - latest hgb at 12.1  - iron panel adequate  - plats at 119,000 and a little lower this time    4/22/2024:  - latest hgb at 12.4  - s/p EGD with Dr Alfaro in Feb 2024 and colonoscopy earlier today  - iron panel adequate  - B12 >1500 - he plans to cut back on B12 suppplements    (11) Esophageal polyp   - He had scope with EGD with Dr Alfaroand they found an esophageal polyp which was cauterized. He saw Dr Santillan  and he recommended that the patient have the polyp removed.   - he is having repeat EGD to re-evaluate the polyp next month      1/13/2021:  -He previously had scope with EGD with Dr Alfaro  and they found an esophageal polyp which was cauterized.  -  He subsequently saw Dr Santillan and he recommended that the patient have the polyp removed.   - He had repeat EGD but it was incomplete due residual gastric contents and it was to be rescheduled for the following month but he did not have it done as of yet.    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 F/u with rad/onc, GI and  directives  2. Check CBC/plat/iron panel every 3 months for now    3. F/u with PCP, , Rad/onc etc   4.  continue ICAR-C daily po and set up IV irons as needed  5. F/u with neurology          RTC in 6 months    Fax note to Wilver Santillan Clinton H. III, MD, and Wiley Gordon Albright; James Houser; Sartor    Discussion:       Total Time spent on patient:    I spent over 15 mins of time with the patient. Reviewed results of the recently ordered labs, tests, reports and studies; made directives with regards to the results. Over half of this time was spent couseling and coordinating care, making treatment and analytical decisions; ordering necessary labs, tests and studies; and discussing treatment options and setting up treatment plan(s) if indicated.        COVID-19 Discussion:    I had long discussion with patient and any applicable family about the COVID-19 coronavirus epidemic and the recommended precautions with regard to cancer and/or hematology patients. I have re-iterated the CDC recommendations for adequate hand washing, use of hand -like products, and coughing into elbow, etc. In addition, especially for our patients who are on chemotherapy and/or our otherwise immunocompromised patients, I have recommended avoidance of crowds, including movie theaters, restaurants, churches, etc. I have recommended avoidance of any sick or symptomatic family members and/or friends. I have also recommended avoidance of any raw and unwashed food products, and general avoidance of food items that have not been prepared by themselves. The patient has been asked to call us immediately with any symptom developments, issues, questions or other general concerns.       Iron Infusion Therapy Discussion:     I provided literature/learning materials on the particular IV iron regimen and discussed the potential side-effect profiles of the drug(s). I discussed the importance of compliance with obtaining and monitoring requested lab work, and went over the potential risk for the development of anaphylactic shock, bronchospasm,  dysrhythmia, liver and/or kidney damage, and respiratory/cardiovascular arrest and/or failure. I discussed the potential risks for development of alopecia, fevers, itching, chills and/or rigors, cold sensory issues, ringing in ears, vertigo and neuropathy, all of which are usually acute but sometimes could end up being chronic and life-long. I discussed the risks of hand-foot syndrome and rashes, and development of other autoimmune mediated processes such as pneumonitis and colitis which could be life threatening.     The patient's consent has been obtained to proceed with the IV iron therapy.The patient will be referred to [facility] for training and education on IV iron therapy, use of antiemetics and/or anti-diarrheals, use of NSAID's, potential IV iron therapy side-effects, and any specific recommendations and precautions with the particular IV iron agents.      I answered all of the patient's (and family's, if applicable) questions to the best of my ability and to their complete satisfaction. The patient acknowledged full understanding of the risks, recommendations and plan(s).         I have explained all of the above in detail and the patient understands all of the current recommendation(s). I have answered all of their questions to the best of my ability and to their complete satisfaction.   The patient is to continue with the current management plan.            Electronically signed by Cyrus Gibson MD

## 2024-04-18 ENCOUNTER — LAB VISIT (OUTPATIENT)
Dept: LAB | Facility: HOSPITAL | Age: 74
End: 2024-04-18
Attending: FAMILY MEDICINE
Payer: MEDICARE

## 2024-04-18 DIAGNOSIS — E11.319 TYPE 2 DIABETES MELLITUS WITH RETINOPATHY OF BOTH EYES, WITHOUT LONG-TERM CURRENT USE OF INSULIN, MACULAR EDEMA PRESENCE UNSPECIFIED, UNSPECIFIED RETINOPATHY SEVERITY: ICD-10-CM

## 2024-04-18 DIAGNOSIS — I10 HYPERTENSION, ESSENTIAL: ICD-10-CM

## 2024-04-18 DIAGNOSIS — E78.5 HYPERLIPIDEMIA, UNSPECIFIED HYPERLIPIDEMIA TYPE: ICD-10-CM

## 2024-04-18 DIAGNOSIS — E03.9 HYPOTHYROIDISM, UNSPECIFIED TYPE: ICD-10-CM

## 2024-04-18 LAB
ALBUMIN SERPL BCP-MCNC: 3.8 G/DL (ref 3.5–5.2)
ALP SERPL-CCNC: 64 U/L (ref 55–135)
ALT SERPL W/O P-5'-P-CCNC: 14 U/L (ref 10–44)
ANION GAP SERPL CALC-SCNC: 6 MMOL/L (ref 8–16)
AST SERPL-CCNC: 14 U/L (ref 10–40)
BASOPHILS # BLD AUTO: 0.02 K/UL (ref 0–0.2)
BASOPHILS NFR BLD: 0.2 % (ref 0–1.9)
BILIRUB SERPL-MCNC: 0.4 MG/DL (ref 0.1–1)
BUN SERPL-MCNC: 16 MG/DL (ref 8–23)
CALCIUM SERPL-MCNC: 8.4 MG/DL (ref 8.7–10.5)
CHLORIDE SERPL-SCNC: 104 MMOL/L (ref 95–110)
CHOLEST SERPL-MCNC: 108 MG/DL (ref 120–199)
CHOLEST/HDLC SERPL: 3.2 {RATIO} (ref 2–5)
CO2 SERPL-SCNC: 29 MMOL/L (ref 23–29)
CREAT SERPL-MCNC: 1 MG/DL (ref 0.5–1.4)
DIFFERENTIAL METHOD BLD: ABNORMAL
EOSINOPHIL # BLD AUTO: 0.4 K/UL (ref 0–0.5)
EOSINOPHIL NFR BLD: 5 % (ref 0–8)
ERYTHROCYTE [DISTWIDTH] IN BLOOD BY AUTOMATED COUNT: 14.5 % (ref 11.5–14.5)
EST. GFR  (NO RACE VARIABLE): >60 ML/MIN/1.73 M^2
ESTIMATED AVG GLUCOSE: 126 MG/DL (ref 68–131)
GLUCOSE SERPL-MCNC: 116 MG/DL (ref 70–110)
HBA1C MFR BLD: 6 % (ref 4.5–6.2)
HCT VFR BLD AUTO: 39.2 % (ref 40–54)
HDLC SERPL-MCNC: 34 MG/DL (ref 40–75)
HDLC SERPL: 31.5 % (ref 20–50)
HGB BLD-MCNC: 12.4 G/DL (ref 14–18)
IMM GRANULOCYTES # BLD AUTO: 0.07 K/UL (ref 0–0.04)
IMM GRANULOCYTES NFR BLD AUTO: 0.8 % (ref 0–0.5)
LDLC SERPL CALC-MCNC: 22 MG/DL (ref 63–159)
LYMPHOCYTES # BLD AUTO: 1.4 K/UL (ref 1–4.8)
LYMPHOCYTES NFR BLD: 17 % (ref 18–48)
MCH RBC QN AUTO: 27.7 PG (ref 27–31)
MCHC RBC AUTO-ENTMCNC: 31.6 G/DL (ref 32–36)
MCV RBC AUTO: 88 FL (ref 82–98)
MONOCYTES # BLD AUTO: 0.8 K/UL (ref 0.3–1)
MONOCYTES NFR BLD: 8.8 % (ref 4–15)
NEUTROPHILS # BLD AUTO: 5.8 K/UL (ref 1.8–7.7)
NEUTROPHILS NFR BLD: 68.2 % (ref 38–73)
NONHDLC SERPL-MCNC: 74 MG/DL
NRBC BLD-RTO: 0 /100 WBC
PLATELET # BLD AUTO: 147 K/UL (ref 150–450)
PMV BLD AUTO: 11.4 FL (ref 9.2–12.9)
POTASSIUM SERPL-SCNC: 4.1 MMOL/L (ref 3.5–5.1)
PROT SERPL-MCNC: 6.2 G/DL (ref 6–8.4)
RBC # BLD AUTO: 4.47 M/UL (ref 4.6–6.2)
SODIUM SERPL-SCNC: 139 MMOL/L (ref 136–145)
TRIGL SERPL-MCNC: 260 MG/DL (ref 30–150)
TSH SERPL DL<=0.005 MIU/L-ACNC: 2.4 UIU/ML (ref 0.34–5.6)
WBC # BLD AUTO: 8.48 K/UL (ref 3.9–12.7)

## 2024-04-18 PROCEDURE — 80053 COMPREHEN METABOLIC PANEL: CPT | Performed by: FAMILY MEDICINE

## 2024-04-18 PROCEDURE — 85025 COMPLETE CBC W/AUTO DIFF WBC: CPT | Performed by: FAMILY MEDICINE

## 2024-04-18 PROCEDURE — 84443 ASSAY THYROID STIM HORMONE: CPT | Performed by: FAMILY MEDICINE

## 2024-04-18 PROCEDURE — 83036 HEMOGLOBIN GLYCOSYLATED A1C: CPT | Performed by: FAMILY MEDICINE

## 2024-04-18 PROCEDURE — 80061 LIPID PANEL: CPT | Performed by: FAMILY MEDICINE

## 2024-04-22 ENCOUNTER — OFFICE VISIT (OUTPATIENT)
Dept: HEMATOLOGY/ONCOLOGY | Facility: CLINIC | Age: 74
End: 2024-04-22
Payer: MEDICARE

## 2024-04-22 VITALS
TEMPERATURE: 98 F | HEART RATE: 63 BPM | HEIGHT: 66 IN | DIASTOLIC BLOOD PRESSURE: 67 MMHG | RESPIRATION RATE: 16 BRPM | SYSTOLIC BLOOD PRESSURE: 135 MMHG | WEIGHT: 224.88 LBS | BODY MASS INDEX: 36.14 KG/M2

## 2024-04-22 DIAGNOSIS — D64.9 ANEMIA, UNSPECIFIED TYPE: ICD-10-CM

## 2024-04-22 DIAGNOSIS — Z85.46 HISTORY OF PROSTATE CANCER: Primary | Chronic | ICD-10-CM

## 2024-04-22 DIAGNOSIS — E53.8 VITAMIN B12 DEFICIENCY: ICD-10-CM

## 2024-04-22 DIAGNOSIS — D50.9 IRON DEFICIENCY ANEMIA, UNSPECIFIED IRON DEFICIENCY ANEMIA TYPE: ICD-10-CM

## 2024-04-22 LAB — CRC RECOMMENDATION EXT: NORMAL

## 2024-04-22 PROCEDURE — 1126F AMNT PAIN NOTED NONE PRSNT: CPT | Mod: CPTII,S$GLB,, | Performed by: INTERNAL MEDICINE

## 2024-04-22 PROCEDURE — 3066F NEPHROPATHY DOC TX: CPT | Mod: CPTII,S$GLB,, | Performed by: INTERNAL MEDICINE

## 2024-04-22 PROCEDURE — 3078F DIAST BP <80 MM HG: CPT | Mod: CPTII,S$GLB,, | Performed by: INTERNAL MEDICINE

## 2024-04-22 PROCEDURE — 4010F ACE/ARB THERAPY RXD/TAKEN: CPT | Mod: CPTII,S$GLB,, | Performed by: INTERNAL MEDICINE

## 2024-04-22 PROCEDURE — 3288F FALL RISK ASSESSMENT DOCD: CPT | Mod: CPTII,S$GLB,, | Performed by: INTERNAL MEDICINE

## 2024-04-22 PROCEDURE — 1159F MED LIST DOCD IN RCRD: CPT | Mod: CPTII,S$GLB,, | Performed by: INTERNAL MEDICINE

## 2024-04-22 PROCEDURE — 1101F PT FALLS ASSESS-DOCD LE1/YR: CPT | Mod: CPTII,S$GLB,, | Performed by: INTERNAL MEDICINE

## 2024-04-22 PROCEDURE — 3044F HG A1C LEVEL LT 7.0%: CPT | Mod: CPTII,S$GLB,, | Performed by: INTERNAL MEDICINE

## 2024-04-22 PROCEDURE — 3008F BODY MASS INDEX DOCD: CPT | Mod: CPTII,S$GLB,, | Performed by: INTERNAL MEDICINE

## 2024-04-22 PROCEDURE — 99213 OFFICE O/P EST LOW 20 MIN: CPT | Mod: S$GLB,,, | Performed by: INTERNAL MEDICINE

## 2024-04-22 PROCEDURE — 3075F SYST BP GE 130 - 139MM HG: CPT | Mod: CPTII,S$GLB,, | Performed by: INTERNAL MEDICINE

## 2024-04-22 PROCEDURE — 1160F RVW MEDS BY RX/DR IN RCRD: CPT | Mod: CPTII,S$GLB,, | Performed by: INTERNAL MEDICINE

## 2024-04-22 PROCEDURE — 3061F NEG MICROALBUMINURIA REV: CPT | Mod: CPTII,S$GLB,, | Performed by: INTERNAL MEDICINE

## 2024-04-25 ENCOUNTER — OFFICE VISIT (OUTPATIENT)
Dept: FAMILY MEDICINE | Facility: CLINIC | Age: 74
End: 2024-04-25
Payer: MEDICARE

## 2024-04-25 VITALS
RESPIRATION RATE: 18 BRPM | HEART RATE: 53 BPM | WEIGHT: 225.06 LBS | SYSTOLIC BLOOD PRESSURE: 120 MMHG | OXYGEN SATURATION: 95 % | DIASTOLIC BLOOD PRESSURE: 68 MMHG | BODY MASS INDEX: 36.17 KG/M2 | TEMPERATURE: 99 F | HEIGHT: 66 IN

## 2024-04-25 DIAGNOSIS — E11.319 TYPE 2 DIABETES MELLITUS WITH RETINOPATHY OF BOTH EYES, WITHOUT LONG-TERM CURRENT USE OF INSULIN, MACULAR EDEMA PRESENCE UNSPECIFIED, UNSPECIFIED RETINOPATHY SEVERITY: ICD-10-CM

## 2024-04-25 DIAGNOSIS — I10 HYPERTENSION, ESSENTIAL: Primary | ICD-10-CM

## 2024-04-25 DIAGNOSIS — G47.33 OSA ON CPAP: ICD-10-CM

## 2024-04-25 DIAGNOSIS — C61 PROSTATE CANCER: ICD-10-CM

## 2024-04-25 DIAGNOSIS — K22.2 ESOPHAGEAL STRICTURE: ICD-10-CM

## 2024-04-25 DIAGNOSIS — Z79.82 ASPIRIN LONG-TERM USE: ICD-10-CM

## 2024-04-25 DIAGNOSIS — D64.9 ANEMIA, UNSPECIFIED TYPE: ICD-10-CM

## 2024-04-25 DIAGNOSIS — E03.9 HYPOTHYROIDISM, UNSPECIFIED TYPE: ICD-10-CM

## 2024-04-25 DIAGNOSIS — E78.5 HYPERLIPIDEMIA, UNSPECIFIED HYPERLIPIDEMIA TYPE: ICD-10-CM

## 2024-04-25 DIAGNOSIS — I25.10 CORONARY ARTERY DISEASE, UNSPECIFIED VESSEL OR LESION TYPE, UNSPECIFIED WHETHER ANGINA PRESENT, UNSPECIFIED WHETHER NATIVE OR TRANSPLANTED HEART: ICD-10-CM

## 2024-04-25 DIAGNOSIS — K63.5 POLYP OF COLON, UNSPECIFIED PART OF COLON, UNSPECIFIED TYPE: ICD-10-CM

## 2024-04-25 PROCEDURE — 4010F ACE/ARB THERAPY RXD/TAKEN: CPT | Mod: CPTII,S$GLB,, | Performed by: FAMILY MEDICINE

## 2024-04-25 PROCEDURE — 3008F BODY MASS INDEX DOCD: CPT | Mod: CPTII,S$GLB,, | Performed by: FAMILY MEDICINE

## 2024-04-25 PROCEDURE — 99214 OFFICE O/P EST MOD 30 MIN: CPT | Mod: S$GLB,,, | Performed by: FAMILY MEDICINE

## 2024-04-25 PROCEDURE — G2211 COMPLEX E/M VISIT ADD ON: HCPCS | Mod: S$GLB,,, | Performed by: FAMILY MEDICINE

## 2024-04-25 PROCEDURE — 1126F AMNT PAIN NOTED NONE PRSNT: CPT | Mod: CPTII,S$GLB,, | Performed by: FAMILY MEDICINE

## 2024-04-25 PROCEDURE — 3078F DIAST BP <80 MM HG: CPT | Mod: CPTII,S$GLB,, | Performed by: FAMILY MEDICINE

## 2024-04-25 PROCEDURE — 3044F HG A1C LEVEL LT 7.0%: CPT | Mod: CPTII,S$GLB,, | Performed by: FAMILY MEDICINE

## 2024-04-25 PROCEDURE — 99999 PR PBB SHADOW E&M-EST. PATIENT-LVL V: CPT | Mod: PBBFAC,,, | Performed by: FAMILY MEDICINE

## 2024-04-25 PROCEDURE — 3066F NEPHROPATHY DOC TX: CPT | Mod: CPTII,S$GLB,, | Performed by: FAMILY MEDICINE

## 2024-04-25 PROCEDURE — 3288F FALL RISK ASSESSMENT DOCD: CPT | Mod: CPTII,S$GLB,, | Performed by: FAMILY MEDICINE

## 2024-04-25 PROCEDURE — 1159F MED LIST DOCD IN RCRD: CPT | Mod: CPTII,S$GLB,, | Performed by: FAMILY MEDICINE

## 2024-04-25 PROCEDURE — 1101F PT FALLS ASSESS-DOCD LE1/YR: CPT | Mod: CPTII,S$GLB,, | Performed by: FAMILY MEDICINE

## 2024-04-25 PROCEDURE — 3074F SYST BP LT 130 MM HG: CPT | Mod: CPTII,S$GLB,, | Performed by: FAMILY MEDICINE

## 2024-04-25 PROCEDURE — 1160F RVW MEDS BY RX/DR IN RCRD: CPT | Mod: CPTII,S$GLB,, | Performed by: FAMILY MEDICINE

## 2024-04-25 PROCEDURE — 3061F NEG MICROALBUMINURIA REV: CPT | Mod: CPTII,S$GLB,, | Performed by: FAMILY MEDICINE

## 2024-04-25 RX ORDER — CYCLOBENZAPRINE HCL 10 MG
10 TABLET ORAL
COMMUNITY
Start: 2024-03-13

## 2024-04-28 NOTE — PROGRESS NOTES
Subjective:       Patient ID: Chris Hill Jr. is a 73 y.o. male.    Chief Complaint: Follow-up (3 month)    Follow-up of hypertension blood pressure under good control.  Coronary artery disease no anginal chest pain.  Hyperlipidemia cholesterol 108 HDL 34 LDL 20 due.  Using aspirin regularly.  Diabetes mellitus type 2 with retinopathy.  A1c is 6.  Previously 5.9 and 5.8.  Glucose 116.  No history of pancreatitis no family history of thyroid disease.  Discussed use of G LP 1.  But they are very expensive and he decided not to try 1 today.  BMI of 36.  Hypothyroidism.  Esophageal stricture EGD about 6 weeks ago.  Swallowing okay.  Obstructive sleep apnea on CPAP.  Compliant and benefitting.  Colon polyps scope 3 days ago.  Anemia B12 greater than 1500 iron 173 TIBC 266 TSH 2.4.  Prostate cancer follow-up current.      Physical examination.  Vital signs noted.  Neck without bruit.  Chest clear.  Heart regular rate rhythm.  Abdomen bowel sounds are positive soft nontender.  Extremities without edema positive pedal pulses.        Objective:        Assessment:       1. Hypertension, essential    2. Hyperlipidemia, unspecified hyperlipidemia type    3. Coronary artery disease, unspecified vessel or lesion type, unspecified whether angina present, unspecified whether native or transplanted heart    4. Aspirin long-term use    5. Type 2 diabetes mellitus with retinopathy of both eyes, without long-term current use of insulin, macular edema presence unspecified, unspecified retinopathy severity    6. BMI 36.0-36.9,adult    7. Hypothyroidism, unspecified type    8. Esophageal stricture    9. LORA on CPAP    10. Polyp of colon, unspecified part of colon, unspecified type    11. Anemia, unspecified type    12. Prostate cancer        Plan:       Hypertension, essential    Hyperlipidemia, unspecified hyperlipidemia type    Coronary artery disease, unspecified vessel or lesion type, unspecified whether angina present, unspecified  whether native or transplanted heart    Aspirin long-term use    Type 2 diabetes mellitus with retinopathy of both eyes, without long-term current use of insulin, macular edema presence unspecified, unspecified retinopathy severity    BMI 36.0-36.9,adult    Hypothyroidism, unspecified type    Esophageal stricture    LORA on CPAP    Polyp of colon, unspecified part of colon, unspecified type    Anemia, unspecified type    Prostate cancer    Discontinue fenofibrate.  Continue his eye car seat.  Follow-up in three-month since he is not going to start a G LP 1.

## 2024-05-01 ENCOUNTER — PATIENT OUTREACH (OUTPATIENT)
Dept: ADMINISTRATIVE | Facility: HOSPITAL | Age: 74
End: 2024-05-01
Payer: MEDICARE

## 2024-05-01 NOTE — PROGRESS NOTES
Population Health Chart Review & Patient Outreach Details      Additional [facility] Health Notes:               Updates Requested / Reviewed:      Updated Care Coordination Note         Health Maintenance Topics Overdue:      VBHM Score: 1     Foot Exam    Tetanus Vaccine  RSV Vaccine                  Health Maintenance Topic(s) Outreach Outcomes & Actions Taken:    Colorectal Cancer Screening - Outreach Outcomes & Actions Taken  : External Records Uploaded, Care Team Updated, & History Updated if Applicable

## 2024-05-23 DIAGNOSIS — I25.10 CORONARY ARTERY DISEASE, UNSPECIFIED VESSEL OR LESION TYPE, UNSPECIFIED WHETHER ANGINA PRESENT, UNSPECIFIED WHETHER NATIVE OR TRANSPLANTED HEART: ICD-10-CM

## 2024-05-23 DIAGNOSIS — I10 HYPERTENSION, ESSENTIAL: ICD-10-CM

## 2024-05-23 RX ORDER — EPLERENONE 25 MG/1
25 TABLET, FILM COATED ORAL DAILY
Qty: 30 TABLET | Refills: 0 | Status: SHIPPED | OUTPATIENT
Start: 2024-05-23

## 2024-05-30 ENCOUNTER — OFFICE VISIT (OUTPATIENT)
Dept: FAMILY MEDICINE | Facility: CLINIC | Age: 74
End: 2024-05-30
Payer: MEDICARE

## 2024-05-30 ENCOUNTER — LAB VISIT (OUTPATIENT)
Dept: LAB | Facility: HOSPITAL | Age: 74
End: 2024-05-30
Attending: FAMILY MEDICINE
Payer: MEDICARE

## 2024-05-30 VITALS
BODY MASS INDEX: 36.32 KG/M2 | WEIGHT: 225 LBS | TEMPERATURE: 98 F | DIASTOLIC BLOOD PRESSURE: 78 MMHG | HEART RATE: 92 BPM | SYSTOLIC BLOOD PRESSURE: 134 MMHG | OXYGEN SATURATION: 96 %

## 2024-05-30 DIAGNOSIS — H81.03 MENIERE'S DISEASE OF BOTH EARS: ICD-10-CM

## 2024-05-30 DIAGNOSIS — Z79.82 ASPIRIN LONG-TERM USE: ICD-10-CM

## 2024-05-30 DIAGNOSIS — I10 HYPERTENSION, ESSENTIAL: Primary | ICD-10-CM

## 2024-05-30 DIAGNOSIS — Z97.4 HEARING AID WORN: ICD-10-CM

## 2024-05-30 DIAGNOSIS — H91.90 HEARING LOSS, UNSPECIFIED HEARING LOSS TYPE, UNSPECIFIED LATERALITY: ICD-10-CM

## 2024-05-30 DIAGNOSIS — R25.2 LEG CRAMP: ICD-10-CM

## 2024-05-30 DIAGNOSIS — E11.42 TYPE 2 DIABETES MELLITUS WITH DIABETIC POLYNEUROPATHY, UNSPECIFIED WHETHER LONG TERM INSULIN USE: ICD-10-CM

## 2024-05-30 LAB
ANION GAP SERPL CALC-SCNC: 8 MMOL/L (ref 8–16)
BUN SERPL-MCNC: 13 MG/DL (ref 8–23)
CALCIUM SERPL-MCNC: 8.8 MG/DL (ref 8.7–10.5)
CHLORIDE SERPL-SCNC: 105 MMOL/L (ref 95–110)
CO2 SERPL-SCNC: 25 MMOL/L (ref 23–29)
CREAT SERPL-MCNC: 0.9 MG/DL (ref 0.5–1.4)
EST. GFR  (NO RACE VARIABLE): >60 ML/MIN/1.73 M^2
GLUCOSE SERPL-MCNC: 123 MG/DL (ref 70–110)
MAGNESIUM SERPL-MCNC: 1.7 MG/DL (ref 1.6–2.6)
POTASSIUM SERPL-SCNC: 3.9 MMOL/L (ref 3.5–5.1)
SODIUM SERPL-SCNC: 138 MMOL/L (ref 136–145)

## 2024-05-30 PROCEDURE — 99999 PR PBB SHADOW E&M-EST. PATIENT-LVL III: CPT | Mod: PBBFAC,,, | Performed by: FAMILY MEDICINE

## 2024-05-30 PROCEDURE — 3288F FALL RISK ASSESSMENT DOCD: CPT | Mod: CPTII,S$GLB,, | Performed by: FAMILY MEDICINE

## 2024-05-30 PROCEDURE — 1160F RVW MEDS BY RX/DR IN RCRD: CPT | Mod: CPTII,S$GLB,, | Performed by: FAMILY MEDICINE

## 2024-05-30 PROCEDURE — 3044F HG A1C LEVEL LT 7.0%: CPT | Mod: CPTII,S$GLB,, | Performed by: FAMILY MEDICINE

## 2024-05-30 PROCEDURE — 3066F NEPHROPATHY DOC TX: CPT | Mod: CPTII,S$GLB,, | Performed by: FAMILY MEDICINE

## 2024-05-30 PROCEDURE — 3078F DIAST BP <80 MM HG: CPT | Mod: CPTII,S$GLB,, | Performed by: FAMILY MEDICINE

## 2024-05-30 PROCEDURE — 36415 COLL VENOUS BLD VENIPUNCTURE: CPT | Performed by: FAMILY MEDICINE

## 2024-05-30 PROCEDURE — 3061F NEG MICROALBUMINURIA REV: CPT | Mod: CPTII,S$GLB,, | Performed by: FAMILY MEDICINE

## 2024-05-30 PROCEDURE — 3075F SYST BP GE 130 - 139MM HG: CPT | Mod: CPTII,S$GLB,, | Performed by: FAMILY MEDICINE

## 2024-05-30 PROCEDURE — 1159F MED LIST DOCD IN RCRD: CPT | Mod: CPTII,S$GLB,, | Performed by: FAMILY MEDICINE

## 2024-05-30 PROCEDURE — 3008F BODY MASS INDEX DOCD: CPT | Mod: CPTII,S$GLB,, | Performed by: FAMILY MEDICINE

## 2024-05-30 PROCEDURE — 4010F ACE/ARB THERAPY RXD/TAKEN: CPT | Mod: CPTII,S$GLB,, | Performed by: FAMILY MEDICINE

## 2024-05-30 PROCEDURE — 99214 OFFICE O/P EST MOD 30 MIN: CPT | Mod: S$GLB,,, | Performed by: FAMILY MEDICINE

## 2024-05-30 PROCEDURE — 80048 BASIC METABOLIC PNL TOTAL CA: CPT | Performed by: FAMILY MEDICINE

## 2024-05-30 PROCEDURE — 1125F AMNT PAIN NOTED PAIN PRSNT: CPT | Mod: CPTII,S$GLB,, | Performed by: FAMILY MEDICINE

## 2024-05-30 PROCEDURE — 1101F PT FALLS ASSESS-DOCD LE1/YR: CPT | Mod: CPTII,S$GLB,, | Performed by: FAMILY MEDICINE

## 2024-05-30 PROCEDURE — 83735 ASSAY OF MAGNESIUM: CPT | Performed by: FAMILY MEDICINE

## 2024-05-30 NOTE — PROGRESS NOTES
SCRIBE #1 NOTE: I, Kevin Tsai, am scribing for, and in the presence of,  Samy Pettit III, MD. I have scribed the entire note.     Subjective:       Patient ID: Chris Hill Jr. is a 73 y.o. male.    Chief Complaint: Follow-up (1 month f/u )    Mr. Hill is here for 1 month follow-up after his last appointment on April 25. Coronary artery disease. Cardiovascular good. No chest pain. No palpitations. Diabetes mellitus type 2 with retinopathy. A1C is 6. Blood glucose is 116. Values under 120 at home. Has had bilateral foot pain but no tingling. Hypertension controlled. Blood pressure 134/78. Hyperlipidemia. Cholesterol is 108. HDL is 34. LDL is 22. Hypothyroidism. TSH is 2.399. Seen by Dr. Callahan for new diagnosis of Meniere's disease of the left ear. Started on Inspra BID. No changes in hearing loss but his tinnitus has improved. He has had leg cramps. Also on HCTZ.     Review of Systems   Constitutional:  Negative for chills and fever.   HENT:  Positive for hearing loss and tinnitus. Negative for congestion and sore throat.    Eyes:  Negative for pain and visual disturbance.   Respiratory:  Negative for chest tightness and shortness of breath.    Cardiovascular:  Negative for chest pain.   Gastrointestinal:  Negative for nausea.   Endocrine: Negative for polydipsia and polyuria.   Genitourinary:  Negative for dysuria and flank pain.   Musculoskeletal:  Positive for myalgias. Negative for back pain, neck pain and neck stiffness.   Skin:  Negative for rash.   Allergic/Immunologic: Negative for immunocompromised state.   Neurological:  Negative for dizziness and weakness.   Hematological:  Does not bruise/bleed easily.   Psychiatric/Behavioral:  Negative for agitation and behavioral problems.    All other systems reviewed and are negative.      Objective:      Physical examination: Vital signs noted. No acute distress. No carotid bruit. Regular heart rate and rhythm. Lungs clear to auscultation  bilaterally. Extremities  trace edema. 2+ pedal pulses. Sensation intact to light touch of all digits of the bilateral feet.      Assessment:       1. Hypertension, essential    2. Type 2 diabetes mellitus with diabetic polyneuropathy, unspecified whether long term insulin use    3. Aspirin long-term use    4. Hearing loss, unspecified hearing loss type, unspecified laterality    5. Leg cramp    6. Hearing aid worn    7. Meniere's disease of both ears        Plan:       Hypertension, essential  -     Lipid Panel; Future; Expected date: 08/30/2024  -     Comprehensive Metabolic Panel; Future; Expected date: 08/30/2024  -     Hemoglobin A1C; Future; Expected date: 08/30/2024    Type 2 diabetes mellitus with diabetic polyneuropathy, unspecified whether long term insulin use  -     Lipid Panel; Future; Expected date: 08/30/2024  -     Comprehensive Metabolic Panel; Future; Expected date: 08/30/2024  -     Hemoglobin A1C; Future; Expected date: 08/30/2024    Aspirin long-term use    Hearing loss, unspecified hearing loss type, unspecified laterality    Leg cramp  -     Basic Metabolic Panel; Future; Expected date: 05/30/2024  -     Magnesium; Future; Expected date: 05/30/2024    Hearing aid worn    Meniere's disease of both ears     Check BMP and magnesium due to the cramps.  Document his foot exam.  Follow-up in 3 months with lipid CMP A1c.  Discontinue the hydrochlorothiazide.  All care gaps addressed or discussed. BMP and magnesium. Discontinue HCTZ. Follow up in 3 months with labs.    I,  personally performed the services described in this documentation. All medical record entries made by the scribe were at my direction and in my presence. I have reviewed the chart and agree that the record reflects my personal performance and is accurate and complete.

## 2024-06-01 PROBLEM — H91.90 HEARING LOSS: Status: ACTIVE | Noted: 2024-06-01

## 2024-06-01 PROBLEM — H81.03 MENIERE'S DISEASE OF BOTH EARS: Status: ACTIVE | Noted: 2024-06-01

## 2024-06-01 PROBLEM — Z97.4 HEARING AID WORN: Status: ACTIVE | Noted: 2024-06-01

## 2024-07-08 ENCOUNTER — LAB VISIT (OUTPATIENT)
Dept: LAB | Facility: HOSPITAL | Age: 74
End: 2024-07-08
Attending: NURSE PRACTITIONER
Payer: MEDICARE

## 2024-07-08 DIAGNOSIS — C61 PROSTATE CANCER: ICD-10-CM

## 2024-07-08 LAB — COMPLEXED PSA SERPL-MCNC: 0.11 NG/ML (ref 0–4)

## 2024-07-08 PROCEDURE — 84403 ASSAY OF TOTAL TESTOSTERONE: CPT | Performed by: NURSE PRACTITIONER

## 2024-07-08 PROCEDURE — 84153 ASSAY OF PSA TOTAL: CPT | Performed by: NURSE PRACTITIONER

## 2024-07-10 LAB — TESTOST SERPL-MCNC: 257 NG/DL (ref 264–916)

## 2024-07-11 ENCOUNTER — PATIENT MESSAGE (OUTPATIENT)
Dept: FAMILY MEDICINE | Facility: CLINIC | Age: 74
End: 2024-07-11
Payer: MEDICARE

## 2024-07-12 ENCOUNTER — OFFICE VISIT (OUTPATIENT)
Dept: FAMILY MEDICINE | Facility: CLINIC | Age: 74
End: 2024-07-12
Payer: MEDICARE

## 2024-07-12 ENCOUNTER — HOSPITAL ENCOUNTER (OUTPATIENT)
Dept: RADIOLOGY | Facility: HOSPITAL | Age: 74
Discharge: HOME | End: 2024-07-12
Payer: MEDICARE

## 2024-07-12 VITALS
SYSTOLIC BLOOD PRESSURE: 140 MMHG | DIASTOLIC BLOOD PRESSURE: 86 MMHG | HEART RATE: 88 BPM | WEIGHT: 222.31 LBS | TEMPERATURE: 98 F | RESPIRATION RATE: 18 BRPM | OXYGEN SATURATION: 100 % | HEIGHT: 66 IN | BODY MASS INDEX: 35.73 KG/M2

## 2024-07-12 DIAGNOSIS — M79.89 PAIN AND SWELLING OF LOWER LEG, LEFT: ICD-10-CM

## 2024-07-12 DIAGNOSIS — L03.116 CELLULITIS OF LEFT LOWER EXTREMITY: ICD-10-CM

## 2024-07-12 DIAGNOSIS — M79.89 PAIN AND SWELLING OF LOWER LEG, LEFT: Primary | ICD-10-CM

## 2024-07-12 DIAGNOSIS — R05.1 ACUTE COUGH: ICD-10-CM

## 2024-07-12 DIAGNOSIS — M79.662 PAIN AND SWELLING OF LOWER LEG, LEFT: Primary | ICD-10-CM

## 2024-07-12 DIAGNOSIS — J02.9 SORE THROAT: ICD-10-CM

## 2024-07-12 DIAGNOSIS — M79.662 PAIN AND SWELLING OF LOWER LEG, LEFT: ICD-10-CM

## 2024-07-12 PROCEDURE — 3061F NEG MICROALBUMINURIA REV: CPT | Mod: CPTII,S$GLB,,

## 2024-07-12 PROCEDURE — 1159F MED LIST DOCD IN RCRD: CPT | Mod: CPTII,S$GLB,,

## 2024-07-12 PROCEDURE — 3044F HG A1C LEVEL LT 7.0%: CPT | Mod: CPTII,S$GLB,,

## 2024-07-12 PROCEDURE — 1160F RVW MEDS BY RX/DR IN RCRD: CPT | Mod: CPTII,S$GLB,,

## 2024-07-12 PROCEDURE — 1101F PT FALLS ASSESS-DOCD LE1/YR: CPT | Mod: CPTII,S$GLB,,

## 2024-07-12 PROCEDURE — 93971 EXTREMITY STUDY: CPT | Mod: 26,LT,, | Performed by: RADIOLOGY

## 2024-07-12 PROCEDURE — 93971 EXTREMITY STUDY: CPT | Mod: TC,LT

## 2024-07-12 PROCEDURE — 3079F DIAST BP 80-89 MM HG: CPT | Mod: CPTII,S$GLB,,

## 2024-07-12 PROCEDURE — 99999 PR PBB SHADOW E&M-EST. PATIENT-LVL V: CPT | Mod: PBBFAC,,,

## 2024-07-12 PROCEDURE — 99214 OFFICE O/P EST MOD 30 MIN: CPT | Mod: S$GLB,,,

## 2024-07-12 PROCEDURE — 3008F BODY MASS INDEX DOCD: CPT | Mod: CPTII,S$GLB,,

## 2024-07-12 PROCEDURE — 3077F SYST BP >= 140 MM HG: CPT | Mod: CPTII,S$GLB,,

## 2024-07-12 PROCEDURE — 1125F AMNT PAIN NOTED PAIN PRSNT: CPT | Mod: CPTII,S$GLB,,

## 2024-07-12 PROCEDURE — 3288F FALL RISK ASSESSMENT DOCD: CPT | Mod: CPTII,S$GLB,,

## 2024-07-12 PROCEDURE — 4010F ACE/ARB THERAPY RXD/TAKEN: CPT | Mod: CPTII,S$GLB,,

## 2024-07-12 PROCEDURE — 3066F NEPHROPATHY DOC TX: CPT | Mod: CPTII,S$GLB,,

## 2024-07-12 RX ORDER — DOXYCYCLINE 100 MG/1
100 CAPSULE ORAL EVERY 12 HOURS
Qty: 20 CAPSULE | Refills: 0 | Status: SHIPPED | OUTPATIENT
Start: 2024-07-12 | End: 2024-07-22

## 2024-07-12 RX ORDER — BENZONATATE 200 MG/1
200 CAPSULE ORAL 3 TIMES DAILY PRN
Qty: 30 CAPSULE | Refills: 0 | Status: SHIPPED | OUTPATIENT
Start: 2024-07-12 | End: 2024-07-22

## 2024-07-12 NOTE — PROGRESS NOTES
Subjective:       Patient ID: Chris Hill Jr. is a 73 y.o. male.    Chief Complaint: Edema (Left leg), Sore Throat, and Otalgia    Chris Hill Jr. Is a 73 y.o. male patient who presents to clinic with complaints of swelling in his leg and a sore throat.  Patient has had pain and swelling in his left lower leg for about a week.  Swelling seems to be getting worse and his toes are feeling numb.  He did get bit by ants yesterday on this leg but pain and swelling was already present.  He also woke up this morning with body aches and sore throat.  He felt like he had fever this morning but did not take his temperature.  He has congestion in his nose and pain in both his ears.  He is not taking any medications.         Review of patient's allergies indicates:  No Known Allergies  Social Determinants of Health     Tobacco Use: Low Risk  (7/12/2024)    Patient History     Smoking Tobacco Use: Never     Smokeless Tobacco Use: Never     Passive Exposure: Not on file   Alcohol Use: Not on file   Financial Resource Strain: Not on file   Food Insecurity: Not on file   Transportation Needs: Not on file   Physical Activity: Not on file   Stress: Not on file   Housing Stability: Not on file   Depression: Low Risk  (4/22/2024)    Depression     Last PHQ-4: Flowsheet Data: 0   Utilities: Not on file   Health Literacy: Not on file   Social Isolation: Not on file      Past Medical History:   Diagnosis Date    Anemia, chronic disease 12/28/2018    Arthritis     Back pain     radiates both legs mainly rt leg    Benign paroxysmal vertigo, bilateral     Diabetes mellitus     Diabetes mellitus, type 2     JANSEN (dyspnea on exertion)     GERD (gastroesophageal reflux disease)     Heart murmur     Hyperlipidemia     Hypertension     Iron deficiency anemia 04/18/2022    Neuropathy     Normocytic normochromic anemia 12/28/2018    Prostate cancer 08/20/2018    Sleep apnea     uses CPAP    Stroke     Thrombocytopenia 12/28/2018    Thyroid  disease     Urinary frequency     Valvular regurgitation       Past Surgical History:   Procedure Laterality Date    ANGIOGRAM, CORONARY, WITH LEFT HEART CATHETERIZATION  7/20/2023    Procedure: Left Heart Cath;  Surgeon: Carol Jama MD;  [facility];  Service: Cardiology;;    ANKLE SURGERY Left     APPENDECTOMY      BACK SURGERY  09/2019    CARDIAC CATHETERIZATION      CERVICAL FUSION      CORONARY ANGIOGRAPHY  7/20/2023    Procedure: Coronary angiogram study;  Surgeon: Carol Jama MD;  Location: [facility];  Service: Cardiology;;    EYE SURGERY      cataracts bilateral    GALLBLADDER SURGERY      HAND SURGERY Bilateral     rt hand x4 left x3    JOINT REPLACEMENT Bilateral     knee    KNEE SURGERY      8 on left knee and 7 on rt knee    NASAL SINUS SURGERY      x5    SHOULDER SURGERY Bilateral     3 on left 2 on right    TRANSRECTAL ULTRASOUND OF PROSTATE WITH INSERTION OF GOLD FIDUCIAL MARKER N/A 10/04/2018    Procedure: ULTRASOUND, PROSTATE, TRANSPERINEAL APPROACH, WITH GOLD FIDUCIAL MARKER INSERTION (with SPACEOAR transperineal biodegradable gel placement);  Surgeon: Manpreet Gordon MD;  Location: [facility] OR;  Service: Urology;  Laterality: N/A;      Social History     Socioeconomic History    Marital status:          Current Outpatient Medications:     amitriptyline (ELAVIL) 50 MG tablet, Take 1 tablet (50 mg total) by mouth every evening., Disp: 90 tablet, Rfl: 3    amLODIPine (NORVASC) 10 MG tablet, Take 1 tablet (10 mg total) by mouth once daily. (Patient taking differently: Take 5 mg by mouth once daily.), Disp: 90 tablet, Rfl: 3    ascorbic acid, vitamin C, (VITAMIN C) 1000 MG tablet, Take 1,000 mg by mouth 2 (two) times daily., Disp: , Rfl:     aspirin (ECOTRIN) 81 MG EC tablet, Take 81 mg by mouth every evening., Disp: , Rfl:     carvediloL (COREG) 25 MG tablet, TAKE 1 TABLET BY MOUTH TWICE DAILY WITH MEALS, Disp: 180 tablet, Rfl: 3    clonazePAM (KLONOPIN) 0.5 MG tablet, Take by  mouth., Disp: , Rfl:     cloNIDine (CATAPRES) 0.1 MG tablet, Take 1 tablet (0.1 mg total) by mouth every 8 (eight) hours as needed (SBP greater than 180)., Disp: 30 tablet, Rfl: 0    co-enzyme Q-10 30 mg capsule, Take 30 mg by mouth 3 (three) times daily., Disp: , Rfl:     cyanocobalamin (VITAMIN B-12) 100 MCG tablet, Take 1,000 mcg by mouth once daily., Disp: , Rfl:     cyclobenzaprine (FLEXERIL) 10 MG tablet, Take 10 mg by mouth as needed for Muscle spasms., Disp: , Rfl:     diazePAM (VALIUM) 5 MG tablet, Take 5 mg by mouth 4 (four) times daily as needed., Disp: , Rfl:     eplerenone (INSPRA) 25 MG Tab, Take 1 tablet (25 mg total) by mouth once daily., Disp: 30 tablet, Rfl: 0    esomeprazole (NEXIUM) 40 MG capsule, Take 40 mg by mouth 2 (two) times daily., Disp: , Rfl:     fish oil-omega-3 fatty acids 300-1,000 mg capsule, Take 2 capsules by mouth every evening., Disp: , Rfl:     hydrALAZINE (APRESOLINE) 100 MG tablet, Take 1 tablet (100 mg total) by mouth 3 (three) times daily. (Patient taking differently: Take 100 mg by mouth 2 (two) times a day.), Disp: 270 tablet, Rfl: 0    HYDROcodone-acetaminophen (NORCO)  mg per tablet, Take 1 tablet by mouth every 4 (four) hours as needed. for pain., Disp: , Rfl:     ibuprofen (ADVIL,MOTRIN) 600 MG tablet, Take 1 tablet (600 mg total) by mouth every 6 (six) hours as needed for Pain., Disp: 20 tablet, Rfl: 0    iron-vitamin C 100-250 mg, ICAR-C, 100-250 mg Tab, Take 1 tablet by mouth once daily, Disp: 30 tablet, Rfl: 0    levothyroxine (SYNTHROID) 50 MCG tablet, Take 1 tablet (50 mcg total) by mouth before breakfast., Disp: 90 tablet, Rfl: 2    magnesium 250 mg Tab, Take 1 tablet by mouth every evening., Disp: , Rfl:     meclizine (ANTIVERT) 25 mg tablet, Take 25 mg by mouth every 6 (six) hours as needed., Disp: , Rfl:     metFORMIN (GLUCOPHAGE-XR) 500 MG ER 24hr tablet, Take 1 tablet (500 mg total) by mouth daily with breakfast., Disp: 90 tablet, Rfl: 1    olmesartan  (BENICAR) 40 MG tablet, Take 1 tablet (40 mg total) by mouth once daily., Disp: 90 tablet, Rfl: 3    potassium chloride SA (K-DUR,KLOR-CON) 20 MEQ tablet, Take 1 tablet (20 mEq total) by mouth 2 (two) times daily., Disp: 180 tablet, Rfl: 3    sildenafiL (VIAGRA) 25 MG tablet, Take 1 tablet (25 mg total) by mouth daily as needed for Erectile Dysfunction. Take 1/2 to 1 tablet as needed for erectile dysfunction., Disp: 10 tablet, Rfl: 0    terazosin (HYTRIN) 10 MG capsule, Take 1 capsule (10 mg total) by mouth every evening., Disp: 90 capsule, Rfl: 3    vitamin D (VITAMIN D3) 1000 units Tab, Take 1,000 Units by mouth 2 (two) times a day., Disp: , Rfl:     benzonatate (TESSALON) 200 MG capsule, Take 1 capsule (200 mg total) by mouth 3 (three) times daily as needed for Cough., Disp: 30 capsule, Rfl: 0    doxycycline (VIBRAMYCIN) 100 MG Cap, Take 1 capsule (100 mg total) by mouth every 12 (twelve) hours. for 10 days, Disp: 20 capsule, Rfl: 0    irbesartan (AVAPRO) 300 MG tablet, , Disp: , Rfl:     rosuvastatin (CRESTOR) 5 MG tablet, Take 1 tablet (5 mg total) by mouth once daily. (Patient not taking: Reported on 7/12/2024), Disp: 90 tablet, Rfl: 1    topiramate (TOPAMAX) 25 MG tablet, Take 50 mg by mouth 2 (two) times daily. (Patient not taking: Reported on 7/12/2024), Disp: , Rfl:     Current Facility-Administered Medications:     sodium chloride 0.9% flush 10 mL, 10 mL, Intravenous, PRN, Carol Jama MD    Lab Results   Component Value Date    WBC 8.48 04/18/2024    HGB 12.4 (L) 04/18/2024    HCT 39.2 (L) 04/18/2024     (L) 04/18/2024    CHOL 108 (L) 04/18/2024    TRIG 260 (H) 04/18/2024    HDL 34 (L) 04/18/2024    ALT 14 04/18/2024    AST 14 04/18/2024     05/30/2024    K 3.9 05/30/2024     05/30/2024    CREATININE 0.9 05/30/2024    BUN 13 05/30/2024    CO2 25 05/30/2024    TSH 2.399 04/18/2024    INR 1.0 06/21/2021    HGBA1C 6.0 04/18/2024    MICROALBUR 0.2 12/02/2022       Review of Systems    Constitutional:  Positive for chills and fatigue.   HENT:  Positive for nasal congestion, ear pain and sore throat.    Respiratory:  Negative for cough and shortness of breath.    Cardiovascular:  Positive for leg swelling. Negative for chest pain and palpitations.   Gastrointestinal:  Negative for abdominal distention.   Musculoskeletal:  Positive for leg pain.   Neurological: Negative.    Psychiatric/Behavioral: Negative.         Objective:      Physical Exam  Vitals reviewed.   Constitutional:       Appearance: Normal appearance.   HENT:      Right Ear: Tenderness present. No middle ear effusion. Tympanic membrane is erythematous. Tympanic membrane is not bulging.      Left Ear: Tenderness present.  No middle ear effusion. Tympanic membrane is erythematous. Tympanic membrane is not bulging.      Ears:      Comments: Bilateral ear canals erythematous     Nose: Congestion present.      Mouth/Throat:      Pharynx: Posterior oropharyngeal erythema present. No oropharyngeal exudate.   Cardiovascular:      Rate and Rhythm: Normal rate and regular rhythm.      Pulses: Normal pulses.           Posterior tibial pulses are 2+ on the right side and 2+ on the left side.      Heart sounds: Normal heart sounds.   Pulmonary:      Effort: Pulmonary effort is normal.      Breath sounds: Normal breath sounds.   Musculoskeletal:      Left lower leg: Tenderness present. 2+ Edema present.      Comments: Left lower extremity with +2 edema, erythema, tender to palpation with positive homans sign.      Ant bite with pustule noted to anterior shin.      Skin:     General: Skin is warm and dry.      Capillary Refill: Capillary refill takes less than 2 seconds.   Neurological:      Mental Status: He is alert.   Psychiatric:         Mood and Affect: Mood normal.         Behavior: Behavior normal.         Thought Content: Thought content normal.         Judgment: Judgment normal.         Assessment:       1. Pain and swelling of lower leg,  left    2. Sore throat    3. Cellulitis of left lower extremity    4. Acute cough        Plan:       Chris was seen today for edema, sore throat and otalgia.    Diagnoses and all orders for this visit:    Pain and swelling of lower leg, left  -     US Lower Extremity Veins Left; Future    Sore throat  -     POCT COVID-19 Rapid Screening    Cellulitis of left lower extremity  -     doxycycline (VIBRAMYCIN) 100 MG Cap; Take 1 capsule (100 mg total) by mouth every 12 (twelve) hours. for 10 days    Acute cough  -     benzonatate (TESSALON) 200 MG capsule; Take 1 capsule (200 mg total) by mouth 3 (three) times daily as needed for Cough.    Stat ultrasound today due to positive homans sign.    Swelling and erythema has appearance of positive cellulitis.  Start doxycycline as prescribed.  Covid swab negative.  Benzonatate for cough.  If he prefers cough syrup he can take Coricidin HBP.  Antihistamine such as Claritin or zyrtec for nasal congestion.  Also flonase as directed.  Follow up in 10 days or sooner if needed.

## 2024-07-17 RX ORDER — HYDRALAZINE HYDROCHLORIDE 100 MG/1
100 TABLET, FILM COATED ORAL 3 TIMES DAILY
Qty: 270 TABLET | Refills: 3 | Status: SHIPPED | OUTPATIENT
Start: 2024-07-17

## 2024-07-17 RX ORDER — HYDRALAZINE HYDROCHLORIDE 100 MG/1
100 TABLET, FILM COATED ORAL 3 TIMES DAILY
Qty: 270 TABLET | Refills: 0 | OUTPATIENT
Start: 2024-07-17

## 2024-07-17 NOTE — TELEPHONE ENCOUNTER
No care due was identified.  [facility] Embedded Care Due Messages. Reference number: [id number].   7/17/2024 8:37:18 AM CDT

## 2024-07-17 NOTE — TELEPHONE ENCOUNTER
No care due was identified.  [facility] Embedded Care Due Messages. Reference number: [id number].   7/17/2024 8:38:46 AM CDT

## 2024-07-18 NOTE — TELEPHONE ENCOUNTER
Refill Routing Note   Medication(s) are not appropriate for processing by [facility] for the following reason(s):        Drug-disease interaction: hydrALAZINE and Coronary artery disease/Orthostasis    [facility] action(s):  Defer             Pharmacist review requested: Yes     Appointments  past 12m or future 3m with PCP    Date Provider   Last Visit   5/30/2024 Samy Pettit III, MD   Next Visit   9/4/2024 Samy Pettit III, MD   ED visits in past 90 days: 0        Note composed:8:40 PM 07/17/2024

## 2024-07-18 NOTE — TELEPHONE ENCOUNTER
Refill Decision Note   Chris Hill  is requesting a refill authorization.  Brief Assessment and Rationale for Refill:  Approve     Medication Therapy Plan: DDI REVIEWED, OK TO FILL      Pharmacist review requested: Yes   Comments:     Note composed:11:04 PM 07/17/2024

## 2024-07-22 ENCOUNTER — OFFICE VISIT (OUTPATIENT)
Dept: UROLOGY | Facility: CLINIC | Age: 74
End: 2024-07-22
Payer: MEDICARE

## 2024-07-22 VITALS
HEART RATE: 75 BPM | SYSTOLIC BLOOD PRESSURE: 122 MMHG | DIASTOLIC BLOOD PRESSURE: 73 MMHG | BODY MASS INDEX: 35.36 KG/M2 | WEIGHT: 220 LBS | HEIGHT: 66 IN

## 2024-07-22 DIAGNOSIS — C61 PROSTATE CANCER: Primary | ICD-10-CM

## 2024-07-22 LAB
BILIRUBIN, UA POC OHS: NEGATIVE
BLOOD, UA POC OHS: NEGATIVE
CLARITY, UA POC OHS: CLEAR
COLOR, UA POC OHS: YELLOW
GLUCOSE, UA POC OHS: NEGATIVE
KETONES, UA POC OHS: NEGATIVE
LEUKOCYTES, UA POC OHS: NEGATIVE
NITRITE, UA POC OHS: NEGATIVE
PH, UA POC OHS: 6
PROTEIN, UA POC OHS: NEGATIVE
SPECIFIC GRAVITY, UA POC OHS: 1.02
UROBILINOGEN, UA POC OHS: 0.2

## 2024-07-22 PROCEDURE — 3044F HG A1C LEVEL LT 7.0%: CPT | Mod: CPTII,S$GLB,, | Performed by: UROLOGY

## 2024-07-22 PROCEDURE — 3008F BODY MASS INDEX DOCD: CPT | Mod: CPTII,S$GLB,, | Performed by: UROLOGY

## 2024-07-22 PROCEDURE — 3078F DIAST BP <80 MM HG: CPT | Mod: CPTII,S$GLB,, | Performed by: UROLOGY

## 2024-07-22 PROCEDURE — 1101F PT FALLS ASSESS-DOCD LE1/YR: CPT | Mod: CPTII,S$GLB,, | Performed by: UROLOGY

## 2024-07-22 PROCEDURE — 81003 URINALYSIS AUTO W/O SCOPE: CPT | Mod: QW,S$GLB,, | Performed by: UROLOGY

## 2024-07-22 PROCEDURE — G2211 COMPLEX E/M VISIT ADD ON: HCPCS | Mod: S$GLB,,, | Performed by: UROLOGY

## 2024-07-22 PROCEDURE — 3074F SYST BP LT 130 MM HG: CPT | Mod: CPTII,S$GLB,, | Performed by: UROLOGY

## 2024-07-22 PROCEDURE — 3066F NEPHROPATHY DOC TX: CPT | Mod: CPTII,S$GLB,, | Performed by: UROLOGY

## 2024-07-22 PROCEDURE — 3288F FALL RISK ASSESSMENT DOCD: CPT | Mod: CPTII,S$GLB,, | Performed by: UROLOGY

## 2024-07-22 PROCEDURE — 3061F NEG MICROALBUMINURIA REV: CPT | Mod: CPTII,S$GLB,, | Performed by: UROLOGY

## 2024-07-22 PROCEDURE — 1126F AMNT PAIN NOTED NONE PRSNT: CPT | Mod: CPTII,S$GLB,, | Performed by: UROLOGY

## 2024-07-22 PROCEDURE — 99999 PR PBB SHADOW E&M-EST. PATIENT-LVL IV: CPT | Mod: PBBFAC,,, | Performed by: UROLOGY

## 2024-07-22 PROCEDURE — 1159F MED LIST DOCD IN RCRD: CPT | Mod: CPTII,S$GLB,, | Performed by: UROLOGY

## 2024-07-22 PROCEDURE — 4010F ACE/ARB THERAPY RXD/TAKEN: CPT | Mod: CPTII,S$GLB,, | Performed by: UROLOGY

## 2024-07-22 PROCEDURE — 99214 OFFICE O/P EST MOD 30 MIN: CPT | Mod: S$GLB,,, | Performed by: UROLOGY

## 2024-07-22 NOTE — PATIENT INSTRUCTIONS
On demand dosing sildensfil viagra minimum 25mg, maximum 100 mg  Can try 50, 75, or 100mg dose - taken approximately 45 minutes in advance of anticipated encounter, better absorbed on an empty stomach, certainly not within 60-90 minutes of a meal on either end, but the empty the stomach the better efficacy of the medication.

## 2024-07-22 NOTE — PROGRESS NOTES
[facility] Urology Progress Note     Chris Hill Jr is a 73 y.o. male who presents for prostate cancer follow up/LUTS     Hx of [place] 4 + 5 equals 9 prostate cancer (cT1c, iPSA 13.2) treated with androgen deprivation therapy external beam radiation.    He presented 10/4/18 for fiducial marker placement as well as SpaceOAR placement. Vol 33.4g at time of markers. At time of SpaceOar he did note daily diarrhea for weeks prior.  8/13/18 eligard 22.5 3 mos depot; 11/13/18 trelstar 22.5mg 6 mos depot.   - 2 weeks prior did interrupt his XRT for lumbar back surgery as his chronic back pain was limiting his ability to lay flat on radiation table. Only missed 2d of therapy.  He noted at that time his diarrhea had persisted. Using prn immodium     Completed XRT 12/18/18. PSA 0.1 on 12/13/18. Has followed up with Dr Gibson for chronic ITP and anemia, stable.  Has been followed by GI. Dr Tavares, with EGD 12/5/18 and colonoscopy 2/11/19 with a few cecal polyps removed and diverticulosis (repeat 3 yrs)     Dr Santillan on 1/9/19: Patient reports fatigue with energy level at 30%. Of note he is undergoing workup for low blood counts with Dr. Gibson.  He reports frequency, urgency, nocturia of urine are resolving however he continues with urgency of stool. He reports frequency has improved with Imodium. AUA SS 17/5 from 10/2     2/12/19: 2/6/19 PSA 0.02, T <4, Cr 0.8, AUA symptom score:  23/5 unhappy (5:  Intermittency, urgency; 4:  Emptying, frequency; 3:  Weak stream; 2:  Sleeping)  He did start oxybutynin 5 mg b.i.d. which has significantly helped decrease the hot flashes.  He still gets some though they are not as intense as they were before  Since starting it, and further away from radiation, his urgency is somewhat better.  He does have daytime frequency of 8-10 times at nighttime frequency of 1-2 times.  Having both urinary urgency and fecal urgency.  His diarrhea is somewhat better.  GI evaluation has been overall  unrevealing of a etiology of chronic diarrhea.  He does report urinary intermittency but no urinary hesitancy. He did have back surgery after 22 radiation treatments, though he does note that his fecal and urinary urgency predated his back surgery. On Hytrin 10 mg in morning, so started Flomax 0.4 mg in the evening to have increased dose of alpha blocker.     5/25/19: improved voiding taking Hytrin in the morning and Flomax in the evening. His diarrhea has improved. Hot flashes went away a bit and well controlled with Ditropan b.i.d.  AUA symptom score:  9/1, please (3:  Emptying, urgency; 1:  Intermittency, weak stream, sleeping). 5/8/19: PSA  0.02 and T 10.  ADT renewed with Lupron 45mg     11/19/19: psa undetectable <0.01, T 7. Chronic ITP. LUTS stable with progressive urgency. AUA SS:  15/3, mixed (5:  Urgency; 3:  Emptying, weak stream; 2:  Intermittency; 1:  Frequency, sleeping).  Medications helped 5/10. Hytrin a.m., Flomax p.m., Ditropan 5 mg b.i.d. Still has mild PV dribble and has minimal UUI  Alternates constipation/diarrhea - may have worse urinary symptoms when constipated. Lots of water during day - rare soda. DTF 4-5x. 2/5 are urgent. Another back surgery 2 mos ago. Urgency may be worse since then? hasnt considered. PVR by bladder scan 0cc.  Deferred cysto in favor of continued medical management, ADT renewed with Lupron 45mg     5/2020 NP OQuin - final Lupron/ADT injection.   11/21/20: AUA SS: 11/2 (3:  Emptying; 2:  Urgency, weak stream, sleeping; 1:  Frequency, intermittency). PVR 45cc. 11/13/20 psa <0.01 and T 9.  Still having some hot flashes. Slight increase in energy after radiation complete  6/13/21: denies gross hematuria/dysuria at this time. 5/24/2021: Testosterone level-110, PSA level-<0.01. Oxybutynin 5XL nightly, flomax 0.4, hytrin 10. Most bothered by urgency  - incd oxybutynin to 10mg XL. PSA 9/7/21 0.03, psa 12/21/21 0.01, psa 6/8/22 is 0.07, psa 9/12/22 is 0.07  Last o/v 6/14/22 with  NP noting psa 0.07 as above. AUA SS: 10/3 (ntf 2, urgency 5, freq/int/weak 1). Pvr 26cc  Last saw matilda singh 1/10/22, also followed by Dr Gibson last seen 11/14/22. Following psa    12/20/22: PSA 12/14/22 is 0.11 with T rise to 201  AUA SS: 13/3 (4:  Urgency; 2: Emptying, frequency, weak stream, sleeping; 1: Intermittency) medication helps 5/10 PVR 0 cc urinalysis dipstick negative  Urination not as bad as has been. Some night doesn't get up, last night got up 4x. Interim issues with diuretic and went on spironolactone and then had issues with breast tenderness which improved since discontinuing  Has been having bad dizziness to point during day even has trouble seein and has to sit down. Has seen neurologist, there was concern for TIA then decided complex migraine, but still unknown. When has bad dizzy spells, cant function. Last week 4 days out of 7. Feels spinning at time, sometimes nauseated. Partial erections returning  - His PSA was suppressed while on ADT, and since discontinuing has been slowly rising, but so far has correlated with continual rise in testosterone.     7/7/23 NP O'Rufina: PSA on 6/20 was 0.14 with T at 224, from PSA of 0.09 on 3/22 and T-280 6 mos ago. UA performed in office today showed 30 of Protein, otherwise normal findings. PVR by bladder scan is 0 mL  AUA SS Today:  2/1 Pleased. Feeling of ICBE:1. Nocturia:1. Pt denies gross hematuria or dysuria at this time. Taking Flomax 0.4 mg daily with no problems noted.  1/2/24 NP: Labs done on 1/8/24: PSA was 0.11; Testosterone level increased to 263 from 215-3 mos ago. UA today showed normal findings. PVR by bladder scan is 20 mL. Pt denies dysuria or gross hematuria  Nocturia 1x nightly, otherwise doing well with no complaints.    He returns today noting  AUA SS: 8/2 (3: urgency, 2 weak stream, 1: frequency, intermittency, sleeping)  No longer on flomax, just the hytrin from his BP regimen  Per lab review below of interim psa and T, his  "psa has stabilized and T reconstution complete - stable, mild low T  +Ed - still 3/4 erection. Tried vaigra without great result. Was not timing in advance and empty stomach etc. Only 25mg though  Still has climax but no ejaculation      Focused Physical Exam:    Vitals:    07/22/24 1033   BP: 122/73   Pulse: 75     Body mass index is 35.51 kg/m². Weight: 99.8 kg (220 lb) Height: 5' 6" (167.6 cm)     Abdomen: Soft, non-tender, nondistended, no CVA tenderness    Recent Results (from the past 336 hour(s))   POCT Urinalysis(Instrument)    Collection Time: 07/22/24 10:38 AM   Result Value Ref Range    Color, POC UA Yellow Yellow, Straw, Colorless    Clarity, POC UA Clear Clear    Glucose, POC UA Negative Negative    Bilirubin, POC UA Negative Negative    Ketones, POC UA Negative Negative    Spec Grav POC UA 1.020 1.005 - 1.030    Blood, POC UA Negative Negative    pH, POC UA 6.0 5.0 - 8.0    Protein, POC UA Negative Negative    Urobilinogen, POC UA 0.2 <=1.0    Nitrite, POC UA Negative Negative    WBC, POC UA Negative Negative      Latest Reference Range & Units 12/14/22 07:39 03/22/23 10:16 06/20/23 15:32 10/02/23 08:45 01/08/24 08:17 04/08/24 08:01 07/08/24 08:18   PSA Diagnostic 0.00 - 4.00 ng/mL 0.11 0.09 0.14  0.14 0.10 0.11 0.11 0.11   Testosterone Total 264 - 916 ng/dL    215 (L) 263 (L) 233 (L) 257 (L)   Testosterone, Total 304 - 1227 ng/dL 201 (L) 280 (L) 224 (L)           ASSESSMENT   1. Prostate cancer  POCT Urinalysis(Instrument)    Prostate Specific Antigen, Diagnostic    Prostate Specific Antigen, Diagnostic          Plan     Overall doing very well now about 4 years after his last Lupron injection completing treatment.  His testosterone has reconstituted and as expected PSA adina from undetectable suppressed by ADT to a baseline or kobe point and has remained quite stable over time, and as testosterone reconstitution has completed.  He was some mild baseline low testosterone.  We did review the hormonal " implications to the ED which he has concerns about, but also has medical therapy, and discussed proper use which is likely the biggest risk factor for its inefficacy at this time.  Otherwise, he is voiding well in the absence of alpha-blocker distinct for urinary symptoms as he previously added Flomax to his baseline Hytrin for LUTS exacerbations from radiation but this has calmed down.  Has previously been found to be emptying very well at all interim follow-up visits.  Urinalysis dipstick is negative today.  No voiding complaints.  At this time will continue to monitor PSA every 6 months and see him annually.  PSA lab visit set for 6 months and 1 year reminder placed. No need to recheck T with every psa now      Level of Medical Decision Making  [ _ ]  Low: 2 minor/1 stable chronic/1 acute uncomplicated --- review record/result/order test x2  [ X ]  Mod: 1 chronic exac/2stable chronic/1 acute comp --- review record/result/order test x3 OR independent interp of outside test OR discuss mgmt of outside ordered test --- start drug therapy/plan minor surgery   [ _ ]  High: chronic with exacerbation/progression or trtmnt side effect vs acute/chronic w/ life/body threat ---  (2/3) review record/result/order test x3 OR independent interp of ouutside test OR discuss mgmt of outside ordered test --- drug with monitoring for toxicity, plan major surgery, hospitalize, deescalate/withdraw care bc of prognosis    *Visit today included increased complexity associated with the current or anticipated ongoing medical longitudinal care and management related to this patient's serious and/or complex managed problem(s) as described above.

## 2024-07-24 DIAGNOSIS — C61 PROSTATE CANCER: ICD-10-CM

## 2024-07-24 RX ORDER — SILDENAFIL 25 MG/1
TABLET, FILM COATED ORAL
Qty: 10 TABLET | Refills: 0 | Status: SHIPPED | OUTPATIENT
Start: 2024-07-24

## 2024-07-26 ENCOUNTER — OFFICE VISIT (OUTPATIENT)
Dept: FAMILY MEDICINE | Facility: CLINIC | Age: 74
End: 2024-07-26
Payer: MEDICARE

## 2024-07-26 VITALS
WEIGHT: 221.81 LBS | BODY MASS INDEX: 35.65 KG/M2 | DIASTOLIC BLOOD PRESSURE: 82 MMHG | RESPIRATION RATE: 18 BRPM | HEIGHT: 66 IN | HEART RATE: 115 BPM | OXYGEN SATURATION: 96 % | TEMPERATURE: 98 F | SYSTOLIC BLOOD PRESSURE: 138 MMHG

## 2024-07-26 DIAGNOSIS — L03.116 CELLULITIS OF LEFT LOWER EXTREMITY: Primary | ICD-10-CM

## 2024-07-26 PROCEDURE — 99999 PR PBB SHADOW E&M-EST. PATIENT-LVL V: CPT | Mod: PBBFAC,,,

## 2024-07-26 RX ORDER — CLINDAMYCIN HYDROCHLORIDE 300 MG/1
300 CAPSULE ORAL EVERY 8 HOURS
Qty: 30 CAPSULE | Refills: 0 | Status: SHIPPED | OUTPATIENT
Start: 2024-07-26 | End: 2024-08-05

## 2024-07-26 NOTE — PROGRESS NOTES
Subjective:       Patient ID: Chris Hill Jr. is a 73 y.o. male.    Chief Complaint: Follow-up (10 day)    Chris Hill Jr. Is a 73 y.o. male patient who presents to clinic follow-up on left lower extremity cellulitis.  Patient has completed a 10 day course of doxycycline.  He is still having some swelling in his left lower leg in his very tender to touch.  He has no fever or shortness a breath.  Ultrasound negative for DVT.        Reading Physician Reading Date Result Priority  Cameron Drake MD  [phone] 7/12/2024 STAT    Narrative & Impression  CLINICAL HISTORY:  Pain in left lower leg     TECHNIQUE:  Grayscale, color and spectral Doppler analysis of the left lower extremity deep venous system was performed.     COMPARISON:  None     FINDINGS:  There is normal compressibility, with normal flow by color and spectral Doppler analysis in the left lower extremity deep venous system, with no evidence of deep venous thrombosis.     Impression:     Negative for left lower extremity DVT.        Electronically signed by:Cameron Drake  Date:                                            07/12/2024  Time:                                           16:18        Exam Ended: 07/12/24 16:14 CDT Last Resulted: 07/12/24 16:18 CDT                  Review of patient's allergies indicates:  No Known Allergies  Social Determinants of Health     Tobacco Use: Low Risk  (7/26/2024)    Patient History     Smoking Tobacco Use: Never     Smokeless Tobacco Use: Never     Passive Exposure: Not on file   Alcohol Use: Not on file   Financial Resource Strain: Not on file   Food Insecurity: Not on file   Transportation Needs: Not on file   Physical Activity: Not on file   Stress: Not on file   Housing Stability: Not on file   Depression: Low Risk  (4/22/2024)    Depression     Last PHQ-4: Flowsheet Data: 0   Utilities: Not on file   Health Literacy: Not on file   Social Isolation: Not on file      Past Medical History:   Diagnosis Date     Anemia, chronic disease 12/28/2018    Arthritis     Back pain     radiates both legs mainly rt leg    Benign paroxysmal vertigo, bilateral     Diabetes mellitus     Diabetes mellitus, type 2     JANSEN (dyspnea on exertion)     GERD (gastroesophageal reflux disease)     Heart murmur     Hyperlipidemia     Hypertension     Iron deficiency anemia 04/18/2022    Neuropathy     Normocytic normochromic anemia 12/28/2018    Prostate cancer 08/20/2018    Sleep apnea     uses CPAP    Stroke     Thrombocytopenia 12/28/2018    Thyroid disease     Urinary frequency     Valvular regurgitation       Past Surgical History:   Procedure Laterality Date    ANGIOGRAM, CORONARY, WITH LEFT HEART CATHETERIZATION  7/20/2023    Procedure: Left Heart Cath;  Surgeon: Carol Jama MD;  Location: [facility] CATH;  Service: Cardiology;;    ANKLE SURGERY Left     APPENDECTOMY      BACK SURGERY  09/2019    CARDIAC CATHETERIZATION      CERVICAL FUSION      CORONARY ANGIOGRAPHY  7/20/2023    Procedure: Coronary angiogram study;  Surgeon: Carol Jama MD;  Location: [facility] CATH;  Service: Cardiology;;    EYE SURGERY      cataracts bilateral    GALLBLADDER SURGERY      HAND SURGERY Bilateral     rt hand x4 left x3    JOINT REPLACEMENT Bilateral     knee    KNEE SURGERY      8 on left knee and 7 on rt knee    NASAL SINUS SURGERY      x5    SHOULDER SURGERY Bilateral     3 on left 2 on right    TRANSRECTAL ULTRASOUND OF PROSTATE WITH INSERTION OF GOLD FIDUCIAL MARKER N/A 10/04/2018    Procedure: ULTRASOUND, PROSTATE, TRANSPERINEAL APPROACH, WITH GOLD FIDUCIAL MARKER INSERTION (with SPACEOAR transperineal biodegradable gel placement);  Surgeon: Manpreet Gordon MD;  Location: [facility];  Service: Urology;  Laterality: N/A;      Social History     Socioeconomic History    Marital status:          Current Outpatient Medications:     amitriptyline (ELAVIL) 50 MG tablet, Take 1 tablet (50 mg total) by mouth every evening., Disp: 90 tablet, Rfl: 3     amLODIPine (NORVASC) 10 MG tablet, Take 1 tablet (10 mg total) by mouth once daily. (Patient taking differently: Take 5 mg by mouth once daily.), Disp: 90 tablet, Rfl: 3    ascorbic acid, vitamin C, (VITAMIN C) 1000 MG tablet, Take 1,000 mg by mouth 2 (two) times daily., Disp: , Rfl:     aspirin (ECOTRIN) 81 MG EC tablet, Take 81 mg by mouth every evening., Disp: , Rfl:     carvediloL (COREG) 25 MG tablet, TAKE 1 TABLET BY MOUTH TWICE DAILY WITH MEALS, Disp: 180 tablet, Rfl: 3    clonazePAM (KLONOPIN) 0.5 MG tablet, Take by mouth., Disp: , Rfl:     cloNIDine (CATAPRES) 0.1 MG tablet, Take 1 tablet (0.1 mg total) by mouth every 8 (eight) hours as needed (SBP greater than 180)., Disp: 30 tablet, Rfl: 0    co-enzyme Q-10 30 mg capsule, Take 30 mg by mouth 3 (three) times daily., Disp: , Rfl:     cyanocobalamin (VITAMIN B-12) 100 MCG tablet, Take 1,000 mcg by mouth once daily., Disp: , Rfl:     cyclobenzaprine (FLEXERIL) 10 MG tablet, Take 10 mg by mouth as needed for Muscle spasms., Disp: , Rfl:     diazePAM (VALIUM) 5 MG tablet, Take 5 mg by mouth 4 (four) times daily as needed., Disp: , Rfl:     eplerenone (INSPRA) 25 MG Tab, Take 1 tablet (25 mg total) by mouth once daily., Disp: 30 tablet, Rfl: 0    esomeprazole (NEXIUM) 40 MG capsule, Take 40 mg by mouth 2 (two) times daily., Disp: , Rfl:     fish oil-omega-3 fatty acids 300-1,000 mg capsule, Take 2 capsules by mouth every evening., Disp: , Rfl:     hydrALAZINE (APRESOLINE) 100 MG tablet, Take 1 tablet (100 mg total) by mouth 3 (three) times daily., Disp: 270 tablet, Rfl: 3    HYDROcodone-acetaminophen (NORCO)  mg per tablet, Take 1 tablet by mouth every 4 (four) hours as needed. for pain., Disp: , Rfl:     ibuprofen (ADVIL,MOTRIN) 600 MG tablet, Take 1 tablet (600 mg total) by mouth every 6 (six) hours as needed for Pain., Disp: 20 tablet, Rfl: 0    iron-vitamin C 100-250 mg, ICAR-C, 100-250 mg Tab, Take 1 tablet by mouth once daily, Disp: 30 tablet, Rfl:  0    levothyroxine (SYNTHROID) 50 MCG tablet, Take 1 tablet (50 mcg total) by mouth before breakfast., Disp: 90 tablet, Rfl: 2    magnesium 250 mg Tab, Take 1 tablet by mouth every evening., Disp: , Rfl:     meclizine (ANTIVERT) 25 mg tablet, Take 25 mg by mouth every 6 (six) hours as needed., Disp: , Rfl:     metFORMIN (GLUCOPHAGE-XR) 500 MG ER 24hr tablet, Take 1 tablet (500 mg total) by mouth daily with breakfast., Disp: 90 tablet, Rfl: 1    olmesartan (BENICAR) 40 MG tablet, Take 1 tablet (40 mg total) by mouth once daily., Disp: 90 tablet, Rfl: 3    potassium chloride SA (K-DUR,KLOR-CON) 20 MEQ tablet, Take 1 tablet (20 mEq total) by mouth 2 (two) times daily., Disp: 180 tablet, Rfl: 3    rosuvastatin (CRESTOR) 5 MG tablet, Take 1 tablet (5 mg total) by mouth once daily., Disp: 90 tablet, Rfl: 1    sildenafiL (VIAGRA) 25 MG tablet, TAKE 1/2 TO 1 (ONE-HALF TO ONE) TABLET BY MOUTH ONCE DAILY AS NEEDED FOR ERECTILE DYSFUNCTION, Disp: 10 tablet, Rfl: 0    terazosin (HYTRIN) 10 MG capsule, Take 1 capsule (10 mg total) by mouth every evening., Disp: 90 capsule, Rfl: 3    topiramate (TOPAMAX) 25 MG tablet, Take 50 mg by mouth 2 (two) times daily., Disp: , Rfl:     vitamin D (VITAMIN D3) 1000 units Tab, Take 1,000 Units by mouth 2 (two) times a day., Disp: , Rfl:     clindamycin (CLEOCIN) 300 MG capsule, Take 1 capsule (300 mg total) by mouth every 8 (eight) hours. for 10 days, Disp: 30 capsule, Rfl: 0    Current Facility-Administered Medications:     sodium chloride 0.9% flush 10 mL, 10 mL, Intravenous, PRN, Carol Jama MD    Lab Results   Component Value Date    WBC 8.48 04/18/2024    HGB 12.4 (L) 04/18/2024    HCT 39.2 (L) 04/18/2024     (L) 04/18/2024    CHOL 108 (L) 04/18/2024    TRIG 260 (H) 04/18/2024    HDL 34 (L) 04/18/2024    ALT 14 04/18/2024    AST 14 04/18/2024     05/30/2024    K 3.9 05/30/2024     05/30/2024    CREATININE 0.9 05/30/2024    BUN 13 05/30/2024    CO2 25 05/30/2024     TSH 2.399 04/18/2024    INR 1.0 06/21/2021    HGBA1C 6.0 04/18/2024    MICROALBUR 0.2 12/02/2022       Review of Systems   Constitutional: Negative.    Respiratory: Negative.  Negative for chest tightness and shortness of breath.    Cardiovascular:  Positive for leg swelling. Negative for chest pain.       Objective:      Physical Exam  Vitals reviewed.   Constitutional:       Appearance: Normal appearance.   Cardiovascular:      Rate and Rhythm: Normal rate and regular rhythm.      Pulses: Normal pulses.      Heart sounds: Normal heart sounds.   Pulmonary:      Effort: Pulmonary effort is normal.      Breath sounds: Normal breath sounds.   Skin:     General: Skin is warm and dry.      Capillary Refill: Capillary refill takes less than 2 seconds.          Neurological:      Mental Status: He is alert.         Assessment:       1. Cellulitis of left lower extremity        Plan:       Chris was seen today for follow-up.    Diagnoses and all orders for this visit:    Cellulitis of left lower extremity  -     clindamycin (CLEOCIN) 300 MG capsule; Take 1 capsule (300 mg total) by mouth every 8 (eight) hours. for 10 days    Take clindamycin as prescribed.  Follow-up in 10 days or sooner if needed.

## 2024-08-05 ENCOUNTER — OFFICE VISIT (OUTPATIENT)
Dept: FAMILY MEDICINE | Facility: CLINIC | Age: 74
End: 2024-08-05
Payer: MEDICARE

## 2024-08-05 VITALS
HEART RATE: 62 BPM | WEIGHT: 224.19 LBS | OXYGEN SATURATION: 93 % | RESPIRATION RATE: 18 BRPM | BODY MASS INDEX: 36.03 KG/M2 | TEMPERATURE: 99 F | SYSTOLIC BLOOD PRESSURE: 138 MMHG | DIASTOLIC BLOOD PRESSURE: 76 MMHG | HEIGHT: 66 IN

## 2024-08-05 DIAGNOSIS — L03.116 CELLULITIS OF LEFT LOWER EXTREMITY: Primary | ICD-10-CM

## 2024-08-05 PROCEDURE — 3044F HG A1C LEVEL LT 7.0%: CPT | Mod: CPTII,S$GLB,,

## 2024-08-05 PROCEDURE — 4010F ACE/ARB THERAPY RXD/TAKEN: CPT | Mod: CPTII,S$GLB,,

## 2024-08-05 PROCEDURE — 3061F NEG MICROALBUMINURIA REV: CPT | Mod: CPTII,S$GLB,,

## 2024-08-05 PROCEDURE — 1126F AMNT PAIN NOTED NONE PRSNT: CPT | Mod: CPTII,S$GLB,,

## 2024-08-05 PROCEDURE — 3066F NEPHROPATHY DOC TX: CPT | Mod: CPTII,S$GLB,,

## 2024-08-05 PROCEDURE — 1101F PT FALLS ASSESS-DOCD LE1/YR: CPT | Mod: CPTII,S$GLB,,

## 2024-08-05 PROCEDURE — 3008F BODY MASS INDEX DOCD: CPT | Mod: CPTII,S$GLB,,

## 2024-08-05 PROCEDURE — 3288F FALL RISK ASSESSMENT DOCD: CPT | Mod: CPTII,S$GLB,,

## 2024-08-05 PROCEDURE — 3075F SYST BP GE 130 - 139MM HG: CPT | Mod: CPTII,S$GLB,,

## 2024-08-05 PROCEDURE — 1159F MED LIST DOCD IN RCRD: CPT | Mod: CPTII,S$GLB,,

## 2024-08-05 PROCEDURE — 3078F DIAST BP <80 MM HG: CPT | Mod: CPTII,S$GLB,,

## 2024-08-05 PROCEDURE — 99213 OFFICE O/P EST LOW 20 MIN: CPT | Mod: S$GLB,,,

## 2024-08-05 PROCEDURE — 99999 PR PBB SHADOW E&M-EST. PATIENT-LVL V: CPT | Mod: PBBFAC,,,

## 2024-08-05 NOTE — PROGRESS NOTES
Subjective:       Patient ID: Chris Hill Jr. is a 74 y.o. male.    Chief Complaint: Follow-up (10 day)    Chris Hill Jr. is a 74-year-old male patient who presents to clinic for follow-up of cellulitis.  Patient had pain and swelling in his left lower leg and was treated for cellulitis with doxycycline with not much improvement.  He was then started on clindamycin for 10 days, has 1 dose left.  He is doing much better still a little tender in the front of his leg.  Redness has resolved and no longer swollen.            Review of patient's allergies indicates:  No Known Allergies  Social Determinants of Health     Tobacco Use: Low Risk  (8/5/2024)    Patient History     Smoking Tobacco Use: Never     Smokeless Tobacco Use: Never     Passive Exposure: Not on file   Alcohol Use: Not on file   Financial Resource Strain: Not on file   Food Insecurity: Not on file   Transportation Needs: Not on file   Physical Activity: Not on file   Stress: Not on file   Housing Stability: Not on file   Depression: Low Risk  (4/22/2024)    Depression     Last PHQ-4: Flowsheet Data: 0   Utilities: Not on file   Health Literacy: Not on file   Social Isolation: Not on file      Past Medical History:   Diagnosis Date    Anemia, chronic disease 12/28/2018    Arthritis     Back pain     radiates both legs mainly rt leg    Benign paroxysmal vertigo, bilateral     Diabetes mellitus     Diabetes mellitus, type 2     JANSEN (dyspnea on exertion)     GERD (gastroesophageal reflux disease)     Heart murmur     Hyperlipidemia     Hypertension     Iron deficiency anemia 04/18/2022    Neuropathy     Normocytic normochromic anemia 12/28/2018    Prostate cancer 08/20/2018    Sleep apnea     uses CPAP    Stroke     Thrombocytopenia 12/28/2018    Thyroid disease     Urinary frequency     Valvular regurgitation       Past Surgical History:   Procedure Laterality Date    ANGIOGRAM, CORONARY, WITH LEFT HEART CATHETERIZATION  7/20/2023    Procedure: Left  Heart Cath;  Surgeon: Carol Jama MD;  Location: [facility] CATH;  Service: Cardiology;;    ANKLE SURGERY Left     APPENDECTOMY      BACK SURGERY  09/2019    CARDIAC CATHETERIZATION      CERVICAL FUSION      CORONARY ANGIOGRAPHY  7/20/2023    Procedure: Coronary angiogram study;  Surgeon: Carol Jama MD;  [facility];  Service: Cardiology;;    EYE SURGERY      cataracts bilateral    GALLBLADDER SURGERY      HAND SURGERY Bilateral     rt hand x4 left x3    JOINT REPLACEMENT Bilateral     knee    KNEE SURGERY      8 on left knee and 7 on rt knee    NASAL SINUS SURGERY      x5    SHOULDER SURGERY Bilateral     3 on left 2 on right    TRANSRECTAL ULTRASOUND OF PROSTATE WITH INSERTION OF GOLD FIDUCIAL MARKER N/A 10/04/2018    Procedure: ULTRASOUND, PROSTATE, TRANSPERINEAL APPROACH, WITH GOLD FIDUCIAL MARKER INSERTION (with SPACEOAR transperineal biodegradable gel placement);  Surgeon: Manpreet Gordon MD;  Location: [facility] OR;  Service: Urology;  Laterality: N/A;      Social History     Socioeconomic History    Marital status:          Current Outpatient Medications:     amitriptyline (ELAVIL) 50 MG tablet, Take 1 tablet (50 mg total) by mouth every evening., Disp: 90 tablet, Rfl: 3    amLODIPine (NORVASC) 10 MG tablet, Take 1 tablet (10 mg total) by mouth once daily. (Patient taking differently: Take 5 mg by mouth once daily.), Disp: 90 tablet, Rfl: 3    ascorbic acid, vitamin C, (VITAMIN C) 1000 MG tablet, Take 1,000 mg by mouth 2 (two) times daily., Disp: , Rfl:     aspirin (ECOTRIN) 81 MG EC tablet, Take 81 mg by mouth every evening., Disp: , Rfl:     carvediloL (COREG) 25 MG tablet, TAKE 1 TABLET BY MOUTH TWICE DAILY WITH MEALS, Disp: 180 tablet, Rfl: 3    clindamycin (CLEOCIN) 300 MG capsule, Take 1 capsule (300 mg total) by mouth every 8 (eight) hours. for 10 days, Disp: 30 capsule, Rfl: 0    clonazePAM (KLONOPIN) 0.5 MG tablet, Take by mouth., Disp: , Rfl:     cloNIDine (CATAPRES) 0.1 MG tablet,  Take 1 tablet (0.1 mg total) by mouth every 8 (eight) hours as needed (SBP greater than 180)., Disp: 30 tablet, Rfl: 0    co-enzyme Q-10 30 mg capsule, Take 30 mg by mouth 3 (three) times daily., Disp: , Rfl:     cyanocobalamin (VITAMIN B-12) 100 MCG tablet, Take 1,000 mcg by mouth once daily., Disp: , Rfl:     cyclobenzaprine (FLEXERIL) 10 MG tablet, Take 10 mg by mouth as needed for Muscle spasms., Disp: , Rfl:     diazePAM (VALIUM) 5 MG tablet, Take 5 mg by mouth 4 (four) times daily as needed., Disp: , Rfl:     eplerenone (INSPRA) 25 MG Tab, Take 1 tablet (25 mg total) by mouth once daily., Disp: 30 tablet, Rfl: 0    esomeprazole (NEXIUM) 40 MG capsule, Take 40 mg by mouth 2 (two) times daily., Disp: , Rfl:     fish oil-omega-3 fatty acids 300-1,000 mg capsule, Take 2 capsules by mouth every evening., Disp: , Rfl:     hydrALAZINE (APRESOLINE) 100 MG tablet, Take 1 tablet (100 mg total) by mouth 3 (three) times daily., Disp: 270 tablet, Rfl: 3    HYDROcodone-acetaminophen (NORCO)  mg per tablet, Take 1 tablet by mouth every 4 (four) hours as needed. for pain., Disp: , Rfl:     ibuprofen (ADVIL,MOTRIN) 600 MG tablet, Take 1 tablet (600 mg total) by mouth every 6 (six) hours as needed for Pain., Disp: 20 tablet, Rfl: 0    iron-vitamin C 100-250 mg, ICAR-C, 100-250 mg Tab, Take 1 tablet by mouth once daily, Disp: 30 tablet, Rfl: 0    levothyroxine (SYNTHROID) 50 MCG tablet, Take 1 tablet (50 mcg total) by mouth before breakfast., Disp: 90 tablet, Rfl: 2    magnesium 250 mg Tab, Take 1 tablet by mouth every evening., Disp: , Rfl:     meclizine (ANTIVERT) 25 mg tablet, Take 25 mg by mouth every 6 (six) hours as needed., Disp: , Rfl:     metFORMIN (GLUCOPHAGE-XR) 500 MG ER 24hr tablet, Take 1 tablet (500 mg total) by mouth daily with breakfast., Disp: 90 tablet, Rfl: 1    olmesartan (BENICAR) 40 MG tablet, Take 1 tablet (40 mg total) by mouth once daily., Disp: 90 tablet, Rfl: 3    potassium chloride SA  (K-DUR,KLOR-CON) 20 MEQ tablet, Take 1 tablet (20 mEq total) by mouth 2 (two) times daily., Disp: 180 tablet, Rfl: 3    sildenafiL (VIAGRA) 25 MG tablet, TAKE 1/2 TO 1 (ONE-HALF TO ONE) TABLET BY MOUTH ONCE DAILY AS NEEDED FOR ERECTILE DYSFUNCTION, Disp: 10 tablet, Rfl: 0    terazosin (HYTRIN) 10 MG capsule, Take 1 capsule (10 mg total) by mouth every evening., Disp: 90 capsule, Rfl: 3    vitamin D (VITAMIN D3) 1000 units Tab, Take 1,000 Units by mouth 2 (two) times a day., Disp: , Rfl:     rosuvastatin (CRESTOR) 5 MG tablet, Take 1 tablet (5 mg total) by mouth once daily. (Patient not taking: Reported on 8/5/2024), Disp: 90 tablet, Rfl: 1    topiramate (TOPAMAX) 25 MG tablet, Take 50 mg by mouth 2 (two) times daily. (Patient not taking: Reported on 8/5/2024), Disp: , Rfl:     Current Facility-Administered Medications:     sodium chloride 0.9% flush 10 mL, 10 mL, Intravenous, PRN, Carol Jama MD    Lab Results   Component Value Date    WBC 8.48 04/18/2024    HGB 12.4 (L) 04/18/2024    HCT 39.2 (L) 04/18/2024     (L) 04/18/2024    CHOL 108 (L) 04/18/2024    TRIG 260 (H) 04/18/2024    HDL 34 (L) 04/18/2024    ALT 14 04/18/2024    AST 14 04/18/2024     05/30/2024    K 3.9 05/30/2024     05/30/2024    CREATININE 0.9 05/30/2024    BUN 13 05/30/2024    CO2 25 05/30/2024    TSH 2.399 04/18/2024    INR 1.0 06/21/2021    HGBA1C 6.0 04/18/2024    MICROALBUR 0.2 12/02/2022       Review of Systems   Constitutional: Negative.    Respiratory: Negative.     Cardiovascular: Negative.    Integumentary:  Negative.       Objective:      Physical Exam  Vitals reviewed.   Constitutional:       Appearance: Normal appearance.   Cardiovascular:      Rate and Rhythm: Normal rate and regular rhythm.      Pulses: Normal pulses.      Heart sounds: Normal heart sounds.   Pulmonary:      Effort: Pulmonary effort is normal.      Breath sounds: Normal breath sounds.   Skin:     General: Skin is warm and dry.      Capillary  Refill: Capillary refill takes less than 2 seconds.          Neurological:      Mental Status: He is alert.   Psychiatric:         Mood and Affect: Mood normal.         Thought Content: Thought content normal.         Assessment:       1. Cellulitis of left lower extremity        Plan:       Chris was seen today for follow-up.    Diagnoses and all orders for this visit:    Cellulitis of left lower extremity    Cellulitis resolved.  Patient advised to follow-up in clinic if symptoms return.

## 2024-08-10 NOTE — TELEPHONE ENCOUNTER
Care Due:                  Date            Visit Type   Department     Provider  --------------------------------------------------------------------------------                                [facility]                              [facility]  [facility] 05[facility]      [facility]   [place]                              EP -                              PRIMARY      [facility]  Next Visit: 09-      CARE ([facility])   [facility]  [place]                                                            Last  Test          Frequency    Reason                     Performed    Due Date  --------------------------------------------------------------------------------    HBA1C.......  6 months...  metFORMIN................  04-   10-    [facility] Embedded Care Due Messages. Reference number: [id number].   8/10/2024 10:50:31 AM CDT

## 2024-08-11 RX ORDER — METFORMIN HYDROCHLORIDE 500 MG/1
500 TABLET, EXTENDED RELEASE ORAL
Qty: 90 TABLET | Refills: 0 | Status: SHIPPED | OUTPATIENT
Start: 2024-08-11

## 2024-08-11 NOTE — TELEPHONE ENCOUNTER
Provider Staff:  Action required for this patient     Please see care gap opportunities below in Care Due Message.    Thanks!  [facility]     Appointments      Date Provider   Last Visit   5/30/2024 Samy Pettit III, MD   Next Visit   9/4/2024 Samy Pettit III, MD      Refill Decision Note   Chris Hill  is requesting a refill authorization.  Brief Assessment and Rationale for Refill:  Approve     Medication Therapy Plan:         Comments:     Note composed:3:38 AM 08/11/2024

## 2024-08-29 ENCOUNTER — PATIENT MESSAGE (OUTPATIENT)
Dept: FAMILY MEDICINE | Facility: CLINIC | Age: 74
End: 2024-08-29
Payer: MEDICARE

## 2024-09-03 ENCOUNTER — LAB VISIT (OUTPATIENT)
Dept: LAB | Facility: HOSPITAL | Age: 74
End: 2024-09-03
Attending: FAMILY MEDICINE
Payer: MEDICARE

## 2024-09-03 DIAGNOSIS — I10 HYPERTENSION, ESSENTIAL: ICD-10-CM

## 2024-09-03 DIAGNOSIS — E11.42 TYPE 2 DIABETES MELLITUS WITH DIABETIC POLYNEUROPATHY, UNSPECIFIED WHETHER LONG TERM INSULIN USE: ICD-10-CM

## 2024-09-03 LAB
ALBUMIN SERPL BCP-MCNC: 3.9 G/DL (ref 3.5–5.2)
ALP SERPL-CCNC: 62 U/L (ref 55–135)
ALT SERPL W/O P-5'-P-CCNC: 14 U/L (ref 10–44)
ANION GAP SERPL CALC-SCNC: 7 MMOL/L (ref 8–16)
AST SERPL-CCNC: 14 U/L (ref 10–40)
BILIRUB SERPL-MCNC: 0.3 MG/DL (ref 0.1–1)
BUN SERPL-MCNC: 30 MG/DL (ref 8–23)
CALCIUM SERPL-MCNC: 9 MG/DL (ref 8.7–10.5)
CHLORIDE SERPL-SCNC: 102 MMOL/L (ref 95–110)
CHOLEST SERPL-MCNC: 224 MG/DL (ref 120–199)
CHOLEST/HDLC SERPL: 5.3 {RATIO} (ref 2–5)
CO2 SERPL-SCNC: 29 MMOL/L (ref 23–29)
CREAT SERPL-MCNC: 1.1 MG/DL (ref 0.5–1.4)
EST. GFR  (NO RACE VARIABLE): >60 ML/MIN/1.73 M^2
ESTIMATED AVG GLUCOSE: 126 MG/DL (ref 68–131)
GLUCOSE SERPL-MCNC: 116 MG/DL (ref 70–110)
HBA1C MFR BLD: 6 % (ref 4.5–6.2)
HDLC SERPL-MCNC: 42 MG/DL (ref 40–75)
HDLC SERPL: 18.8 % (ref 20–50)
LDLC SERPL CALC-MCNC: ABNORMAL MG/DL (ref 63–159)
NONHDLC SERPL-MCNC: 182 MG/DL
POTASSIUM SERPL-SCNC: 4.6 MMOL/L (ref 3.5–5.1)
PROT SERPL-MCNC: 6.6 G/DL (ref 6–8.4)
SODIUM SERPL-SCNC: 138 MMOL/L (ref 136–145)
TRIGL SERPL-MCNC: 502 MG/DL (ref 30–150)

## 2024-09-03 PROCEDURE — 80053 COMPREHEN METABOLIC PANEL: CPT | Performed by: FAMILY MEDICINE

## 2024-09-03 PROCEDURE — 83036 HEMOGLOBIN GLYCOSYLATED A1C: CPT | Performed by: FAMILY MEDICINE

## 2024-09-03 PROCEDURE — 80061 LIPID PANEL: CPT | Performed by: FAMILY MEDICINE

## 2024-09-04 ENCOUNTER — OFFICE VISIT (OUTPATIENT)
Dept: FAMILY MEDICINE | Facility: CLINIC | Age: 74
End: 2024-09-04
Payer: MEDICARE

## 2024-09-04 VITALS — BODY MASS INDEX: 36.14 KG/M2 | HEIGHT: 66 IN | OXYGEN SATURATION: 95 % | WEIGHT: 224.88 LBS | HEART RATE: 61 BPM

## 2024-09-04 DIAGNOSIS — I25.10 CORONARY ARTERY DISEASE, UNSPECIFIED VESSEL OR LESION TYPE, UNSPECIFIED WHETHER ANGINA PRESENT, UNSPECIFIED WHETHER NATIVE OR TRANSPLANTED HEART: ICD-10-CM

## 2024-09-04 DIAGNOSIS — E78.1 HYPERTRIGLYCERIDEMIA: Primary | ICD-10-CM

## 2024-09-04 DIAGNOSIS — E11.319 TYPE 2 DIABETES MELLITUS WITH RETINOPATHY OF BOTH EYES, WITHOUT LONG-TERM CURRENT USE OF INSULIN, MACULAR EDEMA PRESENCE UNSPECIFIED, UNSPECIFIED RETINOPATHY SEVERITY: ICD-10-CM

## 2024-09-04 DIAGNOSIS — T46.6X5A MYALGIA DUE TO STATIN: ICD-10-CM

## 2024-09-04 DIAGNOSIS — E78.5 HYPERLIPIDEMIA, UNSPECIFIED HYPERLIPIDEMIA TYPE: ICD-10-CM

## 2024-09-04 DIAGNOSIS — Z79.82 ASPIRIN LONG-TERM USE: ICD-10-CM

## 2024-09-04 DIAGNOSIS — I10 HYPERTENSION, ESSENTIAL: ICD-10-CM

## 2024-09-04 DIAGNOSIS — M79.10 MYALGIA DUE TO STATIN: ICD-10-CM

## 2024-09-04 DIAGNOSIS — C61 PROSTATE CANCER: ICD-10-CM

## 2024-09-04 DIAGNOSIS — E03.9 HYPOTHYROIDISM, UNSPECIFIED TYPE: ICD-10-CM

## 2024-09-04 PROCEDURE — 1160F RVW MEDS BY RX/DR IN RCRD: CPT | Mod: CPTII,S$GLB,, | Performed by: FAMILY MEDICINE

## 2024-09-04 PROCEDURE — 4010F ACE/ARB THERAPY RXD/TAKEN: CPT | Mod: CPTII,S$GLB,, | Performed by: FAMILY MEDICINE

## 2024-09-04 PROCEDURE — 99214 OFFICE O/P EST MOD 30 MIN: CPT | Mod: S$GLB,,, | Performed by: FAMILY MEDICINE

## 2024-09-04 PROCEDURE — 1159F MED LIST DOCD IN RCRD: CPT | Mod: CPTII,S$GLB,, | Performed by: FAMILY MEDICINE

## 2024-09-04 PROCEDURE — 3008F BODY MASS INDEX DOCD: CPT | Mod: CPTII,S$GLB,, | Performed by: FAMILY MEDICINE

## 2024-09-04 PROCEDURE — G2211 COMPLEX E/M VISIT ADD ON: HCPCS | Mod: S$GLB,,, | Performed by: FAMILY MEDICINE

## 2024-09-04 PROCEDURE — 3044F HG A1C LEVEL LT 7.0%: CPT | Mod: CPTII,S$GLB,, | Performed by: FAMILY MEDICINE

## 2024-09-04 PROCEDURE — 1101F PT FALLS ASSESS-DOCD LE1/YR: CPT | Mod: CPTII,S$GLB,, | Performed by: FAMILY MEDICINE

## 2024-09-04 PROCEDURE — 3061F NEG MICROALBUMINURIA REV: CPT | Mod: CPTII,S$GLB,, | Performed by: FAMILY MEDICINE

## 2024-09-04 PROCEDURE — 1126F AMNT PAIN NOTED NONE PRSNT: CPT | Mod: CPTII,S$GLB,, | Performed by: FAMILY MEDICINE

## 2024-09-04 PROCEDURE — 3066F NEPHROPATHY DOC TX: CPT | Mod: CPTII,S$GLB,, | Performed by: FAMILY MEDICINE

## 2024-09-04 PROCEDURE — 3288F FALL RISK ASSESSMENT DOCD: CPT | Mod: CPTII,S$GLB,, | Performed by: FAMILY MEDICINE

## 2024-09-04 PROCEDURE — 99999 PR PBB SHADOW E&M-EST. PATIENT-LVL V: CPT | Mod: PBBFAC,,, | Performed by: FAMILY MEDICINE

## 2024-09-04 RX ORDER — EVOLOCUMAB 140 MG/ML
140 INJECTION, SOLUTION SUBCUTANEOUS
Qty: 2 EACH | Refills: 5 | Status: SHIPPED | OUTPATIENT
Start: 2024-09-04

## 2024-09-04 RX ORDER — EVOLOCUMAB 140 MG/ML
INJECTION, SOLUTION SUBCUTANEOUS
COMMUNITY
End: 2024-09-04 | Stop reason: SDUPTHER

## 2024-09-04 RX ORDER — OMEGA-3-ACID ETHYL ESTERS 1 G/1
CAPSULE, LIQUID FILLED ORAL
Qty: 360 CAPSULE | Refills: 1 | Status: SHIPPED | OUTPATIENT
Start: 2024-09-04

## 2024-09-04 RX ORDER — OMEGA-3-ACID ETHYL ESTERS 1 G/1
2 CAPSULE, LIQUID FILLED ORAL 2 TIMES DAILY
COMMUNITY
End: 2024-09-04 | Stop reason: SDUPTHER

## 2024-09-05 ENCOUNTER — PATIENT MESSAGE (OUTPATIENT)
Dept: FAMILY MEDICINE | Facility: CLINIC | Age: 74
End: 2024-09-05
Payer: MEDICARE

## 2024-09-05 NOTE — PROGRESS NOTES
Subjective:       Patient ID: Chris Hill Jr. is a 74 y.o. male.    Chief Complaint: Follow-up (3m)    Diabetes mellitus type 2 with retinopathy A1c 6 this is stable.  Glucose 116 down from 123.  Hyper triglyceridemia.  Triglycerides 502.  GFR is over 60.  Hyperlipidemia with cholesterol of 224 and HDL of 42.  Has coronary artery disease involving the LAD.  Had an angiogram a couple of years ago.  Using aspirin regularly.  Prostate cancer currently okay follow-up is current.  Also has hypothyroidism.  Has statin myalgia can not tolerate Crestor or Lipitor.    Physical examination.  Vital signs noted.  Neck without bruit.  Chest clear.  Heart regular rate rhythm.  Abdomen bowel sounds are positive soft nontender.  Extremities without edema positive pedal pulses.        Objective:        Assessment:       1. Hypertriglyceridemia    2. Hypertension, essential    3. Hyperlipidemia, unspecified hyperlipidemia type    4. Coronary artery disease, unspecified vessel or lesion type, unspecified whether angina present, unspecified whether native or transplanted heart    5. Aspirin long-term use    6. Prostate cancer    7. Type 2 diabetes mellitus with retinopathy of both eyes, without long-term current use of insulin, macular edema presence unspecified, unspecified retinopathy severity    8. Hypothyroidism, unspecified type    9. Myalgia due to statin        Plan:       Hypertriglyceridemia  -     Comprehensive Metabolic Panel; Future; Expected date: 12/04/2024  -     Lipid Panel; Future; Expected date: 12/04/2024    Hypertension, essential    Hyperlipidemia, unspecified hyperlipidemia type  -     Lipid Panel; Future; Expected date: 12/04/2024  -     evolocumab (REPATHA SURECLICK) 140 mg/mL PnIj; Inject 1 mL (140 mg total) into the skin every 14 (fourteen) days.  Dispense: 2 each; Refill: 5    Coronary artery disease, unspecified vessel or lesion type, unspecified whether angina present, unspecified whether native or  transplanted heart  -     evolocumab (REPATHA SURECLICK) 140 mg/mL PnIj; Inject 1 mL (140 mg total) into the skin every 14 (fourteen) days.  Dispense: 2 each; Refill: 5    Aspirin long-term use    Prostate cancer    Type 2 diabetes mellitus with retinopathy of both eyes, without long-term current use of insulin, macular edema presence unspecified, unspecified retinopathy severity  -     Hemoglobin A1C; Future; Expected date: 12/04/2024    Hypothyroidism, unspecified type  -     TSH; Future; Expected date: 09/04/2024    Myalgia due to statin  -     evolocumab (REPATHA SURECLICK) 140 mg/mL PnIj; Inject 1 mL (140 mg total) into the skin every 14 (fourteen) days.  Dispense: 2 each; Refill: 5    Other orders  -     omega-3 acid ethyl esters (LOVAZA) 1 gram capsule; Take 2 pills twice a day  Dispense: 360 capsule; Refill: 1    With his coronary artery disease and statin myalgia.  Will try and get him Repatha 140 mg subQ every 2 weeks.  Two of these with 5 refills.  Lovaza 1 g 2 b.i.d. for his hypertriglyceridemia.  Follow-up in 3 months with lipid CMP A1c and TSH.

## 2024-09-26 RX ORDER — OLMESARTAN MEDOXOMIL 40 MG/1
40 TABLET ORAL
Qty: 90 TABLET | Refills: 3 | Status: SHIPPED | OUTPATIENT
Start: 2024-09-26

## 2024-09-26 NOTE — TELEPHONE ENCOUNTER
Refill Routing Note   Medication(s) are not appropriate for processing by [facility] for the following reason(s):        Drug-disease interaction  Drug-Disease: olmesartan and Orthostasis    [facility] action(s):  Defer               Appointments  past 12m or future 3m with PCP    Date Provider   Last Visit   9/4/2024 Samy Pettit III, MD   Next Visit   12/12/2024 Samy Pettit III, MD   ED visits in past 90 days: 0        Note composed:9:35 PM 09/25/2024

## 2024-09-26 NOTE — TELEPHONE ENCOUNTER
No care due was identified.  [facility] Embedded Care Due Messages. Reference number: [id number].   9/25/2024 7:17:41 PM CDT

## 2024-10-04 RX ORDER — TERAZOSIN 10 MG/1
10 CAPSULE ORAL
Qty: 90 CAPSULE | Refills: 3 | Status: SHIPPED | OUTPATIENT
Start: 2024-10-04

## 2024-10-04 NOTE — TELEPHONE ENCOUNTER
No care due was identified.  [facility] Embedded Care Due Messages. Reference number: [id number].   10/04/2024 8:59:45 AM CDT

## 2024-10-04 NOTE — TELEPHONE ENCOUNTER
Refill Routing Note   Medication(s) are not appropriate for processing by [facility] for the following reason(s):        Drug-disease interaction  Drug-Disease: terazosin and Orthostasis    [facility] action(s):  Defer             Pharmacist review requested: Yes     Appointments  past 12m or future 3m with PCP    Date Provider   Last Visit   9/4/2024 Samy Pettit III, MD   Next Visit   12/12/2024 Samy Pettit III, MD   ED visits in past 90 days: 0        Note composed:2:58 PM 10/04/2024

## 2024-10-05 NOTE — TELEPHONE ENCOUNTER
Refill Decision Note   Chris Hill  is requesting a refill authorization.  Brief Assessment and Rationale for Refill:  Approve     Medication Therapy Plan:       Medication Reconciliation Completed: No   Comments:     No Care Gaps recommended.     Note composed:9:04 PM 10/04/2024

## 2024-10-12 NOTE — TELEPHONE ENCOUNTER
No care due was identified.  [facility] Embedded Care Due Messages. Reference number: [id number].   10/12/2024 9:53:46 AM CDT

## 2024-10-13 RX ORDER — AMITRIPTYLINE HYDROCHLORIDE 50 MG/1
50 TABLET, FILM COATED ORAL NIGHTLY
Qty: 90 TABLET | Refills: 3 | Status: SHIPPED | OUTPATIENT
Start: 2024-10-13

## 2024-10-13 NOTE — TELEPHONE ENCOUNTER
Refill Decision Note   Chris Hill  is requesting a refill authorization.  Brief Assessment and Rationale for Refill:  Approve     Medication Therapy Plan:         Comments:     Note composed:8:27 AM 10/13/2024

## 2024-10-15 ENCOUNTER — LAB VISIT (OUTPATIENT)
Dept: LAB | Facility: HOSPITAL | Age: 74
End: 2024-10-15
Attending: INTERNAL MEDICINE
Payer: MEDICARE

## 2024-10-15 ENCOUNTER — TELEPHONE (OUTPATIENT)
Facility: CLINIC | Age: 74
End: 2024-10-15
Payer: MEDICARE

## 2024-10-15 DIAGNOSIS — D64.9 ANEMIA, UNSPECIFIED TYPE: ICD-10-CM

## 2024-10-15 DIAGNOSIS — Z85.46 HISTORY OF PROSTATE CANCER: Chronic | ICD-10-CM

## 2024-10-15 DIAGNOSIS — I25.10 CORONARY ARTERY DISEASE, UNSPECIFIED VESSEL OR LESION TYPE, UNSPECIFIED WHETHER ANGINA PRESENT, UNSPECIFIED WHETHER NATIVE OR TRANSPLANTED HEART: ICD-10-CM

## 2024-10-15 DIAGNOSIS — I10 HYPERTENSION, ESSENTIAL: ICD-10-CM

## 2024-10-15 DIAGNOSIS — E53.8 VITAMIN B12 DEFICIENCY: ICD-10-CM

## 2024-10-15 DIAGNOSIS — D50.9 IRON DEFICIENCY ANEMIA, UNSPECIFIED IRON DEFICIENCY ANEMIA TYPE: ICD-10-CM

## 2024-10-15 LAB
ALBUMIN SERPL BCP-MCNC: 4 G/DL (ref 3.5–5.2)
ALP SERPL-CCNC: 59 U/L (ref 55–135)
ALT SERPL W/O P-5'-P-CCNC: 16 U/L (ref 10–44)
ANION GAP SERPL CALC-SCNC: 8 MMOL/L (ref 8–16)
AST SERPL-CCNC: 15 U/L (ref 10–40)
BASOPHILS # BLD AUTO: 0.02 K/UL (ref 0–0.2)
BASOPHILS NFR BLD: 0.2 % (ref 0–1.9)
BILIRUB SERPL-MCNC: 0.4 MG/DL (ref 0.1–1)
BUN SERPL-MCNC: 24 MG/DL (ref 8–23)
CALCIUM SERPL-MCNC: 8.7 MG/DL (ref 8.7–10.5)
CHLORIDE SERPL-SCNC: 105 MMOL/L (ref 95–110)
CO2 SERPL-SCNC: 26 MMOL/L (ref 23–29)
CREAT SERPL-MCNC: 1.2 MG/DL (ref 0.5–1.4)
DIFFERENTIAL METHOD BLD: ABNORMAL
EOSINOPHIL # BLD AUTO: 0.3 K/UL (ref 0–0.5)
EOSINOPHIL NFR BLD: 3.8 % (ref 0–8)
ERYTHROCYTE [DISTWIDTH] IN BLOOD BY AUTOMATED COUNT: 14.8 % (ref 11.5–14.5)
EST. GFR  (NO RACE VARIABLE): >60 ML/MIN/1.73 M^2
FERRITIN SERPL-MCNC: 195.6 NG/ML (ref 20–300)
FOLATE SERPL-MCNC: 16.1 NG/ML (ref 4–24)
GLUCOSE SERPL-MCNC: 97 MG/DL (ref 70–110)
HCT VFR BLD AUTO: 39.4 % (ref 40–54)
HGB BLD-MCNC: 12.5 G/DL (ref 14–18)
IMM GRANULOCYTES # BLD AUTO: 0.13 K/UL (ref 0–0.04)
IMM GRANULOCYTES NFR BLD AUTO: 1.5 % (ref 0–0.5)
IRON SERPL-MCNC: 91 UG/DL (ref 45–160)
LYMPHOCYTES # BLD AUTO: 1.6 K/UL (ref 1–4.8)
LYMPHOCYTES NFR BLD: 18.9 % (ref 18–48)
MCH RBC QN AUTO: 28.3 PG (ref 27–31)
MCHC RBC AUTO-ENTMCNC: 31.7 G/DL (ref 32–36)
MCV RBC AUTO: 89 FL (ref 82–98)
MONOCYTES # BLD AUTO: 0.8 K/UL (ref 0.3–1)
MONOCYTES NFR BLD: 9.1 % (ref 4–15)
NEUTROPHILS # BLD AUTO: 5.7 K/UL (ref 1.8–7.7)
NEUTROPHILS NFR BLD: 66.5 % (ref 38–73)
NRBC BLD-RTO: 0 /100 WBC
PLATELET # BLD AUTO: 160 K/UL (ref 150–450)
PMV BLD AUTO: 12.1 FL (ref 9.2–12.9)
POTASSIUM SERPL-SCNC: 4.2 MMOL/L (ref 3.5–5.1)
PROT SERPL-MCNC: 6.5 G/DL (ref 6–8.4)
RBC # BLD AUTO: 4.42 M/UL (ref 4.6–6.2)
SATURATED IRON: 31 % (ref 20–50)
SODIUM SERPL-SCNC: 139 MMOL/L (ref 136–145)
TOTAL IRON BINDING CAPACITY: 298 UG/DL (ref 250–450)
TRANSFERRIN SERPL-MCNC: 213 MG/DL (ref 200–375)
VIT B12 SERPL-MCNC: >1500 PG/ML (ref 210–950)
WBC # BLD AUTO: 8.5 K/UL (ref 3.9–12.7)

## 2024-10-15 PROCEDURE — 85025 COMPLETE CBC W/AUTO DIFF WBC: CPT | Performed by: INTERNAL MEDICINE

## 2024-10-15 PROCEDURE — 82607 VITAMIN B-12: CPT | Performed by: INTERNAL MEDICINE

## 2024-10-15 PROCEDURE — 82728 ASSAY OF FERRITIN: CPT | Performed by: INTERNAL MEDICINE

## 2024-10-15 PROCEDURE — 83540 ASSAY OF IRON: CPT | Performed by: INTERNAL MEDICINE

## 2024-10-15 PROCEDURE — 36415 COLL VENOUS BLD VENIPUNCTURE: CPT | Performed by: INTERNAL MEDICINE

## 2024-10-15 PROCEDURE — 84466 ASSAY OF TRANSFERRIN: CPT | Performed by: INTERNAL MEDICINE

## 2024-10-15 PROCEDURE — 80053 COMPREHEN METABOLIC PANEL: CPT | Performed by: INTERNAL MEDICINE

## 2024-10-15 PROCEDURE — 82746 ASSAY OF FOLIC ACID SERUM: CPT | Performed by: INTERNAL MEDICINE

## 2024-10-15 RX ORDER — EPLERENONE 25 MG/1
25 TABLET, FILM COATED ORAL DAILY
Qty: 30 TABLET | Refills: 3 | Status: SHIPPED | OUTPATIENT
Start: 2024-10-15

## 2024-10-22 ENCOUNTER — OFFICE VISIT (OUTPATIENT)
Facility: CLINIC | Age: 74
End: 2024-10-22
Payer: MEDICARE

## 2024-10-22 VITALS
RESPIRATION RATE: 18 BRPM | WEIGHT: 230.5 LBS | DIASTOLIC BLOOD PRESSURE: 70 MMHG | BODY MASS INDEX: 37.2 KG/M2 | HEART RATE: 55 BPM | TEMPERATURE: 98 F | SYSTOLIC BLOOD PRESSURE: 153 MMHG

## 2024-10-22 DIAGNOSIS — Z85.46 HISTORY OF PROSTATE CANCER: Primary | Chronic | ICD-10-CM

## 2024-10-22 DIAGNOSIS — E53.8 VITAMIN B12 DEFICIENCY: ICD-10-CM

## 2024-10-22 DIAGNOSIS — D50.9 IRON DEFICIENCY ANEMIA, UNSPECIFIED IRON DEFICIENCY ANEMIA TYPE: ICD-10-CM

## 2024-10-22 DIAGNOSIS — D64.9 ANEMIA, UNSPECIFIED TYPE: ICD-10-CM

## 2024-10-22 PROCEDURE — 3078F DIAST BP <80 MM HG: CPT | Mod: CPTII,S$GLB,, | Performed by: INTERNAL MEDICINE

## 2024-10-22 PROCEDURE — 3066F NEPHROPATHY DOC TX: CPT | Mod: CPTII,S$GLB,, | Performed by: INTERNAL MEDICINE

## 2024-10-22 PROCEDURE — 99214 OFFICE O/P EST MOD 30 MIN: CPT | Mod: S$GLB,,, | Performed by: INTERNAL MEDICINE

## 2024-10-22 PROCEDURE — 3044F HG A1C LEVEL LT 7.0%: CPT | Mod: CPTII,S$GLB,, | Performed by: INTERNAL MEDICINE

## 2024-10-22 PROCEDURE — G2211 COMPLEX E/M VISIT ADD ON: HCPCS | Mod: S$GLB,,, | Performed by: INTERNAL MEDICINE

## 2024-10-22 PROCEDURE — 1160F RVW MEDS BY RX/DR IN RCRD: CPT | Mod: CPTII,S$GLB,, | Performed by: INTERNAL MEDICINE

## 2024-10-22 PROCEDURE — 4010F ACE/ARB THERAPY RXD/TAKEN: CPT | Mod: CPTII,S$GLB,, | Performed by: INTERNAL MEDICINE

## 2024-10-22 PROCEDURE — 3008F BODY MASS INDEX DOCD: CPT | Mod: CPTII,S$GLB,, | Performed by: INTERNAL MEDICINE

## 2024-10-22 PROCEDURE — 3061F NEG MICROALBUMINURIA REV: CPT | Mod: CPTII,S$GLB,, | Performed by: INTERNAL MEDICINE

## 2024-10-22 PROCEDURE — 1125F AMNT PAIN NOTED PAIN PRSNT: CPT | Mod: CPTII,S$GLB,, | Performed by: INTERNAL MEDICINE

## 2024-10-22 PROCEDURE — 1159F MED LIST DOCD IN RCRD: CPT | Mod: CPTII,S$GLB,, | Performed by: INTERNAL MEDICINE

## 2024-10-22 PROCEDURE — 3288F FALL RISK ASSESSMENT DOCD: CPT | Mod: CPTII,S$GLB,, | Performed by: INTERNAL MEDICINE

## 2024-10-22 PROCEDURE — 99999 PR PBB SHADOW E&M-EST. PATIENT-LVL IV: CPT | Mod: PBBFAC,,, | Performed by: INTERNAL MEDICINE

## 2024-10-22 PROCEDURE — 3077F SYST BP >= 140 MM HG: CPT | Mod: CPTII,S$GLB,, | Performed by: INTERNAL MEDICINE

## 2024-10-22 PROCEDURE — 1100F PTFALLS ASSESS-DOCD GE2>/YR: CPT | Mod: CPTII,S$GLB,, | Performed by: INTERNAL MEDICINE

## 2024-10-22 NOTE — PROGRESS NOTES
[facility] Hematology/Oncology  PROGRESS NOTE -  Follow-up Visit      Subjective:       Patient ID:   NAME: Chris Hill Jr. : 1950     74 y.o. male    Referring Doc: Tyrell  Other Physicians: Manpreet Gordon; Wiley; Jacob Alfaro        Chief Complaint:  prostate cancer; anem/tcp f/u         History of Present Illness:     Patient is being seen today in person..The patient is on today to go over the results of the recently ordered labs, tests and studies.  He is here with his wife.     He has been having new left groin issues and is seeing Dr Sandra and Dr Barone.    He is followed by Dr Alfaro with GI and Dr Carey with ENT. He has severe reflux and has a lot of mucous issues.     He is continued on oral iron and B12    He saw Dr Pettit in 2024 and Dr Jama in 2024    He saw Dr Gordon with  in 2024    He denies any CP, SOB, HA's or N/V.               I discussed COVID19 precautions - he had his vaccinations          ROS:   GEN: normal without any fever, night sweats or weight loss; no current dizzy spells; some new groin and left leg pains; mucus issues  HEENT: normal with no HA's, sore throat, stiff neck, changes in vision  CV: normal with no CP, SOB, PND, JANSEN or orthopnea  PULM: normal with no SOB, cough, hemoptysis, sputum or pleuritic pain  GI: extreme reflux issues  : normal with no hematuria, dysuria   SKIN: normal with no rash, erythema, bruising, or swelling    Allergies:  Review of patient's allergies indicates:  No Known Allergies    Medications:    Current Outpatient Medications:     amitriptyline (ELAVIL) 50 MG tablet, Take 1 tablet by mouth in the evening, Disp: 90 tablet, Rfl: 3    amLODIPine (NORVASC) 10 MG tablet, Take 1 tablet (10 mg total) by mouth once daily. (Patient taking differently: Take 5 mg by mouth once daily.), Disp: 90 tablet, Rfl: 3    ascorbic acid, vitamin C, (VITAMIN C) 1000 MG tablet, Take 1,000 mg by mouth 2 (two) times daily., Disp: , Rfl:     aspirin  (ECOTRIN) 81 MG EC tablet, Take 81 mg by mouth every evening., Disp: , Rfl:     carvediloL (COREG) 25 MG tablet, TAKE 1 TABLET BY MOUTH TWICE DAILY WITH MEALS, Disp: 180 tablet, Rfl: 3    clonazePAM (KLONOPIN) 0.5 MG tablet, Take by mouth., Disp: , Rfl:     co-enzyme Q-10 30 mg capsule, Take 30 mg by mouth 3 (three) times daily., Disp: , Rfl:     cyanocobalamin (VITAMIN B-12) 100 MCG tablet, Take 1,000 mcg by mouth once daily., Disp: , Rfl:     cyclobenzaprine (FLEXERIL) 10 MG tablet, Take 10 mg by mouth as needed for Muscle spasms., Disp: , Rfl:     eplerenone (INSPRA) 25 MG Tab, Take 1 tablet (25 mg total) by mouth once daily., Disp: 30 tablet, Rfl: 3    esomeprazole (NEXIUM) 40 MG capsule, Take 40 mg by mouth 2 (two) times daily., Disp: , Rfl:     evolocumab (REPATHA SURECLICK) 140 mg/mL PnIj, Inject 1 mL (140 mg total) into the skin every 14 (fourteen) days., Disp: 2 each, Rfl: 5    fish oil-omega-3 fatty acids 300-1,000 mg capsule, Take 2 capsules by mouth every evening., Disp: , Rfl:     hydrALAZINE (APRESOLINE) 100 MG tablet, Take 1 tablet (100 mg total) by mouth 3 (three) times daily., Disp: 270 tablet, Rfl: 3    HYDROcodone-acetaminophen (NORCO)  mg per tablet, Take 1 tablet by mouth every 4 (four) hours as needed. for pain., Disp: , Rfl:     ibuprofen (ADVIL,MOTRIN) 600 MG tablet, Take 1 tablet (600 mg total) by mouth every 6 (six) hours as needed for Pain., Disp: 20 tablet, Rfl: 0    iron-vitamin C 100-250 mg, ICAR-C, 100-250 mg Tab, Take 1 tablet by mouth once daily, Disp: 30 tablet, Rfl: 0    magnesium 250 mg Tab, Take 1 tablet by mouth every evening., Disp: , Rfl:     meclizine (ANTIVERT) 25 mg tablet, Take 25 mg by mouth every 6 (six) hours as needed., Disp: , Rfl:     metFORMIN (GLUCOPHAGE-XR) 500 MG ER 24hr tablet, Take 1 tablet by mouth once daily with breakfast, Disp: 90 tablet, Rfl: 0    olmesartan (BENICAR) 40 MG tablet, Take 1 tablet by mouth once daily, Disp: 90 tablet, Rfl: 3    omega-3  acid ethyl esters (LOVAZA) 1 gram capsule, Take 2 pills twice a day, Disp: 360 capsule, Rfl: 1    potassium chloride SA (K-DUR,KLOR-CON) 20 MEQ tablet, Take 1 tablet (20 mEq total) by mouth 2 (two) times daily., Disp: 180 tablet, Rfl: 3    sildenafiL (VIAGRA) 25 MG tablet, TAKE 1/2 TO 1 (ONE-HALF TO ONE) TABLET BY MOUTH ONCE DAILY AS NEEDED FOR ERECTILE DYSFUNCTION, Disp: 10 tablet, Rfl: 0    terazosin (HYTRIN) 10 MG capsule, TAKE 1 CAPSULE BY MOUTH IN THE EVENING, Disp: 90 capsule, Rfl: 3    topiramate (TOPAMAX) 25 MG tablet, Take 50 mg by mouth 2 (two) times daily., Disp: , Rfl:     vitamin D (VITAMIN D3) 1000 units Tab, Take 1,000 Units by mouth 2 (two) times a day., Disp: , Rfl:     cloNIDine (CATAPRES) 0.1 MG tablet, Take 1 tablet (0.1 mg total) by mouth every 8 (eight) hours as needed (SBP greater than 180)., Disp: 30 tablet, Rfl: 0    diazePAM (VALIUM) 5 MG tablet, Take 5 mg by mouth 4 (four) times daily as needed. (Patient not taking: Reported on 10/22/2024), Disp: , Rfl:     levothyroxine (SYNTHROID) 50 MCG tablet, Take 1 tablet (50 mcg total) by mouth before breakfast., Disp: 90 tablet, Rfl: 2    Current Facility-Administered Medications:     sodium chloride 0.9% flush 10 mL, 10 mL, Intravenous, PRN, Carol Jama MD    PMHx/PSHx Updates:  See patient's last visit with me on  4/22/2024  See H&P on 12/28/2018        Pathology:  Cancer Staging  No matching staging information was found for the patient.          Objective:     Vitals:  Blood pressure (!) 153/70, pulse (!) 55, temperature 98.1 °F (36.7 °C), resp. rate 18, weight 104.6 kg (230 lb 8 oz).        Physical Examination:   GEN: no apparent distress, comfortable; AAOx3; overweight  HEAD: atraumatic and normocephalic  EYES: no conjunctival pallor or muddiness, no icterus; normal pupil reaction to ambient light  ENT: OMM, no pharyngeal erythema, external bilateral ears WNL; no visible thrush or ulcers  NECK: no masses or swelling, trachea midline, no  visible LAD/LN's   CV: no palpitations; no pedal edema; no noticeable JVD or neck vein distension; pedal swelling resolved  CHEST: Normal respiratory effort; chest wall breath movements symmetrical; no audible wheezing  ABDOM: non-distended; no bloating  MUSC/Skeletal: ROM normal; joints visibly normal; no deformities; positive arthropathy in hands  EXTREM: no clubbing, cyanosis, inflammation or swelling  SKIN: no rashes, lesions, ulcers, petechiae or subcutaneous nodules;   : no ellison  NEURO: moving all 4 extremities; AAOx3; no tremors  PSYCH: normal mood, affect and behavior  LYMPH: no visible LN's or LAD              Labs:        Lab Results   Component Value Date    WBC 8.50 10/15/2024    HGB 12.5 (L) 10/15/2024    HCT 39.4 (L) 10/15/2024    MCV 89 10/15/2024     10/15/2024     CMP  Sodium   Date Value Ref Range Status   10/15/2024 139 136 - 145 mmol/L Final   04/12/2019 138 134 - 144 mmol/L      Potassium   Date Value Ref Range Status   10/15/2024 4.2 3.5 - 5.1 mmol/L Final     Chloride   Date Value Ref Range Status   10/15/2024 105 95 - 110 mmol/L Final   04/12/2019 99 98 - 110 mmol/L      CO2   Date Value Ref Range Status   10/15/2024 26 23 - 29 mmol/L Final     Glucose   Date Value Ref Range Status   10/15/2024 97 70 - 110 mg/dL Final   04/12/2019 117 (H) 70 - 99 mg/dL      BUN   Date Value Ref Range Status   10/15/2024 24 (H) 8 - 23 mg/dL Final     Creatinine   Date Value Ref Range Status   10/15/2024 1.2 0.5 - 1.4 mg/dL Final   04/12/2019 0.72 0.60 - 1.40 mg/dL      Calcium   Date Value Ref Range Status   10/15/2024 8.7 8.7 - 10.5 mg/dL Final     Total Protein   Date Value Ref Range Status   10/15/2024 6.5 6.0 - 8.4 g/dL Final     Albumin   Date Value Ref Range Status   10/15/2024 4.0 3.5 - 5.2 g/dL Final   04/12/2019 3.3 3.1 - 4.7 g/dL      Total Bilirubin   Date Value Ref Range Status   10/15/2024 0.4 0.1 - 1.0 mg/dL Final     Comment:     For infants and newborns, interpretation of results  should be based  on gestational age, weight and in agreement with clinical  observations.    Premature Infant recommended reference ranges:  Up to 24 hours.............<8.0 mg/dL  Up to 48 hours............<12.0 mg/dL  3-5 days..................<15.0 mg/dL  6-29 days.................<15.0 mg/dL       Alkaline Phosphatase   Date Value Ref Range Status   10/15/2024 59 55 - 135 U/L Final     AST   Date Value Ref Range Status   10/15/2024 15 10 - 40 U/L Final     ALT   Date Value Ref Range Status   10/15/2024 16 10 - 44 U/L Final     Anion Gap   Date Value Ref Range Status   10/15/2024 8 8 - 16 mmol/L Final     eGFR if    Date Value Ref Range Status   07/25/2022 >60.0 >60 mL/min/1.73 m^2 Final     eGFR if non    Date Value Ref Range Status   07/25/2022 >60.0 >60 mL/min/1.73 m^2 Final     Comment:     Calculation used to obtain the estimated glomerular filtration  rate (eGFR) is the CKD-EPI equation.        Lab Results   Component Value Date    IRON 91 10/15/2024    TIBC 298 10/15/2024    FERRITIN 195.6 10/15/2024        Lab Results   Component Value Date    WYJPIULK04 >1500 (H) 10/15/2024     Lab Results   Component Value Date    FOLATE 16.1 10/15/2024           Radiology/Diagnostic Studies:    Us Abdomen Complete    Result Date: 1/2/2019  Abdominal ultrasound Clinical history is anemia, prostate cancer The pancreas, aorta and IVC are normal. The liver is hyperechoic compatible with fatty infiltration. There is hepatopedal flow within the portal vein. The common bile duct measures 6 mm. The patient has had a cholecystectomy. The kidneys are normal in size and echogenicity. The spleen is normal in size measuring 12 cm. IMPRESSION: Fatty infiltration of the liver otherwise negative abdominal ultrasound Read and electronically signed by: Marry Hernandez MD on 1/2/2019 9:49 AM CST MARRY HERNANDEZ MD      I have reviewed all available lab results and radiology reports.    Assessment/Plan:    (1) 74 y.o. male with diagnosis of prostate cancer who has been referred by Simone Santillan Jr., MD for evaluation by medical oncology. Patient with a high risk adenocarcinoma of the prostate with [id number] with GS of 4+5.   - he started androgen depravation therapy and monotherapy XRT (IMRT)  - followed by Dr Gordon with  and currently on Trelstar  - followed by Dr Santillan with Rad/onc and completed XRt on 12/18/2018  - latest PSA at 0.01 in May 2020 and he had his last lupron shot done in April/May 2020  - he sees Dr Gordon again in Nov 2020 1/13/2021:  - He saw Dr Gordon again in Nov 2020 and they are repeating his PSA next month with plans to repeat every 3 months for now.     4/13/2021:  - seeing Dr Gordon with labs in near future  - PSA on 2/23/2021 was 0.01    9/28/2021:  - He sees Dr Gordon again in Dec 2021 and the latest PSA was a little higher at 0.03; he has been off the ADT for about a year  - discussed and will make referral to Dr Andrea Scanlon at [facility] with -Oncology    12/28/2021:  - he did not see Dr Scanlon at [facility] as of yet - will check on the referral  - He was supposed to f/u with Dr Gordon again in Dec 2021 but it was cancelled; he has been off the ADT for over a year; he sees  again in June 2022 and Dr Satnillan again in Jan 2022  - PSA down to 0.01 now and good    4/18/2022:  - he sees Dr Gordon again in June 2022 7/18/2022:  - mild increase in PSA up to 0.07 from 0.01  - planned repeat level in 3 months per  with follow-up with Dr Gordon again in Aug 2022  - he never did go see Dr Scanlon    11/15/2022:  - His latest PSA was 0.07 and he sees Dr Gordon again in Dec 2022    7/20/2023:  - His PSA has increased to 0.14 and he is seeing Dr Gordon; he saw O'Rufina last week. He sees Dr Gordon again in 3 months. He is planning to see Dr Santillan again in near future  -  planning to repeat PSA again in 3 months    10/19/2023:  - PSA back down to 0.1  - followed by   Heidi/Dunia    4/22/2024:  - latest PSA was 0.11 and stable  - followed by Randa with  and sees them again in July 2024    10/22/2024:  - he saw Dr Gordon in July 2024  - latest PSA 0.11 in July 2024       (2) CAD and non-rheumatic MR; mild CVA in 1993; Hypertensive Heart disease - followed by Dr Jama with cardiology     (3) HTN and hypercholesterolemia     (4) LORA     (5) DM    (6) Chronic back pain issues - required recent lumbar surgery with Dr Chris Fofana at [facility]     (7) Hx/of nightly fevers     (8) Chronic diarhhea - followed by Dr Alfaro with GI and s/p recent endoscopies     (9) Mild thrombocytopenia resolved with last platelet count down at 141,000  - no history of hepatitis or liver problems; no alcoholism  - he has positive antiplatelet antibody screens both direct and indirect consistent with an underlying chronic ITP condition  - his flow cytometry showed no evidence of leukemia but he did have a relative increase in eosinophils    1/13/2021:  - latest platelet count at 129,000 which should be adequate for most surgeries    4/13/2021:  - labs pending    9/28/2021:  - latest platelet count is WNL    12/28/2021:  - latest plat wnl    7/18/2022:  - latest plats wnl at 158,000    11/15/2022:  - plats are good at 159,000    7/20/2023:  - plats currently WNL - 177,000    4/22/2024:  - latest plats at 147,000 and WNL    10/22/2024:  - platelets WNL at 160,000     (10) Mild anemia with latest hgb at 11.5 and stable   - NCNC parameters  - iron panel was WNL with recent labs  - OBS was negative on 11/15  - hx/of colon polyps in past  - followed by Dr Alfaro with GI with planned repeat endoscopies in near future    1/13/2021:  - hgb at 11.5 with normal iron panel; stable    4/13/2021:  - labs pending    9/28/2021:  - latest hgb at 11.5 and relatively stable  - iron panel is adequate    12/28/2021:  - latest hgb at 11.1 and adequate  - iron panel wnl    4/18/2022:  - hgb at 10.7 and a little lower  -  his iron was a little low despite the oral iron  - discussed IV iron and he is agreeable    7/18/2022:  - hgb better at 12.5  - s/p IV iron x2  - on oral iron    11/15/2022:  - latest hgb at 12.5 and iorn panel stable  - continued on oral iron    7/20/2023:  - hgb at 12.3  - iron panel adequate  - on oral iron daily    10/19/2023:  - latest hgb at 12.1  - iron panel adequate  - plats at 119,000 and a little lower this time    4/22/2024:  - latest hgb at 12.4  - s/p EGD with Dr Alfaro in Feb 2024 and colonoscopy earlier today  - iron panel adequate  - B12 >1500 - he plans to cut back on B12 suppplements    10/22/2024:  - latest hgb at 12.5 and relatively stable  - iron panel adequate  - on oral iron and b12 supplements    (11) Esophageal polyp   - He had scope with EGD with Dr Alfaroand they found an esophageal polyp which was cauterized. He saw Dr Santillan  and he recommended that the patient have the polyp removed.   - he is having repeat EGD to re-evaluate the polyp next month      1/13/2021:  -He previously had scope with EGD with Dr Alfaro  and they found an esophageal polyp which was cauterized.  -  He subsequently saw Dr Santillan and he recommended that the patient have the polyp removed.   - He had repeat EGD but it was incomplete due residual gastric contents and it was to be rescheduled for the following month but he did not have it done as of yet.    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 F/u with rad/onc, GI and  directives  2. Check CBC/plat/iron panel every 3 months for now    3. F/u  with PCP, , Rad/onc etc   4. continue ICAR-C daily po and set up IV irons as needed in future  5. F/u with neurology          RTC in 6 months    Fax note to Wilver Santillan Clinton H. III, MD, and Wiley Gordon Albright; Mitchell Scanlon    Discussion:       Total Time spent on patient:    I spent over 15 mins of time with the patient. Reviewed results of the recently ordered labs, tests, reports and studies; made directives with regards to the results. Over half of this time was spent couseling and coordinating care, making treatment and analytical decisions; ordering necessary labs, tests and studies; and discussing treatment options and setting up treatment plan(s) if indicated.        COVID-19 Discussion:    I had long discussion with patient and any applicable family about the COVID-19 coronavirus epidemic and the recommended precautions with regard to cancer and/or hematology patients. I have re-iterated the CDC recommendations for adequate hand washing, use of hand -like products, and coughing into elbow, etc. In addition, especially for our patients who are on chemotherapy and/or our otherwise immunocompromised patients, I have recommended avoidance of crowds, including movie theaters, restaurants, churches, etc. I have recommended avoidance of any sick or symptomatic family members and/or friends. I have also recommended avoidance of any raw and unwashed food products, and general avoidance of food items that have not been prepared by themselves. The patient has been asked to call us immediately with any symptom developments, issues, questions or other general concerns.       Iron Infusion Therapy Discussion:     I provided literature/learning materials on the particular IV iron regimen and discussed the potential side-effect profiles of the drug(s). I discussed the importance of compliance with obtaining and monitoring requested lab work, and went over the potential risk for the  development of anaphylactic shock, bronchospasm, dysrhythmia, liver and/or kidney damage, and respiratory/cardiovascular arrest and/or failure. I discussed the potential risks for development of alopecia, fevers, itching, chills and/or rigors, cold sensory issues, ringing in ears, vertigo and neuropathy, all of which are usually acute but sometimes could end up being chronic and life-long. I discussed the risks of hand-foot syndrome and rashes, and development of other autoimmune mediated processes such as pneumonitis and colitis which could be life threatening.     The patient's consent has been obtained to proceed with the IV iron therapy.The patient will be referred to [facility] for training and education on IV iron therapy, use of antiemetics and/or anti-diarrheals, use of NSAID's, potential IV iron therapy side-effects, and any specific recommendations and precautions with the particular IV iron agents.      I answered all of the patient's (and family's, if applicable) questions to the best of my ability and to their complete satisfaction. The patient acknowledged full understanding of the risks, recommendations and plan(s).         I have explained all of the above in detail and the patient understands all of the current recommendation(s). I have answered all of their questions to the best of my ability and to their complete satisfaction.   The patient is to continue with the current management plan.            Electronically signed by Cyrus Gibson MD

## 2024-11-05 RX ORDER — METFORMIN HYDROCHLORIDE 500 MG/1
500 TABLET, EXTENDED RELEASE ORAL
Qty: 90 TABLET | Refills: 1 | Status: SHIPPED | OUTPATIENT
Start: 2024-11-05

## 2024-11-05 NOTE — TELEPHONE ENCOUNTER
Refill Decision Note   Chris Hill  is requesting a refill authorization.  Brief Assessment and Rationale for Refill:  Approve     Medication Therapy Plan:         Comments:     Note composed:12:57 PM 11/05/2024             Appointments     Last Visit   9/4/2024 Samy Pettit III, MD   Next Visit   12/12/2024 Samy Pettit III, MD

## 2024-11-05 NOTE — TELEPHONE ENCOUNTER
No care due was identified.  [facility] Embedded Care Due Messages. Reference number: [id number].   11/05/2024 8:57:46 AM CST

## 2024-11-12 RX ORDER — POTASSIUM CHLORIDE 20 MEQ/1
TABLET, EXTENDED RELEASE ORAL 2 TIMES DAILY
Qty: 180 TABLET | Refills: 0 | OUTPATIENT
Start: 2024-11-12

## 2024-11-12 RX ORDER — POTASSIUM CHLORIDE 20 MEQ/1
20 TABLET, EXTENDED RELEASE ORAL 2 TIMES DAILY
Qty: 180 TABLET | Refills: 3 | Status: SHIPPED | OUTPATIENT
Start: 2024-11-12

## 2024-11-12 NOTE — TELEPHONE ENCOUNTER
No care due was identified.  [facility] Embedded Care Due Messages. Reference number: [id number].   11/12/2024 1:40:09 PM CST

## 2024-11-12 NOTE — TELEPHONE ENCOUNTER
Refill Decision Note   Chris Hill  is requesting a refill authorization.  Brief Assessment and Rationale for Refill:  Approve     Medication Therapy Plan:         Comments:     Note composed:2:08 PM 11/12/2024

## 2024-11-12 NOTE — TELEPHONE ENCOUNTER
No care due was identified.  [facility] Embedded Care Due Messages. Reference number: [id number].   11/12/2024 1:42:25 PM CST

## 2024-11-12 NOTE — TELEPHONE ENCOUNTER
Refill Decision Note   Chris Hill  is requesting a refill authorization.  Brief Assessment and Rationale for Refill:  Quick Discontinue     Medication Therapy Plan:  Duplicate      Comments:     Note composed:2:07 PM 11/12/2024

## 2024-11-15 RX ORDER — CARVEDILOL 25 MG/1
25 TABLET ORAL 2 TIMES DAILY WITH MEALS
Qty: 180 TABLET | Refills: 3 | OUTPATIENT
Start: 2024-11-15

## 2024-11-15 NOTE — TELEPHONE ENCOUNTER
No care due was identified.  [facility] Embedded Care Due Messages. Reference number: [id number].   11/15/2024 9:17:11 AM CST

## 2024-11-26 RX ORDER — AMLODIPINE BESYLATE 10 MG/1
10 TABLET ORAL
Qty: 90 TABLET | Refills: 3 | Status: SHIPPED | OUTPATIENT
Start: 2024-11-26

## 2024-11-26 NOTE — TELEPHONE ENCOUNTER
No care due was identified.  [facility] Embedded Care Due Messages. Reference number: [id number].   11/26/2024 4:52:56 PM CST

## 2024-11-27 NOTE — TELEPHONE ENCOUNTER
Refill Routing Note   Medication(s) are not appropriate for processing by [facility] for the following reason(s):        Required vitals abnormal    [facility] action(s):  Defer               Appointments  past 12m or future 3m with PCP    Date Provider   Last Visit   9/4/2024 Samy Pettit III, MD   Next Visit   12/12/2024 Samy Pettit III, MD   ED visits in past 90 days: 0        Note composed:9:16 PM 11/26/2024

## 2024-12-02 ENCOUNTER — PATIENT MESSAGE (OUTPATIENT)
Dept: FAMILY MEDICINE | Facility: CLINIC | Age: 74
End: 2024-12-02
Payer: MEDICARE

## 2024-12-09 ENCOUNTER — LAB VISIT (OUTPATIENT)
Dept: LAB | Facility: HOSPITAL | Age: 74
End: 2024-12-09
Attending: FAMILY MEDICINE
Payer: MEDICARE

## 2024-12-09 DIAGNOSIS — E11.319 TYPE 2 DIABETES MELLITUS WITH RETINOPATHY OF BOTH EYES, WITHOUT LONG-TERM CURRENT USE OF INSULIN, MACULAR EDEMA PRESENCE UNSPECIFIED, UNSPECIFIED RETINOPATHY SEVERITY: ICD-10-CM

## 2024-12-09 DIAGNOSIS — E03.9 HYPOTHYROIDISM, UNSPECIFIED TYPE: ICD-10-CM

## 2024-12-09 DIAGNOSIS — E78.5 HYPERLIPIDEMIA, UNSPECIFIED HYPERLIPIDEMIA TYPE: ICD-10-CM

## 2024-12-09 DIAGNOSIS — E78.1 HYPERTRIGLYCERIDEMIA: ICD-10-CM

## 2024-12-09 LAB
ALBUMIN SERPL BCP-MCNC: 4 G/DL (ref 3.5–5.2)
ALP SERPL-CCNC: 57 U/L (ref 55–135)
ALT SERPL W/O P-5'-P-CCNC: 13 U/L (ref 10–44)
ANION GAP SERPL CALC-SCNC: 5 MMOL/L (ref 8–16)
AST SERPL-CCNC: 16 U/L (ref 10–40)
BILIRUB SERPL-MCNC: 0.5 MG/DL (ref 0.1–1)
BUN SERPL-MCNC: 16 MG/DL (ref 8–23)
CALCIUM SERPL-MCNC: 8.7 MG/DL (ref 8.7–10.5)
CHLORIDE SERPL-SCNC: 105 MMOL/L (ref 95–110)
CHOLEST SERPL-MCNC: 108 MG/DL (ref 120–199)
CHOLEST/HDLC SERPL: 2.8 {RATIO} (ref 2–5)
CO2 SERPL-SCNC: 29 MMOL/L (ref 23–29)
CREAT SERPL-MCNC: 1.1 MG/DL (ref 0.5–1.4)
EST. GFR  (NO RACE VARIABLE): >60 ML/MIN/1.73 M^2
ESTIMATED AVG GLUCOSE: 123 MG/DL (ref 68–131)
GLUCOSE SERPL-MCNC: 124 MG/DL (ref 70–110)
HBA1C MFR BLD: 5.9 % (ref 4.5–6.2)
HDLC SERPL-MCNC: 39 MG/DL (ref 40–75)
HDLC SERPL: 36.1 % (ref 20–50)
LDLC SERPL CALC-MCNC: 20.2 MG/DL (ref 63–159)
NONHDLC SERPL-MCNC: 69 MG/DL
POTASSIUM SERPL-SCNC: 3.9 MMOL/L (ref 3.5–5.1)
PROT SERPL-MCNC: 6.5 G/DL (ref 6–8.4)
SODIUM SERPL-SCNC: 139 MMOL/L (ref 136–145)
TRIGL SERPL-MCNC: 244 MG/DL (ref 30–150)
TSH SERPL DL<=0.005 MIU/L-ACNC: 2.61 UIU/ML (ref 0.34–5.6)

## 2024-12-09 PROCEDURE — 36415 COLL VENOUS BLD VENIPUNCTURE: CPT | Performed by: FAMILY MEDICINE

## 2024-12-09 PROCEDURE — 83036 HEMOGLOBIN GLYCOSYLATED A1C: CPT | Performed by: FAMILY MEDICINE

## 2024-12-09 PROCEDURE — 84443 ASSAY THYROID STIM HORMONE: CPT | Performed by: FAMILY MEDICINE

## 2024-12-09 PROCEDURE — 80061 LIPID PANEL: CPT | Performed by: FAMILY MEDICINE

## 2024-12-09 PROCEDURE — 80053 COMPREHEN METABOLIC PANEL: CPT | Performed by: FAMILY MEDICINE

## 2024-12-12 ENCOUNTER — OFFICE VISIT (OUTPATIENT)
Dept: FAMILY MEDICINE | Facility: CLINIC | Age: 74
End: 2024-12-12
Payer: MEDICARE

## 2024-12-12 ENCOUNTER — LAB VISIT (OUTPATIENT)
Dept: LAB | Facility: HOSPITAL | Age: 74
End: 2024-12-12
Attending: FAMILY MEDICINE
Payer: MEDICARE

## 2024-12-12 VITALS
DIASTOLIC BLOOD PRESSURE: 66 MMHG | BODY MASS INDEX: 36.81 KG/M2 | HEART RATE: 61 BPM | TEMPERATURE: 98 F | HEIGHT: 66 IN | OXYGEN SATURATION: 98 % | WEIGHT: 229.06 LBS | SYSTOLIC BLOOD PRESSURE: 138 MMHG

## 2024-12-12 DIAGNOSIS — R19.7 DIARRHEA, UNSPECIFIED TYPE: ICD-10-CM

## 2024-12-12 DIAGNOSIS — E03.9 HYPOTHYROIDISM, UNSPECIFIED TYPE: ICD-10-CM

## 2024-12-12 DIAGNOSIS — I10 HYPERTENSION, ESSENTIAL: Primary | ICD-10-CM

## 2024-12-12 DIAGNOSIS — E66.01 SEVERE OBESITY (BMI 35.0-39.9) WITH COMORBIDITY: ICD-10-CM

## 2024-12-12 DIAGNOSIS — Z79.82 ASPIRIN LONG-TERM USE: ICD-10-CM

## 2024-12-12 DIAGNOSIS — G89.29 CHRONIC BACK PAIN, UNSPECIFIED BACK LOCATION, UNSPECIFIED BACK PAIN LATERALITY: ICD-10-CM

## 2024-12-12 DIAGNOSIS — M54.9 CHRONIC BACK PAIN, UNSPECIFIED BACK LOCATION, UNSPECIFIED BACK PAIN LATERALITY: ICD-10-CM

## 2024-12-12 DIAGNOSIS — Z23 NEED FOR INFLUENZA VACCINATION: ICD-10-CM

## 2024-12-12 DIAGNOSIS — E78.1 HYPERTRIGLYCERIDEMIA: ICD-10-CM

## 2024-12-12 DIAGNOSIS — E78.5 HYPERLIPIDEMIA, UNSPECIFIED HYPERLIPIDEMIA TYPE: ICD-10-CM

## 2024-12-12 DIAGNOSIS — M25.552 LEFT HIP PAIN: ICD-10-CM

## 2024-12-12 DIAGNOSIS — E11.319 TYPE 2 DIABETES MELLITUS WITH RETINOPATHY OF BOTH EYES, WITHOUT LONG-TERM CURRENT USE OF INSULIN, MACULAR EDEMA PRESENCE UNSPECIFIED, UNSPECIFIED RETINOPATHY SEVERITY: ICD-10-CM

## 2024-12-12 DIAGNOSIS — I25.10 CORONARY ARTERY DISEASE, UNSPECIFIED VESSEL OR LESION TYPE, UNSPECIFIED WHETHER ANGINA PRESENT, UNSPECIFIED WHETHER NATIVE OR TRANSPLANTED HEART: ICD-10-CM

## 2024-12-12 PROCEDURE — 87045 FECES CULTURE AEROBIC BACT: CPT | Performed by: FAMILY MEDICINE

## 2024-12-12 PROCEDURE — 99999 PR PBB SHADOW E&M-EST. PATIENT-LVL V: CPT | Mod: PBBFAC,,, | Performed by: FAMILY MEDICINE

## 2024-12-12 PROCEDURE — 87449 NOS EACH ORGANISM AG IA: CPT | Performed by: FAMILY MEDICINE

## 2024-12-12 PROCEDURE — 87046 STOOL CULTR AEROBIC BACT EA: CPT | Performed by: FAMILY MEDICINE

## 2024-12-12 RX ORDER — SEMAGLUTIDE 0.68 MG/ML
0.25 INJECTION, SOLUTION SUBCUTANEOUS
Qty: 1 EACH | Refills: 0 | Status: SHIPPED | OUTPATIENT
Start: 2024-12-12

## 2024-12-12 RX ORDER — SEMAGLUTIDE 0.68 MG/ML
0.25 INJECTION, SOLUTION SUBCUTANEOUS
COMMUNITY
End: 2024-12-12 | Stop reason: SDUPTHER

## 2024-12-14 NOTE — PROGRESS NOTES
Subjective:       Patient ID: Chris Hill Jr. is a 74 y.o. male.    Chief Complaint: Follow-up (3 month)    Having back and left hip pain.  Saw surgeon.  Occurs around the left groin area and down the anterior thigh.  Hypertension is under good control.  Has hypertriglyceridemia.  Coronary artery disease doing okay no anginal chest pain.  Using aspirin regularly.  Type 2 diabetes with retinopathy.  A1c 5.9 glucose 124 GFR is over 60.  TSH is 2.61.  Has hypothyroidism.  BMI of 37 which is stable.  Has hyperlipidemia with a cholesterol of 108 HDL of 39 LDL of 20 on his Repatha shots.  Having diarrhea daily for 3 weeks.  One metformin per day only no antibiotics recently.    Physical examination.  Vital signs noted.  No acute distress neck without bruit.  Chest clear.  Heart regular rate rhythm.  Left hip is slightly painful with flex rotation.  Nontender.        Objective:        Assessment:       1. Hypertension, essential    2. Hypertriglyceridemia    3. Coronary artery disease, unspecified vessel or lesion type, unspecified whether angina present, unspecified whether native or transplanted heart    4. Aspirin long-term use    5. Type 2 diabetes mellitus with retinopathy of both eyes, without long-term current use of insulin, macular edema presence unspecified, unspecified retinopathy severity    6. Hypothyroidism, unspecified type    7. Hyperlipidemia, unspecified hyperlipidemia type    8. Severe obesity (BMI 35.0-39.9) with comorbidity    9. Diarrhea, unspecified type    10. Chronic back pain, unspecified back location, unspecified back pain laterality    11. Left hip pain    12. Need for influenza vaccination        Plan:       Hypertension, essential    Hypertriglyceridemia    Coronary artery disease, unspecified vessel or lesion type, unspecified whether angina present, unspecified whether native or transplanted heart    Aspirin long-term use    Type 2 diabetes mellitus with retinopathy of both eyes, without  long-term current use of insulin, macular edema presence unspecified, unspecified retinopathy severity  -     semaglutide (OZEMPIC) 0.25 mg or 0.5 mg (2 mg/3 mL) pen injector; Inject 0.25 mg into the skin every 7 days.  Dispense: 1 each; Refill: 0    Hypothyroidism, unspecified type    Hyperlipidemia, unspecified hyperlipidemia type    Severe obesity (BMI 35.0-39.9) with comorbidity    Diarrhea, unspecified type  -     CULTURE, STOOL; Future; Expected date: 12/12/2024  -     Cancel: CLOSTRIDIUM DIFFICILE; Future; Expected date: 12/12/2024    Chronic back pain, unspecified back location, unspecified back pain laterality    Left hip pain    Need for influenza vaccination  -     influenza (adjuvanted) (Fluad) 45 mcg/0.5 mL IM vaccine (> or = 64 yo) 0.5 mL      Hold his metformin see if this resolves the diarrhea.  Check stool culture and sensitivity.  Check stool for Clostridium difficile.  Follow-up with orthopedics.  Flu shot high-dose.  Ozempic 2 mg pen 0.25 weekly.  No history of pancreatitis no family history of thyroid cancer.  Follow-up in one-month on this.

## 2024-12-15 LAB
BACTERIA STL CULT: NORMAL
BACTERIA STL CULT: NORMAL

## 2025-01-13 RX ORDER — LEVOTHYROXINE SODIUM 50 UG/1
50 TABLET ORAL
Qty: 90 TABLET | Refills: 3 | Status: SHIPPED | OUTPATIENT
Start: 2025-01-13 | End: 2025-01-14 | Stop reason: SDUPTHER

## 2025-01-13 NOTE — TELEPHONE ENCOUNTER
No care due was identified.  Health [facility] Embedded Care Due Messages. Reference number: [id number].   1/13/2025 2:42:35 PM CST

## 2025-01-14 ENCOUNTER — OFFICE VISIT (OUTPATIENT)
Dept: FAMILY MEDICINE | Facility: CLINIC | Age: 75
End: 2025-01-14
Payer: MEDICARE

## 2025-01-14 VITALS
HEART RATE: 54 BPM | DIASTOLIC BLOOD PRESSURE: 64 MMHG | BODY MASS INDEX: 36.37 KG/M2 | TEMPERATURE: 98 F | SYSTOLIC BLOOD PRESSURE: 112 MMHG | HEIGHT: 66 IN | WEIGHT: 226.31 LBS | OXYGEN SATURATION: 96 %

## 2025-01-14 DIAGNOSIS — I10 HYPERTENSION, ESSENTIAL: Primary | ICD-10-CM

## 2025-01-14 DIAGNOSIS — C61 PROSTATE CANCER: ICD-10-CM

## 2025-01-14 DIAGNOSIS — E11.319 TYPE 2 DIABETES MELLITUS WITH RETINOPATHY OF BOTH EYES, WITHOUT LONG-TERM CURRENT USE OF INSULIN, MACULAR EDEMA PRESENCE UNSPECIFIED, UNSPECIFIED RETINOPATHY SEVERITY: ICD-10-CM

## 2025-01-14 DIAGNOSIS — E66.01 SEVERE OBESITY (BMI 35.0-39.9) WITH COMORBIDITY: ICD-10-CM

## 2025-01-14 DIAGNOSIS — K59.00 CONSTIPATION, UNSPECIFIED CONSTIPATION TYPE: ICD-10-CM

## 2025-01-14 DIAGNOSIS — K21.9 GASTROESOPHAGEAL REFLUX DISEASE, UNSPECIFIED WHETHER ESOPHAGITIS PRESENT: ICD-10-CM

## 2025-01-14 DIAGNOSIS — I47.29 NSVT (NONSUSTAINED VENTRICULAR TACHYCARDIA): ICD-10-CM

## 2025-01-14 PROCEDURE — 3078F DIAST BP <80 MM HG: CPT | Mod: CPTII,S$GLB,, | Performed by: FAMILY MEDICINE

## 2025-01-14 PROCEDURE — 1101F PT FALLS ASSESS-DOCD LE1/YR: CPT | Mod: CPTII,S$GLB,, | Performed by: FAMILY MEDICINE

## 2025-01-14 PROCEDURE — 99999 PR PBB SHADOW E&M-EST. PATIENT-LVL V: CPT | Mod: PBBFAC,,, | Performed by: FAMILY MEDICINE

## 2025-01-14 PROCEDURE — 1159F MED LIST DOCD IN RCRD: CPT | Mod: CPTII,S$GLB,, | Performed by: FAMILY MEDICINE

## 2025-01-14 PROCEDURE — 99214 OFFICE O/P EST MOD 30 MIN: CPT | Mod: S$GLB,,, | Performed by: FAMILY MEDICINE

## 2025-01-14 PROCEDURE — G2211 COMPLEX E/M VISIT ADD ON: HCPCS | Mod: S$GLB,,, | Performed by: FAMILY MEDICINE

## 2025-01-14 PROCEDURE — 3008F BODY MASS INDEX DOCD: CPT | Mod: CPTII,S$GLB,, | Performed by: FAMILY MEDICINE

## 2025-01-14 PROCEDURE — 1160F RVW MEDS BY RX/DR IN RCRD: CPT | Mod: CPTII,S$GLB,, | Performed by: FAMILY MEDICINE

## 2025-01-14 PROCEDURE — 3288F FALL RISK ASSESSMENT DOCD: CPT | Mod: CPTII,S$GLB,, | Performed by: FAMILY MEDICINE

## 2025-01-14 PROCEDURE — 3074F SYST BP LT 130 MM HG: CPT | Mod: CPTII,S$GLB,, | Performed by: FAMILY MEDICINE

## 2025-01-14 PROCEDURE — 1125F AMNT PAIN NOTED PAIN PRSNT: CPT | Mod: CPTII,S$GLB,, | Performed by: FAMILY MEDICINE

## 2025-01-14 RX ORDER — FAMOTIDINE 20 MG/1
20 TABLET, FILM COATED ORAL 2 TIMES DAILY
Qty: 60 TABLET | Refills: 2 | Status: SHIPPED | OUTPATIENT
Start: 2025-01-14 | End: 2026-01-14

## 2025-01-14 RX ORDER — LEVOTHYROXINE SODIUM 50 UG/1
50 TABLET ORAL
Qty: 90 TABLET | Refills: 3 | Status: SHIPPED | OUTPATIENT
Start: 2025-01-14

## 2025-01-14 RX ORDER — FAMOTIDINE 40 MG/1
40 TABLET, FILM COATED ORAL DAILY
COMMUNITY

## 2025-01-14 NOTE — TELEPHONE ENCOUNTER
Refill Decision Note   Chris Hill  is requesting a refill authorization.  Brief Assessment and Rationale for Refill:  Approve     Medication Therapy Plan:        Comments:     Note composed:7:22 PM 01/13/2025

## 2025-01-14 NOTE — TELEPHONE ENCOUNTER
No care due was identified.  Health [facility] Embedded Care Due Messages. Reference number: [id number].   1/14/2025 8:02:06 AM CST

## 2025-01-15 ENCOUNTER — TELEPHONE (OUTPATIENT)
Dept: UROLOGY | Facility: CLINIC | Age: 75
End: 2025-01-15
Payer: MEDICARE

## 2025-01-15 NOTE — TELEPHONE ENCOUNTER
----- Message from Jillian sent at 1/15/2025  2:32 PM CST -----  Contact: self  Type:  Sooner Appointment Request    Caller is requesting a sooner appointment.  Caller declined first available appointment listed below.  Caller will not accept being placed on the waitlist and is requesting a message be sent to doctor.    Name of Caller:  pt  When is the first available appointment?  N/a  Symptoms:  personal  Would the patient rather a call back or a response via [facility]? call  Best Call Back Number:  [phone]   Additional Information:  please call pt wants to be seen asap

## 2025-01-15 NOTE — TELEPHONE ENCOUNTER
Spoke w pt call for request of appt for issues with penis   Per pt voiced that it is dealing with a rash   Pt was offered with NP visit for penile rash eval   Offered 1st avaiable 2/6   Pt declined

## 2025-01-16 NOTE — PROGRESS NOTES
Subjective:       Patient ID: Chris Hill Jr. is a 74 y.o. male.    Chief Complaint: Follow-up (1 month)    Follow-up of hypertension blood pressure under good control.  Gastroesophageal reflux disease worsened by the Ozempic.  Given Pepcid recently by nurse practitioner GI doctor.  Diabetes mellitus type 2 with retinopathy.  Diarrhea that he had from metformin is gone without the metformin now replaced with constipation.  Avenue use MiraLax.  Also has a history of esophageal stricture having an EGD on February 3rd.  The MiraLax is helping.  BMI of 36.5 only down 3 lb.  But he is going to have to go off of the Ozempic anyway to have upper endoscopy.  And to have injection for his hip.  Needs hip replacement.  Lumbar back surgery needed also.  History of V-tach no current arrhythmia problems.    Physical examination.  Vital signs noted.  Blood pressure by me 131/63 no acute distress.  Neck without bruit.  Chest clear.  Heart regular rate rhythm.  Abdomen bowel sounds are positive soft nontender no guarding or rebound.  Extremities without edema positive pedal pulses.        Objective:        Assessment:       1. Hypertension, essential    2. Gastroesophageal reflux disease, unspecified whether esophagitis present    3. Type 2 diabetes mellitus with retinopathy of both eyes, without long-term current use of insulin, macular edema presence unspecified, unspecified retinopathy severity    4. Constipation, unspecified constipation type    5. Severe obesity (BMI 35.0-39.9) with comorbidity    6. Prostate cancer    7. NSVT (nonsustained ventricular tachycardia)    8. BMI 36.0-36.9,adult        Plan:       Hypertension, essential  -     CBC Auto Differential; Future; Expected date: 03/17/2025  -     Comprehensive Metabolic Panel; Future; Expected date: 03/17/2025  -     Hemoglobin A1C; Future; Expected date: 03/17/2025  -     Microalbumin/creatinine urine ratio; Future; Expected date: 03/17/2025    Gastroesophageal  reflux disease, unspecified whether esophagitis present    Type 2 diabetes mellitus with retinopathy of both eyes, without long-term current use of insulin, macular edema presence unspecified, unspecified retinopathy severity  -     CBC Auto Differential; Future; Expected date: 03/17/2025  -     Comprehensive Metabolic Panel; Future; Expected date: 03/17/2025  -     Hemoglobin A1C; Future; Expected date: 03/17/2025  -     Microalbumin/creatinine urine ratio; Future; Expected date: 03/17/2025    Constipation, unspecified constipation type    Severe obesity (BMI 35.0-39.9) with comorbidity    Prostate cancer    NSVT (nonsustained ventricular tachycardia)    BMI 36.0-36.9,adult    Other orders  -     famotidine (PEPCID) 20 MG tablet; Take 1 tablet (20 mg total) by mouth 2 (two) times daily.  Dispense: 60 tablet; Refill: 2    Use the Pepcid b.i.d..  Ozempic 0.25 he will need to stopped this to have his procedures done.  After that he can either continue the 0.25 use up what he has or he can try Mounjaro 2.5 after he is finished with his surgery and see if this does better for him without causing all of the reflux issues.  Diabetes is under reasonable control so we can leave the metformin off for now also.  Regroup after the surgery.

## 2025-01-27 ENCOUNTER — PATIENT MESSAGE (OUTPATIENT)
Dept: FAMILY MEDICINE | Facility: CLINIC | Age: 75
End: 2025-01-27
Payer: MEDICARE

## 2025-01-27 ENCOUNTER — LAB VISIT (OUTPATIENT)
Dept: LAB | Facility: HOSPITAL | Age: 75
End: 2025-01-27
Attending: UROLOGY
Payer: MEDICARE

## 2025-01-27 DIAGNOSIS — C61 PROSTATE CANCER: ICD-10-CM

## 2025-01-27 LAB — COMPLEXED PSA SERPL-MCNC: 0.15 NG/ML (ref 0–4)

## 2025-01-27 PROCEDURE — 36415 COLL VENOUS BLD VENIPUNCTURE: CPT | Performed by: UROLOGY

## 2025-01-27 PROCEDURE — 84153 ASSAY OF PSA TOTAL: CPT | Performed by: UROLOGY

## 2025-01-30 DIAGNOSIS — Z00.00 ENCOUNTER FOR MEDICARE ANNUAL WELLNESS EXAM: ICD-10-CM

## 2025-02-17 ENCOUNTER — PATIENT MESSAGE (OUTPATIENT)
Dept: FAMILY MEDICINE | Facility: CLINIC | Age: 75
End: 2025-02-17
Payer: MEDICARE

## 2025-02-17 DIAGNOSIS — I25.10 CORONARY ARTERY DISEASE, UNSPECIFIED VESSEL OR LESION TYPE, UNSPECIFIED WHETHER ANGINA PRESENT, UNSPECIFIED WHETHER NATIVE OR TRANSPLANTED HEART: ICD-10-CM

## 2025-02-17 DIAGNOSIS — I10 HYPERTENSION, ESSENTIAL: ICD-10-CM

## 2025-02-17 RX ORDER — EPLERENONE 25 MG/1
25 TABLET ORAL DAILY
Qty: 30 TABLET | Refills: 3 | Status: SHIPPED | OUTPATIENT
Start: 2025-02-17

## 2025-02-17 NOTE — TELEPHONE ENCOUNTER
No care due was identified.  [facility] Embedded Care Due Messages. Reference number: [id number].   2/17/2025 3:05:50 PM CST

## 2025-02-22 RX ORDER — CARVEDILOL 25 MG/1
25 TABLET ORAL 2 TIMES DAILY WITH MEALS
Qty: 180 TABLET | Refills: 3 | Status: SHIPPED | OUTPATIENT
Start: 2025-02-22

## 2025-02-22 NOTE — TELEPHONE ENCOUNTER
Refill Routing Note   Medication(s) are not appropriate for processing by [facility] for the following reason(s):        Drug-disease interaction    [facility] action(s):  Defer        Medication Therapy Plan: Drug-Disease: carvediloL and Orthostasis      Appointments  past 12m or future 3m with PCP    Date Provider   Last Visit   1/14/2025 Samy Pettit III, MD   Next Visit   4/15/2025 Samy Pettit III, MD   ED visits in past 90 days: 0        Note composed:10:48 PM 02/21/2025

## 2025-02-22 NOTE — TELEPHONE ENCOUNTER
No care due was identified.  [facility] Embedded Care Due Messages. Reference number: [id number].   2/21/2025 8:13:25 PM CST

## 2025-03-06 ENCOUNTER — RESULTS FOLLOW-UP (OUTPATIENT)
Dept: UROLOGY | Facility: CLINIC | Age: 75
End: 2025-03-06

## 2025-03-06 DIAGNOSIS — C61 PROSTATE CA: Primary | ICD-10-CM

## 2025-03-12 ENCOUNTER — PATIENT MESSAGE (OUTPATIENT)
Dept: FAMILY MEDICINE | Facility: CLINIC | Age: 75
End: 2025-03-12
Payer: MEDICARE

## 2025-03-12 NOTE — TELEPHONE ENCOUNTER
Care Due:                  Date            Visit Type   Department     Provider  --------------------------------------------------------------------------------                                [facility]                              [facility]  Last Visit: 01-      CARE ([facility])   [facility]  [place]                              EP -                              PRIMARY      [facility]  Next Visit: 04-      [facility] ([facility])   [facility]  [place]                                                            Last  Test          Frequency    Reason                     Performed    Due Date  --------------------------------------------------------------------------------    HBA1C.......  6 months...  metFORMIN, semaglutide...  12- 06-    Health [facility] Embedded Care Due Messages. Reference number: [id number].   3/12/2025 4:20:18 PM CDT

## 2025-03-17 NOTE — TELEPHONE ENCOUNTER
No care due was identified.  Health [facility] Embedded Care Due Messages. Reference number: [id number].   3/17/2025 12:09:31 PM CDT

## 2025-03-17 NOTE — TELEPHONE ENCOUNTER
Refill Decision Note   Chris Hill  is requesting a refill authorization.  Brief Assessment and Rationale for Refill:  Quick Discontinue     Medication Therapy Plan:  Receipt confirmed by pharmacy (3/12/2025  9:51 PM CDT)      Comments:     Note composed:12:11 PM 03/17/2025

## 2025-03-18 ENCOUNTER — PATIENT MESSAGE (OUTPATIENT)
Dept: FAMILY MEDICINE | Facility: CLINIC | Age: 75
End: 2025-03-18
Payer: MEDICARE

## 2025-03-20 DIAGNOSIS — T46.6X5A MYALGIA DUE TO STATIN: ICD-10-CM

## 2025-03-20 DIAGNOSIS — M79.10 MYALGIA DUE TO STATIN: ICD-10-CM

## 2025-03-20 DIAGNOSIS — I25.10 CORONARY ARTERY DISEASE, UNSPECIFIED VESSEL OR LESION TYPE, UNSPECIFIED WHETHER ANGINA PRESENT, UNSPECIFIED WHETHER NATIVE OR TRANSPLANTED HEART: ICD-10-CM

## 2025-03-20 DIAGNOSIS — E78.5 HYPERLIPIDEMIA, UNSPECIFIED HYPERLIPIDEMIA TYPE: ICD-10-CM

## 2025-03-20 RX ORDER — EVOLOCUMAB 140 MG/ML
140 INJECTION, SOLUTION SUBCUTANEOUS
Qty: 2 EACH | Refills: 5 | Status: ACTIVE | OUTPATIENT
Start: 2025-03-20

## 2025-03-20 NOTE — TELEPHONE ENCOUNTER
No care due was identified.  [facility] Embedded Care Due Messages. Reference number: [id number].   3/20/2025 1:44:06 PM CDT

## 2025-03-27 NOTE — PATIENT INSTRUCTIONS
Discussed conservative measures to control urgency and frequency including   1. avoiding bladder irritants (see below)   Discussed bladder irritants include coffe (even decaf), tea, alcohol, soda, spicy foods, acidic juices (orange, tomato), vinegar, and artificial sweeteners/sugary beverages.    2. timed voiding - empty on a schedule (approx ~2 hours) in spite of need to get ahead of urge    3. dont postponing voiding - dont hold it on purpose     4. bowel regimen as distended bowel has extrinsic compressive effect on bladder.   - any or all of the following in any combination, titrate to soft daily bowel movement without pushing or straining  - colace/stool softener capsule - once to twice daily  - miralax - 1 capful daily to start, can increase to 2x daily (or decrease to 1/2 cap daily)  - increase dietary fibery  - fiber supplements, such as metamucil           "Demian Delacruz MD  03/27/25 [id number]

## 2025-03-31 ENCOUNTER — PATIENT MESSAGE (OUTPATIENT)
Dept: FAMILY MEDICINE | Facility: CLINIC | Age: 75
End: 2025-03-31
Payer: MEDICARE

## 2025-04-10 ENCOUNTER — LAB VISIT (OUTPATIENT)
Dept: LAB | Facility: HOSPITAL | Age: 75
End: 2025-04-10
Attending: FAMILY MEDICINE
Payer: MEDICARE

## 2025-04-10 DIAGNOSIS — D64.9 ANEMIA, UNSPECIFIED TYPE: ICD-10-CM

## 2025-04-10 DIAGNOSIS — I10 HYPERTENSION, ESSENTIAL: ICD-10-CM

## 2025-04-10 DIAGNOSIS — E53.8 VITAMIN B12 DEFICIENCY: ICD-10-CM

## 2025-04-10 DIAGNOSIS — E11.319 TYPE 2 DIABETES MELLITUS WITH RETINOPATHY OF BOTH EYES, WITHOUT LONG-TERM CURRENT USE OF INSULIN, MACULAR EDEMA PRESENCE UNSPECIFIED, UNSPECIFIED RETINOPATHY SEVERITY: ICD-10-CM

## 2025-04-10 DIAGNOSIS — Z85.46 HISTORY OF PROSTATE CANCER: ICD-10-CM

## 2025-04-10 DIAGNOSIS — D50.9 IRON DEFICIENCY ANEMIA, UNSPECIFIED IRON DEFICIENCY ANEMIA TYPE: ICD-10-CM

## 2025-04-10 LAB
ABSOLUTE EOSINOPHIL (SMH): 0.35 K/UL
ABSOLUTE MONOCYTE (SMH): 0.69 K/UL (ref 0.3–1)
ABSOLUTE NEUTROPHIL COUNT (SMH): 4.9 K/UL (ref 1.8–7.7)
ALBUMIN SERPL-MCNC: 3.7 G/DL (ref 3.5–5.2)
ALBUMIN/CREAT UR: 5 UG/MG
ALP SERPL-CCNC: 64 UNIT/L (ref 55–135)
ALT SERPL-CCNC: 13 UNIT/L (ref 10–44)
ANION GAP (SMH): 11 MMOL/L (ref 8–16)
AST SERPL-CCNC: 17 UNIT/L (ref 10–40)
BASOPHILS # BLD AUTO: 0.04 K/UL
BASOPHILS NFR BLD AUTO: 0.5 %
BILIRUB SERPL-MCNC: 0.4 MG/DL (ref 0.1–1)
BUN SERPL-MCNC: 16 MG/DL (ref 8–23)
CALCIUM SERPL-MCNC: 8.6 MG/DL (ref 8.7–10.5)
CHLORIDE SERPL-SCNC: 106 MMOL/L (ref 95–110)
CO2 SERPL-SCNC: 24 MMOL/L (ref 23–29)
CREAT SERPL-MCNC: 1.2 MG/DL (ref 0.5–1.4)
CREAT UR-MCNC: 277.6 MG/DL (ref 23–375)
EAG (SMH): 123 MG/DL (ref 68–131)
ERYTHROCYTE [DISTWIDTH] IN BLOOD BY AUTOMATED COUNT: 14.8 % (ref 11.5–14.5)
FERRITIN SERPL-MCNC: 202.6 NG/ML (ref 20–300)
FOLATE SERPL-MCNC: 12.5 NG/ML (ref 4–24)
GFR SERPLBLD CREATININE-BSD FMLA CKD-EPI: >60 ML/MIN/1.73/M2
GLUCOSE SERPL-MCNC: 121 MG/DL (ref 70–110)
HBA1C MFR BLD: 5.9 % (ref 4.5–6.2)
HCT VFR BLD AUTO: 40.4 % (ref 40–54)
HGB BLD-MCNC: 12.4 GM/DL (ref 14–18)
IMM GRANULOCYTES # BLD AUTO: 0.08 K/UL (ref 0–0.04)
IMM GRANULOCYTES NFR BLD AUTO: 1.1 % (ref 0–0.5)
IRON SATN MFR SERPL: 29 % (ref 20–50)
IRON SERPL-MCNC: 72 UG/DL (ref 45–160)
LYMPHOCYTES # BLD AUTO: 1.29 K/UL (ref 1–4.8)
MCH RBC QN AUTO: 27.9 PG (ref 27–31)
MCHC RBC AUTO-ENTMCNC: 30.7 G/DL (ref 32–36)
MCV RBC AUTO: 91 FL (ref 82–98)
MICROALBUMIN UR-MCNC: 13.9 UG/ML
NUCLEATED RBC (/100WBC) (SMH): 0 /100 WBC
PLATELET # BLD AUTO: 160 K/UL (ref 150–450)
PMV BLD AUTO: 12.6 FL (ref 9.2–12.9)
POTASSIUM SERPL-SCNC: 3.8 MMOL/L (ref 3.5–5.1)
PROT SERPL-MCNC: 6.1 GM/DL (ref 6–8.4)
RBC # BLD AUTO: 4.45 M/UL (ref 4.6–6.2)
RELATIVE EOSINOPHIL (SMH): 4.8 % (ref 0–8)
RELATIVE LYMPHOCYTE (SMH): 17.6 % (ref 18–48)
RELATIVE MONOCYTE (SMH): 9.4 % (ref 4–15)
RELATIVE NEUTROPHIL (SMH): 66.6 % (ref 38–73)
SODIUM SERPL-SCNC: 141 MMOL/L (ref 136–145)
TIBC SERPL-MCNC: 246 UG/DL (ref 250–450)
TRANSFERRIN SERPL-MCNC: 176 MG/DL (ref 200–375)
VIT B12 SERPL-MCNC: >1500 PG/ML (ref 210–950)
WBC # BLD AUTO: 7.33 K/UL (ref 3.9–12.7)

## 2025-04-10 PROCEDURE — 82746 ASSAY OF FOLIC ACID SERUM: CPT

## 2025-04-10 PROCEDURE — 83036 HEMOGLOBIN GLYCOSYLATED A1C: CPT

## 2025-04-10 PROCEDURE — 84155 ASSAY OF PROTEIN SERUM: CPT

## 2025-04-10 PROCEDURE — 82043 UR ALBUMIN QUANTITATIVE: CPT

## 2025-04-10 PROCEDURE — 85025 COMPLETE CBC W/AUTO DIFF WBC: CPT

## 2025-04-10 PROCEDURE — 84466 ASSAY OF TRANSFERRIN: CPT

## 2025-04-10 PROCEDURE — 82607 VITAMIN B-12: CPT

## 2025-04-10 PROCEDURE — 36415 COLL VENOUS BLD VENIPUNCTURE: CPT | Mod: PO

## 2025-04-10 PROCEDURE — 82728 ASSAY OF FERRITIN: CPT

## 2025-04-15 ENCOUNTER — OFFICE VISIT (OUTPATIENT)
Dept: FAMILY MEDICINE | Facility: CLINIC | Age: 75
End: 2025-04-15
Payer: MEDICARE

## 2025-04-15 VITALS
BODY MASS INDEX: 35.95 KG/M2 | HEIGHT: 66 IN | DIASTOLIC BLOOD PRESSURE: 64 MMHG | WEIGHT: 223.69 LBS | SYSTOLIC BLOOD PRESSURE: 118 MMHG | HEART RATE: 56 BPM | TEMPERATURE: 97 F | OXYGEN SATURATION: 95 %

## 2025-04-15 DIAGNOSIS — H91.90 HEARING LOSS, UNSPECIFIED HEARING LOSS TYPE, UNSPECIFIED LATERALITY: ICD-10-CM

## 2025-04-15 DIAGNOSIS — D64.9 ANEMIA, UNSPECIFIED TYPE: ICD-10-CM

## 2025-04-15 DIAGNOSIS — J40 BRONCHITIS: ICD-10-CM

## 2025-04-15 DIAGNOSIS — Z97.4 HEARING AID WORN: ICD-10-CM

## 2025-04-15 DIAGNOSIS — G47.33 OSA ON CPAP: ICD-10-CM

## 2025-04-15 DIAGNOSIS — E11.319 TYPE 2 DIABETES MELLITUS WITH RETINOPATHY OF BOTH EYES, WITHOUT LONG-TERM CURRENT USE OF INSULIN, MACULAR EDEMA PRESENCE UNSPECIFIED, UNSPECIFIED RETINOPATHY SEVERITY: ICD-10-CM

## 2025-04-15 DIAGNOSIS — Z85.46 HISTORY OF PROSTATE CANCER: ICD-10-CM

## 2025-04-15 DIAGNOSIS — Z79.82 ASPIRIN LONG-TERM USE: ICD-10-CM

## 2025-04-15 DIAGNOSIS — M16.9 OSTEOARTHRITIS OF HIP, UNSPECIFIED LATERALITY, UNSPECIFIED OSTEOARTHRITIS TYPE: ICD-10-CM

## 2025-04-15 DIAGNOSIS — E03.9 HYPOTHYROIDISM, UNSPECIFIED TYPE: ICD-10-CM

## 2025-04-15 DIAGNOSIS — E78.5 HYPERLIPIDEMIA, UNSPECIFIED HYPERLIPIDEMIA TYPE: ICD-10-CM

## 2025-04-15 DIAGNOSIS — I25.10 CORONARY ARTERY DISEASE, UNSPECIFIED VESSEL OR LESION TYPE, UNSPECIFIED WHETHER ANGINA PRESENT, UNSPECIFIED WHETHER NATIVE OR TRANSPLANTED HEART: ICD-10-CM

## 2025-04-15 DIAGNOSIS — I10 HYPERTENSION, ESSENTIAL: Primary | ICD-10-CM

## 2025-04-15 PROCEDURE — 3066F NEPHROPATHY DOC TX: CPT | Mod: CPTII,S$GLB,, | Performed by: FAMILY MEDICINE

## 2025-04-15 PROCEDURE — 3061F NEG MICROALBUMINURIA REV: CPT | Mod: CPTII,S$GLB,, | Performed by: FAMILY MEDICINE

## 2025-04-15 PROCEDURE — 1159F MED LIST DOCD IN RCRD: CPT | Mod: CPTII,S$GLB,, | Performed by: FAMILY MEDICINE

## 2025-04-15 PROCEDURE — 99999 PR PBB SHADOW E&M-EST. PATIENT-LVL V: CPT | Mod: PBBFAC,,, | Performed by: FAMILY MEDICINE

## 2025-04-15 PROCEDURE — 3044F HG A1C LEVEL LT 7.0%: CPT | Mod: CPTII,S$GLB,, | Performed by: FAMILY MEDICINE

## 2025-04-15 PROCEDURE — 3008F BODY MASS INDEX DOCD: CPT | Mod: CPTII,S$GLB,, | Performed by: FAMILY MEDICINE

## 2025-04-15 PROCEDURE — 3288F FALL RISK ASSESSMENT DOCD: CPT | Mod: CPTII,S$GLB,, | Performed by: FAMILY MEDICINE

## 2025-04-15 PROCEDURE — G2211 COMPLEX E/M VISIT ADD ON: HCPCS | Mod: S$GLB,,, | Performed by: FAMILY MEDICINE

## 2025-04-15 PROCEDURE — 3074F SYST BP LT 130 MM HG: CPT | Mod: CPTII,S$GLB,, | Performed by: FAMILY MEDICINE

## 2025-04-15 PROCEDURE — 1125F AMNT PAIN NOTED PAIN PRSNT: CPT | Mod: CPTII,S$GLB,, | Performed by: FAMILY MEDICINE

## 2025-04-15 PROCEDURE — 3078F DIAST BP <80 MM HG: CPT | Mod: CPTII,S$GLB,, | Performed by: FAMILY MEDICINE

## 2025-04-15 PROCEDURE — 4010F ACE/ARB THERAPY RXD/TAKEN: CPT | Mod: CPTII,S$GLB,, | Performed by: FAMILY MEDICINE

## 2025-04-15 PROCEDURE — 1101F PT FALLS ASSESS-DOCD LE1/YR: CPT | Mod: CPTII,S$GLB,, | Performed by: FAMILY MEDICINE

## 2025-04-15 PROCEDURE — 99214 OFFICE O/P EST MOD 30 MIN: CPT | Mod: S$GLB,,, | Performed by: FAMILY MEDICINE

## 2025-04-15 PROCEDURE — 1160F RVW MEDS BY RX/DR IN RCRD: CPT | Mod: CPTII,S$GLB,, | Performed by: FAMILY MEDICINE

## 2025-04-15 RX ORDER — TIRZEPATIDE 2.5 MG/.5ML
2.5 INJECTION, SOLUTION SUBCUTANEOUS
Qty: 4 PEN | Refills: 0 | Status: SHIPPED | OUTPATIENT
Start: 2025-04-15

## 2025-04-15 RX ORDER — SPIRONOLACTONE 25 MG/1
25 TABLET ORAL DAILY
Qty: 90 TABLET | Refills: 1 | Status: SHIPPED | OUTPATIENT
Start: 2025-04-15

## 2025-04-15 RX ORDER — DOXYCYCLINE 100 MG/1
100 CAPSULE ORAL 2 TIMES DAILY
Qty: 20 CAPSULE | Refills: 0 | Status: SHIPPED | OUTPATIENT
Start: 2025-04-15

## 2025-04-15 NOTE — PROGRESS NOTES
SCRIBE #1 NOTE: I, Gokul John, am scribing for, and in the presence of,  Samy Pettit III, MD. I have scribed the entire note.     Subjective:       Patient ID: Chris Hill Jr. is a 74 y.o. male.    Chief Complaint: Follow-up (3 month )    Mr. Hill is here for a follow up with his last appointment being here on January 14, 2025. Hearing loss and wears hearing aids. He has been sick for 2 weeks. He I congested with some green and yellow sputum. He has ear pain mostly in the left. He is sweating but unsure of a fever. Reports he vomited 3-4x 2 days ago. He had double laminectomy surgery with Dr. Cervantes. States this did help some. He was told he had scoliosis but no surgery can be done. DJD hip. Reports he might need a hip replacement. History of prostate cancer. BMI of 36.10, down slightly. Cardiovascular good. No chest pain. No palpitations. Blood pressure is 118/64. Hypertension controlled. On Inspra from cardiologist Dr. Jama. Hyperlipidemia. Type 2 diabetes mellitus with retinopathy. Not on anything for this. He was on Ozempic 0.25mg. A1C is 5.9. Blood glucose is 121. CAD. Using aspirin. Hypothyroidism. Anemia. Hemoglobin is 12.4. Obstructive sleep apnea. Using CPAP regularly, compliant, benefiting from this. Folate and B12 are okay. Eye exam is due. Diabetic foot exam is being done today.     Review of Systems   Constitutional:  Positive for diaphoresis.   HENT:  Positive for congestion and ear pain. Negative for sore throat.    Eyes:  Negative for pain and visual disturbance.   Respiratory:  Negative for chest tightness and shortness of breath.    Cardiovascular:  Negative for chest pain.   Gastrointestinal:  Positive for vomiting. Negative for nausea.   Endocrine: Negative for polydipsia and polyuria.   Genitourinary:  Negative for dysuria and flank pain.   Musculoskeletal:  Negative for back pain, neck pain and neck stiffness.   Skin:  Negative for rash.   Allergic/Immunologic: Negative for  immunocompromised state.   Neurological:  Negative for dizziness and weakness.   Hematological:  Does not bruise/bleed easily.   Psychiatric/Behavioral:  Negative for agitation and behavioral problems.    All other systems reviewed and are negative.      Objective:      Physical examination: Vital signs noted. No acute distress. No carotid bruit. Regular heart rate and rhythm. Lungs clear to auscultation bilaterally. Abdomen bowel sounds positive soft and nontender. Extremities without edema. 2+ pedal pulses. No calluses, breaks in the skin, crackling, or dryness. 5/5 light touch to each foot. Vibratory senses intact. Posterior tibial 2+ bilaterally.      Assessment:       1. Hypertension, essential    2. Hearing loss, unspecified hearing loss type, unspecified laterality    3. Hearing aid worn    4. Hyperlipidemia, unspecified hyperlipidemia type    5. Aspirin long-term use    6. LORA on CPAP    7. BMI 36.0-36.9,adult    8. Bronchitis    9. Coronary artery disease, unspecified vessel or lesion type, unspecified whether angina present, unspecified whether native or transplanted heart    10. Type 2 diabetes mellitus with retinopathy of both eyes, without long-term current use of insulin, macular edema presence unspecified, unspecified retinopathy severity    11. Hypothyroidism, unspecified type    12. History of prostate cancer    13. Osteoarthritis of hip, unspecified laterality, unspecified osteoarthritis type    14. Anemia, unspecified type        Plan:       Hypertension, essential    Hearing loss, unspecified hearing loss type, unspecified laterality    Hearing aid worn    Hyperlipidemia, unspecified hyperlipidemia type    Aspirin long-term use    LORA on CPAP    BMI 36.0-36.9,adult    Bronchitis    Coronary artery disease, unspecified vessel or lesion type, unspecified whether angina present, unspecified whether native or transplanted heart    Type 2 diabetes mellitus with retinopathy of both eyes, without  long-term current use of insulin, macular edema presence unspecified, unspecified retinopathy severity    Hypothyroidism, unspecified type    History of prostate cancer    Osteoarthritis of hip, unspecified laterality, unspecified osteoarthritis type    Anemia, unspecified type    Other orders  -     spironolactone (ALDACTONE) 25 MG tablet; Take 1 tablet (25 mg total) by mouth once daily.  Dispense: 90 tablet; Refill: 1  -     tirzepatide (MOUNJARO) 2.5 mg/0.5 mL PnIj; Inject 2.5 mg into the skin every 7 days.  Dispense: 4 Pen; Refill: 0  -     doxycycline (VIBRAMYCIN) 100 MG Cap; Take 1 capsule (100 mg total) by mouth 2 (two) times a day.  Dispense: 20 capsule; Refill: 0    Needs to go for eye exam.  Mounjaro 2.5 weekly 4 pens.  Follow-up with nurse practitioner in 4 weeks.  Discontinue his Inspra due to cost.  Change to Aldactone 25 mg daily 90 with a refill.  Vibramycin 100 mg b.i.d. for 10 days for his bronchitis symptoms.  Hypertension is controlled.  Continue his CPAP.  No coronary symptoms currently.  Prostate cancer follow-up current.  He is status post lumbar laminectomy in he has improved from this.  All care gaps addressed or discussed.     I, Samy Pettit III, MD, personally performed the services described in this documentation. All medical record entries made by the scribe were at my direction and in my presence. I have reviewed the chart and agree that the record reflects my personal performance and is accurate and complete.

## 2025-04-17 ENCOUNTER — TELEPHONE (OUTPATIENT)
Dept: UROLOGY | Facility: CLINIC | Age: 75
End: 2025-04-17
Payer: MEDICARE

## 2025-04-22 ENCOUNTER — OFFICE VISIT (OUTPATIENT)
Facility: CLINIC | Age: 75
End: 2025-04-22
Payer: MEDICARE

## 2025-04-22 ENCOUNTER — TELEPHONE (OUTPATIENT)
Dept: UROLOGY | Facility: CLINIC | Age: 75
End: 2025-04-22
Payer: MEDICARE

## 2025-04-22 VITALS
HEART RATE: 63 BPM | HEIGHT: 66 IN | DIASTOLIC BLOOD PRESSURE: 76 MMHG | OXYGEN SATURATION: 98 % | RESPIRATION RATE: 16 BRPM | BODY MASS INDEX: 36 KG/M2 | SYSTOLIC BLOOD PRESSURE: 137 MMHG | TEMPERATURE: 98 F | WEIGHT: 224 LBS

## 2025-04-22 DIAGNOSIS — D64.9 ANEMIA, UNSPECIFIED TYPE: ICD-10-CM

## 2025-04-22 DIAGNOSIS — D50.9 IRON DEFICIENCY ANEMIA, UNSPECIFIED IRON DEFICIENCY ANEMIA TYPE: Primary | ICD-10-CM

## 2025-04-22 DIAGNOSIS — E53.8 VITAMIN B12 DEFICIENCY: ICD-10-CM

## 2025-04-22 DIAGNOSIS — Z85.46 HISTORY OF PROSTATE CANCER: Chronic | ICD-10-CM

## 2025-04-22 PROCEDURE — 3066F NEPHROPATHY DOC TX: CPT | Mod: CPTII,S$GLB,, | Performed by: INTERNAL MEDICINE

## 2025-04-22 PROCEDURE — 1125F AMNT PAIN NOTED PAIN PRSNT: CPT | Mod: CPTII,S$GLB,, | Performed by: INTERNAL MEDICINE

## 2025-04-22 PROCEDURE — 3008F BODY MASS INDEX DOCD: CPT | Mod: CPTII,S$GLB,, | Performed by: INTERNAL MEDICINE

## 2025-04-22 PROCEDURE — 4010F ACE/ARB THERAPY RXD/TAKEN: CPT | Mod: CPTII,S$GLB,, | Performed by: INTERNAL MEDICINE

## 2025-04-22 PROCEDURE — 1101F PT FALLS ASSESS-DOCD LE1/YR: CPT | Mod: CPTII,S$GLB,, | Performed by: INTERNAL MEDICINE

## 2025-04-22 PROCEDURE — 3078F DIAST BP <80 MM HG: CPT | Mod: CPTII,S$GLB,, | Performed by: INTERNAL MEDICINE

## 2025-04-22 PROCEDURE — 3061F NEG MICROALBUMINURIA REV: CPT | Mod: CPTII,S$GLB,, | Performed by: INTERNAL MEDICINE

## 2025-04-22 PROCEDURE — 3044F HG A1C LEVEL LT 7.0%: CPT | Mod: CPTII,S$GLB,, | Performed by: INTERNAL MEDICINE

## 2025-04-22 PROCEDURE — G2211 COMPLEX E/M VISIT ADD ON: HCPCS | Mod: S$GLB,,, | Performed by: INTERNAL MEDICINE

## 2025-04-22 PROCEDURE — 1160F RVW MEDS BY RX/DR IN RCRD: CPT | Mod: CPTII,S$GLB,, | Performed by: INTERNAL MEDICINE

## 2025-04-22 PROCEDURE — 3075F SYST BP GE 130 - 139MM HG: CPT | Mod: CPTII,S$GLB,, | Performed by: INTERNAL MEDICINE

## 2025-04-22 PROCEDURE — 1159F MED LIST DOCD IN RCRD: CPT | Mod: CPTII,S$GLB,, | Performed by: INTERNAL MEDICINE

## 2025-04-22 PROCEDURE — 3288F FALL RISK ASSESSMENT DOCD: CPT | Mod: CPTII,S$GLB,, | Performed by: INTERNAL MEDICINE

## 2025-04-22 PROCEDURE — 99999 PR PBB SHADOW E&M-EST. PATIENT-LVL V: CPT | Mod: PBBFAC,,, | Performed by: INTERNAL MEDICINE

## 2025-04-22 PROCEDURE — 99214 OFFICE O/P EST MOD 30 MIN: CPT | Mod: S$GLB,,, | Performed by: INTERNAL MEDICINE

## 2025-04-22 NOTE — PROGRESS NOTES
[facility] Hematology/Oncology  PROGRESS NOTE -  Follow-up Visit      Subjective:       Patient ID:   NAME: Chris Hill Jr. : 1950     74 y.o. male    Referring Doc: Tyrell  Other Physicians: Manpreet Gordon; Wiley; Jacob Alfaro        Chief Complaint:  prostate cancer; anem/tcp f/u         History of Present Illness:     Patient is being seen today in person..The patient is on today to go over the results of the recently ordered labs, tests and studies.  He is here with his wife.     He had back surgery about 2 months ago with Dr Sandra and only has about a 50% improvement; he is seeing  Dr Barone later today about his left hip issues; he had injection about a month ago    He is followed by Dr Alfaro with GI and had EGD since last visit and is on a new acid reflux medication which seems to be helping;      He is continued on oral iron and B12    He saw Dr Pettit last week;     He last saw Dr Gordon with  in 2024 and sees them again in 2025    He denies any CP, SOB, HA's or N/V.                   ROS:   GEN: normal without any fever, night sweats or weight loss; no new issues; about 50% improvement in back; left hip issues  HEENT: normal with no HA's, sore throat, stiff neck, changes in vision  CV: normal with no CP, SOB, PND, JANSEN or orthopnea  PULM: normal with no SOB, cough, hemoptysis, sputum or pleuritic pain  GI: extreme reflux issues  : normal with no hematuria, dysuria   SKIN: normal with no rash, erythema, bruising, or swelling    Allergies:  Review of patient's allergies indicates:  No Known Allergies    Medications:    Current Outpatient Medications:     amitriptyline (ELAVIL) 50 MG tablet, Take 1 tablet by mouth in the evening, Disp: 90 tablet, Rfl: 3    amLODIPine (NORVASC) 10 MG tablet, Take 1 tablet by mouth once daily, Disp: 90 tablet, Rfl: 3    ascorbic acid, vitamin C, (VITAMIN C) 1000 MG tablet, Take 1,000 mg by mouth 2 (two) times daily., Disp: , Rfl:     aspirin (ECOTRIN) 81  MG EC tablet, Take 81 mg by mouth every evening., Disp: , Rfl:     blood sugar diagnostic Strp, 3 strips by Misc.(Non-Drug; Combo Route) route 3 (three) times daily., Disp: 200 each, Rfl: 1    carvediloL (COREG) 25 MG tablet, TAKE 1 TABLET BY MOUTH TWICE DAILY WITH MEALS, Disp: 180 tablet, Rfl: 3    clonazePAM (KLONOPIN) 0.5 MG tablet, Take by mouth., Disp: , Rfl:     co-enzyme Q-10 30 mg capsule, Take 30 mg by mouth 3 (three) times daily., Disp: , Rfl:     cyanocobalamin (VITAMIN B-12) 100 MCG tablet, Take 1,000 mcg by mouth once daily., Disp: , Rfl:     cyclobenzaprine (FLEXERIL) 10 MG tablet, Take 10 mg by mouth as needed for Muscle spasms., Disp: , Rfl:     diazePAM (VALIUM) 5 MG tablet, Take 5 mg by mouth 4 (four) times daily as needed., Disp: , Rfl:     doxycycline (VIBRAMYCIN) 100 MG Cap, Take 1 capsule (100 mg total) by mouth 2 (two) times a day., Disp: 20 capsule, Rfl: 0    eplerenone (INSPRA) 25 MG Tab, Take 1 tablet (25 mg total) by mouth once daily., Disp: 30 tablet, Rfl: 3    esomeprazole (NEXIUM) 40 MG capsule, Take 40 mg by mouth 2 (two) times daily., Disp: , Rfl:     evolocumab (REPATHA SURECLICK) 140 mg/mL PnIj, Inject 1 mL (140 mg total) into the skin every 14 (fourteen) days., Disp: 2 each, Rfl: 5    fish oil-omega-3 fatty acids 300-1,000 mg capsule, Take 2 capsules by mouth every evening., Disp: , Rfl:     hydrALAZINE (APRESOLINE) 100 MG tablet, Take 1 tablet (100 mg total) by mouth 3 (three) times daily. (Patient taking differently: Take 100 mg by mouth 2 (two) times a day.), Disp: 270 tablet, Rfl: 3    iron-vitamin C 100-250 mg, ICAR-C, 100-250 mg Tab, Take 1 tablet by mouth once daily, Disp: 30 tablet, Rfl: 0    levothyroxine (SYNTHROID) 50 MCG tablet, Take 1 tablet (50 mcg total) by mouth before breakfast., Disp: 90 tablet, Rfl: 3    magnesium 250 mg Tab, Take 1 tablet by mouth every evening., Disp: , Rfl:     metFORMIN (GLUCOPHAGE-XR) 500 MG ER 24hr tablet, Take 1 tablet by mouth once daily  with breakfast, Disp: 90 tablet, Rfl: 1    olmesartan (BENICAR) 40 MG tablet, Take 1 tablet by mouth once daily, Disp: 90 tablet, Rfl: 3    omega-3 acid ethyl esters (LOVAZA) 1 gram capsule, Take 2 pills twice a day, Disp: 360 capsule, Rfl: 1    potassium chloride SA (K-DUR,KLOR-CON) 20 MEQ tablet, Take 1 tablet (20 mEq total) by mouth 2 (two) times daily., Disp: 180 tablet, Rfl: 3    sildenafiL (VIAGRA) 25 MG tablet, TAKE 1/2 TO 1 (ONE-HALF TO ONE) TABLET BY MOUTH ONCE DAILY AS NEEDED FOR ERECTILE DYSFUNCTION, Disp: 10 tablet, Rfl: 0    spironolactone (ALDACTONE) 25 MG tablet, Take 1 tablet (25 mg total) by mouth once daily., Disp: 90 tablet, Rfl: 1    terazosin (HYTRIN) 10 MG capsule, TAKE 1 CAPSULE BY MOUTH IN THE EVENING, Disp: 90 capsule, Rfl: 3    tirzepatide (MOUNJARO) 2.5 mg/0.5 mL PnIj, Inject 2.5 mg into the skin every 7 days., Disp: 4 Pen, Rfl: 0    cloNIDine (CATAPRES) 0.1 MG tablet, Take 1 tablet (0.1 mg total) by mouth every 8 (eight) hours as needed (SBP greater than 180). (Patient not taking: Reported on 4/15/2025), Disp: 30 tablet, Rfl: 0    famotidine (PEPCID) 20 MG tablet, Take 1 tablet (20 mg total) by mouth 2 (two) times daily. (Patient not taking: Reported on 4/22/2025), Disp: 60 tablet, Rfl: 2    famotidine (PEPCID) 40 MG tablet, Take 40 mg by mouth once daily. (Patient not taking: Reported on 4/22/2025), Disp: , Rfl:     HYDROcodone-acetaminophen (NORCO)  mg per tablet, Take 1 tablet by mouth every 4 (four) hours as needed. for pain. (Patient not taking: Reported on 4/22/2025), Disp: , Rfl:     ibuprofen (ADVIL,MOTRIN) 600 MG tablet, Take 1 tablet (600 mg total) by mouth every 6 (six) hours as needed for Pain. (Patient not taking: Reported on 4/22/2025), Disp: 20 tablet, Rfl: 0    meclizine (ANTIVERT) 25 mg tablet, Take 25 mg by mouth every 6 (six) hours as needed. (Patient not taking: Reported on 4/15/2025), Disp: , Rfl:     topiramate (TOPAMAX) 25 MG tablet, Take 50 mg by mouth 2  "(two) times daily. (Patient not taking: Reported on 4/22/2025), Disp: , Rfl:     vitamin D (VITAMIN D3) 1000 units Tab, Take 1,000 Units by mouth 2 (two) times a day. (Patient not taking: Reported on 4/22/2025), Disp: , Rfl:     Current Facility-Administered Medications:     sodium chloride 0.9% flush 10 mL, 10 mL, Intravenous, PRN, Carol Jama MD    PMHx/PSHx Updates:  See patient's last visit with me on  10/22/2024  See H&P on 12/28/2018        Pathology:  Cancer Staging  No matching staging information was found for the patient.          Objective:     Vitals:  Blood pressure 137/76, pulse 63, temperature 97.9 °F (36.6 °C), temperature source Temporal, resp. rate 16, height 5' 6" (1.676 m), weight 101.6 kg (224 lb), SpO2 98%.        Physical Examination:   GEN: no apparent distress, comfortable; AAOx3; overweight  HEAD: atraumatic and normocephalic  EYES: no conjunctival pallor or muddiness, no icterus; normal pupil reaction to ambient light  ENT: OMM, no pharyngeal erythema, external bilateral ears WNL; no visible thrush or ulcers  NECK: no masses or swelling, trachea midline, no visible LAD/LN's   CV: no palpitations; no pedal edema; no noticeable JVD or neck vein distension; pedal swelling resolved  CHEST: Normal respiratory effort; chest wall breath movements symmetrical; no audible wheezing  ABDOM: non-distended; no bloating  MUSC/Skeletal: ROM normal; joints visibly normal; no deformities; positive arthropathy in hands  EXTREM: no clubbing, cyanosis, inflammation or swelling  SKIN: no rashes, lesions, ulcers, petechiae or subcutaneous nodules;   : no ellison  NEURO: moving all 4 extremities; AAOx3; no tremors  PSYCH: normal mood, affect and behavior  LYMPH: no visible LN's or LAD              Labs:        Lab Results   Component Value Date    WBC 7.33 04/10/2025    HGB 12.4 (L) 04/10/2025    HCT 40.4 04/10/2025    MCV 91 04/10/2025     04/10/2025     CMP  Sodium   Date Value Ref Range Status "   04/10/2025 141 136 - 145 mmol/L Final   04/12/2019 138 134 - 144 mmol/L      Potassium   Date Value Ref Range Status   04/10/2025 3.8 3.5 - 5.1 mmol/L Final     Chloride   Date Value Ref Range Status   12/09/2024 105 95 - 110 mmol/L Final   04/12/2019 99 98 - 110 mmol/L      CO2   Date Value Ref Range Status   04/10/2025 24 23 - 29 mmol/L Final     Glucose   Date Value Ref Range Status   12/09/2024 124 (H) 70 - 110 mg/dL Final   04/12/2019 117 (H) 70 - 99 mg/dL      BUN   Date Value Ref Range Status   04/10/2025 16 8 - 23 mg/dL Final     Creatinine   Date Value Ref Range Status   04/10/2025 1.2 0.5 - 1.4 mg/dL Final   04/12/2019 0.72 0.60 - 1.40 mg/dL      Calcium   Date Value Ref Range Status   04/10/2025 8.6 (L) 8.7 - 10.5 mg/dL Final     Total Protein   Date Value Ref Range Status   12/09/2024 6.5 6.0 - 8.4 g/dL Final     Albumin   Date Value Ref Range Status   04/10/2025 3.7 3.5 - 5.2 g/dL Final   04/12/2019 3.3 3.1 - 4.7 g/dL      Bilirubin Total   Date Value Ref Range Status   04/10/2025 0.4 0.1 - 1.0 mg/dL Final     Comment:     For infants and newborns, interpretation of results should be based   on gestational age, weight and in agreement with clinical   observations.    Premature Infant recommended reference ranges:   0-24 hours:  <8.0 mg/dL   24-48 hours: <12.0 mg/dL   3-5 days:    <15.0 mg/dL   6-29 days:   <15.0 mg/dL     ALP   Date Value Ref Range Status   04/10/2025 64 55 - 135 unit/L Final     AST   Date Value Ref Range Status   04/10/2025 17 10 - 40 unit/L Final     ALT   Date Value Ref Range Status   04/10/2025 13 10 - 44 unit/L Final     Anion Gap   Date Value Ref Range Status   12/09/2024 5 (L) 8 - 16 mmol/L Final     eGFR if    Date Value Ref Range Status   07/25/2022 >60.0 >60 mL/min/1.73 m^2 Final     eGFR if non    Date Value Ref Range Status   07/25/2022 >60.0 >60 mL/min/1.73 m^2 Final     Comment:     Calculation used to obtain the estimated glomerular  filtration  rate (eGFR) is the CKD-EPI equation.        Lab Results   Component Value Date    IRON 72 04/10/2025    TIBC 246 (L) 04/10/2025    FERRITIN 202.6 04/10/2025        Lab Results   Component Value Date    [id number] >1,500 (H) 04/10/2025     Lab Results   Component Value Date    FOLATE 12.5 04/10/2025           Radiology/Diagnostic Studies:    Us Abdomen Complete    Result Date: 1/2/2019  Abdominal ultrasound Clinical history is anemia, prostate cancer The pancreas, aorta and IVC are normal. The liver is hyperechoic compatible with fatty infiltration. There is hepatopedal flow within the portal vein. The common bile duct measures 6 mm. The patient has had a cholecystectomy. The kidneys are normal in size and echogenicity. The spleen is normal in size measuring 12 cm. IMPRESSION: Fatty infiltration of the liver otherwise negative abdominal ultrasound Read and electronically signed by: Marry Hernandez MD on 1/2/2019 9:49 AM CST MARRY HERNANDEZ MD      I have reviewed all available lab results and radiology reports.    Assessment/Plan:   (1) 74 y.o. male with diagnosis of prostate cancer who has been referred by Simone Santillan Jr., MD for evaluation by medical oncology. Patient with a high risk adenocarcinoma of the prostate with [id number] with GS of 4+5.   - he started androgen depravation therapy and monotherapy XRT (IMRT)  - followed by Dr Gordon with  and currently on Trelstar  - followed by Dr Santillan with Rad/onc and completed XRt on 12/18/2018  - latest PSA at 0.01 in May 2020 and he had his last lupron shot done in April/May 2020  - he sees Dr Gordon again in Nov 2020 1/13/2021:  - He saw Dr Gordon again in Nov 2020 and they are repeating his PSA next month with plans to repeat every 3 months for now.     4/13/2021:  - seeing Dr Godron with labs in near future  - PSA on 2/23/2021 was 0.01    9/28/2021:  - He sees Dr Gordon again in Dec 2021 and the latest PSA was a little higher at 0.03; he  has been off the ADT for about a year  - discussed and will make referral to Dr Andrea Scanlon at [facility] with -Oncology    12/28/2021:  - he did not see Dr Scanlon at [facility] as of yet - will check on the referral  - He was supposed to f/u with Dr Gordon again in Dec 2021 but it was cancelled; he has been off the ADT for over a year; he sees  again in June 2022 and Dr Santillan again in Jan 2022  - PSA down to 0.01 now and good    4/18/2022:  - he sees Dr Gordon again in June 2022 7/18/2022:  - mild increase in PSA up to 0.07 from 0.01  - planned repeat level in 3 months per  with follow-up with Dr Gordon again in Aug 2022  - he never did go see Dr Scanlon    11/15/2022:  - His latest PSA was 0.07 and he sees Dr Gordon again in Dec 2022    7/20/2023:  - His PSA has increased to 0.14 and he is seeing Dr Gordon; he saw Randa last week. He sees Dr Gordon again in 3 months. 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; mild CVA in 1993; Hypertensive Heart disease - followed by Dr Jama with cardiology     (3) HTN and hypercholesterolemia     (4) LORA     (5) DM    (6) Chronic back pain issues - required recent lumbar surgery with Dr Chris Fofana at [facility]     (7) Hx/of nightly fevers     (8) Chronic diarhhea - followed by Dr Alfaro with GI and s/p recent endoscopies     (9) Mild thrombocytopenia resolved with last platelet count down at 141,000  - no history of hepatitis or liver problems; no alcoholism  - he has positive antiplatelet antibody screens both direct and  indirect consistent with an underlying chronic ITP condition  - his flow cytometry showed no evidence of leukemia but he did have a relative increase in eosinophils    1/13/2021:  - latest platelet count at 129,000 which should be adequate for most surgeries    4/13/2021:  - labs pending    9/28/2021:  - latest platelet count is WNL    12/28/2021:  - latest plat wnl    7/18/2022:  - latest plats wnl at 158,000    11/15/2022:  - plats are good at 159,000    7/20/2023:  - plats currently WNL - 177,000    4/22/2024:  - latest plats at 147,000 and WNL    10/22/2024:  - platelets WNL at 160,000    4/22/2025:  - latest plat at 160,000 and WNL     (10) Mild anemia with latest hgb at 11.5 and stable   - NCNC parameters  - iron panel was WNL with recent labs  - OBS was negative on 11/15  - hx/of colon polyps in past  - followed by Dr Alfaro with GI with planned repeat endoscopies in near future    1/13/2021:  - hgb at 11.5 with normal iron panel; stable    4/13/2021:  - labs pending    9/28/2021:  - latest hgb at 11.5 and relatively stable  - iron panel is adequate    12/28/2021:  - latest hgb at 11.1 and adequate  - iron panel wnl    4/18/2022:  - hgb at 10.7 and a little lower  - his iron was a little low despite the oral iron  - discussed IV iron and he is agreeable    7/18/2022:  - hgb better at 12.5  - s/p IV iron x2  - on oral iron    11/15/2022:  - latest hgb at 12.5 and iorn panel stable  - continued on oral iron    7/20/2023:  - hgb at 12.3  - iron panel adequate  - on oral iron daily    10/19/2023:  - latest hgb at 12.1  - iron panel adequate  - plats at 119,000 and a little lower this time    4/22/2024:  - latest hgb at 12.4  - s/p EGD with Dr Alfaro in Feb 2024 and colonoscopy earlier today  - iron panel adequate  - B12 >1500 - he plans to cut back on B12 suppplements    10/22/2024:  - latest hgb at 12.5 and relatively stable  - iron panel adequate  - on oral iron and b12 supplements    4/22/2025:  - latest  Hgb at 12.4 and practically WNL  - iron panel adequate  - continued on oral iron    (11) Esophageal polyp   - He had scope with EGD with Dr Alfaroand they found an esophageal polyp which was cauterized. He saw Dr Santillan  and he recommended that the patient have the polyp removed.   - he is having repeat EGD to re-evaluate the polyp next month      1/13/2021:  -He previously had scope with EGD with Dr Alfaro  and they found an esophageal polyp which was cauterized.  -  He subsequently saw Dr Santillan and he recommended that the patient have the polyp removed.   - He had repeat EGD but it was incomplete due residual gastric contents and it was to be rescheduled for the following month but he did not have it done as of yet.    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 F/u with rad/onc, GI and  as per their directives  2. Check CBC/plat/iron panel every 3 months for now    3. F/u with PCP, , Rad/onc etc   4. continue ICAR-C daily po and resume IV irons as needed in future  5. F/u with neurology and ortho           RTC in 6 months    Fax note to Wilver Santillan Clinton H. III, MD, and Wiley Gordon Albright; Mitchell Scanlon    Discussion:       Total Time spent on patient:    I spent over 15 mins of time with the patient. Reviewed results of the recently ordered labs, tests, reports and studies; made directives with regards to the results. Over half of this time was spent couseling and coordinating care, making treatment and analytical decisions; ordering  necessary labs, tests and studies; and discussing treatment options and setting up treatment plan(s) if indicated.        COVID-19 Discussion:    I had long discussion with patient and any applicable family about the COVID-19 coronavirus epidemic and the recommended precautions with regard to cancer and/or hematology patients. I have re-iterated the CDC recommendations for adequate hand washing, use of hand -like products, and coughing into elbow, etc. In addition, especially for our patients who are on chemotherapy and/or our otherwise immunocompromised patients, I have recommended avoidance of crowds, including movie theaters, restaurants, churches, etc. I have recommended avoidance of any sick or symptomatic family members and/or friends. I have also recommended avoidance of any raw and unwashed food products, and general avoidance of food items that have not been prepared by themselves. The patient has been asked to call us immediately with any symptom developments, issues, questions or other general concerns.       Iron Infusion Therapy Discussion:     I provided literature/learning materials on the particular IV iron regimen and discussed the potential side-effect profiles of the drug(s). I discussed the importance of compliance with obtaining and monitoring requested lab work, and went over the potential risk for the development of anaphylactic shock, bronchospasm, dysrhythmia, liver and/or kidney damage, and respiratory/cardiovascular arrest and/or failure. I discussed the potential risks for development of alopecia, fevers, itching, chills and/or rigors, cold sensory issues, ringing in ears, vertigo and neuropathy, all of which are usually acute but sometimes could end up being chronic and life-long. I discussed the risks of hand-foot syndrome and rashes, and development of other autoimmune mediated processes such as pneumonitis and colitis which could be life threatening.     The patient's consent  has been obtained to proceed with the IV iron therapy.The patient will be referred to [facility] for training and education on IV iron therapy, use of antiemetics and/or anti-diarrheals, use of NSAID's, potential IV iron therapy side-effects, and any specific recommendations and precautions with the particular IV iron agents.      I answered all of the patient's (and family's, if applicable) questions to the best of my ability and to their complete satisfaction. The patient acknowledged full understanding of the risks, recommendations and plan(s).         I have explained all of the above in detail and the patient understands all of the current recommendation(s). I have answered all of their questions to the best of my ability and to their complete satisfaction.   The patient is to continue with the current management plan.            Electronically signed by Cyrus Gibson MD

## 2025-04-22 NOTE — TELEPHONE ENCOUNTER
----- Message from Mona sent at 4/22/2025 11:28 AM CDT -----  Contact: self  Type:  Appointment RequestCaller is requesting a appointment. Name of Caller:pt Symptoms:Would the patient rather a call back or a response via [employer]? callBest Call Back Number:[phone] Information: was a pt of Dr bernal and needing a new follow up apt .   Please call back to advise. Thanks!

## 2025-04-23 ENCOUNTER — PATIENT MESSAGE (OUTPATIENT)
Dept: FAMILY MEDICINE | Facility: CLINIC | Age: 75
End: 2025-04-23
Payer: MEDICARE

## 2025-05-04 NOTE — TELEPHONE ENCOUNTER
No care due was identified.  [facility] Embedded Care Due Messages. Reference number: [id number].   5/04/2025 6:32:35 PM CDT

## 2025-05-05 NOTE — TELEPHONE ENCOUNTER
"Refill Routing Note   Medication(s) are not appropriate for processing by [facility] for the following reason(s):        New or recently adjusted medication    [facility] action(s):  Defer        Medication Therapy Plan: Note stating " patient no longer taking" and 40mg was added into chart as historical report.      Appointments  past 12m or future 3m with PCP    Date Provider   Last Visit   4/15/2025 Samy Pettit III, MD   Next Visit   Visit date not found Samy Pettit III, MD   ED visits in past 90 days: 0        Note composed:6:17 AM 05/05/2025          "

## 2025-05-06 NOTE — TELEPHONE ENCOUNTER
No care due was identified.  [facility] Embedded Care Due Messages. Reference number: [id number].   5/06/2025 2:35:43 PM CDT

## 2025-05-07 ENCOUNTER — PATIENT MESSAGE (OUTPATIENT)
Dept: FAMILY MEDICINE | Facility: CLINIC | Age: 75
End: 2025-05-07
Payer: MEDICARE

## 2025-05-07 RX ORDER — FAMOTIDINE 20 MG/1
20 TABLET, FILM COATED ORAL 2 TIMES DAILY
Qty: 60 TABLET | Refills: 0 | OUTPATIENT
Start: 2025-05-07

## 2025-05-07 RX ORDER — FAMOTIDINE 20 MG/1
20 TABLET, FILM COATED ORAL 2 TIMES DAILY
Qty: 180 TABLET | Refills: 3 | Status: SHIPPED | OUTPATIENT
Start: 2025-05-07

## 2025-05-07 NOTE — TELEPHONE ENCOUNTER
Refill Decision Note   Chris Hill  is requesting a refill authorization.  Brief Assessment and Rationale for Refill:  Approve     Medication Therapy Plan:        Comments:     Note composed:10:27 AM 05/07/2025

## 2025-05-07 NOTE — TELEPHONE ENCOUNTER
Refill Decision Note   Chris Hill  is requesting a refill authorization.  Brief Assessment and Rationale for Refill:  Quick Discontinue     Medication Therapy Plan: .      Comments:     Note composed:10:30 AM 05/07/2025

## 2025-05-08 ENCOUNTER — TELEPHONE (OUTPATIENT)
Dept: CARDIOLOGY | Facility: CLINIC | Age: 75
End: 2025-05-08
Payer: MEDICARE

## 2025-05-08 NOTE — TELEPHONE ENCOUNTER
Pt has not see Cardiology in almost 2 years. Pre-op clearance requested for Hip sx. Appt scheduled with PA.

## 2025-05-09 ENCOUNTER — TELEPHONE (OUTPATIENT)
Dept: FAMILY MEDICINE | Facility: CLINIC | Age: 75
End: 2025-05-09
Payer: MEDICARE

## 2025-05-09 NOTE — TELEPHONE ENCOUNTER
----- Message from Darby sent at 5/9/2025 11:09 AM CDT -----  Type: Needs Medical AdviceWho Called:  dr. Cain - nurse [place] Call Back Number: [phone] Information: nurse requesting a call back, need to know if the pt need cardiac clearance for hip replacement surgery

## 2025-05-13 ENCOUNTER — OFFICE VISIT (OUTPATIENT)
Dept: FAMILY MEDICINE | Facility: CLINIC | Age: 75
End: 2025-05-13
Payer: MEDICARE

## 2025-05-13 VITALS
WEIGHT: 217.63 LBS | RESPIRATION RATE: 18 BRPM | HEART RATE: 59 BPM | OXYGEN SATURATION: 95 % | BODY MASS INDEX: 34.98 KG/M2 | TEMPERATURE: 97 F | HEIGHT: 66 IN | DIASTOLIC BLOOD PRESSURE: 62 MMHG | SYSTOLIC BLOOD PRESSURE: 120 MMHG

## 2025-05-13 DIAGNOSIS — E11.319 TYPE 2 DIABETES MELLITUS WITH RETINOPATHY OF BOTH EYES, WITHOUT LONG-TERM CURRENT USE OF INSULIN, MACULAR EDEMA PRESENCE UNSPECIFIED, UNSPECIFIED RETINOPATHY SEVERITY: Primary | ICD-10-CM

## 2025-05-13 DIAGNOSIS — Z79.82 ASPIRIN LONG-TERM USE: ICD-10-CM

## 2025-05-13 DIAGNOSIS — G45.9 TIA (TRANSIENT ISCHEMIC ATTACK): ICD-10-CM

## 2025-05-13 DIAGNOSIS — I25.10 CORONARY ARTERY DISEASE, UNSPECIFIED VESSEL OR LESION TYPE, UNSPECIFIED WHETHER ANGINA PRESENT, UNSPECIFIED WHETHER NATIVE OR TRANSPLANTED HEART: ICD-10-CM

## 2025-05-13 DIAGNOSIS — I70.0 AORTIC ATHEROSCLEROSIS: ICD-10-CM

## 2025-05-13 DIAGNOSIS — E03.9 HYPOTHYROIDISM, UNSPECIFIED TYPE: ICD-10-CM

## 2025-05-13 DIAGNOSIS — H81.03 MENIERE'S DISEASE OF BOTH EARS: ICD-10-CM

## 2025-05-13 DIAGNOSIS — Z85.46 HISTORY OF PROSTATE CANCER: Chronic | ICD-10-CM

## 2025-05-13 DIAGNOSIS — D64.9 ANEMIA, UNSPECIFIED TYPE: ICD-10-CM

## 2025-05-13 DIAGNOSIS — I47.29 NSVT (NONSUSTAINED VENTRICULAR TACHYCARDIA): ICD-10-CM

## 2025-05-13 DIAGNOSIS — E78.5 HYPERLIPIDEMIA, UNSPECIFIED HYPERLIPIDEMIA TYPE: ICD-10-CM

## 2025-05-13 DIAGNOSIS — I10 HYPERTENSION, ESSENTIAL: ICD-10-CM

## 2025-05-13 PROCEDURE — 1160F RVW MEDS BY RX/DR IN RCRD: CPT | Mod: CPTII,S$GLB,,

## 2025-05-13 PROCEDURE — 3061F NEG MICROALBUMINURIA REV: CPT | Mod: CPTII,S$GLB,,

## 2025-05-13 PROCEDURE — 4010F ACE/ARB THERAPY RXD/TAKEN: CPT | Mod: CPTII,S$GLB,,

## 2025-05-13 PROCEDURE — 3066F NEPHROPATHY DOC TX: CPT | Mod: CPTII,S$GLB,,

## 2025-05-13 PROCEDURE — 3044F HG A1C LEVEL LT 7.0%: CPT | Mod: CPTII,S$GLB,,

## 2025-05-13 PROCEDURE — 3288F FALL RISK ASSESSMENT DOCD: CPT | Mod: CPTII,S$GLB,,

## 2025-05-13 PROCEDURE — 99214 OFFICE O/P EST MOD 30 MIN: CPT | Mod: S$GLB,,,

## 2025-05-13 PROCEDURE — 1126F AMNT PAIN NOTED NONE PRSNT: CPT | Mod: CPTII,S$GLB,,

## 2025-05-13 PROCEDURE — 99999 PR PBB SHADOW E&M-EST. PATIENT-LVL V: CPT | Mod: PBBFAC,,,

## 2025-05-13 PROCEDURE — 3074F SYST BP LT 130 MM HG: CPT | Mod: CPTII,S$GLB,,

## 2025-05-13 PROCEDURE — 1159F MED LIST DOCD IN RCRD: CPT | Mod: CPTII,S$GLB,,

## 2025-05-13 PROCEDURE — 1101F PT FALLS ASSESS-DOCD LE1/YR: CPT | Mod: CPTII,S$GLB,,

## 2025-05-13 PROCEDURE — 3078F DIAST BP <80 MM HG: CPT | Mod: CPTII,S$GLB,,

## 2025-05-13 PROCEDURE — 3008F BODY MASS INDEX DOCD: CPT | Mod: CPTII,S$GLB,,

## 2025-05-13 RX ORDER — SUCRALFATE 1 G/1
1 TABLET ORAL 4 TIMES DAILY
COMMUNITY
Start: 2025-05-12

## 2025-05-13 RX ORDER — OXYCODONE AND ACETAMINOPHEN 10; 325 MG/1; MG/1
.5-1 TABLET ORAL EVERY 4 HOURS PRN
COMMUNITY
Start: 2025-02-05

## 2025-05-13 RX ORDER — TIRZEPATIDE 2.5 MG/.5ML
2.5 INJECTION, SOLUTION SUBCUTANEOUS
Qty: 4 PEN | Refills: 2 | Status: SHIPPED | OUTPATIENT
Start: 2025-05-13

## 2025-05-13 NOTE — PROGRESS NOTES
Patient ID: Chris Hill Jr. is a 74 y.o. male.    Chief Complaint: Follow-up (1 month)      Chris Hill Jr. is in the office preoperative clearance.    Chris Hill Jr. Is a 74 y.o. male patient that presents to clinic for 1 month follow up after starting Mounjaro.  He is also needing preoperative clearance for left hip replacement with Dr Cain on 6/6/25.  He is a type 2 Diabetic that is controlled well.  His HA1C is 5.9.  He was taking Ozempic but had significant reflux and nausea.  He was changed over to Mounjaro and is doing better.  He does still have some reflux but it has improved.  He has very minimal nausea and no vomiting.  His weight today is 217 lbs which is down from 224 lbs.  Hypothyroidism.  Last TSH done 12/2024 was normal at 2.6063.  He has history of TIA, Meniere's disease, Aortic atherosclerosis, CAD, hyperlipidemia, hypertension (controlled).  NSVT, he denies chest pain or palpitations.  Takes daily aspirin.  Iron deficiency anemia, stable last H&H 12.4/40.4 and RBC 4.45. He will repeat his labs tomorrow for his surgeon and send copy of his labs through the portal.  GERD, taking Nexium and Pepcid twice daily.  Recently had endoscopy and his esophagus stretched.  LORA uses his cpap and benefits.  He has never been a smoker.  He denies drug or alcohol use.  He denies personal or family history of anesthesia complications.  He has appointment next week with his Cardiologist for clearance.          Past Medical History:   Diagnosis Date    Anemia, chronic disease 12/28/2018    Arthritis     Back pain     radiates both legs mainly rt leg    Benign paroxysmal vertigo, bilateral     Diabetes mellitus     Diabetes mellitus, type 2     JANSEN (dyspnea on exertion)     GERD (gastroesophageal reflux disease)     Heart murmur     Hyperlipidemia     Hypertension     Iron deficiency anemia 04/18/2022    Neuropathy     Normocytic normochromic anemia 12/28/2018    Prostate cancer 08/20/2018    Sleep apnea      uses CPAP    Stroke     Thrombocytopenia 12/28/2018    Thyroid disease     Urinary frequency     Valvular regurgitation               Current Medications[1]    The ASCVD Risk score (Kenia WRIGHT, et al., 2019) failed to calculate for the following reasons:    Risk score cannot be calculated because patient has a medical history suggesting prior/existing ASCVD     Wt Readings from Last 3 Encounters:   05/13/25 98.7 kg (217 lb 9.6 oz)   04/22/25 101.6 kg (224 lb)   04/15/25 101.5 kg (223 lb 10.5 oz)     Temp Readings from Last 3 Encounters:   05/13/25 97.2 °F (36.2 °C)   04/22/25 97.9 °F (36.6 °C) (Temporal)   04/15/25 97 °F (36.1 °C) (Oral)     BP Readings from Last 3 Encounters:   05/13/25 120/62   04/22/25 137/76   04/15/25 118/64     Pulse Readings from Last 3 Encounters:   05/13/25 (!) 59   04/22/25 63   04/15/25 (!) 56     Resp Readings from Last 3 Encounters:   05/13/25 18   04/22/25 16   10/22/24 18     PF Readings from Last 3 Encounters:   No data found for PF     SpO2 Readings from Last 3 Encounters:   05/13/25 95%   04/22/25 98%   04/15/25 95%        Lab Results   Component Value Date    HGBA1C 5.9 04/10/2025    HGBA1C 5.9 12/09/2024    HGBA1C 6.0 09/03/2024     Lab Results   Component Value Date    MICROALBUR 0.2 12/02/2022    LDLCALC 20.2 (L) 12/09/2024    CREATININE 1.2 04/10/2025       Review of Systems   Constitutional: Negative.  Negative for chills and fever.   HENT: Negative.     Respiratory: Negative.  Negative for chest tightness and shortness of breath.    Cardiovascular: Negative.  Negative for chest pain and palpitations.   Gastrointestinal:  Positive for nausea. Negative for abdominal pain, constipation, diarrhea and vomiting.        Some reflux   Genitourinary: Negative.    Musculoskeletal:  Positive for arthralgias.   Skin: Negative.    Neurological: Negative.    Hematological: Negative.    Psychiatric/Behavioral: Negative.             Objective:      Physical Exam  Constitutional:        Appearance: Normal appearance.   HENT:      Head: Normocephalic and atraumatic.      Right Ear: Tympanic membrane normal.      Left Ear: Tympanic membrane normal.      Nose: Nose normal.      Mouth/Throat:      Mouth: Mucous membranes are moist.      Pharynx: Oropharynx is clear.   Eyes:      Conjunctiva/sclera: Conjunctivae normal.      Pupils: Pupils are equal, round, and reactive to light.   Neck:      Vascular: No carotid bruit.   Cardiovascular:      Rate and Rhythm: Normal rate and regular rhythm.      Pulses: Normal pulses.      Heart sounds: Normal heart sounds.   Pulmonary:      Effort: Pulmonary effort is normal.      Breath sounds: Normal breath sounds.   Abdominal:      General: Bowel sounds are normal.      Palpations: Abdomen is soft.      Tenderness: There is no abdominal tenderness.   Musculoskeletal:      Cervical back: Normal range of motion.      Comments: Antalgic gait   Lymphadenopathy:      Cervical: No cervical adenopathy.   Skin:     General: Skin is warm and dry.      Capillary Refill: Capillary refill takes less than 2 seconds.   Neurological:      General: No focal deficit present.      Mental Status: He is alert.   Psychiatric:         Mood and Affect: Mood normal.         Behavior: Behavior normal.         Thought Content: Thought content normal.         Judgment: Judgment normal.             Screening recommendations appropriate to age and health status were reviewed.    Type 2 diabetes mellitus with retinopathy of both eyes, without long-term current use of insulin, macular edema presence unspecified, unspecified retinopathy severity  -     tirzepatide (MOUNJARO) 2.5 mg/0.5 mL PnIj; Inject 2.5 mg into the skin every 7 days.  Dispense: 4 Pen; Refill: 2    TIA (transient ischemic attack)    Meniere's disease of both ears    NSVT (nonsustained ventricular tachycardia)    Hypertension, essential    Hyperlipidemia, unspecified hyperlipidemia type    Coronary artery disease, unspecified  vessel or lesion type, unspecified whether angina present, unspecified whether native or transplanted heart    Aortic atherosclerosis    Aspirin long-term use    History of prostate cancer    Anemia, unspecified type    Hypothyroidism, unspecified type  -     TSH; Future; Expected date: 05/13/2025    He has preoperative clearance testing for his surgeon tomorrow.  He will need to send me copies of that blood work.  He needs to have his TSH checked.  He should continue mounjaro 2.5 mg weeky.  Wont increase dose at this time as patient is still having some reflux and mild nausea.  His vital signs are stable.  His anemia is stable but I would like to see his blood work from his preop visit tomorrow.      RCRI risk factors include: history of ischemic disease and history of cerebrovascular disease. As such, per RCRI the risk of cardiac death, nonfatal myocardial infarction, or nonfatal cardiac arrest is 1.0% and the risk of myocardial infarction, pulmonary edema, ventricular fibrillation, primary cardiac arrest, or complete heart block is 1.3%.  Overall this patient can be considered intermediate risk for this intermediate risk procedure. No further cardiac testing is recommended at this time.     Patient has LORA and uses CPAP. Would not recommend obtaining chest X-ray, sleep study, or PFTs at this time. Patient is a non-smoker. We discussed the benefits of early mobilization and deep breathing after surgery.      Screened patient for alcohol misuse, use of illicit drugs, and personal or family history of anesthetic complications or bleeding diathesis and no substantial concerns were identified.     All current medications were reviewed and at this time no changes to medications are recommended prior to surgery.  He was advised to stop aspirin and any vitamin supplements 1 week prior to surgery. He should also stop Mounjaro 1 week prior to surgery.      I recommend use of standard pre-op and post-op precautions for this  patient. In my opinion, he is medically optimized for this procedure, and can proceed pending labs and cardiac clearance.           [1]   Current Outpatient Medications:     amitriptyline (ELAVIL) 50 MG tablet, Take 1 tablet by mouth in the evening, Disp: 90 tablet, Rfl: 3    amLODIPine (NORVASC) 10 MG tablet, Take 1 tablet by mouth once daily, Disp: 90 tablet, Rfl: 3    aspirin (ECOTRIN) 81 MG EC tablet, Take 81 mg by mouth every evening., Disp: , Rfl:     blood sugar diagnostic Strp, 3 strips by Misc.(Non-Drug; Combo Route) route 3 (three) times daily., Disp: 200 each, Rfl: 1    carvediloL (COREG) 25 MG tablet, TAKE 1 TABLET BY MOUTH TWICE DAILY WITH MEALS, Disp: 180 tablet, Rfl: 3    clonazePAM (KLONOPIN) 0.5 MG tablet, Take 0.5 mg by mouth as needed for Anxiety., Disp: , Rfl:     cloNIDine (CATAPRES) 0.1 MG tablet, Take 1 tablet (0.1 mg total) by mouth every 8 (eight) hours as needed (SBP greater than 180)., Disp: 30 tablet, Rfl: 0    co-enzyme Q-10 30 mg capsule, Take 30 mg by mouth 3 (three) times daily., Disp: , Rfl:     cyclobenzaprine (FLEXERIL) 10 MG tablet, Take 10 mg by mouth as needed for Muscle spasms., Disp: , Rfl:     diazePAM (VALIUM) 5 MG tablet, Take 5 mg by mouth 4 (four) times daily as needed., Disp: , Rfl:     esomeprazole (NEXIUM) 40 MG capsule, Take 40 mg by mouth 2 (two) times daily., Disp: , Rfl:     evolocumab (REPATHA SURECLICK) 140 mg/mL PnIj, Inject 1 mL (140 mg total) into the skin every 14 (fourteen) days., Disp: 2 each, Rfl: 5    famotidine (PEPCID) 20 MG tablet, Take 1 tablet (20 mg total) by mouth 2 (two) times daily., Disp: 180 tablet, Rfl: 3    hydrALAZINE (APRESOLINE) 100 MG tablet, Take 1 tablet (100 mg total) by mouth 3 (three) times daily. (Patient taking differently: Take 100 mg by mouth 2 (two) times a day.), Disp: 270 tablet, Rfl: 3    HYDROcodone-acetaminophen (NORCO)  mg per tablet, Take 1 tablet by mouth every 4 (four) hours as needed. for pain., Disp: , Rfl:      iron-vitamin C 100-250 mg, ICAR-C, 100-250 mg Tab, Take 1 tablet by mouth once daily, Disp: 30 tablet, Rfl: 0    levothyroxine (SYNTHROID) 50 MCG tablet, Take 1 tablet (50 mcg total) by mouth before breakfast., Disp: 90 tablet, Rfl: 3    magnesium 250 mg Tab, Take 1 tablet by mouth every evening., Disp: , Rfl:     meclizine (ANTIVERT) 25 mg tablet, Take 25 mg by mouth every 6 (six) hours as needed., Disp: , Rfl:     olmesartan (BENICAR) 40 MG tablet, Take 1 tablet by mouth once daily, Disp: 90 tablet, Rfl: 3    omega-3 acid ethyl esters (LOVAZA) 1 gram capsule, Take 2 pills twice a day, Disp: 360 capsule, Rfl: 1    oxyCODONE-acetaminophen (PERCOCET)  mg per tablet, Take 0.5-1 tablets by mouth every 4 (four) hours as needed., Disp: , Rfl:     spironolactone (ALDACTONE) 25 MG tablet, Take 1 tablet (25 mg total) by mouth once daily., Disp: 90 tablet, Rfl: 1    sucralfate (CARAFATE) 1 gram tablet, Take 1 g by mouth 4 (four) times daily., Disp: , Rfl:     terazosin (HYTRIN) 10 MG capsule, TAKE 1 CAPSULE BY MOUTH IN THE EVENING, Disp: 90 capsule, Rfl: 3    ascorbic acid, vitamin C, (VITAMIN C) 1000 MG tablet, Take 1,000 mg by mouth 2 (two) times daily. (Patient not taking: Reported on 5/13/2025), Disp: , Rfl:     cyanocobalamin (VITAMIN B-12) 100 MCG tablet, Take 1,000 mcg by mouth once daily. (Patient not taking: Reported on 5/13/2025), Disp: , Rfl:     eplerenone (INSPRA) 25 MG Tab, Take 1 tablet (25 mg total) by mouth once daily. (Patient not taking: Reported on 5/13/2025), Disp: 30 tablet, Rfl: 3    ibuprofen (ADVIL,MOTRIN) 600 MG tablet, Take 1 tablet (600 mg total) by mouth every 6 (six) hours as needed for Pain. (Patient not taking: Reported on 4/15/2025), Disp: 20 tablet, Rfl: 0    metFORMIN (GLUCOPHAGE-XR) 500 MG ER 24hr tablet, Take 1 tablet by mouth once daily with breakfast (Patient not taking: Reported on 5/13/2025), Disp: 90 tablet, Rfl: 1    potassium chloride SA (K-DUR,KLOR-CON) 20 MEQ tablet, Take 1  tablet (20 mEq total) by mouth 2 (two) times daily., Disp: 180 tablet, Rfl: 3    sildenafiL (VIAGRA) 25 MG tablet, TAKE 1/2 TO 1 (ONE-HALF TO ONE) TABLET BY MOUTH ONCE DAILY AS NEEDED FOR ERECTILE DYSFUNCTION, Disp: 10 tablet, Rfl: 0    tirzepatide (MOUNJARO) 2.5 mg/0.5 mL PnIj, Inject 2.5 mg into the skin every 7 days., Disp: 4 Pen, Rfl: 2    topiramate (TOPAMAX) 25 MG tablet, Take 50 mg by mouth 2 (two) times daily. (Patient not taking: Reported on 4/15/2025), Disp: , Rfl:     vitamin D (VITAMIN D3) 1000 units Tab, Take 1,000 Units by mouth 2 (two) times a day. (Patient not taking: Reported on 4/15/2025), Disp: , Rfl:     Current Facility-Administered Medications:     sodium chloride 0.9% flush 10 mL, 10 mL, Intravenous, PRN, Carol Jama MD

## 2025-05-14 ENCOUNTER — LAB VISIT (OUTPATIENT)
Dept: LAB | Facility: HOSPITAL | Age: 75
End: 2025-05-14
Attending: NURSE PRACTITIONER
Payer: MEDICARE

## 2025-05-14 DIAGNOSIS — E03.9 HYPOTHYROIDISM, UNSPECIFIED TYPE: ICD-10-CM

## 2025-05-14 DIAGNOSIS — E53.8 VITAMIN B12 DEFICIENCY: ICD-10-CM

## 2025-05-14 DIAGNOSIS — D50.9 IRON DEFICIENCY ANEMIA, UNSPECIFIED IRON DEFICIENCY ANEMIA TYPE: ICD-10-CM

## 2025-05-14 DIAGNOSIS — D64.9 ANEMIA, UNSPECIFIED TYPE: ICD-10-CM

## 2025-05-14 LAB
FOLATE SERPL-MCNC: 14.4 NG/ML (ref 4–24)
TSH SERPL-ACNC: 2.13 UIU/ML (ref 0.34–5.6)

## 2025-05-14 PROCEDURE — 36415 COLL VENOUS BLD VENIPUNCTURE: CPT

## 2025-05-14 PROCEDURE — 82746 ASSAY OF FOLIC ACID SERUM: CPT

## 2025-05-14 PROCEDURE — 84443 ASSAY THYROID STIM HORMONE: CPT

## 2025-05-15 ENCOUNTER — RESULTS FOLLOW-UP (OUTPATIENT)
Dept: FAMILY MEDICINE | Facility: CLINIC | Age: 75
End: 2025-05-15

## 2025-05-15 ENCOUNTER — TELEPHONE (OUTPATIENT)
Dept: FAMILY MEDICINE | Facility: CLINIC | Age: 75
End: 2025-05-15
Payer: MEDICARE

## 2025-05-16 ENCOUNTER — TELEPHONE (OUTPATIENT)
Dept: FAMILY MEDICINE | Facility: CLINIC | Age: 75
End: 2025-05-16
Payer: MEDICARE

## 2025-05-16 NOTE — TELEPHONE ENCOUNTER
Spoke with patient and let him know found labs the [facility] had sent and his pre op will be filled out on Monday

## 2025-05-19 ENCOUNTER — TELEPHONE (OUTPATIENT)
Dept: FAMILY MEDICINE | Facility: CLINIC | Age: 75
End: 2025-05-19
Payer: MEDICARE

## 2025-05-19 NOTE — PROGRESS NOTES
Labs reviewed that were provided from Dr Cain's office and are acceptable for marshall.  He will still need clearance from his Cardiologist.

## 2025-05-21 ENCOUNTER — OFFICE VISIT (OUTPATIENT)
Dept: CARDIOLOGY | Facility: CLINIC | Age: 75
End: 2025-05-21
Payer: MEDICARE

## 2025-05-21 VITALS
BODY MASS INDEX: 35.19 KG/M2 | DIASTOLIC BLOOD PRESSURE: 69 MMHG | HEART RATE: 53 BPM | HEIGHT: 66 IN | SYSTOLIC BLOOD PRESSURE: 118 MMHG | WEIGHT: 218.94 LBS

## 2025-05-21 DIAGNOSIS — I10 HYPERTENSION, ESSENTIAL: Primary | ICD-10-CM

## 2025-05-21 DIAGNOSIS — E11.319 TYPE 2 DIABETES MELLITUS WITH RETINOPATHY OF BOTH EYES, WITHOUT LONG-TERM CURRENT USE OF INSULIN, MACULAR EDEMA PRESENCE UNSPECIFIED, UNSPECIFIED RETINOPATHY SEVERITY: ICD-10-CM

## 2025-05-21 DIAGNOSIS — I70.0 AORTIC ATHEROSCLEROSIS: ICD-10-CM

## 2025-05-21 DIAGNOSIS — E78.5 HYPERLIPIDEMIA, UNSPECIFIED HYPERLIPIDEMIA TYPE: ICD-10-CM

## 2025-05-21 DIAGNOSIS — E66.01 SEVERE OBESITY (BMI 35.0-39.9) WITH COMORBIDITY: ICD-10-CM

## 2025-05-21 DIAGNOSIS — I25.10 CORONARY ARTERY DISEASE, UNSPECIFIED VESSEL OR LESION TYPE, UNSPECIFIED WHETHER ANGINA PRESENT, UNSPECIFIED WHETHER NATIVE OR TRANSPLANTED HEART: ICD-10-CM

## 2025-05-21 LAB
OHS QRS DURATION: 104 MS
OHS QTC CALCULATION: 422 MS

## 2025-05-21 PROCEDURE — 93010 ELECTROCARDIOGRAM REPORT: CPT | Mod: S$GLB,,, | Performed by: INTERNAL MEDICINE

## 2025-05-21 PROCEDURE — 1101F PT FALLS ASSESS-DOCD LE1/YR: CPT | Mod: CPTII,S$GLB,,

## 2025-05-21 PROCEDURE — 3066F NEPHROPATHY DOC TX: CPT | Mod: CPTII,S$GLB,,

## 2025-05-21 PROCEDURE — 3061F NEG MICROALBUMINURIA REV: CPT | Mod: CPTII,S$GLB,,

## 2025-05-21 PROCEDURE — 3288F FALL RISK ASSESSMENT DOCD: CPT | Mod: CPTII,S$GLB,,

## 2025-05-21 PROCEDURE — 1159F MED LIST DOCD IN RCRD: CPT | Mod: CPTII,S$GLB,,

## 2025-05-21 PROCEDURE — 3074F SYST BP LT 130 MM HG: CPT | Mod: CPTII,S$GLB,,

## 2025-05-21 PROCEDURE — 3078F DIAST BP <80 MM HG: CPT | Mod: CPTII,S$GLB,,

## 2025-05-21 PROCEDURE — 4010F ACE/ARB THERAPY RXD/TAKEN: CPT | Mod: CPTII,S$GLB,,

## 2025-05-21 PROCEDURE — 99999 PR PBB SHADOW E&M-EST. PATIENT-LVL IV: CPT | Mod: PBBFAC,,,

## 2025-05-21 PROCEDURE — 93005 ELECTROCARDIOGRAM TRACING: CPT | Mod: PO

## 2025-05-21 PROCEDURE — 3044F HG A1C LEVEL LT 7.0%: CPT | Mod: CPTII,S$GLB,,

## 2025-05-21 PROCEDURE — 99214 OFFICE O/P EST MOD 30 MIN: CPT | Mod: S$GLB,,,

## 2025-05-21 PROCEDURE — 1126F AMNT PAIN NOTED NONE PRSNT: CPT | Mod: CPTII,S$GLB,,

## 2025-05-21 PROCEDURE — 3008F BODY MASS INDEX DOCD: CPT | Mod: CPTII,S$GLB,,

## 2025-05-21 NOTE — PROGRESS NOTES
Subjective:    Patient ID:  Chris Hill Jr. is a 74 y.o. male patient here for evaluation Surgical Clearance     History of Present Illness:     Patient of Dr. Jama's last seen in 2023 here today for pre-operative clearance for hip replacement on June 6th. Doing well from a CV standpoint but has not been able to be that active because he also had back surgery 3 mo ago. BP has been well controlled- insurance made him switch from eplerenone to spirnolactone.         Most Recent Echocardiogram Results  Results for orders placed during the hospital encounter of 06/21/21    Echo Saline Bubble? Yes    Interpretation Summary  · The estimated PA systolic pressure is 23 mmHg.  · The left ventricle is normal in size with mild concentric hypertrophy and normal systolic function.  · No interatrial septal defect present.  · The estimated ejection fraction is 60%.  · Normal left ventricular diastolic function.  · Normal right ventricular size with normal right ventricular systolic function.  · Moderate left atrial enlargement.  · Mild-to-moderate mitral regurgitation.  · Mild aortic regurgitation.      Most Recent Nuclear Stress Test Results  Results for orders placed during the hospital encounter of 07/11/23    Nuclear Stress - Cardiology Interpreted    Interpretation Summary    Abnormal myocardial perfusion scan.    There are two significant perfusion abnormalities.    Perfusion Abnormality #1 - There is a moderate intensity, small to moderate sized, reversible perfusion abnormality that is consistent with ischemia in the apical wall(s).    Perfusion Abnormality #2 - There is a small to moderate sized, mild intensity, reversible perfusion abnormality that is consistent with ischemia in the basal to mid inferior wall(s).    There are no other significant perfusion abnormalities.    The gated perfusion images showed an ejection fraction of 53% at rest.    The ECG portion of the study is negative for ischemia.    The patient  reported chest pain during the stress test.    There were no arrhythmias during stress.      Most Recent Cardiac PET Stress Test Results  No results found for this or any previous visit.      Most Recent Cardiovascular Angiogram results  Results for orders placed during the hospital encounter of 07/20/23    Cardiac catheterization    Conclusion    The left ventricular end diastolic pressure was elevated.    The pre-procedure left ventricular end diastolic pressure was 26.    The estimated blood loss was none.    There was minimal non-obstructive coronary artery disease..    The procedure log was documented by Documenter: Aidan Sawant and verified by Carol Jama MD.    Date: 7/20/2023  Time: 9:03 PM      Other Most Recent Cardiology Results  Results for orders placed in visit on 03/16/23    Cardiac event monitor    Interpretation Summary  · Patient monitored for 13d 13h 4m  · The predominant rhythm is normal sinus rhythm  · 7,997 PACs with PAC burden of 1%  · 16,796 PVCs with PVC burden of 2%  · One short run of nonsustained ventricular tachycardia 8 beats at a rate of 183 beats per minute  · No atrial fibrillation, no heart block      REVIEW OF SYSTEMS: As noted in HPI   CARDIOVASCULAR: No recent chest pain, palpitations, arm/neck/jaw pain, or edema.  RESPIRATORY: No recent fever, cough, SOB.  : No blood in the urine  GI: No reflux, nausea, vomiting, or blood in stool.   MUSCULOSKELETAL: No falls.   NEURO: No headaches, syncope, or dizziness.  EYES: No sudden changes in vision.     Past Medical History:   Diagnosis Date    Anemia, chronic disease 12/28/2018    Arthritis     Back pain     radiates both legs mainly rt leg    Benign paroxysmal vertigo, bilateral     Diabetes mellitus     Diabetes mellitus, type 2     JANSEN (dyspnea on exertion)     GERD (gastroesophageal reflux disease)     Heart murmur     Hyperlipidemia     Hypertension     Iron deficiency anemia 04/18/2022    Neuropathy     Normocytic normochromic  anemia 12/28/2018    Prostate cancer 08/20/2018    Sleep apnea     uses CPAP    Stroke     Thrombocytopenia 12/28/2018    Thyroid disease     Urinary frequency     Valvular regurgitation      Past Surgical History:   Procedure Laterality Date    ANGIOGRAM, CORONARY, WITH LEFT HEART CATHETERIZATION  7/20/2023    Procedure: Left Heart Cath;  Surgeon: Carol Jama MD;  [facility];  Service: Cardiology;;    ANKLE SURGERY Left     APPENDECTOMY      BACK SURGERY  09/2019    CARDIAC CATHETERIZATION      CERVICAL FUSION      CORONARY ANGIOGRAPHY  7/20/2023    Procedure: Coronary angiogram study;  Surgeon: Carol Jama MD;  Location[facility];  Service: Cardiology;;    EYE SURGERY      cataracts bilateral    GALLBLADDER SURGERY      HAND SURGERY Bilateral     rt hand x4 left x3    JOINT REPLACEMENT Bilateral     knee    KNEE SURGERY      8 on left knee and 7 on rt knee    NASAL SINUS SURGERY      x5    SHOULDER SURGERY Bilateral     3 on left 2 on right    TRANSRECTAL ULTRASOUND OF PROSTATE WITH INSERTION OF GOLD FIDUCIAL MARKER N/A 10/04/2018    Procedure: ULTRASOUND, PROSTATE, TRANSPERINEAL APPROACH, WITH GOLD FIDUCIAL MARKER INSERTION (with SPACEOAR transperineal biodegradable gel placement);  Surgeon: Manpreet Gordon MD;  [facility];  Service: Urology;  Laterality: N/A;     Social History[1]      Objective      Vitals:    05/21/25 1447   BP: 118/69   Pulse: (!) 53       LAST EKG  Results for orders placed or performed during the hospital encounter of 07/20/23   EKG 12-lead    Collection Time: 07/20/23  1:38 PM    Narrative    Test Reason : R94.39,    Vent. Rate : 050 BPM     Atrial Rate : 050 BPM     P-R Int : 164 ms          QRS Dur : 096 ms      QT Int : 488 ms       P-R-T Axes : 038 005 028 degrees     QTc Int : 444 ms    Sinus bradycardia  Otherwise normal ECG  Confirmed by Edilson Portillo ([id number]) on 7/29/2023 10:47:54 PM    Referred By:             Confirmed By:Edilson Portillo     LIPIDS - LAST 2    Lab Results   Component Value Date    CHOL 108 (L) 12/09/2024    CHOL 224 (H) 09/03/2024    HDL 39 (L) 12/09/2024    HDL 42 09/03/2024    LDLCALC 20.2 (L) 12/09/2024    LDLCALC Invalid, Trig>400.0 09/03/2024    TRIG 244 (H) 12/09/2024    TRIG 502 (H) 09/03/2024    CHOLHDL 36.1 12/09/2024    CHOLHDL 18.8 (L) 09/03/2024     CARDIAC PROFILE - LAST 2  Lab Results   Component Value Date    BNP 39 07/25/2022    BNP 22 03/23/2022    TROPONINI <0.030 07/25/2022    TROPONINI <0.030 06/22/2021      CBC - LAST 2  Lab Results   Component Value Date    WBC 7.33 04/10/2025    WBC 8.50 10/15/2024    HGB 12.4 (L) 04/10/2025    HGB 12.5 (L) 10/15/2024    HCT 40.4 04/10/2025    HCT 39.4 (L) 10/15/2024     04/10/2025     10/15/2024     Lab Results   Component Value Date    LABPT 12.8 06/21/2021    LABPT 13.8 11/21/2020    INR 1.0 06/21/2021    INR 1.1 11/21/2020    APTT 26.2 06/21/2021     CHEMISTRY - LAST 2  Lab Results   Component Value Date     04/10/2025     12/09/2024    K 3.8 04/10/2025    K 3.9 12/09/2024    CO2 24 04/10/2025    CO2 29 12/09/2024    BUN 16 04/10/2025    BUN 16 12/09/2024    CREATININE 1.2 04/10/2025    CREATININE 1.1 12/09/2024     (H) 04/10/2025     (H) 12/09/2024    CALCIUM 8.6 (L) 04/10/2025    CALCIUM 8.7 12/09/2024    MG 1.7 05/30/2024    MG 1.9 06/20/2023    ALBUMIN 3.7 04/10/2025    ALBUMIN 4.0 12/09/2024    ALT 13 04/10/2025    ALT 13 12/09/2024    AST 17 04/10/2025    AST 16 12/09/2024      ENDOCRINE - LAST 2  Lab Results   Component Value Date    HGBA1C 5.9 04/10/2025    HGBA1C 5.9 12/09/2024    TSH 2.128 05/14/2025    TSH 2.606 12/09/2024        PHYSICAL EXAM  CONSTITUTIONAL: Well built, well nourished in no apparent distress  NECK: no carotid bruit, no JVD  LUNGS: CTA  CHEST WALL: no tenderness  HEART: regular rate and rhythm, S1, S2 normal, no murmur, click, rub or gallop   ABDOMEN: soft, non-tender; bowel sounds normal; no masses,  no  organomegaly  EXTREMITIES: Extremities normal, no edema, no calf tenderness noted  NEURO: AAO X 3    I HAVE REVIEWED :    The vital signs, most recent cardiac testing, and most recent pertinent non-cardiology provider notes.    Current Outpatient Medications   Medication Instructions    amitriptyline (ELAVIL) 50 mg, Oral, Nightly    amLODIPine (NORVASC) 10 mg, Oral    ascorbic acid (vitamin C) (VITAMIN C) 1,000 mg, 2 times daily    aspirin (ECOTRIN) 81 mg, Nightly    blood sugar diagnostic Strp 3 strips, Misc.(Non-Drug; Combo Route), 3 times daily    carvediloL (COREG) 25 mg, Oral, 2 times daily with meals    clonazePAM (KLONOPIN) 0.5 mg, As needed (PRN)    cloNIDine (CATAPRES) 0.1 mg, Oral, Every 8 hours PRN    co-enzyme Q-10 30 mg, 3 times daily    cyanocobalamin (VITAMIN B-12) 1,000 mcg, Daily    cyclobenzaprine (FLEXERIL) 10 mg, As needed (PRN)    diazePAM (VALIUM) 5 mg, 4 times daily PRN    eplerenone (INSPRA) 25 mg, Oral, Daily    esomeprazole (NEXIUM) 40 mg, 2 times daily    famotidine (PEPCID) 20 mg, Oral, 2 times daily    hydrALAZINE (APRESOLINE) 100 mg, Oral, 3 times daily    HYDROcodone-acetaminophen (NORCO)  mg per tablet 1 tablet, Every 4 hours PRN    ibuprofen (ADVIL,MOTRIN) 600 mg, Oral, Every 6 hours PRN    iron-vitamin C 100-250 mg, ICAR-C, 100-250 mg Tab 1 tablet, Oral, Daily    levothyroxine (SYNTHROID) 50 mcg, Oral, Before breakfast    magnesium 250 mg Tab 1 tablet, Nightly    meclizine (ANTIVERT) 25 mg, Every 6 hours PRN    metFORMIN (GLUCOPHAGE-XR) 500 mg, Oral    MOUNJARO 2.5 mg, Subcutaneous, Every 7 days    olmesartan (BENICAR) 40 mg, Oral    omega-3 acid ethyl esters (LOVAZA) 1 gram capsule Take 2 pills twice a day    oxyCODONE-acetaminophen (PERCOCET)  mg per tablet 0.5-1 tablets, Every 4 hours PRN    potassium chloride SA (K-DUR,KLOR-CON) 20 MEQ tablet 20 mEq, Oral, 2 times daily    REPATHA SURECLICK 140 mg, Subcutaneous, Every 14 days    sildenafiL (VIAGRA) 25 MG tablet TAKE  1/2 TO 1 (ONE-HALF TO ONE) TABLET BY MOUTH ONCE DAILY AS NEEDED FOR ERECTILE DYSFUNCTION    spironolactone (ALDACTONE) 25 mg, Oral, Daily    sucralfate (CARAFATE) 1 g, 4 times daily    terazosin (HYTRIN) 10 mg, Oral    topiramate (TOPAMAX) 50 mg, 2 times daily    vitamin D (VITAMIN D3) 1,000 Units, 2 times daily        Assessment & Plan   1. Hypertension, essential (Primary)  BP well controlled   Continue amlopdine coreg aldactone hydralizine olmesartan hytrin as is   - IN OFFICE EKG 12-LEAD (to Muse)    2. Coronary artery disease, unspecified vessel or lesion type, unspecified whether angina present, unspecified whether native or transplanted heart  Nonobstructive disease by [facility] 2023   - IN OFFICE EKG 12-LEAD (to Muse)    3. Hyperlipidemia, unspecified hyperlipidemia type  As per PCP   - IN OFFICE EKG 12-LEAD (to Muse)    4. Aortic atherosclerosis  As above     5. Type 2 diabetes mellitus with retinopathy of both eyes, without long-term current use of insulin, macular edema presence unspecified, unspecified retinopathy severity  A1C 5.9%   As per PCP     6. Severe obesity (BMI 35.0-39.9) with comorbidity  Just started GLP1s  Low level/low impact aerobic exercise 5x's/wk recommended. Heart healthy diet and risk factor modification discussed with patient.     7. Preoperative clearance   EKG today sinus dawit no ischemia   METS cannot be adequately assessed due to orthopedic issues   Will obtain echocardiogram and if unchanged from previous, ok for surgery at acceptable CV risks      6 mo Dr. Amkieh Naomi Peters, PA-C [facility]   Office: [phone]         [1]   Social History  Tobacco Use    Smoking status: Never    Smokeless tobacco: Never   Substance Use Topics    Alcohol use: No    Drug use: No

## 2025-05-26 RX ORDER — OMEGA-3-ACID ETHYL ESTERS 1 G/1
2 CAPSULE, LIQUID FILLED ORAL 2 TIMES DAILY
Qty: 360 CAPSULE | Refills: 1 | Status: SHIPPED | OUTPATIENT
Start: 2025-05-26

## 2025-05-26 NOTE — TELEPHONE ENCOUNTER
No care due was identified.  [facility] Embedded Care Due Messages. Reference number: [id number].   5/26/2025 6:22:00 PM CDT

## 2025-05-27 ENCOUNTER — HOSPITAL ENCOUNTER (OUTPATIENT)
Dept: CARDIOLOGY | Facility: HOSPITAL | Age: 75
Discharge: HOME | End: 2025-05-27
Payer: MEDICARE

## 2025-05-27 VITALS — HEIGHT: 66 IN | BODY MASS INDEX: 35.03 KG/M2 | WEIGHT: 218 LBS

## 2025-05-27 DIAGNOSIS — I25.10 CORONARY ARTERY DISEASE, UNSPECIFIED VESSEL OR LESION TYPE, UNSPECIFIED WHETHER ANGINA PRESENT, UNSPECIFIED WHETHER NATIVE OR TRANSPLANTED HEART: ICD-10-CM

## 2025-05-27 DIAGNOSIS — I10 HYPERTENSION, ESSENTIAL: ICD-10-CM

## 2025-05-27 PROCEDURE — 93306 TTE W/DOPPLER COMPLETE: CPT | Mod: 26,,, | Performed by: INTERNAL MEDICINE

## 2025-05-27 PROCEDURE — 93306 TTE W/DOPPLER COMPLETE: CPT | Mod: PO

## 2025-05-27 NOTE — TELEPHONE ENCOUNTER
Refill Decision Note   Chris Hill  is requesting a refill authorization.  Brief Assessment and Rationale for Refill:  Approve     Medication Therapy Plan:         Comments:     Note composed:11:29 PM 05/26/2025

## 2025-05-28 LAB
AORTIC SIZE INDEX (SOV): 1.6 CM/M2
AORTIC SIZE INDEX: 1.4 CM/M2
ASCENDING AORTA: 3 CM
AV INDEX (PROSTH): 0.67
AV MEAN GRADIENT: 3 MMHG
AV PEAK GRADIENT: 7 MMHG
AV REGURGITATION PRESSURE HALF TIME: 535 MS
AV VALVE AREA BY VELOCITY RATIO: 2.6 CM²
AV VALVE AREA: 2.5 CM²
AV VELOCITY RATIO: 0.69
BSA FOR ECHO PROCEDURE: 2.15 M2
CV ECHO LV RWT: 0.41 CM
DOP CALC AO PEAK VEL: 1.3 M/S
DOP CALC AO VTI: 26.2 CM
DOP CALC LVOT AREA: 3.8 CM2
DOP CALC LVOT DIAMETER: 2.2 CM
DOP CALC LVOT PEAK VEL: 0.9 M/S
DOP CALC LVOT STROKE VOLUME: 66.5 CM3
DOP CALCLVOT PEAK VEL VTI: 17.5 CM
E WAVE DECELERATION TIME: 195 MSEC
E/A RATIO: 0.78
E/E' RATIO: 9 M/S
ECHO LV POSTERIOR WALL: 1.1 CM (ref 0.6–1.1)
FRACTIONAL SHORTENING: 35.2 % (ref 28–44)
INTERVENTRICULAR SEPTUM: 1.5 CM (ref 0.6–1.1)
IVRT: 100 MSEC
LEFT ATRIUM AREA SYSTOLIC (APICAL 2 CHAMBER): 14.76 CM2
LEFT ATRIUM AREA SYSTOLIC (APICAL 4 CHAMBER): 15.45 CM2
LEFT ATRIUM SIZE: 4.5 CM
LEFT ATRIUM VOLUME INDEX MOD: 17 ML/M2
LEFT ATRIUM VOLUME MOD: 36 ML
LEFT INTERNAL DIMENSION IN SYSTOLE: 3.5 CM (ref 2.1–4)
LEFT VENTRICLE DIASTOLIC VOLUME INDEX: 69.57 ML/M2
LEFT VENTRICLE DIASTOLIC VOLUME: 144 ML
LEFT VENTRICLE END SYSTOLIC VOLUME APICAL 2 CHAMBER: 33.64 ML
LEFT VENTRICLE END SYSTOLIC VOLUME APICAL 4 CHAMBER: 38.4 ML
LEFT VENTRICLE MASS INDEX: 142.8 G/M2
LEFT VENTRICLE SYSTOLIC VOLUME INDEX: 24.6 ML/M2
LEFT VENTRICLE SYSTOLIC VOLUME: 51 ML
LEFT VENTRICULAR INTERNAL DIMENSION IN DIASTOLE: 5.4 CM (ref 3.5–6)
LEFT VENTRICULAR MASS: 295.6 G
LV LATERAL E/E' RATIO: 7.5 M/S
LV SEPTAL E/E' RATIO: 10 M/S
LVED V (TEICH): 143.89 ML
LVES V (TEICH): 51.03 ML
LVOT MG: 1.55 MMHG
LVOT MV: 0.59 CM/S
MV PEAK A VEL: 0.77 M/S
MV PEAK E VEL: 0.6 M/S
MV STENOSIS PRESSURE HALF TIME: 56.59 MS
MV VALVE AREA P 1/2 METHOD: 3.89 CM2
OHS CV RV/LV RATIO: 0.56 CM
PISA AR MAX VEL: 3.9 M/S
PISA TR MAX VEL: 1.2 M/S
PULM VEIN S/D RATIO: 0.88
PV PEAK D VEL: 0.52 M/S
PV PEAK S VEL: 0.46 M/S
RA PRESSURE ESTIMATED: 3 MMHG
RIGHT VENTRICLE DIASTOLIC BASEL DIMENSION: 3 CM
RIGHT VENTRICLE DIASTOLIC LENGTH: 6.7 CM
RIGHT VENTRICLE DIASTOLIC MID DIMENSION: 2.2 CM
RIGHT VENTRICULAR END-DIASTOLIC DIMENSION: 3.02 CM
RIGHT VENTRICULAR LENGTH IN DIASTOLE (APICAL 4-CHAMBER VIEW): 6.71 CM
RV MID DIAMA: 2.17 CM
RV TB RVSP: 4 MMHG
RV TISSUE DOPPLER FREE WALL SYSTOLIC VELOCITY 1 (APICAL 4 CHAMBER VIEW): 12.17 CM/S
SINUS: 3.35 CM
STJ: 3 CM
TDI LATERAL: 0.08 M/S
TDI SEPTAL: 0.06 M/S
TDI: 0.07 M/S
TR MAX PG: 6 MMHG
TRICUSPID ANNULAR PLANE SYSTOLIC EXCURSION: 2.7 CM
TV REST PULMONARY ARTERY PRESSURE: 9 MMHG
Z-SCORE OF LEFT VENTRICULAR DIMENSION IN END DIASTOLE: -1.53
Z-SCORE OF LEFT VENTRICULAR DIMENSION IN END SYSTOLE: -0.76

## 2025-05-29 ENCOUNTER — TELEPHONE (OUTPATIENT)
Dept: CARDIOLOGY | Facility: CLINIC | Age: 75
End: 2025-05-29
Payer: MEDICARE

## 2025-05-29 ENCOUNTER — RESULTS FOLLOW-UP (OUTPATIENT)
Dept: CARDIOLOGY | Facility: CLINIC | Age: 75
End: 2025-05-29

## 2025-05-29 NOTE — TELEPHONE ENCOUNTER
Surgical Clearance     Facility: [facility]   Procedure: Left hip total arthroplasty   Blood thinners: ASA  DOS: 6/6/25  Anesthesia: General   Fax: [phone]

## 2025-06-16 ENCOUNTER — TELEPHONE (OUTPATIENT)
Dept: FAMILY MEDICINE | Facility: CLINIC | Age: 75
End: 2025-06-16
Payer: MEDICARE

## 2025-06-16 NOTE — TELEPHONE ENCOUNTER
Copied from CRM #[id number]. Topic: General Inquiry - Patient Advice  >> Jun 9, 2025  1:29 PM Mert Grewal wrote:  Type: Needs Medical Advice  Who Called:  azar Swenson Call Back Number: [phone]   Additional Information: azar from  home health nurse calling about pt had left hip surgery  on Friday  and isn't acting the same , azar needs nurse to call her thank you

## 2025-06-18 ENCOUNTER — PATIENT MESSAGE (OUTPATIENT)
Dept: FAMILY MEDICINE | Facility: CLINIC | Age: 75
End: 2025-06-18
Payer: MEDICARE

## 2025-06-19 ENCOUNTER — OFFICE VISIT (OUTPATIENT)
Dept: FAMILY MEDICINE | Facility: CLINIC | Age: 75
End: 2025-06-19
Payer: MEDICARE

## 2025-06-19 VITALS
HEIGHT: 66 IN | DIASTOLIC BLOOD PRESSURE: 42 MMHG | BODY MASS INDEX: 34.51 KG/M2 | WEIGHT: 214.75 LBS | OXYGEN SATURATION: 94 % | SYSTOLIC BLOOD PRESSURE: 80 MMHG | TEMPERATURE: 98 F | HEART RATE: 52 BPM

## 2025-06-19 DIAGNOSIS — E11.319 TYPE 2 DIABETES MELLITUS WITH RETINOPATHY OF BOTH EYES, WITHOUT LONG-TERM CURRENT USE OF INSULIN, MACULAR EDEMA PRESENCE UNSPECIFIED, UNSPECIFIED RETINOPATHY SEVERITY: ICD-10-CM

## 2025-06-19 DIAGNOSIS — E03.9 HYPOTHYROIDISM, UNSPECIFIED TYPE: Primary | ICD-10-CM

## 2025-06-19 DIAGNOSIS — G47.33 OSA ON CPAP: ICD-10-CM

## 2025-06-19 DIAGNOSIS — Z96.642 S/P HIP REPLACEMENT, LEFT: ICD-10-CM

## 2025-06-19 PROCEDURE — 3066F NEPHROPATHY DOC TX: CPT | Mod: CPTII,S$GLB,, | Performed by: FAMILY MEDICINE

## 2025-06-19 PROCEDURE — 1101F PT FALLS ASSESS-DOCD LE1/YR: CPT | Mod: CPTII,S$GLB,, | Performed by: FAMILY MEDICINE

## 2025-06-19 PROCEDURE — 3044F HG A1C LEVEL LT 7.0%: CPT | Mod: CPTII,S$GLB,, | Performed by: FAMILY MEDICINE

## 2025-06-19 PROCEDURE — 99214 OFFICE O/P EST MOD 30 MIN: CPT | Mod: S$GLB,,, | Performed by: FAMILY MEDICINE

## 2025-06-19 PROCEDURE — 3074F SYST BP LT 130 MM HG: CPT | Mod: CPTII,S$GLB,, | Performed by: FAMILY MEDICINE

## 2025-06-19 PROCEDURE — 4010F ACE/ARB THERAPY RXD/TAKEN: CPT | Mod: CPTII,S$GLB,, | Performed by: FAMILY MEDICINE

## 2025-06-19 PROCEDURE — 1159F MED LIST DOCD IN RCRD: CPT | Mod: CPTII,S$GLB,, | Performed by: FAMILY MEDICINE

## 2025-06-19 PROCEDURE — 3078F DIAST BP <80 MM HG: CPT | Mod: CPTII,S$GLB,, | Performed by: FAMILY MEDICINE

## 2025-06-19 PROCEDURE — 99999 PR PBB SHADOW E&M-EST. PATIENT-LVL V: CPT | Mod: PBBFAC,,, | Performed by: FAMILY MEDICINE

## 2025-06-19 PROCEDURE — 1160F RVW MEDS BY RX/DR IN RCRD: CPT | Mod: CPTII,S$GLB,, | Performed by: FAMILY MEDICINE

## 2025-06-19 PROCEDURE — 3288F FALL RISK ASSESSMENT DOCD: CPT | Mod: CPTII,S$GLB,, | Performed by: FAMILY MEDICINE

## 2025-06-19 PROCEDURE — 3061F NEG MICROALBUMINURIA REV: CPT | Mod: CPTII,S$GLB,, | Performed by: FAMILY MEDICINE

## 2025-06-19 PROCEDURE — G2211 COMPLEX E/M VISIT ADD ON: HCPCS | Mod: S$GLB,,, | Performed by: FAMILY MEDICINE

## 2025-06-19 PROCEDURE — 1125F AMNT PAIN NOTED PAIN PRSNT: CPT | Mod: CPTII,S$GLB,, | Performed by: FAMILY MEDICINE

## 2025-06-19 PROCEDURE — 3008F BODY MASS INDEX DOCD: CPT | Mod: CPTII,S$GLB,, | Performed by: FAMILY MEDICINE

## 2025-06-21 PROBLEM — Z96.642 S/P HIP REPLACEMENT, LEFT: Status: ACTIVE | Noted: 2025-06-21

## 2025-06-21 NOTE — PROGRESS NOTES
Subjective:       Patient ID: Chris Hill Jr. is a 74 y.o. male.    Chief Complaint: Follow-up (Low blood pressure)    Two weeks post left hip replacement.  Generally doing well.  Had some hypotension preoperatively.  And blood pressure has remained low since then.  Became elevated without his medications were resumed all of them and dropped again at that point.  Still low today.  Diabetes mellitus with retinopathy has taken 1 Mounjaro shots since the surgery.  Hypothyroidism on his medication.  Obstructive sleep apnea using his CPAP regularly compliant and benefitting.  No melena.    Physical examination.  Vital signs noted.  Neck without bruit no adenopathy.  Alert oriented.  Chest clear.  Heart regular rate rhythm.  Abdomen bowel sounds positive soft nontender.  Extremities without edema.        Objective:        Assessment:       1. Hypothyroidism, unspecified type    2. Type 2 diabetes mellitus with retinopathy of both eyes, without long-term current use of insulin, macular edema presence unspecified, unspecified retinopathy severity    3. S/P hip replacement, left    4. LORA on CPAP    5. BMI 34.0-34.9,adult        Plan:       Hypothyroidism, unspecified type    Type 2 diabetes mellitus with retinopathy of both eyes, without long-term current use of insulin, macular edema presence unspecified, unspecified retinopathy severity  -     CBC Auto Differential; Future; Expected date: 07/19/2025  -     Comprehensive Metabolic Panel; Future; Expected date: 07/19/2025    S/P hip replacement, left    LORA on CPAP    BMI 34.0-34.9,adult    Continue the Mounjaro.  Discontinue the hydralazine.  Dropped Norvasc to 5 mg daily 1/2 of a 10 mg.  Continue the Coreg.  Hold the Hytrin also.  Follow-up in one-month with a CBC and a CMP.

## 2025-06-24 ENCOUNTER — LAB VISIT (OUTPATIENT)
Dept: LAB | Facility: HOSPITAL | Age: 75
End: 2025-06-24
Attending: UROLOGY
Payer: MEDICARE

## 2025-06-24 DIAGNOSIS — C61 PROSTATE CA: ICD-10-CM

## 2025-06-24 LAB
PSA SERPL-MCNC: 0.12 NG/ML (ref ?–4)
TESTOST SERPL-MCNC: 323 NG/DL (ref 304–1227)

## 2025-06-24 PROCEDURE — 84403 ASSAY OF TOTAL TESTOSTERONE: CPT

## 2025-06-24 PROCEDURE — 36415 COLL VENOUS BLD VENIPUNCTURE: CPT

## 2025-06-24 PROCEDURE — 84153 ASSAY OF PSA TOTAL: CPT

## 2025-07-17 ENCOUNTER — PATIENT MESSAGE (OUTPATIENT)
Dept: FAMILY MEDICINE | Facility: CLINIC | Age: 75
End: 2025-07-17
Payer: MEDICARE

## 2025-07-17 ENCOUNTER — LAB VISIT (OUTPATIENT)
Dept: LAB | Facility: HOSPITAL | Age: 75
End: 2025-07-17
Attending: INTERNAL MEDICINE
Payer: MEDICARE

## 2025-07-17 DIAGNOSIS — D64.9 ANEMIA, UNSPECIFIED TYPE: ICD-10-CM

## 2025-07-17 DIAGNOSIS — E53.8 VITAMIN B12 DEFICIENCY: ICD-10-CM

## 2025-07-17 DIAGNOSIS — D50.9 IRON DEFICIENCY ANEMIA, UNSPECIFIED IRON DEFICIENCY ANEMIA TYPE: ICD-10-CM

## 2025-07-17 LAB
ABSOLUTE EOSINOPHIL (SMH): 0.7 K/UL
ABSOLUTE MONOCYTE (SMH): 0.71 K/UL (ref 0.3–1)
ABSOLUTE NEUTROPHIL COUNT (SMH): 4.5 K/UL (ref 1.8–7.7)
ALBUMIN SERPL-MCNC: 3.7 G/DL (ref 3.5–5.2)
ALP SERPL-CCNC: 163 UNIT/L (ref 55–135)
ALT SERPL-CCNC: 33 UNIT/L (ref 10–44)
ANION GAP (SMH): 6 MMOL/L (ref 8–16)
AST SERPL-CCNC: 21 UNIT/L (ref 10–40)
BASOPHILS # BLD AUTO: 0.04 K/UL
BASOPHILS NFR BLD AUTO: 0.5 %
BILIRUB SERPL-MCNC: 0.3 MG/DL (ref 0.1–1)
BUN SERPL-MCNC: 26 MG/DL (ref 8–23)
CALCIUM SERPL-MCNC: 9.1 MG/DL (ref 8.7–10.5)
CHLORIDE SERPL-SCNC: 102 MMOL/L (ref 95–110)
CO2 SERPL-SCNC: 27 MMOL/L (ref 23–29)
CREAT SERPL-MCNC: 1.7 MG/DL (ref 0.5–1.4)
ERYTHROCYTE [DISTWIDTH] IN BLOOD BY AUTOMATED COUNT: 14.3 % (ref 11.5–14.5)
FERRITIN SERPL-MCNC: 283.5 NG/ML (ref 20–300)
FOLATE SERPL-MCNC: 7 NG/ML (ref 4–24)
GFR SERPLBLD CREATININE-BSD FMLA CKD-EPI: 42 ML/MIN/1.73/M2
GLUCOSE SERPL-MCNC: 101 MG/DL (ref 70–110)
HCT VFR BLD AUTO: 37.3 % (ref 40–54)
HGB BLD-MCNC: 11.7 GM/DL (ref 14–18)
IMM GRANULOCYTES # BLD AUTO: 0.11 K/UL (ref 0–0.04)
IMM GRANULOCYTES NFR BLD AUTO: 1.4 % (ref 0–0.5)
IRON SATN MFR SERPL: 21 % (ref 20–50)
IRON SERPL-MCNC: 54 UG/DL (ref 45–160)
LYMPHOCYTES # BLD AUTO: 1.76 K/UL (ref 1–4.8)
MCH RBC QN AUTO: 27.7 PG (ref 27–31)
MCHC RBC AUTO-ENTMCNC: 31.4 G/DL (ref 32–36)
MCV RBC AUTO: 88 FL (ref 82–98)
NUCLEATED RBC (/100WBC) (SMH): 0 /100 WBC
PLATELET # BLD AUTO: 191 K/UL (ref 150–450)
PMV BLD AUTO: 11.5 FL (ref 9.2–12.9)
POTASSIUM SERPL-SCNC: 4.6 MMOL/L (ref 3.5–5.1)
PROT SERPL-MCNC: 6.4 GM/DL (ref 6–8.4)
RBC # BLD AUTO: 4.22 M/UL (ref 4.6–6.2)
RELATIVE EOSINOPHIL (SMH): 8.9 % (ref 0–8)
RELATIVE LYMPHOCYTE (SMH): 22.4 % (ref 18–48)
RELATIVE MONOCYTE (SMH): 9 % (ref 4–15)
RELATIVE NEUTROPHIL (SMH): 57.8 % (ref 38–73)
SODIUM SERPL-SCNC: 135 MMOL/L (ref 136–145)
TIBC SERPL-MCNC: 260 UG/DL (ref 250–450)
TRANSFERRIN SERPL-MCNC: 186 MG/DL (ref 200–375)
VIT B12 SERPL-MCNC: 538 PG/ML (ref 210–950)
WBC # BLD AUTO: 7.86 K/UL (ref 3.9–12.7)

## 2025-07-17 PROCEDURE — 85025 COMPLETE CBC W/AUTO DIFF WBC: CPT

## 2025-07-17 PROCEDURE — 82746 ASSAY OF FOLIC ACID SERUM: CPT

## 2025-07-17 PROCEDURE — 82040 ASSAY OF SERUM ALBUMIN: CPT

## 2025-07-17 PROCEDURE — 82728 ASSAY OF FERRITIN: CPT

## 2025-07-17 PROCEDURE — 36415 COLL VENOUS BLD VENIPUNCTURE: CPT

## 2025-07-17 PROCEDURE — 84466 ASSAY OF TRANSFERRIN: CPT

## 2025-07-17 PROCEDURE — 82607 VITAMIN B-12: CPT

## 2025-07-24 ENCOUNTER — PATIENT MESSAGE (OUTPATIENT)
Dept: ADMINISTRATIVE | Facility: OTHER | Age: 75
End: 2025-07-24
Payer: MEDICARE

## 2025-07-24 ENCOUNTER — PATIENT MESSAGE (OUTPATIENT)
Dept: FAMILY MEDICINE | Facility: CLINIC | Age: 75
End: 2025-07-24
Payer: MEDICARE

## 2025-07-25 ENCOUNTER — LAB VISIT (OUTPATIENT)
Dept: LAB | Facility: HOSPITAL | Age: 75
End: 2025-07-25
Attending: FAMILY MEDICINE
Payer: MEDICARE

## 2025-07-25 DIAGNOSIS — E11.319 TYPE 2 DIABETES MELLITUS WITH RETINOPATHY OF BOTH EYES, WITHOUT LONG-TERM CURRENT USE OF INSULIN, MACULAR EDEMA PRESENCE UNSPECIFIED, UNSPECIFIED RETINOPATHY SEVERITY: ICD-10-CM

## 2025-07-25 LAB
ALBUMIN SERPL-MCNC: 3.7 G/DL (ref 3.5–5.2)
ALP SERPL-CCNC: 105 UNIT/L (ref 55–135)
ALT SERPL-CCNC: 17 UNIT/L (ref 10–44)
ANION GAP (SMH): 4 MMOL/L (ref 8–16)
AST SERPL-CCNC: 14 UNIT/L (ref 10–40)
BILIRUB SERPL-MCNC: 0.3 MG/DL (ref 0.1–1)
BUN SERPL-MCNC: 23 MG/DL (ref 8–23)
CALCIUM SERPL-MCNC: 9 MG/DL (ref 8.7–10.5)
CHLORIDE SERPL-SCNC: 106 MMOL/L (ref 95–110)
CO2 SERPL-SCNC: 25 MMOL/L (ref 23–29)
CREAT SERPL-MCNC: 1.7 MG/DL (ref 0.5–1.4)
GFR SERPLBLD CREATININE-BSD FMLA CKD-EPI: 42 ML/MIN/1.73/M2
GLUCOSE SERPL-MCNC: 97 MG/DL (ref 70–110)
POTASSIUM SERPL-SCNC: 4.8 MMOL/L (ref 3.5–5.1)
PROT SERPL-MCNC: 6.3 GM/DL (ref 6–8.4)
SODIUM SERPL-SCNC: 135 MMOL/L (ref 136–145)

## 2025-07-25 PROCEDURE — 36415 COLL VENOUS BLD VENIPUNCTURE: CPT

## 2025-07-25 PROCEDURE — 80053 COMPREHEN METABOLIC PANEL: CPT

## 2025-07-31 ENCOUNTER — OFFICE VISIT (OUTPATIENT)
Dept: FAMILY MEDICINE | Facility: CLINIC | Age: 75
End: 2025-07-31
Payer: MEDICARE

## 2025-07-31 VITALS
SYSTOLIC BLOOD PRESSURE: 108 MMHG | OXYGEN SATURATION: 95 % | DIASTOLIC BLOOD PRESSURE: 62 MMHG | BODY MASS INDEX: 30.7 KG/M2 | WEIGHT: 191 LBS | HEIGHT: 66 IN | TEMPERATURE: 98 F | HEART RATE: 62 BPM

## 2025-07-31 DIAGNOSIS — I25.10 CORONARY ARTERY DISEASE, UNSPECIFIED VESSEL OR LESION TYPE, UNSPECIFIED WHETHER ANGINA PRESENT, UNSPECIFIED WHETHER NATIVE OR TRANSPLANTED HEART: ICD-10-CM

## 2025-07-31 DIAGNOSIS — E78.5 HYPERLIPIDEMIA, UNSPECIFIED HYPERLIPIDEMIA TYPE: ICD-10-CM

## 2025-07-31 DIAGNOSIS — Z97.4 HEARING AID WORN: ICD-10-CM

## 2025-07-31 DIAGNOSIS — E03.9 HYPOTHYROIDISM, UNSPECIFIED TYPE: ICD-10-CM

## 2025-07-31 DIAGNOSIS — E11.22 TYPE 2 DIABETES MELLITUS WITH STAGE 3B CHRONIC KIDNEY DISEASE, WITHOUT LONG-TERM CURRENT USE OF INSULIN: ICD-10-CM

## 2025-07-31 DIAGNOSIS — I10 HYPERTENSION, ESSENTIAL: Primary | ICD-10-CM

## 2025-07-31 DIAGNOSIS — N18.32 TYPE 2 DIABETES MELLITUS WITH STAGE 3B CHRONIC KIDNEY DISEASE, WITHOUT LONG-TERM CURRENT USE OF INSULIN: ICD-10-CM

## 2025-07-31 DIAGNOSIS — E11.319 TYPE 2 DIABETES MELLITUS WITH RETINOPATHY OF BOTH EYES, WITHOUT LONG-TERM CURRENT USE OF INSULIN, MACULAR EDEMA PRESENCE UNSPECIFIED, UNSPECIFIED RETINOPATHY SEVERITY: ICD-10-CM

## 2025-07-31 DIAGNOSIS — Z79.82 ASPIRIN LONG-TERM USE: ICD-10-CM

## 2025-07-31 DIAGNOSIS — Z96.642 S/P HIP REPLACEMENT, LEFT: ICD-10-CM

## 2025-07-31 DIAGNOSIS — G47.33 OSA ON CPAP: ICD-10-CM

## 2025-07-31 DIAGNOSIS — H91.90 HEARING LOSS, UNSPECIFIED HEARING LOSS TYPE, UNSPECIFIED LATERALITY: ICD-10-CM

## 2025-07-31 PROCEDURE — 99999 PR PBB SHADOW E&M-EST. PATIENT-LVL V: CPT | Mod: PBBFAC,,, | Performed by: FAMILY MEDICINE

## 2025-07-31 PROCEDURE — 3044F HG A1C LEVEL LT 7.0%: CPT | Mod: CPTII,S$GLB,, | Performed by: FAMILY MEDICINE

## 2025-07-31 PROCEDURE — 3074F SYST BP LT 130 MM HG: CPT | Mod: CPTII,S$GLB,, | Performed by: FAMILY MEDICINE

## 2025-07-31 PROCEDURE — 1126F AMNT PAIN NOTED NONE PRSNT: CPT | Mod: CPTII,S$GLB,, | Performed by: FAMILY MEDICINE

## 2025-07-31 PROCEDURE — 1101F PT FALLS ASSESS-DOCD LE1/YR: CPT | Mod: CPTII,S$GLB,, | Performed by: FAMILY MEDICINE

## 2025-07-31 PROCEDURE — 4010F ACE/ARB THERAPY RXD/TAKEN: CPT | Mod: CPTII,S$GLB,, | Performed by: FAMILY MEDICINE

## 2025-07-31 PROCEDURE — 3078F DIAST BP <80 MM HG: CPT | Mod: CPTII,S$GLB,, | Performed by: FAMILY MEDICINE

## 2025-07-31 PROCEDURE — 3061F NEG MICROALBUMINURIA REV: CPT | Mod: CPTII,S$GLB,, | Performed by: FAMILY MEDICINE

## 2025-07-31 PROCEDURE — 3008F BODY MASS INDEX DOCD: CPT | Mod: CPTII,S$GLB,, | Performed by: FAMILY MEDICINE

## 2025-07-31 PROCEDURE — 3288F FALL RISK ASSESSMENT DOCD: CPT | Mod: CPTII,S$GLB,, | Performed by: FAMILY MEDICINE

## 2025-07-31 PROCEDURE — 1159F MED LIST DOCD IN RCRD: CPT | Mod: CPTII,S$GLB,, | Performed by: FAMILY MEDICINE

## 2025-07-31 PROCEDURE — 3066F NEPHROPATHY DOC TX: CPT | Mod: CPTII,S$GLB,, | Performed by: FAMILY MEDICINE

## 2025-07-31 PROCEDURE — 99214 OFFICE O/P EST MOD 30 MIN: CPT | Mod: S$GLB,,, | Performed by: FAMILY MEDICINE

## 2025-07-31 PROCEDURE — G2211 COMPLEX E/M VISIT ADD ON: HCPCS | Mod: S$GLB,,, | Performed by: FAMILY MEDICINE

## 2025-07-31 PROCEDURE — 1160F RVW MEDS BY RX/DR IN RCRD: CPT | Mod: CPTII,S$GLB,, | Performed by: FAMILY MEDICINE

## 2025-07-31 RX ORDER — TIRZEPATIDE 2.5 MG/.5ML
2.5 INJECTION, SOLUTION SUBCUTANEOUS
Qty: 4 PEN | Refills: 2 | Status: SHIPPED | OUTPATIENT
Start: 2025-07-31

## 2025-07-31 NOTE — PROGRESS NOTES
SCRIBE #1 NOTE: I, Gokul John, am scribing for, and in the presence of,  Samy Pettit III, MD. I have scribed the entire note.     Subjective:       Patient ID: Chris Hill Jr. is a 74 y.o. male.    Chief Complaint: Hospital Follow Up (BP issues)    Mr. Hill is here for a hospital follow up with his last appointment being here on June 19, 2025. Accompanied by wife. Wears hearing aids due to hearing loss. S/P left hip replacement with Dr. Cain with [facility] for 8 weeks. This is doing well. He is in therapy for this. BMI of 30.83, down 24 pounds. Cardiovascular good. No chest pain. No palpitations. Blood pressure is 108/62. Hypertension controlled. Hyperlipidemia. On Repatha. Type 2 diabetes mellitus with retinopathy. Also with CKD 3B. On Mounjaro 2.5mg. States he can be sick the first couple days. Blood sugar is 97 in the morning. CAD. Using aspirin 81mg. CKD 3B. GFR is 42, was 60. No NSAIDs. Hypothyroidism. Obstructive sleep apnea. Using CPAP occasionally. B12 is 538. Iron is 54. TIBC is 260. Ferritin is 283.5. Hemoglobin is 13.5. Eye exam is due with Dr. Judd.     Review of Systems   Constitutional:  Negative for chills and fever.   HENT:  Negative for congestion and sore throat.    Eyes:  Negative for pain and visual disturbance.   Respiratory:  Negative for chest tightness and shortness of breath.    Cardiovascular:  Negative for chest pain.   Gastrointestinal:  Negative for nausea.   Endocrine: Negative for polydipsia and polyuria.   Genitourinary:  Negative for dysuria and flank pain.   Musculoskeletal:  Negative for back pain, neck pain and neck stiffness.   Skin:  Negative for rash.   Allergic/Immunologic: Negative for immunocompromised state.   Neurological:  Negative for dizziness and weakness.   Hematological:  Does not bruise/bleed easily.   Psychiatric/Behavioral:  Negative for agitation and behavioral problems.    All other systems reviewed and are negative.       Objective:      Physical examination: Vital signs noted. No acute distress. No carotid bruit. Regular heart rate and rhythm. Lungs clear to auscultation bilaterally. Abdomen bowel sounds positive soft and nontender. Extremities without edema. 2+ pedal pulses.      Assessment:       1. Hypertension, essential    2. Aspirin long-term use    3. Hyperlipidemia, unspecified hyperlipidemia type    4. Coronary artery disease, unspecified vessel or lesion type, unspecified whether angina present, unspecified whether native or transplanted heart    5. Hypothyroidism, unspecified type    6. S/P hip replacement, left    7. LORA on CPAP    8. Type 2 diabetes mellitus with retinopathy of both eyes, without long-term current use of insulin, macular edema presence unspecified, unspecified retinopathy severity    9. Type 2 diabetes mellitus with stage 3b chronic kidney disease, without long-term current use of insulin    10. Hearing aid worn    11. Hearing loss, unspecified hearing loss type, unspecified laterality    12. BMI 30.0-30.9,adult        Plan:       Hypertension, essential  -     Comprehensive Metabolic Panel; Future; Expected date: 10/31/2025    Aspirin long-term use    Hyperlipidemia, unspecified hyperlipidemia type  -     Lipid Panel; Future; Expected date: 10/31/2025    Coronary artery disease, unspecified vessel or lesion type, unspecified whether angina present, unspecified whether native or transplanted heart    Hypothyroidism, unspecified type    S/P hip replacement, left    LORA on CPAP    Type 2 diabetes mellitus with retinopathy of both eyes, without long-term current use of insulin, macular edema presence unspecified, unspecified retinopathy severity  -     tirzepatide (MOUNJARO) 2.5 mg/0.5 mL PnIj; Inject 2.5 mg into the skin every 7 days.  Dispense: 4 Pen; Refill: 2    Type 2 diabetes mellitus with stage 3b chronic kidney disease, without long-term current use of insulin  -     Hemoglobin A1C; Future;  Expected date: 10/31/2025    Hearing aid worn    Hearing loss, unspecified hearing loss type, unspecified laterality    BMI 30.0-30.9,adult    Hypertension is under good control.  Continue use of hearing aids.  Continue use of his aspirin.  Coronary artery disease is stable.  Blood sugar under good control.  Needs to use his CPAP regularly.  CKD 3B decreased slightly at 42.  Needs to go for his diabetic eye exam with Dr. Judd.  Check lipids A1c and CMP.  Mounjaro 2.5 weekly 4 pens with 3 refills.  Follow-up here in 3 months.  Having little bit of Mounjaro related nausea but not enough to discontinue the medication.  All care gaps addressed or discussed.     I, Samy Pettit III, MD, personally performed the services described in this documentation. All medical record entries made by the scribe were at my direction and in my presence. I have reviewed the chart and agree that the record reflects my personal performance and is accurate and complete.

## 2025-08-01 PROBLEM — N18.32 TYPE 2 DIABETES MELLITUS WITH STAGE 3B CHRONIC KIDNEY DISEASE, WITHOUT LONG-TERM CURRENT USE OF INSULIN: Status: ACTIVE | Noted: 2025-08-01

## 2025-08-01 PROBLEM — E11.22 TYPE 2 DIABETES MELLITUS WITH STAGE 3B CHRONIC KIDNEY DISEASE, WITHOUT LONG-TERM CURRENT USE OF INSULIN: Status: ACTIVE | Noted: 2025-08-01

## 2025-08-04 LAB
LEFT EYE DM RETINOPATHY: POSITIVE
RIGHT EYE DM RETINOPATHY: POSITIVE

## 2025-08-12 ENCOUNTER — TELEPHONE (OUTPATIENT)
Dept: UROLOGY | Facility: CLINIC | Age: 75
End: 2025-08-12
Payer: MEDICARE

## 2025-08-19 ENCOUNTER — PATIENT OUTREACH (OUTPATIENT)
Dept: ADMINISTRATIVE | Facility: HOSPITAL | Age: 75
End: 2025-08-19
Payer: MEDICARE

## 2025-08-22 ENCOUNTER — PATIENT MESSAGE (OUTPATIENT)
Dept: ADMINISTRATIVE | Facility: OTHER | Age: 75
End: 2025-08-22
Payer: MEDICARE

## 2025-08-25 DIAGNOSIS — E11.319 TYPE 2 DIABETES MELLITUS WITH RETINOPATHY OF BOTH EYES, WITHOUT LONG-TERM CURRENT USE OF INSULIN, MACULAR EDEMA PRESENCE UNSPECIFIED, UNSPECIFIED RETINOPATHY SEVERITY: ICD-10-CM

## 2025-08-26 RX ORDER — TIRZEPATIDE 2.5 MG/.5ML
2.5 INJECTION, SOLUTION SUBCUTANEOUS
Qty: 12 PEN | Refills: 0 | Status: SHIPPED | OUTPATIENT
Start: 2025-08-26

## 2025-08-28 ENCOUNTER — TELEPHONE (OUTPATIENT)
Dept: UROLOGY | Facility: CLINIC | Age: 75
End: 2025-08-28
Payer: MEDICARE

## (undated) DEVICE — SYS LABEL CORRECT MED

## (undated) DEVICE — GLOVE SURG ULTRA TOUCH 7

## (undated) DEVICE — APPLICATOR CHLORAPREP ORN 26ML

## (undated) DEVICE — NDL SPINAL 22GX7 SPINOCAN

## (undated) DEVICE — SEE MEDLINE ITEM 146292

## (undated) DEVICE — Device

## (undated) DEVICE — GAUZE SPONGE 4X4 12PLY